# Patient Record
Sex: FEMALE | Race: WHITE | Employment: OTHER
[De-identification: names, ages, dates, MRNs, and addresses within clinical notes are randomized per-mention and may not be internally consistent; named-entity substitution may affect disease eponyms.]

---

## 2017-01-02 DIAGNOSIS — G25.81 RLS (RESTLESS LEGS SYNDROME): ICD-10-CM

## 2017-01-04 ENCOUNTER — OFFICE VISIT (OUTPATIENT)
Dept: INTERNAL MEDICINE | Facility: CLINIC | Age: 57
End: 2017-01-04

## 2017-01-04 VITALS
WEIGHT: 194 LBS | DIASTOLIC BLOOD PRESSURE: 72 MMHG | BODY MASS INDEX: 32.32 KG/M2 | SYSTOLIC BLOOD PRESSURE: 122 MMHG | HEART RATE: 93 BPM

## 2017-01-04 DIAGNOSIS — E10.43 TYPE 1 DIABETES MELLITUS WITH DIABETIC AUTONOMIC NEUROPATHY (HCC): ICD-10-CM

## 2017-01-04 DIAGNOSIS — J32.0 CHRONIC MAXILLARY SINUSITIS: ICD-10-CM

## 2017-01-04 DIAGNOSIS — J44.9 CHRONIC OBSTRUCTIVE PULMONARY DISEASE, UNSPECIFIED COPD TYPE (HCC): Primary | ICD-10-CM

## 2017-01-04 DIAGNOSIS — S09.90XA HEAD INJURIES, INITIAL ENCOUNTER: ICD-10-CM

## 2017-01-04 PROCEDURE — 99214 OFFICE O/P EST MOD 30 MIN: CPT | Performed by: INTERNAL MEDICINE

## 2017-01-04 RX ORDER — LORATADINE 10 MG/1
10 TABLET ORAL DAILY
Qty: 10 TABLET | Refills: 0 | Status: SHIPPED | OUTPATIENT
Start: 2017-01-04 | End: 2017-01-14

## 2017-01-04 RX ORDER — FLUTICASONE PROPIONATE 50 MCG
1 SPRAY, SUSPENSION (ML) NASAL DAILY
Qty: 1 BOTTLE | Refills: 3 | Status: SHIPPED | OUTPATIENT
Start: 2017-01-04 | End: 2017-05-18

## 2017-01-05 RX ORDER — TORSEMIDE 20 MG/1
TABLET ORAL
Qty: 30 TABLET | Refills: 11 | Status: SHIPPED | OUTPATIENT
Start: 2017-01-05 | End: 2018-02-06 | Stop reason: SDUPTHER

## 2017-01-06 ASSESSMENT — ENCOUNTER SYMPTOMS
RHINORRHEA: 1
ABDOMINAL DISTENTION: 0
ABDOMINAL PAIN: 0
BACK PAIN: 0
CHEST TIGHTNESS: 0
EYE DISCHARGE: 0
APNEA: 0
EYE ITCHING: 0

## 2017-01-12 ENCOUNTER — OFFICE VISIT (OUTPATIENT)
Dept: INTERNAL MEDICINE | Facility: CLINIC | Age: 57
End: 2017-01-12

## 2017-01-12 VITALS
HEIGHT: 65 IN | SYSTOLIC BLOOD PRESSURE: 124 MMHG | DIASTOLIC BLOOD PRESSURE: 68 MMHG | BODY MASS INDEX: 33.49 KG/M2 | WEIGHT: 201 LBS

## 2017-01-12 DIAGNOSIS — N60.02 CYST (SOLITARY) OF BREAST, LEFT: ICD-10-CM

## 2017-01-12 DIAGNOSIS — J01.00 ACUTE MAXILLARY SINUSITIS, RECURRENCE NOT SPECIFIED: ICD-10-CM

## 2017-01-12 DIAGNOSIS — Z96.41 INSULIN PUMP IN PLACE: ICD-10-CM

## 2017-01-12 DIAGNOSIS — M48.04 SPINAL STENOSIS, THORACIC: ICD-10-CM

## 2017-01-12 DIAGNOSIS — E13.319 RETINOPATHY DUE TO SECONDARY DM (HCC): ICD-10-CM

## 2017-01-12 DIAGNOSIS — H65.92 LEFT NON-SUPPURATIVE OTITIS MEDIA: ICD-10-CM

## 2017-01-12 DIAGNOSIS — G25.81 RESTLESS LEGS SYNDROME: ICD-10-CM

## 2017-01-12 DIAGNOSIS — E10.43 TYPE 1 DIABETES MELLITUS WITH DIABETIC AUTONOMIC NEUROPATHY, WITH LONG-TERM CURRENT USE OF INSULIN (HCC): ICD-10-CM

## 2017-01-12 DIAGNOSIS — F32.89 OTHER DEPRESSION: ICD-10-CM

## 2017-01-12 DIAGNOSIS — E78.2 MIXED HYPERLIPIDEMIA: ICD-10-CM

## 2017-01-12 DIAGNOSIS — J44.9 CHRONIC OBSTRUCTIVE PULMONARY DISEASE, UNSPECIFIED COPD TYPE (HCC): ICD-10-CM

## 2017-01-12 DIAGNOSIS — E10.43 TYPE 1 DIABETES MELLITUS WITH DIABETIC AUTONOMIC NEUROPATHY (HCC): Primary | ICD-10-CM

## 2017-01-12 DIAGNOSIS — E03.4 HYPOTHYROIDISM DUE TO ACQUIRED ATROPHY OF THYROID: ICD-10-CM

## 2017-01-12 DIAGNOSIS — I10 ESSENTIAL HYPERTENSION: ICD-10-CM

## 2017-01-12 DIAGNOSIS — I73.9 PERIPHERAL VASCULAR DISEASE (HCC): ICD-10-CM

## 2017-01-12 PROCEDURE — 3023F SPIROM DOC REV: CPT | Performed by: INTERNAL MEDICINE

## 2017-01-12 PROCEDURE — G8427 DOCREV CUR MEDS BY ELIG CLIN: HCPCS | Performed by: INTERNAL MEDICINE

## 2017-01-12 PROCEDURE — 3017F COLORECTAL CA SCREEN DOC REV: CPT | Performed by: INTERNAL MEDICINE

## 2017-01-12 PROCEDURE — G8419 CALC BMI OUT NRM PARAM NOF/U: HCPCS | Performed by: INTERNAL MEDICINE

## 2017-01-12 PROCEDURE — G8926 SPIRO NO PERF OR DOC: HCPCS | Performed by: INTERNAL MEDICINE

## 2017-01-12 PROCEDURE — 1036F TOBACCO NON-USER: CPT | Performed by: INTERNAL MEDICINE

## 2017-01-12 PROCEDURE — 3014F SCREEN MAMMO DOC REV: CPT | Performed by: INTERNAL MEDICINE

## 2017-01-12 PROCEDURE — 99214 OFFICE O/P EST MOD 30 MIN: CPT | Performed by: INTERNAL MEDICINE

## 2017-01-12 PROCEDURE — 3045F PR MOST RECENT HEMOGLOBIN A1C LEVEL 7.0-9.0%: CPT | Performed by: INTERNAL MEDICINE

## 2017-01-12 PROCEDURE — G8484 FLU IMMUNIZE NO ADMIN: HCPCS | Performed by: INTERNAL MEDICINE

## 2017-01-12 RX ORDER — AMOXICILLIN AND CLAVULANATE POTASSIUM 875; 125 MG/1; MG/1
1 TABLET, FILM COATED ORAL 2 TIMES DAILY
Qty: 20 TABLET | Refills: 1 | Status: SHIPPED | OUTPATIENT
Start: 2017-01-12 | End: 2017-01-22

## 2017-01-12 RX ORDER — METHYLPREDNISOLONE ACETATE 40 MG/ML
40 INJECTION, SUSPENSION INTRA-ARTICULAR; INTRALESIONAL; INTRAMUSCULAR; SOFT TISSUE ONCE
Qty: 1 ML | Refills: 0
Start: 2017-01-12 | End: 2017-01-12

## 2017-01-12 ASSESSMENT — ENCOUNTER SYMPTOMS
ABDOMINAL PAIN: 0
SHORTNESS OF BREATH: 0

## 2017-01-25 ENCOUNTER — OFFICE VISIT (OUTPATIENT)
Dept: INTERNAL MEDICINE | Facility: CLINIC | Age: 57
End: 2017-01-25

## 2017-01-25 VITALS
HEART RATE: 92 BPM | HEIGHT: 65 IN | WEIGHT: 201 LBS | SYSTOLIC BLOOD PRESSURE: 132 MMHG | BODY MASS INDEX: 33.49 KG/M2 | DIASTOLIC BLOOD PRESSURE: 70 MMHG

## 2017-01-25 DIAGNOSIS — E08.21 DIABETES MELLITUS DUE TO UNDERLYING CONDITION WITH DIABETIC NEPHROPATHY, WITHOUT LONG-TERM CURRENT USE OF INSULIN (HCC): ICD-10-CM

## 2017-01-25 DIAGNOSIS — E10.43 TYPE 1 DIABETES MELLITUS WITH DIABETIC AUTONOMIC NEUROPATHY (HCC): Primary | ICD-10-CM

## 2017-01-25 DIAGNOSIS — R10.84 GENERALIZED ABDOMINAL PAIN: ICD-10-CM

## 2017-01-25 PROCEDURE — G8419 CALC BMI OUT NRM PARAM NOF/U: HCPCS | Performed by: INTERNAL MEDICINE

## 2017-01-25 PROCEDURE — 99213 OFFICE O/P EST LOW 20 MIN: CPT | Performed by: INTERNAL MEDICINE

## 2017-01-25 PROCEDURE — G8484 FLU IMMUNIZE NO ADMIN: HCPCS | Performed by: INTERNAL MEDICINE

## 2017-01-25 PROCEDURE — 1036F TOBACCO NON-USER: CPT | Performed by: INTERNAL MEDICINE

## 2017-01-25 PROCEDURE — 3045F PR MOST RECENT HEMOGLOBIN A1C LEVEL 7.0-9.0%: CPT | Performed by: INTERNAL MEDICINE

## 2017-01-25 PROCEDURE — 3017F COLORECTAL CA SCREEN DOC REV: CPT | Performed by: INTERNAL MEDICINE

## 2017-01-25 PROCEDURE — 3014F SCREEN MAMMO DOC REV: CPT | Performed by: INTERNAL MEDICINE

## 2017-01-25 PROCEDURE — G8427 DOCREV CUR MEDS BY ELIG CLIN: HCPCS | Performed by: INTERNAL MEDICINE

## 2017-01-25 RX ORDER — DICYCLOMINE HYDROCHLORIDE 10 MG/1
10 CAPSULE ORAL 4 TIMES DAILY
Qty: 60 CAPSULE | Refills: 3 | Status: SHIPPED | OUTPATIENT
Start: 2017-01-25 | End: 2017-05-18

## 2017-01-26 ASSESSMENT — ENCOUNTER SYMPTOMS
CHEST TIGHTNESS: 0
ABDOMINAL DISTENTION: 0
EYE ITCHING: 0
ABDOMINAL PAIN: 1
EYE DISCHARGE: 0
BACK PAIN: 0
APNEA: 0
COLOR CHANGE: 0

## 2017-02-16 ENCOUNTER — OFFICE VISIT (OUTPATIENT)
Dept: INTERNAL MEDICINE | Facility: CLINIC | Age: 57
End: 2017-02-16

## 2017-02-16 VITALS
HEIGHT: 65 IN | DIASTOLIC BLOOD PRESSURE: 74 MMHG | WEIGHT: 204 LBS | SYSTOLIC BLOOD PRESSURE: 138 MMHG | BODY MASS INDEX: 33.99 KG/M2

## 2017-02-16 DIAGNOSIS — L02.92 BOIL: ICD-10-CM

## 2017-02-16 DIAGNOSIS — E78.2 MIXED HYPERLIPIDEMIA: ICD-10-CM

## 2017-02-16 DIAGNOSIS — E10.43 TYPE 1 DIABETES MELLITUS WITH DIABETIC AUTONOMIC NEUROPATHY (HCC): Primary | ICD-10-CM

## 2017-02-16 DIAGNOSIS — Z96.41 INSULIN PUMP IN PLACE: ICD-10-CM

## 2017-02-16 DIAGNOSIS — E10.43 TYPE 1 DIABETES MELLITUS WITH DIABETIC AUTONOMIC NEUROPATHY, WITH LONG-TERM CURRENT USE OF INSULIN (HCC): ICD-10-CM

## 2017-02-16 DIAGNOSIS — I10 ESSENTIAL HYPERTENSION: ICD-10-CM

## 2017-02-16 DIAGNOSIS — I73.9 PERIPHERAL VASCULAR DISEASE (HCC): ICD-10-CM

## 2017-02-16 DIAGNOSIS — M54.14 THORACIC RADICULOPATHY: Chronic | ICD-10-CM

## 2017-02-16 DIAGNOSIS — E03.4 HYPOTHYROIDISM DUE TO ACQUIRED ATROPHY OF THYROID: ICD-10-CM

## 2017-02-16 DIAGNOSIS — G89.4 CHRONIC PAIN SYNDROME: Chronic | ICD-10-CM

## 2017-02-16 PROCEDURE — G8484 FLU IMMUNIZE NO ADMIN: HCPCS | Performed by: INTERNAL MEDICINE

## 2017-02-16 PROCEDURE — G8427 DOCREV CUR MEDS BY ELIG CLIN: HCPCS | Performed by: INTERNAL MEDICINE

## 2017-02-16 PROCEDURE — 1036F TOBACCO NON-USER: CPT | Performed by: INTERNAL MEDICINE

## 2017-02-16 PROCEDURE — 99214 OFFICE O/P EST MOD 30 MIN: CPT | Performed by: INTERNAL MEDICINE

## 2017-02-16 PROCEDURE — G8417 CALC BMI ABV UP PARAM F/U: HCPCS | Performed by: INTERNAL MEDICINE

## 2017-02-16 PROCEDURE — 3017F COLORECTAL CA SCREEN DOC REV: CPT | Performed by: INTERNAL MEDICINE

## 2017-02-16 PROCEDURE — 3045F PR MOST RECENT HEMOGLOBIN A1C LEVEL 7.0-9.0%: CPT | Performed by: INTERNAL MEDICINE

## 2017-02-16 PROCEDURE — 3014F SCREEN MAMMO DOC REV: CPT | Performed by: INTERNAL MEDICINE

## 2017-02-16 RX ORDER — DOXYCYCLINE HYCLATE 100 MG/1
100 CAPSULE ORAL 2 TIMES DAILY
Qty: 20 CAPSULE | Refills: 0 | Status: SHIPPED | OUTPATIENT
Start: 2017-02-16 | End: 2017-02-26

## 2017-02-16 RX ORDER — AMOXICILLIN AND CLAVULANATE POTASSIUM 875; 125 MG/1; MG/1
1 TABLET, FILM COATED ORAL 2 TIMES DAILY
Qty: 20 TABLET | Refills: 0 | Status: SHIPPED | OUTPATIENT
Start: 2017-02-16 | End: 2017-02-26

## 2017-02-23 ASSESSMENT — ENCOUNTER SYMPTOMS
SHORTNESS OF BREATH: 0
ABDOMINAL PAIN: 0
ROS SKIN COMMENTS: BOIL

## 2017-03-15 ENCOUNTER — HOSPITAL ENCOUNTER (OUTPATIENT)
Dept: CT IMAGING | Age: 57
Discharge: HOME | End: 2017-03-15
Payer: MEDICARE

## 2017-03-15 DIAGNOSIS — R10.84 GENERALIZED ABDOMINAL PAIN: ICD-10-CM

## 2017-03-15 LAB
BUN BLDV-MCNC: 19 MG/DL (ref 6–20)
CREAT SERPL-MCNC: 0.85 MG/DL (ref 0.5–0.9)
GFR AFRICAN AMERICAN: >60 ML/MIN
GFR NON-AFRICAN AMERICAN: >60 ML/MIN
GFR SERPL CREATININE-BSD FRML MDRD: NORMAL ML/MIN/{1.73_M2}
GFR SERPL CREATININE-BSD FRML MDRD: NORMAL ML/MIN/{1.73_M2}

## 2017-03-15 PROCEDURE — 36415 COLL VENOUS BLD VENIPUNCTURE: CPT

## 2017-03-15 PROCEDURE — 84520 ASSAY OF UREA NITROGEN: CPT

## 2017-03-15 PROCEDURE — 6360000004 HC RX CONTRAST MEDICATION: Performed by: SURGERY

## 2017-03-15 PROCEDURE — 82565 ASSAY OF CREATININE: CPT

## 2017-03-15 PROCEDURE — 74177 CT ABD & PELVIS W/CONTRAST: CPT

## 2017-03-15 RX ORDER — 0.9 % SODIUM CHLORIDE 0.9 %
100 INTRAVENOUS SOLUTION INTRAVENOUS ONCE
Status: DISCONTINUED | OUTPATIENT
Start: 2017-03-15 | End: 2017-03-18 | Stop reason: HOSPADM

## 2017-03-15 RX ORDER — SODIUM CHLORIDE 0.9 % (FLUSH) 0.9 %
10 SYRINGE (ML) INJECTION PRN
Status: DISCONTINUED | OUTPATIENT
Start: 2017-03-15 | End: 2017-03-18 | Stop reason: HOSPADM

## 2017-03-15 RX ADMIN — IOVERSOL 130 ML: 741 INJECTION INTRA-ARTERIAL; INTRAVENOUS at 12:42

## 2017-03-23 ENCOUNTER — OFFICE VISIT (OUTPATIENT)
Dept: INTERNAL MEDICINE CLINIC | Age: 57
End: 2017-03-23
Payer: MEDICARE

## 2017-03-23 VITALS
SYSTOLIC BLOOD PRESSURE: 138 MMHG | DIASTOLIC BLOOD PRESSURE: 74 MMHG | WEIGHT: 202 LBS | HEIGHT: 65 IN | BODY MASS INDEX: 33.66 KG/M2

## 2017-03-23 DIAGNOSIS — F32.89 OTHER DEPRESSION: ICD-10-CM

## 2017-03-23 DIAGNOSIS — I10 ESSENTIAL HYPERTENSION: ICD-10-CM

## 2017-03-23 DIAGNOSIS — Z96.41 INSULIN PUMP IN PLACE: ICD-10-CM

## 2017-03-23 DIAGNOSIS — M25.561 KNEE PAIN, RIGHT ANTERIOR: Primary | ICD-10-CM

## 2017-03-23 DIAGNOSIS — E10.43 TYPE 1 DIABETES MELLITUS WITH DIABETIC AUTONOMIC NEUROPATHY, WITH LONG-TERM CURRENT USE OF INSULIN (HCC): ICD-10-CM

## 2017-03-23 PROCEDURE — G8484 FLU IMMUNIZE NO ADMIN: HCPCS | Performed by: INTERNAL MEDICINE

## 2017-03-23 PROCEDURE — 3017F COLORECTAL CA SCREEN DOC REV: CPT | Performed by: INTERNAL MEDICINE

## 2017-03-23 PROCEDURE — G8427 DOCREV CUR MEDS BY ELIG CLIN: HCPCS | Performed by: INTERNAL MEDICINE

## 2017-03-23 PROCEDURE — 3014F SCREEN MAMMO DOC REV: CPT | Performed by: INTERNAL MEDICINE

## 2017-03-23 PROCEDURE — 1036F TOBACCO NON-USER: CPT | Performed by: INTERNAL MEDICINE

## 2017-03-23 PROCEDURE — 99214 OFFICE O/P EST MOD 30 MIN: CPT | Performed by: INTERNAL MEDICINE

## 2017-03-23 PROCEDURE — 3045F PR MOST RECENT HEMOGLOBIN A1C LEVEL 7.0-9.0%: CPT | Performed by: INTERNAL MEDICINE

## 2017-03-23 PROCEDURE — G8417 CALC BMI ABV UP PARAM F/U: HCPCS | Performed by: INTERNAL MEDICINE

## 2017-03-27 ENCOUNTER — OFFICE VISIT (OUTPATIENT)
Dept: INTERNAL MEDICINE CLINIC | Age: 57
End: 2017-03-27
Payer: MEDICARE

## 2017-03-27 VITALS
HEART RATE: 87 BPM | BODY MASS INDEX: 33.99 KG/M2 | DIASTOLIC BLOOD PRESSURE: 70 MMHG | WEIGHT: 204 LBS | HEIGHT: 65 IN | SYSTOLIC BLOOD PRESSURE: 126 MMHG

## 2017-03-27 DIAGNOSIS — I10 ESSENTIAL HYPERTENSION: ICD-10-CM

## 2017-03-27 DIAGNOSIS — G89.29 CHRONIC PAIN OF RIGHT KNEE: ICD-10-CM

## 2017-03-27 DIAGNOSIS — M70.61 TROCHANTERIC BURSITIS OF RIGHT HIP: ICD-10-CM

## 2017-03-27 DIAGNOSIS — M25.561 CHRONIC PAIN OF RIGHT KNEE: ICD-10-CM

## 2017-03-27 DIAGNOSIS — Z13.9 SCREENING: Primary | ICD-10-CM

## 2017-03-27 LAB — HBA1C MFR BLD: 7.8 %

## 2017-03-27 PROCEDURE — 1036F TOBACCO NON-USER: CPT | Performed by: INTERNAL MEDICINE

## 2017-03-27 PROCEDURE — G8417 CALC BMI ABV UP PARAM F/U: HCPCS | Performed by: INTERNAL MEDICINE

## 2017-03-27 PROCEDURE — G8484 FLU IMMUNIZE NO ADMIN: HCPCS | Performed by: INTERNAL MEDICINE

## 2017-03-27 PROCEDURE — 3017F COLORECTAL CA SCREEN DOC REV: CPT | Performed by: INTERNAL MEDICINE

## 2017-03-27 PROCEDURE — 3014F SCREEN MAMMO DOC REV: CPT | Performed by: INTERNAL MEDICINE

## 2017-03-27 PROCEDURE — G8427 DOCREV CUR MEDS BY ELIG CLIN: HCPCS | Performed by: INTERNAL MEDICINE

## 2017-03-27 PROCEDURE — 99214 OFFICE O/P EST MOD 30 MIN: CPT | Performed by: INTERNAL MEDICINE

## 2017-03-27 ASSESSMENT — ENCOUNTER SYMPTOMS
ANAL BLEEDING: 0
CHEST TIGHTNESS: 0
CHOKING: 0
EYE DISCHARGE: 0
SHORTNESS OF BREATH: 0
APNEA: 0
EYE PAIN: 0
ABDOMINAL PAIN: 0
COUGH: 0
BACK PAIN: 0
EYE ITCHING: 0
ABDOMINAL DISTENTION: 0

## 2017-03-28 RX ORDER — METHYLPREDNISOLONE ACETATE 40 MG/ML
40 INJECTION, SUSPENSION INTRA-ARTICULAR; INTRALESIONAL; INTRAMUSCULAR; SOFT TISSUE ONCE
Qty: 1 ML | Refills: 0
Start: 2017-03-28 | End: 2017-03-28

## 2017-03-28 ASSESSMENT — ENCOUNTER SYMPTOMS: COLOR CHANGE: 0

## 2017-03-31 ENCOUNTER — HOSPITAL ENCOUNTER (OUTPATIENT)
Dept: PREADMISSION TESTING | Age: 57
Discharge: HOME | End: 2017-03-31
Payer: MEDICARE

## 2017-03-31 ENCOUNTER — HOSPITAL ENCOUNTER (OUTPATIENT)
Dept: GENERAL RADIOLOGY | Age: 57
Discharge: HOME | End: 2017-03-31
Payer: MEDICARE

## 2017-03-31 VITALS
HEIGHT: 64 IN | OXYGEN SATURATION: 96 % | SYSTOLIC BLOOD PRESSURE: 152 MMHG | DIASTOLIC BLOOD PRESSURE: 70 MMHG | BODY MASS INDEX: 34.85 KG/M2 | WEIGHT: 204.15 LBS | HEART RATE: 79 BPM | RESPIRATION RATE: 16 BRPM

## 2017-03-31 LAB
ABSOLUTE EOS #: 0.3 K/UL (ref 0–0.4)
ABSOLUTE LYMPH #: 2.7 K/UL (ref 1–4.8)
ABSOLUTE MONO #: 0.8 K/UL (ref 0.2–0.8)
ANION GAP SERPL CALCULATED.3IONS-SCNC: 14 MMOL/L (ref 9–17)
BASOPHILS # BLD: 1 % (ref 0–2)
BASOPHILS ABSOLUTE: 0 K/UL (ref 0–0.2)
BUN BLDV-MCNC: 11 MG/DL (ref 6–20)
BUN/CREAT BLD: 11 (ref 9–20)
CALCIUM SERPL-MCNC: 9.2 MG/DL (ref 8.6–10.4)
CHLORIDE BLD-SCNC: 96 MMOL/L (ref 98–107)
CO2: 28 MMOL/L (ref 20–31)
CREAT SERPL-MCNC: 1.04 MG/DL (ref 0.5–0.9)
DIFFERENTIAL TYPE: ABNORMAL
EKG ATRIAL RATE: 72 BPM
EKG P AXIS: 39 DEGREES
EKG P-R INTERVAL: 178 MS
EKG Q-T INTERVAL: 396 MS
EKG QRS DURATION: 80 MS
EKG QTC CALCULATION (BAZETT): 433 MS
EKG R AXIS: 70 DEGREES
EKG T AXIS: 55 DEGREES
EKG VENTRICULAR RATE: 72 BPM
EOSINOPHILS RELATIVE PERCENT: 3 % (ref 1–4)
GFR AFRICAN AMERICAN: >60 ML/MIN
GFR NON-AFRICAN AMERICAN: 55 ML/MIN
GFR SERPL CREATININE-BSD FRML MDRD: ABNORMAL ML/MIN/{1.73_M2}
GFR SERPL CREATININE-BSD FRML MDRD: ABNORMAL ML/MIN/{1.73_M2}
GLUCOSE BLD-MCNC: 215 MG/DL (ref 70–99)
HCT VFR BLD CALC: 39.5 % (ref 36–46)
HEMOGLOBIN: 13.1 G/DL (ref 12–16)
LYMPHOCYTES # BLD: 26 % (ref 24–44)
MCH RBC QN AUTO: 29.9 PG (ref 26–34)
MCHC RBC AUTO-ENTMCNC: 33.3 G/DL (ref 31–37)
MCV RBC AUTO: 89.8 FL (ref 80–100)
MONOCYTES # BLD: 7 % (ref 1–7)
PDW BLD-RTO: 14.6 % (ref 11.5–14.5)
PLATELET # BLD: 234 K/UL (ref 130–400)
PLATELET ESTIMATE: ABNORMAL
PMV BLD AUTO: 7.8 FL (ref 6–12)
POTASSIUM SERPL-SCNC: 4.5 MMOL/L (ref 3.7–5.3)
RBC # BLD: 4.4 M/UL (ref 4–5.2)
RBC # BLD: ABNORMAL 10*6/UL
SEG NEUTROPHILS: 63 % (ref 36–66)
SEGMENTED NEUTROPHILS ABSOLUTE COUNT: 6.4 K/UL (ref 1.8–7.7)
SODIUM BLD-SCNC: 138 MMOL/L (ref 135–144)
WBC # BLD: 10.2 K/UL (ref 3.5–11)
WBC # BLD: ABNORMAL 10*3/UL

## 2017-03-31 PROCEDURE — 85025 COMPLETE CBC W/AUTO DIFF WBC: CPT

## 2017-03-31 PROCEDURE — 93005 ELECTROCARDIOGRAM TRACING: CPT

## 2017-03-31 PROCEDURE — 36415 COLL VENOUS BLD VENIPUNCTURE: CPT

## 2017-03-31 PROCEDURE — 71020 XR CHEST STANDARD TWO VW: CPT

## 2017-03-31 PROCEDURE — 80048 BASIC METABOLIC PNL TOTAL CA: CPT

## 2017-04-05 ENCOUNTER — HOSPITAL ENCOUNTER (OUTPATIENT)
Dept: GENERAL RADIOLOGY | Age: 57
Discharge: HOME | End: 2017-04-05
Payer: MEDICARE

## 2017-04-05 ENCOUNTER — HOSPITAL ENCOUNTER (OUTPATIENT)
Dept: MRI IMAGING | Age: 57
Discharge: HOME | End: 2017-04-05
Payer: MEDICARE

## 2017-04-05 ENCOUNTER — HOSPITAL ENCOUNTER (EMERGENCY)
Age: 57
Discharge: HOME | End: 2017-04-05
Attending: EMERGENCY MEDICINE
Payer: MEDICARE

## 2017-04-05 VITALS
TEMPERATURE: 97.8 F | HEART RATE: 70 BPM | RESPIRATION RATE: 16 BRPM | OXYGEN SATURATION: 93 % | SYSTOLIC BLOOD PRESSURE: 150 MMHG | DIASTOLIC BLOOD PRESSURE: 71 MMHG | HEIGHT: 65 IN | WEIGHT: 200 LBS | BODY MASS INDEX: 33.32 KG/M2

## 2017-04-05 DIAGNOSIS — M25.561 CHRONIC PAIN OF BOTH KNEES: Primary | ICD-10-CM

## 2017-04-05 DIAGNOSIS — M25.562 CHRONIC PAIN OF BOTH KNEES: Primary | ICD-10-CM

## 2017-04-05 DIAGNOSIS — M25.561 KNEE PAIN, RIGHT ANTERIOR: ICD-10-CM

## 2017-04-05 DIAGNOSIS — G89.29 CHRONIC PAIN OF BOTH KNEES: Primary | ICD-10-CM

## 2017-04-05 LAB
CHP ED QC CHECK: NORMAL
GLUCOSE BLD-MCNC: 82 MG/DL
GLUCOSE BLD-MCNC: 82 MG/DL (ref 65–105)

## 2017-04-05 PROCEDURE — 96372 THER/PROPH/DIAG INJ SC/IM: CPT

## 2017-04-05 PROCEDURE — 73564 X-RAY EXAM KNEE 4 OR MORE: CPT

## 2017-04-05 PROCEDURE — 6360000002 HC RX W HCPCS: Performed by: PHYSICIAN ASSISTANT

## 2017-04-05 PROCEDURE — 99283 EMERGENCY DEPT VISIT LOW MDM: CPT

## 2017-04-05 PROCEDURE — 73721 MRI JNT OF LWR EXTRE W/O DYE: CPT

## 2017-04-05 PROCEDURE — 82947 ASSAY GLUCOSE BLOOD QUANT: CPT

## 2017-04-05 RX ORDER — METOCLOPRAMIDE HYDROCHLORIDE 5 MG/ML
10 INJECTION INTRAMUSCULAR; INTRAVENOUS ONCE
Status: COMPLETED | OUTPATIENT
Start: 2017-04-05 | End: 2017-04-05

## 2017-04-05 RX ADMIN — METOCLOPRAMIDE 10 MG: 5 INJECTION, SOLUTION INTRAMUSCULAR; INTRAVENOUS at 20:30

## 2017-04-05 RX ADMIN — HYDROMORPHONE HYDROCHLORIDE 1 MG: 1 INJECTION, SOLUTION INTRAMUSCULAR; INTRAVENOUS; SUBCUTANEOUS at 20:30

## 2017-04-05 ASSESSMENT — PAIN DESCRIPTION - ORIENTATION: ORIENTATION: RIGHT

## 2017-04-05 ASSESSMENT — PAIN SCALES - GENERAL
PAINLEVEL_OUTOF10: 8
PAINLEVEL_OUTOF10: 8
PAINLEVEL_OUTOF10: 5

## 2017-04-05 ASSESSMENT — PAIN DESCRIPTION - PAIN TYPE: TYPE: ACUTE PAIN

## 2017-04-05 ASSESSMENT — PAIN DESCRIPTION - DESCRIPTORS: DESCRIPTORS: ACHING;THROBBING

## 2017-04-05 ASSESSMENT — PAIN DESCRIPTION - LOCATION: LOCATION: KNEE

## 2017-04-06 ENCOUNTER — OFFICE VISIT (OUTPATIENT)
Dept: INTERNAL MEDICINE CLINIC | Age: 57
End: 2017-04-06
Payer: MEDICARE

## 2017-04-06 VITALS
BODY MASS INDEX: 33.49 KG/M2 | HEIGHT: 65 IN | DIASTOLIC BLOOD PRESSURE: 64 MMHG | WEIGHT: 201 LBS | SYSTOLIC BLOOD PRESSURE: 118 MMHG

## 2017-04-06 DIAGNOSIS — M76.891 TENDINITIS OF RIGHT KNEE: ICD-10-CM

## 2017-04-06 DIAGNOSIS — M17.11 ARTHRITIS OF KNEE, RIGHT: Primary | ICD-10-CM

## 2017-04-06 PROCEDURE — 3014F SCREEN MAMMO DOC REV: CPT | Performed by: INTERNAL MEDICINE

## 2017-04-06 PROCEDURE — 1036F TOBACCO NON-USER: CPT | Performed by: INTERNAL MEDICINE

## 2017-04-06 PROCEDURE — 3017F COLORECTAL CA SCREEN DOC REV: CPT | Performed by: INTERNAL MEDICINE

## 2017-04-06 PROCEDURE — G8427 DOCREV CUR MEDS BY ELIG CLIN: HCPCS | Performed by: INTERNAL MEDICINE

## 2017-04-06 PROCEDURE — G8417 CALC BMI ABV UP PARAM F/U: HCPCS | Performed by: INTERNAL MEDICINE

## 2017-04-06 PROCEDURE — 99214 OFFICE O/P EST MOD 30 MIN: CPT | Performed by: INTERNAL MEDICINE

## 2017-04-06 RX ORDER — HYDROCODONE BITARTRATE AND ACETAMINOPHEN 5; 325 MG/1; MG/1
1 TABLET ORAL 2 TIMES DAILY PRN
Qty: 60 TABLET | Refills: 0 | Status: ON HOLD | OUTPATIENT
Start: 2017-04-06 | End: 2017-06-19 | Stop reason: ALTCHOICE

## 2017-04-06 RX ORDER — PREDNISONE 20 MG/1
20 TABLET ORAL DAILY
Qty: 7 TABLET | Refills: 0 | Status: SHIPPED | OUTPATIENT
Start: 2017-04-06 | End: 2017-05-18

## 2017-04-09 ASSESSMENT — ENCOUNTER SYMPTOMS
SHORTNESS OF BREATH: 0
ABDOMINAL PAIN: 0

## 2017-04-17 ENCOUNTER — OFFICE VISIT (OUTPATIENT)
Dept: INTERNAL MEDICINE CLINIC | Age: 57
End: 2017-04-17
Payer: MEDICARE

## 2017-04-17 VITALS
BODY MASS INDEX: 33.66 KG/M2 | DIASTOLIC BLOOD PRESSURE: 78 MMHG | SYSTOLIC BLOOD PRESSURE: 130 MMHG | RESPIRATION RATE: 14 BRPM | WEIGHT: 202 LBS | HEIGHT: 65 IN

## 2017-04-17 DIAGNOSIS — K43.2 INCISIONAL HERNIA, WITHOUT OBSTRUCTION OR GANGRENE: ICD-10-CM

## 2017-04-17 DIAGNOSIS — M54.14 THORACIC RADICULOPATHY: Chronic | ICD-10-CM

## 2017-04-17 DIAGNOSIS — J20.8 ACUTE BRONCHITIS DUE TO OTHER SPECIFIED ORGANISMS: ICD-10-CM

## 2017-04-17 DIAGNOSIS — E10.43 TYPE 1 DIABETES MELLITUS WITH DIABETIC AUTONOMIC NEUROPATHY, WITH LONG-TERM CURRENT USE OF INSULIN (HCC): Primary | ICD-10-CM

## 2017-04-17 PROBLEM — J20.9 ACUTE BRONCHITIS: Status: ACTIVE | Noted: 2017-04-17

## 2017-04-17 PROCEDURE — 3014F SCREEN MAMMO DOC REV: CPT | Performed by: INTERNAL MEDICINE

## 2017-04-17 PROCEDURE — 1036F TOBACCO NON-USER: CPT | Performed by: INTERNAL MEDICINE

## 2017-04-17 PROCEDURE — G8417 CALC BMI ABV UP PARAM F/U: HCPCS | Performed by: INTERNAL MEDICINE

## 2017-04-17 PROCEDURE — G8427 DOCREV CUR MEDS BY ELIG CLIN: HCPCS | Performed by: INTERNAL MEDICINE

## 2017-04-17 PROCEDURE — 99214 OFFICE O/P EST MOD 30 MIN: CPT | Performed by: INTERNAL MEDICINE

## 2017-04-17 PROCEDURE — 3017F COLORECTAL CA SCREEN DOC REV: CPT | Performed by: INTERNAL MEDICINE

## 2017-04-17 PROCEDURE — 3045F PR MOST RECENT HEMOGLOBIN A1C LEVEL 7.0-9.0%: CPT | Performed by: INTERNAL MEDICINE

## 2017-04-17 RX ORDER — CEFUROXIME AXETIL 250 MG/1
250 TABLET ORAL 2 TIMES DAILY
Qty: 14 TABLET | Refills: 0 | Status: SHIPPED | OUTPATIENT
Start: 2017-04-17 | End: 2017-04-24

## 2017-04-17 RX ORDER — CEFTRIAXONE 500 MG/1
500 INJECTION, POWDER, FOR SOLUTION INTRAMUSCULAR; INTRAVENOUS ONCE
Qty: 0.5 G | Refills: 0
Start: 2017-04-17 | End: 2017-04-17

## 2017-04-17 ASSESSMENT — ENCOUNTER SYMPTOMS
RHINORRHEA: 0
ABDOMINAL PAIN: 1
SINUS PAIN: 0
DIARRHEA: 0
COLOR CHANGE: 0
NAUSEA: 0
SHORTNESS OF BREATH: 0
COUGH: 1
CONSTIPATION: 0
CHEST TIGHTNESS: 0
EYE PAIN: 0
TROUBLE SWALLOWING: 0
BACK PAIN: 0
EYE DISCHARGE: 0

## 2017-04-18 DIAGNOSIS — Z51.81 ENCOUNTER FOR THERAPEUTIC DRUG MONITORING: Primary | ICD-10-CM

## 2017-04-18 DIAGNOSIS — G25.81 RESTLESS LEGS SYNDROME: ICD-10-CM

## 2017-04-18 RX ORDER — CLONAZEPAM 0.5 MG/1
TABLET ORAL
Qty: 60 TABLET | Refills: 2 | Status: SHIPPED | OUTPATIENT
Start: 2017-04-18 | End: 2017-10-09 | Stop reason: SDUPTHER

## 2017-04-26 DIAGNOSIS — G89.4 CHRONIC PAIN SYNDROME: Chronic | ICD-10-CM

## 2017-04-27 RX ORDER — LOSARTAN POTASSIUM 100 MG/1
TABLET ORAL
Qty: 30 TABLET | Refills: 11 | Status: SHIPPED | OUTPATIENT
Start: 2017-04-27 | End: 2018-03-16 | Stop reason: SDUPTHER

## 2017-04-27 RX ORDER — ROPINIROLE 2 MG/1
TABLET, FILM COATED ORAL
Qty: 30 TABLET | Refills: 11 | Status: SHIPPED | OUTPATIENT
Start: 2017-04-27 | End: 2018-05-01 | Stop reason: SDUPTHER

## 2017-04-27 RX ORDER — GABAPENTIN 800 MG/1
TABLET ORAL
Qty: 60 TABLET | Refills: 5 | Status: SHIPPED | OUTPATIENT
Start: 2017-04-27 | End: 2017-11-28 | Stop reason: SDUPTHER

## 2017-05-18 ENCOUNTER — HOSPITAL ENCOUNTER (OUTPATIENT)
Age: 57
Setting detail: SPECIMEN
Discharge: HOME | End: 2017-05-18
Payer: MEDICARE

## 2017-05-18 ENCOUNTER — OFFICE VISIT (OUTPATIENT)
Dept: INTERNAL MEDICINE CLINIC | Age: 57
End: 2017-05-18
Payer: MEDICARE

## 2017-05-18 VITALS
SYSTOLIC BLOOD PRESSURE: 118 MMHG | BODY MASS INDEX: 33.15 KG/M2 | HEIGHT: 65 IN | DIASTOLIC BLOOD PRESSURE: 72 MMHG | WEIGHT: 199 LBS

## 2017-05-18 DIAGNOSIS — E10.43 TYPE 1 DIABETES MELLITUS WITH DIABETIC AUTONOMIC NEUROPATHY, WITH LONG-TERM CURRENT USE OF INSULIN (HCC): ICD-10-CM

## 2017-05-18 DIAGNOSIS — M25.561 CHRONIC PAIN OF RIGHT KNEE: ICD-10-CM

## 2017-05-18 DIAGNOSIS — F51.01 PRIMARY INSOMNIA: ICD-10-CM

## 2017-05-18 DIAGNOSIS — E13.319 RETINOPATHY DUE TO SECONDARY DM (HCC): ICD-10-CM

## 2017-05-18 DIAGNOSIS — E55.9 VITAMIN D DEFICIENCY: ICD-10-CM

## 2017-05-18 DIAGNOSIS — Z96.41 INSULIN PUMP IN PLACE: ICD-10-CM

## 2017-05-18 DIAGNOSIS — G25.81 RESTLESS LEGS SYNDROME: ICD-10-CM

## 2017-05-18 DIAGNOSIS — K43.2 INCISIONAL HERNIA, WITHOUT OBSTRUCTION OR GANGRENE: ICD-10-CM

## 2017-05-18 DIAGNOSIS — K21.9 GASTROESOPHAGEAL REFLUX DISEASE WITHOUT ESOPHAGITIS: ICD-10-CM

## 2017-05-18 DIAGNOSIS — I10 ESSENTIAL HYPERTENSION: ICD-10-CM

## 2017-05-18 DIAGNOSIS — M48.04 SPINAL STENOSIS, THORACIC: ICD-10-CM

## 2017-05-18 DIAGNOSIS — G89.29 CHRONIC PAIN OF RIGHT KNEE: ICD-10-CM

## 2017-05-18 DIAGNOSIS — E78.2 MIXED HYPERLIPIDEMIA: ICD-10-CM

## 2017-05-18 DIAGNOSIS — I73.9 PERIPHERAL VASCULAR DISEASE (HCC): Primary | ICD-10-CM

## 2017-05-18 DIAGNOSIS — E03.4 HYPOTHYROIDISM DUE TO ACQUIRED ATROPHY OF THYROID: ICD-10-CM

## 2017-05-18 PROBLEM — J20.9 ACUTE BRONCHITIS: Status: RESOLVED | Noted: 2017-04-17 | Resolved: 2017-05-18

## 2017-05-18 PROBLEM — M70.61 TROCHANTERIC BURSITIS OF RIGHT HIP: Status: RESOLVED | Noted: 2017-03-27 | Resolved: 2017-05-18

## 2017-05-18 LAB
CREATININE URINE: 10.9 MG/DL (ref 28–217)
MICROALBUMIN/CREAT 24H UR: <12 MG/L
MICROALBUMIN/CREAT UR-RTO: 110 MCG/MG CREAT

## 2017-05-18 PROCEDURE — 3014F SCREEN MAMMO DOC REV: CPT | Performed by: INTERNAL MEDICINE

## 2017-05-18 PROCEDURE — 1036F TOBACCO NON-USER: CPT | Performed by: INTERNAL MEDICINE

## 2017-05-18 PROCEDURE — 3045F PR MOST RECENT HEMOGLOBIN A1C LEVEL 7.0-9.0%: CPT | Performed by: INTERNAL MEDICINE

## 2017-05-18 PROCEDURE — 3017F COLORECTAL CA SCREEN DOC REV: CPT | Performed by: INTERNAL MEDICINE

## 2017-05-18 PROCEDURE — G8417 CALC BMI ABV UP PARAM F/U: HCPCS | Performed by: INTERNAL MEDICINE

## 2017-05-18 PROCEDURE — G8427 DOCREV CUR MEDS BY ELIG CLIN: HCPCS | Performed by: INTERNAL MEDICINE

## 2017-05-18 PROCEDURE — 99215 OFFICE O/P EST HI 40 MIN: CPT | Performed by: INTERNAL MEDICINE

## 2017-05-18 RX ORDER — AMITRIPTYLINE HYDROCHLORIDE 150 MG/1
75 TABLET, FILM COATED ORAL NIGHTLY
Qty: 30 TABLET | Refills: 10 | Status: ON HOLD | OUTPATIENT
Start: 2017-05-18 | End: 2017-06-01 | Stop reason: HOSPADM

## 2017-05-18 RX ORDER — PANTOPRAZOLE SODIUM 40 MG/1
40 TABLET, DELAYED RELEASE ORAL DAILY
Qty: 30 TABLET | Refills: 5 | Status: SHIPPED | COMMUNITY
Start: 2017-05-18 | End: 2017-07-13 | Stop reason: SDUPTHER

## 2017-05-18 ASSESSMENT — PATIENT HEALTH QUESTIONNAIRE - PHQ9
1. LITTLE INTEREST OR PLEASURE IN DOING THINGS: 1
2. FEELING DOWN, DEPRESSED OR HOPELESS: 1
SUM OF ALL RESPONSES TO PHQ QUESTIONS 1-9: 2
SUM OF ALL RESPONSES TO PHQ9 QUESTIONS 1 & 2: 2

## 2017-05-19 ASSESSMENT — ENCOUNTER SYMPTOMS
BACK PAIN: 1
SHORTNESS OF BREATH: 1
ABDOMINAL PAIN: 0
HEARTBURN: 1

## 2017-05-25 ENCOUNTER — OFFICE VISIT (OUTPATIENT)
Dept: INTERNAL MEDICINE CLINIC | Age: 57
End: 2017-05-25
Payer: MEDICARE

## 2017-05-25 VITALS
HEIGHT: 65 IN | BODY MASS INDEX: 32.99 KG/M2 | SYSTOLIC BLOOD PRESSURE: 134 MMHG | DIASTOLIC BLOOD PRESSURE: 64 MMHG | WEIGHT: 198 LBS

## 2017-05-25 DIAGNOSIS — T78.40XD ALLERGY, SUBSEQUENT ENCOUNTER: Primary | ICD-10-CM

## 2017-05-25 DIAGNOSIS — J06.9 ACUTE URI: ICD-10-CM

## 2017-05-25 DIAGNOSIS — I10 ESSENTIAL HYPERTENSION: ICD-10-CM

## 2017-05-25 DIAGNOSIS — H91.92 HEARING LOSS IN LEFT EAR: ICD-10-CM

## 2017-05-25 PROCEDURE — 3017F COLORECTAL CA SCREEN DOC REV: CPT | Performed by: INTERNAL MEDICINE

## 2017-05-25 PROCEDURE — 3014F SCREEN MAMMO DOC REV: CPT | Performed by: INTERNAL MEDICINE

## 2017-05-25 PROCEDURE — 99213 OFFICE O/P EST LOW 20 MIN: CPT | Performed by: INTERNAL MEDICINE

## 2017-05-25 PROCEDURE — G8417 CALC BMI ABV UP PARAM F/U: HCPCS | Performed by: INTERNAL MEDICINE

## 2017-05-25 PROCEDURE — G8427 DOCREV CUR MEDS BY ELIG CLIN: HCPCS | Performed by: INTERNAL MEDICINE

## 2017-05-25 PROCEDURE — 1036F TOBACCO NON-USER: CPT | Performed by: INTERNAL MEDICINE

## 2017-05-25 RX ORDER — CETIRIZINE HYDROCHLORIDE 10 MG/1
10 TABLET ORAL DAILY
Qty: 30 TABLET | Refills: 0 | Status: SHIPPED | OUTPATIENT
Start: 2017-05-25 | End: 2017-07-13 | Stop reason: SDUPTHER

## 2017-05-26 ENCOUNTER — APPOINTMENT (OUTPATIENT)
Dept: GENERAL RADIOLOGY | Age: 57
DRG: 189 | End: 2017-05-26
Payer: MEDICARE

## 2017-05-26 ENCOUNTER — APPOINTMENT (OUTPATIENT)
Dept: CT IMAGING | Age: 57
DRG: 189 | End: 2017-05-26
Payer: MEDICARE

## 2017-05-26 ENCOUNTER — HOSPITAL ENCOUNTER (INPATIENT)
Age: 57
LOS: 6 days | Discharge: NURSING, REHAB OR LONG-TERM CARE | DRG: 189 | End: 2017-06-01
Attending: EMERGENCY MEDICINE | Admitting: FAMILY MEDICINE
Payer: MEDICARE

## 2017-05-26 ENCOUNTER — TELEPHONE (OUTPATIENT)
Dept: INTERNAL MEDICINE CLINIC | Age: 57
End: 2017-05-26

## 2017-05-26 DIAGNOSIS — J44.1 COPD EXACERBATION (HCC): Primary | ICD-10-CM

## 2017-05-26 DIAGNOSIS — R09.02 HYPOXIA: ICD-10-CM

## 2017-05-26 LAB
-: ABNORMAL
ABSOLUTE EOS #: 0.3 K/UL (ref 0–0.4)
ABSOLUTE LYMPH #: 1.2 K/UL (ref 1–4.8)
ABSOLUTE MONO #: 0.5 K/UL (ref 0.1–1.2)
AMORPHOUS: ABNORMAL
ANION GAP SERPL CALCULATED.3IONS-SCNC: 13 MMOL/L (ref 9–17)
BACTERIA: ABNORMAL
BASOPHILS # BLD: 1 %
BASOPHILS ABSOLUTE: 0.1 K/UL (ref 0–0.2)
BILIRUBIN URINE: NEGATIVE
BNP INTERPRETATION: NORMAL
BUN BLDV-MCNC: 10 MG/DL (ref 6–20)
BUN/CREAT BLD: ABNORMAL (ref 9–20)
CALCIUM SERPL-MCNC: 9.3 MG/DL (ref 8.6–10.4)
CASTS UA: ABNORMAL /LPF (ref 0–2)
CHLORIDE BLD-SCNC: 102 MMOL/L (ref 98–107)
CO2: 25 MMOL/L (ref 20–31)
COLOR: YELLOW
CREAT SERPL-MCNC: 0.86 MG/DL (ref 0.5–0.9)
CRYSTALS, UA: ABNORMAL /HPF
D-DIMER QUANTITATIVE: 0.27 MG/L FEU
DIFFERENTIAL TYPE: ABNORMAL
EOSINOPHILS RELATIVE PERCENT: 3 %
EPITHELIAL CELLS UA: ABNORMAL /HPF (ref 0–5)
GFR AFRICAN AMERICAN: >60 ML/MIN
GFR NON-AFRICAN AMERICAN: >60 ML/MIN
GFR SERPL CREATININE-BSD FRML MDRD: ABNORMAL ML/MIN/{1.73_M2}
GFR SERPL CREATININE-BSD FRML MDRD: ABNORMAL ML/MIN/{1.73_M2}
GLUCOSE BLD-MCNC: 129 MG/DL (ref 70–99)
GLUCOSE BLD-MCNC: 285 MG/DL (ref 65–105)
GLUCOSE BLD-MCNC: 311 MG/DL (ref 65–105)
GLUCOSE BLD-MCNC: 341 MG/DL (ref 65–105)
GLUCOSE URINE: NEGATIVE
HCT VFR BLD CALC: 40.3 % (ref 36–46)
HEMOGLOBIN: 13.5 G/DL (ref 12–16)
KETONES, URINE: NEGATIVE
LEUKOCYTE ESTERASE, URINE: NEGATIVE
LYMPHOCYTES # BLD: 11 %
MCH RBC QN AUTO: 29.5 PG (ref 26–34)
MCHC RBC AUTO-ENTMCNC: 33.5 G/DL (ref 31–37)
MCV RBC AUTO: 88 FL (ref 80–100)
MONOCYTES # BLD: 5 %
MUCUS: ABNORMAL
NITRITE, URINE: NEGATIVE
OTHER OBSERVATIONS UA: ABNORMAL
PDW BLD-RTO: 14.5 % (ref 12.5–15.4)
PH UA: 6 (ref 5–8)
PLATELET # BLD: 220 K/UL (ref 140–450)
PLATELET ESTIMATE: ABNORMAL
PMV BLD AUTO: 8.5 FL (ref 6–12)
POC TROPONIN I: 0 NG/ML (ref 0–0.1)
POC TROPONIN I: 0.01 NG/ML (ref 0–0.1)
POC TROPONIN INTERP: NORMAL
POC TROPONIN INTERP: NORMAL
POTASSIUM SERPL-SCNC: 4.3 MMOL/L (ref 3.7–5.3)
PRO-BNP: 151 PG/ML
PROTEIN UA: NEGATIVE
RBC # BLD: 4.59 M/UL (ref 4–5.2)
RBC # BLD: ABNORMAL 10*6/UL
RBC UA: ABNORMAL /HPF (ref 0–2)
RENAL EPITHELIAL, UA: ABNORMAL /HPF
SEG NEUTROPHILS: 80 %
SEGMENTED NEUTROPHILS ABSOLUTE COUNT: 8.7 K/UL (ref 1.8–7.7)
SODIUM BLD-SCNC: 140 MMOL/L (ref 135–144)
SPECIFIC GRAVITY UA: 1.01 (ref 1–1.03)
TRICHOMONAS: ABNORMAL
TURBIDITY: CLEAR
URINE HGB: NEGATIVE
UROBILINOGEN, URINE: NORMAL
WBC # BLD: 10.8 K/UL (ref 3.5–11)
WBC # BLD: ABNORMAL 10*3/UL
WBC UA: ABNORMAL /HPF (ref 0–5)
YEAST: ABNORMAL

## 2017-05-26 PROCEDURE — 93005 ELECTROCARDIOGRAM TRACING: CPT

## 2017-05-26 PROCEDURE — 85379 FIBRIN DEGRADATION QUANT: CPT

## 2017-05-26 PROCEDURE — 71020 XR CHEST STANDARD TWO VW: CPT

## 2017-05-26 PROCEDURE — 85025 COMPLETE CBC W/AUTO DIFF WBC: CPT

## 2017-05-26 PROCEDURE — 6370000000 HC RX 637 (ALT 250 FOR IP): Performed by: FAMILY MEDICINE

## 2017-05-26 PROCEDURE — 2580000003 HC RX 258: Performed by: FAMILY MEDICINE

## 2017-05-26 PROCEDURE — 6360000002 HC RX W HCPCS: Performed by: FAMILY MEDICINE

## 2017-05-26 PROCEDURE — 82947 ASSAY GLUCOSE BLOOD QUANT: CPT

## 2017-05-26 PROCEDURE — 94664 DEMO&/EVAL PT USE INHALER: CPT

## 2017-05-26 PROCEDURE — 96374 THER/PROPH/DIAG INJ IV PUSH: CPT

## 2017-05-26 PROCEDURE — 87086 URINE CULTURE/COLONY COUNT: CPT

## 2017-05-26 PROCEDURE — 6360000002 HC RX W HCPCS: Performed by: EMERGENCY MEDICINE

## 2017-05-26 PROCEDURE — 84484 ASSAY OF TROPONIN QUANT: CPT

## 2017-05-26 PROCEDURE — 83880 ASSAY OF NATRIURETIC PEPTIDE: CPT

## 2017-05-26 PROCEDURE — 99285 EMERGENCY DEPT VISIT HI MDM: CPT

## 2017-05-26 PROCEDURE — 70480 CT ORBIT/EAR/FOSSA W/O DYE: CPT

## 2017-05-26 PROCEDURE — 80048 BASIC METABOLIC PNL TOTAL CA: CPT

## 2017-05-26 PROCEDURE — 6370000000 HC RX 637 (ALT 250 FOR IP): Performed by: EMERGENCY MEDICINE

## 2017-05-26 PROCEDURE — P9612 CATHETERIZE FOR URINE SPEC: HCPCS

## 2017-05-26 PROCEDURE — 1200000000 HC SEMI PRIVATE

## 2017-05-26 PROCEDURE — 81001 URINALYSIS AUTO W/SCOPE: CPT

## 2017-05-26 PROCEDURE — 96376 TX/PRO/DX INJ SAME DRUG ADON: CPT

## 2017-05-26 PROCEDURE — 94640 AIRWAY INHALATION TREATMENT: CPT

## 2017-05-26 PROCEDURE — 96375 TX/PRO/DX INJ NEW DRUG ADDON: CPT

## 2017-05-26 PROCEDURE — 99223 1ST HOSP IP/OBS HIGH 75: CPT | Performed by: FAMILY MEDICINE

## 2017-05-26 RX ORDER — CLONAZEPAM 0.5 MG/1
0.5 TABLET ORAL 2 TIMES DAILY PRN
Status: DISCONTINUED | OUTPATIENT
Start: 2017-05-26 | End: 2017-05-31

## 2017-05-26 RX ORDER — DOXYCYCLINE HYCLATE 100 MG
100 TABLET ORAL EVERY 12 HOURS
Status: DISCONTINUED | OUTPATIENT
Start: 2017-05-26 | End: 2017-05-27

## 2017-05-26 RX ORDER — METOCLOPRAMIDE 5 MG/1
5 TABLET ORAL 2 TIMES DAILY
Status: DISCONTINUED | OUTPATIENT
Start: 2017-05-26 | End: 2017-06-01 | Stop reason: HOSPADM

## 2017-05-26 RX ORDER — BENZONATATE 100 MG/1
100 CAPSULE ORAL 3 TIMES DAILY PRN
Status: DISCONTINUED | OUTPATIENT
Start: 2017-05-26 | End: 2017-06-01 | Stop reason: HOSPADM

## 2017-05-26 RX ORDER — ASPIRIN 81 MG/1
324 TABLET, CHEWABLE ORAL ONCE
Status: COMPLETED | OUTPATIENT
Start: 2017-05-26 | End: 2017-05-26

## 2017-05-26 RX ORDER — ALBUTEROL SULFATE 2.5 MG/3ML
5 SOLUTION RESPIRATORY (INHALATION)
Status: DISCONTINUED | OUTPATIENT
Start: 2017-05-26 | End: 2017-05-26

## 2017-05-26 RX ORDER — GABAPENTIN 800 MG/1
800 TABLET ORAL 2 TIMES DAILY
Status: DISCONTINUED | OUTPATIENT
Start: 2017-05-26 | End: 2017-06-01 | Stop reason: HOSPADM

## 2017-05-26 RX ORDER — IPRATROPIUM BROMIDE AND ALBUTEROL SULFATE 2.5; .5 MG/3ML; MG/3ML
1 SOLUTION RESPIRATORY (INHALATION)
Status: DISCONTINUED | OUTPATIENT
Start: 2017-05-26 | End: 2017-05-28

## 2017-05-26 RX ORDER — LOSARTAN POTASSIUM 50 MG/1
100 TABLET ORAL DAILY
Status: DISCONTINUED | OUTPATIENT
Start: 2017-05-26 | End: 2017-06-01 | Stop reason: HOSPADM

## 2017-05-26 RX ORDER — ACETAMINOPHEN 325 MG/1
650 TABLET ORAL EVERY 4 HOURS PRN
Status: DISCONTINUED | OUTPATIENT
Start: 2017-05-26 | End: 2017-06-01 | Stop reason: HOSPADM

## 2017-05-26 RX ORDER — SODIUM CHLORIDE 0.9 % (FLUSH) 0.9 %
10 SYRINGE (ML) INJECTION PRN
Status: DISCONTINUED | OUTPATIENT
Start: 2017-05-26 | End: 2017-06-01 | Stop reason: HOSPADM

## 2017-05-26 RX ORDER — LEVOFLOXACIN 500 MG/1
500 TABLET, FILM COATED ORAL DAILY
Status: DISCONTINUED | OUTPATIENT
Start: 2017-05-26 | End: 2017-05-27

## 2017-05-26 RX ORDER — LEVOTHYROXINE SODIUM 0.12 MG/1
125 TABLET ORAL DAILY
Status: DISCONTINUED | OUTPATIENT
Start: 2017-05-26 | End: 2017-06-01 | Stop reason: HOSPADM

## 2017-05-26 RX ORDER — SODIUM CHLORIDE 0.9 % (FLUSH) 0.9 %
10 SYRINGE (ML) INJECTION EVERY 12 HOURS SCHEDULED
Status: DISCONTINUED | OUTPATIENT
Start: 2017-05-26 | End: 2017-06-01 | Stop reason: HOSPADM

## 2017-05-26 RX ORDER — ASPIRIN 81 MG/1
81 TABLET ORAL DAILY
Status: DISCONTINUED | OUTPATIENT
Start: 2017-05-26 | End: 2017-06-01 | Stop reason: HOSPADM

## 2017-05-26 RX ORDER — METHYLPREDNISOLONE SODIUM SUCCINATE 125 MG/2ML
125 INJECTION, POWDER, LYOPHILIZED, FOR SOLUTION INTRAMUSCULAR; INTRAVENOUS ONCE
Status: COMPLETED | OUTPATIENT
Start: 2017-05-26 | End: 2017-05-26

## 2017-05-26 RX ORDER — NICOTINE POLACRILEX 4 MG
15 LOZENGE BUCCAL PRN
Status: DISCONTINUED | OUTPATIENT
Start: 2017-05-26 | End: 2017-06-01 | Stop reason: HOSPADM

## 2017-05-26 RX ORDER — CETIRIZINE HYDROCHLORIDE 10 MG/1
10 TABLET ORAL DAILY
Status: DISCONTINUED | OUTPATIENT
Start: 2017-05-26 | End: 2017-06-01 | Stop reason: HOSPADM

## 2017-05-26 RX ORDER — SIMVASTATIN 20 MG
20 TABLET ORAL NIGHTLY
Status: DISCONTINUED | OUTPATIENT
Start: 2017-05-26 | End: 2017-06-01 | Stop reason: HOSPADM

## 2017-05-26 RX ORDER — ALBUTEROL SULFATE 90 UG/1
2 AEROSOL, METERED RESPIRATORY (INHALATION) DAILY
Status: DISCONTINUED | OUTPATIENT
Start: 2017-05-26 | End: 2017-05-26

## 2017-05-26 RX ORDER — ALBUTEROL SULFATE 90 UG/1
2 AEROSOL, METERED RESPIRATORY (INHALATION) EVERY 6 HOURS PRN
Status: DISCONTINUED | OUTPATIENT
Start: 2017-05-26 | End: 2017-05-26

## 2017-05-26 RX ORDER — ROPINIROLE 1 MG/1
2 TABLET, FILM COATED ORAL 3 TIMES DAILY
Status: DISCONTINUED | OUTPATIENT
Start: 2017-05-26 | End: 2017-05-27

## 2017-05-26 RX ORDER — DEXTROSE MONOHYDRATE 25 G/50ML
12.5 INJECTION, SOLUTION INTRAVENOUS PRN
Status: DISCONTINUED | OUTPATIENT
Start: 2017-05-26 | End: 2017-06-01 | Stop reason: HOSPADM

## 2017-05-26 RX ORDER — ALBUTEROL SULFATE 90 UG/1
2 AEROSOL, METERED RESPIRATORY (INHALATION) EVERY 6 HOURS PRN
Qty: 1 INHALER | Refills: 0 | Status: SHIPPED | OUTPATIENT
Start: 2017-05-26 | End: 2019-09-04

## 2017-05-26 RX ORDER — PANTOPRAZOLE SODIUM 40 MG/1
40 TABLET, DELAYED RELEASE ORAL DAILY
Status: DISCONTINUED | OUTPATIENT
Start: 2017-05-26 | End: 2017-06-01 | Stop reason: HOSPADM

## 2017-05-26 RX ORDER — PREDNISONE 20 MG/1
TABLET ORAL
Qty: 10 TABLET | Refills: 0 | Status: ON HOLD | OUTPATIENT
Start: 2017-05-26 | End: 2017-06-19 | Stop reason: HOSPADM

## 2017-05-26 RX ORDER — DULOXETIN HYDROCHLORIDE 30 MG/1
60 CAPSULE, DELAYED RELEASE ORAL DAILY
Status: DISCONTINUED | OUTPATIENT
Start: 2017-05-26 | End: 2017-05-26

## 2017-05-26 RX ORDER — ONDANSETRON 2 MG/ML
4 INJECTION INTRAMUSCULAR; INTRAVENOUS EVERY 6 HOURS PRN
Status: DISCONTINUED | OUTPATIENT
Start: 2017-05-26 | End: 2017-06-01 | Stop reason: HOSPADM

## 2017-05-26 RX ORDER — HYDRALAZINE HYDROCHLORIDE 20 MG/ML
5 INJECTION INTRAMUSCULAR; INTRAVENOUS EVERY 6 HOURS PRN
Status: DISCONTINUED | OUTPATIENT
Start: 2017-05-26 | End: 2017-06-01 | Stop reason: HOSPADM

## 2017-05-26 RX ORDER — METHYLPREDNISOLONE SODIUM SUCCINATE 40 MG/ML
40 INJECTION, POWDER, LYOPHILIZED, FOR SOLUTION INTRAMUSCULAR; INTRAVENOUS EVERY 6 HOURS
Status: DISCONTINUED | OUTPATIENT
Start: 2017-05-26 | End: 2017-05-27

## 2017-05-26 RX ORDER — DOCUSATE SODIUM 100 MG/1
100 CAPSULE, LIQUID FILLED ORAL 2 TIMES DAILY
Status: DISCONTINUED | OUTPATIENT
Start: 2017-05-26 | End: 2017-06-01 | Stop reason: HOSPADM

## 2017-05-26 RX ORDER — MAGNESIUM SULFATE 1 G/100ML
1 INJECTION INTRAVENOUS
Status: COMPLETED | OUTPATIENT
Start: 2017-05-26 | End: 2017-05-26

## 2017-05-26 RX ORDER — METOPROLOL TARTRATE 50 MG/1
50 TABLET, FILM COATED ORAL 2 TIMES DAILY
Status: DISCONTINUED | OUTPATIENT
Start: 2017-05-26 | End: 2017-06-01 | Stop reason: HOSPADM

## 2017-05-26 RX ORDER — AMOXICILLIN AND CLAVULANATE POTASSIUM 875; 125 MG/1; MG/1
1 TABLET, FILM COATED ORAL 2 TIMES DAILY
Qty: 20 TABLET | Refills: 0 | Status: SHIPPED | OUTPATIENT
Start: 2017-05-26 | End: 2017-05-26 | Stop reason: CLARIF

## 2017-05-26 RX ORDER — DEXTROSE MONOHYDRATE 50 MG/ML
100 INJECTION, SOLUTION INTRAVENOUS PRN
Status: DISCONTINUED | OUTPATIENT
Start: 2017-05-26 | End: 2017-06-01 | Stop reason: HOSPADM

## 2017-05-26 RX ORDER — HYDROCODONE BITARTRATE AND ACETAMINOPHEN 5; 325 MG/1; MG/1
1 TABLET ORAL 2 TIMES DAILY PRN
Status: DISCONTINUED | OUTPATIENT
Start: 2017-05-26 | End: 2017-06-01 | Stop reason: HOSPADM

## 2017-05-26 RX ORDER — TORSEMIDE 20 MG/1
20 TABLET ORAL DAILY
Status: DISCONTINUED | OUTPATIENT
Start: 2017-05-26 | End: 2017-06-01 | Stop reason: HOSPADM

## 2017-05-26 RX ORDER — LEVOFLOXACIN 500 MG/1
500 TABLET, FILM COATED ORAL DAILY
Qty: 10 TABLET | Refills: 0 | Status: ON HOLD | OUTPATIENT
Start: 2017-05-26 | End: 2017-06-01 | Stop reason: HOSPADM

## 2017-05-26 RX ORDER — DULOXETIN HYDROCHLORIDE 30 MG/1
60 CAPSULE, DELAYED RELEASE ORAL DAILY
Status: DISCONTINUED | OUTPATIENT
Start: 2017-05-26 | End: 2017-05-29

## 2017-05-26 RX ADMIN — LEVOFLOXACIN 500 MG: 500 TABLET, FILM COATED ORAL at 16:09

## 2017-05-26 RX ADMIN — IPRATROPIUM BROMIDE 0.5 MG: 0.5 SOLUTION RESPIRATORY (INHALATION) at 11:40

## 2017-05-26 RX ADMIN — DOCUSATE SODIUM 100 MG: 100 CAPSULE ORAL at 21:09

## 2017-05-26 RX ADMIN — METHYLPREDNISOLONE SODIUM SUCCINATE 125 MG: 125 INJECTION, POWDER, FOR SOLUTION INTRAMUSCULAR; INTRAVENOUS at 11:31

## 2017-05-26 RX ADMIN — MOMETASONE FUROATE AND FORMOTEROL FUMARATE DIHYDRATE 2 PUFF: 200; 5 AEROSOL RESPIRATORY (INHALATION) at 21:33

## 2017-05-26 RX ADMIN — ROPINIROLE HYDROCHLORIDE 2 MG: 1 TABLET, FILM COATED ORAL at 21:09

## 2017-05-26 RX ADMIN — METHYLPREDNISOLONE SODIUM SUCCINATE 40 MG: 40 INJECTION, POWDER, FOR SOLUTION INTRAMUSCULAR; INTRAVENOUS at 16:10

## 2017-05-26 RX ADMIN — IPRATROPIUM BROMIDE AND ALBUTEROL SULFATE 1 AMPULE: .5; 3 SOLUTION RESPIRATORY (INHALATION) at 17:13

## 2017-05-26 RX ADMIN — ASPIRIN 81 MG 324 MG: 81 TABLET ORAL at 11:31

## 2017-05-26 RX ADMIN — HYDROCODONE BITARTRATE AND ACETAMINOPHEN 1 TABLET: 5; 325 TABLET ORAL at 17:59

## 2017-05-26 RX ADMIN — DOXYCYCLINE HYCLATE 100 MG: 100 TABLET, COATED ORAL at 16:09

## 2017-05-26 RX ADMIN — METOCLOPRAMIDE 5 MG: 5 TABLET ORAL at 21:09

## 2017-05-26 RX ADMIN — GABAPENTIN 800 MG: 800 TABLET ORAL at 21:09

## 2017-05-26 RX ADMIN — ENOXAPARIN SODIUM 40 MG: 40 INJECTION SUBCUTANEOUS at 16:09

## 2017-05-26 RX ADMIN — MAGNESIUM SULFATE IN DEXTROSE 1 G: 10 INJECTION, SOLUTION INTRAVENOUS at 13:00

## 2017-05-26 RX ADMIN — VITAMIN D, TAB 1000IU (100/BT) 4000 UNITS: 25 TAB at 21:08

## 2017-05-26 RX ADMIN — CLONAZEPAM 0.5 MG: 0.5 TABLET ORAL at 16:12

## 2017-05-26 RX ADMIN — ALBUTEROL SULFATE 5 MG: 5 SOLUTION RESPIRATORY (INHALATION) at 11:40

## 2017-05-26 RX ADMIN — METHYLPREDNISOLONE SODIUM SUCCINATE 40 MG: 40 INJECTION, POWDER, FOR SOLUTION INTRAMUSCULAR; INTRAVENOUS at 21:08

## 2017-05-26 RX ADMIN — MAGNESIUM SULFATE IN DEXTROSE 1 G: 10 INJECTION, SOLUTION INTRAVENOUS at 11:31

## 2017-05-26 RX ADMIN — METOPROLOL TARTRATE 50 MG: 50 TABLET, FILM COATED ORAL at 21:13

## 2017-05-26 RX ADMIN — IPRATROPIUM BROMIDE AND ALBUTEROL SULFATE 1 AMPULE: .5; 3 SOLUTION RESPIRATORY (INHALATION) at 21:33

## 2017-05-26 RX ADMIN — Medication 10 ML: at 21:09

## 2017-05-26 RX ADMIN — AMITRIPTYLINE HYDROCHLORIDE 75 MG: 50 TABLET, FILM COATED ORAL at 21:09

## 2017-05-26 RX ADMIN — ALBUTEROL SULFATE 5 MG: 5 SOLUTION RESPIRATORY (INHALATION) at 11:58

## 2017-05-26 RX ADMIN — DULOXETINE HYDROCHLORIDE 60 MG: 30 CAPSULE, DELAYED RELEASE ORAL at 21:09

## 2017-05-26 ASSESSMENT — PAIN DESCRIPTION - DESCRIPTORS: DESCRIPTORS: ACHING;DULL

## 2017-05-26 ASSESSMENT — ENCOUNTER SYMPTOMS
VOMITING: 0
TROUBLE SWALLOWING: 0
NAUSEA: 0
RHINORRHEA: 0
ABDOMINAL PAIN: 0
DIARRHEA: 0
VOICE CHANGE: 0
COUGH: 1
SORE THROAT: 0
SINUS PRESSURE: 0
CONSTIPATION: 0
SHORTNESS OF BREATH: 1
BACK PAIN: 0
BLOOD IN STOOL: 0
WHEEZING: 1

## 2017-05-26 ASSESSMENT — PAIN DESCRIPTION - PAIN TYPE: TYPE: ACUTE PAIN

## 2017-05-26 ASSESSMENT — PAIN SCALES - GENERAL
PAINLEVEL_OUTOF10: 5
PAINLEVEL_OUTOF10: 5

## 2017-05-26 ASSESSMENT — PAIN DESCRIPTION - LOCATION: LOCATION: HEAD;CHEST

## 2017-05-26 ASSESSMENT — PAIN DESCRIPTION - ONSET: ONSET: ON-GOING

## 2017-05-26 ASSESSMENT — PAIN DESCRIPTION - FREQUENCY: FREQUENCY: INTERMITTENT

## 2017-05-27 ENCOUNTER — APPOINTMENT (OUTPATIENT)
Dept: GENERAL RADIOLOGY | Age: 57
DRG: 189 | End: 2017-05-27
Payer: MEDICARE

## 2017-05-27 PROBLEM — J01.40 ACUTE NON-RECURRENT PANSINUSITIS: Status: ACTIVE | Noted: 2017-05-27

## 2017-05-27 PROBLEM — J01.00 ACUTE NON-RECURRENT MAXILLARY SINUSITIS: Status: ACTIVE | Noted: 2017-05-27

## 2017-05-27 LAB
CULTURE: NO GROWTH
CULTURE: NORMAL
FIO2: 100
GLUCOSE BLD-MCNC: 254 MG/DL (ref 65–105)
GLUCOSE BLD-MCNC: 265 MG/DL (ref 65–105)
GLUCOSE BLD-MCNC: 297 MG/DL (ref 65–105)
GLUCOSE BLD-MCNC: 329 MG/DL (ref 65–105)
Lab: NORMAL
NEGATIVE BASE EXCESS, ART: 1 (ref 0–2)
O2 DEVICE/FLOW/%: ABNORMAL
PATIENT TEMP: ABNORMAL
POC HCO3: 24.5 MMOL/L (ref 22–27)
POC O2 SATURATION: 93 %
POC PCO2 TEMP: ABNORMAL MM HG
POC PCO2: 46 MM HG (ref 32–45)
POC PH TEMP: ABNORMAL
POC PH: 7.34 (ref 7.35–7.45)
POC PO2 TEMP: ABNORMAL MM HG
POC PO2: 71 MM HG (ref 75–95)
POSITIVE BASE EXCESS, ART: ABNORMAL (ref 0–2)
SPECIMEN DESCRIPTION: NORMAL
STATUS: NORMAL
TCO2 (CALC), ART: 26 MM HG (ref 23–28)

## 2017-05-27 PROCEDURE — 6370000000 HC RX 637 (ALT 250 FOR IP): Performed by: FAMILY MEDICINE

## 2017-05-27 PROCEDURE — 82947 ASSAY GLUCOSE BLOOD QUANT: CPT

## 2017-05-27 PROCEDURE — 6370000000 HC RX 637 (ALT 250 FOR IP): Performed by: NURSE PRACTITIONER

## 2017-05-27 PROCEDURE — 2580000003 HC RX 258: Performed by: FAMILY MEDICINE

## 2017-05-27 PROCEDURE — 2060000000 HC ICU INTERMEDIATE R&B

## 2017-05-27 PROCEDURE — 71010 XR CHEST PORTABLE: CPT

## 2017-05-27 PROCEDURE — 99222 1ST HOSP IP/OBS MODERATE 55: CPT | Performed by: INTERNAL MEDICINE

## 2017-05-27 PROCEDURE — 36600 WITHDRAWAL OF ARTERIAL BLOOD: CPT

## 2017-05-27 PROCEDURE — 6360000002 HC RX W HCPCS: Performed by: HOSPITALIST

## 2017-05-27 PROCEDURE — 2580000003 HC RX 258: Performed by: HOSPITALIST

## 2017-05-27 PROCEDURE — 6370000000 HC RX 637 (ALT 250 FOR IP): Performed by: INTERNAL MEDICINE

## 2017-05-27 PROCEDURE — 6360000002 HC RX W HCPCS: Performed by: FAMILY MEDICINE

## 2017-05-27 PROCEDURE — 82803 BLOOD GASES ANY COMBINATION: CPT

## 2017-05-27 PROCEDURE — 99232 SBSQ HOSP IP/OBS MODERATE 35: CPT | Performed by: NURSE PRACTITIONER

## 2017-05-27 PROCEDURE — 94640 AIRWAY INHALATION TREATMENT: CPT

## 2017-05-27 RX ORDER — PREDNISONE 20 MG/1
40 TABLET ORAL DAILY
Status: DISCONTINUED | OUTPATIENT
Start: 2017-05-28 | End: 2017-06-01 | Stop reason: HOSPADM

## 2017-05-27 RX ORDER — LEVOFLOXACIN 5 MG/ML
750 INJECTION, SOLUTION INTRAVENOUS EVERY 24 HOURS
Status: DISCONTINUED | OUTPATIENT
Start: 2017-05-28 | End: 2017-06-01

## 2017-05-27 RX ORDER — FLUTICASONE PROPIONATE 50 MCG
1 SPRAY, SUSPENSION (ML) NASAL 2 TIMES DAILY
Status: DISCONTINUED | OUTPATIENT
Start: 2017-05-27 | End: 2017-06-01 | Stop reason: HOSPADM

## 2017-05-27 RX ORDER — GUAIFENESIN DEXTROMETHORPHAN HYDROBROMIDE ORAL SOLUTION 10; 100 MG/5ML; MG/5ML
10 SOLUTION ORAL 3 TIMES DAILY PRN
Status: DISCONTINUED | OUTPATIENT
Start: 2017-05-27 | End: 2017-06-01 | Stop reason: HOSPADM

## 2017-05-27 RX ADMIN — Medication 10 ML: at 21:03

## 2017-05-27 RX ADMIN — MOMETASONE FUROATE AND FORMOTEROL FUMARATE DIHYDRATE 2 PUFF: 200; 5 AEROSOL RESPIRATORY (INHALATION) at 20:08

## 2017-05-27 RX ADMIN — METOPROLOL TARTRATE 50 MG: 50 TABLET, FILM COATED ORAL at 08:59

## 2017-05-27 RX ADMIN — SIMVASTATIN 20 MG: 20 TABLET, FILM COATED ORAL at 21:03

## 2017-05-27 RX ADMIN — DOXYCYCLINE HYCLATE 100 MG: 100 TABLET, COATED ORAL at 15:22

## 2017-05-27 RX ADMIN — Medication 10 ML: at 03:41

## 2017-05-27 RX ADMIN — DOXYCYCLINE HYCLATE 100 MG: 100 TABLET, COATED ORAL at 04:55

## 2017-05-27 RX ADMIN — PANTOPRAZOLE SODIUM 40 MG: 40 TABLET, DELAYED RELEASE ORAL at 08:59

## 2017-05-27 RX ADMIN — GABAPENTIN 800 MG: 800 TABLET ORAL at 21:03

## 2017-05-27 RX ADMIN — ROPINIROLE HYDROCHLORIDE 2 MG: 1 TABLET, FILM COATED ORAL at 08:59

## 2017-05-27 RX ADMIN — BENZONATATE 100 MG: 100 CAPSULE ORAL at 17:49

## 2017-05-27 RX ADMIN — CLONAZEPAM 0.5 MG: 0.5 TABLET ORAL at 00:15

## 2017-05-27 RX ADMIN — HYDROCODONE BITARTRATE AND ACETAMINOPHEN 1 TABLET: 5; 325 TABLET ORAL at 22:39

## 2017-05-27 RX ADMIN — CLONAZEPAM 0.5 MG: 0.5 TABLET ORAL at 09:06

## 2017-05-27 RX ADMIN — METHYLPREDNISOLONE SODIUM SUCCINATE 40 MG: 40 INJECTION, POWDER, FOR SOLUTION INTRAMUSCULAR; INTRAVENOUS at 15:22

## 2017-05-27 RX ADMIN — ROPINIROLE HYDROCHLORIDE 2 MG: 1 TABLET, FILM COATED ORAL at 15:22

## 2017-05-27 RX ADMIN — DOCUSATE SODIUM 100 MG: 100 CAPSULE ORAL at 21:03

## 2017-05-27 RX ADMIN — IPRATROPIUM BROMIDE AND ALBUTEROL SULFATE 1 AMPULE: .5; 3 SOLUTION RESPIRATORY (INHALATION) at 12:23

## 2017-05-27 RX ADMIN — AMPICILLIN SODIUM AND SULBACTAM SODIUM 1.5 G: 1; .5 INJECTION, POWDER, FOR SOLUTION INTRAMUSCULAR; INTRAVENOUS at 21:00

## 2017-05-27 RX ADMIN — LEVOFLOXACIN 500 MG: 500 TABLET, FILM COATED ORAL at 08:59

## 2017-05-27 RX ADMIN — ENOXAPARIN SODIUM 40 MG: 40 INJECTION SUBCUTANEOUS at 08:59

## 2017-05-27 RX ADMIN — ONDANSETRON 4 MG: 2 INJECTION, SOLUTION INTRAMUSCULAR; INTRAVENOUS at 07:11

## 2017-05-27 RX ADMIN — METHYLPREDNISOLONE SODIUM SUCCINATE 40 MG: 40 INJECTION, POWDER, FOR SOLUTION INTRAMUSCULAR; INTRAVENOUS at 03:41

## 2017-05-27 RX ADMIN — CETIRIZINE HYDROCHLORIDE 10 MG: 10 TABLET ORAL at 08:59

## 2017-05-27 RX ADMIN — FLUTICASONE PROPIONATE 1 SPRAY: 50 SPRAY, METERED NASAL at 11:03

## 2017-05-27 RX ADMIN — GABAPENTIN 800 MG: 800 TABLET ORAL at 08:59

## 2017-05-27 RX ADMIN — MOMETASONE FUROATE AND FORMOTEROL FUMARATE DIHYDRATE 2 PUFF: 200; 5 AEROSOL RESPIRATORY (INHALATION) at 09:43

## 2017-05-27 RX ADMIN — METOPROLOL TARTRATE 50 MG: 50 TABLET, FILM COATED ORAL at 21:03

## 2017-05-27 RX ADMIN — LOSARTAN POTASSIUM 100 MG: 50 TABLET, FILM COATED ORAL at 08:59

## 2017-05-27 RX ADMIN — CLONAZEPAM 0.5 MG: 0.5 TABLET ORAL at 21:05

## 2017-05-27 RX ADMIN — AMITRIPTYLINE HYDROCHLORIDE 75 MG: 50 TABLET, FILM COATED ORAL at 21:03

## 2017-05-27 RX ADMIN — IPRATROPIUM BROMIDE AND ALBUTEROL SULFATE 1 AMPULE: .5; 3 SOLUTION RESPIRATORY (INHALATION) at 09:43

## 2017-05-27 RX ADMIN — VITAMIN D, TAB 1000IU (100/BT) 4000 UNITS: 25 TAB at 08:59

## 2017-05-27 RX ADMIN — DULOXETINE HYDROCHLORIDE 60 MG: 30 CAPSULE, DELAYED RELEASE ORAL at 09:01

## 2017-05-27 RX ADMIN — METOCLOPRAMIDE 5 MG: 5 TABLET ORAL at 08:59

## 2017-05-27 RX ADMIN — INSULIN LISPRO 4 UNITS: 100 INJECTION, SOLUTION INTRAVENOUS; SUBCUTANEOUS at 13:07

## 2017-05-27 RX ADMIN — FLUTICASONE PROPIONATE 1 SPRAY: 50 SPRAY, METERED NASAL at 21:03

## 2017-05-27 RX ADMIN — LEVOTHYROXINE SODIUM 125 MCG: 125 TABLET ORAL at 07:39

## 2017-05-27 RX ADMIN — IPRATROPIUM BROMIDE AND ALBUTEROL SULFATE 1 AMPULE: .5; 3 SOLUTION RESPIRATORY (INHALATION) at 05:22

## 2017-05-27 RX ADMIN — HYDRALAZINE HYDROCHLORIDE 5 MG: 20 INJECTION INTRAMUSCULAR; INTRAVENOUS at 00:15

## 2017-05-27 RX ADMIN — ASPIRIN 81 MG: 81 TABLET, COATED ORAL at 08:59

## 2017-05-27 RX ADMIN — ONDANSETRON 4 MG: 2 INJECTION, SOLUTION INTRAMUSCULAR; INTRAVENOUS at 17:49

## 2017-05-27 RX ADMIN — IPRATROPIUM BROMIDE AND ALBUTEROL SULFATE 1 AMPULE: .5; 3 SOLUTION RESPIRATORY (INHALATION) at 20:08

## 2017-05-27 RX ADMIN — DOCUSATE SODIUM 100 MG: 100 CAPSULE ORAL at 08:59

## 2017-05-27 RX ADMIN — IPRATROPIUM BROMIDE AND ALBUTEROL SULFATE 1 AMPULE: .5; 3 SOLUTION RESPIRATORY (INHALATION) at 17:10

## 2017-05-27 RX ADMIN — METHYLPREDNISOLONE SODIUM SUCCINATE 40 MG: 40 INJECTION, POWDER, FOR SOLUTION INTRAMUSCULAR; INTRAVENOUS at 08:58

## 2017-05-27 RX ADMIN — METOCLOPRAMIDE 5 MG: 5 TABLET ORAL at 21:03

## 2017-05-27 ASSESSMENT — PAIN SCALES - GENERAL: PAINLEVEL_OUTOF10: 5

## 2017-05-28 ENCOUNTER — APPOINTMENT (OUTPATIENT)
Dept: GENERAL RADIOLOGY | Age: 57
DRG: 189 | End: 2017-05-28
Payer: MEDICARE

## 2017-05-28 LAB
ABSOLUTE EOS #: 0 K/UL (ref 0–0.4)
ABSOLUTE LYMPH #: 1.8 K/UL (ref 1–4.8)
ABSOLUTE MONO #: 1 K/UL (ref 0.1–1.2)
ANION GAP SERPL CALCULATED.3IONS-SCNC: 12 MMOL/L (ref 9–17)
BASOPHILS # BLD: 0 %
BASOPHILS ABSOLUTE: 0 K/UL (ref 0–0.2)
BUN BLDV-MCNC: 19 MG/DL (ref 6–20)
BUN/CREAT BLD: ABNORMAL (ref 9–20)
CALCIUM SERPL-MCNC: 9.1 MG/DL (ref 8.6–10.4)
CHLORIDE BLD-SCNC: 102 MMOL/L (ref 98–107)
CO2: 28 MMOL/L (ref 20–31)
CREAT SERPL-MCNC: 0.95 MG/DL (ref 0.5–0.9)
DIFFERENTIAL TYPE: ABNORMAL
DIRECT EXAM: NORMAL
EOSINOPHILS RELATIVE PERCENT: 0 %
GFR AFRICAN AMERICAN: >60 ML/MIN
GFR NON-AFRICAN AMERICAN: >60 ML/MIN
GFR SERPL CREATININE-BSD FRML MDRD: ABNORMAL ML/MIN/{1.73_M2}
GFR SERPL CREATININE-BSD FRML MDRD: ABNORMAL ML/MIN/{1.73_M2}
GLUCOSE BLD-MCNC: 120 MG/DL (ref 65–105)
GLUCOSE BLD-MCNC: 135 MG/DL (ref 65–105)
GLUCOSE BLD-MCNC: 170 MG/DL (ref 65–105)
GLUCOSE BLD-MCNC: 243 MG/DL (ref 65–105)
GLUCOSE BLD-MCNC: 271 MG/DL (ref 65–105)
GLUCOSE BLD-MCNC: 70 MG/DL (ref 70–99)
GLUCOSE BLD-MCNC: 71 MG/DL (ref 65–105)
HCT VFR BLD CALC: 40.4 % (ref 36–46)
HEMOGLOBIN: 13.3 G/DL (ref 12–16)
LYMPHOCYTES # BLD: 10 %
Lab: NORMAL
MCH RBC QN AUTO: 29.4 PG (ref 26–34)
MCHC RBC AUTO-ENTMCNC: 33 G/DL (ref 31–37)
MCV RBC AUTO: 89 FL (ref 80–100)
MONOCYTES # BLD: 6 %
PDW BLD-RTO: 14.8 % (ref 12.5–15.4)
PLATELET # BLD: 241 K/UL (ref 140–450)
PLATELET ESTIMATE: ABNORMAL
PMV BLD AUTO: 7.9 FL (ref 6–12)
POTASSIUM SERPL-SCNC: 4.5 MMOL/L (ref 3.7–5.3)
RBC # BLD: 4.54 M/UL (ref 4–5.2)
RBC # BLD: ABNORMAL 10*6/UL
SEG NEUTROPHILS: 84 %
SEGMENTED NEUTROPHILS ABSOLUTE COUNT: 15.6 K/UL (ref 1.8–7.7)
SODIUM BLD-SCNC: 142 MMOL/L (ref 135–144)
SPECIMEN DESCRIPTION: NORMAL
STATUS: NORMAL
WBC # BLD: 18.5 K/UL (ref 3.5–11)
WBC # BLD: ABNORMAL 10*3/UL

## 2017-05-28 PROCEDURE — 6370000000 HC RX 637 (ALT 250 FOR IP): Performed by: FAMILY MEDICINE

## 2017-05-28 PROCEDURE — 36415 COLL VENOUS BLD VENIPUNCTURE: CPT

## 2017-05-28 PROCEDURE — 85025 COMPLETE CBC W/AUTO DIFF WBC: CPT

## 2017-05-28 PROCEDURE — 94640 AIRWAY INHALATION TREATMENT: CPT

## 2017-05-28 PROCEDURE — 6370000000 HC RX 637 (ALT 250 FOR IP): Performed by: HOSPITALIST

## 2017-05-28 PROCEDURE — 99232 SBSQ HOSP IP/OBS MODERATE 35: CPT | Performed by: NURSE PRACTITIONER

## 2017-05-28 PROCEDURE — 87449 NOS EACH ORGANISM AG IA: CPT

## 2017-05-28 PROCEDURE — 6370000000 HC RX 637 (ALT 250 FOR IP): Performed by: INTERNAL MEDICINE

## 2017-05-28 PROCEDURE — 82947 ASSAY GLUCOSE BLOOD QUANT: CPT

## 2017-05-28 PROCEDURE — 6360000002 HC RX W HCPCS: Performed by: FAMILY MEDICINE

## 2017-05-28 PROCEDURE — 80048 BASIC METABOLIC PNL TOTAL CA: CPT

## 2017-05-28 PROCEDURE — 2580000003 HC RX 258: Performed by: FAMILY MEDICINE

## 2017-05-28 PROCEDURE — 71020 XR CHEST STANDARD TWO VW: CPT

## 2017-05-28 PROCEDURE — 6360000002 HC RX W HCPCS: Performed by: HOSPITALIST

## 2017-05-28 PROCEDURE — 99232 SBSQ HOSP IP/OBS MODERATE 35: CPT | Performed by: INTERNAL MEDICINE

## 2017-05-28 PROCEDURE — 2580000003 HC RX 258: Performed by: HOSPITALIST

## 2017-05-28 PROCEDURE — 6370000000 HC RX 637 (ALT 250 FOR IP): Performed by: NURSE PRACTITIONER

## 2017-05-28 PROCEDURE — 2060000000 HC ICU INTERMEDIATE R&B

## 2017-05-28 RX ORDER — IPRATROPIUM BROMIDE AND ALBUTEROL SULFATE 2.5; .5 MG/3ML; MG/3ML
1 SOLUTION RESPIRATORY (INHALATION) 4 TIMES DAILY
Status: DISCONTINUED | OUTPATIENT
Start: 2017-05-28 | End: 2017-06-01 | Stop reason: HOSPADM

## 2017-05-28 RX ADMIN — PANTOPRAZOLE SODIUM 40 MG: 40 TABLET, DELAYED RELEASE ORAL at 09:40

## 2017-05-28 RX ADMIN — AMPICILLIN SODIUM AND SULBACTAM SODIUM 1.5 G: 1; .5 INJECTION, POWDER, FOR SOLUTION INTRAMUSCULAR; INTRAVENOUS at 09:51

## 2017-05-28 RX ADMIN — Medication 10 ML: at 09:48

## 2017-05-28 RX ADMIN — AMPICILLIN SODIUM AND SULBACTAM SODIUM 1.5 G: 1; .5 INJECTION, POWDER, FOR SOLUTION INTRAMUSCULAR; INTRAVENOUS at 14:27

## 2017-05-28 RX ADMIN — BENZONATATE 100 MG: 100 CAPSULE ORAL at 09:45

## 2017-05-28 RX ADMIN — CETIRIZINE HYDROCHLORIDE 10 MG: 10 TABLET ORAL at 09:43

## 2017-05-28 RX ADMIN — ENOXAPARIN SODIUM 40 MG: 40 INJECTION SUBCUTANEOUS at 09:45

## 2017-05-28 RX ADMIN — HYDROCODONE BITARTRATE AND ACETAMINOPHEN 1 TABLET: 5; 325 TABLET ORAL at 10:30

## 2017-05-28 RX ADMIN — VITAMIN D, TAB 1000IU (100/BT) 4000 UNITS: 25 TAB at 09:41

## 2017-05-28 RX ADMIN — Medication 10 ML: at 09:45

## 2017-05-28 RX ADMIN — ASPIRIN 81 MG: 81 TABLET, COATED ORAL at 09:40

## 2017-05-28 RX ADMIN — METOCLOPRAMIDE 5 MG: 5 TABLET ORAL at 09:47

## 2017-05-28 RX ADMIN — IPRATROPIUM BROMIDE AND ALBUTEROL SULFATE 1 AMPULE: .5; 3 SOLUTION RESPIRATORY (INHALATION) at 19:43

## 2017-05-28 RX ADMIN — GABAPENTIN 800 MG: 800 TABLET ORAL at 21:26

## 2017-05-28 RX ADMIN — CLONAZEPAM 0.5 MG: 0.5 TABLET ORAL at 10:30

## 2017-05-28 RX ADMIN — MOMETASONE FUROATE AND FORMOTEROL FUMARATE DIHYDRATE 2 PUFF: 200; 5 AEROSOL RESPIRATORY (INHALATION) at 19:43

## 2017-05-28 RX ADMIN — IPRATROPIUM BROMIDE AND ALBUTEROL SULFATE 1 AMPULE: .5; 3 SOLUTION RESPIRATORY (INHALATION) at 06:53

## 2017-05-28 RX ADMIN — METOPROLOL TARTRATE 50 MG: 50 TABLET, FILM COATED ORAL at 09:41

## 2017-05-28 RX ADMIN — LEVOTHYROXINE SODIUM 125 MCG: 125 TABLET ORAL at 09:49

## 2017-05-28 RX ADMIN — DULOXETINE HYDROCHLORIDE 60 MG: 30 CAPSULE, DELAYED RELEASE ORAL at 23:08

## 2017-05-28 RX ADMIN — HYDROCODONE BITARTRATE AND ACETAMINOPHEN 1 TABLET: 5; 325 TABLET ORAL at 21:27

## 2017-05-28 RX ADMIN — FLUTICASONE PROPIONATE 1 SPRAY: 50 SPRAY, METERED NASAL at 14:28

## 2017-05-28 RX ADMIN — FLUTICASONE PROPIONATE 1 SPRAY: 50 SPRAY, METERED NASAL at 21:26

## 2017-05-28 RX ADMIN — LOSARTAN POTASSIUM 100 MG: 50 TABLET, FILM COATED ORAL at 09:47

## 2017-05-28 RX ADMIN — TORSEMIDE 20 MG: 20 TABLET ORAL at 09:47

## 2017-05-28 RX ADMIN — IPRATROPIUM BROMIDE AND ALBUTEROL SULFATE 1 AMPULE: .5; 3 SOLUTION RESPIRATORY (INHALATION) at 15:19

## 2017-05-28 RX ADMIN — METOPROLOL TARTRATE 50 MG: 50 TABLET, FILM COATED ORAL at 21:26

## 2017-05-28 RX ADMIN — Medication 10 ML: at 21:26

## 2017-05-28 RX ADMIN — METOCLOPRAMIDE 5 MG: 5 TABLET ORAL at 21:26

## 2017-05-28 RX ADMIN — DOCUSATE SODIUM 100 MG: 100 CAPSULE ORAL at 09:43

## 2017-05-28 RX ADMIN — AMPICILLIN SODIUM AND SULBACTAM SODIUM 1.5 G: 1; .5 INJECTION, POWDER, FOR SOLUTION INTRAMUSCULAR; INTRAVENOUS at 02:25

## 2017-05-28 RX ADMIN — DOCUSATE SODIUM 100 MG: 100 CAPSULE ORAL at 21:28

## 2017-05-28 RX ADMIN — GABAPENTIN 800 MG: 800 TABLET ORAL at 09:44

## 2017-05-28 RX ADMIN — LEVOFLOXACIN 750 MG: 5 INJECTION, SOLUTION INTRAVENOUS at 10:33

## 2017-05-28 RX ADMIN — IPRATROPIUM BROMIDE AND ALBUTEROL SULFATE 1 AMPULE: .5; 3 SOLUTION RESPIRATORY (INHALATION) at 11:20

## 2017-05-28 RX ADMIN — AMITRIPTYLINE HYDROCHLORIDE 75 MG: 50 TABLET, FILM COATED ORAL at 21:27

## 2017-05-28 RX ADMIN — MOMETASONE FUROATE AND FORMOTEROL FUMARATE DIHYDRATE 2 PUFF: 200; 5 AEROSOL RESPIRATORY (INHALATION) at 06:54

## 2017-05-28 RX ADMIN — SIMVASTATIN 20 MG: 20 TABLET, FILM COATED ORAL at 21:26

## 2017-05-28 RX ADMIN — AMPICILLIN SODIUM AND SULBACTAM SODIUM 1.5 G: 1; .5 INJECTION, POWDER, FOR SOLUTION INTRAMUSCULAR; INTRAVENOUS at 21:25

## 2017-05-28 RX ADMIN — PREDNISONE 40 MG: 20 TABLET ORAL at 09:47

## 2017-05-28 ASSESSMENT — PAIN DESCRIPTION - ORIENTATION: ORIENTATION: LOWER

## 2017-05-28 ASSESSMENT — PAIN DESCRIPTION - PROGRESSION: CLINICAL_PROGRESSION: GRADUALLY IMPROVING

## 2017-05-28 ASSESSMENT — PAIN DESCRIPTION - LOCATION
LOCATION: GENERALIZED
LOCATION: BACK

## 2017-05-28 ASSESSMENT — PAIN SCALES - GENERAL
PAINLEVEL_OUTOF10: 0
PAINLEVEL_OUTOF10: 8
PAINLEVEL_OUTOF10: 0
PAINLEVEL_OUTOF10: 1
PAINLEVEL_OUTOF10: 7

## 2017-05-28 ASSESSMENT — PAIN DESCRIPTION - DESCRIPTORS: DESCRIPTORS: ACHING

## 2017-05-28 ASSESSMENT — PAIN DESCRIPTION - PAIN TYPE
TYPE: ACUTE PAIN
TYPE: ACUTE PAIN

## 2017-05-28 ASSESSMENT — PAIN DESCRIPTION - FREQUENCY: FREQUENCY: INTERMITTENT

## 2017-05-29 LAB
GLUCOSE BLD-MCNC: 227 MG/DL (ref 65–105)
GLUCOSE BLD-MCNC: 237 MG/DL (ref 65–105)
GLUCOSE BLD-MCNC: 62 MG/DL (ref 65–105)
GLUCOSE BLD-MCNC: 75 MG/DL (ref 65–105)
GLUCOSE BLD-MCNC: 92 MG/DL (ref 65–105)

## 2017-05-29 PROCEDURE — 94760 N-INVAS EAR/PLS OXIMETRY 1: CPT

## 2017-05-29 PROCEDURE — 99232 SBSQ HOSP IP/OBS MODERATE 35: CPT | Performed by: INTERNAL MEDICINE

## 2017-05-29 PROCEDURE — 6370000000 HC RX 637 (ALT 250 FOR IP): Performed by: INTERNAL MEDICINE

## 2017-05-29 PROCEDURE — 6360000002 HC RX W HCPCS: Performed by: FAMILY MEDICINE

## 2017-05-29 PROCEDURE — 6370000000 HC RX 637 (ALT 250 FOR IP): Performed by: FAMILY MEDICINE

## 2017-05-29 PROCEDURE — 6360000002 HC RX W HCPCS: Performed by: HOSPITALIST

## 2017-05-29 PROCEDURE — 82947 ASSAY GLUCOSE BLOOD QUANT: CPT

## 2017-05-29 PROCEDURE — 87205 SMEAR GRAM STAIN: CPT

## 2017-05-29 PROCEDURE — 2060000000 HC ICU INTERMEDIATE R&B

## 2017-05-29 PROCEDURE — 6370000000 HC RX 637 (ALT 250 FOR IP): Performed by: NURSE PRACTITIONER

## 2017-05-29 PROCEDURE — 6370000000 HC RX 637 (ALT 250 FOR IP): Performed by: HOSPITALIST

## 2017-05-29 PROCEDURE — 51701 INSERT BLADDER CATHETER: CPT

## 2017-05-29 PROCEDURE — 2580000003 HC RX 258: Performed by: HOSPITALIST

## 2017-05-29 PROCEDURE — 2580000003 HC RX 258: Performed by: FAMILY MEDICINE

## 2017-05-29 PROCEDURE — 94640 AIRWAY INHALATION TREATMENT: CPT

## 2017-05-29 PROCEDURE — 87070 CULTURE OTHR SPECIMN AEROBIC: CPT

## 2017-05-29 RX ORDER — DULOXETIN HYDROCHLORIDE 30 MG/1
60 CAPSULE, DELAYED RELEASE ORAL ONCE
Status: COMPLETED | OUTPATIENT
Start: 2017-05-30 | End: 2017-05-30

## 2017-05-29 RX ORDER — SENNA PLUS 8.6 MG/1
1 TABLET ORAL 2 TIMES DAILY
Status: DISCONTINUED | OUTPATIENT
Start: 2017-05-29 | End: 2017-06-01 | Stop reason: HOSPADM

## 2017-05-29 RX ORDER — DULOXETIN HYDROCHLORIDE 30 MG/1
60 CAPSULE, DELAYED RELEASE ORAL
Status: DISCONTINUED | OUTPATIENT
Start: 2017-05-30 | End: 2017-06-01 | Stop reason: HOSPADM

## 2017-05-29 RX ADMIN — CETIRIZINE HYDROCHLORIDE 10 MG: 10 TABLET ORAL at 09:30

## 2017-05-29 RX ADMIN — MOMETASONE FUROATE AND FORMOTEROL FUMARATE DIHYDRATE 2 PUFF: 200; 5 AEROSOL RESPIRATORY (INHALATION) at 08:02

## 2017-05-29 RX ADMIN — FLUTICASONE PROPIONATE 1 SPRAY: 50 SPRAY, METERED NASAL at 09:30

## 2017-05-29 RX ADMIN — MOMETASONE FUROATE AND FORMOTEROL FUMARATE DIHYDRATE 2 PUFF: 200; 5 AEROSOL RESPIRATORY (INHALATION) at 19:39

## 2017-05-29 RX ADMIN — GABAPENTIN 800 MG: 800 TABLET ORAL at 20:02

## 2017-05-29 RX ADMIN — LOSARTAN POTASSIUM 100 MG: 50 TABLET, FILM COATED ORAL at 09:29

## 2017-05-29 RX ADMIN — HYDROCODONE BITARTRATE AND ACETAMINOPHEN 1 TABLET: 5; 325 TABLET ORAL at 20:03

## 2017-05-29 RX ADMIN — PANTOPRAZOLE SODIUM 40 MG: 40 TABLET, DELAYED RELEASE ORAL at 09:28

## 2017-05-29 RX ADMIN — ENOXAPARIN SODIUM 40 MG: 40 INJECTION SUBCUTANEOUS at 09:29

## 2017-05-29 RX ADMIN — CLONAZEPAM 0.5 MG: 0.5 TABLET ORAL at 21:38

## 2017-05-29 RX ADMIN — AMPICILLIN SODIUM AND SULBACTAM SODIUM 1.5 G: 1; .5 INJECTION, POWDER, FOR SOLUTION INTRAMUSCULAR; INTRAVENOUS at 02:09

## 2017-05-29 RX ADMIN — AMPICILLIN SODIUM AND SULBACTAM SODIUM 1.5 G: 1; .5 INJECTION, POWDER, FOR SOLUTION INTRAMUSCULAR; INTRAVENOUS at 20:02

## 2017-05-29 RX ADMIN — AMITRIPTYLINE HYDROCHLORIDE 75 MG: 50 TABLET, FILM COATED ORAL at 21:38

## 2017-05-29 RX ADMIN — IPRATROPIUM BROMIDE AND ALBUTEROL SULFATE 1 AMPULE: .5; 3 SOLUTION RESPIRATORY (INHALATION) at 16:29

## 2017-05-29 RX ADMIN — GABAPENTIN 800 MG: 800 TABLET ORAL at 09:28

## 2017-05-29 RX ADMIN — AMPICILLIN SODIUM AND SULBACTAM SODIUM 1.5 G: 1; .5 INJECTION, POWDER, FOR SOLUTION INTRAMUSCULAR; INTRAVENOUS at 14:16

## 2017-05-29 RX ADMIN — IPRATROPIUM BROMIDE AND ALBUTEROL SULFATE 1 AMPULE: .5; 3 SOLUTION RESPIRATORY (INHALATION) at 19:39

## 2017-05-29 RX ADMIN — METOPROLOL TARTRATE 50 MG: 50 TABLET, FILM COATED ORAL at 20:02

## 2017-05-29 RX ADMIN — SENNA 8.6 MG: 8.6 TABLET, COATED ORAL at 09:28

## 2017-05-29 RX ADMIN — IPRATROPIUM BROMIDE AND ALBUTEROL SULFATE 1 AMPULE: .5; 3 SOLUTION RESPIRATORY (INHALATION) at 08:01

## 2017-05-29 RX ADMIN — LEVOFLOXACIN 750 MG: 5 INJECTION, SOLUTION INTRAVENOUS at 09:36

## 2017-05-29 RX ADMIN — FLUTICASONE PROPIONATE 1 SPRAY: 50 SPRAY, METERED NASAL at 20:07

## 2017-05-29 RX ADMIN — DOCUSATE SODIUM 100 MG: 100 CAPSULE ORAL at 20:02

## 2017-05-29 RX ADMIN — VITAMIN D, TAB 1000IU (100/BT) 4000 UNITS: 25 TAB at 09:28

## 2017-05-29 RX ADMIN — METOPROLOL TARTRATE 50 MG: 50 TABLET, FILM COATED ORAL at 09:30

## 2017-05-29 RX ADMIN — MAGNESIUM HYDROXIDE 30 ML: 400 SUSPENSION ORAL at 08:31

## 2017-05-29 RX ADMIN — METOCLOPRAMIDE 5 MG: 5 TABLET ORAL at 20:02

## 2017-05-29 RX ADMIN — Medication 10 ML: at 20:09

## 2017-05-29 RX ADMIN — DOCUSATE SODIUM 100 MG: 100 CAPSULE ORAL at 09:30

## 2017-05-29 RX ADMIN — ACETAMINOPHEN 650 MG: 325 TABLET ORAL at 16:28

## 2017-05-29 RX ADMIN — SENNA 8.6 MG: 8.6 TABLET, COATED ORAL at 20:02

## 2017-05-29 RX ADMIN — TORSEMIDE 20 MG: 20 TABLET ORAL at 09:30

## 2017-05-29 RX ADMIN — Medication 10 ML: at 03:45

## 2017-05-29 RX ADMIN — LEVOTHYROXINE SODIUM 125 MCG: 125 TABLET ORAL at 09:28

## 2017-05-29 RX ADMIN — IPRATROPIUM BROMIDE AND ALBUTEROL SULFATE 1 AMPULE: .5; 3 SOLUTION RESPIRATORY (INHALATION) at 12:30

## 2017-05-29 RX ADMIN — METOCLOPRAMIDE 5 MG: 5 TABLET ORAL at 09:28

## 2017-05-29 RX ADMIN — SIMVASTATIN 20 MG: 20 TABLET, FILM COATED ORAL at 09:28

## 2017-05-29 RX ADMIN — AMPICILLIN SODIUM AND SULBACTAM SODIUM 1.5 G: 1; .5 INJECTION, POWDER, FOR SOLUTION INTRAMUSCULAR; INTRAVENOUS at 09:36

## 2017-05-29 RX ADMIN — SIMVASTATIN 20 MG: 20 TABLET, FILM COATED ORAL at 23:07

## 2017-05-29 RX ADMIN — HYDROCODONE BITARTRATE AND ACETAMINOPHEN 1 TABLET: 5; 325 TABLET ORAL at 08:32

## 2017-05-29 RX ADMIN — PREDNISONE 40 MG: 20 TABLET ORAL at 09:29

## 2017-05-29 RX ADMIN — SENNA 8.6 MG: 8.6 TABLET, COATED ORAL at 02:09

## 2017-05-29 RX ADMIN — ASPIRIN 81 MG: 81 TABLET, COATED ORAL at 09:30

## 2017-05-29 ASSESSMENT — PAIN SCALES - GENERAL
PAINLEVEL_OUTOF10: 3
PAINLEVEL_OUTOF10: 2
PAINLEVEL_OUTOF10: 6
PAINLEVEL_OUTOF10: 7
PAINLEVEL_OUTOF10: 7
PAINLEVEL_OUTOF10: 3
PAINLEVEL_OUTOF10: 0

## 2017-05-29 ASSESSMENT — PAIN DESCRIPTION - LOCATION: LOCATION: BACK

## 2017-05-29 ASSESSMENT — PAIN DESCRIPTION - DESCRIPTORS: DESCRIPTORS: ACHING

## 2017-05-29 ASSESSMENT — PAIN DESCRIPTION - PAIN TYPE: TYPE: ACUTE PAIN

## 2017-05-29 ASSESSMENT — PAIN DESCRIPTION - ORIENTATION: ORIENTATION: LOWER

## 2017-05-30 LAB
GLUCOSE BLD-MCNC: 213 MG/DL (ref 65–105)
GLUCOSE BLD-MCNC: 268 MG/DL (ref 65–105)
GLUCOSE BLD-MCNC: 344 MG/DL (ref 65–105)
GLUCOSE BLD-MCNC: 77 MG/DL (ref 65–105)
GLUCOSE BLD-MCNC: 78 MG/DL (ref 65–105)
GLUCOSE BLD-MCNC: 80 MG/DL (ref 65–105)

## 2017-05-30 PROCEDURE — 97165 OT EVAL LOW COMPLEX 30 MIN: CPT

## 2017-05-30 PROCEDURE — 6370000000 HC RX 637 (ALT 250 FOR IP): Performed by: NURSE PRACTITIONER

## 2017-05-30 PROCEDURE — 99232 SBSQ HOSP IP/OBS MODERATE 35: CPT | Performed by: INTERNAL MEDICINE

## 2017-05-30 PROCEDURE — 6370000000 HC RX 637 (ALT 250 FOR IP): Performed by: FAMILY MEDICINE

## 2017-05-30 PROCEDURE — G8987 SELF CARE CURRENT STATUS: HCPCS

## 2017-05-30 PROCEDURE — 97161 PT EVAL LOW COMPLEX 20 MIN: CPT

## 2017-05-30 PROCEDURE — 94640 AIRWAY INHALATION TREATMENT: CPT

## 2017-05-30 PROCEDURE — 6370000000 HC RX 637 (ALT 250 FOR IP): Performed by: INTERNAL MEDICINE

## 2017-05-30 PROCEDURE — 2060000000 HC ICU INTERMEDIATE R&B

## 2017-05-30 PROCEDURE — G8988 SELF CARE GOAL STATUS: HCPCS

## 2017-05-30 PROCEDURE — 2580000003 HC RX 258: Performed by: FAMILY MEDICINE

## 2017-05-30 PROCEDURE — 6360000002 HC RX W HCPCS: Performed by: HOSPITALIST

## 2017-05-30 PROCEDURE — 6360000002 HC RX W HCPCS: Performed by: FAMILY MEDICINE

## 2017-05-30 PROCEDURE — S9441 ASTHMA EDUCATION: HCPCS

## 2017-05-30 PROCEDURE — 82947 ASSAY GLUCOSE BLOOD QUANT: CPT

## 2017-05-30 PROCEDURE — 6370000000 HC RX 637 (ALT 250 FOR IP): Performed by: HOSPITALIST

## 2017-05-30 PROCEDURE — 94660 CPAP INITIATION&MGMT: CPT

## 2017-05-30 PROCEDURE — 99233 SBSQ HOSP IP/OBS HIGH 50: CPT | Performed by: INTERNAL MEDICINE

## 2017-05-30 PROCEDURE — 2580000003 HC RX 258: Performed by: HOSPITALIST

## 2017-05-30 PROCEDURE — 97530 THERAPEUTIC ACTIVITIES: CPT

## 2017-05-30 PROCEDURE — 94664 DEMO&/EVAL PT USE INHALER: CPT

## 2017-05-30 RX ORDER — SIMVASTATIN 20 MG
TABLET ORAL
Qty: 30 TABLET | Refills: 11 | Status: SHIPPED | OUTPATIENT
Start: 2017-05-30 | End: 2017-06-26 | Stop reason: SDUPTHER

## 2017-05-30 RX ADMIN — AMPICILLIN SODIUM AND SULBACTAM SODIUM 1.5 G: 1; .5 INJECTION, POWDER, FOR SOLUTION INTRAMUSCULAR; INTRAVENOUS at 14:59

## 2017-05-30 RX ADMIN — TORSEMIDE 20 MG: 20 TABLET ORAL at 09:36

## 2017-05-30 RX ADMIN — METOCLOPRAMIDE 5 MG: 5 TABLET ORAL at 09:38

## 2017-05-30 RX ADMIN — GABAPENTIN 800 MG: 800 TABLET ORAL at 09:37

## 2017-05-30 RX ADMIN — PREDNISONE 40 MG: 20 TABLET ORAL at 09:38

## 2017-05-30 RX ADMIN — FLUTICASONE PROPIONATE 1 SPRAY: 50 SPRAY, METERED NASAL at 09:33

## 2017-05-30 RX ADMIN — CLONAZEPAM 0.5 MG: 0.5 TABLET ORAL at 23:05

## 2017-05-30 RX ADMIN — ONDANSETRON 4 MG: 2 INJECTION, SOLUTION INTRAMUSCULAR; INTRAVENOUS at 12:04

## 2017-05-30 RX ADMIN — DOCUSATE SODIUM 100 MG: 100 CAPSULE ORAL at 22:46

## 2017-05-30 RX ADMIN — SENNA 8.6 MG: 8.6 TABLET, COATED ORAL at 22:46

## 2017-05-30 RX ADMIN — DOCUSATE SODIUM 100 MG: 100 CAPSULE ORAL at 09:37

## 2017-05-30 RX ADMIN — HYDROCODONE BITARTRATE AND ACETAMINOPHEN 1 TABLET: 5; 325 TABLET ORAL at 23:05

## 2017-05-30 RX ADMIN — METOPROLOL TARTRATE 50 MG: 50 TABLET, FILM COATED ORAL at 09:38

## 2017-05-30 RX ADMIN — PANTOPRAZOLE SODIUM 40 MG: 40 TABLET, DELAYED RELEASE ORAL at 09:37

## 2017-05-30 RX ADMIN — METOPROLOL TARTRATE 50 MG: 50 TABLET, FILM COATED ORAL at 22:46

## 2017-05-30 RX ADMIN — Medication 10 ML: at 19:52

## 2017-05-30 RX ADMIN — IPRATROPIUM BROMIDE AND ALBUTEROL SULFATE 1 AMPULE: .5; 3 SOLUTION RESPIRATORY (INHALATION) at 19:46

## 2017-05-30 RX ADMIN — DULOXETINE HYDROCHLORIDE 60 MG: 30 CAPSULE, DELAYED RELEASE ORAL at 22:46

## 2017-05-30 RX ADMIN — VITAMIN D, TAB 1000IU (100/BT) 4000 UNITS: 25 TAB at 09:36

## 2017-05-30 RX ADMIN — INSULIN LISPRO 2 UNITS: 100 INJECTION, SOLUTION INTRAVENOUS; SUBCUTANEOUS at 23:17

## 2017-05-30 RX ADMIN — LEVOTHYROXINE SODIUM 125 MCG: 125 TABLET ORAL at 09:37

## 2017-05-30 RX ADMIN — LOSARTAN POTASSIUM 100 MG: 50 TABLET, FILM COATED ORAL at 09:37

## 2017-05-30 RX ADMIN — AMPICILLIN SODIUM AND SULBACTAM SODIUM 1.5 G: 1; .5 INJECTION, POWDER, FOR SOLUTION INTRAMUSCULAR; INTRAVENOUS at 19:48

## 2017-05-30 RX ADMIN — METOCLOPRAMIDE 5 MG: 5 TABLET ORAL at 22:46

## 2017-05-30 RX ADMIN — ENOXAPARIN SODIUM 40 MG: 40 INJECTION SUBCUTANEOUS at 09:38

## 2017-05-30 RX ADMIN — CETIRIZINE HYDROCHLORIDE 10 MG: 10 TABLET ORAL at 09:37

## 2017-05-30 RX ADMIN — MOMETASONE FUROATE AND FORMOTEROL FUMARATE DIHYDRATE 2 PUFF: 200; 5 AEROSOL RESPIRATORY (INHALATION) at 19:46

## 2017-05-30 RX ADMIN — MOMETASONE FUROATE AND FORMOTEROL FUMARATE DIHYDRATE 2 PUFF: 200; 5 AEROSOL RESPIRATORY (INHALATION) at 08:49

## 2017-05-30 RX ADMIN — AMPICILLIN SODIUM AND SULBACTAM SODIUM 1.5 G: 1; .5 INJECTION, POWDER, FOR SOLUTION INTRAMUSCULAR; INTRAVENOUS at 09:29

## 2017-05-30 RX ADMIN — SIMVASTATIN 20 MG: 20 TABLET, FILM COATED ORAL at 22:46

## 2017-05-30 RX ADMIN — AMITRIPTYLINE HYDROCHLORIDE 75 MG: 50 TABLET, FILM COATED ORAL at 22:46

## 2017-05-30 RX ADMIN — ASPIRIN 81 MG: 81 TABLET, COATED ORAL at 09:37

## 2017-05-30 RX ADMIN — IPRATROPIUM BROMIDE AND ALBUTEROL SULFATE 1 AMPULE: .5; 3 SOLUTION RESPIRATORY (INHALATION) at 16:16

## 2017-05-30 RX ADMIN — IPRATROPIUM BROMIDE AND ALBUTEROL SULFATE 1 AMPULE: .5; 3 SOLUTION RESPIRATORY (INHALATION) at 08:45

## 2017-05-30 RX ADMIN — AMPICILLIN SODIUM AND SULBACTAM SODIUM 1.5 G: 1; .5 INJECTION, POWDER, FOR SOLUTION INTRAMUSCULAR; INTRAVENOUS at 01:27

## 2017-05-30 RX ADMIN — ONDANSETRON 4 MG: 2 INJECTION, SOLUTION INTRAMUSCULAR; INTRAVENOUS at 19:33

## 2017-05-30 RX ADMIN — LEVOFLOXACIN 750 MG: 5 INJECTION, SOLUTION INTRAVENOUS at 10:38

## 2017-05-30 RX ADMIN — SENNA 8.6 MG: 8.6 TABLET, COATED ORAL at 09:37

## 2017-05-30 RX ADMIN — IPRATROPIUM BROMIDE AND ALBUTEROL SULFATE 1 AMPULE: .5; 3 SOLUTION RESPIRATORY (INHALATION) at 12:07

## 2017-05-30 RX ADMIN — Medication 10 ML: at 10:40

## 2017-05-30 RX ADMIN — DULOXETINE HYDROCHLORIDE 60 MG: 30 CAPSULE, DELAYED RELEASE ORAL at 01:25

## 2017-05-30 RX ADMIN — GABAPENTIN 800 MG: 800 TABLET ORAL at 22:46

## 2017-05-30 ASSESSMENT — PAIN SCALES - GENERAL
PAINLEVEL_OUTOF10: 6
PAINLEVEL_OUTOF10: 2
PAINLEVEL_OUTOF10: 5

## 2017-05-30 ASSESSMENT — PAIN DESCRIPTION - PAIN TYPE: TYPE: ACUTE PAIN

## 2017-05-30 ASSESSMENT — PAIN DESCRIPTION - LOCATION: LOCATION: ABDOMEN

## 2017-05-31 LAB
ANION GAP SERPL CALCULATED.3IONS-SCNC: 11 MMOL/L (ref 9–17)
BUN BLDV-MCNC: 23 MG/DL (ref 6–20)
BUN/CREAT BLD: ABNORMAL (ref 9–20)
CALCIUM SERPL-MCNC: 8.5 MG/DL (ref 8.6–10.4)
CHLORIDE BLD-SCNC: 93 MMOL/L (ref 98–107)
CO2: 30 MMOL/L (ref 20–31)
CREAT SERPL-MCNC: 1.03 MG/DL (ref 0.5–0.9)
CULTURE: NORMAL
CULTURE: NORMAL
DIRECT EXAM: NORMAL
GFR AFRICAN AMERICAN: >60 ML/MIN
GFR NON-AFRICAN AMERICAN: 56 ML/MIN
GFR SERPL CREATININE-BSD FRML MDRD: ABNORMAL ML/MIN/{1.73_M2}
GFR SERPL CREATININE-BSD FRML MDRD: ABNORMAL ML/MIN/{1.73_M2}
GLUCOSE BLD-MCNC: 121 MG/DL (ref 65–105)
GLUCOSE BLD-MCNC: 159 MG/DL (ref 65–105)
GLUCOSE BLD-MCNC: 163 MG/DL (ref 65–105)
GLUCOSE BLD-MCNC: 195 MG/DL (ref 70–99)
GLUCOSE BLD-MCNC: 209 MG/DL (ref 65–105)
GLUCOSE BLD-MCNC: 257 MG/DL (ref 65–105)
GLUCOSE BLD-MCNC: 320 MG/DL (ref 65–105)
GLUCOSE BLD-MCNC: 38 MG/DL (ref 65–105)
GLUCOSE BLD-MCNC: 46 MG/DL (ref 65–105)
GLUCOSE BLD-MCNC: 55 MG/DL (ref 65–105)
Lab: NORMAL
POTASSIUM SERPL-SCNC: 3.5 MMOL/L (ref 3.7–5.3)
SODIUM BLD-SCNC: 134 MMOL/L (ref 135–144)
SPECIMEN DESCRIPTION: NORMAL
STATUS: NORMAL

## 2017-05-31 PROCEDURE — 99233 SBSQ HOSP IP/OBS HIGH 50: CPT | Performed by: INTERNAL MEDICINE

## 2017-05-31 PROCEDURE — 6370000000 HC RX 637 (ALT 250 FOR IP): Performed by: INTERNAL MEDICINE

## 2017-05-31 PROCEDURE — 97535 SELF CARE MNGMENT TRAINING: CPT

## 2017-05-31 PROCEDURE — 82947 ASSAY GLUCOSE BLOOD QUANT: CPT

## 2017-05-31 PROCEDURE — 97110 THERAPEUTIC EXERCISES: CPT

## 2017-05-31 PROCEDURE — 2060000000 HC ICU INTERMEDIATE R&B

## 2017-05-31 PROCEDURE — 6370000000 HC RX 637 (ALT 250 FOR IP): Performed by: HOSPITALIST

## 2017-05-31 PROCEDURE — 6370000000 HC RX 637 (ALT 250 FOR IP): Performed by: FAMILY MEDICINE

## 2017-05-31 PROCEDURE — S9441 ASTHMA EDUCATION: HCPCS

## 2017-05-31 PROCEDURE — 80048 BASIC METABOLIC PNL TOTAL CA: CPT

## 2017-05-31 PROCEDURE — 2580000003 HC RX 258: Performed by: HOSPITALIST

## 2017-05-31 PROCEDURE — 94640 AIRWAY INHALATION TREATMENT: CPT

## 2017-05-31 PROCEDURE — 6370000000 HC RX 637 (ALT 250 FOR IP): Performed by: NURSE PRACTITIONER

## 2017-05-31 PROCEDURE — 99232 SBSQ HOSP IP/OBS MODERATE 35: CPT | Performed by: INTERNAL MEDICINE

## 2017-05-31 PROCEDURE — 6360000002 HC RX W HCPCS: Performed by: HOSPITALIST

## 2017-05-31 PROCEDURE — 36415 COLL VENOUS BLD VENIPUNCTURE: CPT

## 2017-05-31 PROCEDURE — 94660 CPAP INITIATION&MGMT: CPT

## 2017-05-31 PROCEDURE — 6360000002 HC RX W HCPCS: Performed by: FAMILY MEDICINE

## 2017-05-31 RX ORDER — LACTOBACILLUS RHAMNOSUS GG 10B CELL
1 CAPSULE ORAL
Status: DISCONTINUED | OUTPATIENT
Start: 2017-06-01 | End: 2017-06-01 | Stop reason: HOSPADM

## 2017-05-31 RX ORDER — CLONAZEPAM 0.5 MG/1
0.5 TABLET ORAL 3 TIMES DAILY PRN
Status: DISCONTINUED | OUTPATIENT
Start: 2017-05-31 | End: 2017-06-01 | Stop reason: HOSPADM

## 2017-05-31 RX ADMIN — ACETAMINOPHEN 650 MG: 325 TABLET ORAL at 12:25

## 2017-05-31 RX ADMIN — PREDNISONE 40 MG: 20 TABLET ORAL at 09:19

## 2017-05-31 RX ADMIN — GABAPENTIN 800 MG: 800 TABLET ORAL at 22:28

## 2017-05-31 RX ADMIN — MOMETASONE FUROATE AND FORMOTEROL FUMARATE DIHYDRATE 2 PUFF: 200; 5 AEROSOL RESPIRATORY (INHALATION) at 19:35

## 2017-05-31 RX ADMIN — FLUTICASONE PROPIONATE 1 SPRAY: 50 SPRAY, METERED NASAL at 22:30

## 2017-05-31 RX ADMIN — Medication 10 ML: at 22:34

## 2017-05-31 RX ADMIN — IPRATROPIUM BROMIDE AND ALBUTEROL SULFATE 1 AMPULE: .5; 3 SOLUTION RESPIRATORY (INHALATION) at 11:23

## 2017-05-31 RX ADMIN — AMPICILLIN SODIUM AND SULBACTAM SODIUM 1.5 G: 1; .5 INJECTION, POWDER, FOR SOLUTION INTRAMUSCULAR; INTRAVENOUS at 02:31

## 2017-05-31 RX ADMIN — GABAPENTIN 800 MG: 800 TABLET ORAL at 09:20

## 2017-05-31 RX ADMIN — METOCLOPRAMIDE 5 MG: 5 TABLET ORAL at 22:29

## 2017-05-31 RX ADMIN — IPRATROPIUM BROMIDE AND ALBUTEROL SULFATE 1 AMPULE: .5; 3 SOLUTION RESPIRATORY (INHALATION) at 15:33

## 2017-05-31 RX ADMIN — DOCUSATE SODIUM 100 MG: 100 CAPSULE ORAL at 22:28

## 2017-05-31 RX ADMIN — METOPROLOL TARTRATE 50 MG: 50 TABLET, FILM COATED ORAL at 09:20

## 2017-05-31 RX ADMIN — AMPICILLIN SODIUM AND SULBACTAM SODIUM 1.5 G: 1; .5 INJECTION, POWDER, FOR SOLUTION INTRAMUSCULAR; INTRAVENOUS at 20:18

## 2017-05-31 RX ADMIN — AMITRIPTYLINE HYDROCHLORIDE 75 MG: 50 TABLET, FILM COATED ORAL at 22:29

## 2017-05-31 RX ADMIN — SENNA 8.6 MG: 8.6 TABLET, COATED ORAL at 22:29

## 2017-05-31 RX ADMIN — IPRATROPIUM BROMIDE AND ALBUTEROL SULFATE 1 AMPULE: .5; 3 SOLUTION RESPIRATORY (INHALATION) at 19:35

## 2017-05-31 RX ADMIN — DULOXETINE HYDROCHLORIDE 60 MG: 30 CAPSULE, DELAYED RELEASE ORAL at 22:29

## 2017-05-31 RX ADMIN — SENNA 8.6 MG: 8.6 TABLET, COATED ORAL at 09:19

## 2017-05-31 RX ADMIN — CETIRIZINE HYDROCHLORIDE 10 MG: 10 TABLET ORAL at 09:20

## 2017-05-31 RX ADMIN — PANTOPRAZOLE SODIUM 40 MG: 40 TABLET, DELAYED RELEASE ORAL at 09:20

## 2017-05-31 RX ADMIN — AMPICILLIN SODIUM AND SULBACTAM SODIUM 1.5 G: 1; .5 INJECTION, POWDER, FOR SOLUTION INTRAMUSCULAR; INTRAVENOUS at 09:15

## 2017-05-31 RX ADMIN — LOSARTAN POTASSIUM 100 MG: 50 TABLET, FILM COATED ORAL at 09:20

## 2017-05-31 RX ADMIN — IPRATROPIUM BROMIDE AND ALBUTEROL SULFATE 1 AMPULE: .5; 3 SOLUTION RESPIRATORY (INHALATION) at 07:44

## 2017-05-31 RX ADMIN — ENOXAPARIN SODIUM 40 MG: 40 INJECTION SUBCUTANEOUS at 09:19

## 2017-05-31 RX ADMIN — MOMETASONE FUROATE AND FORMOTEROL FUMARATE DIHYDRATE 2 PUFF: 200; 5 AEROSOL RESPIRATORY (INHALATION) at 07:44

## 2017-05-31 RX ADMIN — FLUTICASONE PROPIONATE 1 SPRAY: 50 SPRAY, METERED NASAL at 09:24

## 2017-05-31 RX ADMIN — DOCUSATE SODIUM 100 MG: 100 CAPSULE ORAL at 09:20

## 2017-05-31 RX ADMIN — TORSEMIDE 20 MG: 20 TABLET ORAL at 09:19

## 2017-05-31 RX ADMIN — AMPICILLIN SODIUM AND SULBACTAM SODIUM 1.5 G: 1; .5 INJECTION, POWDER, FOR SOLUTION INTRAMUSCULAR; INTRAVENOUS at 16:23

## 2017-05-31 RX ADMIN — HYDROCODONE BITARTRATE AND ACETAMINOPHEN 1 TABLET: 5; 325 TABLET ORAL at 22:29

## 2017-05-31 RX ADMIN — LEVOTHYROXINE SODIUM 125 MCG: 125 TABLET ORAL at 09:20

## 2017-05-31 RX ADMIN — ASPIRIN 81 MG: 81 TABLET, COATED ORAL at 09:20

## 2017-05-31 RX ADMIN — ACETAMINOPHEN 650 MG: 325 TABLET ORAL at 05:34

## 2017-05-31 RX ADMIN — METOPROLOL TARTRATE 50 MG: 50 TABLET, FILM COATED ORAL at 22:29

## 2017-05-31 RX ADMIN — SIMVASTATIN 20 MG: 20 TABLET, FILM COATED ORAL at 22:29

## 2017-05-31 RX ADMIN — METOCLOPRAMIDE 5 MG: 5 TABLET ORAL at 09:20

## 2017-05-31 RX ADMIN — LEVOFLOXACIN 750 MG: 5 INJECTION, SOLUTION INTRAVENOUS at 11:07

## 2017-05-31 RX ADMIN — INSULIN LISPRO 2 UNITS: 100 INJECTION, SOLUTION INTRAVENOUS; SUBCUTANEOUS at 22:51

## 2017-05-31 RX ADMIN — VITAMIN D, TAB 1000IU (100/BT) 4000 UNITS: 25 TAB at 09:19

## 2017-05-31 RX ADMIN — CLONAZEPAM 0.5 MG: 0.5 TABLET ORAL at 16:24

## 2017-05-31 ASSESSMENT — PAIN DESCRIPTION - PROGRESSION: CLINICAL_PROGRESSION: GRADUALLY IMPROVING

## 2017-05-31 ASSESSMENT — PAIN SCALES - GENERAL
PAINLEVEL_OUTOF10: 6
PAINLEVEL_OUTOF10: 0
PAINLEVEL_OUTOF10: 4
PAINLEVEL_OUTOF10: 1
PAINLEVEL_OUTOF10: 0
PAINLEVEL_OUTOF10: 3
PAINLEVEL_OUTOF10: 0

## 2017-06-01 ENCOUNTER — APPOINTMENT (OUTPATIENT)
Dept: GENERAL RADIOLOGY | Age: 57
DRG: 189 | End: 2017-06-01
Payer: MEDICARE

## 2017-06-01 VITALS
HEART RATE: 100 BPM | WEIGHT: 198 LBS | BODY MASS INDEX: 32.99 KG/M2 | SYSTOLIC BLOOD PRESSURE: 152 MMHG | HEIGHT: 65 IN | DIASTOLIC BLOOD PRESSURE: 60 MMHG | TEMPERATURE: 97.7 F | OXYGEN SATURATION: 94 % | RESPIRATION RATE: 20 BRPM

## 2017-06-01 LAB
GLUCOSE BLD-MCNC: 205 MG/DL (ref 65–105)
GLUCOSE BLD-MCNC: 243 MG/DL (ref 65–105)
GLUCOSE BLD-MCNC: 250 MG/DL (ref 65–105)
GLUCOSE BLD-MCNC: 44 MG/DL (ref 65–105)
GLUCOSE BLD-MCNC: 80 MG/DL (ref 65–105)

## 2017-06-01 PROCEDURE — 6370000000 HC RX 637 (ALT 250 FOR IP): Performed by: FAMILY MEDICINE

## 2017-06-01 PROCEDURE — 6370000000 HC RX 637 (ALT 250 FOR IP): Performed by: NURSE PRACTITIONER

## 2017-06-01 PROCEDURE — 6360000002 HC RX W HCPCS: Performed by: FAMILY MEDICINE

## 2017-06-01 PROCEDURE — 6370000000 HC RX 637 (ALT 250 FOR IP): Performed by: INTERNAL MEDICINE

## 2017-06-01 PROCEDURE — 6360000002 HC RX W HCPCS: Performed by: HOSPITALIST

## 2017-06-01 PROCEDURE — 71010 XR CHEST PORTABLE: CPT

## 2017-06-01 PROCEDURE — 99239 HOSP IP/OBS DSCHRG MGMT >30: CPT | Performed by: INTERNAL MEDICINE

## 2017-06-01 PROCEDURE — 2580000003 HC RX 258: Performed by: HOSPITALIST

## 2017-06-01 PROCEDURE — 99233 SBSQ HOSP IP/OBS HIGH 50: CPT | Performed by: INTERNAL MEDICINE

## 2017-06-01 PROCEDURE — 94640 AIRWAY INHALATION TREATMENT: CPT

## 2017-06-01 PROCEDURE — 82947 ASSAY GLUCOSE BLOOD QUANT: CPT

## 2017-06-01 PROCEDURE — 6370000000 HC RX 637 (ALT 250 FOR IP): Performed by: HOSPITALIST

## 2017-06-01 RX ORDER — BENZONATATE 100 MG/1
100 CAPSULE ORAL 3 TIMES DAILY PRN
Qty: 20 CAPSULE | Refills: 0 | Status: SHIPPED | OUTPATIENT
Start: 2017-06-01 | End: 2017-06-08

## 2017-06-01 RX ORDER — FLUTICASONE PROPIONATE 50 MCG
1 SPRAY, SUSPENSION (ML) NASAL 2 TIMES DAILY
Qty: 1 BOTTLE | Refills: 0 | Status: SHIPPED | OUTPATIENT
Start: 2017-06-01 | End: 2019-05-08

## 2017-06-01 RX ORDER — ONDANSETRON 2 MG/ML
4 INJECTION INTRAMUSCULAR; INTRAVENOUS EVERY 6 HOURS PRN
Qty: 15 ML | Refills: 0 | Status: ON HOLD | OUTPATIENT
Start: 2017-06-01 | End: 2017-06-19 | Stop reason: ALTCHOICE

## 2017-06-01 RX ORDER — AMITRIPTYLINE HYDROCHLORIDE 75 MG/1
75 TABLET, FILM COATED ORAL NIGHTLY
Qty: 30 TABLET | Refills: 3 | Status: SHIPPED | OUTPATIENT
Start: 2017-06-01 | End: 2017-06-01 | Stop reason: HOSPADM

## 2017-06-01 RX ORDER — GUAIFENESIN DEXTROMETHORPHAN HYDROBROMIDE ORAL SOLUTION 10; 100 MG/5ML; MG/5ML
10 SOLUTION ORAL 3 TIMES DAILY PRN
Qty: 1 BOTTLE | Refills: 0 | Status: SHIPPED | OUTPATIENT
Start: 2017-06-01 | End: 2017-06-27 | Stop reason: ALTCHOICE

## 2017-06-01 RX ADMIN — IPRATROPIUM BROMIDE AND ALBUTEROL SULFATE 1 AMPULE: .5; 3 SOLUTION RESPIRATORY (INHALATION) at 09:51

## 2017-06-01 RX ADMIN — Medication 10 ML: at 06:02

## 2017-06-01 RX ADMIN — GABAPENTIN 800 MG: 800 TABLET ORAL at 08:42

## 2017-06-01 RX ADMIN — PANTOPRAZOLE SODIUM 40 MG: 40 TABLET, DELAYED RELEASE ORAL at 08:42

## 2017-06-01 RX ADMIN — METOPROLOL TARTRATE 50 MG: 50 TABLET, FILM COATED ORAL at 08:41

## 2017-06-01 RX ADMIN — AMPICILLIN SODIUM AND SULBACTAM SODIUM 1.5 G: 1; .5 INJECTION, POWDER, FOR SOLUTION INTRAMUSCULAR; INTRAVENOUS at 02:29

## 2017-06-01 RX ADMIN — CLONAZEPAM 0.5 MG: 0.5 TABLET ORAL at 12:04

## 2017-06-01 RX ADMIN — VITAMIN D, TAB 1000IU (100/BT) 4000 UNITS: 25 TAB at 08:41

## 2017-06-01 RX ADMIN — ASPIRIN 81 MG: 81 TABLET, COATED ORAL at 08:43

## 2017-06-01 RX ADMIN — Medication 1 CAPSULE: at 08:43

## 2017-06-01 RX ADMIN — METOCLOPRAMIDE 5 MG: 5 TABLET ORAL at 08:41

## 2017-06-01 RX ADMIN — LOSARTAN POTASSIUM 100 MG: 50 TABLET, FILM COATED ORAL at 08:42

## 2017-06-01 RX ADMIN — TORSEMIDE 20 MG: 20 TABLET ORAL at 08:45

## 2017-06-01 RX ADMIN — SENNA 8.6 MG: 8.6 TABLET, COATED ORAL at 08:43

## 2017-06-01 RX ADMIN — MOMETASONE FUROATE AND FORMOTEROL FUMARATE DIHYDRATE 2 PUFF: 200; 5 AEROSOL RESPIRATORY (INHALATION) at 09:51

## 2017-06-01 RX ADMIN — AMPICILLIN SODIUM AND SULBACTAM SODIUM 1.5 G: 1; .5 INJECTION, POWDER, FOR SOLUTION INTRAMUSCULAR; INTRAVENOUS at 10:33

## 2017-06-01 RX ADMIN — CETIRIZINE HYDROCHLORIDE 10 MG: 10 TABLET ORAL at 08:43

## 2017-06-01 RX ADMIN — FLUTICASONE PROPIONATE 1 SPRAY: 50 SPRAY, METERED NASAL at 08:44

## 2017-06-01 RX ADMIN — DOCUSATE SODIUM 100 MG: 100 CAPSULE ORAL at 08:42

## 2017-06-01 RX ADMIN — ENOXAPARIN SODIUM 40 MG: 40 INJECTION SUBCUTANEOUS at 08:42

## 2017-06-01 RX ADMIN — LEVOTHYROXINE SODIUM 125 MCG: 125 TABLET ORAL at 08:41

## 2017-06-01 RX ADMIN — PREDNISONE 40 MG: 20 TABLET ORAL at 08:41

## 2017-06-02 ENCOUNTER — HOSPITAL ENCOUNTER (OUTPATIENT)
Age: 57
Setting detail: SPECIMEN
Discharge: HOME | End: 2017-06-02
Payer: MEDICARE

## 2017-06-02 LAB
CHOLESTEROL/HDL RATIO: 4.4
CHOLESTEROL: 153 MG/DL
CULTURE: ABNORMAL
DIRECT EXAM: ABNORMAL
EKG ATRIAL RATE: 95 BPM
EKG P AXIS: 70 DEGREES
EKG P-R INTERVAL: 136 MS
EKG Q-T INTERVAL: 346 MS
EKG QRS DURATION: 74 MS
EKG QTC CALCULATION (BAZETT): 434 MS
EKG R AXIS: 37 DEGREES
EKG T AXIS: 69 DEGREES
EKG VENTRICULAR RATE: 95 BPM
ESTIMATED AVERAGE GLUCOSE: 180 MG/DL
HBA1C MFR BLD: 7.9 % (ref 4–6)
HDLC SERPL-MCNC: 35 MG/DL
LDL CHOLESTEROL: 82 MG/DL (ref 0–130)
Lab: ABNORMAL
Lab: ABNORMAL
SPECIMEN DESCRIPTION: ABNORMAL
SPECIMEN DESCRIPTION: ABNORMAL
STATUS: ABNORMAL
TRIGL SERPL-MCNC: 179 MG/DL
TSH SERPL DL<=0.05 MIU/L-ACNC: 0.23 MIU/L (ref 0.3–5)
VLDLC SERPL CALC-MCNC: ABNORMAL MG/DL (ref 1–30)

## 2017-06-02 PROCEDURE — 80061 LIPID PANEL: CPT

## 2017-06-02 PROCEDURE — 87070 CULTURE OTHR SPECIMN AEROBIC: CPT

## 2017-06-02 PROCEDURE — 83036 HEMOGLOBIN GLYCOSYLATED A1C: CPT

## 2017-06-02 PROCEDURE — 84443 ASSAY THYROID STIM HORMONE: CPT

## 2017-06-02 PROCEDURE — 87205 SMEAR GRAM STAIN: CPT

## 2017-06-03 ENCOUNTER — HOSPITAL ENCOUNTER (OUTPATIENT)
Age: 57
Setting detail: SPECIMEN
Discharge: HOME | End: 2017-06-03
Payer: MEDICARE

## 2017-06-03 LAB
DIRECT EXAM: NORMAL
Lab: NORMAL
SPECIMEN DESCRIPTION: NORMAL
SPECIMEN DESCRIPTION: NORMAL
STATUS: NORMAL

## 2017-06-03 PROCEDURE — 87449 NOS EACH ORGANISM AG IA: CPT

## 2017-06-05 ENCOUNTER — CARE COORDINATION (OUTPATIENT)
Dept: CASE MANAGEMENT | Age: 57
End: 2017-06-05

## 2017-06-13 ENCOUNTER — CARE COORDINATION (OUTPATIENT)
Dept: CASE MANAGEMENT | Age: 57
End: 2017-06-13

## 2017-06-14 ENCOUNTER — CARE COORDINATION (OUTPATIENT)
Dept: CASE MANAGEMENT | Age: 57
End: 2017-06-14

## 2017-06-16 ENCOUNTER — OFFICE VISIT (OUTPATIENT)
Dept: INTERNAL MEDICINE CLINIC | Age: 57
End: 2017-06-16
Payer: MEDICARE

## 2017-06-16 VITALS
DIASTOLIC BLOOD PRESSURE: 60 MMHG | HEART RATE: 84 BPM | HEIGHT: 65 IN | WEIGHT: 195 LBS | BODY MASS INDEX: 32.49 KG/M2 | SYSTOLIC BLOOD PRESSURE: 128 MMHG

## 2017-06-16 DIAGNOSIS — I10 ESSENTIAL HYPERTENSION: ICD-10-CM

## 2017-06-16 DIAGNOSIS — J44.1 COPD EXACERBATION (HCC): ICD-10-CM

## 2017-06-16 DIAGNOSIS — K21.00 GASTROESOPHAGEAL REFLUX DISEASE WITH ESOPHAGITIS: Primary | ICD-10-CM

## 2017-06-16 DIAGNOSIS — J06.9 UPPER RESPIRATORY TRACT INFECTION, UNSPECIFIED TYPE: ICD-10-CM

## 2017-06-16 PROCEDURE — G8417 CALC BMI ABV UP PARAM F/U: HCPCS | Performed by: INTERNAL MEDICINE

## 2017-06-16 PROCEDURE — 3014F SCREEN MAMMO DOC REV: CPT | Performed by: INTERNAL MEDICINE

## 2017-06-16 PROCEDURE — 99214 OFFICE O/P EST MOD 30 MIN: CPT | Performed by: INTERNAL MEDICINE

## 2017-06-16 PROCEDURE — G8427 DOCREV CUR MEDS BY ELIG CLIN: HCPCS | Performed by: INTERNAL MEDICINE

## 2017-06-16 PROCEDURE — 1111F DSCHRG MED/CURRENT MED MERGE: CPT | Performed by: INTERNAL MEDICINE

## 2017-06-16 PROCEDURE — G8926 SPIRO NO PERF OR DOC: HCPCS | Performed by: INTERNAL MEDICINE

## 2017-06-16 PROCEDURE — 3023F SPIROM DOC REV: CPT | Performed by: INTERNAL MEDICINE

## 2017-06-16 PROCEDURE — 1036F TOBACCO NON-USER: CPT | Performed by: INTERNAL MEDICINE

## 2017-06-16 PROCEDURE — 3017F COLORECTAL CA SCREEN DOC REV: CPT | Performed by: INTERNAL MEDICINE

## 2017-06-16 RX ORDER — AZITHROMYCIN 250 MG/1
TABLET, FILM COATED ORAL
Qty: 1 PACKET | Refills: 0 | Status: ON HOLD | OUTPATIENT
Start: 2017-06-16 | End: 2017-06-19 | Stop reason: HOSPADM

## 2017-06-16 RX ORDER — PANTOPRAZOLE SODIUM 20 MG/1
20 TABLET, DELAYED RELEASE ORAL DAILY
Qty: 30 TABLET | Refills: 3 | Status: ON HOLD | OUTPATIENT
Start: 2017-06-16 | End: 2017-06-19 | Stop reason: ALTCHOICE

## 2017-06-16 RX ORDER — SYRING-NEEDL,DISP,INSUL,0.3 ML 29 G X1/2"
SYRINGE, EMPTY DISPOSABLE MISCELLANEOUS
Status: ON HOLD | COMMUNITY
Start: 2017-05-09 | End: 2020-10-17 | Stop reason: HOSPADM

## 2017-06-18 ENCOUNTER — HOSPITAL ENCOUNTER (OUTPATIENT)
Age: 57
Setting detail: OBSERVATION
LOS: 1 days | Discharge: HOME | End: 2017-06-19
Attending: EMERGENCY MEDICINE | Admitting: INTERNAL MEDICINE
Payer: MEDICARE

## 2017-06-18 ENCOUNTER — APPOINTMENT (OUTPATIENT)
Dept: GENERAL RADIOLOGY | Age: 57
End: 2017-06-18
Payer: MEDICARE

## 2017-06-18 DIAGNOSIS — J18.9 HCAP (HEALTHCARE-ASSOCIATED PNEUMONIA): Primary | ICD-10-CM

## 2017-06-18 PROBLEM — J06.9 URI (UPPER RESPIRATORY INFECTION): Status: ACTIVE | Noted: 2017-06-18

## 2017-06-18 LAB
ABSOLUTE EOS #: 0 K/UL (ref 0–0.4)
ABSOLUTE LYMPH #: 0.7 K/UL (ref 1–4.8)
ABSOLUTE MONO #: 0.2 K/UL (ref 0.1–1.2)
ANION GAP SERPL CALCULATED.3IONS-SCNC: 17 MMOL/L (ref 9–17)
BASOPHILS # BLD: 0 %
BASOPHILS ABSOLUTE: 0 K/UL (ref 0–0.2)
BUN BLDV-MCNC: 14 MG/DL (ref 6–20)
BUN/CREAT BLD: ABNORMAL (ref 9–20)
CALCIUM SERPL-MCNC: 9 MG/DL (ref 8.6–10.4)
CHLORIDE BLD-SCNC: 98 MMOL/L (ref 98–107)
CO2: 22 MMOL/L (ref 20–31)
CREAT SERPL-MCNC: 1.16 MG/DL (ref 0.5–0.9)
DIFFERENTIAL TYPE: ABNORMAL
EOSINOPHILS RELATIVE PERCENT: 0 %
GFR AFRICAN AMERICAN: 59 ML/MIN
GFR NON-AFRICAN AMERICAN: 49 ML/MIN
GFR SERPL CREATININE-BSD FRML MDRD: ABNORMAL ML/MIN/{1.73_M2}
GFR SERPL CREATININE-BSD FRML MDRD: ABNORMAL ML/MIN/{1.73_M2}
GLUCOSE BLD-MCNC: 228 MG/DL (ref 70–99)
HCT VFR BLD CALC: 32.7 % (ref 36–46)
HEMOGLOBIN: 10.9 G/DL (ref 12–16)
LYMPHOCYTES # BLD: 8 %
MCH RBC QN AUTO: 29.1 PG (ref 26–34)
MCHC RBC AUTO-ENTMCNC: 33.2 G/DL (ref 31–37)
MCV RBC AUTO: 87.6 FL (ref 80–100)
MONOCYTES # BLD: 2 %
PDW BLD-RTO: 15.2 % (ref 12.5–15.4)
PLATELET # BLD: 237 K/UL (ref 140–450)
PLATELET ESTIMATE: ABNORMAL
PMV BLD AUTO: 7.8 FL (ref 6–12)
POC TROPONIN I: 0 NG/ML (ref 0–0.1)
POC TROPONIN I: 0 NG/ML (ref 0–0.1)
POC TROPONIN INTERP: NORMAL
POC TROPONIN INTERP: NORMAL
POTASSIUM SERPL-SCNC: 3.9 MMOL/L (ref 3.7–5.3)
RBC # BLD: 3.73 M/UL (ref 4–5.2)
RBC # BLD: ABNORMAL 10*6/UL
SEG NEUTROPHILS: 90 %
SEGMENTED NEUTROPHILS ABSOLUTE COUNT: 7.5 K/UL (ref 1.8–7.7)
SODIUM BLD-SCNC: 137 MMOL/L (ref 135–144)
WBC # BLD: 8.4 K/UL (ref 3.5–11)
WBC # BLD: ABNORMAL 10*3/UL

## 2017-06-18 PROCEDURE — 6360000002 HC RX W HCPCS: Performed by: EMERGENCY MEDICINE

## 2017-06-18 PROCEDURE — 1200000000 HC SEMI PRIVATE

## 2017-06-18 PROCEDURE — 80048 BASIC METABOLIC PNL TOTAL CA: CPT

## 2017-06-18 PROCEDURE — 96375 TX/PRO/DX INJ NEW DRUG ADDON: CPT

## 2017-06-18 PROCEDURE — 71020 XR CHEST STANDARD TWO VW: CPT

## 2017-06-18 PROCEDURE — 99285 EMERGENCY DEPT VISIT HI MDM: CPT

## 2017-06-18 PROCEDURE — 2580000003 HC RX 258: Performed by: EMERGENCY MEDICINE

## 2017-06-18 PROCEDURE — 94640 AIRWAY INHALATION TREATMENT: CPT

## 2017-06-18 PROCEDURE — 85025 COMPLETE CBC W/AUTO DIFF WBC: CPT

## 2017-06-18 PROCEDURE — 94664 DEMO&/EVAL PT USE INHALER: CPT

## 2017-06-18 PROCEDURE — 93005 ELECTROCARDIOGRAM TRACING: CPT

## 2017-06-18 PROCEDURE — 84484 ASSAY OF TROPONIN QUANT: CPT

## 2017-06-18 PROCEDURE — 96365 THER/PROPH/DIAG IV INF INIT: CPT

## 2017-06-18 RX ADMIN — ALBUTEROL SULFATE 5 MG: 5 SOLUTION RESPIRATORY (INHALATION) at 20:58

## 2017-06-18 RX ADMIN — PIPERACILLIN AND TAZOBACTAM 3.38 G: 3; .375 INJECTION, POWDER, LYOPHILIZED, FOR SOLUTION INTRAVENOUS; PARENTERAL at 22:46

## 2017-06-18 RX ADMIN — IPRATROPIUM BROMIDE 0.5 MG: 0.5 SOLUTION RESPIRATORY (INHALATION) at 20:58

## 2017-06-18 RX ADMIN — VANCOMYCIN HYDROCHLORIDE 1250 MG: 10 INJECTION, POWDER, LYOPHILIZED, FOR SOLUTION INTRAVENOUS at 23:42

## 2017-06-18 ASSESSMENT — ENCOUNTER SYMPTOMS
CONSTIPATION: 0
BLOOD IN STOOL: 0
RHINORRHEA: 0
TROUBLE SWALLOWING: 0
COUGH: 1
SHORTNESS OF BREATH: 1
PHOTOPHOBIA: 0
NAUSEA: 0
DIARRHEA: 0
ABDOMINAL PAIN: 0
SINUS PRESSURE: 0
SORE THROAT: 0
VOMITING: 0
BACK PAIN: 0

## 2017-06-19 VITALS
DIASTOLIC BLOOD PRESSURE: 60 MMHG | OXYGEN SATURATION: 96 % | RESPIRATION RATE: 16 BRPM | SYSTOLIC BLOOD PRESSURE: 133 MMHG | HEIGHT: 64 IN | WEIGHT: 195 LBS | HEART RATE: 101 BPM | BODY MASS INDEX: 33.29 KG/M2 | TEMPERATURE: 99.6 F

## 2017-06-19 LAB
ABSOLUTE EOS #: 0 K/UL (ref 0–0.4)
ABSOLUTE LYMPH #: 0.8 K/UL (ref 1–4.8)
ABSOLUTE MONO #: 0.5 K/UL (ref 0.1–1.2)
ANION GAP SERPL CALCULATED.3IONS-SCNC: 13 MMOL/L (ref 9–17)
BASOPHILS # BLD: 0 %
BASOPHILS ABSOLUTE: 0 K/UL (ref 0–0.2)
BUN BLDV-MCNC: 16 MG/DL (ref 6–20)
BUN/CREAT BLD: ABNORMAL (ref 9–20)
CALCIUM SERPL-MCNC: 8.5 MG/DL (ref 8.6–10.4)
CHLORIDE BLD-SCNC: 99 MMOL/L (ref 98–107)
CO2: 23 MMOL/L (ref 20–31)
CREAT SERPL-MCNC: 0.93 MG/DL (ref 0.5–0.9)
DIFFERENTIAL TYPE: ABNORMAL
EKG ATRIAL RATE: 91 BPM
EKG P AXIS: 21 DEGREES
EKG P-R INTERVAL: 150 MS
EKG Q-T INTERVAL: 356 MS
EKG QRS DURATION: 74 MS
EKG QTC CALCULATION (BAZETT): 437 MS
EKG R AXIS: 23 DEGREES
EKG T AXIS: 47 DEGREES
EKG VENTRICULAR RATE: 91 BPM
EOSINOPHILS RELATIVE PERCENT: 0 %
GFR AFRICAN AMERICAN: >60 ML/MIN
GFR NON-AFRICAN AMERICAN: >60 ML/MIN
GFR SERPL CREATININE-BSD FRML MDRD: ABNORMAL ML/MIN/{1.73_M2}
GFR SERPL CREATININE-BSD FRML MDRD: ABNORMAL ML/MIN/{1.73_M2}
GLUCOSE BLD-MCNC: 309 MG/DL (ref 70–99)
GLUCOSE BLD-MCNC: 334 MG/DL (ref 65–105)
GLUCOSE BLD-MCNC: 423 MG/DL (ref 65–105)
HCT VFR BLD CALC: 30.3 % (ref 36–46)
HEMOGLOBIN: 10.3 G/DL (ref 12–16)
LYMPHOCYTES # BLD: 12 %
MCH RBC QN AUTO: 29.3 PG (ref 26–34)
MCHC RBC AUTO-ENTMCNC: 33.9 G/DL (ref 31–37)
MCV RBC AUTO: 86.5 FL (ref 80–100)
MONOCYTES # BLD: 7 %
PDW BLD-RTO: 14.8 % (ref 12.5–15.4)
PLATELET # BLD: 240 K/UL (ref 140–450)
PLATELET ESTIMATE: ABNORMAL
PMV BLD AUTO: 8.2 FL (ref 6–12)
POTASSIUM SERPL-SCNC: 4.5 MMOL/L (ref 3.7–5.3)
RBC # BLD: 3.5 M/UL (ref 4–5.2)
RBC # BLD: ABNORMAL 10*6/UL
SEG NEUTROPHILS: 81 %
SEGMENTED NEUTROPHILS ABSOLUTE COUNT: 5.5 K/UL (ref 1.8–7.7)
SODIUM BLD-SCNC: 135 MMOL/L (ref 135–144)
WBC # BLD: 6.7 K/UL (ref 3.5–11)
WBC # BLD: ABNORMAL 10*3/UL

## 2017-06-19 PROCEDURE — 36415 COLL VENOUS BLD VENIPUNCTURE: CPT

## 2017-06-19 PROCEDURE — 82947 ASSAY GLUCOSE BLOOD QUANT: CPT

## 2017-06-19 PROCEDURE — 6370000000 HC RX 637 (ALT 250 FOR IP): Performed by: INTERNAL MEDICINE

## 2017-06-19 PROCEDURE — 80048 BASIC METABOLIC PNL TOTAL CA: CPT

## 2017-06-19 PROCEDURE — 2580000003 HC RX 258: Performed by: INTERNAL MEDICINE

## 2017-06-19 PROCEDURE — 6360000002 HC RX W HCPCS: Performed by: EMERGENCY MEDICINE

## 2017-06-19 PROCEDURE — 6360000002 HC RX W HCPCS: Performed by: INTERNAL MEDICINE

## 2017-06-19 PROCEDURE — G8979 MOBILITY GOAL STATUS: HCPCS

## 2017-06-19 PROCEDURE — 97161 PT EVAL LOW COMPLEX 20 MIN: CPT

## 2017-06-19 PROCEDURE — G8978 MOBILITY CURRENT STATUS: HCPCS

## 2017-06-19 PROCEDURE — 96372 THER/PROPH/DIAG INJ SC/IM: CPT

## 2017-06-19 PROCEDURE — 94640 AIRWAY INHALATION TREATMENT: CPT

## 2017-06-19 PROCEDURE — 85025 COMPLETE CBC W/AUTO DIFF WBC: CPT

## 2017-06-19 PROCEDURE — G8980 MOBILITY D/C STATUS: HCPCS

## 2017-06-19 PROCEDURE — G0378 HOSPITAL OBSERVATION PER HR: HCPCS

## 2017-06-19 PROCEDURE — 99220 PR INITIAL OBSERVATION CARE/DAY 70 MINUTES: CPT | Performed by: INTERNAL MEDICINE

## 2017-06-19 PROCEDURE — 97530 THERAPEUTIC ACTIVITIES: CPT

## 2017-06-19 RX ORDER — METOCLOPRAMIDE 5 MG/1
5 TABLET ORAL 2 TIMES DAILY
Status: DISCONTINUED | OUTPATIENT
Start: 2017-06-19 | End: 2017-06-19 | Stop reason: HOSPADM

## 2017-06-19 RX ORDER — SODIUM CHLORIDE 0.9 % (FLUSH) 0.9 %
10 SYRINGE (ML) INJECTION EVERY 12 HOURS SCHEDULED
Status: DISCONTINUED | OUTPATIENT
Start: 2017-06-19 | End: 2017-06-19 | Stop reason: HOSPADM

## 2017-06-19 RX ORDER — ASPIRIN 81 MG/1
81 TABLET, CHEWABLE ORAL DAILY
Status: DISCONTINUED | OUTPATIENT
Start: 2017-06-19 | End: 2017-06-19 | Stop reason: HOSPADM

## 2017-06-19 RX ORDER — DEXTROSE MONOHYDRATE 25 G/50ML
12.5 INJECTION, SOLUTION INTRAVENOUS PRN
Status: DISCONTINUED | OUTPATIENT
Start: 2017-06-19 | End: 2017-06-19 | Stop reason: HOSPADM

## 2017-06-19 RX ORDER — SIMVASTATIN 20 MG
20 TABLET ORAL NIGHTLY
Status: DISCONTINUED | OUTPATIENT
Start: 2017-06-19 | End: 2017-06-19 | Stop reason: HOSPADM

## 2017-06-19 RX ORDER — AMITRIPTYLINE HYDROCHLORIDE 50 MG/1
150 TABLET, FILM COATED ORAL NIGHTLY
Status: DISCONTINUED | OUTPATIENT
Start: 2017-06-19 | End: 2017-06-19 | Stop reason: HOSPADM

## 2017-06-19 RX ORDER — ALBUTEROL SULFATE 90 UG/1
2 AEROSOL, METERED RESPIRATORY (INHALATION) EVERY 6 HOURS PRN
Status: DISCONTINUED | OUTPATIENT
Start: 2017-06-19 | End: 2017-06-19 | Stop reason: HOSPADM

## 2017-06-19 RX ORDER — SODIUM CHLORIDE 0.9 % (FLUSH) 0.9 %
10 SYRINGE (ML) INJECTION PRN
Status: DISCONTINUED | OUTPATIENT
Start: 2017-06-19 | End: 2017-06-19 | Stop reason: HOSPADM

## 2017-06-19 RX ORDER — CLONAZEPAM 0.5 MG/1
0.5 TABLET ORAL 2 TIMES DAILY PRN
Status: DISCONTINUED | OUTPATIENT
Start: 2017-06-19 | End: 2017-06-19 | Stop reason: HOSPADM

## 2017-06-19 RX ORDER — ROPINIROLE 1 MG/1
2 TABLET, FILM COATED ORAL NIGHTLY
Status: DISCONTINUED | OUTPATIENT
Start: 2017-06-19 | End: 2017-06-19 | Stop reason: HOSPADM

## 2017-06-19 RX ORDER — ALBUTEROL SULFATE 2.5 MG/3ML
2.5 SOLUTION RESPIRATORY (INHALATION) 3 TIMES DAILY
Status: DISCONTINUED | OUTPATIENT
Start: 2017-06-19 | End: 2017-06-19 | Stop reason: HOSPADM

## 2017-06-19 RX ORDER — CETIRIZINE HYDROCHLORIDE 10 MG/1
10 TABLET ORAL DAILY
Status: DISCONTINUED | OUTPATIENT
Start: 2017-06-19 | End: 2017-06-19 | Stop reason: HOSPADM

## 2017-06-19 RX ORDER — NICOTINE POLACRILEX 4 MG
15 LOZENGE BUCCAL PRN
Status: DISCONTINUED | OUTPATIENT
Start: 2017-06-19 | End: 2017-06-19 | Stop reason: HOSPADM

## 2017-06-19 RX ORDER — SODIUM CHLORIDE 9 MG/ML
INJECTION, SOLUTION INTRAVENOUS CONTINUOUS
Status: DISCONTINUED | OUTPATIENT
Start: 2017-06-19 | End: 2017-06-19

## 2017-06-19 RX ORDER — BENZONATATE 100 MG/1
100 CAPSULE ORAL 3 TIMES DAILY PRN
Qty: 14 CAPSULE | Refills: 0 | Status: SHIPPED | OUTPATIENT
Start: 2017-06-19 | End: 2017-06-26

## 2017-06-19 RX ORDER — PANTOPRAZOLE SODIUM 40 MG/1
40 TABLET, DELAYED RELEASE ORAL DAILY
Status: DISCONTINUED | OUTPATIENT
Start: 2017-06-19 | End: 2017-06-19 | Stop reason: HOSPADM

## 2017-06-19 RX ORDER — GUAIFENESIN DEXTROMETHORPHAN HYDROBROMIDE ORAL SOLUTION 10; 100 MG/5ML; MG/5ML
10 SOLUTION ORAL 3 TIMES DAILY PRN
Status: DISCONTINUED | OUTPATIENT
Start: 2017-06-19 | End: 2017-06-19 | Stop reason: HOSPADM

## 2017-06-19 RX ORDER — DEXTROSE MONOHYDRATE 50 MG/ML
100 INJECTION, SOLUTION INTRAVENOUS PRN
Status: DISCONTINUED | OUTPATIENT
Start: 2017-06-19 | End: 2017-06-19 | Stop reason: HOSPADM

## 2017-06-19 RX ORDER — DULOXETIN HYDROCHLORIDE 30 MG/1
60 CAPSULE, DELAYED RELEASE ORAL DAILY
Status: DISCONTINUED | OUTPATIENT
Start: 2017-06-19 | End: 2017-06-19 | Stop reason: HOSPADM

## 2017-06-19 RX ORDER — TORSEMIDE 20 MG/1
20 TABLET ORAL DAILY
Status: DISCONTINUED | OUTPATIENT
Start: 2017-06-19 | End: 2017-06-19 | Stop reason: HOSPADM

## 2017-06-19 RX ORDER — LOSARTAN POTASSIUM 50 MG/1
100 TABLET ORAL DAILY
Status: DISCONTINUED | OUTPATIENT
Start: 2017-06-19 | End: 2017-06-19 | Stop reason: HOSPADM

## 2017-06-19 RX ORDER — GABAPENTIN 800 MG/1
800 TABLET ORAL 2 TIMES DAILY
Status: DISCONTINUED | OUTPATIENT
Start: 2017-06-19 | End: 2017-06-19 | Stop reason: HOSPADM

## 2017-06-19 RX ORDER — ALBUTEROL SULFATE 2.5 MG/3ML
2.5 SOLUTION RESPIRATORY (INHALATION) EVERY 6 HOURS PRN
Status: DISCONTINUED | OUTPATIENT
Start: 2017-06-19 | End: 2017-06-19 | Stop reason: HOSPADM

## 2017-06-19 RX ORDER — AMITRIPTYLINE HYDROCHLORIDE 150 MG/1
150 TABLET, FILM COATED ORAL NIGHTLY
COMMUNITY
End: 2017-08-10

## 2017-06-19 RX ORDER — LEVOFLOXACIN 750 MG/1
750 TABLET ORAL DAILY
Status: DISCONTINUED | OUTPATIENT
Start: 2017-06-19 | End: 2017-06-19 | Stop reason: HOSPADM

## 2017-06-19 RX ORDER — LOSARTAN POTASSIUM 50 MG/1
100 TABLET ORAL DAILY
Status: DISCONTINUED | OUTPATIENT
Start: 2017-06-19 | End: 2017-06-19

## 2017-06-19 RX ORDER — ONDANSETRON 2 MG/ML
4 INJECTION INTRAMUSCULAR; INTRAVENOUS EVERY 6 HOURS PRN
Status: DISCONTINUED | OUTPATIENT
Start: 2017-06-19 | End: 2017-06-19 | Stop reason: HOSPADM

## 2017-06-19 RX ORDER — LEVOFLOXACIN 750 MG/1
750 TABLET ORAL DAILY
Qty: 3 TABLET | Refills: 0 | Status: SHIPPED | OUTPATIENT
Start: 2017-06-19 | End: 2017-06-22

## 2017-06-19 RX ORDER — METOPROLOL TARTRATE 50 MG/1
50 TABLET, FILM COATED ORAL 2 TIMES DAILY
Status: DISCONTINUED | OUTPATIENT
Start: 2017-06-19 | End: 2017-06-19 | Stop reason: HOSPADM

## 2017-06-19 RX ORDER — LEVOTHYROXINE SODIUM 0.12 MG/1
125 TABLET ORAL DAILY
Status: DISCONTINUED | OUTPATIENT
Start: 2017-06-19 | End: 2017-06-19 | Stop reason: HOSPADM

## 2017-06-19 RX ADMIN — SIMVASTATIN 20 MG: 20 TABLET, FILM COATED ORAL at 01:49

## 2017-06-19 RX ADMIN — VITAMIN D, TAB 1000IU (100/BT) 4000 UNITS: 25 TAB at 09:03

## 2017-06-19 RX ADMIN — GABAPENTIN 800 MG: 800 TABLET ORAL at 01:49

## 2017-06-19 RX ADMIN — ALBUTEROL SULFATE 2.5 MG: 2.5 SOLUTION RESPIRATORY (INHALATION) at 09:34

## 2017-06-19 RX ADMIN — PANTOPRAZOLE SODIUM 40 MG: 40 TABLET, DELAYED RELEASE ORAL at 09:03

## 2017-06-19 RX ADMIN — GABAPENTIN 800 MG: 800 TABLET ORAL at 09:03

## 2017-06-19 RX ADMIN — METOCLOPRAMIDE 5 MG: 5 TABLET ORAL at 09:03

## 2017-06-19 RX ADMIN — ENOXAPARIN SODIUM 40 MG: 40 INJECTION SUBCUTANEOUS at 09:03

## 2017-06-19 RX ADMIN — AMITRIPTYLINE HYDROCHLORIDE 150 MG: 50 TABLET, FILM COATED ORAL at 03:17

## 2017-06-19 RX ADMIN — METOPROLOL TARTRATE 50 MG: 50 TABLET, FILM COATED ORAL at 09:03

## 2017-06-19 RX ADMIN — TIOTROPIUM BROMIDE 18 MCG: 18 CAPSULE ORAL; RESPIRATORY (INHALATION) at 07:48

## 2017-06-19 RX ADMIN — CLONAZEPAM 0.5 MG: 0.5 TABLET ORAL at 01:49

## 2017-06-19 RX ADMIN — METOPROLOL TARTRATE 50 MG: 50 TABLET, FILM COATED ORAL at 01:49

## 2017-06-19 RX ADMIN — SODIUM CHLORIDE: 9 INJECTION, SOLUTION INTRAVENOUS at 01:26

## 2017-06-19 RX ADMIN — ASPIRIN 81 MG: 81 TABLET, CHEWABLE ORAL at 09:03

## 2017-06-19 RX ADMIN — CETIRIZINE HYDROCHLORIDE 10 MG: 10 TABLET ORAL at 09:03

## 2017-06-19 RX ADMIN — LOSARTAN POTASSIUM 100 MG: 50 TABLET, FILM COATED ORAL at 09:03

## 2017-06-19 RX ADMIN — TORSEMIDE 20 MG: 20 TABLET ORAL at 09:05

## 2017-06-19 RX ADMIN — Medication 10 ML: at 04:14

## 2017-06-19 RX ADMIN — ROPINIROLE HYDROCHLORIDE 2 MG: 1 TABLET, FILM COATED ORAL at 01:49

## 2017-06-19 RX ADMIN — DULOXETINE HYDROCHLORIDE 60 MG: 30 CAPSULE, DELAYED RELEASE ORAL at 03:17

## 2017-06-19 RX ADMIN — LEVOFLOXACIN 750 MG: 750 TABLET, FILM COATED ORAL at 09:03

## 2017-06-19 RX ADMIN — METOCLOPRAMIDE 5 MG: 5 TABLET ORAL at 03:17

## 2017-06-19 RX ADMIN — Medication 10 ML: at 08:12

## 2017-06-19 RX ADMIN — LEVOTHYROXINE SODIUM 125 MCG: 125 TABLET ORAL at 06:43

## 2017-06-19 ASSESSMENT — ENCOUNTER SYMPTOMS
WHEEZING: 0
VOMITING: 0
SPUTUM PRODUCTION: 0
SHORTNESS OF BREATH: 1
COUGH: 1
PHOTOPHOBIA: 0

## 2017-06-19 ASSESSMENT — PAIN SCALES - GENERAL
PAINLEVEL_OUTOF10: 0
PAINLEVEL_OUTOF10: 0

## 2017-06-20 ENCOUNTER — CARE COORDINATION (OUTPATIENT)
Dept: INTERNAL MEDICINE CLINIC | Age: 57
End: 2017-06-20

## 2017-06-20 ENCOUNTER — CARE COORDINATION (OUTPATIENT)
Dept: CARE COORDINATION | Age: 57
End: 2017-06-20

## 2017-06-20 ENCOUNTER — CARE COORDINATION (OUTPATIENT)
Dept: CASE MANAGEMENT | Age: 57
End: 2017-06-20

## 2017-06-20 RX ORDER — PREDNISONE 20 MG/1
20 TABLET ORAL DAILY
COMMUNITY
End: 2017-08-15

## 2017-06-23 ENCOUNTER — CARE COORDINATION (OUTPATIENT)
Dept: CASE MANAGEMENT | Age: 57
End: 2017-06-23

## 2017-06-24 ASSESSMENT — ENCOUNTER SYMPTOMS
WHEEZING: 1
BLOOD IN STOOL: 0
BACK PAIN: 0
RHINORRHEA: 1
APNEA: 0
COUGH: 1
ABDOMINAL PAIN: 0
EYE ITCHING: 0
CONSTIPATION: 0
CHOKING: 0
EYE DISCHARGE: 0
SORE THROAT: 1
SHORTNESS OF BREATH: 0
DIARRHEA: 0
EYE PAIN: 0
CHEST TIGHTNESS: 0
COLOR CHANGE: 0
ABDOMINAL DISTENTION: 0
EYE REDNESS: 0

## 2017-06-27 ENCOUNTER — OFFICE VISIT (OUTPATIENT)
Dept: INTERNAL MEDICINE CLINIC | Age: 57
End: 2017-06-27
Payer: MEDICARE

## 2017-06-27 VITALS
WEIGHT: 192 LBS | DIASTOLIC BLOOD PRESSURE: 74 MMHG | HEIGHT: 64 IN | SYSTOLIC BLOOD PRESSURE: 132 MMHG | BODY MASS INDEX: 32.78 KG/M2 | OXYGEN SATURATION: 96 %

## 2017-06-27 DIAGNOSIS — I10 ESSENTIAL HYPERTENSION: Primary | ICD-10-CM

## 2017-06-27 DIAGNOSIS — E10.43 TYPE 1 DIABETES MELLITUS WITH DIABETIC AUTONOMIC NEUROPATHY, WITH LONG-TERM CURRENT USE OF INSULIN (HCC): ICD-10-CM

## 2017-06-27 DIAGNOSIS — J44.1 COPD EXACERBATION (HCC): ICD-10-CM

## 2017-06-27 PROCEDURE — G8427 DOCREV CUR MEDS BY ELIG CLIN: HCPCS | Performed by: INTERNAL MEDICINE

## 2017-06-27 PROCEDURE — G8926 SPIRO NO PERF OR DOC: HCPCS | Performed by: INTERNAL MEDICINE

## 2017-06-27 PROCEDURE — 99214 OFFICE O/P EST MOD 30 MIN: CPT | Performed by: INTERNAL MEDICINE

## 2017-06-27 PROCEDURE — 3017F COLORECTAL CA SCREEN DOC REV: CPT | Performed by: INTERNAL MEDICINE

## 2017-06-27 PROCEDURE — 3023F SPIROM DOC REV: CPT | Performed by: INTERNAL MEDICINE

## 2017-06-27 PROCEDURE — 3046F HEMOGLOBIN A1C LEVEL >9.0%: CPT | Performed by: INTERNAL MEDICINE

## 2017-06-27 PROCEDURE — G8417 CALC BMI ABV UP PARAM F/U: HCPCS | Performed by: INTERNAL MEDICINE

## 2017-06-27 PROCEDURE — 3014F SCREEN MAMMO DOC REV: CPT | Performed by: INTERNAL MEDICINE

## 2017-06-27 PROCEDURE — 1111F DSCHRG MED/CURRENT MED MERGE: CPT | Performed by: INTERNAL MEDICINE

## 2017-06-27 PROCEDURE — 1036F TOBACCO NON-USER: CPT | Performed by: INTERNAL MEDICINE

## 2017-06-27 RX ORDER — SIMVASTATIN 20 MG
20 TABLET ORAL NIGHTLY
Qty: 30 TABLET | Refills: 11 | Status: SHIPPED | OUTPATIENT
Start: 2017-06-27 | End: 2018-03-16 | Stop reason: SDUPTHER

## 2017-06-27 RX ORDER — PREDNISONE 20 MG/1
20 TABLET ORAL DAILY
Qty: 7 TABLET | Refills: 0 | Status: SHIPPED | OUTPATIENT
Start: 2017-06-27 | End: 2017-07-04

## 2017-06-27 RX ORDER — AZITHROMYCIN 250 MG/1
TABLET, FILM COATED ORAL
Qty: 1 PACKET | Refills: 0 | Status: SHIPPED | OUTPATIENT
Start: 2017-06-27 | End: 2017-08-15 | Stop reason: ALTCHOICE

## 2017-07-02 ENCOUNTER — HOSPITAL ENCOUNTER (EMERGENCY)
Age: 57
Discharge: HOME | End: 2017-07-02
Attending: EMERGENCY MEDICINE
Payer: MEDICARE

## 2017-07-02 ENCOUNTER — APPOINTMENT (OUTPATIENT)
Dept: GENERAL RADIOLOGY | Age: 57
End: 2017-07-02
Payer: MEDICARE

## 2017-07-02 VITALS
RESPIRATION RATE: 18 BRPM | HEART RATE: 78 BPM | HEIGHT: 64 IN | WEIGHT: 194 LBS | TEMPERATURE: 99.4 F | DIASTOLIC BLOOD PRESSURE: 84 MMHG | BODY MASS INDEX: 33.12 KG/M2 | SYSTOLIC BLOOD PRESSURE: 151 MMHG | OXYGEN SATURATION: 98 %

## 2017-07-02 DIAGNOSIS — S92.901A FOOT FRACTURE, RIGHT, CLOSED, INITIAL ENCOUNTER: Primary | ICD-10-CM

## 2017-07-02 PROCEDURE — 73590 X-RAY EXAM OF LOWER LEG: CPT

## 2017-07-02 PROCEDURE — 73630 X-RAY EXAM OF FOOT: CPT

## 2017-07-02 PROCEDURE — 99283 EMERGENCY DEPT VISIT LOW MDM: CPT

## 2017-07-02 ASSESSMENT — PAIN SCALES - GENERAL: PAINLEVEL_OUTOF10: 8

## 2017-07-02 ASSESSMENT — PAIN DESCRIPTION - PAIN TYPE: TYPE: ACUTE PAIN

## 2017-07-02 ASSESSMENT — ENCOUNTER SYMPTOMS
WHEEZING: 0
COUGH: 1
BACK PAIN: 0
STRIDOR: 0

## 2017-07-02 ASSESSMENT — PAIN DESCRIPTION - ORIENTATION: ORIENTATION: RIGHT

## 2017-07-02 ASSESSMENT — PAIN DESCRIPTION - LOCATION: LOCATION: FOOT;LEG

## 2017-07-03 ENCOUNTER — CARE COORDINATION (OUTPATIENT)
Dept: CARE COORDINATION | Age: 57
End: 2017-07-03

## 2017-07-05 RX ORDER — DULOXETIN HYDROCHLORIDE 30 MG/1
1 CAPSULE, DELAYED RELEASE ORAL DAILY
Refills: 2 | COMMUNITY
Start: 2017-06-18 | End: 2017-09-21

## 2017-07-05 RX ORDER — ALBUTEROL SULFATE 2.5 MG/3ML
1 SOLUTION RESPIRATORY (INHALATION) 3 TIMES DAILY PRN
Refills: 0 | COMMUNITY
Start: 2017-06-15 | End: 2019-09-04

## 2017-07-05 RX ORDER — FLUTICASONE FUROATE AND VILANTEROL TRIFENATATE 100; 25 UG/1; UG/1
1 POWDER RESPIRATORY (INHALATION) DAILY
Refills: 1 | COMMUNITY
Start: 2017-06-17 | End: 2017-11-30 | Stop reason: SDUPTHER

## 2017-07-11 ENCOUNTER — OFFICE VISIT (OUTPATIENT)
Dept: INTERNAL MEDICINE CLINIC | Age: 57
End: 2017-07-11
Payer: MEDICARE

## 2017-07-11 VITALS
WEIGHT: 201 LBS | BODY MASS INDEX: 34.31 KG/M2 | DIASTOLIC BLOOD PRESSURE: 72 MMHG | HEIGHT: 64 IN | SYSTOLIC BLOOD PRESSURE: 138 MMHG

## 2017-07-11 DIAGNOSIS — I10 ESSENTIAL HYPERTENSION: ICD-10-CM

## 2017-07-11 DIAGNOSIS — I73.9 PERIPHERAL VASCULAR DISEASE (HCC): ICD-10-CM

## 2017-07-11 DIAGNOSIS — J69.0 ASPIRATION PNEUMONIA OF BOTH LOWER LOBES, UNSPECIFIED ASPIRATION PNEUMONIA TYPE (HCC): ICD-10-CM

## 2017-07-11 DIAGNOSIS — J96.11 CHRONIC RESPIRATORY FAILURE WITH HYPOXIA (HCC): ICD-10-CM

## 2017-07-11 DIAGNOSIS — E13.319 RETINOPATHY DUE TO SECONDARY DM (HCC): ICD-10-CM

## 2017-07-11 DIAGNOSIS — S89.91XD RIGHT LEG INJURY, SUBSEQUENT ENCOUNTER: Primary | ICD-10-CM

## 2017-07-11 DIAGNOSIS — J18.9 HCAP (HEALTHCARE-ASSOCIATED PNEUMONIA): ICD-10-CM

## 2017-07-11 DIAGNOSIS — E03.4 HYPOTHYROIDISM DUE TO ACQUIRED ATROPHY OF THYROID: ICD-10-CM

## 2017-07-11 PROCEDURE — 3014F SCREEN MAMMO DOC REV: CPT | Performed by: INTERNAL MEDICINE

## 2017-07-11 PROCEDURE — G8427 DOCREV CUR MEDS BY ELIG CLIN: HCPCS | Performed by: INTERNAL MEDICINE

## 2017-07-11 PROCEDURE — 3046F HEMOGLOBIN A1C LEVEL >9.0%: CPT | Performed by: INTERNAL MEDICINE

## 2017-07-11 PROCEDURE — 1036F TOBACCO NON-USER: CPT | Performed by: INTERNAL MEDICINE

## 2017-07-11 PROCEDURE — 99215 OFFICE O/P EST HI 40 MIN: CPT | Performed by: INTERNAL MEDICINE

## 2017-07-11 PROCEDURE — G8417 CALC BMI ABV UP PARAM F/U: HCPCS | Performed by: INTERNAL MEDICINE

## 2017-07-11 PROCEDURE — 1111F DSCHRG MED/CURRENT MED MERGE: CPT | Performed by: INTERNAL MEDICINE

## 2017-07-11 PROCEDURE — 3017F COLORECTAL CA SCREEN DOC REV: CPT | Performed by: INTERNAL MEDICINE

## 2017-07-11 ASSESSMENT — ENCOUNTER SYMPTOMS
SHORTNESS OF BREATH: 1
HEARTBURN: 1
BACK PAIN: 1
ABDOMINAL PAIN: 0

## 2017-07-13 ENCOUNTER — TELEPHONE (OUTPATIENT)
Dept: INTERNAL MEDICINE CLINIC | Age: 57
End: 2017-07-13

## 2017-07-13 DIAGNOSIS — K21.9 GASTROESOPHAGEAL REFLUX DISEASE WITHOUT ESOPHAGITIS: ICD-10-CM

## 2017-07-13 PROBLEM — J30.1 SEASONAL ALLERGIC RHINITIS DUE TO POLLEN: Status: ACTIVE | Noted: 2017-07-13

## 2017-07-13 RX ORDER — CETIRIZINE HYDROCHLORIDE 10 MG/1
10 TABLET ORAL DAILY
Qty: 30 TABLET | Refills: 0 | Status: SHIPPED | OUTPATIENT
Start: 2017-07-13 | End: 2017-08-24 | Stop reason: SDUPTHER

## 2017-07-13 RX ORDER — PANTOPRAZOLE SODIUM 40 MG/1
40 TABLET, DELAYED RELEASE ORAL DAILY
Qty: 30 TABLET | Refills: 5 | Status: ON HOLD | OUTPATIENT
Start: 2017-07-13 | End: 2018-04-09

## 2017-07-13 RX ORDER — METOPROLOL TARTRATE 50 MG/1
50 TABLET, FILM COATED ORAL 2 TIMES DAILY
Qty: 60 TABLET | Refills: 9 | Status: SHIPPED | OUTPATIENT
Start: 2017-07-13 | End: 2018-05-24 | Stop reason: SDUPTHER

## 2017-07-14 RX ORDER — AMOXICILLIN 500 MG/1
500 CAPSULE ORAL 3 TIMES DAILY
Qty: 21 CAPSULE | Refills: 0 | OUTPATIENT
Start: 2017-07-14 | End: 2017-07-21

## 2017-08-08 ENCOUNTER — HOSPITAL ENCOUNTER (OUTPATIENT)
Dept: SLEEP CENTER | Age: 57
Discharge: HOME | End: 2017-08-08
Payer: MEDICARE

## 2017-08-08 PROCEDURE — 95810 POLYSOM 6/> YRS 4/> PARAM: CPT

## 2017-08-08 ASSESSMENT — SLEEP AND FATIGUE QUESTIONNAIRES: NECK CIRCUMFERENCE (INCHES): 43

## 2017-08-09 VITALS — BODY MASS INDEX: 34.31 KG/M2 | HEART RATE: 92 BPM | RESPIRATION RATE: 14 BRPM | HEIGHT: 64 IN | WEIGHT: 201 LBS

## 2017-08-09 DIAGNOSIS — F51.01 PRIMARY INSOMNIA: ICD-10-CM

## 2017-08-10 RX ORDER — AMITRIPTYLINE HYDROCHLORIDE 150 MG/1
150 TABLET, FILM COATED ORAL NIGHTLY
Qty: 30 TABLET | Refills: 10 | Status: SHIPPED | OUTPATIENT
Start: 2017-08-10 | End: 2018-08-14 | Stop reason: SDUPTHER

## 2017-08-15 ENCOUNTER — OFFICE VISIT (OUTPATIENT)
Dept: INTERNAL MEDICINE CLINIC | Age: 57
End: 2017-08-15
Payer: MEDICARE

## 2017-08-15 VITALS
HEIGHT: 64 IN | SYSTOLIC BLOOD PRESSURE: 110 MMHG | WEIGHT: 199 LBS | BODY MASS INDEX: 33.97 KG/M2 | DIASTOLIC BLOOD PRESSURE: 64 MMHG

## 2017-08-15 DIAGNOSIS — F32.A DEPRESSION, UNSPECIFIED DEPRESSION TYPE: ICD-10-CM

## 2017-08-15 DIAGNOSIS — J44.9 CHRONIC OBSTRUCTIVE PULMONARY DISEASE, UNSPECIFIED COPD TYPE (HCC): ICD-10-CM

## 2017-08-15 DIAGNOSIS — Z23 NEED FOR VACCINATION: ICD-10-CM

## 2017-08-15 DIAGNOSIS — J69.0 ASPIRATION PNEUMONIA OF BOTH LOWER LOBES, UNSPECIFIED ASPIRATION PNEUMONIA TYPE (HCC): ICD-10-CM

## 2017-08-15 DIAGNOSIS — G89.4 CHRONIC PAIN SYNDROME: Chronic | ICD-10-CM

## 2017-08-15 DIAGNOSIS — E78.2 MIXED HYPERLIPIDEMIA: ICD-10-CM

## 2017-08-15 DIAGNOSIS — E10.43 TYPE 1 DIABETES MELLITUS WITH DIABETIC AUTONOMIC NEUROPATHY, WITH LONG-TERM CURRENT USE OF INSULIN (HCC): ICD-10-CM

## 2017-08-15 DIAGNOSIS — M54.14 THORACIC RADICULOPATHY: Chronic | ICD-10-CM

## 2017-08-15 DIAGNOSIS — I73.9 PERIPHERAL VASCULAR DISEASE (HCC): ICD-10-CM

## 2017-08-15 DIAGNOSIS — E13.319 RETINOPATHY DUE TO SECONDARY DM (HCC): ICD-10-CM

## 2017-08-15 DIAGNOSIS — M77.8 TENDINITIS OF BOTH WRISTS: ICD-10-CM

## 2017-08-15 DIAGNOSIS — J18.9 PNEUMONIA OF BOTH LOWER LOBES DUE TO INFECTIOUS ORGANISM: Primary | ICD-10-CM

## 2017-08-15 DIAGNOSIS — Z96.41 INSULIN PUMP IN PLACE: ICD-10-CM

## 2017-08-15 DIAGNOSIS — E11.8 DIABETIC FOOT (HCC): ICD-10-CM

## 2017-08-15 DIAGNOSIS — M48.04 SPINAL STENOSIS, THORACIC: ICD-10-CM

## 2017-08-15 DIAGNOSIS — G25.81 RESTLESS LEGS SYNDROME: ICD-10-CM

## 2017-08-15 DIAGNOSIS — E55.9 VITAMIN D DEFICIENCY: ICD-10-CM

## 2017-08-15 DIAGNOSIS — I10 ESSENTIAL HYPERTENSION: ICD-10-CM

## 2017-08-15 DIAGNOSIS — J45.42 MODERATE PERSISTENT ASTHMA WITH STATUS ASTHMATICUS: ICD-10-CM

## 2017-08-15 DIAGNOSIS — E03.4 HYPOTHYROIDISM DUE TO ACQUIRED ATROPHY OF THYROID: ICD-10-CM

## 2017-08-15 PROBLEM — J96.11 CHRONIC RESPIRATORY FAILURE WITH HYPOXIA (HCC): Status: RESOLVED | Noted: 2017-07-11 | Resolved: 2017-08-15

## 2017-08-15 PROBLEM — J44.1 COPD EXACERBATION (HCC): Status: RESOLVED | Noted: 2017-05-26 | Resolved: 2017-08-15

## 2017-08-15 PROBLEM — G89.29 CHRONIC PAIN OF RIGHT KNEE: Status: RESOLVED | Noted: 2017-03-27 | Resolved: 2017-08-15

## 2017-08-15 PROBLEM — M25.561 CHRONIC PAIN OF RIGHT KNEE: Status: RESOLVED | Noted: 2017-03-27 | Resolved: 2017-08-15

## 2017-08-15 PROBLEM — J01.40 ACUTE NON-RECURRENT PANSINUSITIS: Status: RESOLVED | Noted: 2017-05-27 | Resolved: 2017-08-15

## 2017-08-15 PROBLEM — J06.9 URI (UPPER RESPIRATORY INFECTION): Status: RESOLVED | Noted: 2017-06-18 | Resolved: 2017-08-15

## 2017-08-15 PROCEDURE — 3023F SPIROM DOC REV: CPT | Performed by: INTERNAL MEDICINE

## 2017-08-15 PROCEDURE — 99214 OFFICE O/P EST MOD 30 MIN: CPT | Performed by: INTERNAL MEDICINE

## 2017-08-15 PROCEDURE — 3014F SCREEN MAMMO DOC REV: CPT | Performed by: INTERNAL MEDICINE

## 2017-08-15 PROCEDURE — G8427 DOCREV CUR MEDS BY ELIG CLIN: HCPCS | Performed by: INTERNAL MEDICINE

## 2017-08-15 PROCEDURE — 3046F HEMOGLOBIN A1C LEVEL >9.0%: CPT | Performed by: INTERNAL MEDICINE

## 2017-08-15 PROCEDURE — 3017F COLORECTAL CA SCREEN DOC REV: CPT | Performed by: INTERNAL MEDICINE

## 2017-08-15 PROCEDURE — G0008 ADMIN INFLUENZA VIRUS VAC: HCPCS | Performed by: INTERNAL MEDICINE

## 2017-08-15 PROCEDURE — G8926 SPIRO NO PERF OR DOC: HCPCS | Performed by: INTERNAL MEDICINE

## 2017-08-15 PROCEDURE — 90686 IIV4 VACC NO PRSV 0.5 ML IM: CPT | Performed by: INTERNAL MEDICINE

## 2017-08-15 PROCEDURE — 1036F TOBACCO NON-USER: CPT | Performed by: INTERNAL MEDICINE

## 2017-08-15 PROCEDURE — G8417 CALC BMI ABV UP PARAM F/U: HCPCS | Performed by: INTERNAL MEDICINE

## 2017-08-15 RX ORDER — SALSALATE 750 MG
750 TABLET ORAL 2 TIMES DAILY
Qty: 60 TABLET | Refills: 0 | Status: SHIPPED | OUTPATIENT
Start: 2017-08-15 | End: 2017-10-03 | Stop reason: ALTCHOICE

## 2017-08-15 ASSESSMENT — ENCOUNTER SYMPTOMS
BACK PAIN: 1
SHORTNESS OF BREATH: 1
HEARTBURN: 0
ABDOMINAL PAIN: 0

## 2017-08-24 RX ORDER — CETIRIZINE HYDROCHLORIDE 10 MG/1
10 TABLET ORAL DAILY
Qty: 30 TABLET | Refills: 5 | Status: ON HOLD | OUTPATIENT
Start: 2017-08-24 | End: 2018-04-08 | Stop reason: CLARIF

## 2017-09-05 ENCOUNTER — CARE COORDINATION (OUTPATIENT)
Dept: CARE COORDINATION | Age: 57
End: 2017-09-05

## 2017-09-16 DIAGNOSIS — J06.9 URTI (ACUTE UPPER RESPIRATORY INFECTION): Primary | ICD-10-CM

## 2017-09-16 RX ORDER — AMOXICILLIN AND CLAVULANATE POTASSIUM 875; 125 MG/1; MG/1
1 TABLET, FILM COATED ORAL 2 TIMES DAILY
Qty: 20 TABLET | Refills: 0 | Status: SHIPPED | OUTPATIENT
Start: 2017-09-16 | End: 2017-09-26

## 2017-09-21 RX ORDER — DULOXETIN HYDROCHLORIDE 60 MG/1
CAPSULE, DELAYED RELEASE ORAL
Qty: 30 CAPSULE | Refills: 11 | Status: SHIPPED | OUTPATIENT
Start: 2017-09-21 | End: 2018-06-03 | Stop reason: SDUPTHER

## 2017-09-22 RX ORDER — DULOXETIN HYDROCHLORIDE 30 MG/1
CAPSULE, DELAYED RELEASE ORAL
Qty: 30 CAPSULE | Refills: 5 | Status: SHIPPED | OUTPATIENT
Start: 2017-09-22 | End: 2017-10-25 | Stop reason: SDUPTHER

## 2017-10-03 ENCOUNTER — HOSPITAL ENCOUNTER (OUTPATIENT)
Dept: PREADMISSION TESTING | Age: 57
Discharge: HOME | End: 2017-10-03
Payer: MEDICARE

## 2017-10-03 VITALS
DIASTOLIC BLOOD PRESSURE: 62 MMHG | WEIGHT: 209 LBS | HEART RATE: 85 BPM | SYSTOLIC BLOOD PRESSURE: 147 MMHG | RESPIRATION RATE: 18 BRPM | BODY MASS INDEX: 34.82 KG/M2 | OXYGEN SATURATION: 94 % | HEIGHT: 65 IN

## 2017-10-03 LAB
ABSOLUTE EOS #: 0.3 K/UL (ref 0–0.4)
ABSOLUTE LYMPH #: 2.2 K/UL (ref 1–4.8)
ABSOLUTE MONO #: 0.5 K/UL (ref 0.2–0.8)
ANION GAP SERPL CALCULATED.3IONS-SCNC: 11 MMOL/L (ref 9–17)
BASOPHILS # BLD: 1 %
BASOPHILS ABSOLUTE: 0.1 K/UL (ref 0–0.2)
BUN BLDV-MCNC: 26 MG/DL (ref 6–20)
BUN/CREAT BLD: 24 (ref 9–20)
CALCIUM SERPL-MCNC: 9.2 MG/DL (ref 8.6–10.4)
CHLORIDE BLD-SCNC: 98 MMOL/L (ref 98–107)
CO2: 26 MMOL/L (ref 20–31)
CREAT SERPL-MCNC: 1.07 MG/DL (ref 0.5–0.9)
DIFFERENTIAL TYPE: ABNORMAL
EOSINOPHILS RELATIVE PERCENT: 3 %
GFR AFRICAN AMERICAN: >60 ML/MIN
GFR NON-AFRICAN AMERICAN: 53 ML/MIN
GFR SERPL CREATININE-BSD FRML MDRD: ABNORMAL ML/MIN/{1.73_M2}
GFR SERPL CREATININE-BSD FRML MDRD: ABNORMAL ML/MIN/{1.73_M2}
GLUCOSE BLD-MCNC: 281 MG/DL (ref 70–99)
HCT VFR BLD CALC: 35.9 % (ref 36–46)
HEMOGLOBIN: 12.3 G/DL (ref 12–16)
LYMPHOCYTES # BLD: 24 %
MCH RBC QN AUTO: 29.4 PG (ref 26–34)
MCHC RBC AUTO-ENTMCNC: 34.2 G/DL (ref 31–37)
MCV RBC AUTO: 86.2 FL (ref 80–100)
MONOCYTES # BLD: 5 %
PDW BLD-RTO: 14.2 % (ref 11.5–14.5)
PLATELET # BLD: 275 K/UL (ref 130–400)
PLATELET ESTIMATE: ABNORMAL
PMV BLD AUTO: 7.3 FL (ref 6–12)
POTASSIUM SERPL-SCNC: 5 MMOL/L (ref 3.7–5.3)
RBC # BLD: 4.17 M/UL (ref 4–5.2)
RBC # BLD: ABNORMAL 10*6/UL
SEG NEUTROPHILS: 67 %
SEGMENTED NEUTROPHILS ABSOLUTE COUNT: 6.4 K/UL (ref 1.8–7.7)
SODIUM BLD-SCNC: 135 MMOL/L (ref 135–144)
WBC # BLD: 9.5 K/UL (ref 3.5–11)
WBC # BLD: ABNORMAL 10*3/UL

## 2017-10-03 PROCEDURE — 80048 BASIC METABOLIC PNL TOTAL CA: CPT

## 2017-10-03 PROCEDURE — 85025 COMPLETE CBC W/AUTO DIFF WBC: CPT

## 2017-10-03 PROCEDURE — 36415 COLL VENOUS BLD VENIPUNCTURE: CPT

## 2017-10-03 NOTE — PRE-PROCEDURE INSTRUCTIONS
[facility] Tuesday, October 17th at 0705 AM    Once you enter the hospital lobby, take the elevators to the second floor. Check-In is at the surgery registration desk. If you have been given a blood band, you must bring it with you the day of surgery. Continue to take your home medications as you normally do up to and including the night before surgery with the exception of any blood thinning medications. Please stop any blood thinning medications as directed by your surgeon or prescribing physician. Failure to stop certain medications may interfere with your scheduled surgery. These may include:  Aspirin, Warfarin (Coumadin), Clopidogrel (Plavix), Ibuprofen (Motrin, Advil), Naproxen (Aleve), Meloxicam (Mobic), Celecoxib (Celebrex), Eliquis, Pradaxa, Xarelto, Effient, Fish Oil, Herbal supplements. Stop aspirin    If you are diabetic, do not take any of your diabetic medications by mouth the morning of surgery. If you are taking insulin contact the doctor that manages your diabetes for instructions about any changes to your insulin dosages the day before surgery. Do not inject insulin or other injectable diabetic medications the morning of surgery unless otherwise instructed by the doctor who manages your diabetes. Please take the following medication(s) the day of surgery with a small sip of water:  Losartan,metoprolol,metoclopramide, levothyroxin,pantoprazole  Please use your inhalers at home the day of surgery. PREPARING FOR YOUR SURGERY:     Before surgery, you can play an important role in your own health. Because skin is not sterile, we need to be sure that your skin is as free of germs as possible before surgery by carefully washing before surgery. Preparing or prepping skin before surgery can reduce the risk of a surgical site infection.   Do not shave the area of your body where your surgery will be performed unless you received specific permission from your must be 25years of age or older and able to sign off on your discharge instructions. A taxi cab or any other form of public transportation is not acceptable. Your friend or family member must stay at the hospital throughout your procedure. Someone must remain with you for the first 24 hours after your surgery if you receive anesthesia or medication. If you do not have someone to stay with you, your procedure may be cancelled.       If you have any other questions regarding your procedure or the day of surgery, please call [phone]      _________________________  ____________________________  Signature (Patient)              Signature (Provider) & date

## 2017-10-03 NOTE — H&P
PAT History and Physical    Pt Name: Timo Brown  MRN: [id number]  YOB: 1960  Date of evaluation: 10/3/2017  Primary Care Physician: Byron Kelley MD    SUBJECTIVE:   History of Chief Complaint: This is Timo Brown a 49 y.o. female with multiple comorbidities some of which DM on insulin pump with gastroparesis, neuropathy and neurogenic bladder, HTN, Hx COPD was O2 dependent until 7/2017. The patient presents to [facility] for upcoming incisional hernia repair with mesh by Dr Olena Barbour for incisional hernia scheduled on 10/17/17 @0905. S/p bowel resection 2012 and previous abdominal surgeries  The patient c/o incisional hernia that has progressively worsened causing increased right abdominal discomfort and \"some constipation\" Scheduled earlier this year but developed SOB. Hospitalized for exacerbation of COPD and aspiration pneumonia and elective surgery was canceled. Now presents for surgical intervention. The patient denies fever, chills, cough, increased SOB, chest pain, night sweats, open sores, lesions or unexplained weight loss. Past Medical History    has a past medical history of Anesthesia complication; Anxiety; Arthritis; Back pain; CAD (coronary artery disease); Caffeine use; Cataract; COPD (chronic obstructive pulmonary disease) ([address] Deafness; Depression; Diabetes mellitus ([address] Diarrhea; Diverticulosis; Fibromyalgia; Gastroparalysis; GERD (gastroesophageal reflux disease); Hearing loss; Hyperlipidemia; Hyperlipidemia; Hypertension; Incontinence; MDRO (multiple drug resistant organisms) resistance; Neurogenic bladder; Neuropathy ([address] Obesity; Osteoarthritis; Peripheral vascular disease, unspecified ([address] Restless leg syndrome; Rhabdomyolysis; Spinal stenosis, thoracic; Stroke ([address] Thyroid disease; Trigeminal neuralgia; and Type II or unspecified type diabetes mellitus without mention of complication, not stated as uncontrolled.   Past Surgical History   has a past surgical history that includes Cholecystectomy; Colon surgery; Tonsillectomy; Tubal ligation; Carpal tunnel release (Right); Middle ear surgery (Left); cyst removal; cyst removal; Cholecystectomy, open; Finger trigger release (Right); Colonoscopy; Abdomen surgery; polypectomy; Cystocopy; Cataract removal; Incisional hernia repair (07/07/15); Upper gastrointestinal endoscopy (07/13/2016); Colonoscopy (07/13/2016); and eye surgery (Right). Medications  Prior to Admission medications    Medication Sig Start Date End Date Taking?  Authorizing Provider   NONFORMULARY Patient has insulin pump with Novolog   Yes Historical Provider, MD   DULoxetine (CYMBALTA) 30 MG extended release capsule TAKE 1 CAPSULE BY MOUTH EVERY DAY 9/22/17   Yosi Chanel MD   DULoxetine (CYMBALTA) 60 MG extended release capsule TAKE 1 CAPSULE BY MOUTH EVERY DAY 9/21/17   Yosi Chanel MD   cetirizine (ZYRTEC ALLERGY) 10 MG tablet Take 1 tablet by mouth daily 8/24/17   Yosi Chanel MD   amitriptyline (ELAVIL) 150 MG tablet Take 1 tablet by mouth nightly 8/10/17   Yosi Chanel MD   pantoprazole (PROTONIX) 40 MG tablet Take 1 tablet by mouth daily 7/13/17   Yosi Chanel MD   metoprolol tartrate (LOPRESSOR) 50 MG tablet Take 1 tablet by mouth 2 times daily 7/13/17   Yosi Chanel MD   BREO ELLIPTA 100-25 MCG/INH AEPB inhaler Take 1 puff by mouth daily 6/17/17   Historical Provider, MD   albuterol (PROVENTIL) (2.5 MG/3ML) 0.083% nebulizer solution Take 1 mL by nebulization 3 times daily as needed 6/15/17   Historical Provider, MD   Walking Boot [address] [facility] by Does not apply route Dx: Right foot fracture 7/2/17   Love Jhaveri PA-C   simvastatin (ZOCOR) 20 MG tablet Take 1 tablet by mouth nightly 6/27/17   Yosi Chanel MD   tiotropium (Jeananne Potash) 18 MCG inhalation capsule Inhale 1 capsule into the lungs Daily 6/19/17   Ananda Woodall MD   ONE TOUCH ULTRA TEST strip  6/7/17   Historical Provider, MD TEJADA INSULIN SYRINGE 29G X 1/2\" 0.5 ML MISC  5/9/17   Historical Provider, MD   fluticasone (FLONASE) 50 MCG/ACT nasal spray 1 spray by Nasal route 2 times daily 6/1/17   James Whitlock MD   albuterol sulfate HFA (PROAIR HFA) 108 (90 BASE) MCG/ACT inhaler Inhale 2 puffs into the lungs every 6 hours as needed for Wheezing 5/26/17   Sharon Mckinney MD   losartan (COZAAR) 100 MG tablet take 1 tablet by mouth once daily 4/27/17   Sharon Mckinney MD   gabapentin (NEURONTIN) 800 MG tablet take 1 tablet by mouth twice a day 4/27/17   Sharon Mckinney MD   rOPINIRole (REQUIP) 2 MG tablet take 1 tablet by mouth every evening 4/27/17   Sharon Mckinney MD   clonazePAM (KLONOPIN) 0.5 MG tablet take 1 tablet by mouth twice a day if needed for anxiety 4/18/17   Zechariah Licona MD   torsemide (DEMADEX) 20 MG tablet take 1 tablet by mouth once daily 1/5/17   Zechariah Licona MD   levothyroxine (SYNTHROID) 125 MCG tablet take 1 tablet by mouth once daily 11/21/16   Zechariah Licona MD   metoclopramide (REGLAN) 5 MG tablet Take 5 mg by mouth 2 times daily  3/25/16   Historical Provider, MD   vitamin D (CHOLECALCIFEROL) 1000 UNIT TABS tablet Take 4,000 Units by mouth daily Takes 4 tabs daily    Historical Provider, MD   acetaminophen (TYLENOL) 325 MG tablet Take 650 mg by mouth every 6 hours as needed for Pain    Historical Provider, MD   docusate sodium (COLACE) 100 MG capsule Take 1 capsule by mouth 2 times daily Take while on narcotics 7/7/15   Stalin García MD   Probiotic Product (PROBIOTIC DAILY PO)   Take 1 tablet by mouth     Historical Provider, MD   aspirin 81 MG tablet Take 81 mg by mouth daily. Historical Provider, MD     Allergies  is allergic to fioricet [butalbital-apap-caffeine]; erythromycin; codeine; droperidol; and tape [adhesive tape].   Family History  family history includes Cancer in her father, maternal grandmother, and paternal grandmother; Diabetes in her maternal grandmother; Heart Disease in her father and maternal grandmother; High myalgia or numbness or tingling. \"Sad at this time of the year since she loss her brother in [facility]". Their birthday were in October together  No LMP recorded. Patient is postmenopausal.       OBJECTIVE:   VITALS:  height is 5' 5\" (1.651 m) and weight is 208 lb 15.9 oz (94.8 kg). Her blood pressure is 147/62 (abnormal) and her pulse is 85. Her respiration is 18 and oxygen saturation is 94%. CONSTITUTIONAL:This is a 49 y.o. female who is cooperative, pleasant, alert & orientated x 3, and in no acute distress   SKIN:  Warm and dry, no rashes No visible open wounds or sores   HEAD:  Normocephalic, atraumatic Face symmetrical   EYES: PERRL. EOMs intact. Glasses w/vision correction  EARS:  Hearing loss AD  No hearing aid  NOSE:  Nares patent. No rhinorrhea   THROAT:  Airway is patent, membranes are moist Dentition good  NECK:supple, no lymphadenopathy  No JVD or carotid bruits  LUNGS:  Normal effort,unlabored w/o use of accessory muscles with scattered rhonchi that cleared past cough. No wheezes noted  CARDIOVASCULAR: Heart sounds are normal.  HR 85 regular rate and rhythm without murmur, gallop or rub. ABDOMEN: Round, obese, distended  Insulin pump present left abdomen. scars consistent with previous surgery  +Incisional hernia noted. Bowel sounds decreased but noted. EXTREMITIES: no peripheral edema or calf tenderness bilaterally Pulses 2+/2+  NEURO: Cranial nerves II-XII grossly intact Strength 5+/5+     Testing:   EKG: Hard copy of 6/18/17 in chart NSR     Lab Review:   Recent Labs      10/03/17   1425   HGB  12.3   HCT  35.9*   WBC  9.5   MCV  86.2   NA  135   K  5.0   CL  98   CO2  26   BUN  26*   CREATININE  1.07*   GLUCOSE  281*       IMPRESSIONS:   1. Incisional hernia  2.  has a past medical history of Anesthesia complication; Anxiety; Arthritis; Back pain; CAD (coronary artery disease); Caffeine use; Cataract; COPD (chronic obstructive pulmonary disease) ([address]  Deafness; Depression; Diabetes mellitus

## 2017-10-09 DIAGNOSIS — Z51.81 ENCOUNTER FOR THERAPEUTIC DRUG MONITORING: Primary | ICD-10-CM

## 2017-10-09 DIAGNOSIS — G25.81 RESTLESS LEGS SYNDROME: ICD-10-CM

## 2017-10-09 NOTE — TELEPHONE ENCOUNTER
Medication Requested: Clonzaepam  Last visit: 8/15/17  Next visit: 11/8/2017  Last refill:4/18/17    Med contract on file:  [x] yes   [] no    Last urine drug screen: do not see drug screening on file  Consistent with medication(s):    [] yes   [] no    Last OARRS ran:  7/13/17    Quantity of medication remaining: 3    Who will be picking rx up: self    Pharmacy if escribed: [facility]

## 2017-10-10 RX ORDER — CLONAZEPAM 0.5 MG/1
TABLET ORAL
Qty: 60 TABLET | Refills: 2 | Status: SHIPPED | OUTPATIENT
Start: 2017-10-10 | End: 2018-04-12 | Stop reason: SDUPTHER

## 2017-10-13 ENCOUNTER — HOSPITAL ENCOUNTER (OUTPATIENT)
Age: 57
Setting detail: SPECIMEN
Discharge: HOME | End: 2017-10-13
Payer: MEDICARE

## 2017-10-13 DIAGNOSIS — Z51.81 ENCOUNTER FOR THERAPEUTIC DRUG MONITORING: ICD-10-CM

## 2017-10-16 ENCOUNTER — ANESTHESIA EVENT (OUTPATIENT)
Dept: OPERATING ROOM | Age: 57
End: 2017-10-16
Payer: MEDICARE

## 2017-10-17 ENCOUNTER — HOSPITAL ENCOUNTER (OUTPATIENT)
Age: 57
Setting detail: OBSERVATION
Discharge: HOME | End: 2017-10-18
Attending: SURGERY | Admitting: SURGERY
Payer: MEDICARE

## 2017-10-17 ENCOUNTER — ANESTHESIA (OUTPATIENT)
Dept: OPERATING ROOM | Age: 57
End: 2017-10-17
Payer: MEDICARE

## 2017-10-17 VITALS — DIASTOLIC BLOOD PRESSURE: 56 MMHG | SYSTOLIC BLOOD PRESSURE: 107 MMHG | OXYGEN SATURATION: 94 % | TEMPERATURE: 96.6 F

## 2017-10-17 LAB
ESTIMATED AVERAGE GLUCOSE: 200 MG/DL
GLUCOSE BLD-MCNC: 129 MG/DL (ref 65–105)
GLUCOSE BLD-MCNC: 134 MG/DL (ref 65–105)
GLUCOSE BLD-MCNC: 161 MG/DL (ref 65–105)
GLUCOSE BLD-MCNC: 179 MG/DL (ref 65–105)
GLUCOSE BLD-MCNC: 199 MG/DL (ref 65–105)
HBA1C MFR BLD: 8.6 % (ref 4–6)

## 2017-10-17 PROCEDURE — 6360000002 HC RX W HCPCS: Performed by: SURGERY

## 2017-10-17 PROCEDURE — 82947 ASSAY GLUCOSE BLOOD QUANT: CPT

## 2017-10-17 PROCEDURE — 7100000001 HC PACU RECOVERY - ADDTL 15 MIN: Performed by: SURGERY

## 2017-10-17 PROCEDURE — 3700000000 HC ANESTHESIA ATTENDED CARE: Performed by: SURGERY

## 2017-10-17 PROCEDURE — 2500000003 HC RX 250 WO HCPCS: Performed by: SPECIALIST

## 2017-10-17 PROCEDURE — A4364 ADHESIVE, LIQUID OR EQUAL: HCPCS | Performed by: SURGERY

## 2017-10-17 PROCEDURE — L0450 TLSO FLEX TRUNK/THOR PRE OTS: HCPCS | Performed by: SURGERY

## 2017-10-17 PROCEDURE — 2580000003 HC RX 258: Performed by: SURGERY

## 2017-10-17 PROCEDURE — 2580000003 HC RX 258: Performed by: ANESTHESIOLOGY

## 2017-10-17 PROCEDURE — 2500000003 HC RX 250 WO HCPCS: Performed by: SURGERY

## 2017-10-17 PROCEDURE — 2580000003 HC RX 258: Performed by: SPECIALIST

## 2017-10-17 PROCEDURE — 3700000001 HC ADD 15 MINUTES (ANESTHESIA): Performed by: SURGERY

## 2017-10-17 PROCEDURE — 6370000000 HC RX 637 (ALT 250 FOR IP): Performed by: SURGERY

## 2017-10-17 PROCEDURE — 3600000002 HC SURGERY LEVEL 2 BASE: Performed by: SURGERY

## 2017-10-17 PROCEDURE — 6360000002 HC RX W HCPCS: Performed by: SPECIALIST

## 2017-10-17 PROCEDURE — 36415 COLL VENOUS BLD VENIPUNCTURE: CPT

## 2017-10-17 PROCEDURE — A6402 STERILE GAUZE <= 16 SQ IN: HCPCS | Performed by: SURGERY

## 2017-10-17 PROCEDURE — 94640 AIRWAY INHALATION TREATMENT: CPT

## 2017-10-17 PROCEDURE — G0378 HOSPITAL OBSERVATION PER HR: HCPCS

## 2017-10-17 PROCEDURE — C1781 MESH (IMPLANTABLE): HCPCS | Performed by: SURGERY

## 2017-10-17 PROCEDURE — 83036 HEMOGLOBIN GLYCOSYLATED A1C: CPT

## 2017-10-17 PROCEDURE — 7100000000 HC PACU RECOVERY - FIRST 15 MIN: Performed by: SURGERY

## 2017-10-17 PROCEDURE — 2500000003 HC RX 250 WO HCPCS

## 2017-10-17 PROCEDURE — 3600000012 HC SURGERY LEVEL 2 ADDTL 15MIN: Performed by: SURGERY

## 2017-10-17 DEVICE — PATCH HERN M DIA2.5IN CIR W/ STRP SEPRA TECHNOLOGY ABSRB: Type: IMPLANTABLE DEVICE | Site: ABDOMEN | Status: FUNCTIONAL

## 2017-10-17 RX ORDER — ASPIRIN 81 MG/1
81 TABLET ORAL DAILY
Status: DISCONTINUED | OUTPATIENT
Start: 2017-10-17 | End: 2017-10-18 | Stop reason: HOSPADM

## 2017-10-17 RX ORDER — PROPOFOL 10 MG/ML
INJECTION, EMULSION INTRAVENOUS PRN
Status: DISCONTINUED | OUTPATIENT
Start: 2017-10-17 | End: 2017-10-17 | Stop reason: SDUPTHER

## 2017-10-17 RX ORDER — ONDANSETRON 2 MG/ML
4 INJECTION INTRAMUSCULAR; INTRAVENOUS
Status: DISCONTINUED | OUTPATIENT
Start: 2017-10-17 | End: 2017-10-17 | Stop reason: HOSPADM

## 2017-10-17 RX ORDER — CETIRIZINE HYDROCHLORIDE 10 MG/1
10 TABLET ORAL NIGHTLY
Status: DISCONTINUED | OUTPATIENT
Start: 2017-10-17 | End: 2017-10-18 | Stop reason: HOSPADM

## 2017-10-17 RX ORDER — SODIUM CHLORIDE 0.9 % (FLUSH) 0.9 %
10 SYRINGE (ML) INJECTION PRN
Status: DISCONTINUED | OUTPATIENT
Start: 2017-10-17 | End: 2017-10-17 | Stop reason: HOSPADM

## 2017-10-17 RX ORDER — FLUTICASONE FUROATE AND VILANTEROL 100; 25 UG/1; UG/1
1 POWDER RESPIRATORY (INHALATION) DAILY
Status: DISCONTINUED | OUTPATIENT
Start: 2017-10-17 | End: 2017-10-17 | Stop reason: CLARIF

## 2017-10-17 RX ORDER — FLUTICASONE PROPIONATE 50 MCG
1 SPRAY, SUSPENSION (ML) NASAL 2 TIMES DAILY
Status: DISCONTINUED | OUTPATIENT
Start: 2017-10-17 | End: 2017-10-18 | Stop reason: HOSPADM

## 2017-10-17 RX ORDER — ONDANSETRON 2 MG/ML
INJECTION INTRAMUSCULAR; INTRAVENOUS PRN
Status: DISCONTINUED | OUTPATIENT
Start: 2017-10-17 | End: 2017-10-17 | Stop reason: SDUPTHER

## 2017-10-17 RX ORDER — SODIUM CHLORIDE, SODIUM LACTATE, POTASSIUM CHLORIDE, CALCIUM CHLORIDE 600; 310; 30; 20 MG/100ML; MG/100ML; MG/100ML; MG/100ML
INJECTION, SOLUTION INTRAVENOUS CONTINUOUS PRN
Status: DISCONTINUED | OUTPATIENT
Start: 2017-10-17 | End: 2017-10-17 | Stop reason: SDUPTHER

## 2017-10-17 RX ORDER — AMITRIPTYLINE HYDROCHLORIDE 150 MG/1
150 TABLET, FILM COATED ORAL NIGHTLY
Status: DISCONTINUED | OUTPATIENT
Start: 2017-10-17 | End: 2017-10-18 | Stop reason: HOSPADM

## 2017-10-17 RX ORDER — FENTANYL CITRATE 50 UG/ML
25 INJECTION, SOLUTION INTRAMUSCULAR; INTRAVENOUS EVERY 5 MIN PRN
Status: DISCONTINUED | OUTPATIENT
Start: 2017-10-17 | End: 2017-10-17 | Stop reason: HOSPADM

## 2017-10-17 RX ORDER — DEXTROSE MONOHYDRATE 50 MG/ML
100 INJECTION, SOLUTION INTRAVENOUS PRN
Status: DISCONTINUED | OUTPATIENT
Start: 2017-10-17 | End: 2017-10-17 | Stop reason: SDUPTHER

## 2017-10-17 RX ORDER — ALBUTEROL SULFATE 2.5 MG/3ML
2.5 SOLUTION RESPIRATORY (INHALATION) 3 TIMES DAILY
Status: DISCONTINUED | OUTPATIENT
Start: 2017-10-17 | End: 2017-10-18 | Stop reason: HOSPADM

## 2017-10-17 RX ORDER — HYDROMORPHONE HCL 110MG/55ML
0.25 PATIENT CONTROLLED ANALGESIA SYRINGE INTRAVENOUS EVERY 5 MIN PRN
Status: DISCONTINUED | OUTPATIENT
Start: 2017-10-17 | End: 2017-10-17 | Stop reason: HOSPADM

## 2017-10-17 RX ORDER — MORPHINE SULFATE 2 MG/ML
4 INJECTION, SOLUTION INTRAMUSCULAR; INTRAVENOUS
Status: DISCONTINUED | OUTPATIENT
Start: 2017-10-17 | End: 2017-10-18 | Stop reason: HOSPADM

## 2017-10-17 RX ORDER — GABAPENTIN 800 MG/1
800 TABLET ORAL 2 TIMES DAILY
Status: DISCONTINUED | OUTPATIENT
Start: 2017-10-17 | End: 2017-10-18 | Stop reason: HOSPADM

## 2017-10-17 RX ORDER — SODIUM CHLORIDE 0.9 % (FLUSH) 0.9 %
10 SYRINGE (ML) INJECTION EVERY 12 HOURS SCHEDULED
Status: DISCONTINUED | OUTPATIENT
Start: 2017-10-17 | End: 2017-10-18 | Stop reason: HOSPADM

## 2017-10-17 RX ORDER — LABETALOL HYDROCHLORIDE 5 MG/ML
5 INJECTION, SOLUTION INTRAVENOUS EVERY 10 MIN PRN
Status: DISCONTINUED | OUTPATIENT
Start: 2017-10-17 | End: 2017-10-17 | Stop reason: HOSPADM

## 2017-10-17 RX ORDER — LIDOCAINE HYDROCHLORIDE 10 MG/ML
1 INJECTION, SOLUTION EPIDURAL; INFILTRATION; INTRACAUDAL; PERINEURAL
Status: DISCONTINUED | OUTPATIENT
Start: 2017-10-17 | End: 2017-10-17 | Stop reason: HOSPADM

## 2017-10-17 RX ORDER — CETIRIZINE HYDROCHLORIDE 10 MG/1
10 TABLET ORAL DAILY
Status: DISCONTINUED | OUTPATIENT
Start: 2017-10-17 | End: 2017-10-17

## 2017-10-17 RX ORDER — HYDRALAZINE HYDROCHLORIDE 20 MG/ML
5 INJECTION INTRAMUSCULAR; INTRAVENOUS EVERY 10 MIN PRN
Status: DISCONTINUED | OUTPATIENT
Start: 2017-10-17 | End: 2017-10-17 | Stop reason: HOSPADM

## 2017-10-17 RX ORDER — SODIUM CHLORIDE 0.9 % (FLUSH) 0.9 %
10 SYRINGE (ML) INJECTION EVERY 12 HOURS SCHEDULED
Status: DISCONTINUED | OUTPATIENT
Start: 2017-10-17 | End: 2017-10-17 | Stop reason: HOSPADM

## 2017-10-17 RX ORDER — SODIUM CHLORIDE, SODIUM LACTATE, POTASSIUM CHLORIDE, CALCIUM CHLORIDE 600; 310; 30; 20 MG/100ML; MG/100ML; MG/100ML; MG/100ML
INJECTION, SOLUTION INTRAVENOUS CONTINUOUS
Status: DISCONTINUED | OUTPATIENT
Start: 2017-10-17 | End: 2017-10-18

## 2017-10-17 RX ORDER — DULOXETIN HYDROCHLORIDE 60 MG/1
60 CAPSULE, DELAYED RELEASE ORAL DAILY
Status: DISCONTINUED | OUTPATIENT
Start: 2017-10-17 | End: 2017-10-17

## 2017-10-17 RX ORDER — OXYCODONE HYDROCHLORIDE AND ACETAMINOPHEN 5; 325 MG/1; MG/1
2 TABLET ORAL EVERY 4 HOURS PRN
Status: DISCONTINUED | OUTPATIENT
Start: 2017-10-17 | End: 2017-10-18 | Stop reason: HOSPADM

## 2017-10-17 RX ORDER — DULOXETIN HYDROCHLORIDE 60 MG/1
60 CAPSULE, DELAYED RELEASE ORAL NIGHTLY
Status: DISCONTINUED | OUTPATIENT
Start: 2017-10-17 | End: 2017-10-18 | Stop reason: HOSPADM

## 2017-10-17 RX ORDER — NICOTINE POLACRILEX 4 MG
15 LOZENGE BUCCAL PRN
Status: DISCONTINUED | OUTPATIENT
Start: 2017-10-17 | End: 2017-10-18 | Stop reason: HOSPADM

## 2017-10-17 RX ORDER — FENTANYL CITRATE 50 UG/ML
50 INJECTION, SOLUTION INTRAMUSCULAR; INTRAVENOUS EVERY 5 MIN PRN
Status: DISCONTINUED | OUTPATIENT
Start: 2017-10-17 | End: 2017-10-17 | Stop reason: HOSPADM

## 2017-10-17 RX ORDER — PANTOPRAZOLE SODIUM 40 MG/1
40 TABLET, DELAYED RELEASE ORAL DAILY
Status: DISCONTINUED | OUTPATIENT
Start: 2017-10-17 | End: 2017-10-18 | Stop reason: HOSPADM

## 2017-10-17 RX ORDER — OXYCODONE HYDROCHLORIDE AND ACETAMINOPHEN 5; 325 MG/1; MG/1
1 TABLET ORAL EVERY 6 HOURS PRN
Qty: 28 TABLET | Refills: 0 | Status: SHIPPED | OUTPATIENT
Start: 2017-10-17 | End: 2017-10-24

## 2017-10-17 RX ORDER — ROPINIROLE 2 MG/1
2 TABLET, FILM COATED ORAL EVERY MORNING
Status: DISCONTINUED | OUTPATIENT
Start: 2017-10-17 | End: 2017-10-17

## 2017-10-17 RX ORDER — METOCLOPRAMIDE 5 MG/1
5 TABLET ORAL 2 TIMES DAILY
Status: DISCONTINUED | OUTPATIENT
Start: 2017-10-17 | End: 2017-10-18 | Stop reason: HOSPADM

## 2017-10-17 RX ORDER — SODIUM CHLORIDE 0.9 % (FLUSH) 0.9 %
10 SYRINGE (ML) INJECTION PRN
Status: DISCONTINUED | OUTPATIENT
Start: 2017-10-17 | End: 2017-10-18 | Stop reason: HOSPADM

## 2017-10-17 RX ORDER — HYDROMORPHONE HCL 110MG/55ML
0.5 PATIENT CONTROLLED ANALGESIA SYRINGE INTRAVENOUS EVERY 5 MIN PRN
Status: DISCONTINUED | OUTPATIENT
Start: 2017-10-17 | End: 2017-10-17 | Stop reason: HOSPADM

## 2017-10-17 RX ORDER — ALBUTEROL SULFATE 2.5 MG/3ML
2.5 SOLUTION RESPIRATORY (INHALATION) EVERY 6 HOURS PRN
Status: DISCONTINUED | OUTPATIENT
Start: 2017-10-17 | End: 2017-10-18 | Stop reason: HOSPADM

## 2017-10-17 RX ORDER — NICOTINE POLACRILEX 4 MG
15 LOZENGE BUCCAL PRN
Status: DISCONTINUED | OUTPATIENT
Start: 2017-10-17 | End: 2017-10-17 | Stop reason: SDUPTHER

## 2017-10-17 RX ORDER — DEXTROSE MONOHYDRATE 50 MG/ML
100 INJECTION, SOLUTION INTRAVENOUS PRN
Status: DISCONTINUED | OUTPATIENT
Start: 2017-10-17 | End: 2017-10-18 | Stop reason: HOSPADM

## 2017-10-17 RX ORDER — OXYCODONE HYDROCHLORIDE AND ACETAMINOPHEN 5; 325 MG/1; MG/1
1 TABLET ORAL EVERY 4 HOURS PRN
Status: DISCONTINUED | OUTPATIENT
Start: 2017-10-17 | End: 2017-10-18 | Stop reason: HOSPADM

## 2017-10-17 RX ORDER — ROCURONIUM BROMIDE 10 MG/ML
INJECTION, SOLUTION INTRAVENOUS PRN
Status: DISCONTINUED | OUTPATIENT
Start: 2017-10-17 | End: 2017-10-17 | Stop reason: SDUPTHER

## 2017-10-17 RX ORDER — METOPROLOL TARTRATE 50 MG/1
50 TABLET, FILM COATED ORAL 2 TIMES DAILY
Status: DISCONTINUED | OUTPATIENT
Start: 2017-10-17 | End: 2017-10-18 | Stop reason: HOSPADM

## 2017-10-17 RX ORDER — DEXTROSE MONOHYDRATE 25 G/50ML
12.5 INJECTION, SOLUTION INTRAVENOUS PRN
Status: DISCONTINUED | OUTPATIENT
Start: 2017-10-17 | End: 2017-10-18 | Stop reason: HOSPADM

## 2017-10-17 RX ORDER — SODIUM CHLORIDE 9 MG/ML
INJECTION, SOLUTION INTRAVENOUS CONTINUOUS
Status: DISCONTINUED | OUTPATIENT
Start: 2017-10-17 | End: 2017-10-17

## 2017-10-17 RX ORDER — SIMVASTATIN 20 MG
20 TABLET ORAL NIGHTLY
Status: DISCONTINUED | OUTPATIENT
Start: 2017-10-17 | End: 2017-10-18 | Stop reason: HOSPADM

## 2017-10-17 RX ORDER — ROPINIROLE 2 MG/1
2 TABLET, FILM COATED ORAL NIGHTLY
Status: DISCONTINUED | OUTPATIENT
Start: 2017-10-17 | End: 2017-10-18 | Stop reason: HOSPADM

## 2017-10-17 RX ORDER — LIDOCAINE HYDROCHLORIDE 20 MG/ML
INJECTION, SOLUTION EPIDURAL; INFILTRATION; INTRACAUDAL; PERINEURAL PRN
Status: DISCONTINUED | OUTPATIENT
Start: 2017-10-17 | End: 2017-10-17 | Stop reason: SDUPTHER

## 2017-10-17 RX ORDER — ALBUTEROL SULFATE 90 UG/1
2 AEROSOL, METERED RESPIRATORY (INHALATION) EVERY 6 HOURS PRN
Status: DISCONTINUED | OUTPATIENT
Start: 2017-10-17 | End: 2017-10-18 | Stop reason: HOSPADM

## 2017-10-17 RX ORDER — DOCUSATE SODIUM 100 MG/1
100 CAPSULE, LIQUID FILLED ORAL 2 TIMES DAILY
Status: DISCONTINUED | OUTPATIENT
Start: 2017-10-17 | End: 2017-10-18 | Stop reason: HOSPADM

## 2017-10-17 RX ORDER — ONDANSETRON 2 MG/ML
4 INJECTION INTRAMUSCULAR; INTRAVENOUS EVERY 6 HOURS PRN
Status: DISCONTINUED | OUTPATIENT
Start: 2017-10-17 | End: 2017-10-18 | Stop reason: HOSPADM

## 2017-10-17 RX ORDER — LOSARTAN POTASSIUM 100 MG/1
100 TABLET ORAL DAILY
Status: DISCONTINUED | OUTPATIENT
Start: 2017-10-17 | End: 2017-10-18 | Stop reason: HOSPADM

## 2017-10-17 RX ORDER — DEXTROSE MONOHYDRATE 25 G/50ML
12.5 INJECTION, SOLUTION INTRAVENOUS PRN
Status: DISCONTINUED | OUTPATIENT
Start: 2017-10-17 | End: 2017-10-17 | Stop reason: SDUPTHER

## 2017-10-17 RX ORDER — CLONAZEPAM 0.5 MG/1
0.5 TABLET ORAL 2 TIMES DAILY PRN
Status: DISCONTINUED | OUTPATIENT
Start: 2017-10-17 | End: 2017-10-18 | Stop reason: HOSPADM

## 2017-10-17 RX ORDER — TORSEMIDE 20 MG/1
20 TABLET ORAL DAILY
Status: DISCONTINUED | OUTPATIENT
Start: 2017-10-17 | End: 2017-10-18 | Stop reason: HOSPADM

## 2017-10-17 RX ORDER — SODIUM CHLORIDE, SODIUM LACTATE, POTASSIUM CHLORIDE, CALCIUM CHLORIDE 600; 310; 30; 20 MG/100ML; MG/100ML; MG/100ML; MG/100ML
INJECTION, SOLUTION INTRAVENOUS CONTINUOUS
Status: DISCONTINUED | OUTPATIENT
Start: 2017-10-17 | End: 2017-10-17

## 2017-10-17 RX ORDER — MORPHINE SULFATE 2 MG/ML
2 INJECTION, SOLUTION INTRAMUSCULAR; INTRAVENOUS
Status: DISCONTINUED | OUTPATIENT
Start: 2017-10-17 | End: 2017-10-18 | Stop reason: HOSPADM

## 2017-10-17 RX ORDER — LEVOTHYROXINE SODIUM 0.12 MG/1
125 TABLET ORAL DAILY
Status: DISCONTINUED | OUTPATIENT
Start: 2017-10-17 | End: 2017-10-18 | Stop reason: HOSPADM

## 2017-10-17 RX ORDER — FENTANYL CITRATE 50 UG/ML
INJECTION, SOLUTION INTRAMUSCULAR; INTRAVENOUS PRN
Status: DISCONTINUED | OUTPATIENT
Start: 2017-10-17 | End: 2017-10-17 | Stop reason: SDUPTHER

## 2017-10-17 RX ORDER — BUPIVACAINE HYDROCHLORIDE AND EPINEPHRINE 5; 5 MG/ML; UG/ML
INJECTION, SOLUTION EPIDURAL; INTRACAUDAL; PERINEURAL PRN
Status: DISCONTINUED | OUTPATIENT
Start: 2017-10-17 | End: 2017-10-17 | Stop reason: HOSPADM

## 2017-10-17 RX ORDER — ALBUTEROL SULFATE 2.5 MG/3ML
0.83 SOLUTION RESPIRATORY (INHALATION) EVERY 6 HOURS PRN
Status: DISCONTINUED | OUTPATIENT
Start: 2017-10-17 | End: 2017-10-17

## 2017-10-17 RX ORDER — GLYCOPYRROLATE 0.2 MG/ML
INJECTION INTRAMUSCULAR; INTRAVENOUS PRN
Status: DISCONTINUED | OUTPATIENT
Start: 2017-10-17 | End: 2017-10-17 | Stop reason: SDUPTHER

## 2017-10-17 RX ADMIN — CEFAZOLIN SODIUM 2 G: 2 SOLUTION INTRAVENOUS at 09:34

## 2017-10-17 RX ADMIN — ONDANSETRON 4 MG: 2 INJECTION, SOLUTION INTRAMUSCULAR; INTRAVENOUS at 09:45

## 2017-10-17 RX ADMIN — LIDOCAINE HYDROCHLORIDE 100 MG: 20 INJECTION, SOLUTION EPIDURAL; INFILTRATION; INTRACAUDAL; PERINEURAL at 09:23

## 2017-10-17 RX ADMIN — TORSEMIDE 20 MG: 20 TABLET ORAL at 16:34

## 2017-10-17 RX ADMIN — OXYCODONE HYDROCHLORIDE AND ACETAMINOPHEN 1 TABLET: 5; 325 TABLET ORAL at 14:30

## 2017-10-17 RX ADMIN — METOCLOPRAMIDE 5 MG: 5 TABLET ORAL at 21:00

## 2017-10-17 RX ADMIN — OXYCODONE HYDROCHLORIDE AND ACETAMINOPHEN 2 TABLET: 5; 325 TABLET ORAL at 20:49

## 2017-10-17 RX ADMIN — ALBUTEROL SULFATE 2.5 MG: 2.5 SOLUTION RESPIRATORY (INHALATION) at 18:00

## 2017-10-17 RX ADMIN — FENTANYL CITRATE 100 MCG: 50 INJECTION, SOLUTION INTRAMUSCULAR; INTRAVENOUS at 09:23

## 2017-10-17 RX ADMIN — ASPIRIN 81 MG: 81 TABLET, COATED ORAL at 17:18

## 2017-10-17 RX ADMIN — VITAMIN D, TAB 1000IU (100/BT) 4000 UNITS: 25 TAB at 21:02

## 2017-10-17 RX ADMIN — Medication 20 MG: at 20:59

## 2017-10-17 RX ADMIN — ROCURONIUM BROMIDE 40 MG: 10 INJECTION, SOLUTION INTRAVENOUS at 09:23

## 2017-10-17 RX ADMIN — MOMETASONE FUROATE AND FORMOTEROL FUMARATE DIHYDRATE 2 PUFF: 200; 5 AEROSOL RESPIRATORY (INHALATION) at 17:54

## 2017-10-17 RX ADMIN — DULOXETIN HYDROCHLORIDE 60 MG: 60 CAPSULE, DELAYED RELEASE ORAL at 20:59

## 2017-10-17 RX ADMIN — GABAPENTIN 800 MG: 800 TABLET ORAL at 20:57

## 2017-10-17 RX ADMIN — NEOSTIGMINE METHYLSULFATE 3 MG: 1 INJECTION INTRAMUSCULAR; INTRAVENOUS; SUBCUTANEOUS at 09:51

## 2017-10-17 RX ADMIN — Medication 0.4 MG: at 09:51

## 2017-10-17 RX ADMIN — SODIUM CHLORIDE, POTASSIUM CHLORIDE, SODIUM LACTATE AND CALCIUM CHLORIDE: 600; 310; 30; 20 INJECTION, SOLUTION INTRAVENOUS at 09:21

## 2017-10-17 RX ADMIN — SODIUM CHLORIDE, POTASSIUM CHLORIDE, SODIUM LACTATE AND CALCIUM CHLORIDE: 600; 310; 30; 20 INJECTION, SOLUTION INTRAVENOUS at 08:13

## 2017-10-17 RX ADMIN — AMITRIPTYLINE HYDROCHLORIDE 150 MG: 150 TABLET, FILM COATED ORAL at 21:01

## 2017-10-17 RX ADMIN — VANCOMYCIN HYDROCHLORIDE 1250 MG: 500 INJECTION, POWDER, LYOPHILIZED, FOR SOLUTION INTRAVENOUS at 09:37

## 2017-10-17 RX ADMIN — SODIUM CHLORIDE, POTASSIUM CHLORIDE, SODIUM LACTATE AND CALCIUM CHLORIDE: 600; 310; 30; 20 INJECTION, SOLUTION INTRAVENOUS at 16:38

## 2017-10-17 RX ADMIN — ALBUTEROL SULFATE 2.5 MG: 2.5 SOLUTION RESPIRATORY (INHALATION) at 20:36

## 2017-10-17 RX ADMIN — METOPROLOL TARTRATE 50 MG: 50 TABLET, FILM COATED ORAL at 21:00

## 2017-10-17 RX ADMIN — GABAPENTIN 800 MG: 800 TABLET ORAL at 16:34

## 2017-10-17 RX ADMIN — CLONAZEPAM 0.5 MG: 0.5 TABLET ORAL at 21:07

## 2017-10-17 RX ADMIN — FLUTICASONE PROPIONATE 1 SPRAY: 50 SPRAY, METERED NASAL at 21:02

## 2017-10-17 RX ADMIN — ROPINIROLE 2 MG: 2 TABLET, FILM COATED ORAL at 20:58

## 2017-10-17 RX ADMIN — SODIUM CHLORIDE, POTASSIUM CHLORIDE, SODIUM LACTATE AND CALCIUM CHLORIDE: 600; 310; 30; 20 INJECTION, SOLUTION INTRAVENOUS at 11:30

## 2017-10-17 RX ADMIN — PROPOFOL 150 MG: 10 INJECTION, EMULSION INTRAVENOUS at 09:23

## 2017-10-17 ASSESSMENT — PAIN SCALES - GENERAL
PAINLEVEL_OUTOF10: 0
PAINLEVEL_OUTOF10: 6
PAINLEVEL_OUTOF10: 3
PAINLEVEL_OUTOF10: 0
PAINLEVEL_OUTOF10: 7

## 2017-10-17 ASSESSMENT — PAIN - FUNCTIONAL ASSESSMENT: PAIN_FUNCTIONAL_ASSESSMENT: 0-10

## 2017-10-17 NOTE — LETTER
[place] [address]  Phone: [phone]    October 18, 2017     Patient: Richa Huang   YOB: 1960   Date of Visit: 8/30/2017     Patient Discharge Instructions  Discharge Date:  10/18/2017    Discharged To:   Home    Home with Home Health Care: No    RESUME ACTIVITY:      BATHING:  May shower in 24 hours, leave Dermabond on, no creams/lotions/ointments over Dermabond    DRIVING: No driving on narcotics    WALKING:  Yes    STAIRS:  Yes    LIFTING: Less than 15 pounds for 4-6 weeks    DIET: common adult    SPECIAL INSTRUCTIONS:     May use ice pack for pain/swelling    May use Motrin or Aleve for breakthrough pain    May take [address] as needed for constipation      Call [phone] for follow up appointment with Dr. Elaine Woods in:  7-10 days

## 2017-10-17 NOTE — INTERVAL H&P NOTE
History and Physical Update    Pt Name: Nicolette Patel  MRN: [id number]  YOB: 1960  Date of evaluation: 10/17/2017      [x] I have [facility]ane Johns CNP dated 10/3/17 which meets the criteria for an Interval History and Physical note and is attached below. [x] I have examined  Nicoletet Patel, a 57 yo female with multiple comorbidities which include DM w/insulin pump, gastroparesis, neuropathy and neurogenic bladder for which she self caths. Berenice Pretty POC . Patient turned Insulin pump to 75% Additionally she has COPD and used O2 until 3 months ago. Used albuterol last night  Has not used Breo Ellipta since 10/15/17 Denies fever, chills, productive cough or increased SOB today. There are no changes to the patient or plans for an Incisional hernia repair with mesh by Dr Sabine Hua for incisional hernia. Stopped ASA 10/9/17  +HX MRSA 2014. Vital signs: BP (!) 153/77   Pulse 72   Temp 98.1 °F (36.7 °C)   Resp 20   SpO2 95%     Allergies:  Fioricet [butalbital-apap-caffeine]; Erythromycin; Codeine; Droperidol; and Tape [adhesive tape]    Medications:   No current facility-administered medications on file prior to encounter.       Current Outpatient Prescriptions on File Prior to Encounter   Medication Sig Dispense Refill    cetirizine (ZYRTEC ALLERGY) 10 MG tablet Take 1 tablet by mouth daily 30 tablet 5    amitriptyline (ELAVIL) 150 MG tablet Take 1 tablet by mouth nightly 30 tablet 10    pantoprazole (PROTONIX) 40 MG tablet Take 1 tablet by mouth daily 30 tablet 5    metoprolol tartrate (LOPRESSOR) 50 MG tablet Take 1 tablet by mouth 2 times daily 60 tablet 9    BREO ELLIPTA 100-25 MCG/INH AEPB inhaler Take 1 puff by mouth daily  1    albuterol (PROVENTIL) (2.5 MG/3ML) 0.083% nebulizer solution Take 1 mL by nebulization 3 times daily as needed  0    Walking Boot MISC by Does not apply route Dx: Right foot fracture 1 each 0    simvastatin (ZOCOR) 20 MG tablet Take 1 MOUTH EVERY DAY 9/21/17   Be Santiago MD   cetirizine (ZYRTEC ALLERGY) 10 MG tablet Take 1 tablet by mouth daily 8/24/17   Be Santiago MD   amitriptyline (ELAVIL) 150 MG tablet Take 1 tablet by mouth nightly 8/10/17   Be Santiago MD   pantoprazole (PROTONIX) 40 MG tablet Take 1 tablet by mouth daily 7/13/17   Be Santiago MD   metoprolol tartrate (LOPRESSOR) 50 MG tablet Take 1 tablet by mouth 2 times daily 7/13/17   Be Santiago MD   BREO ELLIPTA 100-25 MCG/INH AEPB inhaler Take 1 puff by mouth daily 6/17/17   Historical Provider, MD   albuterol (PROVENTIL) (2.5 MG/3ML) 0.083% nebulizer solution Take 1 mL by nebulization 3 times daily as needed 6/15/17   Historical Provider, MD   Walking Boot [facility] by Does not apply route Dx: Right foot fracture 7/2/17   Amos Jhaveri PA-C   simvastatin (ZOCOR) 20 MG tablet Take 1 tablet by mouth nightly 6/27/17   Be Santiago MD   tiotropium (Geoffry Bowser) 18 MCG inhalation capsule Inhale 1 capsule into the lungs Daily 6/19/17   Brooklyn Gallagher MD   ONE TOUCH ULTRA TEST strip  6/7/17   Historical Provider, MD TEJADA INSULIN SYRINGE 29G X 1/2\" 0.5 ML MISC  5/9/17   Historical Provider, MD   fluticasone (FLONASE) 50 MCG/ACT nasal spray 1 spray by Nasal route 2 times daily 6/1/17   Yasmine Miller MD   albuterol sulfate HFA (PROAIR HFA) 108 (90 BASE) MCG/ACT inhaler Inhale 2 puffs into the lungs every 6 hours as needed for Wheezing 5/26/17   Evangelina Vásquez MD   losartan (COZAAR) 100 MG tablet take 1 tablet by mouth once daily 4/27/17   Evangelina Vásquez MD   gabapentin (NEURONTIN) 800 MG tablet take 1 tablet by mouth twice a day 4/27/17   Evangelina Vásquez MD   rOPINIRole (REQUIP) 2 MG tablet take 1 tablet by mouth every evening 4/27/17   Evangelina Vásquez MD   torsemide (DEMADEX) 20 MG tablet take 1 tablet by mouth once daily 1/5/17   Be Santiago MD   levothyroxine (SYNTHROID) 125 MCG tablet take 1 tablet by mouth once daily 11/21/16   Be Santiago MD metoclopramide (REGLAN) 5 MG tablet Take 5 mg by mouth 2 times daily  3/25/16   Historical Provider, MD   vitamin D (CHOLECALCIFEROL) 1000 UNIT TABS tablet Take 4,000 Units by mouth daily Takes 4 tabs daily    Historical Provider, MD   acetaminophen (TYLENOL) 325 MG tablet Take 650 mg by mouth every 6 hours as needed for Pain    Historical Provider, MD   docusate sodium (COLACE) 100 MG capsule Take 1 capsule by mouth 2 times daily Take while on narcotics 7/7/15   Sisi Johnson MD   Probiotic Product (PROBIOTIC DAILY PO)   Take 1 tablet by mouth     Historical Provider, MD   aspirin 81 MG tablet Take 81 mg by mouth daily. Historical Provider, MD       This is a 56 y.o. female who is pleasant, cooperative, alert and oriented x3, in no acute distress. Heart: Heart sounds are normal.  HR 72 regular rate and rhythm without murmur, gallop or rub. Lungs:Equal expansion, normal effort, unlabored and generally clear to auscultation without wheezes or rales    Abdomen: Obese, round, nontender  Insulin pump left abdomen Scars consistent with previous surgeries  +Incisional hernia. Bowel sounds present.    Skin: No visible rash or open wounds     Labs:  Recent Labs      10/03/17   1425   HGB  12.3   HCT  35.9*   WBC  9.5   MCV  86.2   PLT  275   NA  135   K  5.0   CL  98   CO2  26   BUN  26*   CREATININE  1.07*   GLUCOSE  281*       Ye Saucedo, ANP-BC  Electronically signed 10/17/2017 at 7:40 AM

## 2017-10-17 NOTE — ANESTHESIA PRE PROCEDURE
Department of Anesthesiology  Preprocedure Note       Name:  Curtis Kwan   Age:  56 y.o.  :  1960                                          MRN:  [id number]         Date:  10/17/2017      Surgeon: Marilee Lake):  Rboert Mcfarlane MD    Procedure: Procedure(s): HERNIA INCISIONAL REPAIR WITH MESH    Medications prior to admission:   Prior to Admission medications    Medication Sig Start Date End Date Taking?  Authorizing Provider   clonazePAM (KLONOPIN) 0.5 MG tablet take 1 tablet by mouth twice a day if needed for anxiety 10/10/17  Yes Shannon Reynolds MD   NONFORMULARY Patient has insulin pump with Novolog    Yes Historical Provider, MD   DULoxetine (CYMBALTA) 30 MG extended release capsule TAKE 1 CAPSULE BY MOUTH EVERY DAY 17  Yes Shannon Reynolds MD   DULoxetine (CYMBALTA) 60 MG extended release capsule TAKE 1 CAPSULE BY MOUTH EVERY DAY 17  Yes Shannon Reynolds MD   amitriptyline (ELAVIL) 150 MG tablet Take 1 tablet by mouth nightly 8/10/17  Yes Shannon Reynolds MD   pantoprazole (PROTONIX) 40 MG tablet Take 1 tablet by mouth daily 17  Yes Shannon Reynolds MD   metoprolol tartrate (LOPRESSOR) 50 MG tablet Take 1 tablet by mouth 2 times daily 17  Yes Shannon Reynolds MD   BREO ELLIPTA 100-25 MCG/INH AEPB inhaler Take 1 puff by mouth daily 17  Yes Historical Provider, MD   albuterol (PROVENTIL) (2.5 MG/3ML) 0.083% nebulizer solution Take 1 mL by nebulization 3 times daily as needed 6/15/17  Yes Historical Provider, MD   simvastatin (ZOCOR) 20 MG tablet Take 1 tablet by mouth nightly 17  Yes Shannon Reynolds MD   tiotropium (Stefani Smiles) 18 MCG inhalation capsule Inhale 1 capsule into the lungs Daily 17  Yes Johanna Rivas MD   fluticasone (FLONASE) 50 MCG/ACT nasal spray 1 spray by Nasal route 2 times daily 17  Yes Ruby Treadwell MD   albuterol sulfate HFA (PROAIR HFA) 108 (90 BASE) MCG/ACT inhaler Inhale 2 puffs into the lungs every 6 hours as needed for Neck ROM: full  Mouth opening: > = 3 FB Dental: normal exam         Pulmonary: breath sounds clear to auscultation  (+) COPD:      (-) recent URI and sleep apnea                           Cardiovascular:  Exercise tolerance: poor (<4 METS),   (+) hypertension:, CAD:, [place]:, hyperlipidemia    (-) pacemaker, CABG/stent, dysrhythmias and  angina    ECG reviewed  Rhythm: regular  Rate: normal  Echocardiogram reviewed         Beta Blocker:  Dose within 24 Hrs         Neuro/Psych:   (+) CVA (L sided facial numbness): residual symptoms, psychiatric history: stable with treatment   (-) seizures              Comments: Peripheral neuropathy of bilateral LE GI/Hepatic/Renal:   (+) GERD:,      (-) liver disease       Endo/Other:    (+) Type I DM, , hypothyroidism: arthritis:. Abdominal:   (+) obese,         Vascular:     - PVD, DVT and PE. Anesthesia Plan      general     ASA 4       Induction: intravenous. MIPS: Postoperative opioids intended and Prophylactic antiemetics administered. Anesthetic plan and risks discussed with patient. Plan discussed with CRNA.     Attending anesthesiologist reviewed and agrees with Hallie Schwartz MD   10/17/2017

## 2017-10-17 NOTE — ANESTHESIA POSTPROCEDURE EVALUATION
Department of Anesthesiology  Postprocedure Note    Patient: Farrah Bell  MRN: [id number]  YOB: 1960  Date of evaluation: 10/17/2017  Time:  5:07 PM     Procedure Summary     Date:  10/17/17 [facility]  [address] [facility]    Anesthesia Start:  9712 Anesthesia Stop:  0725    Procedure: HERNIA INCISIONAL REPAIR WITH MESH (N/A Abdomen) Diagnosis:  (DX INCISIONAL HERNIA )    Surgeon:  Leila Flores MD Responsible Provider:  Michoacano Sharp MD    Anesthesia Type:  general ASA Status:  4          Anesthesia Type: general    Melina Phase I: Melina Score: 9    Melina Phase II:      Last vitals: Reviewed and per EMR flowsheets.        Anesthesia Post Evaluation    Patient location during evaluation: PACU  Patient participation: complete - patient participated  Level of consciousness: awake and alert  Airway patency: patent  Nausea & Vomiting: no nausea  Complications: no  Cardiovascular status: blood pressure returned to baseline  Respiratory status: acceptable  Hydration status: euvolemic

## 2017-10-17 NOTE — OP NOTE
abdominal wall was re-approximated over the hernia repair with a Prolene suture. Local anesthesia in the form of Marcaine was then infiltrated into the skin and subcutaneous tissue, as well as abdominal wall fascia for patient's comfort postoperatively. The deep subcutaneous tissue, as well as deep dermal tissue were reapproximated with interrupted Vicryl sutures. The skin was closed with a running Monocryl suture. Dermabond was then applied to the wound. The sponge, lap, needle, and instrument count were correct at the end of the case. The patient was awakened from anesthesia and transported to the recovery room in stable condition. There were no immediate complications.     Electronically signed by Jessica Camp MD on 10/17/2017 at 10:01 AM

## 2017-10-17 NOTE — PROGRESS NOTES
Patient admitted from OR to room 1019-2. Patient A&O x4 and denies pain at this time. Ice pack applied to surgical site. Admission assessment completed, VS taken and telemetry placed. Orders reviewed and released. Patient given call light and oriented to room. See chart for details.

## 2017-10-17 NOTE — H&P (VIEW-ONLY)
PAT History and Physical    Pt Name: Richa Huang  MRN: [id number]  YOB: 1960  Date of evaluation: 10/3/2017  Primary Care Physician: Rosemary Jefferson MD    SUBJECTIVE:   History of Chief Complaint: This is Richa Huang a 49 y.o. female with multiple comorbidities some of which DM on insulin pump with gastroparesis, neuropathy and neurogenic bladder, HTN, Hx COPD was O2 dependent until 7/2017. The patient presents to [facility] for upcoming incisional hernia repair with mesh by Dr Elaine Woods for incisional hernia scheduled on 10/17/17 @0905. S/p bowel resection 2012 and previous abdominal surgeries  The patient c/o incisional hernia that has progressively worsened causing increased right abdominal discomfort and \"some constipation\" Scheduled earlier this year but developed SOB. Hospitalized for exacerbation of COPD and aspiration pneumonia and elective surgery was canceled. Now presents for surgical intervention. The patient denies fever, chills, cough, increased SOB, chest pain, night sweats, open sores, lesions or unexplained weight loss. Past Medical History    has a past medical history of Anesthesia complication; Anxiety; Arthritis; Back pain; CAD (coronary artery disease); Caffeine use; Cataract; COPD (chronic obstructive pulmonary disease) ([address] Deafness; Depression; Diabetes mellitus ([address] Diarrhea; Diverticulosis; Fibromyalgia; Gastroparalysis; GERD (gastroesophageal reflux disease); Hearing loss; Hyperlipidemia; Hyperlipidemia; Hypertension; Incontinence; MDRO (multiple drug resistant organisms) resistance; Neurogenic bladder; Neuropathy ([address] Obesity; Osteoarthritis; Peripheral vascular disease, unspecified ([address] Restless leg syndrome; Rhabdomyolysis; Spinal stenosis, thoracic; Stroke ([address] Thyroid disease; Trigeminal neuralgia; and Type II or unspecified type diabetes mellitus without mention of complication, not stated as uncontrolled.   Past Surgical History   has a past 29G X 1/2\" 0.5 ML MISC  5/9/17   Historical Provider, MD   fluticasone (FLONASE) 50 MCG/ACT nasal spray 1 spray by Nasal route 2 times daily 6/1/17   Harvey Giles MD   albuterol sulfate HFA (PROAIR HFA) 108 (90 BASE) MCG/ACT inhaler Inhale 2 puffs into the lungs every 6 hours as needed for Wheezing 5/26/17   Lexa Loya MD   losartan (COZAAR) 100 MG tablet take 1 tablet by mouth once daily 4/27/17   Lexa Loya MD   gabapentin (NEURONTIN) 800 MG tablet take 1 tablet by mouth twice a day 4/27/17   Lexa Loya MD   rOPINIRole (REQUIP) 2 MG tablet take 1 tablet by mouth every evening 4/27/17   Lexa Loya MD   clonazePAM (KLONOPIN) 0.5 MG tablet take 1 tablet by mouth twice a day if needed for anxiety 4/18/17   Yosi Chanel MD   torsemide (DEMADEX) 20 MG tablet take 1 tablet by mouth once daily 1/5/17   Yosi Chanel MD   levothyroxine (SYNTHROID) 125 MCG tablet take 1 tablet by mouth once daily 11/21/16   Yosi Chanel MD   metoclopramide (REGLAN) 5 MG tablet Take 5 mg by mouth 2 times daily  3/25/16   Historical Provider, MD   vitamin D (CHOLECALCIFEROL) 1000 UNIT TABS tablet Take 4,000 Units by mouth daily Takes 4 tabs daily    Historical Provider, MD   acetaminophen (TYLENOL) 325 MG tablet Take 650 mg by mouth every 6 hours as needed for Pain    Historical Provider, MD   docusate sodium (COLACE) 100 MG capsule Take 1 capsule by mouth 2 times daily Take while on narcotics 7/7/15   Edwina Davidson MD   Probiotic Product (PROBIOTIC DAILY PO)   Take 1 tablet by mouth     Historical Provider, MD   aspirin 81 MG tablet Take 81 mg by mouth daily. Historical Provider, MD     Allergies  is allergic to fioricet [butalbital-apap-caffeine]; erythromycin; codeine; droperidol; and tape [adhesive tape].   Family History  family history includes Cancer in her father, maternal grandmother, and paternal grandmother; Diabetes in her maternal grandmother; Heart Disease in her father and maternal grandmother; High Blood Pressure in her father and mother; Migraines in her mother; Other in her brother; Parkinsonism in her maternal grandmother. Social History   reports that she has never smoked. She has never used smokeless tobacco.   reports that she does not drink alcohol. reports that she uses illicit drugs, including Marijuana, about 4 times per week. ROS:  General Health:  Denies any problems with weight, appetite, or sleep. Skin:  No itching or rashes. No lesions or yeast skin infection. No easy bruising or bleeding. HEENT:Glasses  Deaf right ear No hearing aid  \"+benign tumor left ear\" with mild discomfort Needs another hearing test that is scheduled soon. Denies cephalgia or head injury. No eye pain. No sinusitis, rhinorhea or epistaxis. No frequent sorethroat, dysphagia or hoarseness. +sinus drainage uses Flonase nightly No stiffness or injury to the neck. Endocrinology: +DM on insulin pump with gastroparesis and neuropathy Uses glucometer and checks often   with rare 450 every two-three weeks If over 300 takes insulin    Cardio-Respiratory:+COPD Does not use O2 any longer Uses Spiriva and Breo  Sleep study 8/2017 \"normal\"   No hemoptysis, shortness of breath, chest pain, dizziness, orthopnea or palpitations. Quit smoking 5/2017 with pneumonia. Gastrointestinal: +GERD and gastroparesis  +constipation uses colace, probiotics and foods but still has difficulty Colonoscopy 2016 then can have diarrhea No cristiana stools, red or black in the stool. No nausea or vomiting. Genitourinary: +neurogenic bladder Straight cath 4-5x/day depending on day  No dysuria, hematuria, discharge, incontinence but has urgency and need to go quickly Wears pad sometimes but not at bad   No recent urinary tract infection. Locomotor: +PVD   Denies bone, joint or muscle  problems. No need for assistance with ambulation.   Neuropsychiatric:+Neuropathy   Denies neuropathy, syncopal episodes, weakness, vertigo, arthralgia, myalgia or numbness or tingling. \"Sad at this time of the year since she loss her brother in [facility]". Their birthday were in October together  No LMP recorded. Patient is postmenopausal.       OBJECTIVE:   VITALS:  height is 5' 5\" (1.651 m) and weight is 208 lb 15.9 oz (94.8 kg). Her blood pressure is 147/62 (abnormal) and her pulse is 85. Her respiration is 18 and oxygen saturation is 94%. CONSTITUTIONAL:This is a 49 y.o. female who is cooperative, pleasant, alert & orientated x 3, and in no acute distress   SKIN:  Warm and dry, no rashes No visible open wounds or sores   HEAD:  Normocephalic, atraumatic Face symmetrical   EYES: PERRL. EOMs intact. Glasses w/vision correction  EARS:  Hearing loss AD  No hearing aid  NOSE:  Nares patent. No rhinorrhea   THROAT:  Airway is patent, membranes are moist Dentition good  NECK:supple, no lymphadenopathy  No JVD or carotid bruits  LUNGS:  Normal effort,unlabored w/o use of accessory muscles with scattered rhonchi that cleared past cough. No wheezes noted  CARDIOVASCULAR: Heart sounds are normal.  HR 85 regular rate and rhythm without murmur, gallop or rub. ABDOMEN: Round, obese, distended  Insulin pump present left abdomen. scars consistent with previous surgery  +Incisional hernia noted. Bowel sounds decreased but noted. EXTREMITIES: no peripheral edema or calf tenderness bilaterally Pulses 2+/2+  NEURO: Cranial nerves II-XII grossly intact Strength 5+/5+     Testing:   EKG: Hard copy of 6/18/17 in chart NSR     Lab Review:   Recent Labs      10/03/17   1425   HGB  12.3   HCT  35.9*   WBC  9.5   MCV  86.2   NA  135   K  5.0   CL  98   CO2  26   BUN  26*   CREATININE  1.07*   GLUCOSE  281*       IMPRESSIONS:   1. Incisional hernia  2.  has a past medical history of Anesthesia complication; Anxiety; Arthritis; Back pain; CAD (coronary artery disease); Caffeine use; Cataract; COPD (chronic obstructive pulmonary disease) ([address]  Deafness; Depression; Diabetes mellitus ([facility] Diarrhea; Diverticulosis; Fibromyalgia; Gastroparalysis; GERD (gastroesophageal reflux disease); Hearing loss; Hyperlipidemia; Hyperlipidemia; Hypertension; Incontinence; MDRO (multiple drug resistant organisms) resistance (2012); Neurogenic bladder; Neuropathy ([facility] Obesity; Osteoarthritis; Peripheral vascular disease, unspecified ([facility] Restless leg syndrome; Rhabdomyolysis; Spinal stenosis, thoracic (5/27/2014); Stroke [facility]) (2012); Thyroid disease; Trigeminal neuralgia; and Type II or unspecified type diabetes mellitus without mention of complication, not stated as uncontrolled. PLANS:   1.  Repair incisional hernia with mesh    Toya Saucedo ANP-BC  Electronically signed 10/3/2017 at 2:59 PM

## 2017-10-18 VITALS
RESPIRATION RATE: 18 BRPM | OXYGEN SATURATION: 98 % | HEART RATE: 74 BPM | TEMPERATURE: 98.4 F | SYSTOLIC BLOOD PRESSURE: 143 MMHG | BODY MASS INDEX: 34.82 KG/M2 | HEIGHT: 65 IN | WEIGHT: 209 LBS | DIASTOLIC BLOOD PRESSURE: 69 MMHG

## 2017-10-18 LAB
6-ACETYLMORPHINE, UR: NOT DETECTED
7-AMINOCLONAZEPAM, URINE: NOT DETECTED
ALPHA-OH-ALPRAZ, URINE: NOT DETECTED
ALPRAZOLAM, URINE: NOT DETECTED
AMPHETAMINES, URINE: NOT DETECTED
BARBITURATES, URINE: NOT DETECTED
BENZOYLECGONINE, UR: NOT DETECTED
BUPRENORPHINE URINE: NOT DETECTED
CARISOPRODOL, UR: NOT DETECTED
CLONAZEPAM, URINE: NOT DETECTED
CODEINE, URINE: NOT DETECTED
CREATININE URINE: 31.9 MG/DL (ref 20–400)
DIAZEPAM, URINE: NOT DETECTED
DRUGS EXPECTED, UR: NORMAL
EER HI RES INTERP UR: NORMAL
ETHYL GLUCURONIDE UR: NOT DETECTED
FENTANYL URINE: NOT DETECTED
GLUCOSE BLD-MCNC: 269 MG/DL (ref 65–105)
HYDROCODONE, URINE: NOT DETECTED
HYDROMORPHONE, URINE: NOT DETECTED
LORAZEPAM, URINE: NOT DETECTED
MARIJUANA METAB, UR: PRESENT
MDA, UR: NOT DETECTED
MDEA, EVE, UR: NOT DETECTED
MDMA URINE: NOT DETECTED
MEPERIDINE METAB, UR: NOT DETECTED
METHADONE, URINE: NOT DETECTED
METHAMPHETAMINE, URINE: NOT DETECTED
METHYLPHENIDATE: NOT DETECTED
MIDAZOLAM, URINE: NOT DETECTED
MORPHINE URINE: NOT DETECTED
MRSA, DNA, NASAL: NORMAL
NORBUPRENORPHINE, URINE: NOT DETECTED
NORDIAZEPAM, URINE: NOT DETECTED
NORFENTANYL, URINE: NOT DETECTED
NORHYDROCODONE, URINE: NOT DETECTED
NOROXYCODONE, URINE: NOT DETECTED
NOROXYMORPHONE, URINE: NOT DETECTED
OXAZEPAM, URINE: NOT DETECTED
OXYCODONE URINE: NOT DETECTED
OXYMORPHONE, URINE: NOT DETECTED
PAIN MANAGEMENT DRUG PANEL INTERP, URINE: NORMAL
PAIN MGT DRUG PANEL, HI RES, UR: NORMAL
PCP,URINE: NOT DETECTED
PHENTERMINE, UR: NOT DETECTED
PROPOXYPHENE, URINE: NOT DETECTED
SPECIMEN DESCRIPTION: NORMAL
TAPENTADOL, URINE: NOT DETECTED
TAPENTADOL-O-SULFATE, URINE: NOT DETECTED
TEMAZEPAM, URINE: NOT DETECTED
TRAMADOL, URINE: NOT DETECTED
ZOLPIDEM, URINE: NOT DETECTED

## 2017-10-18 PROCEDURE — G0378 HOSPITAL OBSERVATION PER HR: HCPCS

## 2017-10-18 PROCEDURE — 6370000000 HC RX 637 (ALT 250 FOR IP): Performed by: SURGERY

## 2017-10-18 PROCEDURE — 2580000003 HC RX 258: Performed by: SURGERY

## 2017-10-18 PROCEDURE — 82947 ASSAY GLUCOSE BLOOD QUANT: CPT

## 2017-10-18 PROCEDURE — 87641 MR-STAPH DNA AMP PROBE: CPT

## 2017-10-18 PROCEDURE — 94640 AIRWAY INHALATION TREATMENT: CPT

## 2017-10-18 PROCEDURE — 6360000002 HC RX W HCPCS: Performed by: SURGERY

## 2017-10-18 RX ADMIN — OXYCODONE HYDROCHLORIDE AND ACETAMINOPHEN 2 TABLET: 5; 325 TABLET ORAL at 06:03

## 2017-10-18 RX ADMIN — ALBUTEROL SULFATE 2.5 MG: 2.5 SOLUTION RESPIRATORY (INHALATION) at 09:22

## 2017-10-18 RX ADMIN — SODIUM CHLORIDE, POTASSIUM CHLORIDE, SODIUM LACTATE AND CALCIUM CHLORIDE: 600; 310; 30; 20 INJECTION, SOLUTION INTRAVENOUS at 03:31

## 2017-10-18 RX ADMIN — LEVOTHYROXINE SODIUM 125 MCG: 0.12 TABLET ORAL at 06:03

## 2017-10-18 RX ADMIN — MOMETASONE FUROATE AND FORMOTEROL FUMARATE DIHYDRATE 2 PUFF: 200; 5 AEROSOL RESPIRATORY (INHALATION) at 09:25

## 2017-10-18 ASSESSMENT — PAIN SCALES - GENERAL
PAINLEVEL_OUTOF10: 7
PAINLEVEL_OUTOF10: 0

## 2017-10-18 NOTE — PROGRESS NOTES
Surgery Progress Note            PATIENT NAME: Daily Black     TODAY'S DATE: 10/18/2017, 4:21 AM    SUBJECTIVE:    Pt seen and examined. No acute events overnight. Pain controlled but present. Some throat soreness this am. Denies N/V/Fever/Chills. Passing flatus. Tolerating diet. OBJECTIVE:   VITALS:  BP (!) 118/42   Pulse 72   Temp 97.7 °F (36.5 °C) (Oral)   Resp 18   Ht 5' 4.96\" (1.65 m)   Wt 208 lb 15.9 oz (94.8 kg)   SpO2 97%   BMI 34.82 kg/m²      INTAKE/OUTPUT:      Intake/Output Summary (Last 24 hours) at 10/18/17 0421  Last data filed at 10/17/17 1808   Gross per 24 hour   Intake             1591 ml   Output                0 ml   Net             1591 ml       PHYSICAL EXAM  GEN: Alert, awake, oriented, NAD  HEENT: moist mucous membranes  CV: S1/S2  LUNG: no respiratory distress, no audible wheezing  ABDOMEN: appropriately tender along midline incision where hernia repaired in epigastric region, incision C/D/I with dermabond    ASSESSMENT   1. POD#1 incisional hernia repair with mesh (epigastric)    Plan  1. Okay for regular diet  2. Pain control PRN  3. Supportive care   4. Glucose control/checks   5. As patient's glucose seems to be reasonably controlled overnight, will plan for D/C home later today if continuing to tolerate meals this am. Follow up with Dr. Tayler Deal in 1 week. Electronically signed by Cong Yo DO  on 10/18/2017 at 4:21 AM     Attending Physician Statement  I have discussed the case, including pertinent history and exam findings with the resident. I agree with the assessment, plan and orders as documented by the resident. Patient seen and examined. Agree with above.   Discharge home today with office follow up in 7-10 days

## 2017-10-18 NOTE — FLOWSHEET NOTE
Patient receives Sacrament of the Sick (anointing) from [facility]. [facility] will follow as needed. (writer charting for [place] [facility].)     05/16/25 1133   Encounter Summary   Services provided to: Patient   Referral/Consult From: Rounding   Place of [address] Visiting (10/18/17 anointed)   Complexity of Encounter Low   Length of Encounter 15 minutes   Routine   Type Follow up   Sacraments   Sacrament of Sick-Anointing Anointed  (10/18/17 Fr. Zander Gomez)

## 2017-10-18 NOTE — PROGRESS NOTES
Patient to be discharged after breakfast if tolerating regular diet. All patient belongings collected. All discharge paperwork prepared. Patient requests to take her daily medications at home as she is using her own Rx at this time as an observation patient. No meds given. IV & telemetry to be removed closer to discharge time. Paperwork to be given to and explained to patient upon discharge including Rx for pain medication. See discharge event for further details.

## 2017-10-18 NOTE — CARE COORDINATION
[address] Transitions Interview     10/18/2017    Patient: Patrice Morris Patient : 1960   MRN: [id number]  Reason for Admission: There are no discharge diagnoses documented for the most recent discharge. RARS: [facility] Risk Score: 22.5 CMRS: 11     Attempted to meet with patient for discharge planning and care transitions. Patient discharged prior to completion of interview. Care transitions will continue to follow. Msg to [facility] requesting assist with scheduling TCM appointment with PCP. Review of MR completed. Readmission Risk  Patient Active Problem List   Diagnosis    Peripheral vascular disease ([facility]    Restless legs syndrome    Spinal stenosis, thoracic    Thoracic radiculopathy    Chronic pain syndrome    Depression    Essential hypertension    Retinopathy due to secondary DM ([facility]    Hypothyroidism    Vitamin D deficiency    Mixed hyperlipidemia    Type 1 diabetes mellitus with diabetic autonomic neuropathy, with long-term current use of insulin (HCC)    Insulin pump in place    Incisional hernia without obstruction or gangrene    Pneumonia of both lower lobes due to infectious organism    Moderate persistent asthma with status asthmaticus    Aspiration pneumonia of both lower lobes (HCC)    Seasonal allergic rhinitis due to pollen    Chronic obstructive pulmonary disease (HCC)    Insulin pump in place    Diabetic foot [facility])       Inpatient Assessment  Care Transitions Summary    Care Transitions Inpatient Review  Medication Review  Do you have all of your prescriptions and are they filled?:  Yes   Barriers to Medication Adherence:  None  Are you able to afford your medications?:  Yes  How often do you have difficulty taking your medications?:  I always take them as prescribed.   Housing Review  Who do you live with?:  Alone  Are you an active caregiver in your home?:  No  Social Support  Do you have a ?:  No  Do you have a [address] [place] [address] Irineo Bishop

## 2017-10-19 ENCOUNTER — CARE COORDINATION (OUTPATIENT)
Dept: CASE MANAGEMENT | Age: 57
End: 2017-10-19

## 2017-10-25 ENCOUNTER — OFFICE VISIT (OUTPATIENT)
Dept: INTERNAL MEDICINE CLINIC | Age: 57
End: 2017-10-25
Payer: MEDICARE

## 2017-10-25 ENCOUNTER — CARE COORDINATION (OUTPATIENT)
Dept: CASE MANAGEMENT | Age: 57
End: 2017-10-25

## 2017-10-25 VITALS
WEIGHT: 213 LBS | DIASTOLIC BLOOD PRESSURE: 70 MMHG | HEART RATE: 72 BPM | SYSTOLIC BLOOD PRESSURE: 134 MMHG | RESPIRATION RATE: 14 BRPM | HEIGHT: 65 IN | TEMPERATURE: 97.8 F | BODY MASS INDEX: 35.49 KG/M2 | OXYGEN SATURATION: 93 %

## 2017-10-25 DIAGNOSIS — I10 ESSENTIAL HYPERTENSION: ICD-10-CM

## 2017-10-25 DIAGNOSIS — Z98.890 HISTORY OF INCISIONAL HERNIA REPAIR: Primary | ICD-10-CM

## 2017-10-25 DIAGNOSIS — F32.A DEPRESSION, UNSPECIFIED DEPRESSION TYPE: ICD-10-CM

## 2017-10-25 DIAGNOSIS — F33.42 RECURRENT MAJOR DEPRESSIVE EPISODES, IN FULL REMISSION (HCC): ICD-10-CM

## 2017-10-25 DIAGNOSIS — Z87.19 HISTORY OF INCISIONAL HERNIA REPAIR: Primary | ICD-10-CM

## 2017-10-25 DIAGNOSIS — E10.43 TYPE 1 DIABETES MELLITUS WITH DIABETIC AUTONOMIC NEUROPATHY, WITH LONG-TERM CURRENT USE OF INSULIN (HCC): ICD-10-CM

## 2017-10-25 DIAGNOSIS — Z96.41 INSULIN PUMP IN PLACE: ICD-10-CM

## 2017-10-25 DIAGNOSIS — E03.4 HYPOTHYROIDISM DUE TO ACQUIRED ATROPHY OF THYROID: ICD-10-CM

## 2017-10-25 DIAGNOSIS — Z12.39 SCREENING FOR BREAST CANCER: ICD-10-CM

## 2017-10-25 PROCEDURE — 99496 TRANSJ CARE MGMT HIGH F2F 7D: CPT | Performed by: NURSE PRACTITIONER

## 2017-10-25 RX ORDER — LOSARTAN POTASSIUM 100 MG/1
TABLET ORAL
COMMUNITY
Start: 2016-08-09 | End: 2017-10-25 | Stop reason: SDUPTHER

## 2017-10-25 RX ORDER — GENTAMICIN SULFATE 1 MG/G
CREAM TOPICAL
Refills: 1 | Status: ON HOLD | COMMUNITY
Start: 2017-08-22 | End: 2018-04-08 | Stop reason: CLARIF

## 2017-10-25 RX ORDER — BUDESONIDE AND FORMOTEROL FUMARATE DIHYDRATE 160; 4.5 UG/1; UG/1
AEROSOL RESPIRATORY (INHALATION)
COMMUNITY
Start: 2014-10-17 | End: 2017-10-25 | Stop reason: SDUPTHER

## 2017-10-25 RX ORDER — METOCLOPRAMIDE 5 MG/1
TABLET ORAL
COMMUNITY
Start: 2016-03-25 | End: 2017-10-25 | Stop reason: SDUPTHER

## 2017-10-25 RX ORDER — ROPINIROLE 2 MG/1
TABLET, FILM COATED ORAL
COMMUNITY
Start: 2016-11-03 | End: 2017-10-25 | Stop reason: SDUPTHER

## 2017-10-25 RX ORDER — AMITRIPTYLINE HYDROCHLORIDE 150 MG/1
TABLET, FILM COATED ORAL
COMMUNITY
Start: 2016-10-03 | End: 2017-10-25 | Stop reason: SDUPTHER

## 2017-10-25 RX ORDER — FLUTICASONE PROPIONATE 50 MCG
SPRAY, SUSPENSION (ML) NASAL
COMMUNITY
Start: 2017-01-04 | End: 2017-10-25 | Stop reason: SDUPTHER

## 2017-10-25 RX ORDER — CLONAZEPAM 0.5 MG/1
TABLET ORAL
COMMUNITY
Start: 2016-10-28 | End: 2017-10-25 | Stop reason: SDUPTHER

## 2017-10-25 RX ORDER — DICYCLOMINE HYDROCHLORIDE 10 MG/1
CAPSULE ORAL
COMMUNITY
Start: 2017-01-25 | End: 2018-04-03

## 2017-10-25 RX ORDER — LEVOTHYROXINE SODIUM 0.12 MG/1
TABLET ORAL
COMMUNITY
Start: 2016-11-21 | End: 2017-10-25 | Stop reason: SDUPTHER

## 2017-10-25 RX ORDER — HYDROCODONE BITARTRATE AND ACETAMINOPHEN 5; 325 MG/1; MG/1
TABLET ORAL
COMMUNITY
Start: 2017-04-06 | End: 2017-10-25 | Stop reason: SDUPTHER

## 2017-10-25 RX ORDER — ALBUTEROL SULFATE 90 UG/1
AEROSOL, METERED RESPIRATORY (INHALATION)
COMMUNITY
Start: 2014-10-13 | End: 2017-10-25 | Stop reason: SDUPTHER

## 2017-10-25 RX ORDER — METOPROLOL TARTRATE 50 MG/1
TABLET, FILM COATED ORAL
COMMUNITY
Start: 2016-08-31 | End: 2017-10-25 | Stop reason: SDUPTHER

## 2017-10-25 RX ORDER — GABAPENTIN 800 MG/1
TABLET ORAL
COMMUNITY
Start: 2016-11-03 | End: 2017-10-25 | Stop reason: SDUPTHER

## 2017-10-25 RX ORDER — PANTOPRAZOLE SODIUM 40 MG/1
TABLET, DELAYED RELEASE ORAL
COMMUNITY
End: 2017-10-25 | Stop reason: SDUPTHER

## 2017-10-25 RX ORDER — TORSEMIDE 20 MG/1
TABLET ORAL
COMMUNITY
Start: 2017-01-05 | End: 2017-10-25 | Stop reason: SDUPTHER

## 2017-10-25 RX ORDER — ASPIRIN 81 MG/1
TABLET, CHEWABLE ORAL
COMMUNITY
End: 2017-10-25 | Stop reason: SDUPTHER

## 2017-10-25 RX ORDER — DULOXETIN HYDROCHLORIDE 30 MG/1
CAPSULE, DELAYED RELEASE ORAL
COMMUNITY
End: 2017-10-25 | Stop reason: SDUPTHER

## 2017-10-25 RX ORDER — DOCUSATE SODIUM 100 MG/1
CAPSULE, LIQUID FILLED ORAL
COMMUNITY
Start: 2015-07-07 | End: 2017-10-25 | Stop reason: SDUPTHER

## 2017-10-25 RX ORDER — SIMVASTATIN 20 MG
TABLET ORAL
COMMUNITY
Start: 2016-10-25 | End: 2017-10-25 | Stop reason: SDUPTHER

## 2017-10-25 NOTE — CARE COORDINATION
Attempt to reach patient for care transitions. [facility] requesting return call. Contact information provided. Attempt to reach patient for final care transitions call. [facility] requesting return call. Contact information provided. Final attempt to reach patient for care transitions call. [facility] requesting return call. Contact information provided. Episode resolved. RN-ACC notified.

## 2017-10-25 NOTE — PROGRESS NOTES
Transition Care Office Visit    Date of Face-to-Face: 10/25/2017    Persons at visit: pt only     Here for follow after hospitalization for: hernia repair. Activity: activity as tolerated    Any medication changes since post-hospitalization phone call? No    Any treatment changes since post-hospitalization phone call? No    Any further education needed on medications/treatment plan?  No      Current Medication List  Outpatient Encounter Prescriptions as of 10/25/2017   Medication Sig Dispense Refill    dicyclomine (BENTYL) 10 MG capsule Take by mouth      insulin aspart (NOVOLOG) 100 UNIT/ML injection vial Inject into the skin      BREO ELLIPTA 200-25 MCG/INH AEPB INL 1 PUFF PO QD  6    gentamicin (GARAMYCIN) 0.1 % cream APPLY SUFFICIENT AMOUNT AA QD  1    clonazePAM (KLONOPIN) 0.5 MG tablet take 1 tablet by mouth twice a day if needed for anxiety 60 tablet 2    NONFORMULARY Patient has insulin pump with Novolog       DULoxetine (CYMBALTA) 60 MG extended release capsule TAKE 1 CAPSULE BY MOUTH EVERY DAY 30 capsule 11    cetirizine (ZYRTEC ALLERGY) 10 MG tablet Take 1 tablet by mouth daily 30 tablet 5    amitriptyline (ELAVIL) 150 MG tablet Take 1 tablet by mouth nightly 30 tablet 10    pantoprazole (PROTONIX) 40 MG tablet Take 1 tablet by mouth daily 30 tablet 5    metoprolol tartrate (LOPRESSOR) 50 MG tablet Take 1 tablet by mouth 2 times daily 60 tablet 9    BREO ELLIPTA 100-25 MCG/INH AEPB inhaler Take 1 puff by mouth daily  1    albuterol (PROVENTIL) (2.5 MG/3ML) 0.083% nebulizer solution Take 1 mL by nebulization 3 times daily as needed  0    Walking Boot MISC by Does not apply route Dx: Right foot fracture 1 each 0    simvastatin (ZOCOR) 20 MG tablet Take 1 tablet by mouth nightly 30 tablet 11    tiotropium (SPIRIVA HANDIHALER) 18 MCG inhalation capsule Inhale 1 capsule into the lungs Daily 30 capsule 5    ONE TOUCH ULTRA TEST strip   1    B-D INSULIN SYRINGE 29G X 1/2\" 0.5 ML MISC  fluticasone (FLONASE) 50 MCG/ACT nasal spray 1 spray by Nasal route 2 times daily 1 Bottle 0    albuterol sulfate HFA (PROAIR HFA) 108 (90 BASE) MCG/ACT inhaler Inhale 2 puffs into the lungs every 6 hours as needed for Wheezing 1 Inhaler 0    losartan (COZAAR) 100 MG tablet take 1 tablet by mouth once daily 30 tablet 11    gabapentin (NEURONTIN) 800 MG tablet take 1 tablet by mouth twice a day 60 tablet 5    rOPINIRole (REQUIP) 2 MG tablet take 1 tablet by mouth every evening 30 tablet 11    torsemide (DEMADEX) 20 MG tablet take 1 tablet by mouth once daily 30 tablet 11    levothyroxine (SYNTHROID) 125 MCG tablet take 1 tablet by mouth once daily 30 tablet 11    metoclopramide (REGLAN) 5 MG tablet Take 5 mg by mouth 2 times daily   0    vitamin D (CHOLECALCIFEROL) 1000 UNIT TABS tablet Take 4,000 Units by mouth daily Takes 4 tabs daily      acetaminophen (TYLENOL) 325 MG tablet Take 650 mg by mouth every 6 hours as needed for Pain      docusate sodium (COLACE) 100 MG capsule Take 1 capsule by mouth 2 times daily Take while on narcotics 60 capsule 0    Probiotic Product (PROBIOTIC DAILY PO)   Take 1 tablet by mouth       aspirin 81 MG tablet Take 81 mg by mouth daily.  [DISCONTINUED] budesonide-formoterol (SYMBICORT) 160-4.5 MCG/ACT AERO Take by mouth      [DISCONTINUED] HYDROcodone-acetaminophen (NORCO) 5-325 MG per tablet Take by mouth .       [DISCONTINUED] albuterol sulfate  (90 Base) MCG/ACT inhaler Inhale into the lungs      [DISCONTINUED] amitriptyline (ELAVIL) 150 MG tablet       [DISCONTINUED] aspirin 81 MG chewable tablet Take by mouth      [DISCONTINUED] clonazePAM (KLONOPIN) 0.5 MG tablet       [DISCONTINUED] docusate sodium (COLACE) 100 MG capsule Take by mouth      [DISCONTINUED] DULoxetine (CYMBALTA) 30 MG extended release capsule Take by mouth      [DISCONTINUED] fluticasone (FLONASE) 50 MCG/ACT nasal spray by Nasal route      [DISCONTINUED] gabapentin (NEURONTIN) 800 MG tablet       [DISCONTINUED] levothyroxine (SYNTHROID) 125 MCG tablet       [DISCONTINUED] losartan (COZAAR) 100 MG tablet       [DISCONTINUED] metoclopramide (REGLAN) 5 MG tablet Take by mouth      [DISCONTINUED] metoprolol tartrate (LOPRESSOR) 50 MG tablet       [DISCONTINUED] pantoprazole (PROTONIX) 40 MG tablet Take by mouth      [DISCONTINUED] rOPINIRole (REQUIP) 2 MG tablet       [DISCONTINUED] simvastatin (ZOCOR) 20 MG tablet       [DISCONTINUED] tiotropium (SPIRIVA) 18 MCG inhalation capsule Inhale into the lungs      [DISCONTINUED] torsemide (DEMADEX) 20 MG tablet       [DISCONTINUED] DULoxetine (CYMBALTA) 30 MG extended release capsule TAKE 1 CAPSULE BY MOUTH EVERY DAY 30 capsule 5     No facility-administered encounter medications on file as of 10/25/2017. Medication Reconciliation  Provider has reviewed medication record and it has been modified as necessary to accurately depict current medications since hospitalization. Vic Fleming has been instructed on these changes. See transitional care telephone note. HPI  H/o colon resection\  2nd hernia repair at [facility]  No strenuous activity. No abd pain, incision healing well. Sees dr elias tomorrow   Using stool softener, stooling w/o issue. Has not used neb treatments x 2 d d/t no tubing. DM sees dr Genevieve Arzate wearing pump       Review of Systems     History obtained from the patient.  report own ADLs with caution d/t no lifting order   General ROS: negative for - chills, fatigue or fever  Respiratory ROS: no cough, shortness of breath, or wheezing, needs tube for neb - has order from dr Garry Jefferson   Cardiovascular ROS: no chest pain or dyspnea on exertion  Gastrointestinal ROS: no abdominal pain, change in bowel habits, or black or bloody stools  Genito-Urinary ROS: no dysuria, trouble voiding, or hematuria  Musculoskeletal ROS: negative for - gait disturbance, joint pain or joint swelling  Neurological ROS: no TIA or stroke controlled      6. Hypothyroidism due to acquired atrophy of thyroid  Labs ordered   Is compliant with meds. 7. Type 1 diabetes mellitus with diabetic autonomic neuropathy, with long-term current use of insulin (HCC)  Sees endocrin , is to place glucose monitor on abd. Was waiting d/t surgery. 8. Insulin pump in place has appt in near future. q 3 mos. Orders Placed This Encounter   Procedures    HARMEET DIGITAL SCREEN W OR WO CAD BILATERAL     Standing Status:   Future     Standing Expiration Date:   12/25/2018     Order Specific Question:   Reason for exam:     Answer:   annual exam    TSH with Reflex     Standing Status:   Future     Standing Expiration Date:   10/26/2018     No orders of the defined types were placed in this encounter. Return in about 2 weeks (around 11/8/2017) for with pcp . Diagnostic Tests performed in hospital: surgery , no diagnositics noted in chart.    Requested/reviewed: Yes    Disease Education:  HTN, COPD, DM       Home Health Care :  None      Discussed with other providers: internal medicine          Note:  CPT Coding - 97893 (Moderate level - seen within 14 days)      28642 (High level - seen within 7 days)  Needs to have associated phone contact within 2 business days of inpatient discharge

## 2017-10-25 NOTE — PROGRESS NOTES
Transition Care Office Visit    Date of Face-to-Face: 10/25/2017    Persons at visit: Alone    Here for follow after hospitalization for: Hernia Repair    Activity: activity as tolerated    Any medication changes since post-hospitalization phone call? No    Any treatment changes since post-hospitalization phone call? No    Any further education needed on medications/treatment plan?  No      Current Medication List  Outpatient Encounter Prescriptions as of 10/25/2017   Medication Sig Dispense Refill    dicyclomine (BENTYL) 10 MG capsule Take by mouth      insulin aspart (NOVOLOG) 100 UNIT/ML injection vial Inject into the skin      BREO ELLIPTA 200-25 MCG/INH AEPB INL 1 PUFF PO QD  6    gentamicin (GARAMYCIN) 0.1 % cream APPLY SUFFICIENT AMOUNT AA QD  1    clonazePAM (KLONOPIN) 0.5 MG tablet take 1 tablet by mouth twice a day if needed for anxiety 60 tablet 2    NONFORMULARY Patient has insulin pump with Novolog       DULoxetine (CYMBALTA) 60 MG extended release capsule TAKE 1 CAPSULE BY MOUTH EVERY DAY 30 capsule 11    cetirizine (ZYRTEC ALLERGY) 10 MG tablet Take 1 tablet by mouth daily 30 tablet 5    amitriptyline (ELAVIL) 150 MG tablet Take 1 tablet by mouth nightly 30 tablet 10    pantoprazole (PROTONIX) 40 MG tablet Take 1 tablet by mouth daily 30 tablet 5    metoprolol tartrate (LOPRESSOR) 50 MG tablet Take 1 tablet by mouth 2 times daily 60 tablet 9    BREO ELLIPTA 100-25 MCG/INH AEPB inhaler Take 1 puff by mouth daily  1    albuterol (PROVENTIL) (2.5 MG/3ML) 0.083% nebulizer solution Take 1 mL by nebulization 3 times daily as needed  0    Walking Boot MISC by Does not apply route Dx: Right foot fracture 1 each 0    simvastatin (ZOCOR) 20 MG tablet Take 1 tablet by mouth nightly 30 tablet 11    tiotropium (SPIRIVA HANDIHALER) 18 MCG inhalation capsule Inhale 1 capsule into the lungs Daily 30 capsule 5    ONE TOUCH ULTRA TEST strip   1    B-D INSULIN SYRINGE 29G X 1/2\" 0.5 ML MISC       MG tablet       [DISCONTINUED] levothyroxine (SYNTHROID) 125 MCG tablet       [DISCONTINUED] losartan (COZAAR) 100 MG tablet       [DISCONTINUED] metoclopramide (REGLAN) 5 MG tablet Take by mouth      [DISCONTINUED] metoprolol tartrate (LOPRESSOR) 50 MG tablet       [DISCONTINUED] pantoprazole (PROTONIX) 40 MG tablet Take by mouth      [DISCONTINUED] rOPINIRole (REQUIP) 2 MG tablet       [DISCONTINUED] simvastatin (ZOCOR) 20 MG tablet       [DISCONTINUED] tiotropium (SPIRIVA) 18 MCG inhalation capsule Inhale into the lungs      [DISCONTINUED] torsemide (DEMADEX) 20 MG tablet       [DISCONTINUED] DULoxetine (CYMBALTA) 30 MG extended release capsule TAKE 1 CAPSULE BY MOUTH EVERY DAY 30 capsule 5     No facility-administered encounter medications on file as of 10/25/2017. Medication Reconciliation  Provider has reviewed medication record and it has been modified as necessary to accurately depict current medications since hospitalization. Alfredo Hdz has been instructed on these changes. See transitional care telephone note.        Diagnostic Tests performed in hospital: labs  Requested/reviewed: Yes    Disease Education:  [address] :  None      Discussed with other providers: internal medicine          Note:  CPT Coding - 29403 (Moderate level - seen within 14 days)      49251 (High level - seen within 7 days)  Needs to have associated phone contact within 2 business days of inpatient discharge

## 2017-10-31 ENCOUNTER — TELEPHONE (OUTPATIENT)
Dept: PHARMACY | Facility: CLINIC | Age: 57
End: 2017-10-31

## 2017-11-02 ENCOUNTER — CARE COORDINATION (OUTPATIENT)
Dept: CARE COORDINATION | Age: 57
End: 2017-11-02

## 2017-11-02 NOTE — CARE COORDINATION
[facility] attempted to contact patient for Ambulatory Care Coordination, LVM with contact info [phone].       Cassia Gabriel RN

## 2017-11-03 ENCOUNTER — CARE COORDINATION (OUTPATIENT)
Dept: CARE COORDINATION | Age: 57
End: 2017-11-03

## 2017-11-03 NOTE — CARE COORDINATION
[facility] attempted to contact patient again for Ambulatory Care Coordination, SIS with contact info [phone]. Called her mother's number and spoke to her father who stated the cell number is the only number for Dennis.    Will try to contact again next week if a return call isnt received first.    Future Appointments  Date Time Provider Reji Jay   11/10/2017 2:30  [facility] DIGITAL RM [address] [facility] Radiolog   11/30/2017 2:45 PM Byron Kelley MD [address] RN

## 2017-11-07 ENCOUNTER — CARE COORDINATION (OUTPATIENT)
Dept: CARE COORDINATION | Age: 57
End: 2017-11-07

## 2017-11-07 NOTE — CARE COORDINATION
Ambulatory Care Coordination Note  11/7/2017  CM Risk Score: 11  Jackie Mortality Risk Score:      ACC: Cassia Gabriel, RN    Summary Note: [facility] called and spoke to patient who states she is doing somewhat better. She had to reschedule her appt with pcp d/t having appt with the [facility] in [place] tomorrow. 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 She said her BS had been running high and over the weekend she had to adjust her insulin pump and they are better now. Discussed diet with her and she has been trying to eat better and avoid sweets. Her last A1C was 8.6 up from 7.9. Recently had hernia repair with mesh and inpt. Encouraged her to call [facility] with any questions or concerns and reminder of same day appts. Care Coordination Interventions    Program Enrollment:  Complex Care  Referral from Primary Care Provider:  Yes  Suggested Interventions and Community Resources  Diabetes Education:  Completed  Disease Association:  Completed (Comment: Dr Sakina Walker - Endocrinologist, [facility])  [address]:  Completed (Comment: [address])  Other Services:  Completed (Comment: [address] D[facility])  Zone Management Tools: In Process (Comment: DM and COPD zone sheet.)         Goals Addressed             Most Recent     Conditions and Symptoms   On track (11/7/2017)             I will schedule office visits, as directed by my provider. I will keep my appointment or reschedule if I have to cancel. I will notify my provider of any barriers to my plan of care. I will follow my Zone Management tool to seek urgent or emergent care. I will notify my provider of any symptoms that indicate a worsening of my condition. Barriers: lack of education  Plan for overcoming my barriers: will follow DM and COPD zones. Confidence: 7/10  Anticipated Goal Completion Date: 10/1/17              Prior to Admission medications    Medication Sig Start Date End Date Taking? Authorizing Provider   dicyclomine (BENTYL) 10 MG capsule Take by mouth 1/25/17   Historical Provider, MD   insulin aspart (NOVOLOG) 100 UNIT/ML injection vial Inject into the skin 4/5/16   Historical Provider, MD PATO MGTA 200-25 MCG/INH AEPB INL 1 PUFF PO QD 10/7/17   Historical Provider, MD   gentamicin (GARAMYCIN) 0.1 % cream APPLY SUFFICIENT AMOUNT AA QD 8/22/17   Historical Provider, MD   clonazePAM (KLONOPIN) 0.5 MG tablet take 1 tablet by mouth twice a day if needed for anxiety 10/10/17   Frannie Baez MD   NONFORMULARY Patient has insulin pump with Novolog     Historical Provider, MD   DULoxetine (CYMBALTA) 60 MG extended release capsule TAKE 1 CAPSULE BY MOUTH EVERY DAY 9/21/17   Frannie Baez MD   cetirizine (ZYRTEC ALLERGY) 10 MG tablet Take 1 tablet by mouth daily 8/24/17   Frannie Baez MD   amitriptyline (ELAVIL) 150 MG tablet Take 1 tablet by mouth nightly 8/10/17   Frannie Baez MD   pantoprazole (PROTONIX) 40 MG tablet Take 1 tablet by mouth daily 7/13/17   Frannie Baez MD   metoprolol tartrate (LOPRESSOR) 50 MG tablet Take 1 tablet by mouth 2 times daily 7/13/17   MD PATO Mai ELLIPTA 100-25 MCG/INH AEPB inhaler Take 1 puff by mouth daily 6/17/17   Historical Provider, MD   albuterol (PROVENTIL) (2.5 MG/3ML) 0.083% nebulizer solution Take 1 mL by nebulization 3 times daily as needed 6/15/17   Historical Provider, MD   Walking Boot [address] [facility] by Does not apply route Dx: Right foot fracture 7/2/17   Jennifer Jhaveri PA-C   simvastatin (ZOCOR) 20 MG tablet Take 1 tablet by mouth nightly 6/27/17   Frannie Baez MD   tiotropium ([place] ) 18 MCG inhalation capsule Inhale 1 capsule into the lungs Daily 6/19/17   Jovon Sebastian MD   ONE TOUCH ULTRA TEST strip  6/7/17   Historical Provider, MD TEJADA INSULIN SYRINGE 29G X 1/2\" 0.5 ML MISC  5/9/17   Historical Provider, MD   fluticasone (FLONASE) 50 MCG/ACT nasal spray 1 spray by Nasal route 2 times daily 6/1/17 Jonatan Smith MD   albuterol sulfate HFA (PROAIR HFA) 108 (90 BASE) MCG/ACT inhaler Inhale 2 puffs into the lungs every 6 hours as needed for Wheezing 5/26/17   Emily Gómez MD   losartan (COZAAR) 100 MG tablet take 1 tablet by mouth once daily 4/27/17   Emily Gómez MD   gabapentin (NEURONTIN) 800 MG tablet take 1 tablet by mouth twice a day 4/27/17   Emily Gómez MD   rOPINIRole (REQUIP) 2 MG tablet take 1 tablet by mouth every evening 4/27/17   Emily Gómez MD   torsemide (DEMADEX) 20 MG tablet take 1 tablet by mouth once daily 1/5/17   Tamia Guerra MD   levothyroxine (SYNTHROID) 125 MCG tablet take 1 tablet by mouth once daily 11/21/16   Tamia Guerra MD   metoclopramide (REGLAN) 5 MG tablet Take 5 mg by mouth 2 times daily  3/25/16   Historical Provider, MD   vitamin D (CHOLECALCIFEROL) 1000 UNIT TABS tablet Take 4,000 Units by mouth daily Takes 4 tabs daily    Historical Provider, MD   acetaminophen (TYLENOL) 325 MG tablet Take 650 mg by mouth every 6 hours as needed for Pain    Historical Provider, MD   docusate sodium (COLACE) 100 MG capsule Take 1 capsule by mouth 2 times daily Take while on narcotics 7/7/15   Vitor Florian MD   Probiotic Product (PROBIOTIC DAILY PO)   Take 1 tablet by mouth     Historical Provider, MD   aspirin 81 MG tablet Take 81 mg by mouth daily.     Historical Provider, MD       Future Appointments  Date Time Provider Reji Jay   11/10/2017 2:30  [facility] DIGITAL RM [address] [facility] Radiolog   11/30/2017 2:45 PM Tamia Guerra MD [address], RN  Ambulatory Care Coordinator

## 2017-11-14 ENCOUNTER — HOSPITAL ENCOUNTER (OUTPATIENT)
Dept: MRI IMAGING | Age: 57
Discharge: HOME | End: 2017-11-14
Payer: MEDICARE

## 2017-11-14 DIAGNOSIS — D33.3 ACOUSTIC NEUROMA (HCC): ICD-10-CM

## 2017-11-14 LAB
BUN BLDV-MCNC: 16 MG/DL (ref 6–20)
CREAT SERPL-MCNC: 0.94 MG/DL (ref 0.5–0.9)
GFR AFRICAN AMERICAN: >60 ML/MIN
GFR NON-AFRICAN AMERICAN: >60 ML/MIN
GFR SERPL CREATININE-BSD FRML MDRD: ABNORMAL ML/MIN/{1.73_M2}
GFR SERPL CREATININE-BSD FRML MDRD: ABNORMAL ML/MIN/{1.73_M2}

## 2017-11-14 PROCEDURE — 84520 ASSAY OF UREA NITROGEN: CPT

## 2017-11-14 PROCEDURE — 82565 ASSAY OF CREATININE: CPT

## 2017-11-14 PROCEDURE — 36415 COLL VENOUS BLD VENIPUNCTURE: CPT

## 2017-11-14 PROCEDURE — 6360000004 HC RX CONTRAST MEDICATION: Performed by: INTERNAL MEDICINE

## 2017-11-14 PROCEDURE — 70553 MRI BRAIN STEM W/O & W/DYE: CPT

## 2017-11-14 PROCEDURE — A9579 GAD-BASE MR CONTRAST NOS,1ML: HCPCS | Performed by: INTERNAL MEDICINE

## 2017-11-14 PROCEDURE — 2580000003 HC RX 258: Performed by: INTERNAL MEDICINE

## 2017-11-14 RX ORDER — SODIUM CHLORIDE 0.9 % (FLUSH) 0.9 %
20 SYRINGE (ML) INJECTION PRN
Status: DISCONTINUED | OUTPATIENT
Start: 2017-11-14 | End: 2017-11-17 | Stop reason: HOSPADM

## 2017-11-14 RX ADMIN — GADOTERIDOL 10 ML: 279.3 INJECTION, SOLUTION INTRAVENOUS at 16:07

## 2017-11-14 RX ADMIN — Medication 20 ML: at 16:07

## 2017-11-28 DIAGNOSIS — G89.4 CHRONIC PAIN SYNDROME: Chronic | ICD-10-CM

## 2017-11-28 DIAGNOSIS — E10.43 TYPE 1 DIABETES MELLITUS WITH DIABETIC AUTONOMIC NEUROPATHY (HCC): ICD-10-CM

## 2017-11-28 RX ORDER — METOCLOPRAMIDE 5 MG/1
5 TABLET ORAL 2 TIMES DAILY
Qty: 120 TABLET | Refills: 0 | Status: SHIPPED | OUTPATIENT
Start: 2017-11-28 | End: 2018-03-22 | Stop reason: SDUPTHER

## 2017-11-28 RX ORDER — GABAPENTIN 800 MG/1
800 TABLET ORAL 2 TIMES DAILY
Qty: 60 TABLET | Refills: 5 | Status: SHIPPED | OUTPATIENT
Start: 2017-11-28 | End: 2018-06-03 | Stop reason: SDUPTHER

## 2017-11-30 ENCOUNTER — OFFICE VISIT (OUTPATIENT)
Dept: INTERNAL MEDICINE CLINIC | Age: 57
End: 2017-11-30
Payer: MEDICARE

## 2017-11-30 VITALS
HEIGHT: 65 IN | BODY MASS INDEX: 35.16 KG/M2 | WEIGHT: 211 LBS | SYSTOLIC BLOOD PRESSURE: 138 MMHG | DIASTOLIC BLOOD PRESSURE: 84 MMHG

## 2017-11-30 DIAGNOSIS — I73.9 PERIPHERAL VASCULAR DISEASE (HCC): ICD-10-CM

## 2017-11-30 DIAGNOSIS — Z98.890 S/P HERNIA REPAIR: ICD-10-CM

## 2017-11-30 DIAGNOSIS — Z87.19 S/P HERNIA REPAIR: ICD-10-CM

## 2017-11-30 DIAGNOSIS — M19.012 BILATERAL SHOULDER REGION ARTHRITIS: ICD-10-CM

## 2017-11-30 DIAGNOSIS — I10 ESSENTIAL HYPERTENSION: Primary | ICD-10-CM

## 2017-11-30 DIAGNOSIS — K43.2 INCISIONAL HERNIA WITHOUT OBSTRUCTION OR GANGRENE: ICD-10-CM

## 2017-11-30 DIAGNOSIS — M19.011 BILATERAL SHOULDER REGION ARTHRITIS: ICD-10-CM

## 2017-11-30 DIAGNOSIS — E10.43 TYPE 1 DIABETES MELLITUS WITH DIABETIC AUTONOMIC NEUROPATHY, WITH LONG-TERM CURRENT USE OF INSULIN (HCC): ICD-10-CM

## 2017-11-30 DIAGNOSIS — G89.4 CHRONIC PAIN SYNDROME: Chronic | ICD-10-CM

## 2017-11-30 PROCEDURE — 3045F PR MOST RECENT HEMOGLOBIN A1C LEVEL 7.0-9.0%: CPT | Performed by: INTERNAL MEDICINE

## 2017-11-30 PROCEDURE — 3017F COLORECTAL CA SCREEN DOC REV: CPT | Performed by: INTERNAL MEDICINE

## 2017-11-30 PROCEDURE — G8427 DOCREV CUR MEDS BY ELIG CLIN: HCPCS | Performed by: INTERNAL MEDICINE

## 2017-11-30 PROCEDURE — 3014F SCREEN MAMMO DOC REV: CPT | Performed by: INTERNAL MEDICINE

## 2017-11-30 PROCEDURE — 99214 OFFICE O/P EST MOD 30 MIN: CPT | Performed by: INTERNAL MEDICINE

## 2017-11-30 PROCEDURE — 1036F TOBACCO NON-USER: CPT | Performed by: INTERNAL MEDICINE

## 2017-11-30 PROCEDURE — G8417 CALC BMI ABV UP PARAM F/U: HCPCS | Performed by: INTERNAL MEDICINE

## 2017-11-30 PROCEDURE — G8484 FLU IMMUNIZE NO ADMIN: HCPCS | Performed by: INTERNAL MEDICINE

## 2017-11-30 NOTE — PROGRESS NOTES
Visit Information    Have you changed or started any medications since your last visit including any over-the-counter medicines, vitamins, or herbal medicines? no   Are you having any side effects from any of your medications? -  no  Have you stopped taking any of your medications? Is so, why? -  no    Have you seen any other physician or provider since your last visit? Yes - Records Obtained  Have you had any other diagnostic tests since your last visit? Yes - Records Obtained  Have you been seen in the emergency room and/or had an admission to a hospital since we last saw you? No  Have you had your routine dental cleaning in the past 6 months? no    Have you activated your [employer] account? If not, what are your barriers?  No:      Patient Care Team:  Abrahan Ochoa MD as PCP - General  Cristo Feng MD as PCP - Pulmonology (Pulmonology)  Abrahan Ochoa MD as PCP - [facility] Attributed Provider  Raul Kimble MD as Consulting Physician (Urology)  Omero Lloyd RN as Care Coordinator    Medical History Review  Past Medical, Family, and Social History reviewed and does contribute to the patient presenting condition    Health Maintenance   Topic Date Due    Hepatitis C screen  1960    HIV screen  10/05/1977    Diabetic foot exam  08/10/2016    Diabetic retinal exam  03/01/2017    Breast cancer screen  02/02/2018    Cervical cancer screen  04/30/2018    Diabetic microalbuminuria test  05/18/2018    Lipid screen  06/02/2018    Diabetic hemoglobin A1C test  10/17/2018    Colon cancer screen colonoscopy  01/20/2025    DTaP/Tdap/Td vaccine (2 - Td) 04/20/2025    Flu vaccine  Completed    Pneumococcal med risk  Completed     Chief Complaint   Patient presents with    Hoarse     hoarse voice not feeling    Diarrhea     had diarrhea for about 4 days has stopped now Hoarse     hoarse voice not feeling    Diarrhea     had diarrhea for about 4 days has stopped now           Encounter Diagnoses   Name Primary?  Essential hypertension Yes    Peripheral vascular disease ([facility] [address]     Chronic pain syndrome     S/P hernia repair     Type 1 diabetes mellitus with diabetic autonomic neuropathy, with long-term current use of insulin (HCC)     Incisional hernia without obstruction or gangrene     Bilateral shoulder region arthritis           AND --Patient had abdominal ventral hernia repair recently by [facility]  He has been successful and he has been good    He is complaining of some hoarseness of voice and some vague GI symptoms which seem to have been resolving on its own over the last few days--      ROS AS NOTED IN HPI ,    Other  positive -- Review of Systems   HENT: Positive for congestion. ALL OTHER  SYSTEM REVIEW NEGATIVE. Social History   Substance Use Topics    Smoking status: Never Smoker    Smokeless tobacco: Never Used    Alcohol use No      Comment: once a month     Fioricet [butalbital-apap-caffeine]; Erythromycin; Codeine; Droperidol; and Tape [adhesive tape]     FAMILY  And SOCIAL HISTORY:   Reviewed and No change from previous visit        Medication List          Accurate as of 11/30/17  5:30 PM. If you have any questions, ask your nurse or doctor.                CONTINUE taking these medications    acetaminophen 325 MG tablet  Commonly known as:  TYLENOL     * albuterol sulfate  (90 Base) MCG/ACT inhaler  Commonly known as:  PROAIR HFA  Inhale 2 puffs into the lungs every 6 hours as needed for Wheezing     * albuterol (2.5 MG/3ML) 0.083% nebulizer solution  Commonly known as:  PROVENTIL     amitriptyline 150 MG tablet  Commonly known as:  ELAVIL  Take 1 tablet by mouth nightly     aspirin 81 MG tablet     B-D INSULIN SYRINGE 29G X 1/2\" 0.5 ML Misc  Generic drug:  INSULIN SYRINGE .5CC/29G     BREO ELLIPTA 200-25 MCG/INH Aepb  Generic drug:  Fluticasone Furoate-Vilanterol     cetirizine 10 MG tablet  Commonly known as:  ZYRTEC ALLERGY  Take 1 tablet by mouth daily     clonazePAM 0.5 MG tablet  Commonly known as:  KLONOPIN  take 1 tablet by mouth twice a day if needed for anxiety     dicyclomine 10 MG capsule  Commonly known as:  BENTYL     docusate sodium 100 MG capsule  Commonly known as:  COLACE  Take 1 capsule by mouth 2 times daily Take while on narcotics     DULoxetine 60 MG extended release capsule  Commonly known as:  CYMBALTA  TAKE 1 CAPSULE BY MOUTH EVERY DAY     fluticasone 50 MCG/ACT nasal spray  Commonly known as:  FLONASE  1 spray by Nasal route 2 times daily     gabapentin 800 MG tablet  Commonly known as:  NEURONTIN  Take 1 tablet by mouth 2 times daily     gentamicin 0.1 % cream  Commonly known as:  GARAMYCIN     insulin aspart 100 UNIT/ML injection vial  Commonly known as:  NOVOLOG     levothyroxine 125 MCG tablet  Commonly known as:  SYNTHROID  take 1 tablet by mouth once daily     losartan 100 MG tablet  Commonly known as:  COZAAR  take 1 tablet by mouth once daily     metoclopramide 5 MG tablet  Commonly known as:  REGLAN  Take 1 tablet by mouth 2 times daily     metoprolol tartrate 50 MG tablet  Commonly known as:  LOPRESSOR  Take 1 tablet by mouth 2 times daily     NONFORMULARY     ONE TOUCH ULTRA TEST strip  Generic drug:  glucose blood VI test strips     pantoprazole 40 MG tablet  Commonly known as:  PROTONIX  Take 1 tablet by mouth daily     PROBIOTIC DAILY PO     rOPINIRole 2 MG tablet  Commonly known as:  REQUIP  take 1 tablet by mouth every evening     simvastatin 20 MG tablet  Commonly known as:  ZOCOR  Take 1 tablet by mouth nightly     tiotropium 18 MCG inhalation capsule  Commonly known as:  SPIRIVA HANDIHALER  Inhale 1 capsule into the lungs Daily     torsemide 20 MG tablet  Commonly known as:  DEMADEX  take 1 tablet by mouth once daily     vitamin D 1000 UNIT Tabs tablet  Commonly known as: CHOLECALCIFEROL     Walking Boot Misc  by Does not apply route Dx: Right foot fracture        * This list has 2 medication(s) that are the same as other medications prescribed for you. Read the directions carefully, and ask your doctor or other care provider to review them with you. Allergies; medicatons; past medical, surgical, family, and social history; and problem list reviewed by me, as indicated in this encounter  . OBJECTIVE      Physical exam      Vitals:    11/30/17 1503   BP: 138/84   Weight: 211 lb (95.7 kg)   Height: 5' 4.96\" (1.65 m)      Estimated body mass index is 35.15 kg/m² as calculated from the following:    Height as of this encounter: 5' 4.96\" (1.65 m). Weight as of this encounter: 211 lb (95.7 kg). Physical Exam   Constitutional: She is cooperative. Neck: Carotid bruit is not present. No thyroid mass and no thyromegaly present. Cardiovascular: Normal rate, regular rhythm and normal heart sounds. Exam reveals no S3. No murmur heard. Pulmonary/Chest: Effort normal and breath sounds normal.   Neurological: She is alert. She has normal strength. Skin: Skin is warm and dry. No rash noted. Psychiatric: She has a normal mood and affect.         DIAGNOSTICS / INSERTS / IMAGES    [x] Reviewed         Labs           Chemistry        Component Value Date/Time     10/03/2017 1425    K 5.0 10/03/2017 1425    CL 98 10/03/2017 1425    CO2 26 10/03/2017 1425    BUN 16 11/14/2017 1435    CREATININE 0.94 (H) 11/14/2017 1435        Component Value Date/Time    CALCIUM 9.2 10/03/2017 1425    ALKPHOS 115 (H) 10/14/2016 1602    AST 16 10/14/2016 1602    ALT 11 10/14/2016 1602    BILITOT 0.24 (L) 10/14/2016 1602          Lab Results   Component Value Date    WBC 9.5 10/03/2017    HGB 12.3 10/03/2017    HCT 35.9 (L) 10/03/2017    MCV 86.2 10/03/2017     10/03/2017     Lab Results   Component Value Date    LABMICR 110 (H) 05/18/2017     No components found acute abnormality. SINUSES: The visualized paranasal sinuses and mastoid air cells are well aerated. BONES/SOFT TISSUES: The bone marrow signal intensity appears normal. The craniocervical junction is normal in appearance. 1. Unremarkable appearance of the bilateral 7th and 8th nerve complexes. No evidence of abnormal contrast enhancement. 2. Fluid-filled structures of the bilateral inner ears are unremarkable. No restricted diffusion overlying the temporal bones. No abnormal contrast enhancement. 3. A few scattered nonspecific T2 hyperintense white matter lesions, likely representing chronic small vessel ischemic white matter disease. ASSESSMENT / Jessica Baugh was seen today for hoarse and diarrhea. Diagnoses and all orders for this visit:    Essential hypertension    Peripheral vascular disease ([facility] [address]    Chronic pain syndrome    S/P hernia repair  Comments:  abd , dr elias , 10/17    Type 1 diabetes mellitus with diabetic autonomic neuropathy, with long-term current use of insulin ([facility])    Incisional hernia without obstruction or gangrene    Bilateral shoulder region arthritis  -     diclofenac sodium (VOLTAREN) 1 % GEL; Apply 2 g topically 2 times daily         Patient's diabetic control has been excellent since she had an insulin pump   She is complaining of bilateral shoulder pain for which I prescribed diclofenac sodium   Does have some impingement of the shoulders the problems are chronic      SALIENT  ACTIONS TODAYS VISIT     Today's office visit SALIENT ACTIONS   Medications Discontinued During This Encounter   Medication Reason    BREO ELLIPTA 100-25 MCG/INH AEPB inhaler Duplicate Order         No orders of the defined types were placed in this encounter. There are no Patient Instructions on file for this visit.      Current Outpatient Prescriptions   Medication Sig Dispense Refill    gabapentin (NEURONTIN) 800 MG tablet Take 1 tablet by mouth 2 times daily 60 tablet 5  levothyroxine (SYNTHROID) 125 MCG tablet take 1 tablet by mouth once daily 30 tablet 11    vitamin D (CHOLECALCIFEROL) 1000 UNIT TABS tablet Take 4,000 Units by mouth daily Takes 4 tabs daily      acetaminophen (TYLENOL) 325 MG tablet Take 650 mg by mouth every 6 hours as needed for Pain      docusate sodium (COLACE) 100 MG capsule Take 1 capsule by mouth 2 times daily Take while on narcotics 60 capsule 0    Probiotic Product (PROBIOTIC DAILY PO)   Take 1 tablet by mouth       aspirin 81 MG tablet Take 81 mg by mouth daily. No current facility-administered medications for this visit. FOLLOW UP  PLANS     Return in about 4 months (around 3/30/2018). MD JODI Hilliard[address], [address] [place] [address].    Phone [phone]   Fax: [phone]  Answering Service: [phone]

## 2017-12-19 RX ORDER — LEVOTHYROXINE SODIUM 0.12 MG/1
125 TABLET ORAL DAILY
Qty: 90 TABLET | Refills: 3 | Status: SHIPPED | OUTPATIENT
Start: 2017-12-19 | End: 2018-05-03 | Stop reason: SDUPTHER

## 2017-12-19 NOTE — TELEPHONE ENCOUNTER
Medication: synthroid  Last visit: 11/30/17  Next visit: 4/4/2018  Last refill: 11/21/16  Ph[facility]: [facility]

## 2017-12-26 ENCOUNTER — CARE COORDINATION (OUTPATIENT)
Dept: CARE COORDINATION | Age: 57
End: 2017-12-26

## 2017-12-26 NOTE — CARE COORDINATION
MD   gabapentin (NEURONTIN) 800 MG tablet Take 1 tablet by mouth 2 times daily 11/28/17 1/27/18  Whitney Brown MD   metoclopramide (REGLAN) 5 MG tablet Take 1 tablet by mouth 2 times daily 11/28/17   Whitney Brown MD   dicyclomine (BENTYL) 10 MG capsule Take by mouth 1/25/17   Historical Provider, MD   insulin aspart (NOVOLOG) 100 UNIT/ML injection vial Inject into the skin 4/5/16   Historical Provider, MD   BREKELLEY ELLIPTA 200-25 MCG/INH AEPB INL 1 PUFF PO QD 10/7/17   Historical Provider, MD   gentamicin (GARAMYCIN) 0.1 % cream APPLY SUFFICIENT AMOUNT AA QD 8/22/17   Historical Provider, MD   clonazePAM (KLONOPIN) 0.5 MG tablet take 1 tablet by mouth twice a day if needed for anxiety 10/10/17   Whitney Brown MD   NONFORMULARY Patient has insulin pump with Novolog     Historical Provider, MD   DULoxetine (CYMBALTA) 60 MG extended release capsule TAKE 1 CAPSULE BY MOUTH EVERY DAY 9/21/17   Whitney Brown MD   cetirizine (ZYRTEC ALLERGY) 10 MG tablet Take 1 tablet by mouth daily 8/24/17   Whitney Brown MD   amitriptyline (ELAVIL) 150 MG tablet Take 1 tablet by mouth nightly 8/10/17   Whitney Brown MD   pantoprazole (PROTONIX) 40 MG tablet Take 1 tablet by mouth daily 7/13/17   Whitney Brown MD   metoprolol tartrate (LOPRESSOR) 50 MG tablet Take 1 tablet by mouth 2 times daily 7/13/17   Whitney Brown MD   albuterol (PROVENTIL) (2.5 MG/3ML) 0.083% nebulizer solution Take 1 mL by nebulization 3 times daily as needed 6/15/17   Historical Provider, MD   Walking Boot [address] [facility] by Does not apply route Dx: Right foot fracture 7/2/17   Fabiana Jahveri PA-C   simvastatin (ZOCOR) 20 MG tablet Take 1 tablet by mouth nightly 6/27/17   Whitney Brown MD   tiotropium ([facility] Nightingale) 18 MCG inhalation capsule Inhale 1 capsule into the lungs Daily 6/19/17   Елена Morgan MD   ONE TOUCH ULTRA TEST strip  6/7/17   Historical Provider, MD   B-KELVIN INSULIN SYRINGE 29G X 1/2\" 0.5 ML MISC  5/9/17   Historical

## 2018-01-23 ENCOUNTER — CARE COORDINATION (OUTPATIENT)
Dept: CARE COORDINATION | Age: 58
End: 2018-01-23

## 2018-01-30 ENCOUNTER — OFFICE VISIT (OUTPATIENT)
Dept: INTERNAL MEDICINE CLINIC | Age: 58
End: 2018-01-30
Payer: MEDICARE

## 2018-01-30 VITALS
SYSTOLIC BLOOD PRESSURE: 138 MMHG | DIASTOLIC BLOOD PRESSURE: 74 MMHG | BODY MASS INDEX: 34.66 KG/M2 | HEIGHT: 65 IN | WEIGHT: 208 LBS

## 2018-01-30 DIAGNOSIS — Z79.4 TYPE 2 DIABETES MELLITUS WITH CHRONIC KIDNEY DISEASE, WITH LONG-TERM CURRENT USE OF INSULIN, UNSPECIFIED CKD STAGE (HCC): ICD-10-CM

## 2018-01-30 DIAGNOSIS — S90.512A ABRASION OF LEFT ANKLE, INITIAL ENCOUNTER: Primary | ICD-10-CM

## 2018-01-30 DIAGNOSIS — E11.22 TYPE 2 DIABETES MELLITUS WITH CHRONIC KIDNEY DISEASE, WITH LONG-TERM CURRENT USE OF INSULIN, UNSPECIFIED CKD STAGE (HCC): ICD-10-CM

## 2018-01-30 DIAGNOSIS — J42 CHRONIC BRONCHITIS, UNSPECIFIED CHRONIC BRONCHITIS TYPE (HCC): ICD-10-CM

## 2018-01-30 DIAGNOSIS — F33.42 RECURRENT MAJOR DEPRESSIVE EPISODES, IN FULL REMISSION (HCC): ICD-10-CM

## 2018-01-30 DIAGNOSIS — B34.9 ACUTE VIRAL SYNDROME: ICD-10-CM

## 2018-01-30 PROCEDURE — 3023F SPIROM DOC REV: CPT | Performed by: INTERNAL MEDICINE

## 2018-01-30 PROCEDURE — G8484 FLU IMMUNIZE NO ADMIN: HCPCS | Performed by: INTERNAL MEDICINE

## 2018-01-30 PROCEDURE — 3046F HEMOGLOBIN A1C LEVEL >9.0%: CPT | Performed by: INTERNAL MEDICINE

## 2018-01-30 PROCEDURE — 3014F SCREEN MAMMO DOC REV: CPT | Performed by: INTERNAL MEDICINE

## 2018-01-30 PROCEDURE — G8427 DOCREV CUR MEDS BY ELIG CLIN: HCPCS | Performed by: INTERNAL MEDICINE

## 2018-01-30 PROCEDURE — 99213 OFFICE O/P EST LOW 20 MIN: CPT | Performed by: INTERNAL MEDICINE

## 2018-01-30 PROCEDURE — 1036F TOBACCO NON-USER: CPT | Performed by: INTERNAL MEDICINE

## 2018-01-30 PROCEDURE — G8417 CALC BMI ABV UP PARAM F/U: HCPCS | Performed by: INTERNAL MEDICINE

## 2018-01-30 PROCEDURE — 3017F COLORECTAL CA SCREEN DOC REV: CPT | Performed by: INTERNAL MEDICINE

## 2018-01-30 PROCEDURE — G8926 SPIRO NO PERF OR DOC: HCPCS | Performed by: INTERNAL MEDICINE

## 2018-01-30 NOTE — PROGRESS NOTES
injured lt ankle                                                                                                                                                                                                           OFFICE VISIT  PROGRESS NOTE                                                      Date of patient's visit:  2/9/2018  Patient's Name:             Ginny Bowers  YOB: 1960       Patient Care Team:  Fernando Troy MD as PCP - Antionette Grey MD as PCP - Pulmonology (Pulmonology)  Fernando Troy MD as PCP - [facility] Attributed Provider  Bonifacio Morales MD as Consulting Physician (Urology)  Sukhdev Burns RN as Care Coordinator        SUBJECTIVE     HISTORY             Pertinent details  added to ,       Chief Complaint   Patient presents with    Nausea     every other day     Back Pain     twisted in shower and and has pain in back radiating to abdomin     Ankle Injury     slipped on ice today and injured lt ankle           Encounter Diagnosis   Name Primary?  Abrasion of left ankle, initial encounter Yes          AND --  FEELING    [] sick ,                Has   []  fever                 []    chills       [x] malaise                          Complains of                         [x]  Has  Cough,                         [] wheezing,                         [x]  shortness of breath                         [x].sore throat                          [x]  rhinorrhea                         [].nasal congestion   --      ROS AS NOTED IN HPI ,    Other  positive -- ROS    ALL OTHER  SYSTEM REVIEW NEGATIVE. Social History   Substance Use Topics    Smoking status: Never Smoker    Smokeless tobacco: Never Used    Alcohol use No      Comment: once a month     Fioricet [butalbital-apap-caffeine]; Erythromycin;  Codeine; Droperidol; and Tape [adhesive tape]     Allergies; medicatons; past medical, surgical, family, and social history; and problem list reviewed by me, as indicated in this encounter  . OBJECTIVE      Physical exam      Vitals:    01/30/18 1610   BP: 138/74   Weight: 208 lb (94.3 kg)   Height: 5' 4.96\" (1.65 m)      Estimated body mass index is 34.65 kg/m² as calculated from the following:    Height as of this encounter: 5' 4.96\" (1.65 m). Weight as of this encounter: 208 lb (94.3 kg). Physical Exam   Constitutional: She is cooperative. Neck: Carotid bruit is not present. No thyroid mass and no thyromegaly present. Cardiovascular: Normal rate, regular rhythm and normal heart sounds. Exam reveals no S3. No murmur heard. Pulmonary/Chest: Effort normal and breath sounds normal.   Neurological: She is alert. She has normal strength. Skin: Skin is warm and dry. No rash noted. Psychiatric: She has a normal mood and affect. DIAGNOSTICS / INSERTS / IMAGES    [x] Reviewed        Labs           Chemistry        Component Value Date/Time     10/03/2017 1425    K 5.0 10/03/2017 1425    CL 98 10/03/2017 1425    CO2 26 10/03/2017 1425    BUN 16 11/14/2017 1435    CREATININE 0.94 (H) 11/14/2017 1435        Component Value Date/Time    CALCIUM 9.2 10/03/2017 1425    ALKPHOS 115 (H) 10/14/2016 1602    AST 16 10/14/2016 1602    ALT 11 10/14/2016 1602    BILITOT 0.24 (L) 10/14/2016 1602          Lab Results   Component Value Date    WBC 9.5 10/03/2017    HGB 12.3 10/03/2017    HCT 35.9 (L) 10/03/2017    MCV 86.2 10/03/2017     10/03/2017             Results for orders placed or performed during the hospital encounter of 11/14/17   BUN & Creatinine   Result Value Ref Range    BUN 16 6 - 20 mg/dL    CREATININE 0.94 (H) 0.50 - 0.90 mg/dL    GFR Non-African American >60 >60 mL/min    GFR African American >60 >60 mL/min    GFR Comment          GFR Staging NOT REPORTED         Xr Femur Left (min 2 Views)    Result Date: 1/31/2018  EXAMINATION: 4 VIEWS OF THE LEFT FEMUR 1/31/2018 2:49 pm COMPARISON: None.  HISTORY: ORDERING SYSTEM Bones are intact. Joint spaces are maintained and in anatomic alignment. No focal soft tissue swelling. Nondisplaced distal tibial/lateral malleolar fracture with mild overlying soft tissue swelling. Proximal tibia and fibula are intact. No fracture or malalignment involving the foot. Xr Ankle Left (min 3 Views)    Result Date: 1/31/2018  EXAMINATION: 3 VIEWS OF THE LEFT FOOT; 3 VIEWS OF THE LEFT ANKLE; VIEWS OF THE LEFT TIBIA AND FIBULA 1/31/2018 2:49 pm COMPARISON: Left foot and ankle radiographs 02/23/2014 HISTORY: ORDERING SYSTEM PROVIDED HISTORY: fall TECHNOLOGIST PROVIDED HISTORY: Reason for exam:->fall Ordering Physician Provided Reason for Exam: fall yesterday, left leg pain from femur to foot Acuity: Unknown Type of Exam: Unknown FINDINGS: Tibia/fibula:  Subtle irregularity along the distal fibula. Tibia and fibula are otherwise intact without evidence of fracture or malalignment. No focal soft tissue abnormality. Ankle:  Tibial plafond and talar dome are smooth. Symmetric ankle mortise. No joint effusion. Cortical step-off along the lateral malleolus. Lateral ankle soft tissue swelling. Foot:  Bones are intact. Joint spaces are maintained and in anatomic alignment. No focal soft tissue swelling. Nondisplaced distal tibial/lateral malleolar fracture with mild overlying soft tissue swelling. Proximal tibia and fibula are intact. No fracture or malalignment involving the foot.      Xr Foot Left (min 3 Views)    Result Date: 1/31/2018  EXAMINATION: 3 VIEWS OF THE LEFT FOOT; 3 VIEWS OF THE LEFT ANKLE; VIEWS OF THE LEFT TIBIA AND FIBULA 1/31/2018 2:49 pm COMPARISON: Left foot and ankle radiographs 02/23/2014 HISTORY: ORDERING SYSTEM PROVIDED HISTORY: fall TECHNOLOGIST PROVIDED HISTORY: Reason for exam:->fall Ordering Physician Provided Reason for Exam: fall yesterday, left leg pain from femur to foot Acuity: Unknown Type of Exam: Unknown FINDINGS: Tibia/fibula:  Subtle irregularity along vitamin D 1000 UNIT Tabs tablet  Commonly known as:  CHOLECALCIFEROL     Walking Boot Misc  by Does not apply route Dx: Right foot fracture        * This list has 2 medication(s) that are the same as other medications prescribed for you. Read the directions carefully, and ask your doctor or other care provider to review them with you. FOLLOW UP  PLANS     No Follow-up on file. Evalee Bloch, MD [address], [address] [place] [address].    Phone [phone]   Fax: [phone]  Answering Service: [phone]

## 2018-01-31 ENCOUNTER — APPOINTMENT (OUTPATIENT)
Dept: GENERAL RADIOLOGY | Age: 58
End: 2018-01-31
Payer: MEDICARE

## 2018-01-31 ENCOUNTER — HOSPITAL ENCOUNTER (EMERGENCY)
Age: 58
Discharge: HOME | End: 2018-01-31
Attending: EMERGENCY MEDICINE
Payer: MEDICARE

## 2018-01-31 VITALS
DIASTOLIC BLOOD PRESSURE: 59 MMHG | SYSTOLIC BLOOD PRESSURE: 120 MMHG | HEIGHT: 65 IN | HEART RATE: 78 BPM | TEMPERATURE: 99.1 F | OXYGEN SATURATION: 96 % | RESPIRATION RATE: 16 BRPM | WEIGHT: 208 LBS | BODY MASS INDEX: 34.66 KG/M2

## 2018-01-31 DIAGNOSIS — S82.832A CLOSED FRACTURE OF DISTAL END OF LEFT FIBULA, UNSPECIFIED FRACTURE MORPHOLOGY, INITIAL ENCOUNTER: Primary | ICD-10-CM

## 2018-01-31 PROCEDURE — 73590 X-RAY EXAM OF LOWER LEG: CPT

## 2018-01-31 PROCEDURE — 29515 APPLICATION SHORT LEG SPLINT: CPT

## 2018-01-31 PROCEDURE — 73552 X-RAY EXAM OF FEMUR 2/>: CPT

## 2018-01-31 PROCEDURE — 99284 EMERGENCY DEPT VISIT MOD MDM: CPT

## 2018-01-31 PROCEDURE — 73610 X-RAY EXAM OF ANKLE: CPT

## 2018-01-31 PROCEDURE — 73630 X-RAY EXAM OF FOOT: CPT

## 2018-01-31 PROCEDURE — 6370000000 HC RX 637 (ALT 250 FOR IP): Performed by: PHYSICIAN ASSISTANT

## 2018-01-31 PROCEDURE — 73562 X-RAY EXAM OF KNEE 3: CPT

## 2018-01-31 RX ORDER — OXYCODONE HYDROCHLORIDE AND ACETAMINOPHEN 5; 325 MG/1; MG/1
1 TABLET ORAL EVERY 6 HOURS PRN
Qty: 10 TABLET | Refills: 0 | Status: SHIPPED | OUTPATIENT
Start: 2018-01-31 | End: 2018-02-03

## 2018-01-31 RX ORDER — OXYCODONE HYDROCHLORIDE AND ACETAMINOPHEN 5; 325 MG/1; MG/1
1 TABLET ORAL ONCE
Status: COMPLETED | OUTPATIENT
Start: 2018-01-31 | End: 2018-01-31

## 2018-01-31 RX ADMIN — OXYCODONE HYDROCHLORIDE AND ACETAMINOPHEN 1 TABLET: 5; 325 TABLET ORAL at 15:42

## 2018-01-31 ASSESSMENT — PAIN DESCRIPTION - PAIN TYPE
TYPE: ACUTE PAIN
TYPE: ACUTE PAIN

## 2018-01-31 ASSESSMENT — PAIN SCALES - GENERAL
PAINLEVEL_OUTOF10: 6
PAINLEVEL_OUTOF10: 8
PAINLEVEL_OUTOF10: 10

## 2018-01-31 ASSESSMENT — PAIN DESCRIPTION - LOCATION: LOCATION: ANKLE;LEG

## 2018-01-31 ASSESSMENT — PAIN DESCRIPTION - ORIENTATION: ORIENTATION: LEFT

## 2018-01-31 NOTE — ED PROVIDER NOTES
effusion identified. Joint spaces appear maintained. Bone mineralization appears within normal limits. Soft tissues have a normal radiographic appearance. No radiographic evidence for acute osseous abnormality. Xr Knee Left (3 Views)    Result Date: 1/31/2018  EXAMINATION: 3 VIEWS OF THE LEFT KNEE 1/31/2018 2:49 pm COMPARISON: April 5, 2017 HISTORY: ORDERING SYSTEM PROVIDED HISTORY: fall TECHNOLOGIST PROVIDED HISTORY: Reason for exam:->fall Ordering Physician Provided Reason for Exam: fall yesterday, left leg pain from femur to foot Acuity: Unknown Type of Exam: Unknown FINDINGS: Osteopenia is noted. No evidence of acute fracture or dislocation. No focal osseous lesion. No evidence of joint effusion. No focal soft tissue abnormality. No acute abnormality of the knee. Xr Tibia Fibula Left (2 Views)    Result Date: 1/31/2018  EXAMINATION: 3 VIEWS OF THE LEFT FOOT; 3 VIEWS OF THE LEFT ANKLE; VIEWS OF THE LEFT TIBIA AND FIBULA 1/31/2018 2:49 pm COMPARISON: Left foot and ankle radiographs 02/23/2014 HISTORY: ORDERING SYSTEM PROVIDED HISTORY: fall TECHNOLOGIST PROVIDED HISTORY: Reason for exam:->fall Ordering Physician Provided Reason for Exam: fall yesterday, left leg pain from femur to foot Acuity: Unknown Type of Exam: Unknown FINDINGS: Tibia/fibula:  Subtle irregularity along the distal fibula. Tibia and fibula are otherwise intact without evidence of fracture or malalignment. No focal soft tissue abnormality. Ankle:  Tibial plafond and talar dome are smooth. Symmetric ankle mortise. No joint effusion. Cortical step-off along the lateral malleolus. Lateral ankle soft tissue swelling. Foot:  Bones are intact. Joint spaces are maintained and in anatomic alignment. No focal soft tissue swelling. Nondisplaced distal tibial/lateral malleolar fracture with mild overlying soft tissue swelling. Proximal tibia and fibula are intact. No fracture or malalignment involving the foot.      Xr Ankle Left (min 3 Views)    Result Date: 1/31/2018  EXAMINATION: 3 VIEWS OF THE LEFT FOOT; 3 VIEWS OF THE LEFT ANKLE; VIEWS OF THE LEFT TIBIA AND FIBULA 1/31/2018 2:49 pm COMPARISON: Left foot and ankle radiographs 02/23/2014 HISTORY: ORDERING SYSTEM PROVIDED HISTORY: fall TECHNOLOGIST PROVIDED HISTORY: Reason for exam:->fall Ordering Physician Provided Reason for Exam: fall yesterday, left leg pain from femur to foot Acuity: Unknown Type of Exam: Unknown FINDINGS: Tibia/fibula:  Subtle irregularity along the distal fibula. Tibia and fibula are otherwise intact without evidence of fracture or malalignment. No focal soft tissue abnormality. Ankle:  Tibial plafond and talar dome are smooth. Symmetric ankle mortise. No joint effusion. Cortical step-off along the lateral malleolus. Lateral ankle soft tissue swelling. Foot:  Bones are intact. Joint spaces are maintained and in anatomic alignment. No focal soft tissue swelling. Nondisplaced distal tibial/lateral malleolar fracture with mild overlying soft tissue swelling. Proximal tibia and fibula are intact. No fracture or malalignment involving the foot. Xr Foot Left (min 3 Views)    Result Date: 1/31/2018  EXAMINATION: 3 VIEWS OF THE LEFT FOOT; 3 VIEWS OF THE LEFT ANKLE; VIEWS OF THE LEFT TIBIA AND FIBULA 1/31/2018 2:49 pm COMPARISON: Left foot and ankle radiographs 02/23/2014 HISTORY: ORDERING SYSTEM PROVIDED HISTORY: fall TECHNOLOGIST PROVIDED HISTORY: Reason for exam:->fall Ordering Physician Provided Reason for Exam: fall yesterday, left leg pain from femur to foot Acuity: Unknown Type of Exam: Unknown FINDINGS: Tibia/fibula:  Subtle irregularity along the distal fibula. Tibia and fibula are otherwise intact without evidence of fracture or malalignment. No focal soft tissue abnormality. Ankle:  Tibial plafond and talar dome are smooth. Symmetric ankle mortise. No joint effusion. Cortical step-off along the lateral malleolus.   Lateral ankle soft tissue swelling. Foot:  Bones are intact. Joint spaces are maintained and in anatomic alignment. No focal soft tissue swelling. Nondisplaced distal tibial/lateral malleolar fracture with mild overlying soft tissue swelling. Proximal tibia and fibula are intact. No fracture or malalignment involving the foot. LABS:  Labs Reviewed - No data to display    All other labs were within normal range or not returned as of this dictation. EMERGENCY DEPARTMENT COURSE and DIFFERENTIAL DIAGNOSIS/MDM:   Vitals:    Vitals:    01/31/18 1416   BP: (!) 120/59   Pulse: 78   Resp: 16   Temp: 99.1 °F (37.3 °C)   TempSrc: Oral   SpO2: 96%   Weight: 208 lb (94.3 kg)   Height: 5' 5\" (1.651 m)       After application of posterior splint to left leg by RN, Post application exam shows:  cap refill less than 2 seconds  there is no swelling or discoloration  Sensation intact and able to move distally  Splint is appropriate        Patient instructed to return to the emergency room if symptoms worsen, return, or any other concern right away which is agreed by the patient    ED MEDS:  Orders Placed This Encounter   Medications    oxyCODONE-acetaminophen (PERCOCET) 5-325 MG per tablet 1 tablet    oxyCODONE-acetaminophen (PERCOCET) 5-325 MG per tablet     Sig: Take 1 tablet by mouth every 6 hours as needed for Pain for up to 3 days . Take lowest dose possible to manage pain. Dispense:  10 tablet     Refill:  0         CONSULTS:  None    PROCEDURES:  None      FINAL IMPRESSION      1.  Closed fracture of distal end of left fibula, unspecified fracture morphology, initial encounter          DISPOSITION/PLAN   DISPOSITION Decision To Discharge    PATIENT REFERRED TO:  Viridiana Emmanuel, [address] [facility] [id number] [phone]    In 1 day      [facility] ED  [address]  [address] [facility]  [phone]    If symptoms worsen    Peterson Roberts MD  [address] [facility] [place].  [address] [place] [address]

## 2018-02-01 ENCOUNTER — CARE COORDINATION (OUTPATIENT)
Dept: CARE COORDINATION | Age: 58
End: 2018-02-01

## 2018-02-06 ENCOUNTER — CARE COORDINATION (OUTPATIENT)
Dept: CARE COORDINATION | Age: 58
End: 2018-02-06

## 2018-02-06 DIAGNOSIS — G25.81 RLS (RESTLESS LEGS SYNDROME): ICD-10-CM

## 2018-02-06 RX ORDER — TORSEMIDE 20 MG/1
TABLET ORAL
Qty: 30 TABLET | Refills: 5 | Status: SHIPPED | OUTPATIENT
Start: 2018-02-06 | End: 2018-11-03 | Stop reason: SDUPTHER

## 2018-02-06 NOTE — CARE COORDINATION
understand envrionmental exposure?:  Yes  Is the patient able to verbalize Rescue vs. Long Acting medications?:  Yes  Does the patient have a nebulizer?:  Yes            Symptoms:         ,   Diabetes Assessment    Meal Planning:  Avoidance of concentrated sweets   How often do you test your blood sugar?:  Meals, Bedtime   Do you have barriers with adherence to non-pharmacologic self-management interventions?  (Nutrition/Exercise/Self-Monitoring):  No   Have you ever had to go to the ED for symptoms of low blood sugar?:  No       No patient-reported symptoms      ,

## 2018-02-14 ENCOUNTER — CARE COORDINATION (OUTPATIENT)
Dept: CARE COORDINATION | Age: 58
End: 2018-02-14

## 2018-02-14 NOTE — CARE COORDINATION
RNCC reviewed chart and previous encounters, placed call to [facility] no answer and LVM to return call to [phone] for care coordination updates, questions or concerns.

## 2018-02-21 ENCOUNTER — CARE COORDINATION (OUTPATIENT)
Dept: CARE COORDINATION | Age: 58
End: 2018-02-21

## 2018-02-21 NOTE — CARE COORDINATION
RNCC reviewed chart and previous encounters, placed call to [facility] for CC needs, no answer and LVM to return call to [phone] for updates, questions or concerns.

## 2018-03-07 ENCOUNTER — CARE COORDINATION (OUTPATIENT)
Dept: CARE COORDINATION | Age: 58
End: 2018-03-07

## 2018-03-07 NOTE — CARE COORDINATION
Ambulatory Care Coordination Note  3/7/2018  CM Risk Score: 11  Jackie Mortality Risk Score:      ACC: Cullen Duke, RN    Summary Note: RNCC reviewed chart and previous encounters, spoke with Jonaschasity Godwin for CC needs, and informs [address] that \" I am on my fifth cast, due to damage of cast, but hopefully this is the last one. I am also having issues with both my shoulders, probably because of my broken fibula and always using my arms to help me get up and around. \" [address] inquired of the need for an earlier appointment than 4/4/18 and Jonaschasity Godwin declined. Informed RNCC that she was recently seen at ENT and diagnosed with LPR laryngopharyngeal Reflux and started on Ratidine. No other questions or concerns at this time, encouraged to call office if symptoms worsen or need to be seen by PCP sooner. Informed Jonas Godwin of new RNCC with practice Darek Jung and future calls for outreach. Care Coordination Interventions    Program Enrollment:  Complex Care  Referral from Primary Care Provider:  Yes  Suggested Interventions and Community Resources  Diabetes Education:  Completed  Disease Association:  Completed (Comment: Dr Cynthia Hernandez - Endocrinologist, [facility])  [address]:  Completed (Comment: [address])  Physical Therapy:  Not Started  Other Services:  Completed (Comment: [address])  Zone Management Tools: In Process (Comment: DM and COPD zone sheet.)         Goals Addressed             Most Recent     Conditions and Symptoms   No change (3/7/2018)             I will schedule office visits, as directed by my provider. I will keep my appointment or reschedule if I have to cancel. I will notify my provider of any barriers to my plan of care. I will follow my Zone Management tool to seek urgent or emergent care. I will notify my provider of any symptoms that indicate a worsening of my condition.     Barriers: lack of education  Plan for overcoming my barriers: will follow DM and COPD Historical Provider, MD   Walking Boot [address] [facility] by Does not apply route Dx: Right foot fracture 7/2/17   Amilcar Jhaveri PA-C   simvastatin (ZOCOR) 20 MG tablet Take 1 tablet by mouth nightly 6/27/17   Ryan Shirley MD   tiotropium (Lisa Ivans) 18 MCG inhalation capsule Inhale 1 capsule into the lungs Daily 6/19/17   Nadira Floyd MD   ONE TOUCH ULTRA TEST strip  6/7/17   Historical Provider, MD TEJADA INSULIN SYRINGE 29G X 1/2\" 0.5 ML MISC  5/9/17   Historical Provider, MD   fluticasone (FLONASE) 50 MCG/ACT nasal spray 1 spray by Nasal route 2 times daily 6/1/17   Trip Briggs MD   albuterol sulfate HFA (PROAIR HFA) 108 (90 BASE) MCG/ACT inhaler Inhale 2 puffs into the lungs every 6 hours as needed for Wheezing 5/26/17   Ko Giron MD   losartan (COZAAR) 100 MG tablet take 1 tablet by mouth once daily 4/27/17   Ko Giron MD   rOPINIRole (REQUIP) 2 MG tablet take 1 tablet by mouth every evening 4/27/17   Ko Giron MD   vitamin D (CHOLECALCIFEROL) 1000 UNIT TABS tablet Take 4,000 Units by mouth daily Takes 4 tabs daily    Historical Provider, MD   acetaminophen (TYLENOL) 325 MG tablet Take 650 mg by mouth every 6 hours as needed for Pain    Historical Provider, MD   docusate sodium (COLACE) 100 MG capsule Take 1 capsule by mouth 2 times daily Take while on narcotics 7/7/15   Carlos Obando MD   Probiotic Product (PROBIOTIC DAILY PO)   Take 1 tablet by mouth     Historical Provider, MD   aspirin 81 MG tablet Take 81 mg by mouth daily. Historical Provider, MD       Future Appointments  Date Time Provider Reji Jay   4/4/2018 1:15 PM Ryan Shirley MD [address]     ,   Diabetes Assessment    Meal Planning:  Avoidance of concentrated sweets   How often do you test your blood sugar?:  Meals, Bedtime   Do you have barriers with adherence to non-pharmacologic self-management interventions?  (Nutrition/Exercise/Self-Monitoring):  No   Have you ever had to go to the ED for

## 2018-03-19 RX ORDER — SIMVASTATIN 20 MG
20 TABLET ORAL NIGHTLY
Qty: 90 TABLET | Refills: 3 | Status: SHIPPED | OUTPATIENT
Start: 2018-03-19 | End: 2019-03-10 | Stop reason: SDUPTHER

## 2018-03-19 RX ORDER — LOSARTAN POTASSIUM 100 MG/1
100 TABLET ORAL DAILY
Qty: 90 TABLET | Refills: 3 | Status: SHIPPED | OUTPATIENT
Start: 2018-03-19 | End: 2019-04-16 | Stop reason: SDUPTHER

## 2018-03-22 DIAGNOSIS — E10.43 TYPE 1 DIABETES MELLITUS WITH DIABETIC AUTONOMIC NEUROPATHY (HCC): ICD-10-CM

## 2018-03-23 RX ORDER — METOCLOPRAMIDE 5 MG/1
TABLET ORAL
Qty: 120 TABLET | Refills: 0 | Status: SHIPPED | OUTPATIENT
Start: 2018-03-23 | End: 2018-06-03 | Stop reason: SDUPTHER

## 2018-03-29 RX ORDER — DULOXETIN HYDROCHLORIDE 30 MG/1
CAPSULE, DELAYED RELEASE ORAL
Qty: 90 CAPSULE | Refills: 2 | Status: SHIPPED | OUTPATIENT
Start: 2018-03-29 | End: 2018-06-04

## 2018-03-31 ENCOUNTER — HOSPITAL ENCOUNTER (EMERGENCY)
Age: 58
Discharge: HOME | End: 2018-03-31
Attending: EMERGENCY MEDICINE
Payer: MEDICARE

## 2018-03-31 ENCOUNTER — APPOINTMENT (OUTPATIENT)
Dept: GENERAL RADIOLOGY | Age: 58
End: 2018-03-31
Payer: MEDICARE

## 2018-03-31 VITALS
OXYGEN SATURATION: 96 % | SYSTOLIC BLOOD PRESSURE: 163 MMHG | BODY MASS INDEX: 35.17 KG/M2 | HEART RATE: 64 BPM | DIASTOLIC BLOOD PRESSURE: 64 MMHG | RESPIRATION RATE: 16 BRPM | TEMPERATURE: 97.6 F | WEIGHT: 206 LBS | HEIGHT: 64 IN

## 2018-03-31 DIAGNOSIS — R07.81 RIB PAIN ON LEFT SIDE: Primary | ICD-10-CM

## 2018-03-31 LAB
ABSOLUTE EOS #: 0.2 K/UL (ref 0–0.4)
ABSOLUTE IMMATURE GRANULOCYTE: ABNORMAL K/UL (ref 0–0.3)
ABSOLUTE LYMPH #: 3.1 K/UL (ref 1–4.8)
ABSOLUTE MONO #: 0.7 K/UL (ref 0.1–1.3)
ANION GAP SERPL CALCULATED.3IONS-SCNC: 12 MMOL/L (ref 9–17)
BASOPHILS # BLD: 1 % (ref 0–2)
BASOPHILS ABSOLUTE: 0.1 K/UL (ref 0–0.2)
BUN BLDV-MCNC: 20 MG/DL (ref 6–20)
BUN/CREAT BLD: ABNORMAL (ref 9–20)
CALCIUM SERPL-MCNC: 9.2 MG/DL (ref 8.6–10.4)
CHLORIDE BLD-SCNC: 101 MMOL/L (ref 98–107)
CO2: 29 MMOL/L (ref 20–31)
CREAT SERPL-MCNC: 0.87 MG/DL (ref 0.5–0.9)
DIFFERENTIAL TYPE: ABNORMAL
EKG ATRIAL RATE: 68 BPM
EKG P AXIS: 45 DEGREES
EKG P-R INTERVAL: 164 MS
EKG Q-T INTERVAL: 396 MS
EKG QRS DURATION: 82 MS
EKG QTC CALCULATION (BAZETT): 421 MS
EKG R AXIS: 42 DEGREES
EKG T AXIS: 54 DEGREES
EKG VENTRICULAR RATE: 68 BPM
EOSINOPHILS RELATIVE PERCENT: 2 % (ref 0–4)
GFR AFRICAN AMERICAN: >60 ML/MIN
GFR NON-AFRICAN AMERICAN: >60 ML/MIN
GFR SERPL CREATININE-BSD FRML MDRD: ABNORMAL ML/MIN/{1.73_M2}
GFR SERPL CREATININE-BSD FRML MDRD: ABNORMAL ML/MIN/{1.73_M2}
GLUCOSE BLD-MCNC: 87 MG/DL (ref 70–99)
HCT VFR BLD CALC: 42.3 % (ref 36–46)
HEMOGLOBIN: 13.9 G/DL (ref 12–16)
IMMATURE GRANULOCYTES: ABNORMAL %
LYMPHOCYTES # BLD: 27 % (ref 24–44)
MCH RBC QN AUTO: 29.2 PG (ref 26–34)
MCHC RBC AUTO-ENTMCNC: 32.8 G/DL (ref 31–37)
MCV RBC AUTO: 89.2 FL (ref 80–100)
MONOCYTES # BLD: 6 % (ref 1–7)
NRBC AUTOMATED: ABNORMAL PER 100 WBC
PDW BLD-RTO: 14.8 % (ref 11.5–14.9)
PLATELET # BLD: 276 K/UL (ref 150–450)
PLATELET ESTIMATE: ABNORMAL
PMV BLD AUTO: 7.4 FL (ref 6–12)
POTASSIUM SERPL-SCNC: 3.5 MMOL/L (ref 3.7–5.3)
RBC # BLD: 4.74 M/UL (ref 4–5.2)
RBC # BLD: ABNORMAL 10*6/UL
SEG NEUTROPHILS: 64 % (ref 36–66)
SEGMENTED NEUTROPHILS ABSOLUTE COUNT: 7.5 K/UL (ref 1.3–9.1)
SODIUM BLD-SCNC: 142 MMOL/L (ref 135–144)
TROPONIN INTERP: NORMAL
TROPONIN T: <0.03 NG/ML
WBC # BLD: 11.6 K/UL (ref 3.5–11)
WBC # BLD: ABNORMAL 10*3/UL

## 2018-03-31 PROCEDURE — 80048 BASIC METABOLIC PNL TOTAL CA: CPT

## 2018-03-31 PROCEDURE — 71046 X-RAY EXAM CHEST 2 VIEWS: CPT

## 2018-03-31 PROCEDURE — 93005 ELECTROCARDIOGRAM TRACING: CPT

## 2018-03-31 PROCEDURE — 85025 COMPLETE CBC W/AUTO DIFF WBC: CPT

## 2018-03-31 PROCEDURE — 84484 ASSAY OF TROPONIN QUANT: CPT

## 2018-03-31 PROCEDURE — 6370000000 HC RX 637 (ALT 250 FOR IP): Performed by: EMERGENCY MEDICINE

## 2018-03-31 PROCEDURE — 36415 COLL VENOUS BLD VENIPUNCTURE: CPT

## 2018-03-31 PROCEDURE — 99284 EMERGENCY DEPT VISIT MOD MDM: CPT

## 2018-03-31 RX ORDER — HYDROCODONE BITARTRATE AND ACETAMINOPHEN 5; 325 MG/1; MG/1
2 TABLET ORAL ONCE
Status: COMPLETED | OUTPATIENT
Start: 2018-03-31 | End: 2018-03-31

## 2018-03-31 RX ORDER — TRAMADOL HYDROCHLORIDE 50 MG/1
50 TABLET ORAL EVERY 6 HOURS PRN
Qty: 12 TABLET | Refills: 0 | Status: SHIPPED | OUTPATIENT
Start: 2018-03-31 | End: 2018-04-03

## 2018-03-31 RX ADMIN — HYDROCODONE BITARTRATE AND ACETAMINOPHEN 2 TABLET: 5; 325 TABLET ORAL at 12:33

## 2018-03-31 ASSESSMENT — ENCOUNTER SYMPTOMS
COUGH: 0
NAUSEA: 0
SORE THROAT: 0
WHEEZING: 0
EYE DISCHARGE: 0
CONSTIPATION: 0
ABDOMINAL PAIN: 0
VOMITING: 0
SHORTNESS OF BREATH: 0
DIARRHEA: 0
BLURRED VISION: 0

## 2018-03-31 ASSESSMENT — PAIN DESCRIPTION - FREQUENCY: FREQUENCY: CONTINUOUS

## 2018-03-31 ASSESSMENT — PAIN DESCRIPTION - DESCRIPTORS: DESCRIPTORS: ACHING

## 2018-03-31 ASSESSMENT — PAIN SCALES - GENERAL
PAINLEVEL_OUTOF10: 8
PAINLEVEL_OUTOF10: 2

## 2018-04-02 ENCOUNTER — CARE COORDINATION (OUTPATIENT)
Dept: CARE COORDINATION | Age: 58
End: 2018-04-02

## 2018-04-03 ENCOUNTER — OFFICE VISIT (OUTPATIENT)
Dept: INTERNAL MEDICINE CLINIC | Age: 58
End: 2018-04-03
Payer: MEDICARE

## 2018-04-03 ENCOUNTER — CARE COORDINATION (OUTPATIENT)
Dept: CARE COORDINATION | Age: 58
End: 2018-04-03

## 2018-04-03 VITALS
DIASTOLIC BLOOD PRESSURE: 87 MMHG | HEIGHT: 64 IN | HEART RATE: 69 BPM | SYSTOLIC BLOOD PRESSURE: 163 MMHG | WEIGHT: 212.2 LBS | BODY MASS INDEX: 36.23 KG/M2

## 2018-04-03 DIAGNOSIS — E10.43 TYPE 1 DIABETES MELLITUS WITH DIABETIC AUTONOMIC NEUROPATHY, WITH LONG-TERM CURRENT USE OF INSULIN (HCC): ICD-10-CM

## 2018-04-03 DIAGNOSIS — S20.212S CONTUSION OF LEFT CHEST WALL, SEQUELA: ICD-10-CM

## 2018-04-03 DIAGNOSIS — I73.9 PAD (PERIPHERAL ARTERY DISEASE) (HCC): ICD-10-CM

## 2018-04-03 DIAGNOSIS — E03.4 HYPOTHYROIDISM DUE TO ACQUIRED ATROPHY OF THYROID: ICD-10-CM

## 2018-04-03 DIAGNOSIS — I10 ESSENTIAL HYPERTENSION: ICD-10-CM
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
## 2018-04-03 PROCEDURE — 3014F SCREEN MAMMO DOC REV: CPT | Performed by: INTERNAL MEDICINE

## 2018-04-03 PROCEDURE — 1036F TOBACCO NON-USER: CPT | Performed by: INTERNAL MEDICINE

## 2018-04-03 PROCEDURE — G8427 DOCREV CUR MEDS BY ELIG CLIN: HCPCS | Performed by: INTERNAL MEDICINE

## 2018-04-03 PROCEDURE — 99214 OFFICE O/P EST MOD 30 MIN: CPT | Performed by: INTERNAL MEDICINE

## 2018-04-03 PROCEDURE — 3046F HEMOGLOBIN A1C LEVEL >9.0%: CPT | Performed by: INTERNAL MEDICINE

## 2018-04-03 PROCEDURE — G8417 CALC BMI ABV UP PARAM F/U: HCPCS | Performed by: INTERNAL MEDICINE

## 2018-04-03 PROCEDURE — 3017F COLORECTAL CA SCREEN DOC REV: CPT | Performed by: INTERNAL MEDICINE

## 2018-04-03 RX ORDER — HYDROCODONE BITARTRATE AND ACETAMINOPHEN 5; 325 MG/1; MG/1
2 TABLET ORAL EVERY 8 HOURS PRN
Qty: 42 TABLET | Refills: 0 | Status: ON HOLD | OUTPATIENT
Start: 2018-04-03 | End: 2018-04-11 | Stop reason: HOSPADM

## 2018-04-03 ASSESSMENT — ENCOUNTER SYMPTOMS: SHORTNESS OF BREATH: 1

## 2018-04-08 ENCOUNTER — HOSPITAL ENCOUNTER (INPATIENT)
Age: 58
LOS: 3 days | Discharge: HOME | DRG: 191 | End: 2018-04-11
Attending: EMERGENCY MEDICINE | Admitting: INTERNAL MEDICINE
Payer: MEDICARE

## 2018-04-08 ENCOUNTER — APPOINTMENT (OUTPATIENT)
Dept: GENERAL RADIOLOGY | Age: 58
DRG: 191 | End: 2018-04-08
Payer: MEDICARE

## 2018-04-08 DIAGNOSIS — J18.9 PNEUMONIA DUE TO ORGANISM: Primary | ICD-10-CM

## 2018-04-08 PROBLEM — R33.9 CHRONIC RETENTION OF URINE: Status: ACTIVE | Noted: 2018-04-08

## 2018-04-08 PROBLEM — S20.212A CONTUSION OF LEFT CHEST WALL: Status: ACTIVE | Noted: 2018-04-08

## 2018-04-08 PROBLEM — J44.1 COPD EXACERBATION (HCC): Status: ACTIVE | Noted: 2018-04-08

## 2018-04-08 LAB
-: ABNORMAL
ABSOLUTE EOS #: 0.1 K/UL (ref 0–0.4)
ABSOLUTE IMMATURE GRANULOCYTE: ABNORMAL K/UL (ref 0–0.3)
ABSOLUTE LYMPH #: 1 K/UL (ref 1–4.8)
ABSOLUTE MONO #: 0.6 K/UL (ref 0.2–0.8)
AMORPHOUS: ABNORMAL
ANION GAP SERPL CALCULATED.3IONS-SCNC: 16 MMOL/L (ref 9–17)
BACTERIA: ABNORMAL
BASOPHILS # BLD: 1 % (ref 0–2)
BASOPHILS ABSOLUTE: 0 K/UL (ref 0–0.2)
BILIRUBIN URINE: NEGATIVE
BUN BLDV-MCNC: 18 MG/DL (ref 6–20)
BUN/CREAT BLD: 16 (ref 9–20)
CALCIUM SERPL-MCNC: 8.5 MG/DL (ref 8.6–10.4)
CASTS UA: ABNORMAL /LPF
CHLORIDE BLD-SCNC: 93 MMOL/L (ref 98–107)
CO2: 24 MMOL/L (ref 20–31)
COLOR: YELLOW
COMMENT UA: ABNORMAL
CREAT SERPL-MCNC: 1.1 MG/DL (ref 0.5–0.9)
CRYSTALS, UA: ABNORMAL /HPF
D-DIMER QUANTITATIVE: 0.44 MG/L FEU
DIFFERENTIAL TYPE: ABNORMAL
EOSINOPHILS RELATIVE PERCENT: 2 % (ref 1–4)
EPITHELIAL CELLS UA: ABNORMAL /HPF (ref 0–5)
GFR AFRICAN AMERICAN: >60 ML/MIN
GFR NON-AFRICAN AMERICAN: 52 ML/MIN
GFR SERPL CREATININE-BSD FRML MDRD: ABNORMAL ML/MIN/{1.73_M2}
GFR SERPL CREATININE-BSD FRML MDRD: ABNORMAL ML/MIN/{1.73_M2}
GLUCOSE BLD-MCNC: 159 MG/DL (ref 65–105)
GLUCOSE BLD-MCNC: 191 MG/DL (ref 65–105)
GLUCOSE BLD-MCNC: 232 MG/DL (ref 70–99)
GLUCOSE URINE: ABNORMAL
HCT VFR BLD CALC: 39.3 % (ref 36–46)
HEMOGLOBIN: 13.2 G/DL (ref 12–16)
IMMATURE GRANULOCYTES: ABNORMAL %
KETONES, URINE: NEGATIVE
LEUKOCYTE ESTERASE, URINE: NEGATIVE
LYMPHOCYTES # BLD: 15 % (ref 24–44)
MCH RBC QN AUTO: 30 PG (ref 26–34)
MCHC RBC AUTO-ENTMCNC: 33.5 G/DL (ref 31–37)
MCV RBC AUTO: 89.5 FL (ref 80–100)
MONOCYTES # BLD: 9 % (ref 1–7)
MUCUS: ABNORMAL
NITRITE, URINE: NEGATIVE
NRBC AUTOMATED: ABNORMAL PER 100 WBC
OTHER OBSERVATIONS UA: ABNORMAL
PDW BLD-RTO: 15.2 % (ref 11.5–14.5)
PH UA: 5.5 (ref 5–8)
PLATELET # BLD: 195 K/UL (ref 130–400)
PLATELET ESTIMATE: ABNORMAL
PMV BLD AUTO: 7.4 FL (ref 6–12)
POTASSIUM SERPL-SCNC: 3.7 MMOL/L (ref 3.7–5.3)
PROTEIN UA: ABNORMAL
RBC # BLD: 4.39 M/UL (ref 4–5.2)
RBC # BLD: ABNORMAL 10*6/UL
RBC UA: ABNORMAL /HPF (ref 0–2)
RENAL EPITHELIAL, UA: ABNORMAL /HPF
SEG NEUTROPHILS: 73 % (ref 36–66)
SEGMENTED NEUTROPHILS ABSOLUTE COUNT: 4.9 K/UL (ref 1.8–7.7)
SODIUM BLD-SCNC: 133 MMOL/L (ref 135–144)
SPECIFIC GRAVITY UA: 1.02 (ref 1–1.03)
TRICHOMONAS: ABNORMAL
TROPONIN INTERP: NORMAL
TROPONIN T: <0.03 NG/ML
TURBIDITY: CLEAR
URINE HGB: NEGATIVE
UROBILINOGEN, URINE: NORMAL
WBC # BLD: 6.6 K/UL (ref 3.5–11)
WBC # BLD: ABNORMAL 10*3/UL
WBC UA: ABNORMAL /HPF (ref 0–5)
YEAST: ABNORMAL

## 2018-04-08 PROCEDURE — 99285 EMERGENCY DEPT VISIT HI MDM: CPT

## 2018-04-08 PROCEDURE — 6360000002 HC RX W HCPCS: Performed by: EMERGENCY MEDICINE

## 2018-04-08 PROCEDURE — 96365 THER/PROPH/DIAG IV INF INIT: CPT

## 2018-04-08 PROCEDURE — 85379 FIBRIN DEGRADATION QUANT: CPT

## 2018-04-08 PROCEDURE — 6360000002 HC RX W HCPCS: Performed by: INTERNAL MEDICINE

## 2018-04-08 PROCEDURE — 36415 COLL VENOUS BLD VENIPUNCTURE: CPT

## 2018-04-08 PROCEDURE — 84484 ASSAY OF TROPONIN QUANT: CPT

## 2018-04-08 PROCEDURE — 94664 DEMO&/EVAL PT USE INHALER: CPT

## 2018-04-08 PROCEDURE — 1200000000 HC SEMI PRIVATE

## 2018-04-08 PROCEDURE — 94640 AIRWAY INHALATION TREATMENT: CPT

## 2018-04-08 PROCEDURE — 87040 BLOOD CULTURE FOR BACTERIA: CPT

## 2018-04-08 PROCEDURE — 99223 1ST HOSP IP/OBS HIGH 75: CPT | Performed by: INTERNAL MEDICINE

## 2018-04-08 PROCEDURE — 85025 COMPLETE CBC W/AUTO DIFF WBC: CPT

## 2018-04-08 PROCEDURE — 2580000003 HC RX 258: Performed by: INTERNAL MEDICINE

## 2018-04-08 PROCEDURE — 71045 X-RAY EXAM CHEST 1 VIEW: CPT

## 2018-04-08 PROCEDURE — 82947 ASSAY GLUCOSE BLOOD QUANT: CPT

## 2018-04-08 PROCEDURE — 2580000003 HC RX 258: Performed by: EMERGENCY MEDICINE

## 2018-04-08 PROCEDURE — 6370000000 HC RX 637 (ALT 250 FOR IP): Performed by: INTERNAL MEDICINE

## 2018-04-08 PROCEDURE — 81001 URINALYSIS AUTO W/SCOPE: CPT

## 2018-04-08 PROCEDURE — 6370000000 HC RX 637 (ALT 250 FOR IP): Performed by: NURSE PRACTITIONER

## 2018-04-08 PROCEDURE — 93005 ELECTROCARDIOGRAM TRACING: CPT

## 2018-04-08 PROCEDURE — 80048 BASIC METABOLIC PNL TOTAL CA: CPT

## 2018-04-08 RX ORDER — ASPIRIN 81 MG/1
81 TABLET ORAL DAILY
Status: DISCONTINUED | OUTPATIENT
Start: 2018-04-08 | End: 2018-04-11 | Stop reason: HOSPADM

## 2018-04-08 RX ORDER — SODIUM CHLORIDE 9 MG/ML
INJECTION, SOLUTION INTRAVENOUS CONTINUOUS
Status: DISCONTINUED | OUTPATIENT
Start: 2018-04-08 | End: 2018-04-08

## 2018-04-08 RX ORDER — AMITRIPTYLINE HYDROCHLORIDE 50 MG/1
150 TABLET, FILM COATED ORAL NIGHTLY
Status: DISCONTINUED | OUTPATIENT
Start: 2018-04-08 | End: 2018-04-11 | Stop reason: HOSPADM

## 2018-04-08 RX ORDER — RANITIDINE 150 MG/1
150 CAPSULE ORAL 2 TIMES DAILY
COMMUNITY
End: 2019-05-08

## 2018-04-08 RX ORDER — ONDANSETRON 2 MG/ML
4 INJECTION INTRAMUSCULAR; INTRAVENOUS EVERY 6 HOURS PRN
Status: DISCONTINUED | OUTPATIENT
Start: 2018-04-08 | End: 2018-04-08 | Stop reason: RX

## 2018-04-08 RX ORDER — NICOTINE POLACRILEX 4 MG
15 LOZENGE BUCCAL PRN
Status: DISCONTINUED | OUTPATIENT
Start: 2018-04-08 | End: 2018-04-11 | Stop reason: HOSPADM

## 2018-04-08 RX ORDER — FLUTICASONE PROPIONATE 50 MCG
1 SPRAY, SUSPENSION (ML) NASAL 2 TIMES DAILY
Status: DISCONTINUED | OUTPATIENT
Start: 2018-04-08 | End: 2018-04-11 | Stop reason: HOSPADM

## 2018-04-08 RX ORDER — DULOXETIN HYDROCHLORIDE 30 MG/1
30 CAPSULE, DELAYED RELEASE ORAL 2 TIMES DAILY
Status: DISCONTINUED | OUTPATIENT
Start: 2018-04-08 | End: 2018-04-10

## 2018-04-08 RX ORDER — METOPROLOL TARTRATE 50 MG/1
50 TABLET, FILM COATED ORAL 2 TIMES DAILY
Status: DISCONTINUED | OUTPATIENT
Start: 2018-04-08 | End: 2018-04-11 | Stop reason: HOSPADM

## 2018-04-08 RX ORDER — ACETAMINOPHEN 325 MG/1
650 TABLET ORAL EVERY 4 HOURS PRN
Status: DISCONTINUED | OUTPATIENT
Start: 2018-04-08 | End: 2018-04-11 | Stop reason: HOSPADM

## 2018-04-08 RX ORDER — DEXTROSE MONOHYDRATE 50 MG/ML
100 INJECTION, SOLUTION INTRAVENOUS PRN
Status: DISCONTINUED | OUTPATIENT
Start: 2018-04-08 | End: 2018-04-11 | Stop reason: HOSPADM

## 2018-04-08 RX ORDER — METHYLPREDNISOLONE SODIUM SUCCINATE 40 MG/ML
40 INJECTION, POWDER, LYOPHILIZED, FOR SOLUTION INTRAMUSCULAR; INTRAVENOUS EVERY 12 HOURS
Status: DISCONTINUED | OUTPATIENT
Start: 2018-04-08 | End: 2018-04-11

## 2018-04-08 RX ORDER — CLONAZEPAM 0.5 MG/1
0.5 TABLET ORAL 2 TIMES DAILY PRN
Status: DISCONTINUED | OUTPATIENT
Start: 2018-04-08 | End: 2018-04-11 | Stop reason: HOSPADM

## 2018-04-08 RX ORDER — GUAIFENESIN/DEXTROMETHORPHAN 100-10MG/5
10 SYRUP ORAL EVERY 4 HOURS PRN
Status: DISCONTINUED | OUTPATIENT
Start: 2018-04-08 | End: 2018-04-11 | Stop reason: HOSPADM

## 2018-04-08 RX ORDER — LEVOTHYROXINE SODIUM 0.12 MG/1
125 TABLET ORAL DAILY
Status: DISCONTINUED | OUTPATIENT
Start: 2018-04-09 | End: 2018-04-11 | Stop reason: HOSPADM

## 2018-04-08 RX ORDER — LOSARTAN POTASSIUM 100 MG/1
100 TABLET ORAL DAILY
Status: DISCONTINUED | OUTPATIENT
Start: 2018-04-08 | End: 2018-04-11 | Stop reason: HOSPADM

## 2018-04-08 RX ORDER — DULOXETIN HYDROCHLORIDE 60 MG/1
60 CAPSULE, DELAYED RELEASE ORAL DAILY
Status: DISCONTINUED | OUTPATIENT
Start: 2018-04-08 | End: 2018-04-10 | Stop reason: SDUPTHER

## 2018-04-08 RX ORDER — PANTOPRAZOLE SODIUM 40 MG/1
40 TABLET, DELAYED RELEASE ORAL DAILY
Status: DISCONTINUED | OUTPATIENT
Start: 2018-04-08 | End: 2018-04-09

## 2018-04-08 RX ORDER — ALBUTEROL SULFATE 90 UG/1
2 AEROSOL, METERED RESPIRATORY (INHALATION) EVERY 6 HOURS PRN
Status: DISCONTINUED | OUTPATIENT
Start: 2018-04-08 | End: 2018-04-11 | Stop reason: HOSPADM

## 2018-04-08 RX ORDER — SODIUM CHLORIDE 0.9 % (FLUSH) 0.9 %
10 SYRINGE (ML) INJECTION PRN
Status: DISCONTINUED | OUTPATIENT
Start: 2018-04-08 | End: 2018-04-11 | Stop reason: HOSPADM

## 2018-04-08 RX ORDER — METOCLOPRAMIDE 5 MG/1
5 TABLET ORAL 2 TIMES DAILY
Status: DISCONTINUED | OUTPATIENT
Start: 2018-04-08 | End: 2018-04-11 | Stop reason: HOSPADM

## 2018-04-08 RX ORDER — ONDANSETRON 4 MG/1
4 TABLET, ORALLY DISINTEGRATING ORAL EVERY 6 HOURS PRN
Status: DISCONTINUED | OUTPATIENT
Start: 2018-04-08 | End: 2018-04-11 | Stop reason: HOSPADM

## 2018-04-08 RX ORDER — GABAPENTIN 400 MG/1
800 CAPSULE ORAL 2 TIMES DAILY
Status: DISCONTINUED | OUTPATIENT
Start: 2018-04-08 | End: 2018-04-11 | Stop reason: HOSPADM

## 2018-04-08 RX ORDER — SODIUM CHLORIDE 0.9 % (FLUSH) 0.9 %
10 SYRINGE (ML) INJECTION EVERY 12 HOURS SCHEDULED
Status: DISCONTINUED | OUTPATIENT
Start: 2018-04-08 | End: 2018-04-11 | Stop reason: HOSPADM

## 2018-04-08 RX ORDER — LEVOFLOXACIN 5 MG/ML
500 INJECTION, SOLUTION INTRAVENOUS ONCE
Status: COMPLETED | OUTPATIENT
Start: 2018-04-08 | End: 2018-04-08

## 2018-04-08 RX ORDER — ALBUTEROL SULFATE 2.5 MG/3ML
2.5 SOLUTION RESPIRATORY (INHALATION) EVERY 4 HOURS PRN
Status: DISCONTINUED | OUTPATIENT
Start: 2018-04-08 | End: 2018-04-11 | Stop reason: HOSPADM

## 2018-04-08 RX ORDER — ONDANSETRON 2 MG/ML
4 INJECTION INTRAMUSCULAR; INTRAVENOUS EVERY 6 HOURS PRN
Status: DISCONTINUED | OUTPATIENT
Start: 2018-04-08 | End: 2018-04-11 | Stop reason: HOSPADM

## 2018-04-08 RX ORDER — DEXTROSE MONOHYDRATE 25 G/50ML
12.5 INJECTION, SOLUTION INTRAVENOUS PRN
Status: DISCONTINUED | OUTPATIENT
Start: 2018-04-08 | End: 2018-04-11 | Stop reason: HOSPADM

## 2018-04-08 RX ORDER — HYDROCODONE BITARTRATE AND ACETAMINOPHEN 5; 325 MG/1; MG/1
2 TABLET ORAL EVERY 6 HOURS PRN
Status: DISCONTINUED | OUTPATIENT
Start: 2018-04-08 | End: 2018-04-11 | Stop reason: HOSPADM

## 2018-04-08 RX ORDER — DOCUSATE SODIUM 100 MG/1
100 CAPSULE, LIQUID FILLED ORAL 2 TIMES DAILY
Status: DISCONTINUED | OUTPATIENT
Start: 2018-04-08 | End: 2018-04-11 | Stop reason: HOSPADM

## 2018-04-08 RX ORDER — RANITIDINE 150 MG/1
150 CAPSULE ORAL NIGHTLY
Status: DISCONTINUED | OUTPATIENT
Start: 2018-04-08 | End: 2018-04-11 | Stop reason: HOSPADM

## 2018-04-08 RX ORDER — LEVOFLOXACIN 500 MG/1
500 TABLET, FILM COATED ORAL DAILY
Status: DISCONTINUED | OUTPATIENT
Start: 2018-04-09 | End: 2018-04-11 | Stop reason: HOSPADM

## 2018-04-08 RX ORDER — ROPINIROLE 2 MG/1
2 TABLET, FILM COATED ORAL NIGHTLY
Status: DISCONTINUED | OUTPATIENT
Start: 2018-04-08 | End: 2018-04-11 | Stop reason: HOSPADM

## 2018-04-08 RX ADMIN — FLUTICASONE PROPIONATE 1 SPRAY: 50 SPRAY, METERED NASAL at 20:39

## 2018-04-08 RX ADMIN — Medication 10 ML: at 22:02

## 2018-04-08 RX ADMIN — ALBUTEROL SULFATE 2.5 MG: 5 SOLUTION RESPIRATORY (INHALATION) at 14:07

## 2018-04-08 RX ADMIN — GABAPENTIN 800 MG: 400 CAPSULE ORAL at 22:00

## 2018-04-08 RX ADMIN — ROPINIROLE HYDROCHLORIDE 2 MG: 2 TABLET, FILM COATED ORAL at 20:38

## 2018-04-08 RX ADMIN — METHYLPREDNISOLONE SODIUM SUCCINATE 40 MG: 40 INJECTION, POWDER, FOR SOLUTION INTRAMUSCULAR; INTRAVENOUS at 19:34

## 2018-04-08 RX ADMIN — HYDROCODONE BITARTRATE AND ACETAMINOPHEN 2 TABLET: 5; 325 TABLET ORAL at 16:28

## 2018-04-08 RX ADMIN — ENOXAPARIN SODIUM 40 MG: 40 INJECTION SUBCUTANEOUS at 19:28

## 2018-04-08 RX ADMIN — CLONAZEPAM 0.5 MG: 0.5 TABLET ORAL at 22:00

## 2018-04-08 RX ADMIN — METOPROLOL TARTRATE 50 MG: 50 TABLET, FILM COATED ORAL at 20:38

## 2018-04-08 RX ADMIN — DOCUSATE SODIUM 100 MG: 100 CAPSULE, LIQUID FILLED ORAL at 20:38

## 2018-04-08 RX ADMIN — AMITRIPTYLINE HYDROCHLORIDE 150 MG: 50 TABLET, FILM COATED ORAL at 20:41

## 2018-04-08 RX ADMIN — DULOXETINE HYDROCHLORIDE 30 MG: 30 CAPSULE, DELAYED RELEASE ORAL at 22:00

## 2018-04-08 RX ADMIN — LEVOFLOXACIN 500 MG: 5 INJECTION, SOLUTION INTRAVENOUS at 15:17

## 2018-04-08 RX ADMIN — SODIUM CHLORIDE: 9 INJECTION, SOLUTION INTRAVENOUS at 14:08

## 2018-04-08 RX ADMIN — DULOXETINE HYDROCHLORIDE 60 MG: 60 CAPSULE, DELAYED RELEASE ORAL at 20:38

## 2018-04-08 RX ADMIN — RANITIDINE 150 MG: 150 CAPSULE ORAL at 22:33

## 2018-04-08 RX ADMIN — METOCLOPRAMIDE 5 MG: 5 TABLET ORAL at 20:39

## 2018-04-08 ASSESSMENT — PAIN DESCRIPTION - ORIENTATION
ORIENTATION: LEFT
ORIENTATION: LEFT

## 2018-04-08 ASSESSMENT — PAIN DESCRIPTION - DESCRIPTORS
DESCRIPTORS: ACHING
DESCRIPTORS: ACHING

## 2018-04-08 ASSESSMENT — ENCOUNTER SYMPTOMS
GASTROINTESTINAL NEGATIVE: 1
COUGH: 1
ALLERGIC/IMMUNOLOGIC NEGATIVE: 1
SHORTNESS OF BREATH: 1
EYES NEGATIVE: 1

## 2018-04-08 ASSESSMENT — PAIN SCALES - GENERAL
PAINLEVEL_OUTOF10: 8
PAINLEVEL_OUTOF10: 3
PAINLEVEL_OUTOF10: 8
PAINLEVEL_OUTOF10: 6

## 2018-04-08 ASSESSMENT — PAIN DESCRIPTION - PROGRESSION: CLINICAL_PROGRESSION: NOT CHANGED

## 2018-04-08 ASSESSMENT — PAIN DESCRIPTION - PAIN TYPE
TYPE: ACUTE PAIN
TYPE: ACUTE PAIN

## 2018-04-08 ASSESSMENT — PAIN DESCRIPTION - LOCATION
LOCATION: RIB CAGE
LOCATION: RIB CAGE

## 2018-04-08 ASSESSMENT — PAIN DESCRIPTION - ONSET: ONSET: ON-GOING

## 2018-04-08 ASSESSMENT — PAIN DESCRIPTION - FREQUENCY: FREQUENCY: INTERMITTENT

## 2018-04-09 LAB
ANION GAP SERPL CALCULATED.3IONS-SCNC: 12 MMOL/L (ref 9–17)
BUN BLDV-MCNC: 16 MG/DL (ref 6–20)
BUN/CREAT BLD: 16 (ref 9–20)
CALCIUM SERPL-MCNC: 8.4 MG/DL (ref 8.6–10.4)
CHLORIDE BLD-SCNC: 96 MMOL/L (ref 98–107)
CO2: 27 MMOL/L (ref 20–31)
CREAT SERPL-MCNC: 0.97 MG/DL (ref 0.5–0.9)
EKG ATRIAL RATE: 88 BPM
EKG P AXIS: 36 DEGREES
EKG P-R INTERVAL: 152 MS
EKG Q-T INTERVAL: 356 MS
EKG QRS DURATION: 82 MS
EKG QTC CALCULATION (BAZETT): 430 MS
EKG R AXIS: 45 DEGREES
EKG T AXIS: 61 DEGREES
EKG VENTRICULAR RATE: 88 BPM
GFR AFRICAN AMERICAN: >60 ML/MIN
GFR NON-AFRICAN AMERICAN: 60 ML/MIN
GFR SERPL CREATININE-BSD FRML MDRD: ABNORMAL ML/MIN/{1.73_M2}
GFR SERPL CREATININE-BSD FRML MDRD: ABNORMAL ML/MIN/{1.73_M2}
GLUCOSE BLD-MCNC: 102 MG/DL (ref 65–105)
GLUCOSE BLD-MCNC: 148 MG/DL (ref 65–105)
GLUCOSE BLD-MCNC: 158 MG/DL (ref 65–105)
GLUCOSE BLD-MCNC: 172 MG/DL (ref 70–99)
GLUCOSE BLD-MCNC: 176 MG/DL (ref 65–105)
GLUCOSE BLD-MCNC: 222 MG/DL (ref 65–105)
GLUCOSE BLD-MCNC: 226 MG/DL (ref 65–105)
GLUCOSE BLD-MCNC: 265 MG/DL (ref 65–105)
GLUCOSE BLD-MCNC: 47 MG/DL (ref 65–105)
HCT VFR BLD CALC: 37.7 % (ref 36–46)
HEMOGLOBIN: 12.5 G/DL (ref 12–16)
MCH RBC QN AUTO: 29.7 PG (ref 26–34)
MCHC RBC AUTO-ENTMCNC: 33.1 G/DL (ref 31–37)
MCV RBC AUTO: 89.8 FL (ref 80–100)
NRBC AUTOMATED: ABNORMAL PER 100 WBC
PDW BLD-RTO: 15.1 % (ref 11.5–14.5)
PLATELET # BLD: 220 K/UL (ref 130–400)
PMV BLD AUTO: 7.4 FL (ref 6–12)
POTASSIUM SERPL-SCNC: 4.4 MMOL/L (ref 3.7–5.3)
RBC # BLD: 4.2 M/UL (ref 4–5.2)
SODIUM BLD-SCNC: 135 MMOL/L (ref 135–144)
WBC # BLD: 6.4 K/UL (ref 3.5–11)

## 2018-04-09 PROCEDURE — 6360000002 HC RX W HCPCS: Performed by: INTERNAL MEDICINE

## 2018-04-09 PROCEDURE — 6370000000 HC RX 637 (ALT 250 FOR IP): Performed by: NURSE PRACTITIONER

## 2018-04-09 PROCEDURE — 94760 N-INVAS EAR/PLS OXIMETRY 1: CPT

## 2018-04-09 PROCEDURE — 80048 BASIC METABOLIC PNL TOTAL CA: CPT

## 2018-04-09 PROCEDURE — 99232 SBSQ HOSP IP/OBS MODERATE 35: CPT | Performed by: INTERNAL MEDICINE

## 2018-04-09 PROCEDURE — 94640 AIRWAY INHALATION TREATMENT: CPT

## 2018-04-09 PROCEDURE — 36415 COLL VENOUS BLD VENIPUNCTURE: CPT

## 2018-04-09 PROCEDURE — 1200000000 HC SEMI PRIVATE

## 2018-04-09 PROCEDURE — 6370000000 HC RX 637 (ALT 250 FOR IP): Performed by: INTERNAL MEDICINE

## 2018-04-09 PROCEDURE — 82947 ASSAY GLUCOSE BLOOD QUANT: CPT

## 2018-04-09 PROCEDURE — 2700000000 HC OXYGEN THERAPY PER DAY

## 2018-04-09 PROCEDURE — 2580000003 HC RX 258: Performed by: INTERNAL MEDICINE

## 2018-04-09 PROCEDURE — 85027 COMPLETE CBC AUTOMATED: CPT

## 2018-04-09 RX ORDER — PANTOPRAZOLE SODIUM 40 MG/1
40 TABLET, DELAYED RELEASE ORAL 2 TIMES DAILY
Status: DISCONTINUED | OUTPATIENT
Start: 2018-04-09 | End: 2018-04-11 | Stop reason: HOSPADM

## 2018-04-09 RX ORDER — PANTOPRAZOLE SODIUM 40 MG/1
40 TABLET, DELAYED RELEASE ORAL 2 TIMES DAILY
COMMUNITY
End: 2019-04-16 | Stop reason: SDUPTHER

## 2018-04-09 RX ADMIN — Medication 2 UNITS: at 17:03

## 2018-04-09 RX ADMIN — DULOXETINE HYDROCHLORIDE 60 MG: 60 CAPSULE, DELAYED RELEASE ORAL at 20:44

## 2018-04-09 RX ADMIN — LEVOFLOXACIN 500 MG: 500 TABLET, FILM COATED ORAL at 09:29

## 2018-04-09 RX ADMIN — ASPIRIN 81 MG: 81 TABLET, COATED ORAL at 09:29

## 2018-04-09 RX ADMIN — ROPINIROLE HYDROCHLORIDE 2 MG: 2 TABLET, FILM COATED ORAL at 20:44

## 2018-04-09 RX ADMIN — Medication 10 ML: at 06:24

## 2018-04-09 RX ADMIN — Medication 10 ML: at 09:30

## 2018-04-09 RX ADMIN — DULOXETINE HYDROCHLORIDE 30 MG: 30 CAPSULE, DELAYED RELEASE ORAL at 20:47

## 2018-04-09 RX ADMIN — GABAPENTIN 800 MG: 400 CAPSULE ORAL at 20:44

## 2018-04-09 RX ADMIN — METOPROLOL TARTRATE 50 MG: 50 TABLET, FILM COATED ORAL at 09:29

## 2018-04-09 RX ADMIN — LOSARTAN POTASSIUM 100 MG: 100 TABLET, FILM COATED ORAL at 09:29

## 2018-04-09 RX ADMIN — RANITIDINE 150 MG: 150 CAPSULE ORAL at 20:43

## 2018-04-09 RX ADMIN — FLUTICASONE PROPIONATE 1 SPRAY: 50 SPRAY, METERED NASAL at 20:47

## 2018-04-09 RX ADMIN — METHYLPREDNISOLONE SODIUM SUCCINATE 40 MG: 40 INJECTION, POWDER, FOR SOLUTION INTRAMUSCULAR; INTRAVENOUS at 19:09

## 2018-04-09 RX ADMIN — ALBUTEROL SULFATE 2.5 MG: 2.5 SOLUTION RESPIRATORY (INHALATION) at 21:05

## 2018-04-09 RX ADMIN — HYDROCODONE BITARTRATE AND ACETAMINOPHEN 2 TABLET: 5; 325 TABLET ORAL at 18:40

## 2018-04-09 RX ADMIN — Medication 2 UNITS: at 09:22

## 2018-04-09 RX ADMIN — ALBUTEROL SULFATE 2.5 MG: 2.5 SOLUTION RESPIRATORY (INHALATION) at 09:37

## 2018-04-09 RX ADMIN — METOPROLOL TARTRATE 50 MG: 50 TABLET, FILM COATED ORAL at 20:44

## 2018-04-09 RX ADMIN — Medication 10 ML: at 19:11

## 2018-04-09 RX ADMIN — FLUTICASONE PROPIONATE 1 SPRAY: 50 SPRAY, METERED NASAL at 09:29

## 2018-04-09 RX ADMIN — METOCLOPRAMIDE 5 MG: 5 TABLET ORAL at 20:44

## 2018-04-09 RX ADMIN — ENOXAPARIN SODIUM 40 MG: 40 INJECTION SUBCUTANEOUS at 09:28

## 2018-04-09 RX ADMIN — LEVOTHYROXINE SODIUM 125 MCG: 125 TABLET ORAL at 06:24

## 2018-04-09 RX ADMIN — Medication 6 UNITS: at 12:39

## 2018-04-09 RX ADMIN — GABAPENTIN 800 MG: 400 CAPSULE ORAL at 09:28

## 2018-04-09 RX ADMIN — METHYLPREDNISOLONE SODIUM SUCCINATE 40 MG: 40 INJECTION, POWDER, FOR SOLUTION INTRAMUSCULAR; INTRAVENOUS at 06:24

## 2018-04-09 RX ADMIN — PANTOPRAZOLE SODIUM 40 MG: 40 TABLET, DELAYED RELEASE ORAL at 18:27

## 2018-04-09 RX ADMIN — PANTOPRAZOLE SODIUM 40 MG: 40 TABLET, DELAYED RELEASE ORAL at 09:29

## 2018-04-09 RX ADMIN — HYDROCODONE BITARTRATE AND ACETAMINOPHEN 2 TABLET: 5; 325 TABLET ORAL at 03:26

## 2018-04-09 RX ADMIN — DOCUSATE SODIUM 100 MG: 100 CAPSULE, LIQUID FILLED ORAL at 20:43

## 2018-04-09 RX ADMIN — HYDROCODONE BITARTRATE AND ACETAMINOPHEN 2 TABLET: 5; 325 TABLET ORAL at 09:58

## 2018-04-09 RX ADMIN — AMITRIPTYLINE HYDROCHLORIDE 150 MG: 50 TABLET, FILM COATED ORAL at 20:43

## 2018-04-09 RX ADMIN — METOCLOPRAMIDE 5 MG: 5 TABLET ORAL at 09:29

## 2018-04-09 RX ADMIN — TIOTROPIUM BROMIDE 18 MCG: 18 CAPSULE ORAL; RESPIRATORY (INHALATION) at 09:33

## 2018-04-09 RX ADMIN — DOCUSATE SODIUM 100 MG: 100 CAPSULE, LIQUID FILLED ORAL at 09:28

## 2018-04-09 RX ADMIN — CLONAZEPAM 0.5 MG: 0.5 TABLET ORAL at 23:16

## 2018-04-09 ASSESSMENT — PAIN SCALES - GENERAL
PAINLEVEL_OUTOF10: 3
PAINLEVEL_OUTOF10: 7
PAINLEVEL_OUTOF10: 0
PAINLEVEL_OUTOF10: 8
PAINLEVEL_OUTOF10: 8
PAINLEVEL_OUTOF10: 6

## 2018-04-09 ASSESSMENT — PAIN DESCRIPTION - ORIENTATION
ORIENTATION: LEFT
ORIENTATION: LEFT

## 2018-04-09 ASSESSMENT — PAIN DESCRIPTION - LOCATION
LOCATION: RIB CAGE
LOCATION: RIB CAGE

## 2018-04-09 ASSESSMENT — PAIN DESCRIPTION - FREQUENCY: FREQUENCY: INTERMITTENT

## 2018-04-09 ASSESSMENT — PAIN DESCRIPTION - DESCRIPTORS
DESCRIPTORS: ACHING
DESCRIPTORS: ACHING

## 2018-04-09 ASSESSMENT — PAIN DESCRIPTION - PAIN TYPE
TYPE: ACUTE PAIN
TYPE: ACUTE PAIN

## 2018-04-09 ASSESSMENT — PAIN DESCRIPTION - ONSET: ONSET: ON-GOING

## 2018-04-09 ASSESSMENT — PAIN DESCRIPTION - PROGRESSION: CLINICAL_PROGRESSION: NOT CHANGED

## 2018-04-10 LAB
ADENOVIRUS PCR: NOT DETECTED
BORDETELLA PERTUSSIS PCR: NOT DETECTED
CHLAMYDIA PNEUMONIAE BY PCR: NOT DETECTED
CORONAVIRUS 229E PCR: NOT DETECTED
CORONAVIRUS HKU1 PCR: NOT DETECTED
CORONAVIRUS NL63 PCR: NOT DETECTED
CORONAVIRUS OC43 PCR: NOT DETECTED
DIRECT EXAM: ABNORMAL
GLUCOSE BLD-MCNC: 152 MG/DL (ref 65–105)
GLUCOSE BLD-MCNC: 163 MG/DL (ref 65–105)
GLUCOSE BLD-MCNC: 164 MG/DL (ref 65–105)
GLUCOSE BLD-MCNC: 60 MG/DL (ref 65–105)
GLUCOSE BLD-MCNC: 80 MG/DL (ref 65–105)
GLUCOSE BLD-MCNC: 94 MG/DL (ref 65–105)
HUMAN METAPNEUMOVIRUS PCR: NOT DETECTED
INFLUENZA A BY PCR: DETECTED
INFLUENZA A H1 (2009) PCR: NOT DETECTED
INFLUENZA A H1 PCR: NOT DETECTED
INFLUENZA A H3 PCR: DETECTED
INFLUENZA B BY PCR: NOT DETECTED
Lab: ABNORMAL
MYCOPLASMA PNEUMONIAE PCR: NOT DETECTED
PARAINFLUENZA 1 PCR: NOT DETECTED
PARAINFLUENZA 2 PCR: NOT DETECTED
PARAINFLUENZA 3 PCR: NOT DETECTED
PARAINFLUENZA 4 PCR: NOT DETECTED
PROCALCITONIN: 0.12 NG/ML
RESP SYNCYTIAL VIRUS PCR: NOT DETECTED
RHINO/ENTEROVIRUS PCR: NOT DETECTED
SOURCE: ABNORMAL
SPECIMEN DESCRIPTION: ABNORMAL
STATUS: ABNORMAL

## 2018-04-10 PROCEDURE — 36415 COLL VENOUS BLD VENIPUNCTURE: CPT

## 2018-04-10 PROCEDURE — 6370000000 HC RX 637 (ALT 250 FOR IP): Performed by: INTERNAL MEDICINE

## 2018-04-10 PROCEDURE — 87798 DETECT AGENT NOS DNA AMP: CPT

## 2018-04-10 PROCEDURE — 87486 CHLMYD PNEUM DNA AMP PROBE: CPT

## 2018-04-10 PROCEDURE — 87633 RESP VIRUS 12-25 TARGETS: CPT

## 2018-04-10 PROCEDURE — 84145 PROCALCITONIN (PCT): CPT

## 2018-04-10 PROCEDURE — 6360000002 HC RX W HCPCS: Performed by: NURSE PRACTITIONER

## 2018-04-10 PROCEDURE — 1200000000 HC SEMI PRIVATE

## 2018-04-10 PROCEDURE — 82947 ASSAY GLUCOSE BLOOD QUANT: CPT

## 2018-04-10 PROCEDURE — 94640 AIRWAY INHALATION TREATMENT: CPT

## 2018-04-10 PROCEDURE — 6360000002 HC RX W HCPCS: Performed by: INTERNAL MEDICINE

## 2018-04-10 PROCEDURE — 99232 SBSQ HOSP IP/OBS MODERATE 35: CPT | Performed by: INTERNAL MEDICINE

## 2018-04-10 PROCEDURE — 2580000003 HC RX 258: Performed by: INTERNAL MEDICINE

## 2018-04-10 PROCEDURE — 6370000000 HC RX 637 (ALT 250 FOR IP): Performed by: NURSE PRACTITIONER

## 2018-04-10 PROCEDURE — 87804 INFLUENZA ASSAY W/OPTIC: CPT

## 2018-04-10 PROCEDURE — 2700000000 HC OXYGEN THERAPY PER DAY

## 2018-04-10 PROCEDURE — 87581 M.PNEUMON DNA AMP PROBE: CPT

## 2018-04-10 RX ORDER — DULOXETIN HYDROCHLORIDE 30 MG/1
30 CAPSULE, DELAYED RELEASE ORAL NIGHTLY
Status: DISCONTINUED | OUTPATIENT
Start: 2018-04-10 | End: 2018-04-10 | Stop reason: SDUPTHER

## 2018-04-10 RX ORDER — ALBUTEROL SULFATE 2.5 MG/3ML
2.5 SOLUTION RESPIRATORY (INHALATION) 3 TIMES DAILY
Status: DISCONTINUED | OUTPATIENT
Start: 2018-04-10 | End: 2018-04-11 | Stop reason: HOSPADM

## 2018-04-10 RX ADMIN — Medication 10 ML: at 11:15

## 2018-04-10 RX ADMIN — ASPIRIN 81 MG: 81 TABLET, COATED ORAL at 11:15

## 2018-04-10 RX ADMIN — Medication 10 ML: at 06:37

## 2018-04-10 RX ADMIN — PANTOPRAZOLE SODIUM 40 MG: 40 TABLET, DELAYED RELEASE ORAL at 11:15

## 2018-04-10 RX ADMIN — LEVOFLOXACIN 500 MG: 500 TABLET, FILM COATED ORAL at 11:16

## 2018-04-10 RX ADMIN — LOSARTAN POTASSIUM 100 MG: 100 TABLET, FILM COATED ORAL at 13:28

## 2018-04-10 RX ADMIN — Medication 10 ML: at 18:29

## 2018-04-10 RX ADMIN — METHYLPREDNISOLONE SODIUM SUCCINATE 40 MG: 40 INJECTION, POWDER, FOR SOLUTION INTRAMUSCULAR; INTRAVENOUS at 06:36

## 2018-04-10 RX ADMIN — HYDROCODONE BITARTRATE AND ACETAMINOPHEN 2 TABLET: 5; 325 TABLET ORAL at 21:16

## 2018-04-10 RX ADMIN — FLUTICASONE PROPIONATE 1 SPRAY: 50 SPRAY, METERED NASAL at 20:45

## 2018-04-10 RX ADMIN — GABAPENTIN 800 MG: 400 CAPSULE ORAL at 13:28

## 2018-04-10 RX ADMIN — Medication 10 ML: at 20:50

## 2018-04-10 RX ADMIN — METOPROLOL TARTRATE 50 MG: 50 TABLET, FILM COATED ORAL at 20:50

## 2018-04-10 RX ADMIN — AMITRIPTYLINE HYDROCHLORIDE 150 MG: 50 TABLET, FILM COATED ORAL at 20:46

## 2018-04-10 RX ADMIN — ENOXAPARIN SODIUM 40 MG: 40 INJECTION SUBCUTANEOUS at 11:14

## 2018-04-10 RX ADMIN — HYDROCODONE BITARTRATE AND ACETAMINOPHEN 2 TABLET: 5; 325 TABLET ORAL at 13:56

## 2018-04-10 RX ADMIN — CLONAZEPAM 0.5 MG: 0.5 TABLET ORAL at 22:38

## 2018-04-10 RX ADMIN — LEVOTHYROXINE SODIUM 125 MCG: 125 TABLET ORAL at 06:39

## 2018-04-10 RX ADMIN — ALBUTEROL SULFATE 2.5 MG: 2.5 SOLUTION RESPIRATORY (INHALATION) at 20:46

## 2018-04-10 RX ADMIN — MOMETASONE FUROATE AND FORMOTEROL FUMARATE DIHYDRATE 2 PUFF: 100; 5 AEROSOL RESPIRATORY (INHALATION) at 20:47

## 2018-04-10 RX ADMIN — HYDROCODONE BITARTRATE AND ACETAMINOPHEN 1 TABLET: 5; 325 TABLET ORAL at 06:45

## 2018-04-10 RX ADMIN — DULOXETINE HYDROCHLORIDE 90 MG: 60 CAPSULE, DELAYED RELEASE ORAL at 21:03

## 2018-04-10 RX ADMIN — METOPROLOL TARTRATE 50 MG: 50 TABLET, FILM COATED ORAL at 13:28

## 2018-04-10 RX ADMIN — ALBUTEROL SULFATE 2.5 MG: 2.5 SOLUTION RESPIRATORY (INHALATION) at 12:16

## 2018-04-10 RX ADMIN — RANITIDINE 150 MG: 150 CAPSULE ORAL at 20:45

## 2018-04-10 RX ADMIN — ALBUTEROL SULFATE 2.5 MG: 2.5 SOLUTION RESPIRATORY (INHALATION) at 15:56

## 2018-04-10 RX ADMIN — PANTOPRAZOLE SODIUM 40 MG: 40 TABLET, DELAYED RELEASE ORAL at 18:35

## 2018-04-10 RX ADMIN — METOCLOPRAMIDE 5 MG: 5 TABLET ORAL at 13:28

## 2018-04-10 RX ADMIN — FLUTICASONE PROPIONATE 1 SPRAY: 50 SPRAY, METERED NASAL at 11:14

## 2018-04-10 RX ADMIN — ROPINIROLE HYDROCHLORIDE 2 MG: 2 TABLET, FILM COATED ORAL at 20:49

## 2018-04-10 RX ADMIN — TIOTROPIUM BROMIDE 18 MCG: 18 CAPSULE ORAL; RESPIRATORY (INHALATION) at 12:16

## 2018-04-10 RX ADMIN — GUAIFENESIN AND DEXTROMETHORPHAN 10 ML: 100; 10 SYRUP ORAL at 16:25

## 2018-04-10 RX ADMIN — METHYLPREDNISOLONE SODIUM SUCCINATE 40 MG: 40 INJECTION, POWDER, FOR SOLUTION INTRAMUSCULAR; INTRAVENOUS at 18:28

## 2018-04-10 RX ADMIN — CLONAZEPAM 0.5 MG: 0.5 TABLET ORAL at 16:25

## 2018-04-10 RX ADMIN — DOCUSATE SODIUM 100 MG: 100 CAPSULE, LIQUID FILLED ORAL at 20:45

## 2018-04-10 RX ADMIN — GABAPENTIN 800 MG: 400 CAPSULE ORAL at 20:45

## 2018-04-10 RX ADMIN — METOCLOPRAMIDE 5 MG: 5 TABLET ORAL at 20:48

## 2018-04-10 ASSESSMENT — PAIN DESCRIPTION - ORIENTATION
ORIENTATION: LEFT

## 2018-04-10 ASSESSMENT — PAIN DESCRIPTION - DESCRIPTORS
DESCRIPTORS: ACHING;DISCOMFORT;SORE
DESCRIPTORS: ACHING
DESCRIPTORS: ACHING

## 2018-04-10 ASSESSMENT — PAIN SCALES - GENERAL
PAINLEVEL_OUTOF10: 7
PAINLEVEL_OUTOF10: 7
PAINLEVEL_OUTOF10: 4
PAINLEVEL_OUTOF10: 8
PAINLEVEL_OUTOF10: 5
PAINLEVEL_OUTOF10: 5

## 2018-04-10 ASSESSMENT — PAIN DESCRIPTION - LOCATION
LOCATION: RIB CAGE

## 2018-04-10 ASSESSMENT — PAIN DESCRIPTION - PAIN TYPE
TYPE: ACUTE PAIN

## 2018-04-10 ASSESSMENT — PAIN DESCRIPTION - ONSET: ONSET: ON-GOING

## 2018-04-10 ASSESSMENT — PAIN DESCRIPTION - FREQUENCY: FREQUENCY: CONTINUOUS

## 2018-04-10 ASSESSMENT — PAIN DESCRIPTION - PROGRESSION: CLINICAL_PROGRESSION: GRADUALLY WORSENING

## 2018-04-11 ENCOUNTER — CARE COORDINATION (OUTPATIENT)
Dept: CARE COORDINATION | Age: 58
End: 2018-04-11

## 2018-04-11 ENCOUNTER — APPOINTMENT (OUTPATIENT)
Dept: GENERAL RADIOLOGY | Age: 58
DRG: 191 | End: 2018-04-11
Payer: MEDICARE

## 2018-04-11 VITALS
RESPIRATION RATE: 18 BRPM | DIASTOLIC BLOOD PRESSURE: 70 MMHG | WEIGHT: 207.4 LBS | SYSTOLIC BLOOD PRESSURE: 148 MMHG | BODY MASS INDEX: 36.75 KG/M2 | TEMPERATURE: 97.9 F | OXYGEN SATURATION: 95 % | HEART RATE: 68 BPM | HEIGHT: 63 IN

## 2018-04-11 LAB
BNP INTERPRETATION: NORMAL
GLUCOSE BLD-MCNC: 185 MG/DL (ref 65–105)
GLUCOSE BLD-MCNC: 44 MG/DL (ref 65–105)
GLUCOSE BLD-MCNC: 67 MG/DL (ref 65–105)
GLUCOSE BLD-MCNC: 74 MG/DL (ref 65–105)
GLUCOSE BLD-MCNC: 95 MG/DL (ref 65–105)
GLUCOSE BLD-MCNC: 97 MG/DL (ref 65–105)
PRO-BNP: 198 PG/ML

## 2018-04-11 PROCEDURE — 71046 X-RAY EXAM CHEST 2 VIEWS: CPT

## 2018-04-11 PROCEDURE — 6370000000 HC RX 637 (ALT 250 FOR IP): Performed by: NURSE PRACTITIONER

## 2018-04-11 PROCEDURE — 99232 SBSQ HOSP IP/OBS MODERATE 35: CPT | Performed by: INTERNAL MEDICINE

## 2018-04-11 PROCEDURE — 6370000000 HC RX 637 (ALT 250 FOR IP): Performed by: INTERNAL MEDICINE

## 2018-04-11 PROCEDURE — 6360000002 HC RX W HCPCS: Performed by: NURSE PRACTITIONER

## 2018-04-11 PROCEDURE — 2580000003 HC RX 258: Performed by: INTERNAL MEDICINE

## 2018-04-11 PROCEDURE — 36415 COLL VENOUS BLD VENIPUNCTURE: CPT

## 2018-04-11 PROCEDURE — 2700000000 HC OXYGEN THERAPY PER DAY

## 2018-04-11 PROCEDURE — 6360000002 HC RX W HCPCS: Performed by: INTERNAL MEDICINE

## 2018-04-11 PROCEDURE — 83880 ASSAY OF NATRIURETIC PEPTIDE: CPT

## 2018-04-11 PROCEDURE — 94640 AIRWAY INHALATION TREATMENT: CPT

## 2018-04-11 PROCEDURE — 82947 ASSAY GLUCOSE BLOOD QUANT: CPT

## 2018-04-11 RX ORDER — PREDNISONE 20 MG/1
20 TABLET ORAL 2 TIMES DAILY
Status: DISCONTINUED | OUTPATIENT
Start: 2018-04-11 | End: 2018-04-11 | Stop reason: HOSPADM

## 2018-04-11 RX ORDER — PREDNISONE 20 MG/1
TABLET ORAL
Qty: 11 TABLET | Refills: 0 | Status: SHIPPED | OUTPATIENT
Start: 2018-04-11 | End: 2018-05-03 | Stop reason: ALTCHOICE

## 2018-04-11 RX ORDER — LEVOFLOXACIN 500 MG/1
500 TABLET, FILM COATED ORAL DAILY
Qty: 10 TABLET | Refills: 0 | Status: SHIPPED | OUTPATIENT
Start: 2018-04-12 | End: 2018-04-22

## 2018-04-11 RX ADMIN — HYDROCODONE BITARTRATE AND ACETAMINOPHEN 2 TABLET: 5; 325 TABLET ORAL at 04:53

## 2018-04-11 RX ADMIN — Medication 10 ML: at 08:01

## 2018-04-11 RX ADMIN — ENOXAPARIN SODIUM 40 MG: 40 INJECTION SUBCUTANEOUS at 08:04

## 2018-04-11 RX ADMIN — PANTOPRAZOLE SODIUM 40 MG: 40 TABLET, DELAYED RELEASE ORAL at 17:26

## 2018-04-11 RX ADMIN — ASPIRIN 81 MG: 81 TABLET, COATED ORAL at 08:01

## 2018-04-11 RX ADMIN — PANTOPRAZOLE SODIUM 40 MG: 40 TABLET, DELAYED RELEASE ORAL at 08:01

## 2018-04-11 RX ADMIN — ALBUTEROL SULFATE 2.5 MG: 2.5 SOLUTION RESPIRATORY (INHALATION) at 14:44

## 2018-04-11 RX ADMIN — DOCUSATE SODIUM 100 MG: 100 CAPSULE, LIQUID FILLED ORAL at 08:02

## 2018-04-11 RX ADMIN — Medication 10 ML: at 04:54

## 2018-04-11 RX ADMIN — MOMETASONE FUROATE AND FORMOTEROL FUMARATE DIHYDRATE 2 PUFF: 100; 5 AEROSOL RESPIRATORY (INHALATION) at 09:31

## 2018-04-11 RX ADMIN — TIOTROPIUM BROMIDE 18 MCG: 18 CAPSULE ORAL; RESPIRATORY (INHALATION) at 09:31

## 2018-04-11 RX ADMIN — GABAPENTIN 800 MG: 400 CAPSULE ORAL at 08:04

## 2018-04-11 RX ADMIN — LEVOTHYROXINE SODIUM 125 MCG: 125 TABLET ORAL at 04:53

## 2018-04-11 RX ADMIN — METOCLOPRAMIDE 5 MG: 5 TABLET ORAL at 08:01

## 2018-04-11 RX ADMIN — HYDROCODONE BITARTRATE AND ACETAMINOPHEN 2 TABLET: 5; 325 TABLET ORAL at 11:21

## 2018-04-11 RX ADMIN — FLUTICASONE PROPIONATE 1 SPRAY: 50 SPRAY, METERED NASAL at 08:29

## 2018-04-11 RX ADMIN — LEVOFLOXACIN 500 MG: 500 TABLET, FILM COATED ORAL at 08:04

## 2018-04-11 RX ADMIN — METOPROLOL TARTRATE 50 MG: 50 TABLET, FILM COATED ORAL at 08:01

## 2018-04-11 RX ADMIN — METHYLPREDNISOLONE SODIUM SUCCINATE 40 MG: 40 INJECTION, POWDER, FOR SOLUTION INTRAMUSCULAR; INTRAVENOUS at 04:53

## 2018-04-11 RX ADMIN — LOSARTAN POTASSIUM 100 MG: 100 TABLET, FILM COATED ORAL at 08:02

## 2018-04-11 RX ADMIN — ALBUTEROL SULFATE 2.5 MG: 2.5 SOLUTION RESPIRATORY (INHALATION) at 09:31

## 2018-04-11 ASSESSMENT — PAIN SCALES - GENERAL
PAINLEVEL_OUTOF10: 7
PAINLEVEL_OUTOF10: 7
PAINLEVEL_OUTOF10: 4

## 2018-04-11 ASSESSMENT — PAIN DESCRIPTION - LOCATION
LOCATION: RIB CAGE
LOCATION: RIB CAGE

## 2018-04-11 ASSESSMENT — PAIN DESCRIPTION - DESCRIPTORS
DESCRIPTORS: ACHING
DESCRIPTORS: ACHING

## 2018-04-11 ASSESSMENT — PAIN DESCRIPTION - ORIENTATION
ORIENTATION: LEFT
ORIENTATION: LEFT

## 2018-04-11 ASSESSMENT — PAIN DESCRIPTION - ONSET: ONSET: ON-GOING

## 2018-04-11 ASSESSMENT — PAIN DESCRIPTION - PAIN TYPE
TYPE: ACUTE PAIN
TYPE: ACUTE PAIN

## 2018-04-11 ASSESSMENT — PAIN DESCRIPTION - PROGRESSION: CLINICAL_PROGRESSION: GRADUALLY IMPROVING

## 2018-04-11 ASSESSMENT — PAIN DESCRIPTION - FREQUENCY: FREQUENCY: CONTINUOUS

## 2018-04-12 ENCOUNTER — CARE COORDINATION (OUTPATIENT)
Dept: CASE MANAGEMENT | Age: 58
End: 2018-04-12

## 2018-04-12 DIAGNOSIS — S20.212D CONTUSION OF LEFT CHEST WALL, SUBSEQUENT ENCOUNTER: ICD-10-CM

## 2018-04-12 DIAGNOSIS — G25.81 RESTLESS LEGS SYNDROME: ICD-10-CM

## 2018-04-12 DIAGNOSIS — J44.1 COPD EXACERBATION (HCC): Primary | ICD-10-CM

## 2018-04-12 DIAGNOSIS — R33.9 CHRONIC RETENTION OF URINE: ICD-10-CM

## 2018-04-12 PROCEDURE — 1111F DSCHRG MED/CURRENT MED MERGE: CPT | Performed by: INTERNAL MEDICINE

## 2018-04-13 ENCOUNTER — TELEPHONE (OUTPATIENT)
Dept: INTERNAL MEDICINE CLINIC | Age: 58
End: 2018-04-13

## 2018-04-13 RX ORDER — CLONAZEPAM 0.5 MG/1
TABLET ORAL
Qty: 60 TABLET | Refills: 2 | OUTPATIENT
Start: 2018-04-13 | End: 2018-10-01 | Stop reason: SDUPTHER

## 2018-04-14 LAB
CULTURE: NORMAL
Lab: NORMAL
Lab: NORMAL
SPECIMEN DESCRIPTION: NORMAL
STATUS: NORMAL
STATUS: NORMAL

## 2018-04-17 ENCOUNTER — CARE COORDINATION (OUTPATIENT)
Dept: CASE MANAGEMENT | Age: 58
End: 2018-04-17

## 2018-04-18 ENCOUNTER — OFFICE VISIT (OUTPATIENT)
Dept: INTERNAL MEDICINE CLINIC | Age: 58
End: 2018-04-18
Payer: MEDICARE

## 2018-04-18 VITALS
WEIGHT: 212 LBS | HEIGHT: 63 IN | SYSTOLIC BLOOD PRESSURE: 122 MMHG | DIASTOLIC BLOOD PRESSURE: 74 MMHG | BODY MASS INDEX: 37.56 KG/M2

## 2018-04-18 DIAGNOSIS — Z09 HOSPITAL DISCHARGE FOLLOW-UP: ICD-10-CM

## 2018-04-18 DIAGNOSIS — Z12.39 SCREENING FOR BREAST CANCER: Primary | ICD-10-CM

## 2018-04-18 PROCEDURE — 99495 TRANSJ CARE MGMT MOD F2F 14D: CPT | Performed by: INTERNAL MEDICINE

## 2018-04-18 PROCEDURE — 1111F DSCHRG MED/CURRENT MED MERGE: CPT | Performed by: INTERNAL MEDICINE

## 2018-04-23 ENCOUNTER — TELEPHONE (OUTPATIENT)
Dept: INTERNAL MEDICINE CLINIC | Age: 58
End: 2018-04-23

## 2018-04-23 DIAGNOSIS — R05.9 COUGH: Primary | ICD-10-CM

## 2018-04-25 ENCOUNTER — HOSPITAL ENCOUNTER (EMERGENCY)
Age: 58
Discharge: HOME | End: 2018-04-25
Attending: EMERGENCY MEDICINE
Payer: MEDICARE

## 2018-04-25 VITALS
WEIGHT: 210 LBS | BODY MASS INDEX: 35.85 KG/M2 | HEART RATE: 89 BPM | HEIGHT: 64 IN | SYSTOLIC BLOOD PRESSURE: 177 MMHG | TEMPERATURE: 98.6 F | DIASTOLIC BLOOD PRESSURE: 85 MMHG | OXYGEN SATURATION: 96 % | RESPIRATION RATE: 16 BRPM

## 2018-04-25 DIAGNOSIS — H60.392 INFECTIVE OTITIS EXTERNA OF LEFT EAR: Primary | ICD-10-CM

## 2018-04-25 DIAGNOSIS — H66.92 LEFT OTITIS MEDIA, UNSPECIFIED OTITIS MEDIA TYPE: ICD-10-CM

## 2018-04-25 PROCEDURE — 99282 EMERGENCY DEPT VISIT SF MDM: CPT

## 2018-04-25 PROCEDURE — 6370000000 HC RX 637 (ALT 250 FOR IP): Performed by: NURSE PRACTITIONER

## 2018-04-25 PROCEDURE — 6360000002 HC RX W HCPCS: Performed by: NURSE PRACTITIONER

## 2018-04-25 RX ORDER — HYDROCODONE BITARTRATE AND ACETAMINOPHEN 5; 325 MG/1; MG/1
2 TABLET ORAL ONCE
Status: COMPLETED | OUTPATIENT
Start: 2018-04-25 | End: 2018-04-25

## 2018-04-25 RX ORDER — AMOXICILLIN 875 MG/1
875 TABLET, COATED ORAL 2 TIMES DAILY
Qty: 20 TABLET | Refills: 0 | Status: SHIPPED | OUTPATIENT
Start: 2018-04-25 | End: 2018-05-05

## 2018-04-25 RX ORDER — ONDANSETRON 4 MG/1
4 TABLET, ORALLY DISINTEGRATING ORAL ONCE
Status: COMPLETED | OUTPATIENT
Start: 2018-04-25 | End: 2018-04-25

## 2018-04-25 RX ORDER — OFLOXACIN 3 MG/ML
10 SOLUTION AURICULAR (OTIC) 2 TIMES DAILY
Qty: 10 ML | Refills: 0 | Status: SHIPPED | OUTPATIENT
Start: 2018-04-25 | End: 2018-05-05

## 2018-04-25 RX ADMIN — ONDANSETRON 4 MG: 4 TABLET, ORALLY DISINTEGRATING ORAL at 15:31

## 2018-04-25 RX ADMIN — HYDROCODONE BITARTRATE AND ACETAMINOPHEN 2 TABLET: 5; 325 TABLET ORAL at 15:30

## 2018-04-25 ASSESSMENT — ENCOUNTER SYMPTOMS
SHORTNESS OF BREATH: 0
VOMITING: 0
COUGH: 0
NAUSEA: 0
TROUBLE SWALLOWING: 0

## 2018-04-25 ASSESSMENT — PAIN DESCRIPTION - PAIN TYPE: TYPE: ACUTE PAIN

## 2018-04-25 ASSESSMENT — PAIN SCALES - GENERAL
PAINLEVEL_OUTOF10: 7
PAINLEVEL_OUTOF10: 9

## 2018-04-25 ASSESSMENT — PAIN DESCRIPTION - ORIENTATION: ORIENTATION: LEFT

## 2018-04-25 ASSESSMENT — PAIN DESCRIPTION - LOCATION: LOCATION: EAR

## 2018-04-26 ENCOUNTER — HOSPITAL ENCOUNTER (OUTPATIENT)
Age: 58
Discharge: HOME | End: 2018-04-26
Payer: MEDICARE

## 2018-04-26 ENCOUNTER — HOSPITAL ENCOUNTER (OUTPATIENT)
Dept: GENERAL RADIOLOGY | Age: 58
Discharge: HOME | End: 2018-04-28
Payer: MEDICARE

## 2018-04-26 ENCOUNTER — CARE COORDINATION (OUTPATIENT)
Dept: CARE COORDINATION | Age: 58
End: 2018-04-26

## 2018-04-26 ENCOUNTER — HOSPITAL ENCOUNTER (OUTPATIENT)
Age: 58
Discharge: HOME | End: 2018-04-28
Payer: MEDICARE

## 2018-04-26 DIAGNOSIS — R05.9 COUGH: ICD-10-CM

## 2018-04-26 LAB
ALBUMIN SERPL-MCNC: 4 G/DL (ref 3.5–5.2)
ALBUMIN/GLOBULIN RATIO: ABNORMAL (ref 1–2.5)
ALP BLD-CCNC: 110 U/L (ref 35–104)
ALT SERPL-CCNC: 13 U/L (ref 5–33)
ANION GAP SERPL CALCULATED.3IONS-SCNC: 10 MMOL/L (ref 9–17)
AST SERPL-CCNC: 23 U/L
BILIRUB SERPL-MCNC: 0.25 MG/DL (ref 0.3–1.2)
BILIRUBIN DIRECT: 0.1 MG/DL
BILIRUBIN, INDIRECT: 0.15 MG/DL (ref 0–1)
BUN BLDV-MCNC: 12 MG/DL (ref 6–20)
BUN/CREAT BLD: ABNORMAL (ref 9–20)
CALCIUM SERPL-MCNC: 8.9 MG/DL (ref 8.6–10.4)
CHLORIDE BLD-SCNC: 99 MMOL/L (ref 98–107)
CO2: 27 MMOL/L (ref 20–31)
CREAT SERPL-MCNC: 0.86 MG/DL (ref 0.5–0.9)
CREATININE URINE: 20.6 MG/DL (ref 28–217)
ESTIMATED AVERAGE GLUCOSE: 220 MG/DL
GFR AFRICAN AMERICAN: >60 ML/MIN
GFR NON-AFRICAN AMERICAN: >60 ML/MIN
GFR SERPL CREATININE-BSD FRML MDRD: ABNORMAL ML/MIN/{1.73_M2}
GFR SERPL CREATININE-BSD FRML MDRD: ABNORMAL ML/MIN/{1.73_M2}
GLOBULIN: ABNORMAL G/DL (ref 1.5–3.8)
GLUCOSE BLD-MCNC: 123 MG/DL (ref 70–99)
HBA1C MFR BLD: 9.3 % (ref 4–6)
MICROALBUMIN/CREAT 24H UR: 20 MG/L
MICROALBUMIN/CREAT UR-RTO: 97 MCG/MG CREAT
POTASSIUM SERPL-SCNC: 4.3 MMOL/L (ref 3.7–5.3)
SODIUM BLD-SCNC: 136 MMOL/L (ref 135–144)
THYROXINE, FREE: 0.97 NG/DL (ref 0.93–1.7)
TOTAL PROTEIN: 6.7 G/DL (ref 6.4–8.3)
TSH SERPL DL<=0.05 MIU/L-ACNC: 9.31 MIU/L (ref 0.3–5)

## 2018-04-26 PROCEDURE — 71046 X-RAY EXAM CHEST 2 VIEWS: CPT

## 2018-04-26 PROCEDURE — 80048 BASIC METABOLIC PNL TOTAL CA: CPT

## 2018-04-26 PROCEDURE — 36415 COLL VENOUS BLD VENIPUNCTURE: CPT

## 2018-04-26 PROCEDURE — 84443 ASSAY THYROID STIM HORMONE: CPT

## 2018-04-26 PROCEDURE — 82043 UR ALBUMIN QUANTITATIVE: CPT

## 2018-04-26 PROCEDURE — 84439 ASSAY OF FREE THYROXINE: CPT

## 2018-04-26 PROCEDURE — 80076 HEPATIC FUNCTION PANEL: CPT

## 2018-04-26 PROCEDURE — 82570 ASSAY OF URINE CREATININE: CPT

## 2018-04-26 PROCEDURE — 83036 HEMOGLOBIN GLYCOSYLATED A1C: CPT

## 2018-05-01 ENCOUNTER — TELEPHONE (OUTPATIENT)
Dept: INTERNAL MEDICINE CLINIC | Age: 58
End: 2018-05-01

## 2018-05-02 RX ORDER — ROPINIROLE 2 MG/1
2 TABLET, FILM COATED ORAL DAILY
Qty: 30 TABLET | Refills: 11 | Status: SHIPPED | OUTPATIENT
Start: 2018-05-02 | End: 2019-02-05 | Stop reason: SDUPTHER

## 2018-05-03 ENCOUNTER — OFFICE VISIT (OUTPATIENT)
Dept: INTERNAL MEDICINE CLINIC | Age: 58
End: 2018-05-03
Payer: MEDICARE

## 2018-05-03 ENCOUNTER — CARE COORDINATION (OUTPATIENT)
Dept: CARE COORDINATION | Age: 58
End: 2018-05-03

## 2018-05-03 VITALS
SYSTOLIC BLOOD PRESSURE: 133 MMHG | BODY MASS INDEX: 35.85 KG/M2 | HEART RATE: 71 BPM | HEIGHT: 64 IN | DIASTOLIC BLOOD PRESSURE: 83 MMHG | WEIGHT: 210 LBS

## 2018-05-03 DIAGNOSIS — Z12.39 BREAST CANCER SCREENING: Primary | ICD-10-CM

## 2018-05-03 DIAGNOSIS — H66.002 ACUTE SUPPURATIVE OTITIS MEDIA OF LEFT EAR WITHOUT SPONTANEOUS RUPTURE OF TYMPANIC MEMBRANE, RECURRENCE NOT SPECIFIED: ICD-10-CM

## 2018-05-03 DIAGNOSIS — I10 ESSENTIAL HYPERTENSION: ICD-10-CM

## 2018-05-03 DIAGNOSIS — J44.9 CHRONIC OBSTRUCTIVE PULMONARY DISEASE, UNSPECIFIED COPD TYPE (HCC): ICD-10-CM

## 2018-05-03 DIAGNOSIS — E10.43 TYPE 1 DIABETES MELLITUS WITH DIABETIC AUTONOMIC NEUROPATHY, WITH LONG-TERM CURRENT USE OF INSULIN (HCC): ICD-10-CM

## 2018-05-03 DIAGNOSIS — Z96.41 INSULIN PUMP IN PLACE: ICD-10-CM

## 2018-05-03 DIAGNOSIS — E03.2 HYPOTHYROIDISM DUE TO NON-MEDICATION EXOGENOUS SUBSTANCES: ICD-10-CM

## 2018-05-03 DIAGNOSIS — E13.319 RETINOPATHY DUE TO SECONDARY DM (HCC): ICD-10-CM

## 2018-05-03 DIAGNOSIS — G89.4 CHRONIC PAIN SYNDROME: Chronic | ICD-10-CM

## 2018-05-03 DIAGNOSIS — I73.9 PERIPHERAL VASCULAR DISEASE (HCC): ICD-10-CM

## 2018-05-03 PROBLEM — J44.1 COPD EXACERBATION (HCC): Status: RESOLVED | Noted: 2018-04-08 | Resolved: 2018-05-03

## 2018-05-03 PROBLEM — J69.0 ASPIRATION PNEUMONIA OF BOTH LOWER LOBES (HCC): Status: RESOLVED | Noted: 2017-07-11 | Resolved: 2018-05-03

## 2018-05-03 PROBLEM — E11.8 DIABETIC FOOT (HCC): Status: RESOLVED | Noted: 2017-08-15 | Resolved: 2018-05-03

## 2018-05-03 PROCEDURE — G8417 CALC BMI ABV UP PARAM F/U: HCPCS | Performed by: INTERNAL MEDICINE

## 2018-05-03 PROCEDURE — 3023F SPIROM DOC REV: CPT | Performed by: INTERNAL MEDICINE

## 2018-05-03 PROCEDURE — 99215 OFFICE O/P EST HI 40 MIN: CPT | Performed by: INTERNAL MEDICINE

## 2018-05-03 PROCEDURE — 3017F COLORECTAL CA SCREEN DOC REV: CPT | Performed by: INTERNAL MEDICINE

## 2018-05-03 PROCEDURE — G8427 DOCREV CUR MEDS BY ELIG CLIN: HCPCS | Performed by: INTERNAL MEDICINE

## 2018-05-03 PROCEDURE — 1036F TOBACCO NON-USER: CPT | Performed by: INTERNAL MEDICINE

## 2018-05-03 PROCEDURE — 3046F HEMOGLOBIN A1C LEVEL >9.0%: CPT | Performed by: INTERNAL MEDICINE

## 2018-05-03 PROCEDURE — G8926 SPIRO NO PERF OR DOC: HCPCS | Performed by: INTERNAL MEDICINE

## 2018-05-03 PROCEDURE — 2022F DILAT RTA XM EVC RTNOPTHY: CPT | Performed by: INTERNAL MEDICINE

## 2018-05-03 PROCEDURE — 1111F DSCHRG MED/CURRENT MED MERGE: CPT | Performed by: INTERNAL MEDICINE

## 2018-05-03 RX ORDER — METAXALONE 800 MG/1
800 TABLET ORAL 3 TIMES DAILY
COMMUNITY
End: 2019-04-15

## 2018-05-03 RX ORDER — ONDANSETRON 4 MG/1
4 TABLET, ORALLY DISINTEGRATING ORAL EVERY 8 HOURS PRN
COMMUNITY
End: 2018-12-30

## 2018-05-03 RX ORDER — IBUPROFEN 200 MG
200 TABLET ORAL EVERY 6 HOURS PRN
COMMUNITY
End: 2019-04-15 | Stop reason: ALTCHOICE

## 2018-05-03 RX ORDER — HYDROCODONE BITARTRATE AND ACETAMINOPHEN 5; 325 MG/1; MG/1
1 TABLET ORAL EVERY 6 HOURS PRN
COMMUNITY
End: 2018-05-03

## 2018-05-03 RX ORDER — LEVOTHYROXINE SODIUM 137 UG/1
125 TABLET ORAL DAILY
Qty: 90 TABLET | Refills: 3 | Status: SHIPPED | OUTPATIENT
Start: 2018-05-03 | End: 2019-04-16 | Stop reason: SDUPTHER

## 2018-05-03 RX ORDER — TRAMADOL HYDROCHLORIDE 50 MG/1
50 TABLET ORAL EVERY 6 HOURS PRN
COMMUNITY
End: 2018-05-03

## 2018-05-03 ASSESSMENT — ENCOUNTER SYMPTOMS
ABDOMINAL PAIN: 0
SHORTNESS OF BREATH: 1
COUGH: 0

## 2018-05-08 ENCOUNTER — HOSPITAL ENCOUNTER (OUTPATIENT)
Dept: WOMENS IMAGING | Age: 58
Discharge: HOME | End: 2018-05-10
Payer: MEDICARE

## 2018-05-08 DIAGNOSIS — Z12.39 SCREENING FOR BREAST CANCER: ICD-10-CM

## 2018-05-08 PROCEDURE — 77067 SCR MAMMO BI INCL CAD: CPT

## 2018-05-11 ENCOUNTER — OFFICE VISIT (OUTPATIENT)
Dept: INTERNAL MEDICINE CLINIC | Age: 58
End: 2018-05-11
Payer: MEDICARE

## 2018-05-11 VITALS
DIASTOLIC BLOOD PRESSURE: 62 MMHG | WEIGHT: 210 LBS | SYSTOLIC BLOOD PRESSURE: 128 MMHG | BODY MASS INDEX: 35.85 KG/M2 | HEIGHT: 64 IN

## 2018-05-11 DIAGNOSIS — E03.9 ACQUIRED HYPOTHYROIDISM: ICD-10-CM

## 2018-05-11 DIAGNOSIS — I10 ESSENTIAL HYPERTENSION: ICD-10-CM

## 2018-05-11 DIAGNOSIS — E78.2 MIXED HYPERLIPIDEMIA: ICD-10-CM

## 2018-05-11 DIAGNOSIS — R60.9 EDEMA, UNSPECIFIED TYPE: Primary | ICD-10-CM

## 2018-05-11 DIAGNOSIS — I50.43 ACUTE ON CHRONIC COMBINED SYSTOLIC AND DIASTOLIC CONGESTIVE HEART FAILURE (HCC): ICD-10-CM

## 2018-05-11 DIAGNOSIS — T80.1XXA VASCULAR COMPLICATIONS FOLLOWING INFUSION, TRANSFUSION AND THERAPEUTIC INJECTION, INITIAL ENCOUNTER: ICD-10-CM

## 2018-05-11 DIAGNOSIS — G25.81 RESTLESS LEGS SYNDROME: ICD-10-CM

## 2018-05-11 PROCEDURE — 1111F DSCHRG MED/CURRENT MED MERGE: CPT | Performed by: INTERNAL MEDICINE

## 2018-05-11 PROCEDURE — 1036F TOBACCO NON-USER: CPT | Performed by: INTERNAL MEDICINE

## 2018-05-11 PROCEDURE — 99214 OFFICE O/P EST MOD 30 MIN: CPT | Performed by: INTERNAL MEDICINE

## 2018-05-11 PROCEDURE — 3017F COLORECTAL CA SCREEN DOC REV: CPT | Performed by: INTERNAL MEDICINE

## 2018-05-11 PROCEDURE — G8427 DOCREV CUR MEDS BY ELIG CLIN: HCPCS | Performed by: INTERNAL MEDICINE

## 2018-05-11 PROCEDURE — G8417 CALC BMI ABV UP PARAM F/U: HCPCS | Performed by: INTERNAL MEDICINE

## 2018-05-11 RX ORDER — DOXYCYCLINE HYCLATE 100 MG/1
CAPSULE ORAL
COMMUNITY
Start: 2018-05-09 | End: 2018-08-21

## 2018-05-11 ASSESSMENT — PATIENT HEALTH QUESTIONNAIRE - PHQ9
SUM OF ALL RESPONSES TO PHQ QUESTIONS 1-9: 2
1. LITTLE INTEREST OR PLEASURE IN DOING THINGS: 1
2. FEELING DOWN, DEPRESSED OR HOPELESS: 1
SUM OF ALL RESPONSES TO PHQ9 QUESTIONS 1 & 2: 2

## 2018-05-11 ASSESSMENT — ENCOUNTER SYMPTOMS
BLOOD IN STOOL: 0
SHORTNESS OF BREATH: 0
DIARRHEA: 0
WHEEZING: 0
COLOR CHANGE: 0
ABDOMINAL DISTENTION: 0
EYE DISCHARGE: 0
COUGH: 0
TROUBLE SWALLOWING: 0
EYE PAIN: 0

## 2018-05-17 ENCOUNTER — CARE COORDINATION (OUTPATIENT)
Dept: CARE COORDINATION | Age: 58
End: 2018-05-17

## 2018-05-23 ENCOUNTER — CARE COORDINATION (OUTPATIENT)
Dept: CARE COORDINATION | Age: 58
End: 2018-05-23

## 2018-05-23 ENCOUNTER — TELEPHONE (OUTPATIENT)
Dept: INTERNAL MEDICINE CLINIC | Age: 58
End: 2018-05-23

## 2018-05-24 RX ORDER — METOPROLOL TARTRATE 50 MG/1
TABLET, FILM COATED ORAL
Qty: 60 TABLET | Refills: 4 | Status: SHIPPED | OUTPATIENT
Start: 2018-05-24 | End: 2018-09-22 | Stop reason: SDUPTHER

## 2018-05-30 ENCOUNTER — CARE COORDINATION (OUTPATIENT)
Dept: CARE COORDINATION | Age: 58
End: 2018-05-30

## 2018-06-03 DIAGNOSIS — E10.43 TYPE 1 DIABETES MELLITUS WITH DIABETIC AUTONOMIC NEUROPATHY (HCC): ICD-10-CM

## 2018-06-03 DIAGNOSIS — G89.4 CHRONIC PAIN SYNDROME: Chronic | ICD-10-CM

## 2018-06-04 ENCOUNTER — CARE COORDINATION (OUTPATIENT)
Dept: CARE COORDINATION | Age: 58
End: 2018-06-04

## 2018-06-04 RX ORDER — GABAPENTIN 800 MG/1
TABLET ORAL
Qty: 60 TABLET | Refills: 5 | Status: SHIPPED | OUTPATIENT
Start: 2018-06-04 | End: 2018-12-15 | Stop reason: SDUPTHER

## 2018-06-04 RX ORDER — METOCLOPRAMIDE 5 MG/1
TABLET ORAL
Qty: 120 TABLET | Refills: 0 | Status: SHIPPED | OUTPATIENT
Start: 2018-06-04 | End: 2018-07-14 | Stop reason: SDUPTHER

## 2018-06-04 RX ORDER — DULOXETIN HYDROCHLORIDE 60 MG/1
CAPSULE, DELAYED RELEASE ORAL
Qty: 30 CAPSULE | Refills: 8 | Status: SHIPPED | OUTPATIENT
Start: 2018-06-04 | End: 2019-01-04

## 2018-06-11 ENCOUNTER — CARE COORDINATION (OUTPATIENT)
Dept: CARE COORDINATION | Age: 58
End: 2018-06-11

## 2018-06-21 LAB
AVERAGE GLUCOSE: 194
HBA1C MFR BLD: 8.4 %
TSH SERPL DL<=0.05 MIU/L-ACNC: 0.09 UIU/ML

## 2018-06-22 ENCOUNTER — HOSPITAL ENCOUNTER (OUTPATIENT)
Dept: NON INVASIVE DIAGNOSTICS | Age: 58
Discharge: HOME | End: 2018-06-22
Payer: MEDICARE

## 2018-06-22 ENCOUNTER — HOSPITAL ENCOUNTER (OUTPATIENT)
Dept: VASCULAR LAB | Age: 58
Discharge: HOME | End: 2018-06-22
Payer: MEDICARE

## 2018-06-22 DIAGNOSIS — I50.43 ACUTE ON CHRONIC COMBINED SYSTOLIC AND DIASTOLIC CONGESTIVE HEART FAILURE (HCC): ICD-10-CM

## 2018-06-22 LAB
LV EF: 55 %
LVEF MODALITY: NORMAL

## 2018-06-22 PROCEDURE — 93306 TTE W/DOPPLER COMPLETE: CPT

## 2018-06-22 PROCEDURE — 93970 EXTREMITY STUDY: CPT

## 2018-06-25 ENCOUNTER — HOSPITAL ENCOUNTER (OUTPATIENT)
Age: 58
Setting detail: SPECIMEN
Discharge: HOME | End: 2018-06-25
Payer: MEDICARE

## 2018-06-25 DIAGNOSIS — N39.0 URINARY TRACT INFECTION WITHOUT HEMATURIA, SITE UNSPECIFIED: ICD-10-CM

## 2018-06-25 DIAGNOSIS — N39.0 URINARY TRACT INFECTION WITHOUT HEMATURIA, SITE UNSPECIFIED: Primary | ICD-10-CM

## 2018-06-25 LAB
BILIRUBIN URINE: NEGATIVE
COLOR: YELLOW
COMMENT UA: NORMAL
GLUCOSE URINE: NEGATIVE
KETONES, URINE: NEGATIVE
LEUKOCYTE ESTERASE, URINE: NEGATIVE
NITRITE, URINE: NEGATIVE
PH UA: 7 (ref 5–8)
PROTEIN UA: NEGATIVE
SPECIFIC GRAVITY UA: 1 (ref 1–1.03)
TURBIDITY: CLEAR
URINE HGB: NEGATIVE
UROBILINOGEN, URINE: NORMAL

## 2018-06-28 DIAGNOSIS — E10.43 TYPE 1 DIABETES MELLITUS WITH DIABETIC AUTONOMIC NEUROPATHY, WITH LONG-TERM CURRENT USE OF INSULIN (HCC): ICD-10-CM

## 2018-06-28 DIAGNOSIS — E03.2 HYPOTHYROIDISM DUE TO NON-MEDICATION EXOGENOUS SUBSTANCES: ICD-10-CM

## 2018-07-11 ENCOUNTER — CARE COORDINATION (OUTPATIENT)
Dept: CARE COORDINATION | Age: 58
End: 2018-07-11

## 2018-07-14 DIAGNOSIS — E10.43 TYPE 1 DIABETES MELLITUS WITH DIABETIC AUTONOMIC NEUROPATHY (HCC): ICD-10-CM

## 2018-07-16 RX ORDER — METOCLOPRAMIDE 5 MG/1
TABLET ORAL
Qty: 120 TABLET | Refills: 0 | Status: SHIPPED | OUTPATIENT
Start: 2018-07-16 | End: 2019-04-16 | Stop reason: SDUPTHER

## 2018-07-20 ENCOUNTER — ANESTHESIA EVENT (OUTPATIENT)
Dept: OPERATING ROOM | Age: 58
End: 2018-07-20
Payer: MEDICARE

## 2018-07-23 ENCOUNTER — ANESTHESIA (OUTPATIENT)
Dept: OPERATING ROOM | Age: 58
End: 2018-07-23
Payer: MEDICARE

## 2018-07-23 ENCOUNTER — HOSPITAL ENCOUNTER (OUTPATIENT)
Age: 58
Setting detail: OUTPATIENT SURGERY
Discharge: HOME | End: 2018-07-23
Attending: SURGERY | Admitting: SURGERY
Payer: MEDICARE

## 2018-07-23 VITALS
WEIGHT: 208 LBS | HEIGHT: 64 IN | RESPIRATION RATE: 19 BRPM | TEMPERATURE: 97.2 F | BODY MASS INDEX: 35.51 KG/M2 | OXYGEN SATURATION: 94 % | HEART RATE: 72 BPM | DIASTOLIC BLOOD PRESSURE: 76 MMHG | SYSTOLIC BLOOD PRESSURE: 152 MMHG

## 2018-07-23 VITALS
DIASTOLIC BLOOD PRESSURE: 70 MMHG | RESPIRATION RATE: 10 BRPM | OXYGEN SATURATION: 98 % | SYSTOLIC BLOOD PRESSURE: 174 MMHG

## 2018-07-23 LAB — GLUCOSE BLD-MCNC: 323 MG/DL (ref 65–105)

## 2018-07-23 PROCEDURE — 82947 ASSAY GLUCOSE BLOOD QUANT: CPT

## 2018-07-23 PROCEDURE — 2709999900 HC NON-CHARGEABLE SUPPLY: Performed by: SURGERY

## 2018-07-23 PROCEDURE — 3609012400 HC EGD TRANSORAL BIOPSY SINGLE/MULTIPLE: Performed by: SURGERY

## 2018-07-23 PROCEDURE — 3700000001 HC ADD 15 MINUTES (ANESTHESIA): Performed by: SURGERY

## 2018-07-23 PROCEDURE — 2580000003 HC RX 258: Performed by: ANESTHESIOLOGY

## 2018-07-23 PROCEDURE — 7100000010 HC PHASE II RECOVERY - FIRST 15 MIN: Performed by: SURGERY

## 2018-07-23 PROCEDURE — 7100000011 HC PHASE II RECOVERY - ADDTL 15 MIN: Performed by: SURGERY

## 2018-07-23 PROCEDURE — 88305 TISSUE EXAM BY PATHOLOGIST: CPT

## 2018-07-23 PROCEDURE — 3700000000 HC ANESTHESIA ATTENDED CARE: Performed by: SURGERY

## 2018-07-23 PROCEDURE — 6370000000 HC RX 637 (ALT 250 FOR IP): Performed by: SURGERY

## 2018-07-23 PROCEDURE — 6360000002 HC RX W HCPCS: Performed by: NURSE ANESTHETIST, CERTIFIED REGISTERED

## 2018-07-23 PROCEDURE — 2500000003 HC RX 250 WO HCPCS: Performed by: NURSE ANESTHETIST, CERTIFIED REGISTERED

## 2018-07-23 RX ORDER — SODIUM CHLORIDE 9 MG/ML
INJECTION, SOLUTION INTRAVENOUS CONTINUOUS
Status: DISCONTINUED | OUTPATIENT
Start: 2018-07-24 | End: 2018-07-23

## 2018-07-23 RX ORDER — PROPOFOL 10 MG/ML
INJECTION, EMULSION INTRAVENOUS PRN
Status: DISCONTINUED | OUTPATIENT
Start: 2018-07-23 | End: 2018-07-23 | Stop reason: SDUPTHER

## 2018-07-23 RX ORDER — SODIUM CHLORIDE 0.9 % (FLUSH) 0.9 %
10 SYRINGE (ML) INJECTION EVERY 12 HOURS SCHEDULED
Status: DISCONTINUED | OUTPATIENT
Start: 2018-07-23 | End: 2018-07-23 | Stop reason: HOSPADM

## 2018-07-23 RX ORDER — LIDOCAINE HYDROCHLORIDE 10 MG/ML
INJECTION, SOLUTION EPIDURAL; INFILTRATION; INTRACAUDAL; PERINEURAL PRN
Status: DISCONTINUED | OUTPATIENT
Start: 2018-07-23 | End: 2018-07-23 | Stop reason: SDUPTHER

## 2018-07-23 RX ORDER — SODIUM CHLORIDE 0.9 % (FLUSH) 0.9 %
10 SYRINGE (ML) INJECTION PRN
Status: DISCONTINUED | OUTPATIENT
Start: 2018-07-23 | End: 2018-07-23 | Stop reason: HOSPADM

## 2018-07-23 RX ORDER — ONDANSETRON 2 MG/ML
4 INJECTION INTRAMUSCULAR; INTRAVENOUS
Status: DISCONTINUED | OUTPATIENT
Start: 2018-07-23 | End: 2018-07-23 | Stop reason: HOSPADM

## 2018-07-23 RX ORDER — DEXTROSE MONOHYDRATE 50 MG/ML
INJECTION, SOLUTION INTRAVENOUS ONCE
Status: DISCONTINUED | OUTPATIENT
Start: 2018-07-23 | End: 2018-07-23 | Stop reason: HOSPADM

## 2018-07-23 RX ORDER — LIDOCAINE HYDROCHLORIDE 10 MG/ML
1 INJECTION, SOLUTION EPIDURAL; INFILTRATION; INTRACAUDAL; PERINEURAL
Status: DISCONTINUED | OUTPATIENT
Start: 2018-07-24 | End: 2018-07-23 | Stop reason: HOSPADM

## 2018-07-23 RX ORDER — SODIUM CHLORIDE, SODIUM LACTATE, POTASSIUM CHLORIDE, CALCIUM CHLORIDE 600; 310; 30; 20 MG/100ML; MG/100ML; MG/100ML; MG/100ML
INJECTION, SOLUTION INTRAVENOUS CONTINUOUS
Status: DISCONTINUED | OUTPATIENT
Start: 2018-07-24 | End: 2018-07-23 | Stop reason: HOSPADM

## 2018-07-23 RX ORDER — MIDAZOLAM HYDROCHLORIDE 1 MG/ML
INJECTION INTRAMUSCULAR; INTRAVENOUS PRN
Status: DISCONTINUED | OUTPATIENT
Start: 2018-07-23 | End: 2018-07-23 | Stop reason: SDUPTHER

## 2018-07-23 RX ADMIN — PROPOFOL 20 MG: 10 INJECTION, EMULSION INTRAVENOUS at 07:37

## 2018-07-23 RX ADMIN — SODIUM CHLORIDE, POTASSIUM CHLORIDE, SODIUM LACTATE AND CALCIUM CHLORIDE: 600; 310; 30; 20 INJECTION, SOLUTION INTRAVENOUS at 06:58

## 2018-07-23 RX ADMIN — SODIUM CHLORIDE, POTASSIUM CHLORIDE, SODIUM LACTATE AND CALCIUM CHLORIDE: 600; 310; 30; 20 INJECTION, SOLUTION INTRAVENOUS at 07:28

## 2018-07-23 RX ADMIN — LIDOCAINE HYDROCHLORIDE 50 MG: 10 INJECTION, SOLUTION EPIDURAL; INFILTRATION; INTRACAUDAL; PERINEURAL at 07:33

## 2018-07-23 RX ADMIN — PROPOFOL 30 MG: 10 INJECTION, EMULSION INTRAVENOUS at 07:33

## 2018-07-23 RX ADMIN — PROPOFOL 20 MG: 10 INJECTION, EMULSION INTRAVENOUS at 07:36

## 2018-07-23 RX ADMIN — MIDAZOLAM 2 MG: 1 INJECTION INTRAMUSCULAR; INTRAVENOUS at 07:28

## 2018-07-23 RX ADMIN — PROPOFOL 20 MG: 10 INJECTION, EMULSION INTRAVENOUS at 07:40

## 2018-07-23 RX ADMIN — PROPOFOL 30 MG: 10 INJECTION, EMULSION INTRAVENOUS at 07:38

## 2018-07-23 ASSESSMENT — PULMONARY FUNCTION TESTS
PIF_VALUE: 1

## 2018-07-23 ASSESSMENT — PAIN DESCRIPTION - DESCRIPTORS: DESCRIPTORS: ACHING

## 2018-07-23 ASSESSMENT — PAIN SCALES - GENERAL
PAINLEVEL_OUTOF10: 0

## 2018-07-23 ASSESSMENT — ENCOUNTER SYMPTOMS: SHORTNESS OF BREATH: 0

## 2018-07-23 ASSESSMENT — PAIN - FUNCTIONAL ASSESSMENT: PAIN_FUNCTIONAL_ASSESSMENT: 0-10

## 2018-07-23 NOTE — ANESTHESIA PRE PROCEDURE
Department of Anesthesiology  Preprocedure Note       Name:  Roxane Spicer   Age:  56 y.o.  :  1960                                          MRN:  [id number]         Date:  2018      Surgeon: Jackelin Garza):  José Gustafson MD    Procedure: Procedure(s):  EGD ESOPHAGOGASTRODUODENOSCOPY    Medications prior to admission:   Prior to Admission medications    Medication Sig Start Date End Date Taking?  Authorizing Provider   metoclopramide (REGLAN) 5 MG tablet TAKE 1 TABLET BY MOUTH TWICE DAILY 18  Yes Garry Rosales MD   gabapentin (NEURONTIN) 800 MG tablet TAKE ONE TABLET BY MOUTH TWICE DAILY 18 Yes Leonie Friedman MD   DULoxetine (CYMBALTA) 60 MG extended release capsule TAKE ONE CAPSULE BY MOUTH ONCE DAILY 18  Yes Leonie Friedman MD   metoprolol tartrate (LOPRESSOR) 50 MG tablet TAKE ONE TABLET BY MOUTH TWICE DAILY 18  Yes Mayuri Harding MD   levothyroxine (SYNTHROID) 137 MCG tablet Take 1 tablet by mouth daily 5/3/18  Yes Leonie Friedman MD   rOPINIRole (REQUIP) 2 MG tablet Take 1 tablet by mouth daily 18  Yes Leonie Friedman MD   clonazePAM (KLONOPIN) 0.5 MG tablet take 1 tablet by mouth twice a day if needed for anxiety but takes one for sure at hs. 18 Yes Leonie Friedman MD   pantoprazole (PROTONIX) 40 MG tablet Take 40 mg by mouth 2 times daily   Yes Historical Provider, MD   ranitidine (ZANTAC) 150 MG capsule Take 150 mg by mouth nightly   Yes Historical Provider, MD   simvastatin (ZOCOR) 20 MG tablet Take 1 tablet by mouth nightly 3/19/18  Yes Leonie Friedman MD   losartan (COZAAR) 100 MG tablet Take 1 tablet by mouth daily 3/19/18  Yes Leonie Friedman MD   torsemide (DEMADEX) 20 MG tablet TAKE ONE TABLET BY MOUTH ONCE DAILY 18  Yes Leonie Friedman MD   insulin aspart (NOVOLOG) 100 UNIT/ML injection vial Inject into the skin Via insulin pump 16  Yes Historical Provider, MD   BREO ELLIPTA 200-25 MCG/INH AEPB INL 1 PUFF PO DAILY 10/7/17 Yes Historical Provider, MD   amitriptyline (ELAVIL) 150 MG tablet Take 1 tablet by mouth nightly 8/10/17  Yes Elissa Latham MD   albuterol (PROVENTIL) (2.5 MG/3ML) 0.083% nebulizer solution Take 1 mL by nebulization 3 times daily as needed 6/15/17  Yes Historical Provider, MD   tiotropium (Naomy Borne) 18 MCG inhalation capsule Inhale 1 capsule into the lungs Daily 6/19/17  Yes Johanna Rivas MD   albuterol sulfate HFA (PROAIR HFA) 108 (90 BASE) MCG/ACT inhaler Inhale 2 puffs into the lungs every 6 hours as needed for Wheezing 5/26/17  Yes Martir Schmidt MD   vitamin D (CHOLECALCIFEROL) 1000 UNIT TABS tablet Take 4,000 Units by mouth daily Takes 4 tabs daily   Yes Historical Provider, MD   Probiotic Product (PROBIOTIC DAILY PO)   Take 1 tablet by mouth    Yes Historical Provider, MD   aspirin 81 MG tablet Take 81 mg by mouth daily.    Yes Historical Provider, MD   doxycycline hyclate (VIBRAMYCIN) 100 MG capsule  5/9/18   Historical Provider, MD   ibuprofen (ADVIL;MOTRIN) 200 MG tablet Take 200 mg by mouth every 6 hours as needed for Pain    Historical Provider, MD   metaxalone (SKELAXIN) 800 MG tablet Take 800 mg by mouth 3 times daily    Historical Provider, MD   ondansetron (ZOFRAN-ODT) 4 MG disintegrating tablet Take 4 mg by mouth every 8 hours as needed for Nausea or Vomiting    Historical Provider, MD   ONE TOUCH ULTRA TEST strip  6/7/17   Historical Provider, MD   B-KELVIN INSULIN SYRINGE 29G X 1/2\" 0.5 ML MISC  5/9/17   Historical Provider, MD   fluticasone (FLONASE) 50 MCG/ACT nasal spray 1 spray by Nasal route 2 times daily 6/1/17   Jordana Hua MD   docusate sodium (COLACE) 100 MG capsule Take 1 capsule by mouth 2 times daily Take while on narcotics 7/7/15   Janie Fung MD       Current medications:    Current Facility-Administered Medications   Medication Dose Route Frequency Provider Last Rate Last Dose    dextrose 5 % solution   Intravenous Once MD Zane Tang ON Anesthesia complication     \"lung collapsed after colon surgery    Anxiety     Arthritis     Back pain     CAD (coronary artery disease)     no stents    Caffeine use     3 coffee / day    Cataract     left    COPD (chronic obstructive pulmonary disease) (HCC)     EMPHYSEMA    Deafness     right ear    Depression     Diabetes mellitus (HCC)     Diarrhea     Diverticulosis     Fibromyalgia     Gastroparalysis     GERD (gastroesophageal reflux disease)     Hearing loss     DEAF IN RIGHT EAR    Hyperlipidemia     Hyperlipidemia     Hypertension     Incontinence     MDRO (multiple drug resistant organisms) resistance 2012    mrsa (nasal), eye, ear    Neurogenic bladder     CATHES HERSELF 7 TIMES A DAY, IS ABLE TO URINATE ON OWN    Neuropathy ([facility])     feet    Obesity     Osteoarthritis     Peripheral vascular disease, unspecified     Restless leg syndrome     Rhabdomyolysis     Deposit 1 week, May 2014, then [facility] for rehab.     Spinal stenosis, thoracic 5/27/2014    Stroke [facility]) 2012    numbness on the left side of face    Thyroid disease     enlarged    Trigeminal neuralgia     Type II or unspecified type diabetes mellitus without mention of complication, not stated as uncontrolled        Past Surgical History:        Procedure Laterality Date    ABDOMEN SURGERY      LAPAROSCOPY    CARPAL TUNNEL RELEASE Right     CATARACT REMOVAL      CHOLECYSTECTOMY      CHOLECYSTECTOMY, OPEN      11/2012    COLON SURGERY      benign mass removed    COLONOSCOPY      COLONOSCOPY  07/13/2016    CYST REMOVAL      sebaceous cyst, under arm left side x5    CYST REMOVAL      right ankle    CYSTOSCOPY      EYE SURGERY Right     HOLE IN RETINA REPAIRED    FINGER TRIGGER RELEASE Right     INCISIONAL HERNIA REPAIR  07/07/15    open   [address]  10/17/2017    MIDDLE EAR SURGERY Left     ear tube placement    POLYPECTOMY      colon polyp    IN REPAIR INCISIONAL ALT 13 04/26/2018       POC Tests: No results for input(s): POCGLU, POCNA, POCK, POCCL, POCBUN, POCHEMO, POCHCT in the last 72 hours. Coags: No results found for: PROTIME, INR, APTT    HCG (If Applicable): No results found for: PREGTESTUR, PREGSERUM, HCG, HCGQUANT     ABGs: No results found for: PHART, PO2ART, PPF8RGI, OJS3YXO, BEART, Y4WPSMGB     Type & Screen (If Applicable):  No results found for: LABABO, LABRH    Anesthesia Evaluation    Airway: Mallampati: I  TM distance: >3 FB   Neck ROM: full  Mouth opening: > = 3 FB Dental:          Pulmonary:normal exam    (+) COPD:      (-) shortness of breath                           Cardiovascular:    (+) hypertension:, CAD:,     (-) CABG/stent and  angina        Rate: normal                    Neuro/Psych:   (+) CVA: no interval change,             GI/Hepatic/Renal:             Endo/Other:    (+) Diabetes, . Abdominal:           Vascular:                                        Anesthesia Plan      general and MAC     ASA 3             Anesthetic plan and risks discussed with patient.                       Nkechi Carson MD   7/23/2018

## 2018-07-23 NOTE — H&P
History and Physical Service   [place] [id number]    HISTORY AND PHYSICAL EXAMINATION            Date of Evaluation: 7/23/2018  Patient name:  Daly Magallanes  MRN:   [id number]  YOB: 1960  PCP:    Lesly Almazan MD    History Obtained From:     Patient    History of Present Illness: This is Daly Magallanes a 56 y.o. female who presents today for an EGD. No hematemesis, vomiting or constipation. She does report chronic diarrhea that is not dark in color and bloody. She has a history of poorly controlled diabetes and has been diagnosed with gastroparesis. She has had symptoms of GERD. She also has a neurogenic bladder due to problems with diabetic neuropathy. She denies any problems with skin lesions, cough, dysuria or fever or chills. She currently has an elevated blood pressure and denies any cardiac or neurological symptoms. She also suffers form COPD that she reports is well controlled. She denies any history of sleep apnea. Her OT BS this AM that is 323. History of MRSA with the last infection 6 weeks ago of the ear that she resolved but has problems with her hearing now.       Past Medical History:     Past Medical History:   Diagnosis Date    Anesthesia complication     \"lung collapsed after colon surgery    Anxiety     Arthritis     Back pain     CAD (coronary artery disease)     no stents    Caffeine use     3 coffee / day    Cataract     left    COPD (chronic obstructive pulmonary disease) (HCC)     EMPHYSEMA    Deafness     right ear    Depression     Diabetes mellitus (HCC)     Diarrhea     Diverticulosis     Fibromyalgia     Gastroparalysis     GERD (gastroesophageal reflux disease)     Hearing loss     DEAF IN RIGHT EAR    Hyperlipidemia     Hyperlipidemia     Hypertension     Incontinence     MDRO (multiple drug resistant organisms) resistance 2012    mrsa (nasal), eye, ear    Neurogenic bladder     CATHES HERSELF 7 TIMES A DAY, IS Provider, MD   aspirin 81 MG tablet Take 81 mg by mouth daily. Yes Historical Provider, MD   doxycycline hyclate (VIBRAMYCIN) 100 MG capsule  5/9/18   Historical Provider, MD   ibuprofen (ADVIL;MOTRIN) 200 MG tablet Take 200 mg by mouth every 6 hours as needed for Pain    Historical Provider, MD   metaxalone (SKELAXIN) 800 MG tablet Take 800 mg by mouth 3 times daily    Historical Provider, MD   ondansetron (ZOFRAN-ODT) 4 MG disintegrating tablet Take 4 mg by mouth every 8 hours as needed for Nausea or Vomiting    Historical Provider, MD   ONE TOUCH ULTRA TEST strip  6/7/17   Historical Provider, MD COELHO-KELVIN INSULIN SYRINGE 29G X 1/2\" 0.5 ML MISC  5/9/17   Historical Provider, MD   fluticasone (FLONASE) 50 MCG/ACT nasal spray 1 spray by Nasal route 2 times daily 6/1/17   Efrain Wall MD   docusate sodium (COLACE) 100 MG capsule Take 1 capsule by mouth 2 times daily Take while on narcotics 7/7/15   Amanda Ansari MD        Allergies:     Fioricet [butalbital-apap-caffeine]; Erythromycin; Codeine; Droperidol; and Tape [adhesive tape]    Social History:     Tobacco:    reports that she has never smoked. She has never used smokeless tobacco.  Alcohol:      reports that she does not drink alcohol. Drug Use:  reports that she uses drugs, including Marijuana, about 4 times per week. Family History:     Family History   Problem Relation Age of Onset    High Blood Pressure Mother     Migraines Mother     High Blood Pressure Father     Heart Disease Father     Cancer Father         skin    Diabetes Maternal Grandmother     Heart Disease Maternal Grandmother     Cancer Maternal Grandmother     Parkinsonism Maternal Grandmother     Cancer Paternal Grandmother     Other Brother         epilepsy       Review of Systems:     Positive and Negative as described in HPI.     CONSTITUTIONAL:  negative for fevers, chills, sweats, fatigue, weight loss  HEENT:  negative for vision, hearing changes, runny nose, throat pain  RESPIRATORY:  negative for shortness of breath, cough, congestion, wheezing. CARDIOVASCULAR:  negative for chest pain, palpitations. GASTROINTESTINAL:  See HPI. Negative for nausea, vomiting, diarrhea, constipation, change in bowel habits, abdominal pain   GENITOURINARY:  negative for difficulty of urination, burning with urination, frequency   INTEGUMENT:  negative for rash, skin lesions, easy bruising   HEMATOLOGIC/LYMPHATIC:  negative for swelling/edema   ALLERGIC/IMMUNOLOGIC:  negative for urticaria , itching  ENDOCRINE:  negative increase in drinking, increase in urination, hot or cold intolerance  MUSCULOSKELETAL:  negative joint pains, muscle aches, swelling of joints  NEUROLOGICAL:  negative for headaches, dizziness, lightheadedness, numbness, pain, tingling extremities  BEHAVIOR/PSYCH:  negative for depression, anxiety    Physical Exam:   BP (!) 161/68   Pulse 80   Temp 97.9 °F (36.6 °C) (Oral)   Resp 20   Ht 5' 4\" (1.626 m)   Wt 208 lb (94.3 kg)   SpO2 95%   BMI 35.70 kg/m²       General Appearance:  alert, well appearing, and in no acute distress  Mental status: oriented to person, place, and time with normal affect  Head:  normocephalic, atraumatic. Eye: no icterus, redness, pupils equal and reactive, extraocular eye movements intact, conjunctiva clear  Ear: normal external ear, no discharge, hearing intact  Nose:  no drainage noted  Mouth: mucous membranes moist  Neck: supple, no carotid bruits, thyroid not palpable  Lungs: Bilateral equal air entry, clear to ausculation, no wheezing, rales or rhonchi, normal effort  Cardiovascular: normal rate, regular rhythm, no murmur, gallop, rub.   Abdomen: Soft, nontender, nondistended, normal bowel sounds, no hepatomegaly or splenomegaly  Neurologic: There are no new focal motor or sensory deficits, normal muscle tone and bulk, no abnormal sensation, normal speech, cranial nerves II through XII grossly intact  Skin: No gross lesions, rashes, bruising or bleeding on exposed skin area  Extremities:  peripheral pulses palpable, no pedal edema or calf pain with palpation  Psych: normal affect           Diagnosis:      Gastroparesis        KIMBERLEE Leong CNP  7/23/2018  7:17 AM

## 2018-07-23 NOTE — OP NOTE
Operative Note    Vargas Camera  [id number]  1960    Preoperative Diagnosis:  Epigastric abdominal pain    Postoperative Diagnosis:   Antral gastritis and duodenitis, retained food consistent with her gastroparesis    Procedure:  EGD with duodenal biopsy    Surgeon:  Quinton Connell M.D. Assistant:  Timmy Jaimes D.O. Anesthesia: Monitored anesthesia care    EBL: Minimal    Findings:  Antral gastritis and duodenitis, retained food in the stomach    Specimens:  Duodenal biopsy    Disposition:  To Recovery Room in stable condition    Indications for Procedure: This is a very pleasant 56 y.o. female who presents with the abdominal pain. The patient has a history of gastroparesis and prior gastritis. The risks and benefits of EGD were discussed with the patient and verbal and written consent was obtained. Procedure in Detail:  After verbal and written consent, the patient was brought to the operating room. A timeout was performed with the staff in the room confirming both the patient and the procedure to be performed. The procedure was begun by anesthetizing her posterior pharynx with topical anesthetic. A bite block was placed. Monitored anesthesia care was administered. An upper endoscope was inserted into the patient's posterior pharynx and under direct visualization was advanced down the esophagus and into the stomach. The pylorus was intubated and the duodenum appeared grossly normal.  There was retained food throughout the stomach. There was evidence of antral gastritis. The patient had evidence of superficial duodenal erosions as well. A duodenal cold biopsy was performed. The scope was advanced to the third portion of the duodenum. The scope was then withdrawn back into the stomach and retroflexed. This demonstrated no evidence of proximal gastric lesions. The distal esophagus appeared fairly unremarkable. The remaining esophagus was without evidence of stricture.   The

## 2018-07-23 NOTE — ANESTHESIA POSTPROCEDURE EVALUATION
Department of Anesthesiology  Postprocedure Note    Patient: Bryanna Bundy  MRN: [id number]  YOB: 1960  Date of evaluation: 7/23/2018  Time:  10:26 AM     Procedure Summary     Date:  07/23/18 [facility]    Anesthesia Start:  0726 Anesthesia Stop:  Lito Garzon    Procedure:  EGD BIOPSY (N/A ) Diagnosis:  (DX ABD PAIN GERD)    Surgeon:  Alon Engel MD Responsible Provider:  Kirk Madrid MD    Anesthesia Type:  general, MAC ASA Status:  3          Anesthesia Type: general, MAC    Melina Phase I:      Melina Phase II: Melina Score: 10    Last vitals: Reviewed and per EMR flowsheets.        Anesthesia Post Evaluation    Patient location during evaluation: PACU  Complications: no  Cardiovascular status: hemodynamically stable  Respiratory status: nonlabored ventilation

## 2018-07-24 ENCOUNTER — CARE COORDINATION (OUTPATIENT)
Dept: CARE COORDINATION | Age: 58
End: 2018-07-24

## 2018-07-24 LAB — SURGICAL PATHOLOGY REPORT: NORMAL

## 2018-07-26 LAB — GLUCOSE BLD-MCNC: 294 MG/DL (ref 65–105)

## 2018-08-14 ENCOUNTER — CARE COORDINATION (OUTPATIENT)
Dept: CARE COORDINATION | Age: 58
End: 2018-08-14

## 2018-08-14 DIAGNOSIS — F51.01 PRIMARY INSOMNIA: ICD-10-CM

## 2018-08-14 RX ORDER — AMITRIPTYLINE HYDROCHLORIDE 150 MG/1
TABLET, FILM COATED ORAL
Qty: 30 TABLET | Refills: 6 | Status: SHIPPED | OUTPATIENT
Start: 2018-08-14 | End: 2019-03-17 | Stop reason: SDUPTHER

## 2018-08-14 NOTE — CARE COORDINATION
Ambulatory Care Coordination Note  8/14/2018  CM Risk Score: 12  Jackie Mortality Risk Score:      ACC: Juan Oates, RN    Summary Note: Spoke with patient, reports she's doing ok. She's having dental pain and scheduled to have 2 teeth pulled tomorrow. States her teeth are not infected. States no new issues with her ears. She's unsure of her next appointment at the [facility]. Her mother takes her to appointments and very supportive. She has her meds on hand and taking as directed. She's aware of her PCP appointment on 8/21. She states no needs from PCP or CC at this time. Encouraged to call with any changes or concerns. Plan;  She has PCP appt 8/21  Discuss recent dental appointment          Care Coordination Interventions    Program Enrollment:  Complex Care  Referral from Primary Care Provider:  Yes  Suggested Interventions and Community Resources  Diabetes Education:  Completed  Disease Association:  Completed (Comment: Dr Maurer Master - Endocrinologist, [facility])  [address]:  Completed (Comment: [address])  Physical Therapy:  Not Started  Other Services:  Completed (Comment: [address] DME)  Zone Management Tools: In Process (Comment: DM and COPD zone sheet.)         Goals Addressed             Most Recent     Conditions and Symptoms   On track (8/14/2018)             I will schedule office visits, as directed by my provider. I will keep my appointment or reschedule if I have to cancel. I will notify my provider of any barriers to my plan of care. I will follow my Zone Management tool to seek urgent or emergent care. I will notify my provider of any symptoms that indicate a worsening of my condition. Barriers: lack of education  Plan for overcoming my barriers: will follow DM and COPD zones. Confidence: 7/10  Anticipated Goal Completion Date: 10/1/17              Prior to Admission medications    Medication Sig Start Date End Date Taking?  Authorizing

## 2018-08-21 ENCOUNTER — CARE COORDINATION (OUTPATIENT)
Dept: CARE COORDINATION | Age: 58
End: 2018-08-21

## 2018-08-21 ENCOUNTER — OFFICE VISIT (OUTPATIENT)
Dept: INTERNAL MEDICINE CLINIC | Age: 58
End: 2018-08-21
Payer: MEDICARE

## 2018-08-21 VITALS
DIASTOLIC BLOOD PRESSURE: 78 MMHG | HEIGHT: 64 IN | SYSTOLIC BLOOD PRESSURE: 151 MMHG | WEIGHT: 207 LBS | BODY MASS INDEX: 35.34 KG/M2

## 2018-08-21 DIAGNOSIS — I73.9 PERIPHERAL VASCULAR DISEASE (HCC): ICD-10-CM

## 2018-08-21 DIAGNOSIS — Z13.220 SCREENING FOR HYPERLIPIDEMIA: ICD-10-CM

## 2018-08-21 DIAGNOSIS — Z72.89 OTHER PROBLEMS RELATED TO LIFESTYLE: ICD-10-CM

## 2018-08-21 DIAGNOSIS — E10.43 TYPE 1 DIABETES MELLITUS WITH DIABETIC AUTONOMIC NEUROPATHY, WITH LONG-TERM CURRENT USE OF INSULIN (HCC): ICD-10-CM

## 2018-08-21 DIAGNOSIS — I10 ESSENTIAL HYPERTENSION: ICD-10-CM

## 2018-08-21 DIAGNOSIS — E13.319 RETINOPATHY DUE TO SECONDARY DM (HCC): ICD-10-CM

## 2018-08-21 DIAGNOSIS — J44.9 CHRONIC OBSTRUCTIVE PULMONARY DISEASE, UNSPECIFIED COPD TYPE (HCC): Primary | ICD-10-CM

## 2018-08-21 PROCEDURE — 99214 OFFICE O/P EST MOD 30 MIN: CPT | Performed by: INTERNAL MEDICINE

## 2018-08-21 PROCEDURE — G8417 CALC BMI ABV UP PARAM F/U: HCPCS | Performed by: INTERNAL MEDICINE

## 2018-08-21 PROCEDURE — 3017F COLORECTAL CA SCREEN DOC REV: CPT | Performed by: INTERNAL MEDICINE

## 2018-08-21 PROCEDURE — G8926 SPIRO NO PERF OR DOC: HCPCS | Performed by: INTERNAL MEDICINE

## 2018-08-21 PROCEDURE — 1036F TOBACCO NON-USER: CPT | Performed by: INTERNAL MEDICINE

## 2018-08-21 PROCEDURE — 2022F DILAT RTA XM EVC RTNOPTHY: CPT | Performed by: INTERNAL MEDICINE

## 2018-08-21 PROCEDURE — 3045F PR MOST RECENT HEMOGLOBIN A1C LEVEL 7.0-9.0%: CPT | Performed by: INTERNAL MEDICINE

## 2018-08-21 PROCEDURE — G8427 DOCREV CUR MEDS BY ELIG CLIN: HCPCS | Performed by: INTERNAL MEDICINE

## 2018-08-21 PROCEDURE — 3023F SPIROM DOC REV: CPT | Performed by: INTERNAL MEDICINE

## 2018-08-21 ASSESSMENT — PATIENT HEALTH QUESTIONNAIRE - PHQ9
SUM OF ALL RESPONSES TO PHQ QUESTIONS 1-9: 0
SUM OF ALL RESPONSES TO PHQ9 QUESTIONS 1 & 2: 0
2. FEELING DOWN, DEPRESSED OR HOPELESS: 0
SUM OF ALL RESPONSES TO PHQ QUESTIONS 1-9: 0
1. LITTLE INTEREST OR PLEASURE IN DOING THINGS: 0

## 2018-08-21 NOTE — PROGRESS NOTES
management     Chronic Disease Visit Information    BP Readings from Last 3 Encounters:   08/21/18 (!) 151/78   07/23/18 (!) 174/70   07/23/18 (!) 152/76          Hemoglobin A1C (%)   Date Value   06/21/2018 8.4   04/26/2018 9.3 (H)   10/17/2017 8.6 (H)     Microalb/Crt. Ratio (mcg/mg creat)   Date Value   04/26/2018 97 (H)     LDL Cholesterol (mg/dL)   Date Value   06/02/2017 82     HDL (mg/dL)   Date Value   06/02/2017 35 (L)     BUN (mg/dL)   Date Value   04/26/2018 12     CREATININE (mg/dL)   Date Value   04/26/2018 0.86     Glucose (mg/dL)   Date Value   04/26/2018 123 (H)   05/14/2012 342 (H)            Have you changed or started any medications since your last visit including any over-the-counter medicines, vitamins, or herbal medicines? no   Are you having any side effects from any of your medications? -  no  Have you stopped taking any of your medications? Is so, why? -  no    Have you seen any other physician or provider since your last visit? Yes - Records Obtained  Have you had any other diagnostic tests since your last visit? Yes - Records Obtained  Have you been seen in the emergency room and/or had an admission to a hospital since we last saw you? Yes - Records Obtained  Have you had your annual diabetic retinal (eye) exam? Yes - Records Requested  Have you had your routine dental cleaning in the past 6 months? no    Have you activated your [employer] account? If not, what are your barriers?  No: declined     Patient Care Team:  Louis Hanson MD as PCP - General  Dimitri Rondon MD as PCP - Pulmonology (Pulmonology)  Louis Hanson MD as PCP - S Attributed Provider  Roshan Fonseca MD as Consulting Physician (Urology)  Olaf Gallardo RN as [facility] AMIRAH Powers MD as Consulting Physician (Infectious Diseases)         Medical History Review  Past Medical, Family, and Social History reviewed and does contribute to the patient presenting condition    Health Maintenance   Topic Date Due    Hepatitis C screen  1960    HIV screen  10/05/1977    Diabetic foot exam  08/10/2016    Cervical cancer screen  04/30/2018    Lipid screen  06/02/2018    Shingles Vaccine (1 of 2 - 2 Dose Series) 04/18/2019 (Originally 10/5/2012)    Flu vaccine (1) 09/01/2018    Diabetic microalbuminuria test  04/26/2019    Potassium monitoring  04/26/2019    Creatinine monitoring  04/26/2019    A1C test (Diabetic or Prediabetic)  06/21/2019    TSH testing  06/21/2019    Diabetic retinal exam  07/09/2019    Breast cancer screen  05/08/2020    Colon cancer screen colonoscopy  01/20/2025    DTaP/Tdap/Td vaccine (2 - Td) 04/20/2025    Pneumococcal med risk  Completed       Discussed rogelio patient has scheduled already     Chief Complaint   Patient presents with    Hypertension     management                                                                                                                                                                                                          OFFICE VISIT  PROGRESS NOTE                                                      Date of patient's visit:  8/21/2018  Patient's Name:             Vargas Monson  YOB: 1960       Patient Care Team:  Louis Hanson MD as PCP - General  Dimitri Rondon MD as PCP - Pulmonology (Pulmonology)  Louis Hanson MD as PCP - [facility] Attributed Provider  Roshan Fonseca MD as Consulting Physician (Urology)  Olaf Gallardo RN as Care Coordinator  Jose Veloz MD as Consulting Physician (Infectious Diseases)        SUBJECTIVE     HISTORY             Pertinent details  added to ,       Chief Complaint   Patient presents with   Shirlene Perez Hypertension     management          Encounter Diagnoses   Name Primary?     Chronic obstructive pulmonary disease, unspecified COPD type ([facility] Yes    Type 1 diabetes mellitus with diabetic autonomic neuropathy, with long-term current use of insulin (HCC)     Retinopathy due to secondary DM ([facility]     Peripheral vascular disease ([facility] [facility]     Essential hypertension           AND --blood sugar control poor     Congested lungs --      ROS AS NOTED IN HPI ,    Other  positive -- Review of Systems   Constitutional: Positive for malaise/fatigue. HENT: Positive for congestion. Respiratory: Positive for shortness of breath. Negative for cough. Cardiovascular: Negative for chest pain, palpitations and leg swelling. Gastrointestinal: Negative for abdominal pain. Genitourinary: Negative for frequency. Musculoskeletal: Negative for myalgias. ALL OTHER  SYSTEM REVIEW NEGATIVE. Social History   Substance Use Topics    Smoking status: Never Smoker    Smokeless tobacco: Never Used    Alcohol use No      Comment: once a month     Fioricet [butalbital-apap-caffeine]; Erythromycin; Codeine; Droperidol; and Tape [adhesive tape]     Allergies; medicatons; past medical, surgical, family, and social history; and problem list reviewed by me, as indicated in this encounter  . OBJECTIVE      Physical exam      Vitals:    08/21/18 1422 08/21/18 1429   BP: (!) 151/72 (!) 151/78   Weight: 207 lb (93.9 kg)    Height: 5' 4.02\" (1.626 m)       Estimated body mass index is 35.51 kg/m² as calculated from the following:    Height as of this encounter: 5' 4.02\" (1.626 m). Weight as of this encounter: 207 lb (93.9 kg). Physical Exam   Constitutional: She is cooperative. Neck: Carotid bruit is not present. No thyroid mass and no thyromegaly present. Cardiovascular: Normal rate, regular rhythm and normal heart sounds. Exam reveals no S3. No murmur heard. Pulmonary/Chest: Effort normal and breath sounds normal.   Neurological: She is alert. She has normal strength. Skin: Skin is warm and dry. No rash noted. Psychiatric: She has a normal mood and affect.         DIAGNOSTICS / INSERTS / IMAGES    [x] Reviewed        Labs           Chemistry        Component Value Date/Time  04/26/2018 1215    K 4.3 04/26/2018 1215    CL 99 04/26/2018 1215    CO2 27 04/26/2018 1215    BUN 12 04/26/2018 1215    CREATININE 0.86 04/26/2018 1215        Component Value Date/Time    CALCIUM 8.9 04/26/2018 1215    ALKPHOS 110 (H) 04/26/2018 1215    AST 23 04/26/2018 1215    ALT 13 04/26/2018 1215    BILITOT 0.25 (L) 04/26/2018 1215          Lab Results   Component Value Date    WBC 6.4 04/09/2018    HGB 12.5 04/09/2018    HCT 37.7 04/09/2018    MCV 89.8 04/09/2018     04/09/2018             Results for orders placed or performed during the hospital encounter of 07/23/18   Surgical Pathology   Result Value Ref Range    Surgical Pathology Report       (NOTE)  [id number]  [address] [place] [address], [place] [address]. [place], [address]  [phone]  Fax: [phone]    [facility]    Patient Name: Holley Robin [facility] Rec: [id number]  Path Number: [id number]  Collected: 7/23/2018  Received: 7/23/2018  Reported: 7/24/2018 14:46    -- Diagnosis --  DUODENUM, BIOPSY:  - UNREMARKABLE DUODENAL MUCOSA. Susie Yousif. Latanya Pardo,  **Electronically Signed Out**         sls/7/24/2018      Clinical Information  Pre-op Diagnosis:   ABD PAIN, GERD  Operative Findings:   DUODENAL BIOPSY  Operation Performed:    EGD    Source of Specimen  1: DUODENUM BX (A)    Gross Description  \"KARYN JONES, DUODENUM BIOPSY\" A 0.3 x 0.2 x 0.2 cm tan tissue  fragment. Entirely in 1cs. po/as      Microscopic Description  The villous architecture appears normal.  There is no evidence of  active inflammation, granulomatous inflammation, ulcer, dysplasia or  malignancy.      POC Glucose Fingerstick   Result Value Ref Range    POC Glucose 323 (H) 65 - 105 mg/dL   POC Glucose Fingerstick   Result Value Ref Range    POC Glucose 294 (H) 65 - 105 mg/dL        Xr Chest Standard (2 Vw)    Result Date: 4/26/2018  EXAMINATION: TWO VIEWS OF THE

## 2018-08-21 NOTE — CARE COORDINATION
Ambulatory Care Coordination Note  8/21/2018  CM Risk Score: 12  Jackie Mortality Risk Score:      ACC: Britany Bailon, RN    Summary Note: Spoke with patient. She recently had 2 teeth pulled. She needs additional dental work for an abscessed tooth and awaiting a return call from the oral surgeon to schedule appt. She has meds on hand. Denies needs from CC at this time. She has PCP office appt today. Plan; follow up in 2 weeks           Care Coordination Interventions    Program Enrollment:  Complex Care  Referral from Primary Care Provider:  Yes  Suggested Interventions and Community Resources  Diabetes Education:  Completed  Disease Association:  Completed (Comment: Dr Bettye Estrada - Endocrinologist, [facility])  [address]:  Completed (Comment: [address])  Physical Therapy:  Not Started  Other Services:  Completed (Comment: [phone])  Zone Management Tools: In Process (Comment: DM and COPD zone sheet.)         Goals Addressed             Most Recent     Conditions and Symptoms   On track (8/21/2018)             I will schedule office visits, as directed by my provider. I will keep my appointment or reschedule if I have to cancel. I will notify my provider of any barriers to my plan of care. I will follow my Zone Management tool to seek urgent or emergent care. I will notify my provider of any symptoms that indicate a worsening of my condition. Barriers: lack of education  Plan for overcoming my barriers: will follow DM and COPD zones. Confidence: 7/10  Anticipated Goal Completion Date: 10/1/17              Prior to Admission medications    Medication Sig Start Date End Date Taking?  Authorizing Provider   amitriptyline (ELAVIL) 150 MG tablet TAKE ONE TABLET BY MOUTH ONCE NIGHTLY 8/14/18   Baldomero Mcclain MD   metoclopramide (REGLAN) 5 MG tablet TAKE 1 TABLET BY MOUTH TWICE DAILY 7/16/18   Lissett Walters MD   gabapentin (NEURONTIN) 800 MG tablet TAKE ONE TABLET tiotropium (SPIRIVA HANDIHALER) 18 MCG inhalation capsule Inhale 1 capsule into the lungs Daily 6/19/17   Alma Sifuentes MD   ONE TOUCH ULTRA TEST strip  6/7/17   Historical Provider, MD COELHO-KELVIN INSULIN SYRINGE 29G X 1/2\" 0.5 ML MISC  5/9/17   Historical Provider, MD   fluticasone (FLONASE) 50 MCG/ACT nasal spray 1 spray by Nasal route 2 times daily 6/1/17   Tobi Andrade MD   albuterol sulfate HFA (PROAIR HFA) 108 (90 BASE) MCG/ACT inhaler Inhale 2 puffs into the lungs every 6 hours as needed for Wheezing 5/26/17   Iron Landis MD   vitamin D (CHOLECALCIFEROL) 1000 UNIT TABS tablet Take 4,000 Units by mouth daily Takes 4 tabs daily    Historical Provider, MD   docusate sodium (COLACE) 100 MG capsule Take 1 capsule by mouth 2 times daily Take while on narcotics 7/7/15   Sol Johnson MD   Probiotic Product (PROBIOTIC DAILY PO)   Take 1 tablet by mouth     Historical Provider, MD   aspirin 81 MG tablet Take 81 mg by mouth daily.     Historical Provider, MD       Future Appointments  Date Time Provider Reji Jay   8/21/2018 2:00 PM Lesly Payne MD [facility]

## 2018-09-10 ENCOUNTER — TELEPHONE (OUTPATIENT)
Dept: INTERNAL MEDICINE CLINIC | Age: 58
End: 2018-09-10

## 2018-09-13 ENCOUNTER — HOSPITAL ENCOUNTER (INPATIENT)
Age: 58
LOS: 2 days | Discharge: HOME | DRG: 179 | End: 2018-09-15
Attending: EMERGENCY MEDICINE | Admitting: INTERNAL MEDICINE
Payer: MEDICARE

## 2018-09-13 ENCOUNTER — APPOINTMENT (OUTPATIENT)
Dept: GENERAL RADIOLOGY | Age: 58
DRG: 179 | End: 2018-09-13
Payer: MEDICARE

## 2018-09-13 ENCOUNTER — CARE COORDINATION (OUTPATIENT)
Dept: CARE COORDINATION | Age: 58
End: 2018-09-13

## 2018-09-13 DIAGNOSIS — R06.89 DYSPNEA AND RESPIRATORY ABNORMALITIES: ICD-10-CM

## 2018-09-13 DIAGNOSIS — R06.00 DYSPNEA AND RESPIRATORY ABNORMALITIES: ICD-10-CM

## 2018-09-13 DIAGNOSIS — J44.1 COPD EXACERBATION (HCC): Primary | ICD-10-CM

## 2018-09-13 LAB
ABSOLUTE EOS #: 0.45 K/UL (ref 0–0.44)
ABSOLUTE IMMATURE GRANULOCYTE: 0.08 K/UL (ref 0–0.3)
ABSOLUTE LYMPH #: 2.11 K/UL (ref 1.1–3.7)
ABSOLUTE MONO #: 0.57 K/UL (ref 0.1–1.2)
ANION GAP SERPL CALCULATED.3IONS-SCNC: 11 MMOL/L (ref 9–17)
BASOPHILS # BLD: 1 % (ref 0–2)
BASOPHILS ABSOLUTE: 0.08 K/UL (ref 0–0.2)
BNP INTERPRETATION: NORMAL
BUN BLDV-MCNC: 10 MG/DL (ref 6–20)
BUN/CREAT BLD: ABNORMAL (ref 9–20)
CALCIUM SERPL-MCNC: 8.7 MG/DL (ref 8.6–10.4)
CHLORIDE BLD-SCNC: 98 MMOL/L (ref 98–107)
CO2: 25 MMOL/L (ref 20–31)
CREAT SERPL-MCNC: 0.86 MG/DL (ref 0.5–0.9)
DIFFERENTIAL TYPE: ABNORMAL
EOSINOPHILS RELATIVE PERCENT: 5 % (ref 1–4)
GFR AFRICAN AMERICAN: >60 ML/MIN
GFR NON-AFRICAN AMERICAN: >60 ML/MIN
GFR SERPL CREATININE-BSD FRML MDRD: ABNORMAL ML/MIN/{1.73_M2}
GFR SERPL CREATININE-BSD FRML MDRD: ABNORMAL ML/MIN/{1.73_M2}
GLUCOSE BLD-MCNC: 330 MG/DL (ref 70–99)
GLUCOSE BLD-MCNC: 349 MG/DL (ref 65–105)
GLUCOSE BLD-MCNC: 374 MG/DL (ref 65–105)
HCT VFR BLD CALC: 39.9 % (ref 36.3–47.1)
HEMOGLOBIN: 12.7 G/DL (ref 11.9–15.1)
IMMATURE GRANULOCYTES: 1 %
LYMPHOCYTES # BLD: 22 % (ref 24–43)
MCH RBC QN AUTO: 29.1 PG (ref 25.2–33.5)
MCHC RBC AUTO-ENTMCNC: 31.8 G/DL (ref 28.4–34.8)
MCV RBC AUTO: 91.5 FL (ref 82.6–102.9)
MONOCYTES # BLD: 6 % (ref 3–12)
NRBC AUTOMATED: 0 PER 100 WBC
PDW BLD-RTO: 13.4 % (ref 11.8–14.4)
PLATELET # BLD: 232 K/UL (ref 138–453)
PLATELET ESTIMATE: ABNORMAL
PMV BLD AUTO: 9.9 FL (ref 8.1–13.5)
POTASSIUM SERPL-SCNC: 4.3 MMOL/L (ref 3.7–5.3)
PRO-BNP: 105 PG/ML
RBC # BLD: 4.36 M/UL (ref 3.95–5.11)
RBC # BLD: ABNORMAL 10*6/UL
SEG NEUTROPHILS: 65 % (ref 36–65)
SEGMENTED NEUTROPHILS ABSOLUTE COUNT: 6.27 K/UL (ref 1.5–8.1)
SODIUM BLD-SCNC: 134 MMOL/L (ref 135–144)
WBC # BLD: 9.6 K/UL (ref 3.5–11.3)
WBC # BLD: ABNORMAL 10*3/UL

## 2018-09-13 PROCEDURE — 83880 ASSAY OF NATRIURETIC PEPTIDE: CPT

## 2018-09-13 PROCEDURE — 6360000002 HC RX W HCPCS: Performed by: EMERGENCY MEDICINE

## 2018-09-13 PROCEDURE — 85025 COMPLETE CBC W/AUTO DIFF WBC: CPT

## 2018-09-13 PROCEDURE — 82947 ASSAY GLUCOSE BLOOD QUANT: CPT

## 2018-09-13 PROCEDURE — 2500000003 HC RX 250 WO HCPCS: Performed by: EMERGENCY MEDICINE

## 2018-09-13 PROCEDURE — 99285 EMERGENCY DEPT VISIT HI MDM: CPT

## 2018-09-13 PROCEDURE — 80048 BASIC METABOLIC PNL TOTAL CA: CPT

## 2018-09-13 PROCEDURE — 94640 AIRWAY INHALATION TREATMENT: CPT

## 2018-09-13 PROCEDURE — 6370000000 HC RX 637 (ALT 250 FOR IP): Performed by: STUDENT IN AN ORGANIZED HEALTH CARE EDUCATION/TRAINING PROGRAM

## 2018-09-13 PROCEDURE — 1200000000 HC SEMI PRIVATE

## 2018-09-13 PROCEDURE — 71046 X-RAY EXAM CHEST 2 VIEWS: CPT

## 2018-09-13 PROCEDURE — 2580000003 HC RX 258: Performed by: EMERGENCY MEDICINE

## 2018-09-13 RX ORDER — METHYLPREDNISOLONE SODIUM SUCCINATE 125 MG/2ML
125 INJECTION, POWDER, LYOPHILIZED, FOR SOLUTION INTRAMUSCULAR; INTRAVENOUS ONCE
Status: COMPLETED | OUTPATIENT
Start: 2018-09-13 | End: 2018-09-13

## 2018-09-13 RX ORDER — ALBUTEROL SULFATE 2.5 MG/3ML
5 SOLUTION RESPIRATORY (INHALATION)
Status: DISCONTINUED | OUTPATIENT
Start: 2018-09-13 | End: 2018-09-14

## 2018-09-13 RX ORDER — ONDANSETRON 4 MG/1
4 TABLET, ORALLY DISINTEGRATING ORAL ONCE
Status: COMPLETED | OUTPATIENT
Start: 2018-09-13 | End: 2018-09-13

## 2018-09-13 RX ORDER — CLINDAMYCIN PHOSPHATE 600 MG/50ML
600 INJECTION INTRAVENOUS EVERY 8 HOURS
Status: DISCONTINUED | OUTPATIENT
Start: 2018-09-13 | End: 2018-09-15 | Stop reason: HOSPADM

## 2018-09-13 RX ORDER — LABETALOL HYDROCHLORIDE 5 MG/ML
10 INJECTION, SOLUTION INTRAVENOUS ONCE
Status: DISCONTINUED | OUTPATIENT
Start: 2018-09-13 | End: 2018-09-13

## 2018-09-13 RX ORDER — TRAMADOL HYDROCHLORIDE 50 MG/1
50 TABLET ORAL ONCE
Status: COMPLETED | OUTPATIENT
Start: 2018-09-13 | End: 2018-09-13

## 2018-09-13 RX ADMIN — ALBUTEROL SULFATE 5 MG: 5 SOLUTION RESPIRATORY (INHALATION) at 18:50

## 2018-09-13 RX ADMIN — CEFTRIAXONE SODIUM 1 G: 1 INJECTION, POWDER, FOR SOLUTION INTRAMUSCULAR; INTRAVENOUS at 20:12

## 2018-09-13 RX ADMIN — ONDANSETRON 4 MG: 4 TABLET, ORALLY DISINTEGRATING ORAL at 18:47

## 2018-09-13 RX ADMIN — METHYLPREDNISOLONE SODIUM SUCCINATE 125 MG: 125 INJECTION, POWDER, FOR SOLUTION INTRAMUSCULAR; INTRAVENOUS at 18:47

## 2018-09-13 RX ADMIN — TRAMADOL HYDROCHLORIDE 50 MG: 50 TABLET, FILM COATED ORAL at 23:22

## 2018-09-13 RX ADMIN — CLINDAMYCIN PHOSPHATE 600 MG: 600 INJECTION, SOLUTION INTRAVENOUS at 21:31

## 2018-09-13 RX ADMIN — ALBUTEROL SULFATE 5 MG: 5 SOLUTION RESPIRATORY (INHALATION) at 19:01

## 2018-09-13 RX ADMIN — IPRATROPIUM BROMIDE 0.5 MG: 0.5 SOLUTION RESPIRATORY (INHALATION) at 18:50

## 2018-09-13 ASSESSMENT — ENCOUNTER SYMPTOMS
CONSTIPATION: 0
NAUSEA: 1
DIARRHEA: 0
PHOTOPHOBIA: 0
SHORTNESS OF BREATH: 1
COUGH: 0
ABDOMINAL PAIN: 0
COLOR CHANGE: 0
VOMITING: 0
TROUBLE SWALLOWING: 0

## 2018-09-13 ASSESSMENT — PAIN SCALES - GENERAL: PAINLEVEL_OUTOF10: 6

## 2018-09-14 ENCOUNTER — APPOINTMENT (OUTPATIENT)
Dept: GENERAL RADIOLOGY | Age: 58
DRG: 179 | End: 2018-09-14
Payer: MEDICARE

## 2018-09-14 LAB
ANION GAP SERPL CALCULATED.3IONS-SCNC: 14 MMOL/L (ref 9–17)
BUN BLDV-MCNC: 9 MG/DL (ref 6–20)
BUN/CREAT BLD: ABNORMAL (ref 9–20)
CALCIUM SERPL-MCNC: 8.7 MG/DL (ref 8.6–10.4)
CHLORIDE BLD-SCNC: 102 MMOL/L (ref 98–107)
CO2: 22 MMOL/L (ref 20–31)
CREAT SERPL-MCNC: 0.8 MG/DL (ref 0.5–0.9)
ESTIMATED AVERAGE GLUCOSE: 192 MG/DL
GFR AFRICAN AMERICAN: >60 ML/MIN
GFR NON-AFRICAN AMERICAN: >60 ML/MIN
GFR SERPL CREATININE-BSD FRML MDRD: ABNORMAL ML/MIN/{1.73_M2}
GFR SERPL CREATININE-BSD FRML MDRD: ABNORMAL ML/MIN/{1.73_M2}
GLUCOSE BLD-MCNC: 265 MG/DL (ref 65–105)
GLUCOSE BLD-MCNC: 296 MG/DL (ref 70–99)
GLUCOSE BLD-MCNC: 301 MG/DL (ref 65–105)
GLUCOSE BLD-MCNC: 303 MG/DL (ref 65–105)
GLUCOSE BLD-MCNC: 337 MG/DL (ref 65–105)
GLUCOSE BLD-MCNC: 360 MG/DL (ref 65–105)
HBA1C MFR BLD: 8.3 % (ref 4–6)
HCT VFR BLD CALC: 40.4 % (ref 36.3–47.1)
HEMOGLOBIN: 12.7 G/DL (ref 11.9–15.1)
MCH RBC QN AUTO: 28.5 PG (ref 25.2–33.5)
MCHC RBC AUTO-ENTMCNC: 31.4 G/DL (ref 28.4–34.8)
MCV RBC AUTO: 90.8 FL (ref 82.6–102.9)
NRBC AUTOMATED: 0 PER 100 WBC
PDW BLD-RTO: 13.5 % (ref 11.8–14.4)
PLATELET # BLD: 249 K/UL (ref 138–453)
PMV BLD AUTO: 10.1 FL (ref 8.1–13.5)
POTASSIUM SERPL-SCNC: 5.1 MMOL/L (ref 3.7–5.3)
RBC # BLD: 4.45 M/UL (ref 3.95–5.11)
SODIUM BLD-SCNC: 138 MMOL/L (ref 135–144)
WBC # BLD: 12.9 K/UL (ref 3.5–11.3)

## 2018-09-14 PROCEDURE — 6360000002 HC RX W HCPCS: Performed by: NURSE PRACTITIONER

## 2018-09-14 PROCEDURE — 99222 1ST HOSP IP/OBS MODERATE 55: CPT | Performed by: INTERNAL MEDICINE

## 2018-09-14 PROCEDURE — 83036 HEMOGLOBIN GLYCOSYLATED A1C: CPT

## 2018-09-14 PROCEDURE — 97165 OT EVAL LOW COMPLEX 30 MIN: CPT

## 2018-09-14 PROCEDURE — G8987 SELF CARE CURRENT STATUS: HCPCS

## 2018-09-14 PROCEDURE — 94762 N-INVAS EAR/PLS OXIMTRY CONT: CPT

## 2018-09-14 PROCEDURE — G8979 MOBILITY GOAL STATUS: HCPCS

## 2018-09-14 PROCEDURE — G8978 MOBILITY CURRENT STATUS: HCPCS

## 2018-09-14 PROCEDURE — 1200000000 HC SEMI PRIVATE

## 2018-09-14 PROCEDURE — 97530 THERAPEUTIC ACTIVITIES: CPT

## 2018-09-14 PROCEDURE — 94640 AIRWAY INHALATION TREATMENT: CPT

## 2018-09-14 PROCEDURE — 6370000000 HC RX 637 (ALT 250 FOR IP): Performed by: STUDENT IN AN ORGANIZED HEALTH CARE EDUCATION/TRAINING PROGRAM

## 2018-09-14 PROCEDURE — 97162 PT EVAL MOD COMPLEX 30 MIN: CPT

## 2018-09-14 PROCEDURE — G8988 SELF CARE GOAL STATUS: HCPCS

## 2018-09-14 PROCEDURE — 6370000000 HC RX 637 (ALT 250 FOR IP): Performed by: NURSE PRACTITIONER

## 2018-09-14 PROCEDURE — 2500000003 HC RX 250 WO HCPCS: Performed by: EMERGENCY MEDICINE

## 2018-09-14 PROCEDURE — 82947 ASSAY GLUCOSE BLOOD QUANT: CPT

## 2018-09-14 PROCEDURE — 80048 BASIC METABOLIC PNL TOTAL CA: CPT

## 2018-09-14 PROCEDURE — 71045 X-RAY EXAM CHEST 1 VIEW: CPT

## 2018-09-14 PROCEDURE — 85027 COMPLETE CBC AUTOMATED: CPT

## 2018-09-14 PROCEDURE — 2580000003 HC RX 258: Performed by: NURSE PRACTITIONER

## 2018-09-14 PROCEDURE — 97535 SELF CARE MNGMENT TRAINING: CPT

## 2018-09-14 PROCEDURE — 51701 INSERT BLADDER CATHETER: CPT

## 2018-09-14 PROCEDURE — 36415 COLL VENOUS BLD VENIPUNCTURE: CPT

## 2018-09-14 RX ORDER — LOSARTAN POTASSIUM 50 MG/1
100 TABLET ORAL DAILY
Status: DISCONTINUED | OUTPATIENT
Start: 2018-09-14 | End: 2018-09-15 | Stop reason: HOSPADM

## 2018-09-14 RX ORDER — FLUTICASONE FUROATE AND VILANTEROL 200; 25 UG/1; UG/1
1 POWDER RESPIRATORY (INHALATION) DAILY
Status: DISCONTINUED | OUTPATIENT
Start: 2018-09-14 | End: 2018-09-15 | Stop reason: HOSPADM

## 2018-09-14 RX ORDER — IPRATROPIUM BROMIDE AND ALBUTEROL SULFATE 2.5; .5 MG/3ML; MG/3ML
1 SOLUTION RESPIRATORY (INHALATION)
Status: DISCONTINUED | OUTPATIENT
Start: 2018-09-14 | End: 2018-09-14

## 2018-09-14 RX ORDER — METOCLOPRAMIDE 5 MG/1
5 TABLET ORAL 2 TIMES DAILY
Status: DISCONTINUED | OUTPATIENT
Start: 2018-09-14 | End: 2018-09-15 | Stop reason: HOSPADM

## 2018-09-14 RX ORDER — CLONAZEPAM 1 MG/1
0.5 TABLET ORAL ONCE
Status: COMPLETED | OUTPATIENT
Start: 2018-09-14 | End: 2018-09-14

## 2018-09-14 RX ORDER — AMITRIPTYLINE HYDROCHLORIDE 50 MG/1
150 TABLET, FILM COATED ORAL NIGHTLY
Status: DISCONTINUED | OUTPATIENT
Start: 2018-09-14 | End: 2018-09-15 | Stop reason: HOSPADM

## 2018-09-14 RX ORDER — FAMOTIDINE 20 MG/1
20 TABLET, FILM COATED ORAL DAILY
Status: DISCONTINUED | OUTPATIENT
Start: 2018-09-14 | End: 2018-09-15 | Stop reason: HOSPADM

## 2018-09-14 RX ORDER — ASPIRIN 81 MG/1
81 TABLET ORAL DAILY
Status: DISCONTINUED | OUTPATIENT
Start: 2018-09-14 | End: 2018-09-15 | Stop reason: HOSPADM

## 2018-09-14 RX ORDER — SIMVASTATIN 20 MG
20 TABLET ORAL NIGHTLY
Status: DISCONTINUED | OUTPATIENT
Start: 2018-09-14 | End: 2018-09-15 | Stop reason: HOSPADM

## 2018-09-14 RX ORDER — ONDANSETRON 2 MG/ML
4 INJECTION INTRAMUSCULAR; INTRAVENOUS EVERY 6 HOURS PRN
Status: DISCONTINUED | OUTPATIENT
Start: 2018-09-14 | End: 2018-09-15 | Stop reason: HOSPADM

## 2018-09-14 RX ORDER — DULOXETIN HYDROCHLORIDE 30 MG/1
60 CAPSULE, DELAYED RELEASE ORAL DAILY
Status: DISCONTINUED | OUTPATIENT
Start: 2018-09-14 | End: 2018-09-15 | Stop reason: HOSPADM

## 2018-09-14 RX ORDER — GABAPENTIN 800 MG/1
800 TABLET ORAL 2 TIMES DAILY
Status: DISCONTINUED | OUTPATIENT
Start: 2018-09-14 | End: 2018-09-15 | Stop reason: HOSPADM

## 2018-09-14 RX ORDER — METHYLPREDNISOLONE SODIUM SUCCINATE 125 MG/2ML
80 INJECTION, POWDER, LYOPHILIZED, FOR SOLUTION INTRAMUSCULAR; INTRAVENOUS EVERY 8 HOURS
Status: DISCONTINUED | OUTPATIENT
Start: 2018-09-14 | End: 2018-09-14

## 2018-09-14 RX ORDER — ALBUTEROL SULFATE 2.5 MG/3ML
2.5 SOLUTION RESPIRATORY (INHALATION) 4 TIMES DAILY
Status: DISCONTINUED | OUTPATIENT
Start: 2018-09-14 | End: 2018-09-15 | Stop reason: HOSPADM

## 2018-09-14 RX ORDER — ROPINIROLE 1 MG/1
2 TABLET, FILM COATED ORAL DAILY
Status: DISCONTINUED | OUTPATIENT
Start: 2018-09-14 | End: 2018-09-14 | Stop reason: SDUPTHER

## 2018-09-14 RX ORDER — ROPINIROLE 1 MG/1
2 TABLET, FILM COATED ORAL NIGHTLY
Status: DISCONTINUED | OUTPATIENT
Start: 2018-09-14 | End: 2018-09-15 | Stop reason: HOSPADM

## 2018-09-14 RX ORDER — SODIUM CHLORIDE 9 MG/ML
INJECTION, SOLUTION INTRAVENOUS CONTINUOUS
Status: DISCONTINUED | OUTPATIENT
Start: 2018-09-14 | End: 2018-09-15 | Stop reason: HOSPADM

## 2018-09-14 RX ORDER — LEVOTHYROXINE SODIUM 0.12 MG/1
125 TABLET ORAL DAILY
Status: DISCONTINUED | OUTPATIENT
Start: 2018-09-14 | End: 2018-09-15 | Stop reason: HOSPADM

## 2018-09-14 RX ORDER — ACETAMINOPHEN 325 MG/1
650 TABLET ORAL EVERY 4 HOURS PRN
Status: DISCONTINUED | OUTPATIENT
Start: 2018-09-14 | End: 2018-09-15 | Stop reason: HOSPADM

## 2018-09-14 RX ORDER — DEXTROSE MONOHYDRATE 25 G/50ML
12.5 INJECTION, SOLUTION INTRAVENOUS PRN
Status: DISCONTINUED | OUTPATIENT
Start: 2018-09-14 | End: 2018-09-15 | Stop reason: HOSPADM

## 2018-09-14 RX ORDER — METOCLOPRAMIDE 10 MG/1
10 TABLET ORAL ONCE
Status: COMPLETED | OUTPATIENT
Start: 2018-09-14 | End: 2018-09-14

## 2018-09-14 RX ORDER — PANTOPRAZOLE SODIUM 40 MG/1
40 TABLET, DELAYED RELEASE ORAL 2 TIMES DAILY
Status: DISCONTINUED | OUTPATIENT
Start: 2018-09-14 | End: 2018-09-15 | Stop reason: HOSPADM

## 2018-09-14 RX ORDER — BISACODYL 10 MG
10 SUPPOSITORY, RECTAL RECTAL DAILY PRN
Status: DISCONTINUED | OUTPATIENT
Start: 2018-09-14 | End: 2018-09-15 | Stop reason: HOSPADM

## 2018-09-14 RX ORDER — TORSEMIDE 20 MG/1
20 TABLET ORAL DAILY
Status: DISCONTINUED | OUTPATIENT
Start: 2018-09-14 | End: 2018-09-15 | Stop reason: HOSPADM

## 2018-09-14 RX ORDER — FLUTICASONE PROPIONATE 50 MCG
1 SPRAY, SUSPENSION (ML) NASAL 2 TIMES DAILY
Status: DISCONTINUED | OUTPATIENT
Start: 2018-09-14 | End: 2018-09-15 | Stop reason: HOSPADM

## 2018-09-14 RX ORDER — GABAPENTIN 400 MG/1
800 CAPSULE ORAL ONCE
Status: COMPLETED | OUTPATIENT
Start: 2018-09-14 | End: 2018-09-14

## 2018-09-14 RX ORDER — DULOXETIN HYDROCHLORIDE 30 MG/1
30 CAPSULE, DELAYED RELEASE ORAL ONCE
Status: COMPLETED | OUTPATIENT
Start: 2018-09-14 | End: 2018-09-14

## 2018-09-14 RX ORDER — METOPROLOL TARTRATE 50 MG/1
50 TABLET, FILM COATED ORAL 2 TIMES DAILY
Status: DISCONTINUED | OUTPATIENT
Start: 2018-09-14 | End: 2018-09-15 | Stop reason: HOSPADM

## 2018-09-14 RX ORDER — DEXTROSE MONOHYDRATE 50 MG/ML
100 INJECTION, SOLUTION INTRAVENOUS PRN
Status: DISCONTINUED | OUTPATIENT
Start: 2018-09-14 | End: 2018-09-15 | Stop reason: HOSPADM

## 2018-09-14 RX ORDER — NICOTINE POLACRILEX 4 MG
15 LOZENGE BUCCAL PRN
Status: DISCONTINUED | OUTPATIENT
Start: 2018-09-14 | End: 2018-09-15 | Stop reason: HOSPADM

## 2018-09-14 RX ORDER — FAMOTIDINE 20 MG/1
20 TABLET, FILM COATED ORAL 2 TIMES DAILY
Status: DISCONTINUED | OUTPATIENT
Start: 2018-09-14 | End: 2018-09-14

## 2018-09-14 RX ORDER — PANTOPRAZOLE SODIUM 40 MG/1
40 TABLET, DELAYED RELEASE ORAL ONCE
Status: COMPLETED | OUTPATIENT
Start: 2018-09-14 | End: 2018-09-14

## 2018-09-14 RX ORDER — AMITRIPTYLINE HYDROCHLORIDE 50 MG/1
150 TABLET, FILM COATED ORAL NIGHTLY
Status: DISCONTINUED | OUTPATIENT
Start: 2018-09-14 | End: 2018-09-14 | Stop reason: SDUPTHER

## 2018-09-14 RX ORDER — SODIUM CHLORIDE 0.9 % (FLUSH) 0.9 %
10 SYRINGE (ML) INJECTION PRN
Status: DISCONTINUED | OUTPATIENT
Start: 2018-09-14 | End: 2018-09-15 | Stop reason: HOSPADM

## 2018-09-14 RX ORDER — SODIUM CHLORIDE 0.9 % (FLUSH) 0.9 %
10 SYRINGE (ML) INJECTION EVERY 12 HOURS SCHEDULED
Status: DISCONTINUED | OUTPATIENT
Start: 2018-09-14 | End: 2018-09-15 | Stop reason: HOSPADM

## 2018-09-14 RX ORDER — CLONAZEPAM 0.5 MG/1
0.5 TABLET ORAL 2 TIMES DAILY
Status: DISCONTINUED | OUTPATIENT
Start: 2018-09-14 | End: 2018-09-15 | Stop reason: HOSPADM

## 2018-09-14 RX ORDER — NICOTINE 21 MG/24HR
1 PATCH, TRANSDERMAL 24 HOURS TRANSDERMAL DAILY PRN
Status: DISCONTINUED | OUTPATIENT
Start: 2018-09-14 | End: 2018-09-15 | Stop reason: HOSPADM

## 2018-09-14 RX ORDER — ALBUTEROL SULFATE 2.5 MG/3ML
2.5 SOLUTION RESPIRATORY (INHALATION)
Status: DISCONTINUED | OUTPATIENT
Start: 2018-09-14 | End: 2018-09-15 | Stop reason: HOSPADM

## 2018-09-14 RX ADMIN — TIOTROPIUM BROMIDE 18 MCG: 18 CAPSULE ORAL; RESPIRATORY (INHALATION) at 07:49

## 2018-09-14 RX ADMIN — LEVOTHYROXINE SODIUM 125 MCG: 125 TABLET ORAL at 09:50

## 2018-09-14 RX ADMIN — METOPROLOL TARTRATE 50 MG: 50 TABLET, FILM COATED ORAL at 02:58

## 2018-09-14 RX ADMIN — METOCLOPRAMIDE 5 MG: 5 TABLET ORAL at 21:03

## 2018-09-14 RX ADMIN — CLONAZEPAM 0.5 MG: 0.5 TABLET ORAL at 21:04

## 2018-09-14 RX ADMIN — SIMVASTATIN 20 MG: 20 TABLET, FILM COATED ORAL at 21:03

## 2018-09-14 RX ADMIN — AMITRIPTYLINE HYDROCHLORIDE 150 MG: 50 TABLET, FILM COATED ORAL at 00:42

## 2018-09-14 RX ADMIN — CLINDAMYCIN PHOSPHATE 600 MG: 600 INJECTION, SOLUTION INTRAVENOUS at 16:08

## 2018-09-14 RX ADMIN — INSULIN LISPRO 5 UNITS: 100 INJECTION, SOLUTION INTRAVENOUS; SUBCUTANEOUS at 22:04

## 2018-09-14 RX ADMIN — DULOXETINE HYDROCHLORIDE 30 MG: 30 CAPSULE, DELAYED RELEASE ORAL at 00:43

## 2018-09-14 RX ADMIN — METOCLOPRAMIDE 10 MG: 10 TABLET ORAL at 00:42

## 2018-09-14 RX ADMIN — CEFTRIAXONE SODIUM 1 G: 1 INJECTION, POWDER, FOR SOLUTION INTRAMUSCULAR; INTRAVENOUS at 21:03

## 2018-09-14 RX ADMIN — METHYLPREDNISOLONE SODIUM SUCCINATE 80 MG: 125 INJECTION, POWDER, FOR SOLUTION INTRAMUSCULAR; INTRAVENOUS at 02:58

## 2018-09-14 RX ADMIN — AMITRIPTYLINE HYDROCHLORIDE 150 MG: 50 TABLET, FILM COATED ORAL at 21:04

## 2018-09-14 RX ADMIN — CLONAZEPAM 0.5 MG: 1 TABLET ORAL at 00:42

## 2018-09-14 RX ADMIN — ENOXAPARIN SODIUM 40 MG: 40 INJECTION SUBCUTANEOUS at 09:50

## 2018-09-14 RX ADMIN — ALBUTEROL SULFATE 2.5 MG: 2.5 SOLUTION RESPIRATORY (INHALATION) at 15:57

## 2018-09-14 RX ADMIN — PANTOPRAZOLE SODIUM 40 MG: 40 TABLET, DELAYED RELEASE ORAL at 09:51

## 2018-09-14 RX ADMIN — PANTOPRAZOLE SODIUM 40 MG: 40 TABLET, DELAYED RELEASE ORAL at 00:42

## 2018-09-14 RX ADMIN — SODIUM CHLORIDE: 9 INJECTION, SOLUTION INTRAVENOUS at 02:55

## 2018-09-14 RX ADMIN — Medication 10 ML: at 09:51

## 2018-09-14 RX ADMIN — FAMOTIDINE 20 MG: 20 TABLET, FILM COATED ORAL at 09:51

## 2018-09-14 RX ADMIN — METOPROLOL TARTRATE 50 MG: 50 TABLET, FILM COATED ORAL at 21:03

## 2018-09-14 RX ADMIN — CLINDAMYCIN PHOSPHATE 600 MG: 600 INJECTION, SOLUTION INTRAVENOUS at 22:08

## 2018-09-14 RX ADMIN — ROPINIROLE HYDROCHLORIDE 2 MG: 1 TABLET, FILM COATED ORAL at 00:43

## 2018-09-14 RX ADMIN — TORSEMIDE 20 MG: 20 TABLET ORAL at 09:51

## 2018-09-14 RX ADMIN — GABAPENTIN 800 MG: 800 TABLET ORAL at 09:51

## 2018-09-14 RX ADMIN — ROPINIROLE HYDROCHLORIDE 2 MG: 1 TABLET, FILM COATED ORAL at 21:03

## 2018-09-14 RX ADMIN — METOCLOPRAMIDE 5 MG: 5 TABLET ORAL at 09:51

## 2018-09-14 RX ADMIN — ALBUTEROL SULFATE 2.5 MG: 2.5 SOLUTION RESPIRATORY (INHALATION) at 11:48

## 2018-09-14 RX ADMIN — LOSARTAN POTASSIUM 100 MG: 50 TABLET, FILM COATED ORAL at 09:51

## 2018-09-14 RX ADMIN — ACETAMINOPHEN 650 MG: 325 TABLET ORAL at 22:47

## 2018-09-14 RX ADMIN — ALBUTEROL SULFATE 2.5 MG: 2.5 SOLUTION RESPIRATORY (INHALATION) at 07:49

## 2018-09-14 RX ADMIN — DULOXETINE HYDROCHLORIDE 60 MG: 30 CAPSULE, DELAYED RELEASE ORAL at 21:03

## 2018-09-14 RX ADMIN — GABAPENTIN 800 MG: 400 CAPSULE ORAL at 00:42

## 2018-09-14 RX ADMIN — PANTOPRAZOLE SODIUM 40 MG: 40 TABLET, DELAYED RELEASE ORAL at 21:03

## 2018-09-14 RX ADMIN — METOPROLOL TARTRATE 50 MG: 50 TABLET, FILM COATED ORAL at 09:51

## 2018-09-14 RX ADMIN — CLINDAMYCIN PHOSPHATE 600 MG: 600 INJECTION, SOLUTION INTRAVENOUS at 05:26

## 2018-09-14 RX ADMIN — GABAPENTIN 800 MG: 800 TABLET ORAL at 21:03

## 2018-09-14 RX ADMIN — ASPIRIN 81 MG: 81 TABLET ORAL at 09:51

## 2018-09-14 RX ADMIN — CLONAZEPAM 0.5 MG: 0.5 TABLET ORAL at 09:50

## 2018-09-14 ASSESSMENT — PAIN SCALES - GENERAL
PAINLEVEL_OUTOF10: 6
PAINLEVEL_OUTOF10: 5
PAINLEVEL_OUTOF10: 6

## 2018-09-14 ASSESSMENT — PAIN DESCRIPTION - PAIN TYPE: TYPE: CHRONIC PAIN

## 2018-09-14 ASSESSMENT — PAIN DESCRIPTION - ORIENTATION: ORIENTATION: MID;UPPER

## 2018-09-14 ASSESSMENT — PAIN DESCRIPTION - LOCATION
LOCATION: BACK
LOCATION: BACK

## 2018-09-15 VITALS
DIASTOLIC BLOOD PRESSURE: 89 MMHG | SYSTOLIC BLOOD PRESSURE: 156 MMHG | WEIGHT: 207.9 LBS | BODY MASS INDEX: 34.64 KG/M2 | HEIGHT: 65 IN | TEMPERATURE: 98.7 F | HEART RATE: 77 BPM | OXYGEN SATURATION: 92 % | RESPIRATION RATE: 20 BRPM

## 2018-09-15 LAB
GLUCOSE BLD-MCNC: 131 MG/DL (ref 65–105)
GLUCOSE BLD-MCNC: 43 MG/DL (ref 65–105)
GLUCOSE BLD-MCNC: 83 MG/DL (ref 65–105)
GLUCOSE BLD-MCNC: 95 MG/DL (ref 65–105)

## 2018-09-15 PROCEDURE — 6360000002 HC RX W HCPCS: Performed by: NURSE PRACTITIONER

## 2018-09-15 PROCEDURE — 94762 N-INVAS EAR/PLS OXIMTRY CONT: CPT

## 2018-09-15 PROCEDURE — 6370000000 HC RX 637 (ALT 250 FOR IP): Performed by: NURSE PRACTITIONER

## 2018-09-15 PROCEDURE — 2580000003 HC RX 258: Performed by: NURSE PRACTITIONER

## 2018-09-15 PROCEDURE — 82947 ASSAY GLUCOSE BLOOD QUANT: CPT

## 2018-09-15 PROCEDURE — 6370000000 HC RX 637 (ALT 250 FOR IP): Performed by: STUDENT IN AN ORGANIZED HEALTH CARE EDUCATION/TRAINING PROGRAM

## 2018-09-15 PROCEDURE — 99239 HOSP IP/OBS DSCHRG MGMT >30: CPT | Performed by: INTERNAL MEDICINE

## 2018-09-15 PROCEDURE — 2500000003 HC RX 250 WO HCPCS: Performed by: EMERGENCY MEDICINE

## 2018-09-15 PROCEDURE — 94640 AIRWAY INHALATION TREATMENT: CPT

## 2018-09-15 RX ORDER — AMOXICILLIN AND CLAVULANATE POTASSIUM 875; 125 MG/1; MG/1
1 TABLET, FILM COATED ORAL EVERY 12 HOURS
Qty: 20 TABLET | Refills: 0 | Status: SHIPPED | OUTPATIENT
Start: 2018-09-15 | End: 2018-09-25

## 2018-09-15 RX ADMIN — CLONAZEPAM 0.5 MG: 0.5 TABLET ORAL at 09:47

## 2018-09-15 RX ADMIN — LEVOTHYROXINE SODIUM 125 MCG: 125 TABLET ORAL at 09:47

## 2018-09-15 RX ADMIN — METOCLOPRAMIDE 5 MG: 5 TABLET ORAL at 09:47

## 2018-09-15 RX ADMIN — SODIUM CHLORIDE: 9 INJECTION, SOLUTION INTRAVENOUS at 05:39

## 2018-09-15 RX ADMIN — ALBUTEROL SULFATE 2.5 MG: 2.5 SOLUTION RESPIRATORY (INHALATION) at 09:28

## 2018-09-15 RX ADMIN — FAMOTIDINE 20 MG: 20 TABLET, FILM COATED ORAL at 09:47

## 2018-09-15 RX ADMIN — GABAPENTIN 800 MG: 800 TABLET ORAL at 09:47

## 2018-09-15 RX ADMIN — ASPIRIN 81 MG: 81 TABLET ORAL at 09:47

## 2018-09-15 RX ADMIN — ENOXAPARIN SODIUM 40 MG: 40 INJECTION SUBCUTANEOUS at 09:48

## 2018-09-15 RX ADMIN — ACETAMINOPHEN 650 MG: 325 TABLET ORAL at 16:40

## 2018-09-15 RX ADMIN — ALBUTEROL SULFATE 2.5 MG: 2.5 SOLUTION RESPIRATORY (INHALATION) at 12:14

## 2018-09-15 RX ADMIN — LOSARTAN POTASSIUM 100 MG: 50 TABLET, FILM COATED ORAL at 09:47

## 2018-09-15 RX ADMIN — PANTOPRAZOLE SODIUM 40 MG: 40 TABLET, DELAYED RELEASE ORAL at 09:48

## 2018-09-15 RX ADMIN — TORSEMIDE 20 MG: 20 TABLET ORAL at 09:47

## 2018-09-15 RX ADMIN — CLINDAMYCIN PHOSPHATE 600 MG: 600 INJECTION, SOLUTION INTRAVENOUS at 05:36

## 2018-09-15 RX ADMIN — CLINDAMYCIN PHOSPHATE 600 MG: 600 INJECTION, SOLUTION INTRAVENOUS at 16:04

## 2018-09-15 RX ADMIN — METOPROLOL TARTRATE 50 MG: 50 TABLET, FILM COATED ORAL at 09:47

## 2018-09-15 RX ADMIN — TIOTROPIUM BROMIDE 18 MCG: 18 CAPSULE ORAL; RESPIRATORY (INHALATION) at 09:28

## 2018-09-15 ASSESSMENT — PAIN DESCRIPTION - DESCRIPTORS: DESCRIPTORS: ACHING

## 2018-09-15 ASSESSMENT — PAIN DESCRIPTION - ONSET: ONSET: ON-GOING

## 2018-09-15 ASSESSMENT — PAIN SCALES - GENERAL: PAINLEVEL_OUTOF10: 3

## 2018-09-15 ASSESSMENT — PAIN DESCRIPTION - LOCATION: LOCATION: HEAD

## 2018-09-15 ASSESSMENT — PAIN DESCRIPTION - PAIN TYPE: TYPE: ACUTE PAIN

## 2018-09-15 ASSESSMENT — PAIN DESCRIPTION - FREQUENCY: FREQUENCY: CONTINUOUS

## 2018-09-16 ENCOUNTER — CARE COORDINATION (OUTPATIENT)
Dept: CASE MANAGEMENT | Age: 58
End: 2018-09-16

## 2018-09-17 ENCOUNTER — CARE COORDINATION (OUTPATIENT)
Dept: CARE COORDINATION | Age: 58
End: 2018-09-17

## 2018-09-17 ENCOUNTER — TELEPHONE (OUTPATIENT)
Dept: PHARMACY | Facility: CLINIC | Age: 58
End: 2018-09-17

## 2018-09-17 NOTE — CARE COORDINATION
[facility] Transitions Initial Follow Up Call    Call within 2 business days of discharge: Yes    Patient: Rita Blackmon Patient : 1960   MRN: [id number]  Reason for Admission: There are no discharge diagnoses documented for the most recent discharge. Discharge Date: 9/15/18 RARS: Readmission Risk Score: 15     Spoke Patient    Facility: [facility]    Non-face-to-face services provided:      Care Transitions 24 Hour Call    Do you have any ongoing symptoms?:  Yes  Patient-reported symptoms:  Nausea  Interventions for patient-reported symptoms:  Other  Do you have a copy of your discharge instructions?:  Yes  Do you have all of your prescriptions and are they filled?:  Yes  Have you been contacted by a [facility] Pharmacist?:  No  Have you scheduled your follow up appointment?:  Yes  How are you going to get to your appointment?:  Car - family or friend to transport  Were you discharged with any Home Care or Post Acute Services:  Yes  Post Acute Services: Other, Home Health (Comment: [address] to Stay)  Patient DME:  Other, Straight cane  Other Patient DME:  Glucometer  Patient Home Equipment:  Oxygen, Nebulizer (Comment: [id number])  Do you have support at home?:  Partner/Spouse/SO  Do you feel like you have everything you need to keep you well at home?:  Yes  Are you an active caregiver in your home?:  No  Care Transitions Interventions         Follow Up  Spoke with patient . Patients main concern is that she is aspirating in the night from reflux, she states that she has had this a while and is cinsidering going to the [facility] for a \"Stomach pacemaker\" she is \" Doses of \"reglan,Zantac and Protonix  Rx have been increased. patient is taking antibiotics as prescribed . Patient reports that she has a appointment with her PCP and will follow up with her surgeon 18  and lung MD 18. Partial Med review completed. Patient did \" not have time\" to review all Rx.

## 2018-09-17 NOTE — TELEPHONE ENCOUNTER
She is calling to see if these forms were completed. Her license is coming up as due. Can you please call her back?

## 2018-09-18 ENCOUNTER — CARE COORDINATION (OUTPATIENT)
Dept: CASE MANAGEMENT | Age: 58
End: 2018-09-18

## 2018-09-18 DIAGNOSIS — M54.14 THORACIC RADICULOPATHY: Primary | Chronic | ICD-10-CM

## 2018-09-18 NOTE — CARE COORDINATION
[facility] Transitions Follow Up Call    2018    Patient: Jermaine Philip  Patient : 1960   MRN: [id number]  Reason for Admission: There are no discharge diagnoses documented for the most recent discharge. Discharge Date: 9/15/18 RARS: Readmission Risk Score: 15       Spoke with: Jermaine Philip     Per patient she is home and feeling much better. She is awaiting her nurse from [address] [place] [address]. Patient lives alone and is independent. Patient has and understands her d/c instructions and plan of care. She denies chest pain, severe sob or cough, fever, chills, N/V/D. She is eating \"a little\", she is concerned about having reflux in the evening. She has started eating her dinner meal earlier and this is helping. She is on supplemental oxygen @ 2lpm NC, she denies wheezing and has all of her supplies. She is drinking fluids and having normal elimination patterns. Patient stated that she has everything she needs. She picked up her Augmentin and is taking it. She has f/u appts scheduled and has transportation. She is aware of when to call her doctor or report to ED for worsening or severe sx. Care Transitions Subsequent and Final Call    Subsequent and Final Calls  Do you have any ongoing symptoms?:  No  Have your medications changed?:  No  Do you have any questions related to your medications?:  No  Do you currently have any active services?:  Yes  Are you currently active with any services?:  Home Health  Do you have any needs or concerns that I can assist you with?:  No  Identified Barriers:  None  Care Transitions Interventions  No Identified Needs  Other Interventions:             Follow Up  Future Appointments  Date Time Provider Reji Jay   2018 4:00 PM Radha Kendrick [facility]   2018 12:45 PM Vicky Crouch MD 42 Broderick Chao RN

## 2018-09-20 ENCOUNTER — CARE COORDINATION (OUTPATIENT)
Dept: CASE MANAGEMENT | Age: 58
End: 2018-09-20

## 2018-09-21 ENCOUNTER — OFFICE VISIT (OUTPATIENT)
Dept: INTERNAL MEDICINE CLINIC | Age: 58
End: 2018-09-21
Payer: MEDICARE

## 2018-09-21 ENCOUNTER — CARE COORDINATION (OUTPATIENT)
Dept: CASE MANAGEMENT | Age: 58
End: 2018-09-21

## 2018-09-21 VITALS
DIASTOLIC BLOOD PRESSURE: 72 MMHG | SYSTOLIC BLOOD PRESSURE: 138 MMHG | BODY MASS INDEX: 32.99 KG/M2 | WEIGHT: 198 LBS | HEIGHT: 65 IN

## 2018-09-21 DIAGNOSIS — J43.1 PANLOBULAR EMPHYSEMA (HCC): ICD-10-CM

## 2018-09-21 DIAGNOSIS — K31.84 GASTROPARESIS: ICD-10-CM

## 2018-09-21 DIAGNOSIS — E10.43 TYPE 1 DIABETES MELLITUS WITH DIABETIC AUTONOMIC NEUROPATHY, WITH LONG-TERM CURRENT USE OF INSULIN (HCC): ICD-10-CM

## 2018-09-21 DIAGNOSIS — I10 ESSENTIAL HYPERTENSION: ICD-10-CM

## 2018-09-21 DIAGNOSIS — J69.0 ASPIRATION PNEUMONIA OF BOTH LOWER LOBES DUE TO VOMIT (HCC): Primary | ICD-10-CM

## 2018-09-21 PROCEDURE — 99496 TRANSJ CARE MGMT HIGH F2F 7D: CPT | Performed by: PHYSICIAN ASSISTANT

## 2018-09-21 PROCEDURE — 1111F DSCHRG MED/CURRENT MED MERGE: CPT | Performed by: PHYSICIAN ASSISTANT

## 2018-09-21 NOTE — TELEPHONE ENCOUNTER
CLINICAL PHARMACY NOTE - Post-Discharge Transitions of Care (KESHA)    Second attempt made to reach patient by telephone for medication review. Left voice message for patient to return clinician's phone call to [phone]. Will prepare letter and mail to patient.      Anthony Kidd, PharmD, Rodney Aguilar, [facility]  Clinical Pharmacy Specialist  O: [phone]  C: [address] [place], [place] [phone], [address]    =======================================================    For Pharmacy Admin Tracking Only  TCM Call Made?: Yes  [facility] Select Patient?: Yes  Total # of Interventions Recommended: 0  Total # Interventions Accepted: 0  Intervention Severity:   - Level 1 Intervention Present?: No   - Level 2 #: 0   - Level 3 #: 0  Outreach Status: Patient Unreachable  Care Coordinator Outreach to Patient?: No  Provider Contacted?: No  Time Spent (min): 15

## 2018-09-21 NOTE — PROGRESS NOTES
 levothyroxine (SYNTHROID) 137 MCG tablet Take 1 tablet by mouth daily 90 tablet 3    rOPINIRole (REQUIP) 2 MG tablet Take 1 tablet by mouth daily 30 tablet 11    clonazePAM (KLONOPIN) 0.5 MG tablet take 1 tablet by mouth twice a day if needed for anxiety but takes one for sure at hs. 60 tablet 2    pantoprazole (PROTONIX) 40 MG tablet Take 40 mg by mouth 2 times daily      ranitidine (ZANTAC) 150 MG capsule Take 150 mg by mouth 2 times daily       simvastatin (ZOCOR) 20 MG tablet Take 1 tablet by mouth nightly 90 tablet 3    losartan (COZAAR) 100 MG tablet Take 1 tablet by mouth daily 90 tablet 3    torsemide (DEMADEX) 20 MG tablet TAKE ONE TABLET BY MOUTH ONCE DAILY 30 tablet 5    insulin aspart (NOVOLOG) 100 UNIT/ML injection vial Inject into the skin Via insulin pump      BREO ELLIPTA 200-25 MCG/INH AEPB INL 1 PUFF PO DAILY  6    albuterol (PROVENTIL) (2.5 MG/3ML) 0.083% nebulizer solution Take 1 mL by nebulization 3 times daily as needed  0    tiotropium (SPIRIVA HANDIHALER) 18 MCG inhalation capsule Inhale 1 capsule into the lungs Daily 30 capsule 5    ONE TOUCH ULTRA TEST strip   1    B-D INSULIN SYRINGE 29G X 1/2\" 0.5 ML MISC       fluticasone (FLONASE) 50 MCG/ACT nasal spray 1 spray by Nasal route 2 times daily 1 Bottle 0    albuterol sulfate HFA (PROAIR HFA) 108 (90 BASE) MCG/ACT inhaler Inhale 2 puffs into the lungs every 6 hours as needed for Wheezing 1 Inhaler 0    vitamin D (CHOLECALCIFEROL) 1000 UNIT TABS tablet Take 4,000 Units by mouth daily Takes 4 tabs daily      docusate sodium (COLACE) 100 MG capsule Take 1 capsule by mouth 2 times daily Take while on narcotics 60 capsule 0    Probiotic Product (PROBIOTIC DAILY PO)   Take 1 tablet by mouth       aspirin 81 MG tablet Take 81 mg by mouth daily.         Medications patient taking as of now reconciled against medications ordered at time of hospital discharge: Yes    Chief Complaint   Patient presents with    Follow-Up from bilaterally, otherwise clear to auscultation bilaterally- no wheezes, rales or rhonchi, no hypoxia, no respiratory distress  Cardiovascular: normal rate, regular rhythm,distal pulses intact, no carotid bruits  Abdomen: soft, non-tender, non-distended, normal bowel sounds  Extremities: no cyanosis, clubbing or edema  Neurologic:  no cranial nerve deficit, gait, coordination and speech normal    Assessment/Plan:  1. Aspiration pneumonia of both lower lobes due to vomit ([address]  - NV DISCHARGE MEDS RECONCILED W/ CURRENT OUTPATIENT MED LIST  - Continue Augmentin until gone  - Follow up with Dr. Braeden Ny as directed  - Continue home oxygen  - Breathing exercises     2. Panlobular emphysema ([address]  - Stable  - Follow up with Dr. Braeden Ny as directed  - Continue current medications  - Continue home oxygen    3. Essential hypertension  - Controlled  - Continue current medications    4. Gastroparesis  - Continue current medications  - Follow with Dr. Dave King  - Discussed prevention of future aspiration     5.  Type 1 diabetes mellitus with diabetic autonomic neuropathy, with long-term current use of insulin (HCC)  - Insulin pump  - Continue present management    Medical Decision Making: moderate complexity    THOMAS Cat [facility]  9/21/2018, 4:59 PM

## 2018-09-24 ENCOUNTER — CARE COORDINATION (OUTPATIENT)
Dept: CARE COORDINATION | Age: 58
End: 2018-09-24

## 2018-09-25 RX ORDER — METOPROLOL TARTRATE 50 MG/1
TABLET, FILM COATED ORAL
Qty: 180 TABLET | Refills: 1 | Status: SHIPPED | OUTPATIENT
Start: 2018-09-25 | End: 2019-04-15 | Stop reason: DRUGHIGH

## 2018-09-27 ENCOUNTER — HOSPITAL ENCOUNTER (OUTPATIENT)
Dept: MRI IMAGING | Facility: CLINIC | Age: 58
Discharge: HOME | End: 2018-09-29
Payer: MEDICARE

## 2018-09-27 DIAGNOSIS — G62.9 NEUROPATHY: ICD-10-CM

## 2018-09-27 DIAGNOSIS — R42 DISEQUILIBRIUM: ICD-10-CM

## 2018-09-27 DIAGNOSIS — R20.2 PARESTHESIA OF RIGHT ARM: ICD-10-CM

## 2018-09-27 DIAGNOSIS — M54.2 NECK PAIN: ICD-10-CM

## 2018-09-27 PROCEDURE — 72141 MRI NECK SPINE W/O DYE: CPT

## 2018-10-01 DIAGNOSIS — F41.9 ANXIETY DISORDER, UNSPECIFIED TYPE: Primary | ICD-10-CM

## 2018-10-01 DIAGNOSIS — G25.81 RESTLESS LEGS SYNDROME: ICD-10-CM

## 2018-10-02 RX ORDER — CLONAZEPAM 0.5 MG/1
TABLET ORAL
Qty: 60 TABLET | Refills: 2 | Status: SHIPPED | OUTPATIENT
Start: 2018-10-02 | End: 2019-02-05 | Stop reason: SDUPTHER

## 2018-10-03 ENCOUNTER — CARE COORDINATION (OUTPATIENT)
Dept: CARE COORDINATION | Age: 58
End: 2018-10-03

## 2018-10-03 NOTE — CARE COORDINATION
Ambulatory Care Coordination Note  10/3/2018  CM Risk Score: 16  Jackie Mortality Risk Score:      ACC: Lyssa Johnson RN    Summary Note: Spoke with patient who denied any needs from PCP or CC at this time. Patient was admitted to [facility] on 9.13.18 for aspiration pneumonia. Patient stated she is having a swallowing study tomorrow and she also has a follow up with Dr. Tri Denton tomorrow. She has completed her antibiotics. Per patient she may be going to [address] [place] [address] in the future to discuss a gastric pacemaker. Patient voiced being upset over losing a friend to pancreatic cancer this week, CC allowed patient to express her grief and offered support. Marcus Price will be on her birthday. CC Plan:   Follow up with patient in 7-10 days for needs. Care Coordination Interventions    Program Enrollment:  Complex Care  Referral from Primary Care Provider:  Yes  Suggested Interventions and Community Resources  Diabetes Education:  Completed  Disease Association:  Completed (Comment: Dr Kelly Healy - Endocrinologist, [facility])  [address]:  Completed (Comment: [address])  Physical Therapy:  Not Started  Other Services:  Completed (Comment: [address] DME)  Zone Management Tools: In Process (Comment: DM and COPD zone sheet.)         Goals Addressed             Most Recent     Conditions and Symptoms   On track (10/3/2018)             I will schedule office visits, as directed by my provider. I will keep my appointment or reschedule if I have to cancel. I will notify my provider of any barriers to my plan of care. I will follow my Zone Management tool to seek urgent or emergent care. I will notify my provider of any symptoms that indicate a worsening of my condition. Barriers: lack of education  Plan for overcoming my barriers: will follow DM and COPD zones.   Confidence: 7/10  Anticipated Goal Completion Date: 10/1/17              Prior to Admission medications    Medication

## 2018-10-04 ENCOUNTER — HOSPITAL ENCOUNTER (OUTPATIENT)
Dept: GENERAL RADIOLOGY | Age: 58
Discharge: HOME | End: 2018-10-06
Payer: MEDICARE

## 2018-10-04 DIAGNOSIS — K21.9 CHRONIC GASTROESOPHAGEAL REFLUX DISEASE: ICD-10-CM

## 2018-10-04 PROCEDURE — 2500000003 HC RX 250 WO HCPCS: Performed by: SURGERY

## 2018-10-04 PROCEDURE — 74247 FL UGI W AIR CONTRAST: CPT

## 2018-10-04 RX ADMIN — BARIUM SULFATE 90 ML: 960 POWDER, FOR SUSPENSION ORAL at 09:45

## 2018-10-04 RX ADMIN — BARIUM SULFATE 135 ML: 980 POWDER, FOR SUSPENSION ORAL at 09:45

## 2018-10-12 ENCOUNTER — CARE COORDINATION (OUTPATIENT)
Dept: CARE COORDINATION | Age: 58
End: 2018-10-12

## 2018-10-12 NOTE — CARE COORDINATION
BY MOUTH TWICE DAILY 9/25/18   Itzel Desai MD   amitriptyline (ELAVIL) 150 MG tablet TAKE ONE TABLET BY MOUTH ONCE NIGHTLY 8/14/18   Itzel Desai MD   metoclopramide (REGLAN) 5 MG tablet TAKE 1 TABLET BY MOUTH TWICE DAILY 7/16/18   Zee Dunbar MD   gabapentin (NEURONTIN) 800 MG tablet TAKE ONE TABLET BY MOUTH TWICE DAILY 6/4/18 11/4/18  Itzel Desai MD   DULoxetine (CYMBALTA) 60 MG extended release capsule TAKE ONE CAPSULE BY MOUTH ONCE DAILY 6/4/18   Itzle Desai MD   ibuprofen (ADVIL;MOTRIN) 200 MG tablet Take 200 mg by mouth every 6 hours as needed for Pain    Historical Provider, MD   metaxalone (SKELAXIN) 800 MG tablet Take 800 mg by mouth 3 times daily    Historical Provider, MD   ondansetron (ZOFRAN-ODT) 4 MG disintegrating tablet Take 4 mg by mouth every 8 hours as needed for Nausea or Vomiting    Historical Provider, MD   levothyroxine (SYNTHROID) 137 MCG tablet Take 1 tablet by mouth daily 5/3/18   Itzel Desai MD   rOPINIRole (REQUIP) 2 MG tablet Take 1 tablet by mouth daily 5/2/18   Itzel Desai MD   pantoprazole (PROTONIX) 40 MG tablet Take 40 mg by mouth 2 times daily    Historical Provider, MD   ranitidine (ZANTAC) 150 MG capsule Take 150 mg by mouth 2 times daily     Historical Provider, MD   simvastatin (ZOCOR) 20 MG tablet Take 1 tablet by mouth nightly 3/19/18   Itzel Desai MD   losartan (COZAAR) 100 MG tablet Take 1 tablet by mouth daily 3/19/18   Itzel Desai MD   torsemide (DEMADEX) 20 MG tablet TAKE ONE TABLET BY MOUTH ONCE DAILY 2/6/18   Itzel Desai MD   insulin aspart (NOVOLOG) 100 UNIT/ML injection vial Inject into the skin Via insulin pump 4/5/16   Historical Provider, MD PATO REAL 200-25 MCG/INH AEPB INL 1 PUFF PO DAILY 10/7/17   Historical Provider, MD   albuterol (PROVENTIL) (2.5 MG/3ML) 0.083% nebulizer solution Take 1 mL by nebulization 3 times daily as needed 6/15/17   Historical Provider, MD   tiotropium (Teresia Setting) 18 MCG

## 2018-10-22 ENCOUNTER — TELEPHONE (OUTPATIENT)
Dept: INTERNAL MEDICINE CLINIC | Age: 58
End: 2018-10-22

## 2018-10-22 NOTE — TELEPHONE ENCOUNTER
Jeffkale Som dropped off Haroon Schein of public safety  [employer] forms. she needs needs completed for driving ability/restrictions. she needs this completed by ASAP. The last one we did, did not get returned in time, so they could not accept it. Please fax/call/mail to [facility] in enclosed envelope. Paperwork was given to [place].

## 2018-10-25 ENCOUNTER — CARE COORDINATION (OUTPATIENT)
Dept: CARE COORDINATION | Age: 58
End: 2018-10-25

## 2018-10-25 NOTE — CARE COORDINATION
Attempting to reach patient for a follow up care coordination call regarding any needs, questions or concerns.   Jovanni Lira, [facility]

## 2018-11-03 DIAGNOSIS — G25.81 RLS (RESTLESS LEGS SYNDROME): ICD-10-CM

## 2018-11-05 RX ORDER — TORSEMIDE 20 MG/1
TABLET ORAL
Qty: 30 TABLET | Refills: 11 | Status: SHIPPED | OUTPATIENT
Start: 2018-11-05 | End: 2018-12-20 | Stop reason: SDUPTHER

## 2018-11-09 ENCOUNTER — CARE COORDINATION (OUTPATIENT)
Dept: CARE COORDINATION | Age: 58
End: 2018-11-09

## 2018-11-19 ENCOUNTER — TELEPHONE (OUTPATIENT)
Dept: INTERNAL MEDICINE CLINIC | Age: 58
End: 2018-11-19

## 2018-11-19 NOTE — TELEPHONE ENCOUNTER
This will be the 3rd time that we are completing this form for the patient. The first time it was not returned within 30 days. Second time, we did not answer all of the required questions. Patient dropped off another set of forms to be completed and mailed back ASAP to the 20[facility] so that she can retain her driving privileges. Form placed in [place]'s in box.

## 2018-12-10 ENCOUNTER — HOSPITAL ENCOUNTER (EMERGENCY)
Age: 58
Discharge: HOME | End: 2018-12-10
Attending: EMERGENCY MEDICINE
Payer: MEDICARE

## 2018-12-10 ENCOUNTER — CARE COORDINATION (OUTPATIENT)
Dept: CARE COORDINATION | Age: 58
End: 2018-12-10

## 2018-12-10 VITALS
TEMPERATURE: 97.6 F | SYSTOLIC BLOOD PRESSURE: 167 MMHG | DIASTOLIC BLOOD PRESSURE: 79 MMHG | OXYGEN SATURATION: 97 % | WEIGHT: 198 LBS | BODY MASS INDEX: 32.95 KG/M2 | RESPIRATION RATE: 16 BRPM | HEART RATE: 83 BPM

## 2018-12-10 DIAGNOSIS — T14.8XXA SKIN AVULSION: Primary | ICD-10-CM

## 2018-12-10 DIAGNOSIS — S61.212A LACERATION OF RIGHT MIDDLE FINGER, FOREIGN BODY PRESENCE UNSPECIFIED, NAIL DAMAGE STATUS UNSPECIFIED, INITIAL ENCOUNTER: ICD-10-CM

## 2018-12-10 PROCEDURE — 99282 EMERGENCY DEPT VISIT SF MDM: CPT

## 2018-12-10 NOTE — CARE COORDINATION
Attempting to reach patient for a follow up care coordination call. Left detailed message for the patient to return my call with any questions or concerns. Would like to check on her blood sugar and any needs, see how her endocrine consult went. Last A! C was 8.3

## 2018-12-11 ENCOUNTER — CARE COORDINATION (OUTPATIENT)
Dept: CARE COORDINATION | Age: 58
End: 2018-12-11

## 2018-12-11 NOTE — CARE COORDINATION
1 TABLET BY MOUTH TWICE DAILY 9/25/18   Jasbir Hampton MD   amitriptyline (ELAVIL) 150 MG tablet TAKE ONE TABLET BY MOUTH ONCE NIGHTLY 8/14/18   Jasbir Hampton MD   metoclopramide (REGLAN) 5 MG tablet TAKE 1 TABLET BY MOUTH TWICE DAILY 7/16/18   Savanna Hodge MD   gabapentin (NEURONTIN) 800 MG tablet TAKE ONE TABLET BY MOUTH TWICE DAILY 6/4/18 11/4/18  Jasbir Hampton MD   DULoxetine (CYMBALTA) 60 MG extended release capsule TAKE ONE CAPSULE BY MOUTH ONCE DAILY 6/4/18   Jasbir Hampton MD   ibuprofen (ADVIL;MOTRIN) 200 MG tablet Take 200 mg by mouth every 6 hours as needed for Pain    Historical Provider, MD   metaxalone (SKELAXIN) 800 MG tablet Take 800 mg by mouth 3 times daily    Historical Provider, MD   ondansetron (ZOFRAN-ODT) 4 MG disintegrating tablet Take 4 mg by mouth every 8 hours as needed for Nausea or Vomiting    Historical Provider, MD   levothyroxine (SYNTHROID) 137 MCG tablet Take 1 tablet by mouth daily 5/3/18   Jasbir Hampton MD   rOPINIRole (REQUIP) 2 MG tablet Take 1 tablet by mouth daily 5/2/18   Jasbir Hampton MD   pantoprazole (PROTONIX) 40 MG tablet Take 40 mg by mouth 2 times daily    Historical Provider, MD   ranitidine (ZANTAC) 150 MG capsule Take 150 mg by mouth 2 times daily     Historical Provider, MD   simvastatin (ZOCOR) 20 MG tablet Take 1 tablet by mouth nightly 3/19/18   Jasbir Hampton MD   losartan (COZAAR) 100 MG tablet Take 1 tablet by mouth daily 3/19/18   Jasbir Hampton MD   insulin aspart (NOVOLOG) 100 UNIT/ML injection vial Inject into the skin Via insulin pump 4/5/16   Historical Provider, MD   BREO ELLIPTA 200-25 MCG/INH AEPB INL 1 PUFF PO DAILY 10/7/17   Historical Provider, MD   albuterol (PROVENTIL) (2.5 MG/3ML) 0.083% nebulizer solution Take 1 mL by nebulization 3 times daily as needed 6/15/17   Historical Provider, MD   tiotropium (SPIRIVA HANDIHALER) 18 MCG inhalation capsule Inhale 1 capsule into the lungs Daily 6/19/17   Jared Royal MD

## 2018-12-15 DIAGNOSIS — G89.4 CHRONIC PAIN SYNDROME: Chronic | ICD-10-CM

## 2018-12-18 RX ORDER — GABAPENTIN 800 MG/1
TABLET ORAL
Qty: 60 TABLET | Refills: 5 | Status: SHIPPED | OUTPATIENT
Start: 2018-12-18 | End: 2019-04-15 | Stop reason: DRUGHIGH

## 2018-12-18 NOTE — TELEPHONE ENCOUNTER
Medication Requested: Gabapentin      Last visit: 09/21/18  Next visit: 12/18/2018  Last refill: 06/04/18 with 5 refills     Med contract on file:  [x] yes   [] no    Last urine drug screen: 10/13/17  Consistent with medication(s):    [] yes   [] no    Last OARRS ran:12/18/18    Quantity of medication remaining: unknown     Who will be picking rx up: unknown     Pharmacy if escribed: unknown     Dr Genevieve Coreas is out of office can you please sign

## 2018-12-20 ENCOUNTER — CARE COORDINATION (OUTPATIENT)
Dept: CARE COORDINATION | Age: 58
End: 2018-12-20

## 2018-12-20 ENCOUNTER — OFFICE VISIT (OUTPATIENT)
Dept: INTERNAL MEDICINE CLINIC | Age: 58
End: 2018-12-20
Payer: MEDICARE

## 2018-12-20 VITALS
HEIGHT: 65 IN | BODY MASS INDEX: 32.99 KG/M2 | DIASTOLIC BLOOD PRESSURE: 64 MMHG | HEART RATE: 68 BPM | OXYGEN SATURATION: 93 % | WEIGHT: 198 LBS | SYSTOLIC BLOOD PRESSURE: 118 MMHG

## 2018-12-20 DIAGNOSIS — G25.81 RLS (RESTLESS LEGS SYNDROME): ICD-10-CM

## 2018-12-20 DIAGNOSIS — M75.40 IMPINGEMENT SYNDROME OF SHOULDER REGION, UNSPECIFIED LATERALITY: ICD-10-CM

## 2018-12-20 DIAGNOSIS — E10.43 TYPE 1 DIABETES MELLITUS WITH DIABETIC AUTONOMIC NEUROPATHY, WITH LONG-TERM CURRENT USE OF INSULIN (HCC): ICD-10-CM

## 2018-12-20 DIAGNOSIS — I50.43 ACUTE ON CHRONIC COMBINED SYSTOLIC AND DIASTOLIC CONGESTIVE HEART FAILURE (HCC): ICD-10-CM

## 2018-12-20 DIAGNOSIS — J06.9 UPPER RESPIRATORY TRACT INFECTION, UNSPECIFIED TYPE: ICD-10-CM

## 2018-12-20 DIAGNOSIS — R51.9 NONINTRACTABLE HEADACHE, UNSPECIFIED CHRONICITY PATTERN, UNSPECIFIED HEADACHE TYPE: ICD-10-CM

## 2018-12-20 DIAGNOSIS — R19.7 DIARRHEA, UNSPECIFIED TYPE: Primary | ICD-10-CM

## 2018-12-20 PROCEDURE — G8417 CALC BMI ABV UP PARAM F/U: HCPCS | Performed by: INTERNAL MEDICINE

## 2018-12-20 PROCEDURE — G8482 FLU IMMUNIZE ORDER/ADMIN: HCPCS | Performed by: INTERNAL MEDICINE

## 2018-12-20 PROCEDURE — 3017F COLORECTAL CA SCREEN DOC REV: CPT | Performed by: INTERNAL MEDICINE

## 2018-12-20 PROCEDURE — 2022F DILAT RTA XM EVC RTNOPTHY: CPT | Performed by: INTERNAL MEDICINE

## 2018-12-20 PROCEDURE — 99214 OFFICE O/P EST MOD 30 MIN: CPT | Performed by: INTERNAL MEDICINE

## 2018-12-20 PROCEDURE — 1036F TOBACCO NON-USER: CPT | Performed by: INTERNAL MEDICINE

## 2018-12-20 PROCEDURE — 3045F PR MOST RECENT HEMOGLOBIN A1C LEVEL 7.0-9.0%: CPT | Performed by: INTERNAL MEDICINE

## 2018-12-20 PROCEDURE — G8427 DOCREV CUR MEDS BY ELIG CLIN: HCPCS | Performed by: INTERNAL MEDICINE

## 2018-12-20 RX ORDER — TORSEMIDE 10 MG/1
10 TABLET ORAL DAILY PRN
Qty: 30 TABLET | Refills: 5 | Status: SHIPPED | OUTPATIENT
Start: 2018-12-20 | End: 2019-04-16 | Stop reason: SDUPTHER

## 2018-12-20 RX ORDER — DOXYCYCLINE HYCLATE 100 MG/1
100 CAPSULE ORAL 2 TIMES DAILY
Qty: 20 CAPSULE | Refills: 0 | Status: SHIPPED | OUTPATIENT
Start: 2018-12-20 | End: 2018-12-30

## 2018-12-20 NOTE — PROGRESS NOTES
Visit Information    Have you changed or started any medications since your last visit including any over-the-counter medicines, vitamins, or herbal medicines? no   Are you having any side effects from any of your medications? -  no  Have you stopped taking any of your medications? Is so, why? -  no    Have you seen any other physician or provider since your last visit? No  Have you had any other diagnostic tests since your last visit? No  Have you been seen in the emergency room and/or had an admission to a hospital since we last saw you? No  Have you had your routine dental cleaning in the past 6 months? no    Have you activated your [employer] account? If not, what are your barriers?  No: sahara     Patient Care Team:  Cecilia Hampton MD as PCP - General  Rita Huertas MD as PCP - Pulmonology (Pulmonology)  Cecilia Hampton MD as PCP - S Attributed Provider  Natacha Brunson MD as Consulting Physician (Urology)  Oneyda Guerrero RN as Care Coordinator  Meghann Diaz MD as Consulting Physician (Infectious Diseases)    Medical History Review  Past Medical, Family, and Social History reviewed and does not contribute to the patient presenting condition    Health Maintenance   Topic Date Due    Hepatitis C screen  1960    HIV screen  10/05/1977    Cervical cancer screen  04/30/2018    Lipid screen  06/02/2018    Shingles Vaccine (1 of 2 - 2 Dose Series) 04/18/2019 (Originally 10/5/2012)    Diabetic microalbuminuria test  04/26/2019    TSH testing  06/21/2019    Diabetic retinal exam  07/09/2019    Diabetic foot exam  08/21/2019    A1C test (Diabetic or Prediabetic)  09/14/2019    Potassium monitoring  09/14/2019    Creatinine monitoring  09/14/2019    Breast cancer screen  05/08/2020    Colon cancer screen colonoscopy  01/20/2025    DTaP/Tdap/Td vaccine (2 - Td) 04/20/2025    Flu vaccine  Completed    Pneumococcal med risk  Completed     Chief Complaint   Patient presents with    Sinusitis unspecified chronicity pattern, unspecified headache type     Type 1 diabetes mellitus with diabetic autonomic neuropathy, with long-term current use of insulin (HCC)     Upper respiratory tract infection, unspecified type     RLS (restless legs syndrome)     Acute on chronic combined systolic and diastolic congestive heart failure (HCC)     Impingement syndrome of shoulder region, unspecified laterality           AND --blood sugar control poor     Congested lungs --      ROS AS NOTED IN HPI ,    Other  positive -- Review of Systems   Constitutional: Positive for malaise/fatigue. HENT: Positive for congestion. Respiratory: Positive for shortness of breath. Negative for cough. Cardiovascular: Negative for chest pain, palpitations and leg swelling. Gastrointestinal: Negative for abdominal pain. Genitourinary: Negative for frequency. Musculoskeletal: Negative for myalgias. ALL OTHER  SYSTEM REVIEW NEGATIVE. Social History   Substance Use Topics    Smoking status: Never Smoker    Smokeless tobacco: Never Used    Alcohol use No      Comment: once a month     Fioricet [butalbital-apap-caffeine]; Erythromycin; Codeine; Droperidol; and Tape [adhesive tape]     Allergies; medicatons; past medical, surgical, family, and social history; and problem list reviewed by me, as indicated in this encounter  . OBJECTIVE      Physical exam      Vitals:    12/20/18 1529   BP: 118/64   Pulse: 68   SpO2: 93%   Weight: 198 lb (89.8 kg)   Height: 5' 5\" (1.651 m)      Estimated body mass index is 32.95 kg/m² as calculated from the following:    Height as of this encounter: 5' 5\" (1.651 m). Weight as of this encounter: 198 lb (89.8 kg). Physical Exam   Constitutional: She is cooperative. Neck: Carotid bruit is not present. No thyroid mass and no thyromegaly present. Cardiovascular: Normal rate, regular rhythm and normal heart sounds. Exam reveals no S3. No murmur heard.   Pulmonary/Chest: BUN 10 6 - 20 mg/dL    CREATININE 0.86 0.50 - 0.90 mg/dL    Bun/Cre Ratio NOT REPORTED 9 - 20    Calcium 8.7 8.6 - 10.4 mg/dL    Sodium 134 (L) 135 - 144 mmol/L    Potassium 4.3 3.7 - 5.3 mmol/L    Chloride 98 98 - 107 mmol/L    CO2 25 20 - 31 mmol/L    Anion Gap 11 9 - 17 mmol/L    GFR Non-African American >60 >60 mL/min    GFR African American >60 >60 mL/min    GFR Comment          GFR Staging NOT REPORTED    Brain Natriuretic Peptide   Result Value Ref Range    Pro- <300 pg/mL    BNP Interpretation         Basic Metabolic Panel w/ Reflex to MG   Result Value Ref Range    Glucose 296 (H) 70 - 99 mg/dL    BUN 9 6 - 20 mg/dL    CREATININE 0.80 0.50 - 0.90 mg/dL    Bun/Cre Ratio NOT REPORTED 9 - 20    Calcium 8.7 8.6 - 10.4 mg/dL    Sodium 138 135 - 144 mmol/L    Potassium 5.1 3.7 - 5.3 mmol/L    Chloride 102 98 - 107 mmol/L    CO2 22 20 - 31 mmol/L    Anion Gap 14 9 - 17 mmol/L    GFR Non-African American >60 >60 mL/min    GFR African American >60 >60 mL/min    GFR Comment          GFR Staging NOT REPORTED    CBC   Result Value Ref Range    WBC 12.9 (H) 3.5 - 11.3 k/uL    RBC 4.45 3.95 - 5.11 m/uL    Hemoglobin 12.7 11.9 - 15.1 g/dL    Hematocrit 40.4 36.3 - 47.1 %    MCV 90.8 82.6 - 102.9 fL    MCH 28.5 25.2 - 33.5 pg    MCHC 31.4 28.4 - 34.8 g/dL    RDW 13.5 11.8 - 14.4 %    Platelets 861 835 - 976 k/uL    MPV 10.1 8.1 - 13.5 fL    NRBC Automated 0.0 0.0 per 100 WBC   Hemoglobin A1C   Result Value Ref Range    Hemoglobin A1C 8.3 (H) 4.0 - 6.0 %    Estimated Avg Glucose 192 mg/dL   POC Glucose Fingerstick   Result Value Ref Range    POC Glucose 349 (H) 65 - 105 mg/dL   POC Glucose Fingerstick   Result Value Ref Range    POC Glucose 374 (H) 65 - 105 mg/dL   POC Glucose Fingerstick   Result Value Ref Range    POC Glucose 337 (H) 65 - 105 mg/dL   POC Glucose Fingerstick   Result Value Ref Range    POC Glucose 265 (H) 65 - 105 mg/dL   POC Glucose Fingerstick   Result Value Ref Range    POC Glucose 301 (H) 65 - CHEST 4/8/2018 2:03 pm COMPARISON: Chest x-ray from 03/31/2018 HISTORY: ORDERING SYSTEM PROVIDED HISTORY: cough TECHNOLOGIST PROVIDED HISTORY: Reason for exam:->cough Acuity: Unknown Type of Exam: Unknown FINDINGS: Increasing reticular densities are seen in the mid to lower lung zones. No focal airspace consolidation, sizeable pleural effusion or pneumothorax identified. Heart size appears within normal limits. Stable appearance of remote right-sided rib fracture. There are similar moderate hypertrophic degenerative changes of the visualized spine. Visualized osseous structures appear mildly demineralized, but grossly intact, given the non dedicated imaging. Increasing linear densities in the mid to lower lung zones, bilaterally may reflect atelectasis and/or atypical pneumonitis. No focal airspace consolidation. Continued imaging follow-up to resolution is recommended. Xr Chest 1 Vw    Result Date: 4/8/2018  EXAMINATION: SINGLE VIEW OF THE CHEST 4/8/2018 2:34 pm COMPARISON: None. HISTORY: ORDERING SYSTEM PROVIDED HISTORY: Chest Pain TECHNOLOGIST PROVIDED HISTORY: Lat view only Reason for exam:->Chest Pain Acuity: Unknown Type of Exam: Unknown FINDINGS: Minimal streaky densities identified primarily related to marco mild bronchial wall thickening appreciated. No focal airspace consolidation or sizable pleural effusion. Heart size appears at the upper limits of normal. Visualized osseous structures appear intact and grossly unremarkable, given the non dedicated imaging. Streaky linear densities and mild peribronchial cuffing may reflect bronchitis/atypical pneumonitis. Atelectasis and/or mild vascular congestion could have a similar appearance. No focal airspace consolidation. Continued imaging follow-up is suggested. ASSESSMENT / PLAN      Diagnosis Orders   1. Diarrhea, unspecified type  doxycycline hyclate (VIBRAMYCIN) 100 MG capsule   2.  Nonintractable headache, unspecified chronicity pattern, unspecified headache type     3. Type 1 diabetes mellitus with diabetic autonomic neuropathy, with long-term current use of insulin ([facility])     4. Upper respiratory tract infection, unspecified type  doxycycline hyclate (VIBRAMYCIN) 100 MG capsule   5. RLS (restless legs syndrome)  torsemide (DEMADEX) 10 MG tablet   6. Acute on chronic combined systolic and diastolic congestive heart failure ([address]     7. Impingement syndrome of shoulder region, unspecified laterality  Brice Comer MD, Orthoped[facility] was seen today for discuss medications and congestion. Diagnoses and all orders for this visit:    Breast cancer screening    Hypothyroidism due to non-medication exogenous substances  On replacement therapy . Continue present dose .    -     TSH with Reflex; Future    Essential hypertension  Good Control . Continue present plan. Retinopathy due to secondary DM ([address]  1. Stable . 2. Rx reviewed . 3. Continue present regimen . Type 1 diabetes mellitus with diabetic autonomic neuropathy, with long-term current use of insulin ([facility])  -     Hemoglobin A1C; Future    Insulin pump in place    Chronic obstructive pulmonary disease, unspecified COPD type ([address]  Pneumonic changes 4/8/18 resolved . Still bothers but improved . Compliance with treatment good . Peripheral vascular disease (HCC)  Asymptomatic     Chronic pain syndrome  Fair control . Therapy reviewed . Continue to monitor . Acute suppurative otitis media of left ear without spontaneous rupture of tympanic membrane, recurrence not specified  On treatment , saw ear specialist from [facility] clinic     Other orders  -     levothyroxine (SYNTHROID) 137 MCG tablet; Take 1 tablet by mouth daily              SALIENT  ACTIONS TODAYS VISIT     Today's office visit SALIENT ACTIONS    -comp med review. ---    Medications Discontinued During This Encounter   Medication Reason    torsemide (DEMADEX)

## 2018-12-21 NOTE — CARE COORDINATION
understand envrionmental exposure?:  Yes  Is the patient able to verbalize Rescue vs. Long Acting medications?:  Yes  Does the patient have a nebulizer?:  Yes     No patient-reported symptoms         Symptoms:   None:  Yes

## 2018-12-28 ENCOUNTER — TELEPHONE (OUTPATIENT)
Dept: INTERNAL MEDICINE CLINIC | Age: 58
End: 2018-12-28

## 2018-12-30 ENCOUNTER — HOSPITAL ENCOUNTER (EMERGENCY)
Age: 58
Discharge: HOME | End: 2018-12-30
Attending: EMERGENCY MEDICINE
Payer: MEDICARE

## 2018-12-30 ENCOUNTER — APPOINTMENT (OUTPATIENT)
Dept: GENERAL RADIOLOGY | Age: 58
End: 2018-12-30
Payer: MEDICARE

## 2018-12-30 ENCOUNTER — APPOINTMENT (OUTPATIENT)
Dept: CT IMAGING | Age: 58
End: 2018-12-30
Payer: MEDICARE

## 2018-12-30 VITALS
WEIGHT: 200 LBS | HEART RATE: 68 BPM | RESPIRATION RATE: 18 BRPM | HEIGHT: 65 IN | OXYGEN SATURATION: 96 % | DIASTOLIC BLOOD PRESSURE: 67 MMHG | SYSTOLIC BLOOD PRESSURE: 155 MMHG | TEMPERATURE: 98.3 F | BODY MASS INDEX: 33.32 KG/M2

## 2018-12-30 DIAGNOSIS — R51.9 NONINTRACTABLE HEADACHE, UNSPECIFIED CHRONICITY PATTERN, UNSPECIFIED HEADACHE TYPE: ICD-10-CM

## 2018-12-30 DIAGNOSIS — R11.0 NAUSEA: ICD-10-CM

## 2018-12-30 DIAGNOSIS — R10.9 FLANK PAIN: Primary | ICD-10-CM

## 2018-12-30 LAB
ABSOLUTE EOS #: 0.4 K/UL (ref 0–0.4)
ABSOLUTE IMMATURE GRANULOCYTE: ABNORMAL K/UL (ref 0–0.3)
ABSOLUTE LYMPH #: 2.7 K/UL (ref 1–4.8)
ABSOLUTE MONO #: 0.5 K/UL (ref 0.2–0.8)
ALBUMIN SERPL-MCNC: 4 G/DL (ref 3.5–5.2)
ALBUMIN/GLOBULIN RATIO: ABNORMAL (ref 1–2.5)
ALP BLD-CCNC: 117 U/L (ref 35–104)
ALT SERPL-CCNC: 6 U/L (ref 5–33)
ANION GAP SERPL CALCULATED.3IONS-SCNC: 13 MMOL/L (ref 9–17)
AST SERPL-CCNC: 11 U/L
BASOPHILS # BLD: 2 % (ref 0–2)
BASOPHILS ABSOLUTE: 0.1 K/UL (ref 0–0.2)
BILIRUB SERPL-MCNC: 0.31 MG/DL (ref 0.3–1.2)
BILIRUBIN DIRECT: 0.1 MG/DL
BILIRUBIN URINE: NEGATIVE
BILIRUBIN, INDIRECT: 0.21 MG/DL (ref 0–1)
BNP INTERPRETATION: NORMAL
BUN BLDV-MCNC: 16 MG/DL (ref 6–20)
BUN/CREAT BLD: 21 (ref 9–20)
CALCIUM SERPL-MCNC: 9.4 MG/DL (ref 8.6–10.4)
CHLORIDE BLD-SCNC: 102 MMOL/L (ref 98–107)
CO2: 24 MMOL/L (ref 20–31)
COLOR: YELLOW
COMMENT UA: NORMAL
CREAT SERPL-MCNC: 0.75 MG/DL (ref 0.5–0.9)
DIFFERENTIAL TYPE: ABNORMAL
EOSINOPHILS RELATIVE PERCENT: 5 % (ref 1–4)
GFR AFRICAN AMERICAN: >60 ML/MIN
GFR NON-AFRICAN AMERICAN: >60 ML/MIN
GFR SERPL CREATININE-BSD FRML MDRD: ABNORMAL ML/MIN/{1.73_M2}
GFR SERPL CREATININE-BSD FRML MDRD: ABNORMAL ML/MIN/{1.73_M2}
GLOBULIN: ABNORMAL G/DL (ref 1.5–3.8)
GLUCOSE BLD-MCNC: 103 MG/DL (ref 65–105)
GLUCOSE BLD-MCNC: 69 MG/DL (ref 70–99)
GLUCOSE URINE: NEGATIVE
HCT VFR BLD CALC: 40.7 % (ref 36–46)
HEMOGLOBIN: 13.3 G/DL (ref 12–16)
IMMATURE GRANULOCYTES: ABNORMAL %
KETONES, URINE: NEGATIVE
LEUKOCYTE ESTERASE, URINE: NEGATIVE
LIPASE: 17 U/L (ref 13–60)
LYMPHOCYTES # BLD: 34 % (ref 24–44)
MAGNESIUM: 2 MG/DL (ref 1.6–2.6)
MCH RBC QN AUTO: 29.3 PG (ref 26–34)
MCHC RBC AUTO-ENTMCNC: 32.8 G/DL (ref 31–37)
MCV RBC AUTO: 89.4 FL (ref 80–100)
MONOCYTES # BLD: 7 % (ref 1–7)
NITRITE, URINE: NEGATIVE
NRBC AUTOMATED: ABNORMAL PER 100 WBC
PDW BLD-RTO: 14 % (ref 11.5–14.5)
PH UA: 6 (ref 5–8)
PLATELET # BLD: 278 K/UL (ref 130–400)
PLATELET ESTIMATE: ABNORMAL
PMV BLD AUTO: ABNORMAL FL (ref 6–12)
POTASSIUM SERPL-SCNC: 4.3 MMOL/L (ref 3.7–5.3)
PRO-BNP: 142 PG/ML
PROTEIN UA: NEGATIVE
RBC # BLD: 4.55 M/UL (ref 4–5.2)
RBC # BLD: ABNORMAL 10*6/UL
SEG NEUTROPHILS: 52 % (ref 36–66)
SEGMENTED NEUTROPHILS ABSOLUTE COUNT: 4.2 K/UL (ref 1.8–7.7)
SODIUM BLD-SCNC: 139 MMOL/L (ref 135–144)
SPECIFIC GRAVITY UA: 1.01 (ref 1–1.03)
TOTAL PROTEIN: 6.8 G/DL (ref 6.4–8.3)
TURBIDITY: CLEAR
URINE HGB: NEGATIVE
UROBILINOGEN, URINE: NORMAL
WBC # BLD: 7.9 K/UL (ref 3.5–11)
WBC # BLD: ABNORMAL 10*3/UL

## 2018-12-30 PROCEDURE — 83735 ASSAY OF MAGNESIUM: CPT

## 2018-12-30 PROCEDURE — 96361 HYDRATE IV INFUSION ADD-ON: CPT

## 2018-12-30 PROCEDURE — 80076 HEPATIC FUNCTION PANEL: CPT

## 2018-12-30 PROCEDURE — 74176 CT ABD & PELVIS W/O CONTRAST: CPT

## 2018-12-30 PROCEDURE — 83880 ASSAY OF NATRIURETIC PEPTIDE: CPT

## 2018-12-30 PROCEDURE — 6360000002 HC RX W HCPCS: Performed by: NURSE PRACTITIONER

## 2018-12-30 PROCEDURE — 2580000003 HC RX 258: Performed by: NURSE PRACTITIONER

## 2018-12-30 PROCEDURE — 99284 EMERGENCY DEPT VISIT MOD MDM: CPT

## 2018-12-30 PROCEDURE — 96374 THER/PROPH/DIAG INJ IV PUSH: CPT

## 2018-12-30 PROCEDURE — 85025 COMPLETE CBC W/AUTO DIFF WBC: CPT

## 2018-12-30 PROCEDURE — 83690 ASSAY OF LIPASE: CPT

## 2018-12-30 PROCEDURE — 81003 URINALYSIS AUTO W/O SCOPE: CPT

## 2018-12-30 PROCEDURE — 82947 ASSAY GLUCOSE BLOOD QUANT: CPT

## 2018-12-30 PROCEDURE — 80048 BASIC METABOLIC PNL TOTAL CA: CPT

## 2018-12-30 PROCEDURE — 71045 X-RAY EXAM CHEST 1 VIEW: CPT

## 2018-12-30 RX ORDER — ACETAMINOPHEN AND CODEINE PHOSPHATE 300; 30 MG/1; MG/1
1 TABLET ORAL EVERY 6 HOURS PRN
Qty: 12 TABLET | Refills: 0 | Status: SHIPPED | OUTPATIENT
Start: 2018-12-30 | End: 2019-01-06

## 2018-12-30 RX ORDER — ONDANSETRON 2 MG/ML
4 INJECTION INTRAMUSCULAR; INTRAVENOUS ONCE
Status: COMPLETED | OUTPATIENT
Start: 2018-12-30 | End: 2018-12-30

## 2018-12-30 RX ORDER — ONDANSETRON 4 MG/1
4 TABLET, ORALLY DISINTEGRATING ORAL EVERY 8 HOURS PRN
Qty: 20 TABLET | Refills: 0 | Status: SHIPPED | OUTPATIENT
Start: 2018-12-30 | End: 2019-09-04

## 2018-12-30 RX ORDER — 0.9 % SODIUM CHLORIDE 0.9 %
1000 INTRAVENOUS SOLUTION INTRAVENOUS ONCE
Status: COMPLETED | OUTPATIENT
Start: 2018-12-30 | End: 2018-12-30

## 2018-12-30 RX ORDER — DEXTROSE MONOHYDRATE 25 G/50ML
12.5 INJECTION, SOLUTION INTRAVENOUS ONCE
Status: COMPLETED | OUTPATIENT
Start: 2018-12-30 | End: 2018-12-30

## 2018-12-30 RX ADMIN — SODIUM CHLORIDE 1000 ML: 9 INJECTION, SOLUTION INTRAVENOUS at 14:10

## 2018-12-30 RX ADMIN — DEXTROSE MONOHYDRATE 12.5 G: 25 INJECTION, SOLUTION INTRAVENOUS at 14:17

## 2018-12-30 RX ADMIN — ONDANSETRON 4 MG: 2 INJECTION INTRAMUSCULAR; INTRAVENOUS at 14:17

## 2018-12-30 ASSESSMENT — PAIN DESCRIPTION - ORIENTATION: ORIENTATION: LEFT

## 2018-12-30 ASSESSMENT — ENCOUNTER SYMPTOMS
COLOR CHANGE: 0
RHINORRHEA: 0
DIARRHEA: 0
PHOTOPHOBIA: 0
VOMITING: 0
SORE THROAT: 0
NAUSEA: 1
COUGH: 0
SHORTNESS OF BREATH: 0
EYE PAIN: 0
ABDOMINAL PAIN: 0
SINUS PRESSURE: 0
BACK PAIN: 1

## 2018-12-30 ASSESSMENT — PAIN DESCRIPTION - FREQUENCY: FREQUENCY: CONTINUOUS

## 2018-12-30 ASSESSMENT — PAIN DESCRIPTION - DESCRIPTORS: DESCRIPTORS: ACHING;CONSTANT

## 2018-12-30 ASSESSMENT — PAIN SCALES - GENERAL: PAINLEVEL_OUTOF10: 6

## 2018-12-30 ASSESSMENT — PAIN SCALES - WONG BAKER: WONGBAKER_NUMERICALRESPONSE: 6

## 2018-12-31 ENCOUNTER — CARE COORDINATION (OUTPATIENT)
Dept: CARE COORDINATION | Age: 58
End: 2018-12-31

## 2019-01-01 ENCOUNTER — APPOINTMENT (OUTPATIENT)
Dept: CT IMAGING | Age: 59
End: 2019-01-01
Payer: MEDICARE

## 2019-01-01 ENCOUNTER — HOSPITAL ENCOUNTER (EMERGENCY)
Age: 59
Discharge: HOME | End: 2019-01-01
Attending: EMERGENCY MEDICINE
Payer: MEDICARE

## 2019-01-01 VITALS
TEMPERATURE: 98.3 F | OXYGEN SATURATION: 95 % | WEIGHT: 200 LBS | BODY MASS INDEX: 33.32 KG/M2 | SYSTOLIC BLOOD PRESSURE: 160 MMHG | DIASTOLIC BLOOD PRESSURE: 82 MMHG | RESPIRATION RATE: 16 BRPM | HEIGHT: 65 IN | HEART RATE: 66 BPM

## 2019-01-01 DIAGNOSIS — K08.89 PAIN, DENTAL: Primary | ICD-10-CM

## 2019-01-01 DIAGNOSIS — G50.0 TRIGEMINAL NEURALGIA OF LEFT SIDE OF FACE: ICD-10-CM

## 2019-01-01 DIAGNOSIS — J01.00 ACUTE NON-RECURRENT MAXILLARY SINUSITIS: ICD-10-CM

## 2019-01-01 PROCEDURE — 70450 CT HEAD/BRAIN W/O DYE: CPT

## 2019-01-01 PROCEDURE — 6370000000 HC RX 637 (ALT 250 FOR IP): Performed by: NURSE PRACTITIONER

## 2019-01-01 PROCEDURE — 6360000002 HC RX W HCPCS: Performed by: NURSE PRACTITIONER

## 2019-01-01 PROCEDURE — 99284 EMERGENCY DEPT VISIT MOD MDM: CPT

## 2019-01-01 PROCEDURE — 96372 THER/PROPH/DIAG INJ SC/IM: CPT

## 2019-01-01 RX ORDER — KETOROLAC TROMETHAMINE 30 MG/ML
30 INJECTION, SOLUTION INTRAMUSCULAR; INTRAVENOUS ONCE
Status: COMPLETED | OUTPATIENT
Start: 2019-01-01 | End: 2019-01-01

## 2019-01-01 RX ORDER — AMOXICILLIN AND CLAVULANATE POTASSIUM 875; 125 MG/1; MG/1
1 TABLET, FILM COATED ORAL 2 TIMES DAILY
Qty: 20 TABLET | Refills: 0 | Status: SHIPPED | OUTPATIENT
Start: 2019-01-01 | End: 2019-01-11

## 2019-01-01 RX ORDER — PENICILLIN V POTASSIUM 250 MG/1
500 TABLET ORAL ONCE
Status: COMPLETED | OUTPATIENT
Start: 2019-01-01 | End: 2019-01-01

## 2019-01-01 RX ORDER — HYDROCODONE BITARTRATE AND ACETAMINOPHEN 5; 325 MG/1; MG/1
2 TABLET ORAL ONCE
Status: COMPLETED | OUTPATIENT
Start: 2019-01-01 | End: 2019-01-01

## 2019-01-01 RX ORDER — HYDROCODONE BITARTRATE AND ACETAMINOPHEN 5; 325 MG/1; MG/1
1 TABLET ORAL EVERY 8 HOURS PRN
Qty: 10 TABLET | Refills: 0 | Status: SHIPPED | OUTPATIENT
Start: 2019-01-01 | End: 2019-01-05

## 2019-01-01 RX ADMIN — PENICILLIN V POTASIUM 500 MG: 250 TABLET ORAL at 21:15

## 2019-01-01 RX ADMIN — KETOROLAC TROMETHAMINE 30 MG: 30 INJECTION, SOLUTION INTRAMUSCULAR at 21:15

## 2019-01-01 RX ADMIN — HYDROCODONE BITARTRATE AND ACETAMINOPHEN 2 TABLET: 5; 325 TABLET ORAL at 21:15

## 2019-01-01 ASSESSMENT — ENCOUNTER SYMPTOMS
VOMITING: 0
NAUSEA: 0
SHORTNESS OF BREATH: 0
COUGH: 0
TROUBLE SWALLOWING: 0

## 2019-01-01 ASSESSMENT — PAIN SCALES - GENERAL
PAINLEVEL_OUTOF10: 2
PAINLEVEL_OUTOF10: 10
PAINLEVEL_OUTOF10: 10

## 2019-01-02 ENCOUNTER — CARE COORDINATION (OUTPATIENT)
Dept: CARE COORDINATION | Age: 59
End: 2019-01-02

## 2019-01-02 LAB — GLUCOSE BLD-MCNC: 79 MG/DL (ref 65–105)

## 2019-01-03 ENCOUNTER — CARE COORDINATION (OUTPATIENT)
Dept: CARE COORDINATION | Age: 59
End: 2019-01-03

## 2019-01-04 ENCOUNTER — TELEPHONE (OUTPATIENT)
Dept: INTERNAL MEDICINE CLINIC | Age: 59
End: 2019-01-04

## 2019-01-04 RX ORDER — DULOXETIN HYDROCHLORIDE 30 MG/1
CAPSULE, DELAYED RELEASE ORAL
Qty: 90 CAPSULE | Refills: 2 | Status: SHIPPED | OUTPATIENT
Start: 2019-01-04 | End: 2019-02-05 | Stop reason: SDUPTHER

## 2019-01-10 ENCOUNTER — OFFICE VISIT (OUTPATIENT)
Dept: ORTHOPEDIC SURGERY | Age: 59
End: 2019-01-10
Payer: MEDICARE

## 2019-01-10 VITALS — WEIGHT: 200 LBS | BODY MASS INDEX: 33.32 KG/M2 | HEIGHT: 65 IN

## 2019-01-10 DIAGNOSIS — M25.512 ACUTE PAIN OF BOTH SHOULDERS: Primary | ICD-10-CM

## 2019-01-10 DIAGNOSIS — M25.511 ACUTE PAIN OF BOTH SHOULDERS: Primary | ICD-10-CM

## 2019-01-10 PROCEDURE — 1036F TOBACCO NON-USER: CPT | Performed by: ORTHOPAEDIC SURGERY

## 2019-01-10 PROCEDURE — 3017F COLORECTAL CA SCREEN DOC REV: CPT | Performed by: ORTHOPAEDIC SURGERY

## 2019-01-10 PROCEDURE — G8482 FLU IMMUNIZE ORDER/ADMIN: HCPCS | Performed by: ORTHOPAEDIC SURGERY

## 2019-01-10 PROCEDURE — G8427 DOCREV CUR MEDS BY ELIG CLIN: HCPCS | Performed by: ORTHOPAEDIC SURGERY

## 2019-01-10 PROCEDURE — 99203 OFFICE O/P NEW LOW 30 MIN: CPT | Performed by: ORTHOPAEDIC SURGERY

## 2019-01-10 PROCEDURE — G8417 CALC BMI ABV UP PARAM F/U: HCPCS | Performed by: ORTHOPAEDIC SURGERY

## 2019-01-16 ENCOUNTER — CARE COORDINATION (OUTPATIENT)
Dept: CARE COORDINATION | Age: 59
End: 2019-01-16

## 2019-01-25 ENCOUNTER — HOSPITAL ENCOUNTER (OUTPATIENT)
Age: 59
Setting detail: SPECIMEN
Discharge: HOME | End: 2019-01-25
Payer: MEDICARE

## 2019-01-25 ENCOUNTER — OFFICE VISIT (OUTPATIENT)
Dept: INTERNAL MEDICINE CLINIC | Age: 59
End: 2019-01-25
Payer: MEDICARE

## 2019-01-25 ENCOUNTER — CARE COORDINATION (OUTPATIENT)
Dept: CARE COORDINATION | Age: 59
End: 2019-01-25

## 2019-01-25 VITALS
SYSTOLIC BLOOD PRESSURE: 122 MMHG | HEART RATE: 78 BPM | DIASTOLIC BLOOD PRESSURE: 78 MMHG | HEIGHT: 65 IN | BODY MASS INDEX: 33.66 KG/M2 | OXYGEN SATURATION: 95 % | WEIGHT: 202 LBS

## 2019-01-25 DIAGNOSIS — R10.9 ACUTE LEFT FLANK PAIN: Primary | ICD-10-CM

## 2019-01-25 DIAGNOSIS — R10.30 LOWER ABDOMINAL PAIN: ICD-10-CM

## 2019-01-25 DIAGNOSIS — R10.9 ACUTE LEFT FLANK PAIN: ICD-10-CM

## 2019-01-25 LAB
ABSOLUTE EOS #: 0.33 K/UL (ref 0–0.44)
ABSOLUTE IMMATURE GRANULOCYTE: 0.05 K/UL (ref 0–0.3)
ABSOLUTE LYMPH #: 2.44 K/UL (ref 1.1–3.7)
ABSOLUTE MONO #: 0.5 K/UL (ref 0.1–1.2)
ANION GAP SERPL CALCULATED.3IONS-SCNC: 16 MMOL/L (ref 9–17)
BASOPHILS # BLD: 1 % (ref 0–2)
BASOPHILS ABSOLUTE: 0.07 K/UL (ref 0–0.2)
BILIRUBIN URINE: NEGATIVE
BUN BLDV-MCNC: 8 MG/DL (ref 6–20)
BUN/CREAT BLD: NORMAL (ref 9–20)
CALCIUM SERPL-MCNC: 9.7 MG/DL (ref 8.6–10.4)
CHLORIDE BLD-SCNC: 102 MMOL/L (ref 98–107)
CO2: 25 MMOL/L (ref 20–31)
COLOR: YELLOW
COMMENT UA: NORMAL
CREAT SERPL-MCNC: 0.86 MG/DL (ref 0.5–0.9)
DIFFERENTIAL TYPE: ABNORMAL
EOSINOPHILS RELATIVE PERCENT: 3 % (ref 1–4)
GFR AFRICAN AMERICAN: >60 ML/MIN
GFR NON-AFRICAN AMERICAN: >60 ML/MIN
GFR SERPL CREATININE-BSD FRML MDRD: NORMAL ML/MIN/{1.73_M2}
GFR SERPL CREATININE-BSD FRML MDRD: NORMAL ML/MIN/{1.73_M2}
GLUCOSE BLD-MCNC: 89 MG/DL (ref 70–99)
GLUCOSE URINE: NEGATIVE
HCT VFR BLD CALC: 44.2 % (ref 36.3–47.1)
HEMOGLOBIN: 13.5 G/DL (ref 11.9–15.1)
IMMATURE GRANULOCYTES: 1 %
KETONES, URINE: NEGATIVE
LEUKOCYTE ESTERASE, URINE: NEGATIVE
LYMPHOCYTES # BLD: 24 % (ref 24–43)
MCH RBC QN AUTO: 29.4 PG (ref 25.2–33.5)
MCHC RBC AUTO-ENTMCNC: 30.5 G/DL (ref 28.4–34.8)
MCV RBC AUTO: 96.3 FL (ref 82.6–102.9)
MONOCYTES # BLD: 5 % (ref 3–12)
NITRITE, URINE: NEGATIVE
NRBC AUTOMATED: 0 PER 100 WBC
PDW BLD-RTO: 13.5 % (ref 11.8–14.4)
PH UA: 5 (ref 5–8)
PLATELET # BLD: 246 K/UL (ref 138–453)
PLATELET ESTIMATE: ABNORMAL
PMV BLD AUTO: 10 FL (ref 8.1–13.5)
POTASSIUM SERPL-SCNC: 4.6 MMOL/L (ref 3.7–5.3)
PROTEIN UA: NEGATIVE
RBC # BLD: 4.59 M/UL (ref 3.95–5.11)
RBC # BLD: ABNORMAL 10*6/UL
SEG NEUTROPHILS: 66 % (ref 36–65)
SEGMENTED NEUTROPHILS ABSOLUTE COUNT: 6.69 K/UL (ref 1.5–8.1)
SODIUM BLD-SCNC: 143 MMOL/L (ref 135–144)
SPECIFIC GRAVITY UA: 1 (ref 1–1.03)
TURBIDITY: CLEAR
URINE HGB: NEGATIVE
UROBILINOGEN, URINE: NORMAL
WBC # BLD: 10.1 K/UL (ref 3.5–11.3)
WBC # BLD: ABNORMAL 10*3/UL

## 2019-01-25 PROCEDURE — G8427 DOCREV CUR MEDS BY ELIG CLIN: HCPCS | Performed by: PHYSICIAN ASSISTANT

## 2019-01-25 PROCEDURE — 3017F COLORECTAL CA SCREEN DOC REV: CPT | Performed by: PHYSICIAN ASSISTANT

## 2019-01-25 PROCEDURE — 99214 OFFICE O/P EST MOD 30 MIN: CPT | Performed by: PHYSICIAN ASSISTANT

## 2019-01-25 PROCEDURE — 1036F TOBACCO NON-USER: CPT | Performed by: PHYSICIAN ASSISTANT

## 2019-01-25 PROCEDURE — G8417 CALC BMI ABV UP PARAM F/U: HCPCS | Performed by: PHYSICIAN ASSISTANT

## 2019-01-25 PROCEDURE — G8482 FLU IMMUNIZE ORDER/ADMIN: HCPCS | Performed by: PHYSICIAN ASSISTANT

## 2019-01-25 RX ORDER — TAMSULOSIN HYDROCHLORIDE 0.4 MG/1
0.4 CAPSULE ORAL DAILY
Qty: 30 CAPSULE | Refills: 0 | Status: SHIPPED | OUTPATIENT
Start: 2019-01-25 | End: 2019-04-15 | Stop reason: ALTCHOICE

## 2019-01-25 ASSESSMENT — ENCOUNTER SYMPTOMS
VOMITING: 0
RHINORRHEA: 0
DIARRHEA: 0
NAUSEA: 0
EYE DISCHARGE: 0
BACK PAIN: 0
SINUS PAIN: 0
SHORTNESS OF BREATH: 0
COUGH: 0
ABDOMINAL PAIN: 1
COLOR CHANGE: 0
CONSTIPATION: 0
CHEST TIGHTNESS: 0
SORE THROAT: 0
EYE PAIN: 0
BLOOD IN STOOL: 0
EYE ITCHING: 0

## 2019-02-01 ENCOUNTER — HOSPITAL ENCOUNTER (OUTPATIENT)
Dept: CT IMAGING | Age: 59
Discharge: HOME | End: 2019-02-03
Payer: MEDICARE

## 2019-02-01 DIAGNOSIS — N20.0 KIDNEY STONE: ICD-10-CM

## 2019-02-01 PROCEDURE — 74176 CT ABD & PELVIS W/O CONTRAST: CPT

## 2019-02-05 ENCOUNTER — OFFICE VISIT (OUTPATIENT)
Dept: INTERNAL MEDICINE CLINIC | Age: 59
End: 2019-02-05
Payer: MEDICARE

## 2019-02-05 ENCOUNTER — CARE COORDINATION (OUTPATIENT)
Dept: CARE COORDINATION | Age: 59
End: 2019-02-05

## 2019-02-05 VITALS
SYSTOLIC BLOOD PRESSURE: 138 MMHG | DIASTOLIC BLOOD PRESSURE: 64 MMHG | WEIGHT: 206 LBS | OXYGEN SATURATION: 94 % | HEIGHT: 65 IN | HEART RATE: 72 BPM | BODY MASS INDEX: 34.32 KG/M2

## 2019-02-05 DIAGNOSIS — G89.4 CHRONIC PAIN SYNDROME: Chronic | ICD-10-CM

## 2019-02-05 DIAGNOSIS — E10.43 TYPE 1 DIABETES MELLITUS WITH DIABETIC AUTONOMIC NEUROPATHY, WITH LONG-TERM CURRENT USE OF INSULIN (HCC): ICD-10-CM

## 2019-02-05 DIAGNOSIS — M48.04 SPINAL STENOSIS, THORACIC: ICD-10-CM

## 2019-02-05 DIAGNOSIS — I10 ESSENTIAL HYPERTENSION: ICD-10-CM

## 2019-02-05 DIAGNOSIS — E03.9 ACQUIRED HYPOTHYROIDISM: ICD-10-CM
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
## 2019-02-05 DIAGNOSIS — I73.9 PERIPHERAL VASCULAR DISEASE (HCC): ICD-10-CM

## 2019-02-05 DIAGNOSIS — K43.2 INCISIONAL HERNIA WITHOUT OBSTRUCTION OR GANGRENE: ICD-10-CM

## 2019-02-05 DIAGNOSIS — E13.319 RETINOPATHY DUE TO SECONDARY DM (HCC): ICD-10-CM

## 2019-02-05 DIAGNOSIS — G47.00 INSOMNIA, UNSPECIFIED TYPE: ICD-10-CM

## 2019-02-05 DIAGNOSIS — G25.81 RESTLESS LEGS SYNDROME: ICD-10-CM

## 2019-02-05 DIAGNOSIS — E10.319 TYPE 1 DIABETES MELLITUS WITH RETINOPATHY, MACULAR EDEMA PRESENCE UNSPECIFIED, UNSPECIFIED LATERALITY, UNSPECIFIED RETINOPATHY SEVERITY (HCC): ICD-10-CM

## 2019-02-05 DIAGNOSIS — F33.42 MAJOR DEPRESSIVE DISORDER, RECURRENT, IN FULL REMISSION (HCC): ICD-10-CM

## 2019-02-05 DIAGNOSIS — F41.9 ANXIETY DISORDER, UNSPECIFIED TYPE: ICD-10-CM

## 2019-02-05 DIAGNOSIS — I50.42 CHRONIC COMBINED SYSTOLIC AND DIASTOLIC CONGESTIVE HEART FAILURE (HCC): ICD-10-CM

## 2019-02-05 DIAGNOSIS — E10.51 TYPE 1 DIABETES MELLITUS WITH DIABETIC PERIPHERAL ANGIOPATHY WITHOUT GANGRENE (HCC): ICD-10-CM

## 2019-02-05 DIAGNOSIS — N20.0 RECURRENT KIDNEY STONES: ICD-10-CM

## 2019-02-05 DIAGNOSIS — N31.9 NEUROGENIC BLADDER: ICD-10-CM

## 2019-02-05 DIAGNOSIS — R33.9 CHRONIC RETENTION OF URINE: ICD-10-CM

## 2019-02-05 DIAGNOSIS — S00.93XA CONTUSION OF HEAD, UNSPECIFIED PART OF HEAD, INITIAL ENCOUNTER: ICD-10-CM

## 2019-02-05 DIAGNOSIS — J45.42 MODERATE PERSISTENT ASTHMA WITH STATUS ASTHMATICUS: ICD-10-CM

## 2019-02-05 DIAGNOSIS — F32.A DEPRESSION, UNSPECIFIED DEPRESSION TYPE: ICD-10-CM

## 2019-02-05 DIAGNOSIS — J43.1 PANLOBULAR EMPHYSEMA (HCC): ICD-10-CM

## 2019-02-05 DIAGNOSIS — Z96.41 INSULIN PUMP IN PLACE: ICD-10-CM

## 2019-02-05 PROBLEM — J69.0 ASPIRATION PNEUMONIA (HCC): Status: RESOLVED | Noted: 2017-07-11 | Resolved: 2019-02-05

## 2019-02-05 PROBLEM — S90.512A ABRASION OF LEFT ANKLE: Status: RESOLVED | Noted: 2018-01-30 | Resolved: 2019-02-05

## 2019-02-05 PROBLEM — J44.1 COPD EXACERBATION (HCC): Status: RESOLVED | Noted: 2018-09-13 | Resolved: 2019-02-05

## 2019-02-05 PROBLEM — I50.43 ACUTE ON CHRONIC COMBINED SYSTOLIC AND DIASTOLIC CONGESTIVE HEART FAILURE (HCC): Status: RESOLVED | Noted: 2018-05-11 | Resolved: 2019-02-05

## 2019-02-05 PROCEDURE — 3023F SPIROM DOC REV: CPT | Performed by: INTERNAL MEDICINE

## 2019-02-05 PROCEDURE — 2022F DILAT RTA XM EVC RTNOPTHY: CPT | Performed by: INTERNAL MEDICINE

## 2019-02-05 PROCEDURE — 1036F TOBACCO NON-USER: CPT | Performed by: INTERNAL MEDICINE

## 2019-02-05 PROCEDURE — 3046F HEMOGLOBIN A1C LEVEL >9.0%: CPT | Performed by: INTERNAL MEDICINE

## 2019-02-05 PROCEDURE — G8926 SPIRO NO PERF OR DOC: HCPCS | Performed by: INTERNAL MEDICINE

## 2019-02-05 PROCEDURE — G8417 CALC BMI ABV UP PARAM F/U: HCPCS | Performed by: INTERNAL MEDICINE

## 2019-02-05 PROCEDURE — G8427 DOCREV CUR MEDS BY ELIG CLIN: HCPCS | Performed by: INTERNAL MEDICINE

## 2019-02-05 PROCEDURE — G8482 FLU IMMUNIZE ORDER/ADMIN: HCPCS | Performed by: INTERNAL MEDICINE

## 2019-02-05 PROCEDURE — 3017F COLORECTAL CA SCREEN DOC REV: CPT | Performed by: INTERNAL MEDICINE

## 2019-02-05 PROCEDURE — 99214 OFFICE O/P EST MOD 30 MIN: CPT | Performed by: INTERNAL MEDICINE

## 2019-02-05 RX ORDER — ROPINIROLE 2 MG/1
2 TABLET, FILM COATED ORAL DAILY
Qty: 30 TABLET | Refills: 11 | Status: SHIPPED | OUTPATIENT
Start: 2019-02-05 | End: 2019-04-16 | Stop reason: SDUPTHER

## 2019-02-05 RX ORDER — DULOXETIN HYDROCHLORIDE 30 MG/1
30 CAPSULE, DELAYED RELEASE ORAL DAILY
Qty: 90 CAPSULE | Refills: 2 | Status: SHIPPED | OUTPATIENT
Start: 2019-02-05 | End: 2019-04-16 | Stop reason: SDUPTHER

## 2019-02-05 RX ORDER — DULOXETIN HYDROCHLORIDE 60 MG/1
60 CAPSULE, DELAYED RELEASE ORAL DAILY
Qty: 30 CAPSULE | Refills: 5 | Status: SHIPPED | OUTPATIENT
Start: 2019-02-05 | End: 2019-08-12 | Stop reason: SDUPTHER

## 2019-02-05 RX ORDER — CLONAZEPAM 0.5 MG/1
0.5 TABLET ORAL 2 TIMES DAILY PRN
Qty: 60 TABLET | Refills: 2 | Status: SHIPPED | OUTPATIENT
Start: 2019-02-05 | End: 2019-09-04 | Stop reason: SDUPTHER

## 2019-03-06 ENCOUNTER — CARE COORDINATION (OUTPATIENT)
Dept: CARE COORDINATION | Age: 59
End: 2019-03-06

## 2019-03-11 RX ORDER — SIMVASTATIN 20 MG
20 TABLET ORAL NIGHTLY
Qty: 90 TABLET | Refills: 3 | Status: SHIPPED | OUTPATIENT
Start: 2019-03-11 | End: 2019-04-16 | Stop reason: SDUPTHER

## 2019-03-17 DIAGNOSIS — F51.01 PRIMARY INSOMNIA: ICD-10-CM

## 2019-03-19 ENCOUNTER — CARE COORDINATION (OUTPATIENT)
Dept: CARE COORDINATION | Age: 59
End: 2019-03-19

## 2019-03-20 ENCOUNTER — TELEPHONE (OUTPATIENT)
Dept: INTERNAL MEDICINE CLINIC | Age: 59
End: 2019-03-20

## 2019-03-20 RX ORDER — AMITRIPTYLINE HYDROCHLORIDE 150 MG/1
TABLET, FILM COATED ORAL
Qty: 30 TABLET | Refills: 6 | Status: SHIPPED | OUTPATIENT
Start: 2019-03-20 | End: 2019-04-16 | Stop reason: SDUPTHER

## 2019-04-02 ENCOUNTER — CARE COORDINATION (OUTPATIENT)
Dept: CARE COORDINATION | Age: 59
End: 2019-04-02

## 2019-04-02 NOTE — CARE COORDINATION
Attempting to reach patient for a follow up care coordination call regarding any needs, questions or concerns. Xin Trammell, [facility]  Phone was picked up and hung up without speaking. CC mailed letter, unable to reach.

## 2019-04-02 NOTE — LETTER
4/2/2019     [address] [place] [address]  [id number]    Dear [address]    I recently attempted to contact you to discuss your healthcare needs. My name is Jannette Swanson and I am a registered nurse who partners with Faustino Mayorga MD to improve patients health. We have met many times in the past.  As a member of your health care team, I would work with other providers involved in your care, offer education for your specific health conditions, and connect you with additional resources as needed. I will collaborate with Darrel Solomon MD to support you in following your treatment plan. The additional support I provide is no additional cost to you. My primary focus is to help you achieve specific goals and improve your health. I look forward to working with you. Please contact me at your earliest convenience at [phone].     In good health,    Jannette Swanson RN

## 2019-04-08 ENCOUNTER — CARE COORDINATION (OUTPATIENT)
Dept: CARE COORDINATION | Age: 59
End: 2019-04-08

## 2019-04-08 NOTE — CARE COORDINATION
Ambulatory Care Coordination Note  4/8/2019  CM Risk Score: 16  Jackie Mortality Risk Score:      ACC: Rhina Pereira RN    Summary Note: Patient returned CC call stating she is doing \"ok\". She is having dental and ear pain. Per patient she is currently on steroids and antibiotics. CC reviewed increase of BS with steroid use, patient is aware and is monitoring BS. Patient needs several teeth extractions but is unsure that she can afford them. CC will ask for assistance from [employer] [place] and FREDRICK Milligan. Patient has appointments with :   ENT on 4.9.19  Oral surgeon 4.10.19  Pulmonology 4.11.19    Per patient she feels she would benefit from going back to psychiatrist to talk. CC offered a referral, patient stated she will go back to [facility] where she has been in the past, she does not need a referral.     Patient stated she has not had any additional falls since her visit on 2-5-19  CC Plan:   Referral to  Jose Malcolm and FREDRICK Milligan. Follow up call in 1-2 weeks for needs. Care Coordination Interventions    Program Enrollment:  Complex Care  Referral from Primary Care Provider:  Yes  Suggested Interventions and Community Resources  Diabetes Education:  Completed  Disease Association:  Completed (Comment: Dr Gallo Hui - Endocrinologist, [facility])  [address]:  Completed (Comment: [address])  Physical Therapy:  Not Started  Other Services:  Completed (Comment: [address] [employer])  Zone Management Tools:   In Process (Comment: DM and COPD zone sheet.)         Goals Addressed                 This Visit's Progress     Reduce Falls    Improving     I will reduce my risk of falls by the following: slow down, avoid ice    Barriers: need to do her own snow removal and take garbage out  Plan for overcoming my barriers: work with CC educate self on fall risks  Confidence: 4/10  Anticipated Goal Completion Date: 6.5.19            Prior to Admission medications Medication Sig Start Date End Date Taking? Authorizing Provider   amitriptyline (ELAVIL) 150 MG tablet TAKE 1 TABLET BY MOUTH NIGHTLY 3/20/19   Maylene Ormond, MD   simvastatin (ZOCOR) 20 MG tablet TAKE 1 TABLET BY MOUTH NIGHTLY 3/11/19   Bertram Yo PA-C   DULoxetine (CYMBALTA) 30 MG extended release capsule Take 1 capsule by mouth daily Take in addition to 30 mg , total dose 90 2/5/19   Maylene Ormond, MD   rOPINIRole (REQUIP) 2 MG tablet Take 1 tablet by mouth daily 2/5/19   Maylene Ormond, MD   DULoxetine (CYMBALTA) 60 MG extended release capsule Take 1 capsule by mouth daily 2/5/19   Maylene Ormond, MD   clonazePAM (KLONOPIN) 0.5 MG tablet Take 1 tablet by mouth 2 times daily as needed for Anxiety (sleep) for up to 90 days. . 2/5/19 5/6/19  Maylene Ormond, MD   tamsulosin (FLOMAX) 0.4 MG capsule Take 1 capsule by mouth daily 1/25/19   Bertram Yo PA-C   ondansetron (ZOFRAN ODT) 4 MG disintegrating tablet Take 1 tablet by mouth every 8 hours as needed for Nausea or Vomiting 12/30/18   KIMBERLEE Platt - CNP   torsemide (DEMADEX) 10 MG tablet Take 1 tablet by mouth daily as needed (leg edema) 12/20/18   Maylene Ormond, MD   gabapentin (NEURONTIN) 800 MG tablet TAKE 1 TABLET BY MOUTH TWICE DAILY 12/18/18 1/17/19  Pennelope Dakin, MD   metoprolol tartrate (LOPRESSOR) 50 MG tablet TAKE 1 TABLET BY MOUTH TWICE DAILY 9/25/18   Maylene Ormond, MD   metoclopramide (REGLAN) 5 MG tablet TAKE 1 TABLET BY MOUTH TWICE DAILY 7/16/18   Marisol Garnica MD   ibuprofen (ADVIL;MOTRIN) 200 MG tablet Take 200 mg by mouth every 6 hours as needed for Pain    Historical Provider, MD   metaxalone (SKELAXIN) 800 MG tablet Take 800 mg by mouth 3 times daily    Historical Provider, MD   levothyroxine (SYNTHROID) 137 MCG tablet Take 1 tablet by mouth daily 5/3/18   Maylene Ormond, MD   pantoprazole (PROTONIX) 40 MG tablet Take 40 mg by mouth 2 times daily    Historical Provider, MD   ranitidine (ZANTAC) 150 MG capsule Take 150 mg by mouth 2 times daily     Historical Provider, MD   losartan (COZAAR) 100 MG tablet Take 1 tablet by mouth daily 3/19/18   Marissa Brennan MD   insulin aspart (NOVOLOG) 100 UNIT/ML injection vial Inject into the skin Via insulin pump 4/5/16   Historical Provider, MD PATO REAL 200-25 MCG/INH AEPB INL 1 PUFF PO DAILY 10/7/17   Historical Provider, MD   albuterol (PROVENTIL) (2.5 MG/3ML) 0.083% nebulizer solution Take 1 mL by nebulization 3 times daily as needed 6/15/17   Historical Provider, MD   tiotropium (Chichi Ivans) 18 MCG inhalation capsule Inhale 1 capsule into the lungs Daily 6/19/17   Susy Farias MD   ONE TOUCH ULTRA TEST strip  6/7/17   Historical Provider, MD TEJADA INSULIN SYRINGE 29G X 1/2\" 0.5 ML MISC  5/9/17   Historical Provider, MD   fluticasone (FLONASE) 50 MCG/ACT nasal spray 1 spray by Nasal route 2 times daily 6/1/17   [address] Spear MD   albuterol sulfate HFA (PROAIR HFA) 108 (90 BASE) MCG/ACT inhaler Inhale 2 puffs into the lungs every 6 hours as needed for Wheezing 5/26/17   Vargas Saunders MD   vitamin D (CHOLECALCIFEROL) 1000 UNIT TABS tablet Take 4,000 Units by mouth daily Takes 4 tabs daily    Historical Provider, MD   docusate sodium (COLACE) 100 MG capsule Take 1 capsule by mouth 2 times daily Take while on narcotics 7/7/15   Donzetta Heimlich, MD   Probiotic Product (PROBIOTIC DAILY PO)   Take 1 tablet by mouth     Historical Provider, MD   aspirin 81 MG tablet Take 81 mg by mouth daily. Historical Provider, MD       No future appointments. ,   Diabetes Assessment    Meal Planning:  Avoidance of concentrated sweets   How often do you test your blood sugar?:  Meals, Bedtime   Do you have barriers with adherence to non-pharmacologic self-management interventions?  (Nutrition/Exercise/Self-Monitoring):  No   Have you ever had to go to the ED for symptoms of low blood sugar?:  No       Increase or Decrease trend in Blood Sugars      ,   Congestive Heart Failure Assessment    Do you understand a low sodium diet?:  Yes  Do you understand how to read food labels?:  Yes  Do you salt your food before tasting it?:  No     No patient-reported symptoms      Symptoms:   CHF associated leg swelling: Neg, CHF associated shortness of breath: Neg      Symptom course:  stable  Weight trend:  stable  Salt intake watch compared to last visit:  stable     ,   COPD Assessment    Does the patient understand envrionmental exposure?:  Yes  Is the patient able to verbalize Rescue vs. Long Acting medications?:  Yes  Does the patient have a nebulizer?:  Yes     No patient-reported symptoms         Symptoms:          and   General Assessment    Do you have any symptoms that are causing concern?:  Yes  Progression since Onset:  Unchanged  Reported Symptoms:  Other (Comment: dental concerns)

## 2019-04-09 ENCOUNTER — CARE COORDINATION (OUTPATIENT)
Dept: CARE COORDINATION | Age: 59
End: 2019-04-09

## 2019-04-09 NOTE — CARE COORDINATION
Albina Kawasaki is a 66year old female who is a patient of Kim Richey. [facility] to [facility] with finding extractions due to cost.     called and spoke with Dennis. Dennis reports that the cheapest she could find to have 2 teeth pulled was $400 dollars.  informed Dennis that she could try [facility]     Address: [address] [place] [address] Cornelio Pelaez, Judy Gates     Phone: [phone]        explained payment is based on a sliding scale depending on income.

## 2019-04-11 ENCOUNTER — APPOINTMENT (OUTPATIENT)
Dept: CT IMAGING | Age: 59
End: 2019-04-11
Payer: MEDICARE

## 2019-04-11 ENCOUNTER — HOSPITAL ENCOUNTER (EMERGENCY)
Age: 59
Discharge: HOME | End: 2019-04-12
Attending: EMERGENCY MEDICINE
Payer: MEDICARE

## 2019-04-11 ENCOUNTER — TELEPHONE (OUTPATIENT)
Dept: PHARMACY | Facility: CLINIC | Age: 59
End: 2019-04-11

## 2019-04-11 DIAGNOSIS — Z76.5 DRUG-SEEKING BEHAVIOR: ICD-10-CM

## 2019-04-11 DIAGNOSIS — J01.90 ACUTE SINUSITIS, RECURRENCE NOT SPECIFIED, UNSPECIFIED LOCATION: Primary | ICD-10-CM

## 2019-04-11 LAB
ABSOLUTE EOS #: 0.2 K/UL (ref 0–0.4)
ABSOLUTE IMMATURE GRANULOCYTE: ABNORMAL K/UL (ref 0–0.3)
ABSOLUTE LYMPH #: 2.9 K/UL (ref 1–4.8)
ABSOLUTE MONO #: 0.7 K/UL (ref 0.1–1.3)
ANION GAP SERPL CALCULATED.3IONS-SCNC: 14 MMOL/L (ref 9–17)
BASOPHILS # BLD: 1 % (ref 0–2)
BASOPHILS ABSOLUTE: 0.2 K/UL (ref 0–0.2)
BUN BLDV-MCNC: 10 MG/DL (ref 6–20)
BUN/CREAT BLD: ABNORMAL (ref 9–20)
CALCIUM SERPL-MCNC: 9.4 MG/DL (ref 8.6–10.4)
CHLORIDE BLD-SCNC: 98 MMOL/L (ref 98–107)
CO2: 25 MMOL/L (ref 20–31)
CREAT SERPL-MCNC: 0.8 MG/DL (ref 0.5–0.9)
DIFFERENTIAL TYPE: ABNORMAL
EOSINOPHILS RELATIVE PERCENT: 2 % (ref 0–4)
GFR AFRICAN AMERICAN: >60 ML/MIN
GFR NON-AFRICAN AMERICAN: >60 ML/MIN
GFR SERPL CREATININE-BSD FRML MDRD: ABNORMAL ML/MIN/{1.73_M2}
GFR SERPL CREATININE-BSD FRML MDRD: ABNORMAL ML/MIN/{1.73_M2}
GLUCOSE BLD-MCNC: 176 MG/DL (ref 70–99)
HCT VFR BLD CALC: 41.6 % (ref 36–46)
HEMOGLOBIN: 14.3 G/DL (ref 12–16)
IMMATURE GRANULOCYTES: ABNORMAL %
LYMPHOCYTES # BLD: 24 % (ref 24–44)
MCH RBC QN AUTO: 30.6 PG (ref 26–34)
MCHC RBC AUTO-ENTMCNC: 34.4 G/DL (ref 31–37)
MCV RBC AUTO: 88.9 FL (ref 80–100)
MONOCYTES # BLD: 6 % (ref 1–7)
NRBC AUTOMATED: ABNORMAL PER 100 WBC
PDW BLD-RTO: 14.5 % (ref 11.5–14.9)
PLATELET # BLD: 261 K/UL (ref 150–450)
PLATELET ESTIMATE: ABNORMAL
PMV BLD AUTO: 7.3 FL (ref 6–12)
POTASSIUM SERPL-SCNC: 3.2 MMOL/L (ref 3.7–5.3)
RBC # BLD: 4.68 M/UL (ref 4–5.2)
RBC # BLD: ABNORMAL 10*6/UL
SEG NEUTROPHILS: 67 % (ref 36–66)
SEGMENTED NEUTROPHILS ABSOLUTE COUNT: 7.9 K/UL (ref 1.3–9.1)
SODIUM BLD-SCNC: 137 MMOL/L (ref 135–144)
WBC # BLD: 11.8 K/UL (ref 3.5–11)
WBC # BLD: ABNORMAL 10*3/UL

## 2019-04-11 PROCEDURE — 36415 COLL VENOUS BLD VENIPUNCTURE: CPT

## 2019-04-11 PROCEDURE — 2580000003 HC RX 258: Performed by: EMERGENCY MEDICINE

## 2019-04-11 PROCEDURE — 70487 CT MAXILLOFACIAL W/DYE: CPT

## 2019-04-11 PROCEDURE — 85025 COMPLETE CBC W/AUTO DIFF WBC: CPT

## 2019-04-11 PROCEDURE — 6360000004 HC RX CONTRAST MEDICATION: Performed by: EMERGENCY MEDICINE

## 2019-04-11 PROCEDURE — 96374 THER/PROPH/DIAG INJ IV PUSH: CPT

## 2019-04-11 PROCEDURE — 96375 TX/PRO/DX INJ NEW DRUG ADDON: CPT

## 2019-04-11 PROCEDURE — 6370000000 HC RX 637 (ALT 250 FOR IP): Performed by: EMERGENCY MEDICINE

## 2019-04-11 PROCEDURE — 6360000002 HC RX W HCPCS: Performed by: EMERGENCY MEDICINE

## 2019-04-11 PROCEDURE — 80048 BASIC METABOLIC PNL TOTAL CA: CPT

## 2019-04-11 PROCEDURE — 99284 EMERGENCY DEPT VISIT MOD MDM: CPT

## 2019-04-11 RX ORDER — AMOXICILLIN AND CLAVULANATE POTASSIUM 875; 125 MG/1; MG/1
1 TABLET, FILM COATED ORAL 2 TIMES DAILY
Qty: 20 TABLET | Refills: 0 | Status: SHIPPED | OUTPATIENT
Start: 2019-04-11 | End: 2019-04-21

## 2019-04-11 RX ORDER — ONDANSETRON 2 MG/ML
4 INJECTION INTRAMUSCULAR; INTRAVENOUS ONCE
Status: COMPLETED | OUTPATIENT
Start: 2019-04-11 | End: 2019-04-11

## 2019-04-11 RX ORDER — MORPHINE SULFATE 4 MG/ML
4 INJECTION, SOLUTION INTRAMUSCULAR; INTRAVENOUS ONCE
Status: COMPLETED | OUTPATIENT
Start: 2019-04-11 | End: 2019-04-11

## 2019-04-11 RX ORDER — 0.9 % SODIUM CHLORIDE 0.9 %
80 INTRAVENOUS SOLUTION INTRAVENOUS ONCE
Status: COMPLETED | OUTPATIENT
Start: 2019-04-11 | End: 2019-04-11

## 2019-04-11 RX ORDER — AMOXICILLIN AND CLAVULANATE POTASSIUM 875; 125 MG/1; MG/1
1 TABLET, FILM COATED ORAL ONCE
Status: COMPLETED | OUTPATIENT
Start: 2019-04-11 | End: 2019-04-11

## 2019-04-11 RX ORDER — 0.9 % SODIUM CHLORIDE 0.9 %
1000 INTRAVENOUS SOLUTION INTRAVENOUS ONCE
Status: COMPLETED | OUTPATIENT
Start: 2019-04-11 | End: 2019-04-11

## 2019-04-11 RX ORDER — SODIUM CHLORIDE 0.9 % (FLUSH) 0.9 %
10 SYRINGE (ML) INJECTION PRN
Status: DISCONTINUED | OUTPATIENT
Start: 2019-04-11 | End: 2019-04-12 | Stop reason: HOSPADM

## 2019-04-11 RX ADMIN — SODIUM CHLORIDE 1000 ML: 9 INJECTION, SOLUTION INTRAVENOUS at 22:16

## 2019-04-11 RX ADMIN — ONDANSETRON 4 MG: 2 INJECTION INTRAMUSCULAR; INTRAVENOUS at 22:16

## 2019-04-11 RX ADMIN — IOVERSOL 75 ML: 741 INJECTION INTRA-ARTERIAL; INTRAVENOUS at 23:03

## 2019-04-11 RX ADMIN — SODIUM CHLORIDE 80 ML: 9 INJECTION, SOLUTION INTRAVENOUS at 23:04

## 2019-04-11 RX ADMIN — MORPHINE SULFATE 4 MG: 4 INJECTION INTRAVENOUS at 22:17

## 2019-04-11 RX ADMIN — AMOXICILLIN AND CLAVULANATE POTASSIUM 1 TABLET: 875; 125 TABLET, FILM COATED ORAL at 23:59

## 2019-04-11 RX ADMIN — Medication 10 ML: at 23:04

## 2019-04-11 ASSESSMENT — ENCOUNTER SYMPTOMS
BACK PAIN: 0
ABDOMINAL PAIN: 0
SHORTNESS OF BREATH: 0
SORE THROAT: 0
VOMITING: 0
TROUBLE SWALLOWING: 0
NAUSEA: 0
COLOR CHANGE: 0
CONSTIPATION: 0
SINUS PAIN: 1
COUGH: 0
BLOOD IN STOOL: 0
DIARRHEA: 0

## 2019-04-11 ASSESSMENT — PAIN SCALES - GENERAL
PAINLEVEL_OUTOF10: 9

## 2019-04-11 NOTE — TELEPHONE ENCOUNTER
CLINICAL PHARMACY NOTE - Medication Review    Louann Ormond is a 66 y.o. female referred to a clinical pharmacy specialist given their history of COPD, CHF and DM.  Appointment scheduled for 04/12/2019 at 12:30pm.    [facility]: MED RECONCILIATION/REVIEW ADDENDUM    For Pharmacy Admin Tracking Only    PHSO: Yes  Time Spent (min): Annabelle Dominguez

## 2019-04-12 ENCOUNTER — OFFICE VISIT (OUTPATIENT)
Dept: INTERNAL MEDICINE CLINIC | Age: 59
End: 2019-04-12
Payer: MEDICARE

## 2019-04-12 ENCOUNTER — SCHEDULED TELEPHONE ENCOUNTER (OUTPATIENT)
Dept: PHARMACY | Facility: CLINIC | Age: 59
End: 2019-04-12

## 2019-04-12 ENCOUNTER — TELEPHONE (OUTPATIENT)
Dept: INTERNAL MEDICINE CLINIC | Age: 59
End: 2019-04-12

## 2019-04-12 VITALS
WEIGHT: 201 LBS | OXYGEN SATURATION: 96 % | DIASTOLIC BLOOD PRESSURE: 82 MMHG | HEIGHT: 65 IN | BODY MASS INDEX: 33.49 KG/M2 | SYSTOLIC BLOOD PRESSURE: 188 MMHG

## 2019-04-12 VITALS
HEART RATE: 112 BPM | DIASTOLIC BLOOD PRESSURE: 104 MMHG | TEMPERATURE: 97.4 F | RESPIRATION RATE: 18 BRPM | SYSTOLIC BLOOD PRESSURE: 229 MMHG | WEIGHT: 199 LBS | OXYGEN SATURATION: 89 % | HEIGHT: 65 IN | BODY MASS INDEX: 33.15 KG/M2

## 2019-04-12 DIAGNOSIS — G50.0 TRIGEMINAL NEURALGIA OF LEFT SIDE OF FACE: ICD-10-CM

## 2019-04-12 DIAGNOSIS — I10 ESSENTIAL HYPERTENSION: Primary | ICD-10-CM

## 2019-04-12 DIAGNOSIS — E03.9 HYPOTHYROIDISM, UNSPECIFIED TYPE: ICD-10-CM

## 2019-04-12 DIAGNOSIS — E10.319 TYPE 1 DIABETES MELLITUS WITH RETINOPATHY, MACULAR EDEMA PRESENCE UNSPECIFIED, UNSPECIFIED LATERALITY, UNSPECIFIED RETINOPATHY SEVERITY (HCC): ICD-10-CM

## 2019-04-12 PROCEDURE — G8427 DOCREV CUR MEDS BY ELIG CLIN: HCPCS | Performed by: INTERNAL MEDICINE

## 2019-04-12 PROCEDURE — 3017F COLORECTAL CA SCREEN DOC REV: CPT | Performed by: INTERNAL MEDICINE

## 2019-04-12 PROCEDURE — 2022F DILAT RTA XM EVC RTNOPTHY: CPT | Performed by: INTERNAL MEDICINE

## 2019-04-12 PROCEDURE — 1036F TOBACCO NON-USER: CPT | Performed by: INTERNAL MEDICINE

## 2019-04-12 PROCEDURE — 99214 OFFICE O/P EST MOD 30 MIN: CPT | Performed by: INTERNAL MEDICINE

## 2019-04-12 PROCEDURE — G8417 CALC BMI ABV UP PARAM F/U: HCPCS | Performed by: INTERNAL MEDICINE

## 2019-04-12 PROCEDURE — 3046F HEMOGLOBIN A1C LEVEL >9.0%: CPT | Performed by: INTERNAL MEDICINE

## 2019-04-12 RX ORDER — HYDROCODONE BITARTRATE AND ACETAMINOPHEN 5; 325 MG/1; MG/1
1 TABLET ORAL EVERY 8 HOURS PRN
Qty: 15 TABLET | Refills: 0 | Status: SHIPPED | OUTPATIENT
Start: 2019-04-12 | End: 2019-04-17

## 2019-04-12 RX ORDER — AMLODIPINE BESYLATE 5 MG/1
5 TABLET ORAL DAILY
Qty: 30 TABLET | Refills: 3 | Status: SHIPPED | OUTPATIENT
Start: 2019-04-12 | End: 2019-04-16 | Stop reason: SDUPTHER

## 2019-04-12 ASSESSMENT — ENCOUNTER SYMPTOMS
COUGH: 0
EYE PAIN: 0
APNEA: 0
ABDOMINAL DISTENTION: 0
CONSTIPATION: 0
CHEST TIGHTNESS: 0
ABDOMINAL PAIN: 0
EYE ITCHING: 0
BLOOD IN STOOL: 0
EYE DISCHARGE: 0
DIARRHEA: 0
COLOR CHANGE: 0
BACK PAIN: 0
EYE REDNESS: 0
CHOKING: 0
SHORTNESS OF BREATH: 0

## 2019-04-12 NOTE — ED NOTES
This RN discharged pt- Dr. Quezada Able okay with pt d/c vitals; pt educated on Rx and f/u with PCP for recheck and continuing sx. Pt IV was removed without complications.  Pt had steady gait out of department     Media CLEVE Cid  04/12/19 0011

## 2019-04-12 NOTE — ED NOTES
Pt is an alert and oriented x4 female who presents to the ER c/o left sided facial/ear pain. Pt states she was treated yesterday at [facility] for a fungal infoection in her left ear. Pt also has a hx of trigeminal myalgia causing the pain to her face. Pt states she has a headache and since being here, her pain in her face has increased. While examing pts ears, pt complains of tenderness to the left ear during the exam. Dr. James Joseph in to bedside for evaluation.       Liz Chao RN  04/12/19 0002

## 2019-04-12 NOTE — ED NOTES
RN in to room to inform her that now would be an appropriate time to call for her ride. RN states to pt that I would be in with her paperwork and go over her discharge once her ride was here. Pt states \"shes not coming in\" RN informed to pt that because of the pain medication she received through her IV, she would need a ride to come in to get her, like she was informed prior to receiving the morphine. As RN was walking away pt yells \"youre a fucking pain in my ass\" RN back to room. Pt states that she was not told she would need someone to come in and get her. Pt states RN was not caring and did nothing for her. RN informed pt of the medical work up and evaluation Dr. Janie Romero had ordered. Pt states \"yea well you didn't do shit for my pain\". RN asked pt if there was something I could do for her to help better satisfy her needs. Pt states \"I dont care to argue with you, it hurts to talk\". Pt then heard yelling on her cell phone from the nurses station.       Kalani Cannon RN  04/11/19 4738

## 2019-04-12 NOTE — ED PROVIDER NOTES
[address] ED  eMERGENCY dEPARTMENT eNCOUnter    Pt Name: Wandalee Sandhoff  MRN: [id number]  YOB: 1960  Date of evaluation:4/11/19  PCP: Alyssa Ren MD    [address] [place] [address]       Chief Complaint   Patient presents with    Facial Pain       HISTORY OF PRESENT ILLNESS    Wandalee Sandhoff is a 66 y.o. female who presents with a chief facial pain. Patient states that 2 days ago at [facility] she was diagnosed with a fungal infection of her left ear. She was given some sort of \"ear powder\" to treat the fungal infection. She states her pain has increased and she is now having pain mostly along her left jaw, under her chin and around the back of her ear. No fevers. States pain is 9 out of 10 in severity. Nothing makes symptoms better or worse. Pain is sharp and nonradiating. No neck pain. No neck stiffness. No difficulty swallowing or breathing. States she is taking Tylenol 3 for pain at home which has not been helping. She does acknowledge multiple other comorbidities. Symptoms are acute. Patient has no other complaints at this time. REVIEW OF SYSTEMS       Review of Systems   Constitutional: Negative for chills, fatigue and fever. HENT: Positive for ear pain and sinus pain. Negative for congestion, sore throat and trouble swallowing. Eyes: Negative for visual disturbance. Respiratory: Negative for cough and shortness of breath. Cardiovascular: Negative for chest pain, palpitations and leg swelling. Gastrointestinal: Negative for abdominal pain, blood in stool, constipation, diarrhea, nausea and vomiting. Genitourinary: Negative for dysuria and flank pain. Musculoskeletal: Negative for arthralgias, back pain, myalgias and neck pain. Skin: Negative for color change, rash and wound. Neurological: Negative for dizziness, weakness, light-headedness, numbness and headaches. Psychiatric/Behavioral: Negative for confusion.    All other systems reviewed and are negative. Negativein 10 essential Systems except as mentioned above and in the HPI. PAST MEDICAL HISTORY     Past Medical History:   Diagnosis Date    Anesthesia complication     \"lung collapsed after colon surgery    Anxiety     Arthritis     Back pain     CAD (coronary artery disease)     no stents    Caffeine use     3 coffee / day    Cataract     left    COPD (chronic obstructive pulmonary disease) (HCC)     EMPHYSEMA    Deafness     right ear    Depression     Diabetes mellitus (HCC)     Diarrhea     Diverticulosis     Fibromyalgia     Gastroparalysis     GERD (gastroesophageal reflux disease)     Hearing loss     DEAF IN RIGHT EAR    Hyperlipidemia     Hyperlipidemia     Hypertension     Incontinence     MDRO (multiple drug resistant organisms) resistance 2012    mrsa (nasal), eye, ear    Neurogenic bladder     CATHES HERSELF 7 TIMES A DAY, IS ABLE TO URINATE ON OWN    Neuropathy     feet    Obesity     Osteoarthritis     Peripheral vascular disease, unspecified ([facility])     Restless leg syndrome     Rhabdomyolysis     [place] 1 week, May 2014, then [facility] for rehab.  Spinal stenosis, thoracic 5/27/2014    Stroke [facility]) 2012    numbness on the left side of face    Thyroid disease     enlarged    Trigeminal neuralgia     Type II or unspecified type diabetes mellitus without mention of complication, not stated as uncontrolled          SURGICAL HISTORY      has a past surgical history that includes Cholecystectomy; Colon surgery; Tonsillectomy; Tubal ligation; Carpal tunnel release (Right); Middle ear surgery (Left); cyst removal; cyst removal; Cholecystectomy, open; Finger trigger release (Right); Colonoscopy; Abdomen surgery; polypectomy; Cystocopy; Cataract removal; Incisional hernia repair (07/07/15); Upper gastrointestinal endoscopy (07/13/2016); Colonoscopy (07/13/2016); eye surgery (Right);  Incisional hernia repair (10/17/2017); pr repair incisional hernia,reducible (N/A, 10/17/2017); Upper gastrointestinal endoscopy (07/23/2018); and Upper gastrointestinal endoscopy (N/A, 7/23/2018). CURRENT MEDICATIONS       Previous Medications    ALBUTEROL (PROVENTIL) (2.5 MG/3ML) 0.083% NEBULIZER SOLUTION    Take 1 mL by nebulization 3 times daily as needed    ALBUTEROL SULFATE HFA (PROAIR HFA) 108 (90 BASE) MCG/ACT INHALER    Inhale 2 puffs into the lungs every 6 hours as needed for Wheezing    AMITRIPTYLINE (ELAVIL) 150 MG TABLET    TAKE 1 TABLET BY MOUTH NIGHTLY    ASPIRIN 81 MG TABLET    Take 81 mg by mouth daily. B-D INSULIN SYRINGE 29G X 1/2\" 0.5 ML MISC        BREO ELLIPTA 200-25 MCG/INH AEPB    INL 1 PUFF PO DAILY    CLONAZEPAM (KLONOPIN) 0.5 MG TABLET    Take 1 tablet by mouth 2 times daily as needed for Anxiety (sleep) for up to 90 days. .    DOCUSATE SODIUM (COLACE) 100 MG CAPSULE    Take 1 capsule by mouth 2 times daily Take while on narcotics    DULOXETINE (CYMBALTA) 30 MG EXTENDED RELEASE CAPSULE    Take 1 capsule by mouth daily Take in addition to 30 mg , total dose 90    DULOXETINE (CYMBALTA) 60 MG EXTENDED RELEASE CAPSULE    Take 1 capsule by mouth daily    FLUTICASONE (FLONASE) 50 MCG/ACT NASAL SPRAY    1 spray by Nasal route 2 times daily    GABAPENTIN (NEURONTIN) 800 MG TABLET    TAKE 1 TABLET BY MOUTH TWICE DAILY    IBUPROFEN (ADVIL;MOTRIN) 200 MG TABLET    Take 200 mg by mouth every 6 hours as needed for Pain    INSULIN ASPART (NOVOLOG) 100 UNIT/ML INJECTION VIAL    Inject into the skin Via insulin pump    LEVOTHYROXINE (SYNTHROID) 137 MCG TABLET    Take 1 tablet by mouth daily    LOSARTAN (COZAAR) 100 MG TABLET    Take 1 tablet by mouth daily    METAXALONE (SKELAXIN) 800 MG TABLET    Take 800 mg by mouth 3 times daily    METOCLOPRAMIDE (REGLAN) 5 MG TABLET    TAKE 1 TABLET BY MOUTH TWICE DAILY    METOPROLOL TARTRATE (LOPRESSOR) 50 MG TABLET    TAKE 1 TABLET BY MOUTH TWICE DAILY    ONDANSETRON (ZOFRAN ODT) 4 MG DISINTEGRATING TABLET Take 1 tablet by mouth every 8 hours as needed for Nausea or Vomiting    ONE TOUCH ULTRA TEST STRIP        PANTOPRAZOLE (PROTONIX) 40 MG TABLET    Take 40 mg by mouth 2 times daily    PROBIOTIC PRODUCT (PROBIOTIC DAILY PO)      Take 1 tablet by mouth     RANITIDINE (ZANTAC) 150 MG CAPSULE    Take 150 mg by mouth 2 times daily     ROPINIROLE (REQUIP) 2 MG TABLET    Take 1 tablet by mouth daily    SIMVASTATIN (ZOCOR) 20 MG TABLET    TAKE 1 TABLET BY MOUTH NIGHTLY    TAMSULOSIN (FLOMAX) 0.4 MG CAPSULE    Take 1 capsule by mouth daily    TIOTROPIUM (SPIRIVA HANDIHALER) 18 MCG INHALATION CAPSULE    Inhale 1 capsule into the lungs Daily    TORSEMIDE (DEMADEX) 10 MG TABLET    Take 1 tablet by mouth daily as needed (leg edema)    VITAMIN D (CHOLECALCIFEROL) 1000 UNIT TABS TABLET    Take 4,000 Units by mouth daily Takes 4 tabs daily       ALLERGIES     is allergic to fioricet [butalbital-apap-caffeine]; erythromycin; codeine; droperidol; and tape [adhesive tape]. FAMILY HISTORY     indicated that her mother is alive. She indicated that her father is alive. She indicated that the status of her brother is unknown. She indicated that the status of her maternal grandmother is unknown. She indicated that the status of her paternal grandmother is unknown.     family history includes Cancer in her father, maternal grandmother, and paternal grandmother; Diabetes in her maternal grandmother; Heart Disease in her father and maternal grandmother; High Blood Pressure in her father and mother; Migraines in her mother; Other in her brother; Parkinsonism in her maternal grandmother. SOCIAL HISTORY      reports that she has never smoked. She has never used smokeless tobacco. She reports that she has current or past drug history. Drug: Marijuana. Frequency: 4.00 times per week. She reports that she does not drink alcohol. PHYSICAL EXAM     INITIAL VITALS:  height is 5' 5\" (1.651 m) and weight is 199 lb (90.3 kg).  Her oral temperature is 97.4 °F (36.3 °C). Her blood pressure is 206/83 (abnormal) and her pulse is 102. Her respiration is 18 and oxygen saturation is 95%. Physical Exam   Constitutional: She is oriented to person, place, and time. No distress. HENT:   Head: Normocephalic and atraumatic. Right Ear: Tympanic membrane and ear canal normal.   Purple staining of the left ear canal noted. Eyes: Pupils are equal, round, and reactive to light. Conjunctivae are normal.   Neck: Neck supple. Cardiovascular: Normal rate, regular rhythm, normal heart sounds and intact distal pulses. No murmur heard. Pulmonary/Chest: Effort normal and breath sounds normal. No respiratory distress. Abdominal: Soft. Bowel sounds are normal. She exhibits no distension. There is no tenderness. Musculoskeletal: She exhibits no edema or tenderness. Lymphadenopathy:     She has no cervical adenopathy. Neurological: She is alert and oriented to person, place, and time. Skin: Skin is warm and dry. No rash noted. Psychiatric: Judgment normal.   Nursing note and vitals reviewed. DIFFERENTIAL DIAGNOSIS/MDM:   61-year-old female presents with facial pain. She is afebrile and nontoxic. Vital signs are normal other than high blood pressure. She doesn't history of hypertension. She's not in acute distress. She does have significant tenderness along her maxillary area most significantly on the left. Also with left mastoid tenderness. No erythema or swelling noted. No sublingual swelling, tenderness or erythema. Abdomen very low suspicion for Herrera angina. I'm concern for mastoiditis or malignant otitis externa as patient is diabetic. She is already being treated for a fungal infection of the ear. We'll give IV fluids, pain medication, check labwork get CT scan of the facial bones.     DIAGNOSTIC RESULTS     EKG: All EKG's are interpreted by the Emergency Department Physician who either signs or Co-signs this chart in the worsen. She is agreeable to plan will be discharge home today. CRITICALCARE:      CONSULTS:  None      PROCEDURES:      FINAL IMPRESSION      1. Acute sinusitis, recurrence not specified, unspecified location    2.  Drug-seeking behavior            DISPOSITION/PLAN   DISPOSITION Decision To Discharge 04/11/2019 11:37:11 PM          PATIENT REFERRED TO:  Kori Ferrera, [address] [facility] [phone]    Schedule an appointment as soon as possible for a visit       [facility] ED  [facility] 469 [phone]    As needed, If symptoms worsen      DISCHARGE MEDICATIONS:  New Prescriptions    AMOXICILLIN-CLAVULANATE (AUGMENTIN) 875-125 MG PER TABLET    Take 1 tablet by mouth 2 times daily for 10 days       (Please note that portions ofthis note were completed with a voice recognition program.  Efforts were made to edit the dictations but occasionally words are mis-transcribed.)    Valencia Seip, DO  Attending Emergency Physician          Valencia Seip, DO  04/11/19 Joanna KNIGHT [address]

## 2019-04-12 NOTE — TELEPHONE ENCOUNTER
CLINICAL PHARMACY CONSULT: MEDICATION RECONCILIATION/REVIEW    Jared Valdes is a 66 y.o. female referred to clinical pharmacy specialist for Medication Reconciliation/Review. Patient states she is not feeling well and was in the ER yesterday. Patient states she is going to doctor soon and can not complete this call today. Patient requests a call back Monday afternoon.      Ragini Serrano, PharmD, [address] Pharmacist  Direct: [phone], Ext [phone]  ======================================  CLINICAL PHARMACY CONSULT: MED RECONCILIATION/REVIEW ADDENDUM    For Pharmacy Admin Tracking Only    PHSO: Yes    Time Spent (min): [id number] [facility]

## 2019-04-12 NOTE — ED NOTES
RN informed by CT that pt is requesting more pain medication prior to CT scan. Pt informed there were no orders at this time. Pt verbalized understanding and went to CT.       Ivanna Guardado RN  04/11/19 1521

## 2019-04-12 NOTE — ED NOTES
This RN heard rising commotion from room 8 and went in to introduce self. Rhiannon RN leaving pts room, this RN attempts to introduce self to pt when pt cuts RN off and states\" shes a fucking bitch! \" this RN then responds with \" I'm sorry you feel that way ma'am. My name is David Whitman and I'm the\" when pt cuts RN off again and states \" I just want to leave, I'm looking for my phone so I can call my ride. I don't care to talk to you. It hurts for me to talk. \" RN then returns to nurses station.       Tracy Becker RN  04/11/19 7819

## 2019-04-12 NOTE — ED NOTES
Pt yelling from her room \"SOMEONE GET THAT NURSE IN HERE\". RN in to bedside. Pt has her arm above her head and blood back up into the IV tubing. Pt states \"see what I mean, you guys dont do shit\" RN informed pt that sometimes this can happened based on the positioning of the IV and the bag of fluids. Pt was given her call light. Pt states I don't need that. RN informed pt that d/t her c/o pain with talking, it might save her some pain when yelling. Pt states \"why, you won't do shit for my pain anyways\". Pt IV is disconnected. Registration informed RN that family for the pt is in the lobby waiting for her. Pt informed that we would be getting her paperwork together for her. Pt is using obscenities as RN leaves the room.       Jessica Saavedra, CLEVE  04/12/19 6512

## 2019-04-12 NOTE — ED NOTES
Pt called out stating that the \"pain shot\" did nothing for her. Dr. Carlotta Hackett informed. No new orders. Pt was informed that she would not be receiving any more narcotics at this time.       Liz Chao RN  04/11/19 1506

## 2019-04-12 NOTE — PROGRESS NOTES
Subjective:      Chief Complaint   Patient presents with    Follow-Up from [facility]     Left ear pain, pt states she seen ENT and was told she had a fungal infection in her ear     Migraine       Patient ID: Eduin Chilel is a 66 y.o. female. Visit Information    Have you changed or started any medications since your last visit including any over-the-counter medicines, vitamins, or herbal medicines? no   Are you having any side effects from any of your medications? -  no  Have you stopped taking any of your medications? Is so, why? -  no    Have you seen any other physician or provider since your last visit? Yes - Records Obtained  Have you had any other diagnostic tests since your last visit? Yes - Records Obtained  Have you been seen in the emergency room and/or had an admission to a hospital since we last saw you? Yes - Records Obtained  Have you had your routine dental cleaning in the past 6 months? Yes, 2 weeks ago    Have you activated your [employer] account? If not, what are your barriers?  No:      Patient Care Team:  Robert Solorzano MD as PCP - General  Franky Chakraborty MD as PCP - Pulmonology (Pulmonology)  Robert Solorzano MD as PCP - [facility] Attributed Provider  Bre Ba MD as Consulting Physician (Urology)  Maira Mccullough RN as [address] [place] [address] MD Gio as Consulting Physician (Infectious Diseases)  UBALDO Singh as     Medical History Review  Past Medical, Family, and Social History reviewed and does not contribute to the patient presenting condition    Health Maintenance   Topic Date Due    Hepatitis C screen  1960    HIV screen  10/05/1977    Hepatitis B Vaccine (1 of 3 - Risk 3-dose series) 10/05/1981    Cervical cancer screen  04/30/2018    Lipid screen  06/02/2018    Diabetic microalbuminuria test  04/26/2019    Shingles Vaccine (1 of 2) 04/18/2019 (Originally 10/5/2012)    TSH testing  06/21/2019    Diabetic retinal exam 07/09/2019    Diabetic foot exam  08/21/2019    A1C test (Diabetic or Prediabetic)  09/14/2019    Potassium monitoring  04/11/2020    Creatinine monitoring  04/11/2020    Breast cancer screen  05/08/2020    Colon cancer screen colonoscopy  01/20/2025    DTaP/Tdap/Td vaccine (2 - Td) 04/20/2025    Flu vaccine  Completed    Pneumococcal 0-64 years Vaccine  Completed     HPI       HPI   1) Location/Symptom - Headache , facial Pain   2) Timing/Onset: 3 days   3) Context/Setting: No fall /Injury /Trauma   4) Quality: Pulsatile Throbbing in nature   5) Duration: continuous   6) Modifying Factors: Hearing problem in Left Ear   7) Severity: moderate   Mentioned that she has Seen ENT doctor , was told that she has Ear Infection , was seen by ENT couples of week ago , Not using Drops prescribed by ENT   Was started on Antibiotics per ED visit , yesterday   Had CT facial Bones , Normal   Had CT head in Jan, 19 , was positive for Left sided Sinusitis   Was Prescribed Medrol pack by Neurologist   hesitant to try Carbamazepine      Review of Systems   Constitutional: Negative for activity change, appetite change, chills, diaphoresis, fatigue and fever. HENT: Positive for hearing loss (on Right side and Reduced hearing on Right side ). Negative for congestion, dental problem, drooling and ear discharge. Eyes: Negative for pain, discharge, redness and itching. Respiratory: Negative for apnea, cough, choking, chest tightness and shortness of breath. Cardiovascular: Negative for chest pain and leg swelling. Gastrointestinal: Negative for abdominal distention, abdominal pain, blood in stool, constipation and diarrhea. Endocrine: Negative for cold intolerance and heat intolerance. Genitourinary: Negative for difficulty urinating, dysuria, enuresis, flank pain and frequency. Musculoskeletal: Positive for arthralgias (Left side of face ). Negative for back pain, gait problem and joint swelling.    Skin: Negative edema presence unspecified, unspecified laterality, unspecified retinopathy severity ([address]  - Hemoglobin A1C; Future  Last HBAIC was 8.3   4. Hypothyroidism, unspecified type  Follows with endo   - TSH; Future      Return in about 6 weeks (around 5/24/2019). · Reviewed prior labs and health maintenance. · Discussed use, benefit, and side effects of prescribed medications. Barriers to medication compliance addressed. All patient questions answered. Pt voiced understanding. MD JODI DrewMissouri Southern Healthcare  4/12/2019, 1:41 PM    Please note that this chart was generated using voice recognition Dragon dictation software. Although every effort was made to ensure the accuracy of this automated transcription, some errors in transcription may have occurred.

## 2019-04-15 ENCOUNTER — CARE COORDINATION (OUTPATIENT)
Dept: CARE COORDINATION | Age: 59
End: 2019-04-15

## 2019-04-15 ENCOUNTER — SCHEDULED TELEPHONE ENCOUNTER (OUTPATIENT)
Dept: PHARMACY | Facility: CLINIC | Age: 59
End: 2019-04-15

## 2019-04-15 RX ORDER — METHYLPREDNISOLONE 4 MG/1
4 TABLET ORAL SEE ADMIN INSTRUCTIONS
COMMUNITY
End: 2019-05-08

## 2019-04-15 RX ORDER — GABAPENTIN 800 MG/1
800 TABLET ORAL 3 TIMES DAILY
COMMUNITY
End: 2019-04-16

## 2019-04-15 RX ORDER — METOPROLOL TARTRATE 50 MG/1
50 TABLET, FILM COATED ORAL 2 TIMES DAILY
COMMUNITY
End: 2019-04-15

## 2019-04-15 RX ORDER — METOPROLOL TARTRATE 50 MG/1
TABLET, FILM COATED ORAL
Qty: 180 TABLET | Refills: 1 | Status: SHIPPED | OUTPATIENT
Start: 2019-04-15 | End: 2019-04-16 | Stop reason: SDUPTHER

## 2019-04-15 NOTE — TELEPHONE ENCOUNTER
CLINICAL PHARMACY NOTE - Medication Review  Patient outreach to review medications - Spoke with patient. SUBJECTIVE/OBJECTIVE:   Duane Swartz is a 66 y.o. female referred to a clinical pharmacy specialist given their history of COPD, CHF, and DM2. Medications:  Medication Sig Comments    methylPREDNISolone (MEDROL DOSEPACK) 4 MG tablet Take 4 mg by mouth See Admin Instructions Take by mouth.  gabapentin (NEURONTIN) 800 MG tablet Take 800 mg by mouth 3 times daily.  metoprolol tartrate (LOPRESSOR) 50 MG tablet Take 50 mg by mouth 2 times daily Sometimes takes three times daily     HYDROcodone-acetaminophen (NORCO) 5-325 MG per tablet Take 1 tablet by mouth every 8 hours as needed for Pain for up to 5 days. Intended supply: 3 days. Take lowest dose possible to manage pain     amLODIPine (NORVASC) 5 MG tablet Take 1 tablet by mouth daily New    amoxicillin-clavulanate (AUGMENTIN) 875-125 MG per tablet Take 1 tablet by mouth 2 times daily for 10 days Patient states she has both Augmentin (from recent ED visit) and amoxicillin (from dentist). Dispense report suggests patient is taking amoxicillin not Augmentin    amitriptyline (ELAVIL) 150 MG tablet TAKE 1 TABLET BY MOUTH NIGHTLY     simvastatin (ZOCOR) 20 MG tablet TAKE 1 TABLET BY MOUTH NIGHTLY     DULoxetine (CYMBALTA) 30 MG extended release capsule Take 1 capsule by mouth daily Take in addition to 30 mg , total dose 90     rOPINIRole (REQUIP) 2 MG tablet Take 1 tablet by mouth daily     DULoxetine (CYMBALTA) 60 MG extended release capsule Take 1 capsule by mouth daily     clonazePAM (KLONOPIN) 0.5 MG tablet Take 1 tablet by mouth 2 times daily as needed for Anxiety (sleep) for up to 90 days. .     ondansetron (ZOFRAN ODT) 4 MG disintegrating tablet Take 1 tablet by mouth every 8 hours as needed for Nausea or Vomiting     torsemide (DEMADEX) 10 MG tablet Take 1 tablet by mouth daily as needed (leg edema) Uses 2-3x/week   [facility] metoclopramide (REGLAN) 5 MG tablet TAKE 1 TABLET BY MOUTH TWICE DAILY     levothyroxine (SYNTHROID) 137 MCG tablet Take 1 tablet by mouth daily     pantoprazole (PROTONIX) 40 MG tablet Take 40 mg by mouth 2 times daily     ranitidine (ZANTAC) 150 MG capsule Take 150 mg by mouth 2 times daily      losartan (COZAAR) 100 MG tablet Take 1 tablet by mouth daily     insulin aspart (NOVOLOG) 100 UNIT/ML injection vial Inject into the skin Via insulin pump     BREO ELLIPTA 200-25 MCG/INH AEPB INL 1 PUFF PO DAILY     albuterol (PROVENTIL) (2.5 MG/3ML) 0.083% nebulizer solution Take 1 mL by nebulization 3 times daily as needed     tiotropium (SPIRIVA HANDIHALER) 18 MCG inhalation capsule Inhale 1 capsule into the lungs Daily     vitamin D (CHOLECALCIFEROL) 1000 UNIT TABS tablet Take 4,000 Units by mouth daily Takes 4 tabs daily     docusate sodium (COLACE) 100 MG capsule Take 1 capsule by mouth 2 times daily Take while on narcotics     Probiotic Product (PROBIOTIC DAILY PO) Take 1 tablet by mouth      aspirin 81 MG tablet Take 81 mg by mouth daily.  ONE TOUCH ULTRA TEST strip      B-D INSULIN SYRINGE 29G X 1/2\" 0.5 ML MISC      fluticasone (FLONASE) 50 MCG/ACT nasal spray 1 spray by Nasal route 2 times daily No longer taking    albuterol sulfate HFA (PROAIR HFA) 108 (90 BASE) MCG/ACT inhaler Inhale 2 puffs into the lungs every 6 hours as needed for Wheezing Doesn't use     Additional Medications (including OTC/Herbal Supplements):  · None per patient    Allergies:    Allergies   Allergen Reactions    Fioricet [Butalbital-Apap-Caffeine] Shortness Of Breath    Erythromycin      Other reaction(s): Unknown  Eye ointment caused swelling    Codeine Nausea And Vomiting and Nausea Only    Droperidol Anxiety    Tape Mauricio Shannan Tape] Rash     Pertinent Labs/Vitals:  BP Readings from Last 3 Encounters:   04/12/19 (!) 188/82   04/12/19 (!) 229/104   02/05/19 138/64     Lab Results   Component Value Date    LABMICR 97 (H) 04/26/2018     Lab Results   Component Value Date    LABA1C 8.3 (H) 09/14/2018    LABA1C 8.4 06/21/2018    LABA1C 9.3 (H) 04/26/2018     Lab Results   Component Value Date    CHOL 153 06/02/2017    TRIG 179 (H) 06/02/2017    HDL 35 (L) 06/02/2017    LDLCHOLESTEROL 82 06/02/2017    LDLDIRECT 127 (H) 05/14/2012     ALT   Date Value Ref Range Status   12/30/2018 6 5 - 33 U/L Final     AST   Date Value Ref Range Status   12/30/2018 11 <32 U/L Final     The 10-year ASCVD risk score (Tiana Vega, et al., 2013) is: 13.5%    Values used to calculate the score:      Age: 66 years      Sex: Female      Is Non- : No      Diabetic: Yes      Tobacco smoker: No      Systolic Blood Pressure: 515 mmHg      Is BP treated: Yes      HDL Cholesterol: 35 mg/dL      Total Cholesterol: 153 mg/dL     Lab Results   Component Value Date    CREATININE 0.80 04/11/2019     CrCL ~71 mL/min (calculated using sCr 0.8 mg/dL, Ht 65\", IBW 57 kg, using C-G equation)   eGFR: >60 mL/min/1.73 m^2    Social History:   Tobacco Use    Smoking status: Never Smoker    Smokeless tobacco: Never Used   Substance Use Topics    Alcohol use: No     Alcohol/week: 0.0 oz     Comment: once a month     Immunizations:   Most Recent Immunizations   Administered Date(s) Administered    Influenza Vaccine, unspecified formulation 11/30/2016    Influenza Virus Vaccine 10/15/2015    Influenza, Cielo Hamm, 3 yrs and older, IM, PF (Fluzone 3 yrs and older or Afluria 5 yrs and older) 11/15/2018    Pneumococcal Polysaccharide ([id number]) 11/15/2018    Tdap (Boostrix, Adacel) 04/20/2015     Spirometry/PFT results:  Not available    Last Echo:  06/22/2018 EF >55%    ASSESSMENT/PLAN:   General Assessment:   · Adherence: inconsistent  · Cost: patient mentioned not being able to afford healthcare, primarily dental work  · Current pharmacy/pharmacies: [facility] DR ANTHONY BRIAN in [place], [place] ([phone])  · Drug interactions: No clinically significant interactions identified via [employer] as category D or higher. · Renal dosing: No renal adjustments necessary. · Immunizations: appropriate per ADA guidelines (pneumonia, influenza)  · Smoking status: Never used tobacco (marijuana use 4x/wk)  · Blood pressure: Is not at BP target of <130/80 mmHg. Recently began amlodipine for uncontrolled hypertension. No questions or concerns about new med. · Lipids: Age >40 with DM2, patient is appropriately prescribed moderate-intensity statin therapy based on updated guidelines. Heart Failure:  · Patient confirmed PRN torsemide for water weight, but then stated she was never informed she had HF    Diabetes:    Glycemic goal: <7.0%. Is not at blood glucose goal but is on insulin therapy.    Current problems: waiting on new insulin pump with sensor that will adjust insulin dose based on BG   Home blood sugar records: fasting range: 100-190   Any episodes of hypoglycemia: yes - reports BG readings <70 ~once per day   Current testing supplies/frequency: 6x/day   Appropriateness of insulin therapy: inappropriate based on most recent A1c and frequent occurrences of hypoglycemia   Diet/exercise: reports recent weight loss   Barriers to success: socioeconomic    Primary Care 6 Gaps:  · A1c not <9: need new A1c   · BP not <130/80  · Pneumonia vaccination (>/= 64yo with any PPSV23 or PCV13 documented in Health Maintenance tab): received 11/15/2018  · Breast cancer screening (50-74yo with mammogram within 27 months of end of current year): had mammogram 05/08/2018  · Colorectal cancer screening (50-74yo with FOBT in past 12 months, flex sig in past 5 years or colonoscopy in last 10 years): had colonoscopy 07/13/2016  · Nephropathy screening (18-74yo with diabetes; either on ACE-I/ARB or annual microalbumin): on losartan    Upcoming appointments:   Future Appointments   Date Time Provider Reji Jay   4/15/2019  1:00 PM SCHEDULE, S CLINICAL

## 2019-04-16 DIAGNOSIS — G89.4 CHRONIC PAIN SYNDROME: Chronic | ICD-10-CM

## 2019-04-16 DIAGNOSIS — E10.43 TYPE 1 DIABETES MELLITUS WITH DIABETIC AUTONOMIC NEUROPATHY (HCC): ICD-10-CM

## 2019-04-16 DIAGNOSIS — G25.81 RESTLESS LEGS SYNDROME: ICD-10-CM

## 2019-04-16 DIAGNOSIS — I10 ESSENTIAL HYPERTENSION: ICD-10-CM

## 2019-04-16 DIAGNOSIS — Z79.899 HIGH RISK MEDICATION USE: Primary | ICD-10-CM

## 2019-04-16 DIAGNOSIS — F32.A DEPRESSION, UNSPECIFIED DEPRESSION TYPE: ICD-10-CM

## 2019-04-16 DIAGNOSIS — G25.81 RLS (RESTLESS LEGS SYNDROME): ICD-10-CM

## 2019-04-16 DIAGNOSIS — F51.01 PRIMARY INSOMNIA: ICD-10-CM

## 2019-04-16 RX ORDER — PANTOPRAZOLE SODIUM 40 MG/1
40 TABLET, DELAYED RELEASE ORAL 2 TIMES DAILY
Qty: 180 TABLET | Refills: 3 | Status: SHIPPED | OUTPATIENT
Start: 2019-04-16 | End: 2019-05-08

## 2019-04-16 RX ORDER — ROPINIROLE 2 MG/1
2 TABLET, FILM COATED ORAL DAILY
Qty: 90 TABLET | Refills: 3 | Status: SHIPPED | OUTPATIENT
Start: 2019-04-16

## 2019-04-16 RX ORDER — AMLODIPINE BESYLATE 5 MG/1
5 TABLET ORAL DAILY
Qty: 90 TABLET | Refills: 3 | Status: SHIPPED | OUTPATIENT
Start: 2019-04-16 | End: 2019-12-12 | Stop reason: SDUPTHER

## 2019-04-16 RX ORDER — METOPROLOL TARTRATE 50 MG/1
TABLET, FILM COATED ORAL
Qty: 180 TABLET | Refills: 3 | Status: SHIPPED | OUTPATIENT
Start: 2019-04-16 | End: 2020-02-10

## 2019-04-16 RX ORDER — GABAPENTIN 800 MG/1
TABLET ORAL
Qty: 270 TABLET | Refills: 0 | Status: SHIPPED | OUTPATIENT
Start: 2019-04-16 | End: 2022-05-05

## 2019-04-16 RX ORDER — AMITRIPTYLINE HYDROCHLORIDE 150 MG/1
TABLET, FILM COATED ORAL
Qty: 90 TABLET | Refills: 3 | Status: SHIPPED | OUTPATIENT
Start: 2019-04-16 | End: 2020-01-22

## 2019-04-16 RX ORDER — TORSEMIDE 10 MG/1
10 TABLET ORAL DAILY PRN
Qty: 90 TABLET | Refills: 3 | Status: SHIPPED | OUTPATIENT
Start: 2019-04-16 | End: 2020-10-16

## 2019-04-16 RX ORDER — LOSARTAN POTASSIUM 100 MG/1
100 TABLET ORAL DAILY
Qty: 90 TABLET | Refills: 3 | Status: SHIPPED | OUTPATIENT
Start: 2019-04-16 | End: 2019-05-14

## 2019-04-16 RX ORDER — LEVOTHYROXINE SODIUM 137 UG/1
125 TABLET ORAL DAILY
Qty: 90 TABLET | Refills: 3 | Status: SHIPPED | OUTPATIENT
Start: 2019-04-16 | End: 2022-05-05 | Stop reason: ALTCHOICE

## 2019-04-16 RX ORDER — SIMVASTATIN 20 MG
20 TABLET ORAL NIGHTLY
Qty: 90 TABLET | Refills: 3 | Status: ON HOLD | OUTPATIENT
Start: 2019-04-16 | End: 2020-10-17 | Stop reason: HOSPADM

## 2019-04-16 RX ORDER — DULOXETIN HYDROCHLORIDE 30 MG/1
30 CAPSULE, DELAYED RELEASE ORAL DAILY
Qty: 90 CAPSULE | Refills: 3 | Status: SHIPPED | OUTPATIENT
Start: 2019-04-16 | End: 2019-04-24 | Stop reason: SDUPTHER

## 2019-04-16 NOTE — TELEPHONE ENCOUNTER
Pharmacy is requesting metoclopramide 10mg refill. However we have her on 5mg. Can you look into this and then have Dr. Mauro Jimenez approve accordingly? Pending the 5mg as we have it.

## 2019-04-16 NOTE — TELEPHONE ENCOUNTER
Medication Requested: gabapentin    Last visit: 4/12/19  Next visit: 5/8/2019  Last refill: 12/18/18    Med contract on file:  [x] yes   [] no    Last urine drug screen: 10/13/17--ordered new  Consistent with medication(s):    [] yes   [x] no    Last OARRS ran: unkn    Quantity of medication remaining: unkn    Who will be picking rx up: patient    Pharmacy if escribed: [facility]

## 2019-04-16 NOTE — TELEPHONE ENCOUNTER
Medication: torsemide, losartan, metoprolol, amitriptyline, norvasc, duloxetine, levothyrox, requip, simvastatin, prilosec  Last visit: 4/12/19  Next visit: 5/8/2019  Last refill: multiple  Pharmacy: [facility]

## 2019-04-18 ENCOUNTER — CARE COORDINATION (OUTPATIENT)
Dept: CARE COORDINATION | Age: 59
End: 2019-04-18

## 2019-04-18 RX ORDER — METOCLOPRAMIDE 5 MG/1
TABLET ORAL
Qty: 180 TABLET | Refills: 3 | Status: SHIPPED | OUTPATIENT
Start: 2019-04-18 | End: 2020-10-16 | Stop reason: DRUGHIGH

## 2019-04-19 ENCOUNTER — TELEPHONE (OUTPATIENT)
Dept: INTERNAL MEDICINE CLINIC | Age: 59
End: 2019-04-19

## 2019-04-19 DIAGNOSIS — F32.A DEPRESSION, UNSPECIFIED DEPRESSION TYPE: ICD-10-CM

## 2019-04-19 NOTE — TELEPHONE ENCOUNTER
Cymbalta was sent in this week. However the directions are not clear and do not match the qty to be dispensed. Can you please review and correct by sending a new script to [facility].

## 2019-04-23 ENCOUNTER — CARE COORDINATION (OUTPATIENT)
Dept: CARE COORDINATION | Age: 59
End: 2019-04-23

## 2019-04-23 NOTE — CARE COORDINATION
Attempting to reach patient for a follow up care coordination call regarding any needs, questions or concerns.   Lashon Lawrence, [address] [facility] Plan:   Follow up with patient at upcoming PCP appointment

## 2019-04-24 RX ORDER — DULOXETIN HYDROCHLORIDE 30 MG/1
30 CAPSULE, DELAYED RELEASE ORAL DAILY
Qty: 90 CAPSULE | Refills: 3 | Status: SHIPPED | OUTPATIENT
Start: 2019-04-24 | End: 2019-11-13

## 2019-04-26 ENCOUNTER — CARE COORDINATION (OUTPATIENT)
Dept: CARE COORDINATION | Age: 59
End: 2019-04-26

## 2019-05-08 ENCOUNTER — CARE COORDINATION (OUTPATIENT)
Dept: CARE COORDINATION | Age: 59
End: 2019-05-08

## 2019-05-08 ENCOUNTER — OFFICE VISIT (OUTPATIENT)
Dept: INTERNAL MEDICINE CLINIC | Age: 59
End: 2019-05-08
Payer: MEDICARE

## 2019-05-08 VITALS
HEIGHT: 65 IN | WEIGHT: 209 LBS | BODY MASS INDEX: 34.82 KG/M2 | DIASTOLIC BLOOD PRESSURE: 70 MMHG | SYSTOLIC BLOOD PRESSURE: 132 MMHG

## 2019-05-08 DIAGNOSIS — K02.9 PAIN DUE TO DENTAL CARIES: ICD-10-CM

## 2019-05-08 DIAGNOSIS — G89.4 CHRONIC PAIN SYNDROME: Primary | ICD-10-CM

## 2019-05-08 PROCEDURE — G8427 DOCREV CUR MEDS BY ELIG CLIN: HCPCS | Performed by: INTERNAL MEDICINE

## 2019-05-08 PROCEDURE — 99214 OFFICE O/P EST MOD 30 MIN: CPT | Performed by: INTERNAL MEDICINE

## 2019-05-08 PROCEDURE — 1036F TOBACCO NON-USER: CPT | Performed by: INTERNAL MEDICINE

## 2019-05-08 PROCEDURE — G8417 CALC BMI ABV UP PARAM F/U: HCPCS | Performed by: INTERNAL MEDICINE

## 2019-05-08 PROCEDURE — 3017F COLORECTAL CA SCREEN DOC REV: CPT | Performed by: INTERNAL MEDICINE

## 2019-05-08 RX ORDER — OFLOXACIN 3 MG/ML
SOLUTION AURICULAR (OTIC)
Refills: 0 | COMMUNITY
Start: 2019-03-27 | End: 2019-05-08

## 2019-05-08 RX ORDER — AMOXICILLIN 250 MG/1
CAPSULE ORAL
Refills: 0 | COMMUNITY
Start: 2019-05-01 | End: 2019-05-08

## 2019-05-08 RX ORDER — HYDROCODONE BITARTRATE AND ACETAMINOPHEN 5; 325 MG/1; MG/1
1 TABLET ORAL EVERY 6 HOURS PRN
Qty: 28 TABLET | Refills: 0 | Status: SHIPPED | OUTPATIENT
Start: 2019-05-08 | End: 2019-05-15

## 2019-05-08 RX ORDER — RANITIDINE 150 MG/1
150 TABLET ORAL 2 TIMES DAILY
Qty: 180 TABLET | Refills: 1 | Status: SHIPPED | OUTPATIENT
Start: 2019-05-08 | End: 2019-12-11

## 2019-05-08 NOTE — CARE COORDINATION
Ambulatory Care Coordination Note  5/8/2019  CM Risk Score: 12  Jackie Mortality Risk Score:      ACC: Jannette Swanson RN    Summary Note: Met with patient at PCP appointment. PCP recommended graduation from [address] [place] [address]. Per PCP she is more stable than a year ago and is seeing endocrine. Patient comprehends education provided but chooses not to apply it at times. CC will sign off care team for now but be available in the future if needed. Patient has open communication with PCP. CC reminded patient of the walk in clinic for non emergency needs. Care Coordination Interventions    Program Enrollment:  Complex Care  Referral from Primary Care Provider:  Yes  Suggested Interventions and Community Resources  Diabetes Education:  Completed  Disease Association:  Completed (Comment: Dr Edie Faith - Endocrinologist, [facility])  [address]:  Completed (Comment: [address])  Physical Therapy:  Not Started  Social Work:  Completed  Other Services:  Completed (Comment: [address] DME)  Zone Management Tools: In Process (Comment: DM and COPD zone sheet.)         Goals Addressed     None          Prior to Admission medications    Medication Sig Start Date End Date Taking? Authorizing Provider   ranitidine (ZANTAC) 150 MG tablet Take 1 tablet by mouth 2 times daily 5/8/19   Darrel Solomon MD   HYDROcodone-acetaminophen (NORCO) 5-325 MG per tablet Take 1 tablet by mouth every 6 hours as needed for Pain for up to 7 days.  Intended supply: 30 days 5/8/19 5/15/19  Darrel Solomon MD   DULoxetine (CYMBALTA) 30 MG extended release capsule Take 1 capsule by mouth daily Take 30 mg in addition to 60 mg to equil total dose 90 4/24/19   Darrel Solomon MD   metoclopramide (REGLAN) 5 MG tablet TAKE 1 TABLET BY MOUTH TWICE DAILY 4/18/19   Darrel Solomon MD   torsemide (DEMADEX) 10 MG tablet Take 1 tablet by mouth daily as needed (leg edema) 4/16/19   Darrel Solomon MD   losartan (COZAAR) 100 MG tablet Take as needed for Wheezing 5/26/17   Piper Mcpherson MD   vitamin D (CHOLECALCIFEROL) 1000 UNIT TABS tablet Take 4,000 Units by mouth daily Takes 4 tabs daily    Historical Provider, MD   docusate sodium (COLACE) 100 MG capsule Take 1 capsule by mouth 2 times daily Take while on narcotics 7/7/15   Yadiel Kwan MD   Probiotic Product (PROBIOTIC DAILY PO)   Take 1 tablet by mouth     Historical Provider, MD   aspirin 81 MG tablet Take 81 mg by mouth daily.     Historical Provider, MD       Future Appointments   Date Time Provider Reji Misty   9/4/2019  1:45 PM Maylene Ormond, MD [facility]

## 2019-05-08 NOTE — PROGRESS NOTES
Vaccine  Completed     Chief Complaint   Patient presents with    Facial Pain     is having multiple teeth extracted, would like pain medication for a week                                                                                                                                                                                                           OFFICE VISIT  PROGRESS NOTE         Date of patient's visit: 5/8/2019  Patient's Name:  Xin Unger  YOB: 1960       Patient Care Team:  Carlos Enrique Sosa MD as PCP - Tera Sy MD as PCP - Pulmonology (Pulmonology)  Carlos Enrique Sosa MD as PCP - [facility] Attributed Provider  Oksana Wylie MD as Consulting Physician (Urology)  Lashon Lawrence RN as Care Coordinator  Tyrese Huber MD as Consulting Physician (Infectious Diseases)  UBALDO Stephens as         SUBJECTIVE     HISTORY           History of present illness     Pertinent details  added to ,       Chief Complaint   Patient presents with    Facial Pain     is having multiple teeth extracted, would like pain medication for a week           Encounter Diagnoses   Name Primary?  Chronic pain syndrome Yes    Pain due to dental caries            Symptom / problem          ---      having teeth pulled upper gums . Dental caries and trigeminal neuralgia     dentist dr Flaquita Hua HPI ,    Other  positive-- Review of Systems    ALL OTHER  SYSTEM REVIEW NEGATIVE. Allergies; medicatons; past medical, surgical, family, and social history; and problem list reviewed by me, as indicated in this encounter  . OBJECTIVE      Physical exam      Vitals:    05/08/19 1344   BP: 132/70   Weight: 209 lb (94.8 kg)   Height: 5' 5\" (1.651 m)        Estimated body mass index is 34.78 kg/m² as calculated from the following:    Height as of this encounter: 5' 5\" (1.651 m). Weight as of this encounter: 209 lb (94.8 kg).   Physical Exam   Vitals:  /70   Ht 5' 5\" (1.651 m)   Wt 209 lb (94.8 kg)   BMI 34.78 kg/m²                 Body mass index is 34.78 kg/m². General Appearance:   Alert , CO-OPERATIVE ,     H E:E N T      No icterus, no pallor . No ptosis. No gross asymmetry  Or abnormality face            Skin:                             No rash or erythema  Neck:                            No mass , no thyroid enlargement                                           Pulmonary/Chest:        Clear to auscultation bilaterally . No wheezes, rales or rhonchi . Cardiovascular:            Normal rate, regular rhythm,                                          No murmur or  Gallop . Abdomen:                       Soft, non-tender                                           Normal bowels sounds,                                             Extremities:                    No  Edema . Lalit Patrick / LILLIAN / Gilberto Macias    [x] Reviewed       Labs      URINE ANALYSIS: No results found for: LABURIN     CBC:  Lab Results   Component Value Date    WBC 11.8 04/11/2019    HGB 14.3 04/11/2019     04/11/2019     05/14/2012        BMP:    Lab Results   Component Value Date     04/11/2019    K 3.2 04/11/2019    CL 98 04/11/2019    CO2 25 04/11/2019    BUN 10 04/11/2019    CREATININE 0.80 04/11/2019    GLUCOSE 176 04/11/2019    GLUCOSE 342 05/14/2012        No components found for: LDLC  Lab Results   Component Value Date    LABA1C 8.3 09/14/2018     Lab Results   Component Value Date    POCGLU 103 12/30/2018    POCGLU 79 12/30/2018    POCGLU 83 09/15/2018    POCGLU 131 09/15/2018    POCGLU 95 09/15/2018      .      LIVER PROFILE:  Lab Results   Component Value Date    ALT 6 12/30/2018    AST 11 12/30/2018    PROT 6.8 12/30/2018    PROT 6.8 02/27/2013    BILITOT 0.31 12/30/2018    BILIDIR 0.10 12/30/2018    LABALBU 4.0 12/30/2018    LABALBU 4.0 05/14/2012                           ASSESSMENT / PLAN     Skip Espana was seen today for facial pain. Diagnoses and all orders for this visit:    Chronic pain syndrome    Pain due to dental caries  -     HYDROcodone-acetaminophen (NORCO) 5-325 MG per tablet; Take 1 tablet by mouth every 6 hours as needed for Pain for up to 7 days. Intended supply: 30 days    Other orders  -     ranitidine (ZANTAC) 150 MG tablet; Take 1 tablet by mouth 2 times daily          Diagnosis Orders   1. Chronic pain syndrome     2. Pain due to dental caries  HYDROcodone-acetaminophen (NORCO) 5-325 MG per tablet        SALIENT  ACTIONS TODAYS VISIT     Today's office visit SALIENT ACTIONS    ----          Medications Discontinued During This Encounter   Medication Reason    amoxicillin (AMOXIL) 250 MG capsule     ofloxacin (FLOXIN) 0.3 % otic solution     methylPREDNISolone (MEDROL DOSEPACK) 4 MG tablet     fluticasone (FLONASE) 50 MCG/ACT nasal spray     ranitidine (ZANTAC) 150 MG capsule     pantoprazole (PROTONIX) 40 MG tablet         No orders of the defined types were placed in this encounter. There are no Patient Instructions on file for this visit. Medication List           Accurate as of 5/8/19  2:12 PM. If you have any questions, ask your nurse or doctor. START taking these medications    HYDROcodone-acetaminophen 5-325 MG per tablet  Commonly known as:  NORCO  Take 1 tablet by mouth every 6 hours as needed for Pain for up to 7 days.  Intended supply: 30 days  Started by:  Abbey Woodward MD     ranitidine 150 MG tablet  Commonly known as:  ZANTAC  Take 1 tablet by mouth 2 times daily  Replaces:  ranitidine 150 MG capsule  Started by:  Abbey Woodward MD        CONTINUE taking these medications    * albuterol sulfate  (90 Base) MCG/ACT inhaler  Commonly known as: PROAIR HFA  Inhale 2 puffs into the lungs every 6 hours as needed for Wheezing     * albuterol (2.5 MG/3ML) 0.083% nebulizer solution  Commonly known as:  PROVENTIL     amitriptyline 150 MG tablet  Commonly known as:  ELAVIL  TAKE 1 TABLET BY MOUTH NIGHTLY     amLODIPine 5 MG tablet  Commonly known as:  NORVASC  Take 1 tablet by mouth daily     aspirin 81 MG tablet     B-D INSULIN SYRINGE 29G X 1/2\" 0.5 ML Misc  Generic drug:  INSULIN SYRINGE .5CC/29G     BREO ELLIPTA 200-25 MCG/INH Aepb  Generic drug:  Fluticasone Furoate-Vilanterol     clonazePAM 0.5 MG tablet  Commonly known as:  KLONOPIN  Take 1 tablet by mouth 2 times daily as needed for Anxiety (sleep) for up to 90 days. [place]      docusate sodium 100 MG capsule  Commonly known as:  COLACE  Take 1 capsule by mouth 2 times daily Take while on narcotics     * DULoxetine 60 MG extended release capsule  Commonly known as:  CYMBALTA  Take 1 capsule by mouth daily     * DULoxetine 30 MG extended release capsule  Commonly known as:  CYMBALTA  Take 1 capsule by mouth daily Take 30 mg in addition to 60 mg to equil total dose 90     gabapentin 800 MG tablet  Commonly known as:  NEURONTIN  TAKE 1 TABLET BY MOUTH THREE TIMES DAILY     insulin aspart 100 UNIT/ML injection vial  Commonly known as:  NOVOLOG     levothyroxine 137 MCG tablet  Commonly known as:  SYNTHROID  Take 1 tablet by mouth daily     losartan 100 MG tablet  Commonly known as:  COZAAR  Take 1 tablet by mouth daily     metoclopramide 5 MG tablet  Commonly known as:  REGLAN  TAKE 1 TABLET BY MOUTH TWICE DAILY     metoprolol tartrate 50 MG tablet  Commonly known as:  LOPRESSOR  TAKE 1 TABLET BY MOUTH TWICE DAILY     ondansetron 4 MG disintegrating tablet  Commonly known as:  ZOFRAN ODT  Take 1 tablet by mouth every 8 hours as needed for Nausea or Vomiting     ONE TOUCH ULTRA TEST strip  Generic drug:  blood glucose test strips     PROBIOTIC DAILY PO     rOPINIRole 2 MG tablet  Commonly known as:  REQUIP  Take 1 tablet by mouth daily     simvastatin 20 MG tablet  Commonly known as:  ZOCOR  Take 1 tablet by mouth nightly     tiotropium 18 MCG inhalation capsule  Commonly known as:  SPIRIVA HANDIHALER  Inhale 1 capsule into the lungs Daily     torsemide 10 MG tablet  Commonly known as:  DEMADEX  Take 1 tablet by mouth daily as needed (leg edema)     vitamin D 1000 UNIT Tabs tablet  Commonly known as:  CHOLECALCIFEROL         * This list has 4 medication(s) that are the same as other medications prescribed for you. Read the directions carefully, and ask your doctor or other care provider to review them with you. STOP taking these medications    amoxicillin 250 MG capsule  Commonly known as:  AMOXIL  Stopped by:  Chiara Hawk MD     fluticasone 50 MCG/ACT nasal spray  Commonly known as:  FLONASE  Stopped by:  Chiara Hawk MD     methylPREDNISolone 4 MG tablet  Commonly known as:  MEDROL DOSEPACK  Stopped by:  Chiara Hawk MD     ofloxacin 0.3 % otic solution  Commonly known as:  FLOXIN  Stopped by:  Chiara Hawk MD     pantoprazole 40 MG tablet  Commonly known as:  PROTONIX  Stopped by:  Chiara Hawk MD     ranitidine 150 MG capsule  Commonly known as:  ZANTAC  Replaced by:  ranitidine 150 MG tablet  Stopped by:  Chiara Hawk MD           Where to Get Your Medications      These medications were sent to [address]    Phone:  [phone] ·   ranitidine 150 MG tablet     You can get these medications from any pharmacy    Bring a paper prescription for each of these medications  · HYDROcodone-acetaminophen 5-325 MG per tablet             FOLLOW UP  PLANS     No follow-ups on file. MD JODI Cohen [address], [address] [place] [address].    Phone [phone]   Fax: [phone]  Answering Service: [phone]

## 2019-05-13 ENCOUNTER — CARE COORDINATION (OUTPATIENT)
Dept: CARE COORDINATION | Age: 59
End: 2019-05-13

## 2019-05-14 RX ORDER — LOSARTAN POTASSIUM 100 MG/1
TABLET ORAL
Qty: 90 TABLET | Refills: 3 | Status: SHIPPED | OUTPATIENT
Start: 2019-05-14 | End: 2022-10-14 | Stop reason: SDUPTHER

## 2019-06-06 ENCOUNTER — TELEPHONE (OUTPATIENT)
Dept: INTERNAL MEDICINE CLINIC | Age: 59
End: 2019-06-06

## 2019-06-06 ENCOUNTER — HOSPITAL ENCOUNTER (OUTPATIENT)
Age: 59
Setting detail: SPECIMEN
Discharge: HOME | End: 2019-06-06
Payer: MEDICARE

## 2019-06-06 DIAGNOSIS — N39.0 URINARY TRACT INFECTION WITHOUT HEMATURIA, SITE UNSPECIFIED: Primary | ICD-10-CM

## 2019-06-06 DIAGNOSIS — N39.0 URINARY TRACT INFECTION WITHOUT HEMATURIA, SITE UNSPECIFIED: ICD-10-CM

## 2019-06-06 DIAGNOSIS — Z79.899 HIGH RISK MEDICATION USE: ICD-10-CM

## 2019-06-06 LAB
-: ABNORMAL
AMORPHOUS: ABNORMAL
BACTERIA: ABNORMAL
BILIRUBIN URINE: NEGATIVE
CASTS UA: ABNORMAL /LPF (ref 0–8)
COLOR: YELLOW
COMMENT UA: ABNORMAL
CRYSTALS, UA: ABNORMAL /HPF
EPITHELIAL CELLS UA: ABNORMAL /HPF (ref 0–5)
GLUCOSE URINE: NEGATIVE
KETONES, URINE: NEGATIVE
LEUKOCYTE ESTERASE, URINE: ABNORMAL
MUCUS: ABNORMAL
NITRITE, URINE: NEGATIVE
OTHER OBSERVATIONS UA: ABNORMAL
PH UA: 8 (ref 5–8)
PROTEIN UA: ABNORMAL
RBC UA: ABNORMAL /HPF (ref 0–4)
RENAL EPITHELIAL, UA: ABNORMAL /HPF
SPECIFIC GRAVITY UA: 1.01 (ref 1–1.03)
TRICHOMONAS: ABNORMAL
TURBIDITY: ABNORMAL
URINE HGB: ABNORMAL
UROBILINOGEN, URINE: NORMAL
WBC UA: ABNORMAL /HPF (ref 0–5)
YEAST: ABNORMAL

## 2019-06-06 NOTE — TELEPHONE ENCOUNTER
Patient co having burning with urination is requesting to have a UA done to make sure that she does not have an infection         Per verbal order from Dr Ke garcia to order UA

## 2019-06-07 ENCOUNTER — TELEPHONE (OUTPATIENT)
Dept: INTERNAL MEDICINE CLINIC | Age: 59
End: 2019-06-07

## 2019-06-07 RX ORDER — NITROFURANTOIN 25; 75 MG/1; MG/1
100 CAPSULE ORAL 2 TIMES DAILY
Qty: 14 CAPSULE | Refills: 0 | Status: SHIPPED | OUTPATIENT
Start: 2019-06-07 | End: 2019-06-14

## 2019-06-08 LAB
CULTURE: ABNORMAL
Lab: ABNORMAL
SPECIMEN DESCRIPTION: ABNORMAL

## 2019-06-10 LAB
6-ACETYLMORPHINE, UR: NOT DETECTED
7-AMINOCLONAZEPAM, URINE: PRESENT
ALPHA-OH-ALPRAZ, URINE: NOT DETECTED
ALPRAZOLAM, URINE: NOT DETECTED
AMPHETAMINES, URINE: NOT DETECTED
BARBITURATES, URINE: NOT DETECTED
BENZOYLECGONINE, UR: NOT DETECTED
BUPRENORPHINE URINE: NOT DETECTED
CARISOPRODOL, UR: NOT DETECTED
CLONAZEPAM, URINE: NOT DETECTED
CODEINE, URINE: NOT DETECTED
CREATININE URINE: 55.7 MG/DL (ref 20–400)
DIAZEPAM, URINE: NOT DETECTED
DRUGS EXPECTED, UR: NORMAL
EER HI RES INTERP UR: NORMAL
ETHYL GLUCURONIDE UR: NOT DETECTED
FENTANYL URINE: NOT DETECTED
HYDROCODONE, URINE: NOT DETECTED
HYDROMORPHONE, URINE: NOT DETECTED
LORAZEPAM, URINE: NOT DETECTED
MARIJUANA METAB, UR: PRESENT
MDA, UR: NOT DETECTED
MDEA, EVE, UR: NOT DETECTED
MDMA URINE: NOT DETECTED
MEPERIDINE METAB, UR: NOT DETECTED
METHADONE, URINE: NOT DETECTED
METHAMPHETAMINE, URINE: NOT DETECTED
METHYLPHENIDATE: NOT DETECTED
MIDAZOLAM, URINE: NOT DETECTED
MORPHINE URINE: NOT DETECTED
NORBUPRENORPHINE, URINE: NOT DETECTED
NORDIAZEPAM, URINE: NOT DETECTED
NORFENTANYL, URINE: NOT DETECTED
NORHYDROCODONE, URINE: NOT DETECTED
NOROXYCODONE, URINE: NOT DETECTED
NOROXYMORPHONE, URINE: NOT DETECTED
OXAZEPAM, URINE: NOT DETECTED
OXYCODONE URINE: NOT DETECTED
OXYMORPHONE, URINE: NOT DETECTED
PAIN MANAGEMENT DRUG PANEL INTERP, URINE: NORMAL
PAIN MGT DRUG PANEL, HI RES, UR: NORMAL
PCP,URINE: NOT DETECTED
PHENTERMINE, UR: NOT DETECTED
PROPOXYPHENE, URINE: NOT DETECTED
TAPENTADOL, URINE: NOT DETECTED
TAPENTADOL-O-SULFATE, URINE: NOT DETECTED
TEMAZEPAM, URINE: NOT DETECTED
TRAMADOL, URINE: NOT DETECTED
ZOLPIDEM, URINE: NOT DETECTED

## 2019-06-11 ENCOUNTER — TELEPHONE (OUTPATIENT)
Dept: INTERNAL MEDICINE CLINIC | Age: 59
End: 2019-06-11

## 2019-06-18 ENCOUNTER — OFFICE VISIT (OUTPATIENT)
Dept: INTERNAL MEDICINE CLINIC | Age: 59
End: 2019-06-18
Payer: MEDICARE

## 2019-06-18 VITALS
TEMPERATURE: 98.3 F | WEIGHT: 201 LBS | HEIGHT: 65 IN | OXYGEN SATURATION: 98 % | BODY MASS INDEX: 33.49 KG/M2 | HEART RATE: 78 BPM | DIASTOLIC BLOOD PRESSURE: 97 MMHG | SYSTOLIC BLOOD PRESSURE: 174 MMHG

## 2019-06-18 DIAGNOSIS — J01.01 ACUTE RECURRENT MAXILLARY SINUSITIS: Primary | ICD-10-CM

## 2019-06-18 PROCEDURE — 99213 OFFICE O/P EST LOW 20 MIN: CPT | Performed by: PHYSICIAN ASSISTANT

## 2019-06-18 PROCEDURE — 3017F COLORECTAL CA SCREEN DOC REV: CPT | Performed by: PHYSICIAN ASSISTANT

## 2019-06-18 PROCEDURE — G8427 DOCREV CUR MEDS BY ELIG CLIN: HCPCS | Performed by: PHYSICIAN ASSISTANT

## 2019-06-18 PROCEDURE — G8417 CALC BMI ABV UP PARAM F/U: HCPCS | Performed by: PHYSICIAN ASSISTANT

## 2019-06-18 PROCEDURE — 1036F TOBACCO NON-USER: CPT | Performed by: PHYSICIAN ASSISTANT

## 2019-06-18 RX ORDER — AMOXICILLIN AND CLAVULANATE POTASSIUM 875; 125 MG/1; MG/1
1 TABLET, FILM COATED ORAL 2 TIMES DAILY
Qty: 20 TABLET | Refills: 0 | Status: SHIPPED | OUTPATIENT
Start: 2019-06-18 | End: 2019-06-28

## 2019-06-18 RX ORDER — TOPIRAMATE 25 MG/1
TABLET ORAL
Refills: 11 | COMMUNITY
Start: 2019-06-14 | End: 2020-10-16

## 2019-06-18 RX ORDER — CLONAZEPAM 0.5 MG/1
TABLET ORAL
Refills: 2 | COMMUNITY
Start: 2019-05-30 | End: 2019-10-01 | Stop reason: SDUPTHER

## 2019-06-18 ASSESSMENT — ENCOUNTER SYMPTOMS
SINUS PAIN: 1
WHEEZING: 0
TROUBLE SWALLOWING: 0
ABDOMINAL PAIN: 0
EYE REDNESS: 0
VOMITING: 0
COLOR CHANGE: 0
SORE THROAT: 0
EYE PAIN: 0
CHEST TIGHTNESS: 0
NAUSEA: 0
COUGH: 0
SINUS PRESSURE: 1
RHINORRHEA: 0
SHORTNESS OF BREATH: 0

## 2019-06-18 NOTE — PROGRESS NOTES
[facility] PHYSICIANS  [facility] - [facility]  [address] [facility]  [address] [address]  Dept: [phone]  Dept Fax: [phone]    OfficeProgress/Follow Up Note  Date of patient's visit: 6/18/2019  Patient's Name:  Ya Castanon YOB: 1960            Patient Care Team:  Abbey Woodward MD as PCP - General  Lisbeth Ramírez MD as PCP - Pulmonology (Pulmonology)  Abbey Woodward MD as PCP - [facility] Empaneled Provider  Vicente Lockett MD as Consulting Physician (Urology)  Annabelle Villagomez MD as Consulting Physician (Infectious Diseases)    REASON FOR VISIT:  Same day visit    HISTORY OF PRESENT ILLNESS:      Chief Complaint   Patient presents with    Sinusitis     facial pain, head ache,      History was obtained from the patient. Ya Castanon is a 66 y.o. female here today for above. Facial pain. Symptoms present for the past month. Pressure in the maxillary sinuses. Associated congestion, headache. Patient does endorse dental caries, recent upper teeth extraction. No fever or chills. Patient recently had CT facial bones, no abscess, mastoiditis, mild sinus thickening.      MEDICATIONS:      Current Outpatient Medications   Medication Sig Dispense Refill    topiramate (TOPAMAX) 25 MG tablet TAKE 1 TABLET BY MOUTH ONCE DAILY FOR 7 DAYS THEN 1 TWICE DAILY FOR 7 DAYS THEN 1 IN THE MORNING AND 2 IN THE EVENING FOR 7 DAYS THEN 2 TWIC  11    clonazePAM (KLONOPIN) 0.5 MG tablet TAKE 1 TABLET BY MOUTH TWICE DAILY AS NEEDED FOR ANXIETY OR SLEEP  2    amoxicillin-clavulanate (AUGMENTIN) 875-125 MG per tablet Take 1 tablet by mouth 2 times daily for 10 days 20 tablet 0    losartan (COZAAR) 100 MG tablet TAKE 1 TABLET BY MOUTH ONCE DAILY 90 tablet 3    ranitidine (ZANTAC) 150 MG tablet Take 1 tablet by mouth 2 times daily 180 tablet 1    DULoxetine (CYMBALTA) 30 MG extended release capsule Take 1 capsule by mouth daily Take 30 mg in addition to 60 mg to equil total dose 90 90 capsule 3    metoclopramide (REGLAN) 5 MG tablet TAKE 1 TABLET BY MOUTH TWICE DAILY 180 tablet 3    torsemide (DEMADEX) 10 MG tablet Take 1 tablet by mouth daily as needed (leg edema) 90 tablet 3    rOPINIRole (REQUIP) 2 MG tablet Take 1 tablet by mouth daily 90 tablet 3    simvastatin (ZOCOR) 20 MG tablet Take 1 tablet by mouth nightly 90 tablet 3    amitriptyline (ELAVIL) 150 MG tablet TAKE 1 TABLET BY MOUTH NIGHTLY 90 tablet 3    amLODIPine (NORVASC) 5 MG tablet Take 1 tablet by mouth daily 90 tablet 3    levothyroxine (SYNTHROID) 137 MCG tablet Take 1 tablet by mouth daily 90 tablet 3    metoprolol tartrate (LOPRESSOR) 50 MG tablet TAKE 1 TABLET BY MOUTH TWICE DAILY 180 tablet 3    gabapentin (NEURONTIN) 800 MG tablet TAKE 1 TABLET BY MOUTH THREE TIMES DAILY 270 tablet 0    DULoxetine (CYMBALTA) 60 MG extended release capsule Take 1 capsule by mouth daily 30 capsule 5    ondansetron (ZOFRAN ODT) 4 MG disintegrating tablet Take 1 tablet by mouth every 8 hours as needed for Nausea or Vomiting 20 tablet 0    insulin aspart (NOVOLOG) 100 UNIT/ML injection vial Inject into the skin Via insulin pump      BREO ELLIPTA 200-25 MCG/INH AEPB INL 1 PUFF PO DAILY  6    albuterol (PROVENTIL) (2.5 MG/3ML) 0.083% nebulizer solution Take 1 mL by nebulization 3 times daily as needed  0    tiotropium (SPIRIVA HANDIHALER) 18 MCG inhalation capsule Inhale 1 capsule into the lungs Daily 30 capsule 5    ONE TOUCH ULTRA TEST strip   1    B-D INSULIN SYRINGE 29G X 1/2\" 0.5 ML MISC       albuterol sulfate HFA (PROAIR HFA) 108 (90 BASE) MCG/ACT inhaler Inhale 2 puffs into the lungs every 6 hours as needed for Wheezing 1 Inhaler 0    vitamin D (CHOLECALCIFEROL) 1000 UNIT TABS tablet Take 4,000 Units by mouth daily Takes 4 tabs daily      docusate sodium (COLACE) 100 MG capsule Take 1 capsule by mouth 2 times daily Take while on narcotics 60 capsule 0    Probiotic Product (PROBIOTIC DAILY PO)   Take 1 tablet by mouth       aspirin 81 MG tablet Take 81 mg by mouth daily.  clonazePAM (KLONOPIN) 0.5 MG tablet Take 1 tablet by mouth 2 times daily as needed for Anxiety (sleep) for up to 90 days. . 60 tablet 2     No current facility-administered medications for this visit. ALLERGIES:      Allergies   Allergen Reactions    Fioricet [Butalbital-Apap-Caffeine] Shortness Of Breath    Erythromycin      Other reaction(s): Unknown  Eye ointment caused swelling    Codeine Nausea And Vomiting and Nausea Only    Droperidol Anxiety    Tape [Adhesive Tape] Rash     SOCIAL HISTORY   Reviewed and no change from previous record. Mariah Valle  reports that she has never smoked. She has never used smokeless tobacco.    FAMILY HISTORY:    Reviewed and No change from previous visit    REVIEW OF SYSTEMS:    Review of Systems   Constitutional: Negative for chills, fatigue and fever. HENT: Positive for congestion, postnasal drip, sinus pressure and sinus pain. Negative for drooling, ear discharge, ear pain, hearing loss, rhinorrhea, sore throat and trouble swallowing. Eyes: Negative for pain and redness. Respiratory: Negative for cough, chest tightness, shortness of breath and wheezing. Cardiovascular: Negative for chest pain and leg swelling. Gastrointestinal: Negative for abdominal pain, nausea and vomiting. Musculoskeletal: Negative for neck pain and neck stiffness. Skin: Negative for color change and rash. Neurological: Positive for headaches. Negative for dizziness, weakness, light-headedness and numbness. Hematological: Negative for adenopathy.       PHYSICAL EXAM:      Vitals:    06/18/19 1050   BP: (!) 174/97   Pulse: 78   Temp: 98.3 °F (36.8 °C)   TempSrc: Oral   SpO2: 98%   Weight: 201 lb (91.2 kg)   Height: 5' 5\" (1.651 m)     General - alert, mild ill appearing, in no acute distress  Skin - normal coloration and turgor, no rashes  Head - tenderness to palpation of maxillary/frontal sinuses  Eyes - pupils equal and reactive, extraocular eye movements intact  Ears - bilateral TM's and external ear canals normal  Nose - normal and patent, no erythema, discharge or polyps  Mouth - mucous membranes are moist, no masses upper mouth or soft palate, pharynx normal without lesions  Neck - supple, no significant adenopathy  Lymphatics - no palpable lymphadenopathy, no hepatosplenomegaly  Chest - clear to auscultation, no wheezes, rales or rhonchi, symmetric air entry  Heart - normal rate, regular rhythm, no murmur  Abdomen - soft, nontender, nondistended  Neurological - alert, oriented, normal speech, no focal sensory or motor deficit  Extremities - no pedal edema    ASSESSMENT AND PLAN:      1. Acute recurrent maxillary sinusitis  - supportive care, rest, hydration, analgesics as needed   - amoxicillin-clavulanate (AUGMENTIN) 875-125 MG per tablet; Take 1 tablet by mouth 2 times daily for 10 days  Dispense: 20 tablet; Refill: 0  - advised to follow up with dentist as instructed, return for new or worsening symptoms   - consider CT sinus if symptoms persist or fail to resolve, consider ENT referral    FOLLOW UP AND INSTRUCTIONS:   Return if symptoms worsen or fail to improve. Discussed use, benefit, and side effects of prescribed medications. All patient questions answered. Patient voiced understanding. Patient given educational materials - see patient instructions    Renee ARNOLD [facility]  6/18/2019, 11:11 AM    Please note that this chart wasgenerated using voice recognition Dragon dictation software. Although every effort was made to ensure the accuracy of this automated transcription, some errors in transcription may have occurred.

## 2019-06-18 NOTE — PROGRESS NOTES
Visit Information    Have you changed or started any medications since your last visit including any over-the-counter medicines, vitamins, or herbal medicines? no   Are you having any side effects from any of your medications? -  no  Have you stopped taking any of your medications? Is so, why? -  no    Have you seen any other physician or provider since your last visit? No  Have you had any other diagnostic tests since your last visit? No  Have you been seen in the emergency room and/or had an admission to a hospital since we last saw you? No  Have you had your routine dental cleaning in the past 6 months? no    Have you activated your [employer] account? If not, what are your barriers?  No: declined     Patient Care Team:  Mathieu Mckeon MD as PCP - General  Hermelinda Bonds MD as PCP - Pulmonology (Pulmonology)  Mathieu Mckeon MD as PCP - [facility] Provider  Tyshawn Lugo MD as Consulting Physician (Urology)  Aylin Roman MD as Consulting Physician (Infectious Diseases)    Medical History Review  Past Medical, Family, and Social History reviewed and does contribute to the patient presenting condition    Health Maintenance   Topic Date Due    Hepatitis C screen  1960    HIV screen  10/05/1977    Hepatitis B Vaccine (1 of 3 - Risk 3-dose series) 10/05/1981    Shingles Vaccine (1 of 2) 10/05/2012    Cervical cancer screen  04/30/2018    Lipid screen  06/02/2018    Diabetic microalbuminuria test  04/26/2019    TSH testing  06/21/2019    Diabetic retinal exam  07/09/2019    Diabetic foot exam  08/21/2019    A1C test (Diabetic or Prediabetic)  09/14/2019    Potassium monitoring  04/11/2020    Creatinine monitoring  04/11/2020    Breast cancer screen  05/08/2020    Colon cancer screen colonoscopy  01/20/2025    DTaP/Tdap/Td vaccine (2 - Td) 04/20/2025    Flu vaccine  Completed    Pneumococcal 0-64 years Vaccine  Completed     Chief Complaint   Patient presents with    Sinusitis facial pain, head ache,

## 2019-06-20 ENCOUNTER — TELEPHONE (OUTPATIENT)
Dept: INTERNAL MEDICINE CLINIC | Age: 59
End: 2019-06-20

## 2019-06-20 RX ORDER — DOXYCYCLINE HYCLATE 100 MG/1
100 CAPSULE ORAL 2 TIMES DAILY
Qty: 20 CAPSULE | Refills: 0 | Status: SHIPPED | OUTPATIENT
Start: 2019-06-20 | End: 2019-06-30

## 2019-06-25 ENCOUNTER — HOSPITAL ENCOUNTER (OUTPATIENT)
Age: 59
Setting detail: SPECIMEN
Discharge: HOME | End: 2019-06-25
Payer: MEDICARE

## 2019-06-25 DIAGNOSIS — N39.0 URINARY TRACT INFECTION WITHOUT HEMATURIA, SITE UNSPECIFIED: ICD-10-CM

## 2019-06-25 DIAGNOSIS — N39.0 URINARY TRACT INFECTION WITHOUT HEMATURIA, SITE UNSPECIFIED: Primary | ICD-10-CM

## 2019-06-25 LAB
-: NORMAL
AMORPHOUS: NORMAL
BACTERIA: NORMAL
BILIRUBIN URINE: NEGATIVE
CASTS UA: NORMAL /LPF (ref 0–8)
COLOR: ABNORMAL
COMMENT UA: ABNORMAL
CRYSTALS, UA: NORMAL /HPF
EPITHELIAL CELLS UA: NORMAL /HPF (ref 0–5)
GLUCOSE URINE: NEGATIVE
KETONES, URINE: NEGATIVE
LEUKOCYTE ESTERASE, URINE: NEGATIVE
MUCUS: NORMAL
NITRITE, URINE: POSITIVE
OTHER OBSERVATIONS UA: NORMAL
PH UA: 6.5 (ref 5–8)
PROTEIN UA: NEGATIVE
RBC UA: NORMAL /HPF (ref 0–4)
RENAL EPITHELIAL, UA: NORMAL /HPF
SPECIFIC GRAVITY UA: 1.01 (ref 1–1.03)
TRICHOMONAS: NORMAL
TURBIDITY: CLEAR
URINE HGB: NEGATIVE
UROBILINOGEN, URINE: NORMAL
WBC UA: NORMAL /HPF (ref 0–5)
YEAST: NORMAL

## 2019-06-26 LAB
CULTURE: NO GROWTH
Lab: NORMAL
SPECIMEN DESCRIPTION: NORMAL

## 2019-06-27 ENCOUNTER — TELEPHONE (OUTPATIENT)
Dept: INTERNAL MEDICINE CLINIC | Age: 59
End: 2019-06-27

## 2019-08-12 DIAGNOSIS — F32.A DEPRESSION, UNSPECIFIED DEPRESSION TYPE: ICD-10-CM

## 2019-08-13 RX ORDER — DULOXETIN HYDROCHLORIDE 60 MG/1
CAPSULE, DELAYED RELEASE ORAL
Qty: 90 CAPSULE | Refills: 1 | Status: SHIPPED | OUTPATIENT
Start: 2019-08-13 | End: 2019-11-13 | Stop reason: SDUPTHER

## 2019-09-04 ENCOUNTER — HOSPITAL ENCOUNTER (OUTPATIENT)
Age: 59
Setting detail: SPECIMEN
Discharge: HOME | End: 2019-09-04
Payer: MEDICARE

## 2019-09-04 ENCOUNTER — OFFICE VISIT (OUTPATIENT)
Dept: INTERNAL MEDICINE CLINIC | Age: 59
End: 2019-09-04
Payer: MEDICARE

## 2019-09-04 VITALS
HEIGHT: 65 IN | BODY MASS INDEX: 32.49 KG/M2 | DIASTOLIC BLOOD PRESSURE: 74 MMHG | WEIGHT: 195 LBS | SYSTOLIC BLOOD PRESSURE: 132 MMHG

## 2019-09-04 DIAGNOSIS — N30.90 CYSTITIS: ICD-10-CM

## 2019-09-04 DIAGNOSIS — J43.1 PANLOBULAR EMPHYSEMA (HCC): ICD-10-CM

## 2019-09-04 DIAGNOSIS — E03.9 ACQUIRED HYPOTHYROIDISM: ICD-10-CM

## 2019-09-04 DIAGNOSIS — I10 ESSENTIAL HYPERTENSION: ICD-10-CM

## 2019-09-04 DIAGNOSIS — I73.9 PERIPHERAL VASCULAR DISEASE (HCC): ICD-10-CM

## 2019-09-04 DIAGNOSIS — H59.022: ICD-10-CM

## 2019-09-04 DIAGNOSIS — E13.319 RETINOPATHY DUE TO SECONDARY DM (HCC): ICD-10-CM

## 2019-09-04 DIAGNOSIS — F33.42 MAJOR DEPRESSIVE DISORDER, RECURRENT, IN FULL REMISSION (HCC): ICD-10-CM

## 2019-09-04 DIAGNOSIS — G89.4 CHRONIC PAIN SYNDROME: Primary | Chronic | ICD-10-CM

## 2019-09-04 DIAGNOSIS — E10.319 TYPE 1 DIABETES MELLITUS WITH RETINOPATHY, MACULAR EDEMA PRESENCE UNSPECIFIED, UNSPECIFIED LATERALITY, UNSPECIFIED RETINOPATHY SEVERITY (HCC): ICD-10-CM

## 2019-09-04 DIAGNOSIS — I50.42 CHRONIC COMBINED SYSTOLIC AND DIASTOLIC CONGESTIVE HEART FAILURE (HCC): ICD-10-CM

## 2019-09-04 DIAGNOSIS — N39.42 URINARY INCONTINENCE WITHOUT SENSORY AWARENESS: ICD-10-CM

## 2019-09-04 DIAGNOSIS — M17.11 ARTHRITIS OF KNEE, RIGHT: ICD-10-CM

## 2019-09-04 DIAGNOSIS — Z96.41 INSULIN PUMP IN PLACE: ICD-10-CM

## 2019-09-04 DIAGNOSIS — R33.9 CHRONIC RETENTION OF URINE: ICD-10-CM

## 2019-09-04 LAB
BILIRUBIN URINE: NEGATIVE
COLOR: YELLOW
COMMENT UA: NORMAL
GLUCOSE URINE: NEGATIVE
KETONES, URINE: NEGATIVE
LEUKOCYTE ESTERASE, URINE: NEGATIVE
NITRITE, URINE: NEGATIVE
PH UA: 6.5 (ref 5–8)
PROTEIN UA: NEGATIVE
SPECIFIC GRAVITY UA: 1 (ref 1–1.03)
TURBIDITY: CLEAR
URINE HGB: NEGATIVE
UROBILINOGEN, URINE: NORMAL

## 2019-09-04 PROCEDURE — 99214 OFFICE O/P EST MOD 30 MIN: CPT | Performed by: INTERNAL MEDICINE

## 2019-09-04 RX ORDER — CEFUROXIME AXETIL 250 MG/1
250 TABLET ORAL 2 TIMES DAILY
Qty: 20 TABLET | Refills: 0 | Status: SHIPPED | OUTPATIENT
Start: 2019-09-04 | End: 2019-09-12

## 2019-09-04 RX ORDER — PANTOPRAZOLE SODIUM 40 MG/1
40 TABLET, DELAYED RELEASE ORAL 2 TIMES DAILY
COMMUNITY

## 2019-09-04 RX ORDER — OFLOXACIN 3 MG/ML
3 SOLUTION/ DROPS OPHTHALMIC 4 TIMES DAILY
COMMUNITY
End: 2020-01-16

## 2019-09-04 NOTE — PROGRESS NOTES
Orders Placed This Encounter   Procedures   Lizette Mayfield MD, Orthopedic Surgery, [place]     Referral Priority:   Routine     Referral Type:   Eval and Treat     Referral Reason:   Specialty Services Required     Referred to Provider:   Virgie Francis MD     Requested Specialty:   Orthopedic Surgery     Number of Visits Requested:   1        There are no Patient Instructions on file for this visit. Medication List           Accurate as of 9/4/19  4:31 PM. If you have any questions, ask your nurse or doctor.                START taking these medications    cefUROXime 250 MG tablet  Commonly known as:  CEFTIN  Take 1 tablet by mouth 2 times daily for 10 days  Started by:  Beth German MD        CONTINUE taking these medications    amitriptyline 150 MG tablet  Commonly known as:  ELAVIL  TAKE 1 TABLET BY MOUTH NIGHTLY     amLODIPine 5 MG tablet  Commonly known as:  NORVASC  Take 1 tablet by mouth daily     aspirin 81 MG tablet     B-D INSULIN SYRINGE 29G X 1/2\" 0.5 ML Misc  Generic drug:  INSULIN SYRINGE .5CC/29G     BREO ELLIPTA 200-25 MCG/INH Aepb  Generic drug:  Fluticasone Furoate-Vilanterol     clonazePAM 0.5 MG tablet  Commonly known as:  KLONOPIN     docusate sodium 100 MG capsule  Commonly known as:  COLACE  Take 1 capsule by mouth 2 times daily Take while on narcotics     * DULoxetine 30 MG extended release capsule  Commonly known as:  CYMBALTA  Take 1 capsule by mouth daily Take 30 mg in addition to 60 mg to equil total dose 90     * DULoxetine 60 MG extended release capsule  Commonly known as:  CYMBALTA  TAKE 1 CAPSULE BY MOUTH ONCE DAILY     gabapentin 800 MG tablet  Commonly known as:  NEURONTIN  TAKE 1 TABLET BY MOUTH THREE TIMES DAILY     insulin aspart 100 UNIT/ML injection vial  Commonly known as:  NOVOLOG     levothyroxine 137 MCG tablet  Commonly known as:  SYNTHROID  Take 1 tablet by mouth daily     losartan 100 MG tablet  Commonly known as:  COZAAR  TAKE 1 TABLET BY MOUTH

## 2019-09-12 ENCOUNTER — HOSPITAL ENCOUNTER (OUTPATIENT)
Age: 59
Setting detail: SPECIMEN
Discharge: HOME | End: 2019-09-12
Payer: MEDICARE

## 2019-09-12 ENCOUNTER — OFFICE VISIT (OUTPATIENT)
Dept: INTERNAL MEDICINE CLINIC | Age: 59
End: 2019-09-12
Payer: MEDICARE

## 2019-09-12 VITALS
DIASTOLIC BLOOD PRESSURE: 80 MMHG | SYSTOLIC BLOOD PRESSURE: 122 MMHG | BODY MASS INDEX: 32.32 KG/M2 | HEIGHT: 65 IN | WEIGHT: 194 LBS

## 2019-09-12 DIAGNOSIS — N10 PYELONEPHRITIS, ACUTE: Primary | ICD-10-CM

## 2019-09-12 DIAGNOSIS — E10.319 TYPE 1 DIABETES MELLITUS WITH RETINOPATHY, MACULAR EDEMA PRESENCE UNSPECIFIED, UNSPECIFIED LATERALITY, UNSPECIFIED RETINOPATHY SEVERITY (HCC): ICD-10-CM

## 2019-09-12 DIAGNOSIS — I10 ESSENTIAL HYPERTENSION: ICD-10-CM

## 2019-09-12 DIAGNOSIS — N39.42 URINARY INCONTINENCE WITHOUT SENSORY AWARENESS: ICD-10-CM

## 2019-09-12 DIAGNOSIS — M54.6 ACUTE BILATERAL THORACIC BACK PAIN: ICD-10-CM

## 2019-09-12 DIAGNOSIS — N10 ACUTE PYELONEPHRITIS: ICD-10-CM

## 2019-09-12 DIAGNOSIS — N20.0 RENAL LITHIASIS: ICD-10-CM

## 2019-09-12 PROCEDURE — 3017F COLORECTAL CA SCREEN DOC REV: CPT | Performed by: INTERNAL MEDICINE

## 2019-09-12 PROCEDURE — 3046F HEMOGLOBIN A1C LEVEL >9.0%: CPT | Performed by: INTERNAL MEDICINE

## 2019-09-12 PROCEDURE — G8427 DOCREV CUR MEDS BY ELIG CLIN: HCPCS | Performed by: INTERNAL MEDICINE

## 2019-09-12 PROCEDURE — 1036F TOBACCO NON-USER: CPT | Performed by: INTERNAL MEDICINE

## 2019-09-12 PROCEDURE — 2022F DILAT RTA XM EVC RTNOPTHY: CPT | Performed by: INTERNAL MEDICINE

## 2019-09-12 PROCEDURE — G8417 CALC BMI ABV UP PARAM F/U: HCPCS | Performed by: INTERNAL MEDICINE

## 2019-09-12 PROCEDURE — 99214 OFFICE O/P EST MOD 30 MIN: CPT | Performed by: INTERNAL MEDICINE

## 2019-09-12 RX ORDER — CEFUROXIME AXETIL 500 MG/1
500 TABLET ORAL 2 TIMES DAILY
Qty: 20 TABLET | Refills: 0 | Status: SHIPPED | OUTPATIENT
Start: 2019-09-12 | End: 2019-09-22

## 2019-09-12 NOTE — PROGRESS NOTES
Urobilinogen, Urine Normal Normal    Nitrite, Urine NEGATIVE NEGATIVE    Leukocyte Esterase, Urine NEGATIVE NEGATIVE    Urinalysis Comments       Microscopic exam not performed based on chemical results unless requested in original order. VITALS INCLUDING BMI REVIEWED WITH PATIENT  Labs reviewed as noted above   Discussed with patient        ASSESSMENT / Alejandra Jennifer was seen today for abdominal pain and urinary frequency. Diagnoses and all orders for this visit:    Pyelonephritis, acute  Presented with lower urinary tract symptoms  Weakness fatigue  Bilateral costovertebral angle tenderness  Findings are suggestive of bilateral pyelonephritis  Will send urine culture and start her on Ceftin pending cultures  -     Cancel: Urinalysis Reflex to Culture; Future  -     cefUROXime (CEFTIN) 500 MG tablet; Take 1 tablet by mouth 2 times daily for 10 days    Renal lithiasis  Patient is known to have renal lithiasis and she was thinking that it could be renal stone related symptoms  However being bilateral costovertebral angle tenderness and UTI symptoms I do not think his renal lithiasis related  Type 1 diabetes mellitus with retinopathy, macular edema presence unspecified, unspecified laterality, unspecified retinopathy severity ([address]    Urinary incontinence without sensory awareness  To have a diabetic neurogenic bladder with urinary incontinence  And is prone for recurrent UTIs    Essential hypertension  Control good  Acute bilateral thoracic back pain  Pain secondary to pyelonephritis  Acute pyelonephritis  Bilateral pyelonephritis we will treat with Ceftin and await cultures  Possibility of renal colic is less likely          Diagnosis Orders   1. Pyelonephritis, acute  cefUROXime (CEFTIN) 500 MG tablet   2. Renal lithiasis     3. Type 1 diabetes mellitus with retinopathy, macular edema presence unspecified, unspecified laterality, unspecified retinopathy severity ([address]     4.  Urinary incontinence without sensory awareness     5. Essential hypertension     6. Acute bilateral thoracic back pain     7. Acute pyelonephritis           SALIENT  ACTIONS TODAYS VISIT       Today's office visit SALIENT ACTIONS    --Vented with bilateral costovertebral angle pain tenderness and urinary tract symptoms and most likely bilateral pyelonephritis--          Medications Discontinued During This Encounter   Medication Reason    cefUROXime (CEFTIN) 250 MG tablet         No orders of the defined types were placed in this encounter. There are no Patient Instructions on file for this visit. Medication List           Accurate as of 9/12/19 11:59 PM. If you have any questions, ask your nurse or doctor.                CHANGE how you take these medications    cefUROXime 500 MG tablet  Commonly known as:  CEFTIN  Take 1 tablet by mouth 2 times daily for 10 days  What changed:    · medication strength  · how much to take  Changed by:  Marcy Farias MD        CONTINUE taking these medications    amitriptyline 150 MG tablet  Commonly known as:  ELAVIL  TAKE 1 TABLET BY MOUTH NIGHTLY     amLODIPine 5 MG tablet  Commonly known as:  NORVASC  Take 1 tablet by mouth daily     aspirin 81 MG tablet     B-D INSULIN SYRINGE 29G X 1/2\" 0.5 ML Misc  Generic drug:  INSULIN SYRINGE .5CC/29G     BREO ELLIPTA 200-25 MCG/INH Aepb  Generic drug:  Fluticasone Furoate-Vilanterol     clonazePAM 0.5 MG tablet  Commonly known as:  KLONOPIN     docusate sodium 100 MG capsule  Commonly known as:  COLACE  Take 1 capsule by mouth 2 times daily Take while on narcotics     * DULoxetine 30 MG extended release capsule  Commonly known as:  CYMBALTA  Take 1 capsule by mouth daily Take 30 mg in addition to 60 mg to equil total dose 90     * DULoxetine 60 MG extended release capsule  Commonly known as:  CYMBALTA  TAKE 1 CAPSULE BY MOUTH ONCE DAILY     gabapentin 800 MG tablet  Commonly known as:  NEURONTIN  TAKE 1 TABLET BY MOUTH THREE

## 2019-09-12 NOTE — PROGRESS NOTES
Visit Information    Have you changed or started any medications since your last visit including any over-the-counter medicines, vitamins, or herbal medicines? no   Are you having any side effects from any of your medications? -  no  Have you stopped taking any of your medications? Is so, why? -  no    Have you seen any other physician or provider since your last visit? No  Have you had any other diagnostic tests since your last visit? No  Have you been seen in the emergency room and/or had an admission to a hospital since we last saw you? No  Have you had your routine dental cleaning in the past 6 months? no    Have you activated your [employer] account? If not, what are your barriers?  Yes     Patient Care Team:  Markel Santana MD as PCP - General  Isabella George MD as PCP - Pulmonology (Pulmonology)  Markel Santana MD as PCP - [facility]  Flor Cook MD as Consulting Physician (Urology)  Thang Wills MD as Consulting Physician (Infectious Diseases)    Medical History Review  Past Medical, Family, and Social History reviewed and does not contribute to the patient presenting condition    Health Maintenance   Topic Date Due    Hepatitis C screen  1960    HIV screen  10/05/1977    Hepatitis B Vaccine (1 of 3 - Risk 3-dose series) 10/05/1981    Shingles Vaccine (1 of 2) 10/05/2012    Cervical cancer screen  04/30/2018    Lipid screen  06/02/2018    Diabetic microalbuminuria test  04/26/2019    TSH testing  06/21/2019    Diabetic retinal exam  07/09/2019    Diabetic foot exam  08/21/2019    Flu vaccine (1) 09/01/2019    Annual Wellness Visit (AWV)  09/05/2019    A1C test (Diabetic or Prediabetic)  09/14/2019    Potassium monitoring  04/11/2020    Creatinine monitoring  04/11/2020    Breast cancer screen  05/08/2020    Colon cancer screen colonoscopy  01/20/2025    DTaP/Tdap/Td vaccine (2 - Td) 04/20/2025    Pneumococcal 0-64 years Vaccine  Completed     Chief Complaint Patient presents with    Abdominal Pain     one week follow up.  feels she passed a kidney stone

## 2019-09-13 ENCOUNTER — TELEPHONE (OUTPATIENT)
Dept: INTERNAL MEDICINE CLINIC | Age: 59
End: 2019-09-13

## 2019-09-13 PROBLEM — N10 ACUTE PYELONEPHRITIS: Status: ACTIVE | Noted: 2019-09-13

## 2019-09-13 PROBLEM — M54.6 ACUTE BILATERAL THORACIC BACK PAIN: Status: ACTIVE | Noted: 2019-09-13

## 2019-09-13 ASSESSMENT — ENCOUNTER SYMPTOMS
SHORTNESS OF BREATH: 0
COUGH: 0
EYE PAIN: 0
ABDOMINAL PAIN: 0
CONSTIPATION: 0
BACK PAIN: 1

## 2019-09-18 ENCOUNTER — TELEPHONE (OUTPATIENT)
Dept: INTERNAL MEDICINE CLINIC | Age: 59
End: 2019-09-18

## 2019-09-18 ENCOUNTER — OFFICE VISIT (OUTPATIENT)
Dept: INTERNAL MEDICINE CLINIC | Age: 59
End: 2019-09-18
Payer: MEDICARE

## 2019-09-18 VITALS
SYSTOLIC BLOOD PRESSURE: 148 MMHG | DIASTOLIC BLOOD PRESSURE: 88 MMHG | HEIGHT: 65 IN | WEIGHT: 195.6 LBS | BODY MASS INDEX: 32.59 KG/M2 | HEART RATE: 66 BPM

## 2019-09-18 DIAGNOSIS — N89.8 VAGINAL ODOR: Primary | ICD-10-CM

## 2019-09-18 DIAGNOSIS — N30.00 ACUTE CYSTITIS WITHOUT HEMATURIA: ICD-10-CM

## 2019-09-18 PROCEDURE — G8417 CALC BMI ABV UP PARAM F/U: HCPCS | Performed by: NURSE PRACTITIONER

## 2019-09-18 PROCEDURE — 1036F TOBACCO NON-USER: CPT | Performed by: NURSE PRACTITIONER

## 2019-09-18 PROCEDURE — G8427 DOCREV CUR MEDS BY ELIG CLIN: HCPCS | Performed by: NURSE PRACTITIONER

## 2019-09-18 PROCEDURE — 99213 OFFICE O/P EST LOW 20 MIN: CPT | Performed by: NURSE PRACTITIONER

## 2019-09-18 PROCEDURE — 3017F COLORECTAL CA SCREEN DOC REV: CPT | Performed by: NURSE PRACTITIONER

## 2019-09-18 NOTE — PROGRESS NOTES
[address] [address]  Dept: [phone]  Dept Fax: [phone]    Office Progress/Follow Up Note  Date of patient's visit: 9/18/2019   Patient's Name:  David Srinivasan  YOB: 1960            Patient Care Team:  Zhang Ibarra MD as PCP - General  Aníbal Kennedy MD as PCP - Pulmonology (Pulmonology)  Zhang Ibarra MD as PCP - [facility] Provider  Ligia Orlando MD as Consulting Physician (Urology)  Jaswant Tsang MD as Consulting Physician (Infectious Diseases)    REASON FOR VISIT: Urinary Tract Infection (back pain, lower abdomen pain, ); Vaginal Odor (on antibiotic since last friday, for kidney infection); and Groin Swelling (vaginal hurt and swelling. Morna Rout )        HISTORY OF PRESENT ILLNESS:      History was obtained from the patient. David Srinivasan is a 66 y.o. is here for vaginal odor, no itching, no discharge. She is completing antibiotic for pyelonephritis. She has neurogenic bladder, self caths multiple times daily, does follow with urology. She also feels that she has swelling in her left thigh and is worried she has a hernia, although though there is no visible swelling on exam, and no palpable inguinal mass sitting or standing, or with straining. She is anxious and is diabetic.     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 ([address]    Acquired hypothyroidism    Vitamin D deficiency    Mixed hyperlipidemia    Type 1 diabetes mellitus with retinopathy ([address]    Insulin pump in place    Incisional hernia without obstruction or gangrene    Moderate persistent asthma with status asthmaticus    Seasonal allergic rhinitis due to pollen    Chronic obstructive pulmonary disease (HCC)    Insulin pump in  simvastatin (ZOCOR) 20 MG tablet Take 1 tablet by mouth nightly 90 tablet 3    amitriptyline (ELAVIL) 150 MG tablet TAKE 1 TABLET BY MOUTH NIGHTLY 90 tablet 3    amLODIPine (NORVASC) 5 MG tablet Take 1 tablet by mouth daily 90 tablet 3    levothyroxine (SYNTHROID) 137 MCG tablet Take 1 tablet by mouth daily 90 tablet 3    metoprolol tartrate (LOPRESSOR) 50 MG tablet TAKE 1 TABLET BY MOUTH TWICE DAILY 180 tablet 3    gabapentin (NEURONTIN) 800 MG tablet TAKE 1 TABLET BY MOUTH THREE TIMES DAILY 270 tablet 0    insulin aspart (NOVOLOG) 100 UNIT/ML injection vial Inject into the skin Via insulin pump      BREO ELLIPTA 200-25 MCG/INH AEPB INL 1 PUFF PO DAILY  6    tiotropium (SPIRIVA HANDIHALER) 18 MCG inhalation capsule Inhale 1 capsule into the lungs Daily 30 capsule 5    ONE TOUCH ULTRA TEST strip   1    B-D INSULIN SYRINGE 29G X 1/2\" 0.5 ML MISC       vitamin D (CHOLECALCIFEROL) 1000 UNIT TABS tablet Take 4,000 Units by mouth daily Takes 4 tabs daily      docusate sodium (COLACE) 100 MG capsule Take 1 capsule by mouth 2 times daily Take while on narcotics 60 capsule 0    Probiotic Product (PROBIOTIC DAILY PO)   Take 1 tablet by mouth       aspirin 81 MG tablet Take 81 mg by mouth daily. No current facility-administered medications for this visit. SOCIAL HISTORY    Reviewed and updated. Colt Fuller  reports that she has never smoked. She has never used smokeless tobacco.    FAMILY HISTORY:    Reviewed and updated. family history includes Cancer in her father, maternal grandmother, and paternal grandmother; Diabetes in her maternal grandmother; Heart Disease in her father and maternal grandmother; High Blood Pressure in her father and mother; Migraines in her mother; Other in her brother; Parkinsonism in her maternal grandmother. REVIEW OF SYSTEMS:      Review of Systems   Constitutional: Positive for fatigue. Negative for chills and fever.    Respiratory: Negative for cough, shortness of

## 2019-09-19 ENCOUNTER — HOSPITAL ENCOUNTER (OUTPATIENT)
Age: 59
Setting detail: SPECIMEN
Discharge: HOME | End: 2019-09-19
Payer: MEDICARE

## 2019-09-19 DIAGNOSIS — N89.8 VAGINAL ODOR: ICD-10-CM

## 2019-09-19 DIAGNOSIS — N30.00 ACUTE CYSTITIS WITHOUT HEMATURIA: ICD-10-CM

## 2019-09-19 ASSESSMENT — ENCOUNTER SYMPTOMS
COUGH: 0
ABDOMINAL PAIN: 0
SHORTNESS OF BREATH: 0
NAUSEA: 0
WHEEZING: 0

## 2019-09-20 LAB
DIRECT EXAM: NORMAL
Lab: NORMAL
SPECIMEN DESCRIPTION: NORMAL

## 2019-09-27 ENCOUNTER — TELEPHONE (OUTPATIENT)
Dept: INTERNAL MEDICINE CLINIC | Age: 59
End: 2019-09-27

## 2019-10-01 DIAGNOSIS — G89.4 CHRONIC PAIN SYNDROME: Primary | ICD-10-CM

## 2019-10-09 RX ORDER — CLONAZEPAM 0.5 MG/1
TABLET ORAL
Qty: 60 TABLET | Refills: 2 | Status: SHIPPED | OUTPATIENT
Start: 2019-10-09 | End: 2022-05-05 | Stop reason: ALTCHOICE

## 2019-10-25 ENCOUNTER — HOSPITAL ENCOUNTER (EMERGENCY)
Age: 59
Discharge: HOME | End: 2019-10-25
Attending: EMERGENCY MEDICINE
Payer: MEDICARE

## 2019-10-25 VITALS
OXYGEN SATURATION: 94 % | TEMPERATURE: 98.4 F | DIASTOLIC BLOOD PRESSURE: 78 MMHG | SYSTOLIC BLOOD PRESSURE: 183 MMHG | HEART RATE: 77 BPM | RESPIRATION RATE: 16 BRPM

## 2019-10-25 DIAGNOSIS — Z76.0 ENCOUNTER FOR MEDICATION REFILL: Primary | ICD-10-CM

## 2019-10-25 PROCEDURE — 99281 EMR DPT VST MAYX REQ PHY/QHP: CPT

## 2019-10-28 ENCOUNTER — TELEPHONE (OUTPATIENT)
Dept: INTERNAL MEDICINE CLINIC | Age: 59
End: 2019-10-28

## 2019-11-07 ENCOUNTER — TELEPHONE (OUTPATIENT)
Dept: INTERNAL MEDICINE CLINIC | Age: 59
End: 2019-11-07

## 2019-11-07 DIAGNOSIS — F32.A DEPRESSION, UNSPECIFIED DEPRESSION TYPE: ICD-10-CM

## 2019-11-08 ENCOUNTER — TELEPHONE (OUTPATIENT)
Dept: INTERNAL MEDICINE CLINIC | Age: 59
End: 2019-11-08

## 2019-11-08 ENCOUNTER — OFFICE VISIT (OUTPATIENT)
Dept: INTERNAL MEDICINE CLINIC | Age: 59
End: 2019-11-08
Payer: MEDICARE

## 2019-11-08 VITALS
BODY MASS INDEX: 32.65 KG/M2 | HEIGHT: 65 IN | OXYGEN SATURATION: 91 % | WEIGHT: 196 LBS | DIASTOLIC BLOOD PRESSURE: 78 MMHG | SYSTOLIC BLOOD PRESSURE: 132 MMHG | HEART RATE: 67 BPM

## 2019-11-08 DIAGNOSIS — J01.10 ACUTE NON-RECURRENT FRONTAL SINUSITIS: Primary | ICD-10-CM

## 2019-11-08 DIAGNOSIS — E10.319 TYPE 1 DIABETES MELLITUS WITH RETINOPATHY, MACULAR EDEMA PRESENCE UNSPECIFIED, UNSPECIFIED LATERALITY, UNSPECIFIED RETINOPATHY SEVERITY (HCC): Primary | ICD-10-CM

## 2019-11-08 DIAGNOSIS — M79.622 PAIN IN BOTH UPPER ARMS: ICD-10-CM

## 2019-11-08 DIAGNOSIS — M79.621 PAIN IN BOTH UPPER ARMS: ICD-10-CM

## 2019-11-08 PROCEDURE — G8417 CALC BMI ABV UP PARAM F/U: HCPCS | Performed by: PHYSICIAN ASSISTANT

## 2019-11-08 PROCEDURE — G8427 DOCREV CUR MEDS BY ELIG CLIN: HCPCS | Performed by: PHYSICIAN ASSISTANT

## 2019-11-08 PROCEDURE — 3017F COLORECTAL CA SCREEN DOC REV: CPT | Performed by: PHYSICIAN ASSISTANT

## 2019-11-08 PROCEDURE — 1036F TOBACCO NON-USER: CPT | Performed by: PHYSICIAN ASSISTANT

## 2019-11-08 PROCEDURE — 99214 OFFICE O/P EST MOD 30 MIN: CPT | Performed by: PHYSICIAN ASSISTANT

## 2019-11-08 PROCEDURE — G8484 FLU IMMUNIZE NO ADMIN: HCPCS | Performed by: PHYSICIAN ASSISTANT

## 2019-11-08 RX ORDER — PREDNISONE 10 MG/1
10 TABLET ORAL 2 TIMES DAILY
Qty: 10 TABLET | Refills: 0 | Status: SHIPPED | OUTPATIENT
Start: 2019-11-08 | End: 2019-11-13

## 2019-11-08 RX ORDER — AMOXICILLIN AND CLAVULANATE POTASSIUM 875; 125 MG/1; MG/1
1 TABLET, FILM COATED ORAL 2 TIMES DAILY
Qty: 14 TABLET | Refills: 0 | Status: SHIPPED | OUTPATIENT
Start: 2019-11-08 | End: 2019-11-15

## 2019-11-11 ASSESSMENT — ENCOUNTER SYMPTOMS
WHEEZING: 0
CHEST TIGHTNESS: 0
TROUBLE SWALLOWING: 0
ABDOMINAL PAIN: 0
EYE DISCHARGE: 0
BLOOD IN STOOL: 0
SORE THROAT: 0
SINUS PAIN: 1
RHINORRHEA: 0
SINUS PRESSURE: 1
EYE ITCHING: 0
BACK PAIN: 1
COLOR CHANGE: 0
VOMITING: 0
DIARRHEA: 0
SHORTNESS OF BREATH: 0
NAUSEA: 0
COUGH: 1
EYE PAIN: 0
VOICE CHANGE: 0
CONSTIPATION: 0

## 2019-11-12 ENCOUNTER — TELEPHONE (OUTPATIENT)
Dept: INTERNAL MEDICINE CLINIC | Age: 59
End: 2019-11-12

## 2019-11-13 DIAGNOSIS — F32.A DEPRESSION, UNSPECIFIED DEPRESSION TYPE: ICD-10-CM

## 2019-11-13 RX ORDER — DULOXETIN HYDROCHLORIDE 60 MG/1
CAPSULE, DELAYED RELEASE ORAL
Qty: 90 CAPSULE | Refills: 1 | Status: SHIPPED | OUTPATIENT
Start: 2019-11-13 | End: 2020-10-16 | Stop reason: DRUGHIGH

## 2019-11-13 RX ORDER — DULOXETIN HYDROCHLORIDE 60 MG/1
60 CAPSULE, DELAYED RELEASE ORAL DAILY
Qty: 90 CAPSULE | Refills: 3 | Status: SHIPPED | OUTPATIENT
Start: 2019-11-13 | End: 2019-11-13

## 2019-12-11 ENCOUNTER — NURSE TRIAGE (OUTPATIENT)
Dept: OTHER | Facility: CLINIC | Age: 59
End: 2019-12-11

## 2019-12-11 ENCOUNTER — HOSPITAL ENCOUNTER (OUTPATIENT)
Age: 59
Setting detail: SPECIMEN
Discharge: HOME | End: 2019-12-11
Payer: MEDICARE

## 2019-12-11 ENCOUNTER — OFFICE VISIT (OUTPATIENT)
Dept: INTERNAL MEDICINE CLINIC | Age: 59
End: 2019-12-11
Payer: MEDICARE

## 2019-12-11 ENCOUNTER — CARE COORDINATION (OUTPATIENT)
Dept: CARE COORDINATION | Age: 59
End: 2019-12-11

## 2019-12-11 VITALS
WEIGHT: 193 LBS | DIASTOLIC BLOOD PRESSURE: 74 MMHG | HEIGHT: 65 IN | SYSTOLIC BLOOD PRESSURE: 132 MMHG | BODY MASS INDEX: 32.15 KG/M2

## 2019-12-11 DIAGNOSIS — F32.A DEPRESSION, UNSPECIFIED DEPRESSION TYPE: ICD-10-CM

## 2019-12-11 DIAGNOSIS — Z13.220 SCREENING FOR HYPERLIPIDEMIA: ICD-10-CM

## 2019-12-11 DIAGNOSIS — E03.9 ACQUIRED HYPOTHYROIDISM: ICD-10-CM

## 2019-12-11 DIAGNOSIS — I50.42 CHRONIC COMBINED SYSTOLIC AND DIASTOLIC CONGESTIVE HEART FAILURE (HCC): ICD-10-CM

## 2019-12-11 DIAGNOSIS — Z96.41 INSULIN PUMP IN PLACE: ICD-10-CM

## 2019-12-11 DIAGNOSIS — E10.319 TYPE 1 DIABETES MELLITUS WITH RETINOPATHY, MACULAR EDEMA PRESENCE UNSPECIFIED, UNSPECIFIED LATERALITY, UNSPECIFIED RETINOPATHY SEVERITY (HCC): Primary | ICD-10-CM

## 2019-12-11 DIAGNOSIS — N20.0 RENAL LITHIASIS: ICD-10-CM

## 2019-12-11 DIAGNOSIS — M79.622 PAIN IN BOTH UPPER ARMS: ICD-10-CM

## 2019-12-11 DIAGNOSIS — M79.621 PAIN IN BOTH UPPER ARMS: ICD-10-CM

## 2019-12-11 DIAGNOSIS — G89.4 CHRONIC PAIN SYNDROME: ICD-10-CM

## 2019-12-11 DIAGNOSIS — E10.319 TYPE 1 DIABETES MELLITUS WITH RETINOPATHY, MACULAR EDEMA PRESENCE UNSPECIFIED, UNSPECIFIED LATERALITY, UNSPECIFIED RETINOPATHY SEVERITY (HCC): ICD-10-CM

## 2019-12-11 LAB
-: ABNORMAL
ALBUMIN SERPL-MCNC: 4.1 G/DL (ref 3.5–5.2)
ALBUMIN/GLOBULIN RATIO: 1.5 (ref 1–2.5)
ALP BLD-CCNC: 110 U/L (ref 35–104)
ALT SERPL-CCNC: 10 U/L (ref 5–33)
AMORPHOUS: ABNORMAL
ANION GAP SERPL CALCULATED.3IONS-SCNC: 11 MMOL/L (ref 9–17)
AST SERPL-CCNC: 16 U/L
BACTERIA: ABNORMAL
BILIRUB SERPL-MCNC: 0.23 MG/DL (ref 0.3–1.2)
BILIRUBIN URINE: NEGATIVE
BUN BLDV-MCNC: 11 MG/DL (ref 6–20)
BUN/CREAT BLD: ABNORMAL (ref 9–20)
C-REACTIVE PROTEIN: 8 MG/L (ref 0–5)
CALCIUM SERPL-MCNC: 9.3 MG/DL (ref 8.6–10.4)
CASTS UA: ABNORMAL /LPF (ref 0–2)
CHLORIDE BLD-SCNC: 105 MMOL/L (ref 98–107)
CHOLESTEROL, FASTING: 232 MG/DL
CHOLESTEROL/HDL RATIO: 4.1
CO2: 24 MMOL/L (ref 20–31)
COLOR: YELLOW
COMMENT UA: ABNORMAL
CREAT SERPL-MCNC: 0.86 MG/DL (ref 0.5–0.9)
CREATININE URINE: 63.9 MG/DL (ref 28–217)
CRYSTALS, UA: ABNORMAL /HPF
EPITHELIAL CELLS UA: ABNORMAL /HPF (ref 0–5)
GFR AFRICAN AMERICAN: >60 ML/MIN
GFR NON-AFRICAN AMERICAN: >60 ML/MIN
GFR SERPL CREATININE-BSD FRML MDRD: ABNORMAL ML/MIN/{1.73_M2}
GFR SERPL CREATININE-BSD FRML MDRD: ABNORMAL ML/MIN/{1.73_M2}
GLUCOSE BLD-MCNC: 139 MG/DL (ref 70–99)
GLUCOSE URINE: NEGATIVE
HBA1C MFR BLD: 8.7 %
HCT VFR BLD CALC: 42.2 % (ref 36.3–47.1)
HDLC SERPL-MCNC: 57 MG/DL
HEMOGLOBIN: 13.2 G/DL (ref 11.9–15.1)
KETONES, URINE: NEGATIVE
LDL CHOLESTEROL: 137 MG/DL (ref 0–130)
LEUKOCYTE ESTERASE, URINE: ABNORMAL
MCH RBC QN AUTO: 29.7 PG (ref 25.2–33.5)
MCHC RBC AUTO-ENTMCNC: 31.3 G/DL (ref 28.4–34.8)
MCV RBC AUTO: 95 FL (ref 82.6–102.9)
MICROALBUMIN/CREAT 24H UR: 79 MG/L
MICROALBUMIN/CREAT UR-RTO: 124 MCG/MG CREAT
MUCUS: ABNORMAL
NITRITE, URINE: NEGATIVE
NRBC AUTOMATED: 0 PER 100 WBC
OTHER OBSERVATIONS UA: ABNORMAL
PDW BLD-RTO: 12.9 % (ref 11.8–14.4)
PH UA: 6.5 (ref 5–8)
PLATELET # BLD: 248 K/UL (ref 138–453)
PMV BLD AUTO: 10.2 FL (ref 8.1–13.5)
POTASSIUM SERPL-SCNC: 4.3 MMOL/L (ref 3.7–5.3)
PROTEIN UA: ABNORMAL
RBC # BLD: 4.44 M/UL (ref 3.95–5.11)
RBC UA: ABNORMAL /HPF (ref 0–2)
RENAL EPITHELIAL, UA: ABNORMAL /HPF
SEDIMENTATION RATE, ERYTHROCYTE: 24 MM (ref 0–20)
SODIUM BLD-SCNC: 140 MMOL/L (ref 135–144)
SPECIFIC GRAVITY UA: 1.02 (ref 1–1.03)
TOTAL PROTEIN: 6.9 G/DL (ref 6.4–8.3)
TRICHOMONAS: ABNORMAL
TRIGLYCERIDE, FASTING: 189 MG/DL
TSH SERPL DL<=0.05 MIU/L-ACNC: 4.68 MIU/L (ref 0.3–5)
TURBIDITY: ABNORMAL
URINE HGB: ABNORMAL
UROBILINOGEN, URINE: NORMAL
VLDLC SERPL CALC-MCNC: ABNORMAL MG/DL (ref 1–30)
WBC # BLD: 7.2 K/UL (ref 3.5–11.3)
WBC UA: ABNORMAL /HPF (ref 0–5)
YEAST: ABNORMAL

## 2019-12-11 PROCEDURE — 2022F DILAT RTA XM EVC RTNOPTHY: CPT | Performed by: INTERNAL MEDICINE

## 2019-12-11 PROCEDURE — 1036F TOBACCO NON-USER: CPT | Performed by: INTERNAL MEDICINE

## 2019-12-11 PROCEDURE — 99214 OFFICE O/P EST MOD 30 MIN: CPT | Performed by: INTERNAL MEDICINE

## 2019-12-11 PROCEDURE — G8417 CALC BMI ABV UP PARAM F/U: HCPCS | Performed by: INTERNAL MEDICINE

## 2019-12-11 PROCEDURE — G8484 FLU IMMUNIZE NO ADMIN: HCPCS | Performed by: INTERNAL MEDICINE

## 2019-12-11 PROCEDURE — G8427 DOCREV CUR MEDS BY ELIG CLIN: HCPCS | Performed by: INTERNAL MEDICINE

## 2019-12-11 PROCEDURE — 3017F COLORECTAL CA SCREEN DOC REV: CPT | Performed by: INTERNAL MEDICINE

## 2019-12-11 PROCEDURE — 83036 HEMOGLOBIN GLYCOSYLATED A1C: CPT | Performed by: INTERNAL MEDICINE

## 2019-12-11 PROCEDURE — 3045F PR MOST RECENT HEMOGLOBIN A1C LEVEL 7.0-9.0%: CPT | Performed by: INTERNAL MEDICINE

## 2019-12-11 RX ORDER — FAMOTIDINE 20 MG/1
20 TABLET, FILM COATED ORAL 2 TIMES DAILY
Refills: 6 | COMMUNITY
Start: 2019-11-19 | End: 2020-10-16

## 2019-12-12 ENCOUNTER — OFFICE VISIT (OUTPATIENT)
Dept: INTERNAL MEDICINE CLINIC | Age: 59
End: 2019-12-12
Payer: MEDICARE

## 2019-12-12 VITALS
WEIGHT: 192.9 LBS | BODY MASS INDEX: 32.14 KG/M2 | SYSTOLIC BLOOD PRESSURE: 126 MMHG | TEMPERATURE: 97.8 F | DIASTOLIC BLOOD PRESSURE: 70 MMHG | HEIGHT: 65 IN

## 2019-12-12 DIAGNOSIS — H92.02 LEFT EAR PAIN: ICD-10-CM

## 2019-12-12 DIAGNOSIS — I10 ESSENTIAL HYPERTENSION: ICD-10-CM

## 2019-12-12 DIAGNOSIS — H61.23 BILATERAL IMPACTED CERUMEN: ICD-10-CM

## 2019-12-12 PROCEDURE — 1036F TOBACCO NON-USER: CPT | Performed by: INTERNAL MEDICINE

## 2019-12-12 PROCEDURE — G8417 CALC BMI ABV UP PARAM F/U: HCPCS | Performed by: INTERNAL MEDICINE

## 2019-12-12 PROCEDURE — 99213 OFFICE O/P EST LOW 20 MIN: CPT | Performed by: INTERNAL MEDICINE

## 2019-12-12 PROCEDURE — G8484 FLU IMMUNIZE NO ADMIN: HCPCS | Performed by: INTERNAL MEDICINE

## 2019-12-12 PROCEDURE — G8427 DOCREV CUR MEDS BY ELIG CLIN: HCPCS | Performed by: INTERNAL MEDICINE

## 2019-12-12 PROCEDURE — 3017F COLORECTAL CA SCREEN DOC REV: CPT | Performed by: INTERNAL MEDICINE

## 2019-12-12 RX ORDER — AMLODIPINE BESYLATE 5 MG/1
5 TABLET ORAL DAILY
Qty: 90 TABLET | Refills: 3 | Status: SHIPPED | OUTPATIENT
Start: 2019-12-12 | End: 2021-01-08

## 2019-12-12 RX ORDER — SULFAMETHOXAZOLE AND TRIMETHOPRIM 800; 160 MG/1; MG/1
1 TABLET ORAL 2 TIMES DAILY
Qty: 10 TABLET | Refills: 0 | Status: SHIPPED | OUTPATIENT
Start: 2019-12-12 | End: 2019-12-17

## 2020-01-09 ENCOUNTER — TELEPHONE (OUTPATIENT)
Dept: INTERNAL MEDICINE CLINIC | Age: 60
End: 2020-01-09

## 2020-01-16 ENCOUNTER — OFFICE VISIT (OUTPATIENT)
Dept: INTERNAL MEDICINE CLINIC | Age: 60
End: 2020-01-16
Payer: MEDICARE

## 2020-01-16 VITALS
BODY MASS INDEX: 31.82 KG/M2 | SYSTOLIC BLOOD PRESSURE: 140 MMHG | HEIGHT: 65 IN | DIASTOLIC BLOOD PRESSURE: 90 MMHG | WEIGHT: 191 LBS

## 2020-01-16 PROCEDURE — G8417 CALC BMI ABV UP PARAM F/U: HCPCS | Performed by: INTERNAL MEDICINE

## 2020-01-16 PROCEDURE — G8427 DOCREV CUR MEDS BY ELIG CLIN: HCPCS | Performed by: INTERNAL MEDICINE

## 2020-01-16 PROCEDURE — 3017F COLORECTAL CA SCREEN DOC REV: CPT | Performed by: INTERNAL MEDICINE

## 2020-01-16 PROCEDURE — 1036F TOBACCO NON-USER: CPT | Performed by: INTERNAL MEDICINE

## 2020-01-16 PROCEDURE — G8484 FLU IMMUNIZE NO ADMIN: HCPCS | Performed by: INTERNAL MEDICINE

## 2020-01-16 PROCEDURE — 99214 OFFICE O/P EST MOD 30 MIN: CPT | Performed by: INTERNAL MEDICINE

## 2020-01-16 PROCEDURE — 3046F HEMOGLOBIN A1C LEVEL >9.0%: CPT | Performed by: INTERNAL MEDICINE

## 2020-01-16 PROCEDURE — 2022F DILAT RTA XM EVC RTNOPTHY: CPT | Performed by: INTERNAL MEDICINE

## 2020-01-16 NOTE — PROGRESS NOTES
No change from previous visit    REVIEW OF SYSTEMS:    Review of Systems        OBJECTIVE      Physical exam           Vitals:    01/16/20 1520   BP: (!) 140/90   Weight: 191 lb (86.6 kg)   Height: 5' 5\" (1.651 m)         Physical Exam   Vitals:  BP (!) 140/90   Ht 5' 5\" (1.651 m)   Wt 191 lb (86.6 kg)   BMI 31.78 kg/m²                 Body mass index is 31.78 kg/m².      Vitals:    01/16/20 1520   BP: (!) 140/90   Weight: 191 lb (86.6 kg)   Height: 5' 5\" (1.651 m)       General -alert, well appearing, and in no distress  Skin - normal coloration and turgor, no rashes,no suspicious skin lesions noted  Eyes - pupils equal and reactive, extraocular eye movementsintact  Ears - bilateral TM's and external ear canals normal  Nose - normal and patent, no erythema, discharge or polyps  Mouth - mucous membranes are moist, pharynx normal without lesions  Neck - supple, no significant adenopathy  Lymphatics - no palpable lymphadenopathy, no hepatosplenomegaly    Chest - clear to auscultation, no wheezes, rales or rhonchi, symmetric air entry    Heart - normal rate, regular rhythm, no murmurs, rubs, clicks or gallops    Extremities - peripheral pulses normal, no pedal edema       Abdomen - soft, nontender, nondistended, no masses or organomegaly    Back - full range of motion, notenderness, palpable spasm or pain on motion    Neurological - alert, oriented, normal speech, no focal sensory or motor deficit  Musculoskeletal - no joint tenderness, deformity or swelling                DIAGNOSTICS / INSERTS / IMAGES    [x] Reviewed       Labs      URINE ANALYSIS: No results found for: LABURIN     CBC:  Lab Results   Component Value Date    WBC 7.2 12/11/2019    HGB 13.2 12/11/2019     12/11/2019     05/14/2012        BMP:    Lab Results   Component Value Date     12/11/2019    K 4.3 12/11/2019     12/11/2019    CO2 24 12/11/2019    BUN 11 12/11/2019    CREATININE 0.86 12/11/2019    GLUCOSE 139 12/11/2019 NORVASC  Take 1 tablet by mouth daily     aspirin 81 MG tablet     B-D INSULIN SYRINGE 29G X 1/2\" 0.5 ML Misc  Generic drug:  INSULIN SYRINGE .5CC/29G     BREO ELLIPTA 200-25 MCG/INH Aepb inhaler  Generic drug:  Fluticasone furoate-vilanterol     clonazePAM 0.5 MG tablet  Commonly known as:  KLONOPIN  TAKE 1 TABLET BY MOUTH TWICE DAILY AS NEEDED FOR ANXIETY OR SLEEP     docusate sodium 100 MG capsule  Commonly known as:  COLACE  Take 1 capsule by mouth 2 times daily Take while on narcotics     DULoxetine 60 MG extended release capsule  Commonly known as:  CYMBALTA  TAKE 1 CAPSULE BY MOUTH ONCE DAILY     famotidine 20 MG tablet  Commonly known as:  PEPCID     gabapentin 800 MG tablet  Commonly known as:  NEURONTIN  TAKE 1 TABLET BY MOUTH THREE TIMES DAILY     HUMALOG 100 UNIT/ML injection vial  Generic drug:  insulin lispro     levothyroxine 137 MCG tablet  Commonly known as:  SYNTHROID  Take 1 tablet by mouth daily     losartan 100 MG tablet  Commonly known as:  COZAAR  TAKE 1 TABLET BY MOUTH ONCE DAILY     metoclopramide 5 MG tablet  Commonly known as:  REGLAN  TAKE 1 TABLET BY MOUTH TWICE DAILY     metoprolol tartrate 50 MG tablet  Commonly known as:  LOPRESSOR  TAKE 1 TABLET BY MOUTH TWICE DAILY     ONE TOUCH ULTRA TEST strip  Generic drug:  blood glucose test strips     pantoprazole 40 MG tablet  Commonly known as:  PROTONIX     PROBIOTIC DAILY PO     rOPINIRole 2 MG tablet  Commonly known as:  REQUIP  Take 1 tablet by mouth daily     simvastatin 20 MG tablet  Commonly known as:  ZOCOR  Take 1 tablet by mouth nightly     tiotropium 18 MCG inhalation capsule  Commonly known as:  SPIRIVA HANDIHALER  Inhale 1 capsule into the lungs Daily     topiramate 25 MG tablet  Commonly known as:  TOPAMAX     torsemide 10 MG tablet  Commonly known as:  DEMADEX  Take 1 tablet by mouth daily as needed (leg edema)     vitamin D 1000 UNIT Tabs tablet  Commonly known as:  CHOLECALCIFEROL                FOLLOW UP  PLANS     No follow-ups on file. MD JODI Jacobo [address], [address] [place] [address].    Phone [phone]   Fax: [phone]  Answering Service: [phone]

## 2020-01-16 NOTE — PROGRESS NOTES
Chief Complaint   Patient presents with    1 Month Follow-Up     following up on kidney stones. Pt feels she possibly passed two last week. Pt still has a lot of pain in lower back pain and bilateral side pain.  Other     Pt would like to discuss torsemide    Cough     cough non productive,chest tightness with bno other symptoms. Onset 1 week and nothing OTC was taken. Visit Information    Have you changed or started any medications since your last visit including any over-the-counter medicines, vitamins, or herbal medicines? no   Are you having any side effects from any of your medications? -  no  Have you stopped taking any of your medications? Is so, why? -  no    Have you seen any other physician or provider since your last visit? No  Have you had any other diagnostic tests since your last visit? No  Have you been seen in the emergency room and/or had an admission to a hospital since we last saw you? No  Have you had your routine dental cleaning in the past 6 months? no    Have you activated your [employer] account?  If not, what are your barriers? no     Patient Care Team:  Geraldine Mosley MD as PCP - General  Betsy Kebede MD as PCP - Pulmonology (Pulmonology)  [facility]  Luis Mckinnon MD as Consulting Physician (Urology)  Mathieu Ritter MD as Consulting Physician (Infectious Diseases)    Medical History Review  Past Medical, Family, and Social History reviewed and does contribute to the patient presenting condition    Health Maintenance   Topic Date Due    Hepatitis C screen  1960    HIV screen  10/05/1977    Hepatitis B vaccine (1 of 3 - Risk 3-dose series) 10/05/1981    Shingles Vaccine (1 of 2) 10/05/2012    Cervical cancer screen  04/30/2018    Diabetic retinal exam  07/09/2019    Annual Wellness Visit (AWV)  09/05/2019    A1C test (Diabetic or Prediabetic)  09/14/2019    Breast cancer screen  05/08/2020    Diabetic foot exam breath . No    chest pain . Patient here for evaluation and management  of  Diabetes Mellitus . -- No symptoms suggestive of hyperglycemia or hypoglycemia . - Blood sugar diary maintaine [] yes    [x] no           - TAKING MEDICATIONS  AS PRESCRIBED , NO ADVERSE EFFECTS .      Is known to have diabetic retinopathy   Also known to have hypothyroidism and hypertension and is compliant with his medications   Denies any new symptoms  MEDICATIONS:      Current Outpatient Medications   Medication Sig Dispense Refill    amLODIPine (NORVASC) 5 MG tablet Take 1 tablet by mouth daily 90 tablet 3    famotidine (PEPCID) 20 MG tablet Take 20 mg by mouth 2 times daily  6    DULoxetine (CYMBALTA) 60 MG extended release capsule TAKE 1 CAPSULE BY MOUTH ONCE DAILY 90 capsule 1    insulin lispro (HUMALOG) 100 UNIT/ML injection vial Inject into the skin 3 times daily (before meals) Indications: via insulin pump      pantoprazole (PROTONIX) 40 MG tablet Take 40 mg by mouth 2 times daily      topiramate (TOPAMAX) 25 MG tablet TAKE 1 TABLET BY MOUTH ONCE DAILY FOR 7 DAYS THEN 1 TWICE DAILY FOR 7 DAYS THEN 1 IN THE MORNING AND 2 IN THE EVENING FOR 7 DAYS THEN 2 TWIC  11    losartan (COZAAR) 100 MG tablet TAKE 1 TABLET BY MOUTH ONCE DAILY 90 tablet 3    metoclopramide (REGLAN) 5 MG tablet TAKE 1 TABLET BY MOUTH TWICE DAILY 180 tablet 3    torsemide (DEMADEX) 10 MG tablet Take 1 tablet by mouth daily as needed (leg edema) 90 tablet 3    rOPINIRole (REQUIP) 2 MG tablet Take 1 tablet by mouth daily 90 tablet 3    simvastatin (ZOCOR) 20 MG tablet Take 1 tablet by mouth nightly 90 tablet 3    levothyroxine (SYNTHROID) 137 MCG tablet Take 1 tablet by mouth daily 90 tablet 3    metoprolol tartrate (LOPRESSOR) 50 MG tablet TAKE 1 TABLET BY MOUTH TWICE DAILY 180 tablet 3    gabapentin (NEURONTIN) 800 MG tablet TAKE 1 TABLET BY MOUTH THREE TIMES DAILY 270 tablet 0    BREO ELLIPTA 200-25 MCG/INH AEPB INL 1 PUFF PO DAILY  6    tiotropium (SPIRIVA HANDIHALER) 18 MCG inhalation capsule Inhale 1 capsule into the lungs Daily 30 capsule 5    ONE TOUCH ULTRA TEST strip   1    B-D INSULIN SYRINGE 29G X 1/2\" 0.5 ML MISC       vitamin D (CHOLECALCIFEROL) 1000 UNIT TABS tablet Take 4,000 Units by mouth daily Takes 4 tabs daily      docusate sodium (COLACE) 100 MG capsule Take 1 capsule by mouth 2 times daily Take while on narcotics 60 capsule 0    Probiotic Product (PROBIOTIC DAILY PO)   Take 1 tablet by mouth       aspirin 81 MG tablet Take 81 mg by mouth daily.  amitriptyline (ELAVIL) 150 MG tablet TAKE 1 TABLET BY MOUTH NIGHTLY 30 tablet 0    clonazePAM (KLONOPIN) 0.5 MG tablet TAKE 1 TABLET BY MOUTH TWICE DAILY AS NEEDED FOR ANXIETY OR SLEEP 60 tablet 2     No current facility-administered medications for this visit. ALLERGIES:      Allergies   Allergen Reactions    Fioricet [Butalbital-Apap-Caffeine] Shortness Of Breath    Erythromycin      Other reaction(s): Unknown  Eye ointment caused swelling    Codeine Nausea And Vomiting and Nausea Only    Droperidol Anxiety    Tape [Adhesive Tape] Rash       SOCIAL HISTORY   Reviewed and no change from previous record. [facility]. She has never used smokeless tobacco.    FAMILY HISTORY:    Reviewed and No change from previous visit    REVIEW OF SYSTEMS:    Review of Systems        OBJECTIVE      Physical exam           Vitals:    01/16/20 1520   BP: (!) 140/90   Weight: 191 lb (86.6 kg)   Height: 5' 5\" (1.651 m)         Physical Exam   Vitals:  BP (!) 140/90   Ht 5' 5\" (1.651 m)   Wt 191 lb (86.6 kg)   BMI 31.78 kg/m²                 Body mass index is 31.78 kg/m².      Vitals:    01/16/20 1520   BP: (!) 140/90   Weight: 191 lb (86.6 kg)   Height: 5' 5\" (1.651 m)       General -alert, well appearing, and in no distress  Skin - normal coloration and turgor, no rashes,no suspicious skin lesions noted  Eyes - pupils equal and performed during the hospital encounter of 12/11/19   Sedimentation rate, automated   Result Value Ref Range    Sed Rate 24 (H) 0 - 20 mm   C-Reactive Protein   Result Value Ref Range    CRP 8.0 (H) 0.0 - 5.0 mg/L                     VITALS INCLUDING BMI REVIEWED WITH PATIENT  Labs reviewed as noted above   Discussed with patient        ASSESSMENT / Elmira Hadley was seen today for 1 month follow-up, other and cough. Diagnoses and all orders for this visit:    Essential hypertension  Control fair  Retinopathy due to secondary DM ([address]  Unchanged  Acquired hypothyroidism  On replacement therapy  Type 1 diabetes mellitus with retinopathy, macular edema presence unspecified, unspecified laterality, unspecified retinopathy severity ([address]  Hemoglobin A1c is 8.7             SALIENT  ACTIONS TODAYS VISIT       Today's office visit SALIENT ACTIONS    ----            Discussed use, benefit, and side effects of prescribed medications. [x] yes    All patient questions answered. Patient voiced understanding. Patient given educational materials - see patient instructions  [] yes         Medications Discontinued During This Encounter   Medication Reason    ofloxacin (OCUFLOX) 0.3 % solution LIST CLEANUP        No orders of the defined types were placed in this encounter. There are no Patient Instructions on file for this visit. Medication List          Accurate as of January 16, 2020 11:59 PM. If you have any questions, ask your nurse or doctor.             CONTINUE taking these medications    amitriptyline 150 MG tablet  Commonly known as:  ELAVIL  TAKE 1 TABLET BY MOUTH NIGHTLY     amLODIPine 5 MG tablet  Commonly known as:  NORVASC  Take 1 tablet by mouth daily     aspirin 81 MG tablet     B-D INSULIN SYRINGE 29G X 1/2\" 0.5 ML Misc  Generic drug:  INSULIN SYRINGE .5CC/29G     Breo Ellipta 200-25 MCG/INH Aepb inhaler  Generic drug:  Fluticasone furoate-vilanterol     clonazePAM 0.5 MG tablet  Commonly known as:  KLONOPIN  TAKE 1 TABLET BY MOUTH TWICE DAILY AS NEEDED FOR ANXIETY OR SLEEP     docusate sodium 100 MG capsule  Commonly known as:  Colace  Take 1 capsule by mouth 2 times daily Take while on narcotics     DULoxetine 60 MG extended release capsule  Commonly known as:  CYMBALTA  TAKE 1 CAPSULE BY MOUTH ONCE DAILY     famotidine 20 MG tablet  Commonly known as:  PEPCID     gabapentin 800 MG tablet  Commonly known as:  NEURONTIN  TAKE 1 TABLET BY MOUTH THREE TIMES DAILY     HumaLOG 100 UNIT/ML injection vial  Generic drug:  insulin lispro     levothyroxine 137 MCG tablet  Commonly known as:  SYNTHROID  Take 1 tablet by mouth daily     losartan 100 MG tablet  Commonly known as:  COZAAR  TAKE 1 TABLET BY MOUTH ONCE DAILY     metoclopramide 5 MG tablet  Commonly known as:  REGLAN  TAKE 1 TABLET BY MOUTH TWICE DAILY     metoprolol tartrate 50 MG tablet  Commonly known as:  LOPRESSOR  TAKE 1 TABLET BY MOUTH TWICE DAILY     ONE TOUCH ULTRA TEST strip  Generic drug:  blood glucose test strips     pantoprazole 40 MG tablet  Commonly known as:  PROTONIX     PROBIOTIC DAILY PO     rOPINIRole 2 MG tablet  Commonly known as:  REQUIP  Take 1 tablet by mouth daily     simvastatin 20 MG tablet  Commonly known as:  ZOCOR  Take 1 tablet by mouth nightly     tiotropium 18 MCG inhalation capsule  Commonly known as:  Spiriva HandiHaler  Inhale 1 capsule into the lungs Daily     topiramate 25 MG tablet  Commonly known as:  TOPAMAX     torsemide 10 MG tablet  Commonly known as:  DEMADEX  Take 1 tablet by mouth daily as needed (leg edema)     vitamin D 1000 UNIT Tabs tablet  Commonly known as:  CHOLECALCIFEROL                FOLLOW UP  PLANS     Return in about 4 months (around 5/16/2020). MD JODI Coronado [address].    Phone [phone]   Fax: [phone]  Answering Service: [phone]

## 2020-01-22 RX ORDER — AMITRIPTYLINE HYDROCHLORIDE 150 MG/1
TABLET, FILM COATED ORAL
Qty: 30 TABLET | Refills: 0 | Status: SHIPPED | OUTPATIENT
Start: 2020-01-22 | End: 2020-02-21

## 2020-02-07 ENCOUNTER — OFFICE VISIT (OUTPATIENT)
Dept: ORTHOPEDIC SURGERY | Age: 60
End: 2020-02-07
Payer: MEDICARE

## 2020-02-07 PROCEDURE — G8484 FLU IMMUNIZE NO ADMIN: HCPCS | Performed by: ORTHOPAEDIC SURGERY

## 2020-02-07 PROCEDURE — 1036F TOBACCO NON-USER: CPT | Performed by: ORTHOPAEDIC SURGERY

## 2020-02-07 PROCEDURE — G8417 CALC BMI ABV UP PARAM F/U: HCPCS | Performed by: ORTHOPAEDIC SURGERY

## 2020-02-07 PROCEDURE — G8428 CUR MEDS NOT DOCUMENT: HCPCS | Performed by: ORTHOPAEDIC SURGERY

## 2020-02-07 PROCEDURE — 99213 OFFICE O/P EST LOW 20 MIN: CPT | Performed by: ORTHOPAEDIC SURGERY

## 2020-02-07 PROCEDURE — 3017F COLORECTAL CA SCREEN DOC REV: CPT | Performed by: ORTHOPAEDIC SURGERY

## 2020-02-09 NOTE — PROGRESS NOTES
Orthopedic Knee Encounter Note     Chief complaint: Bilateral knee pain    HPI: Chin Farias is a 64 y.o. female who presents for evaluation of both knees which is been hurting for some time now. She indicates that her right knee hurts more than the left. In fact the left knee has not bothered her for some weeks now. She states that she has a difficult time going up stairs due to pain in her knees. Her pain is primarily localized to the anterior aspect of the knee. It usually bothers him with weightbearing activities. She also reports that her knees give out on her. She denies any locking or catching. Previous treatment:    NSAIDs: None    Injections:  None    Physical therapy: No    Surgeries: None    Review of Systems:     Constitution: no fever or chills   Pain level: 8/10  Musculoskeletal: As noted in the HPI   Neurologic: no neurologic symptoms    Past Medical History  Dennis  has a past medical history of Anesthesia complication, Anxiety, Arthritis, Back pain, CAD (coronary artery disease), Caffeine use, Cataract, COPD (chronic obstructive pulmonary disease) ([address] Deafness, Depression, Diabetes mellitus ([address] Diarrhea, Diverticulosis, Fibromyalgia, Gastroparalysis, GERD (gastroesophageal reflux disease), Hearing loss, Hyperlipidemia, Hyperlipidemia, Hypertension, Incontinence, MDRO (multiple drug resistant organisms) resistance, Neurogenic bladder, Neuropathy, Obesity, Osteoarthritis, Peripheral vascular disease, unspecified ([address] Restless leg syndrome, Rhabdomyolysis, Spinal stenosis, thoracic, Stroke ([address] Thyroid disease, Trigeminal neuralgia, and Type II or unspecified type diabetes mellitus without mention of complication, not stated as uncontrolled. Past Surgical History  Dennis  has a past surgical history that includes Cholecystectomy; Colon surgery; Tonsillectomy; Tubal ligation; Carpal tunnel release (Right);  Middle ear surgery (Left); cyst removal; cyst removal; 50 MG tablet TAKE 1 TABLET BY MOUTH TWICE DAILY 180 tablet 3    gabapentin (NEURONTIN) 800 MG tablet TAKE 1 TABLET BY MOUTH THREE TIMES DAILY 270 tablet 0    BREO ELLIPTA 200-25 MCG/INH AEPB INL 1 PUFF PO DAILY  6    tiotropium (SPIRIVA HANDIHALER) 18 MCG inhalation capsule Inhale 1 capsule into the lungs Daily 30 capsule 5    ONE TOUCH ULTRA TEST strip   1    B-D INSULIN SYRINGE 29G X 1/2\" 0.5 ML MISC       vitamin D (CHOLECALCIFEROL) 1000 UNIT TABS tablet Take 4,000 Units by mouth daily Takes 4 tabs daily      docusate sodium (COLACE) 100 MG capsule Take 1 capsule by mouth 2 times daily Take while on narcotics 60 capsule 0    Probiotic Product (PROBIOTIC DAILY PO)   Take 1 tablet by mouth       aspirin 81 MG tablet Take 81 mg by mouth daily. No current facility-administered medications for this visit. Allergies  Allergies have been reviewed. Peg Merlin is allergic to fioricet [butalbital-apap-caffeine]; erythromycin; codeine; droperidol; and tape [adhesive tape]. Social History  Peg Merlin  reports that she has never smoked. She has never used smokeless tobacco. She reports current drug use. Frequency: 4.00 times per week. Drug: Marijuana. She reports that she does not drink alcohol. Family History  [place] family history includes Cancer in her father, maternal grandmother, and paternal grandmother; Diabetes in her maternal grandmother; Heart Disease in her father and maternal grandmother; High Blood Pressure in her father and mother; Migraines in her mother; Other in her brother; Parkinsonism in her maternal grandmother. Physical Exam:     There were no vitals taken for this visit.    General Appearance: alert, well appearing, and in no distress  Mental Status: alert, oriented to person, place, and time  Gait: normal  Hips: Good ROM but pain with ROM through the left hip    Knee: Bilateral    Skin: warm and dry, no rash or erythema  Vasculature: 2+ pedal pulses bilaterally  Neuro: Sensation grossly intact to light touch diffusely  Alignment: Normal  Tenderness: Tender to palpation along medial and lateral joint lines of the right knee    ROM: (Degrees)    Right   A P   Left   A P    Extension  0    Extension  0   Flexion   120    Flexion   120      Crepitation  No    Crepitation  No      Muscle strength:    Right       Left    Flexion   5    Flexion   5  Extension  5    Extension  5  SLR   5    SLR   5    Extensor lag   n    Extensor lag  n      Special testing:    Right          Left    n    Pain with deep knee flexion   n  n    Patellar grind     n  n    Patellar apprehension    n  n    Patellar glide     n    n    Lachman     n  n    Anterior drawer    n  n    Pivot shift     n  n    Posterior drawer    n  n    Dial test     n  n    Posterolateral drawer    n  n    Posterior Sag     n  n    MCL      n  n    LCL      n    y    Medial joint line tenderness   n  y    Lateral joint line tenderness   n  n    Appley's     n      Imaging:  Xrays: 4 views of bilateral knees obtained on 2/7/20 were independently reviewed   Indications: Bilateral knee pain  Findings: Joint spaces appropriately preserved. No fracture, dislocation, or subluxation noted. Impression: Normal bilateral knee radiographs. Impression/Plan:     Chin Farias is a 64 y.o. old female with bilateral knee pain that appears to be patellofemoral in nature. I had a discussion with the patient regarding this and recommended proceeding with conservative management by way of physical therapy and a short course of a prescription anti-inflammatory. Both were provided today. I am then have her follow-up in my clinic with persistent or worsening pain but she may return or call at anytime as well with any questions under concerns.       NA = Not assessed  n = No  y = Yes  SLR = Straight leg raise  MCL = Medial collateral ligament  LCL = Lateral collateral ligament

## 2020-02-10 RX ORDER — METOPROLOL TARTRATE 50 MG/1
TABLET, FILM COATED ORAL
Qty: 180 TABLET | Refills: 0 | Status: SHIPPED | OUTPATIENT
Start: 2020-02-10 | End: 2020-10-16 | Stop reason: DRUGHIGH

## 2020-02-13 ENCOUNTER — OFFICE VISIT (OUTPATIENT)
Dept: INTERNAL MEDICINE CLINIC | Age: 60
End: 2020-02-13
Payer: MEDICARE

## 2020-02-13 VITALS
HEART RATE: 61 BPM | WEIGHT: 197 LBS | DIASTOLIC BLOOD PRESSURE: 64 MMHG | HEIGHT: 65 IN | BODY MASS INDEX: 32.82 KG/M2 | SYSTOLIC BLOOD PRESSURE: 138 MMHG | OXYGEN SATURATION: 98 %

## 2020-02-13 PROBLEM — R10.9 FLANK PAIN: Status: ACTIVE | Noted: 2020-02-13

## 2020-02-13 PROCEDURE — 3023F SPIROM DOC REV: CPT | Performed by: INTERNAL MEDICINE

## 2020-02-13 PROCEDURE — G8417 CALC BMI ABV UP PARAM F/U: HCPCS | Performed by: INTERNAL MEDICINE

## 2020-02-13 PROCEDURE — G8484 FLU IMMUNIZE NO ADMIN: HCPCS | Performed by: INTERNAL MEDICINE

## 2020-02-13 PROCEDURE — 3017F COLORECTAL CA SCREEN DOC REV: CPT | Performed by: INTERNAL MEDICINE

## 2020-02-13 PROCEDURE — G8428 CUR MEDS NOT DOCUMENT: HCPCS | Performed by: INTERNAL MEDICINE

## 2020-02-13 PROCEDURE — 1036F TOBACCO NON-USER: CPT | Performed by: INTERNAL MEDICINE

## 2020-02-13 PROCEDURE — G8926 SPIRO NO PERF OR DOC: HCPCS | Performed by: INTERNAL MEDICINE

## 2020-02-13 PROCEDURE — 2022F DILAT RTA XM EVC RTNOPTHY: CPT | Performed by: INTERNAL MEDICINE

## 2020-02-13 PROCEDURE — 3046F HEMOGLOBIN A1C LEVEL >9.0%: CPT | Performed by: INTERNAL MEDICINE

## 2020-02-13 PROCEDURE — 99214 OFFICE O/P EST MOD 30 MIN: CPT | Performed by: INTERNAL MEDICINE

## 2020-02-13 RX ORDER — CYCLOBENZAPRINE HCL 10 MG
10 TABLET ORAL NIGHTLY PRN
Qty: 30 TABLET | Refills: 0 | Status: SHIPPED | OUTPATIENT
Start: 2020-02-13 | End: 2020-03-14

## 2020-02-13 ASSESSMENT — ENCOUNTER SYMPTOMS
WHEEZING: 0
DIARRHEA: 0
SHORTNESS OF BREATH: 0
EYE PAIN: 0
BACK PAIN: 1
TROUBLE SWALLOWING: 0
ABDOMINAL DISTENTION: 0
COUGH: 0
BLOOD IN STOOL: 0
EYE DISCHARGE: 0
COLOR CHANGE: 0

## 2020-02-13 NOTE — PROGRESS NOTES
[address] [address]  Dept: [phone]  Dept Fax: [phone]     Tim Blum is a 64 y.o. female who presents today for her medical conditions/complaintsas noted below. Tim Blum is c/o of   Chief Complaint   Patient presents with    Hip Pain     left hip pain from fall on ice    Abdominal Pain     on left side severe, urologist states not stones, radiates to back    Numbness     left hand is numb         HPI:      HTN  Onset more than 2 years ago  sharee mild to mod  Controlled with current po meds  Not associated with headaches or blurry vision  No chest pain  Diabetes   Duration more than 7 years  Modifying factors on Glucophage and other med  Severity uncontrolled sever  Associated signs and symtoms neuropathy/ckd/ CAD. aggravated with sugar diet and better with low sugar diet     HLD  Onset more than 5 years ago  Severity is mild, not getting worse  Not associated with pancreatitis  Tolerating statin well no muscle pain        Hemoglobin A1C (%)   Date Value   12/11/2019 8.7   09/14/2018 8.3 (H)   06/21/2018 8.4             ( goal A1Cis < 7)   Microalb/Crt.  Ratio (mcg/mg creat)   Date Value   12/11/2019 124 (H)     LDL Cholesterol (mg/dL)   Date Value   12/11/2019 137 (H)   06/02/2017 82   09/20/2016 107       (goal LDL is <100)   AST (U/L)   Date Value   12/11/2019 16     ALT (U/L)   Date Value   12/11/2019 10     BUN (mg/dL)   Date Value   12/11/2019 11     BP Readings from Last 3 Encounters:   02/13/20 138/64   01/16/20 (!) 140/90   12/12/19 126/70          (goal 120/80)    Past Medical History:   Diagnosis Date    Anesthesia complication     \"lung collapsed after colon surgery    Anxiety     Arthritis     Back pain     CAD (coronary artery disease)     no stents    Caffeine use     3 coffee / day    Cataract     left    COPD (chronic obstructive pulmonary disease) (HCC)     EMPHYSEMA    Deafness     right ear    Depression     Diabetes mellitus ([facility]     Diarrhea     Diverticulosis     Fibromyalgia     Gastroparalysis     GERD (gastroesophageal reflux disease)     Hearing loss     DEAF IN RIGHT EAR    Hyperlipidemia     Hyperlipidemia     Hypertension     Incontinence     MDRO (multiple drug resistant organisms) resistance 2012    mrsa (nasal), eye, ear    Neurogenic bladder     CATHES HERSELF 7 TIMES A DAY, IS ABLE TO URINATE ON OWN    Neuropathy     feet    Obesity     Osteoarthritis     Peripheral vascular disease, unspecified ([address]     Restless leg syndrome     Rhabdomyolysis     [place] [address] 1 week, May 2014, then [facility] for rehab.     Spinal stenosis, thoracic 5/27/2014    Stroke [facility]) 2012    numbness on the left side of face    Thyroid disease     enlarged    Trigeminal neuralgia     Type II or unspecified type diabetes mellitus without mention of complication, not stated as uncontrolled       Past Surgical History:   Procedure Laterality Date    ABDOMEN SURGERY      LAPAROSCOPY    CARPAL TUNNEL RELEASE Right     CATARACT REMOVAL      CHOLECYSTECTOMY      CHOLECYSTECTOMY, OPEN      11/2012    COLON SURGERY      benign mass removed    COLONOSCOPY      COLONOSCOPY  07/13/2016    CYST REMOVAL      sebaceous cyst, under arm left side x5    CYST REMOVAL      right ankle    CYSTOSCOPY      EYE SURGERY Right     HOLE IN RETINA REPAIRED    FINGER TRIGGER RELEASE Right     INCISIONAL HERNIA REPAIR  07/07/15    open   [facility]  10/17/2017    MIDDLE EAR SURGERY Left     ear tube placement    POLYPECTOMY      colon polyp    [place] REPAIR INCISIONAL HERNIA,REDUCIBLE N/A 10/17/2017    HERNIA INCISIONAL REPAIR WITH MESH performed by Kaylyn Ricci MD at [facility] ENDOSCOPY  07/13/2016    UPPER GASTROINTESTINAL ENDOSCOPY  07/23/2018    with biopsy    UPPER GASTROINTESTINAL ENDOSCOPY N/A 7/23/2018    EGD edema) 90 tablet 3    rOPINIRole (REQUIP) 2 MG tablet Take 1 tablet by mouth daily 90 tablet 3    simvastatin (ZOCOR) 20 MG tablet Take 1 tablet by mouth nightly 90 tablet 3    levothyroxine (SYNTHROID) 137 MCG tablet Take 1 tablet by mouth daily 90 tablet 3    gabapentin (NEURONTIN) 800 MG tablet TAKE 1 TABLET BY MOUTH THREE TIMES DAILY 270 tablet 0    BREO ELLIPTA 200-25 MCG/INH AEPB INL 1 PUFF PO DAILY  6    tiotropium (SPIRIVA HANDIHALER) 18 MCG inhalation capsule Inhale 1 capsule into the lungs Daily 30 capsule 5    ONE TOUCH ULTRA TEST strip   1    B-D INSULIN SYRINGE 29G X 1/2\" 0.5 ML MISC       vitamin D (CHOLECALCIFEROL) 1000 UNIT TABS tablet Take 4,000 Units by mouth daily Takes 4 tabs daily      docusate sodium (COLACE) 100 MG capsule Take 1 capsule by mouth 2 times daily Take while on narcotics 60 capsule 0    Probiotic Product (PROBIOTIC DAILY PO)   Take 1 tablet by mouth       aspirin 81 MG tablet Take 81 mg by mouth daily.  clonazePAM (KLONOPIN) 0.5 MG tablet TAKE 1 TABLET BY MOUTH TWICE DAILY AS NEEDED FOR ANXIETY OR SLEEP 60 tablet 2     No current facility-administered medications for this visit.       Allergies   Allergen Reactions    Fioricet [Butalbital-Apap-Caffeine] Shortness Of Breath    Betamethasone     Erythromycin      Other reaction(s): Unknown  Eye ointment caused swelling    Codeine Nausea And Vomiting and Nausea Only    Droperidol Anxiety    Tape [Adhesive Tape] Rash       Health Maintenance   Topic Date Due    Hepatitis C screen  1960    HIV screen  10/05/1977    Hepatitis B vaccine (1 of 3 - Risk 3-dose series) 10/05/1981    Shingles Vaccine (1 of 2) 10/05/2012    Cervical cancer screen  04/30/2018    Diabetic retinal exam  07/09/2019    Annual Wellness Visit (AWV)  09/05/2019    A1C test (Diabetic or Prediabetic)  09/14/2019    Breast cancer screen  05/08/2020    Diabetic foot exam  12/11/2020    Diabetic microalbuminuria test  12/11/2020   

## 2020-02-17 ENCOUNTER — HOSPITAL ENCOUNTER (OUTPATIENT)
Dept: ULTRASOUND IMAGING | Age: 60
Discharge: HOME | End: 2020-02-19
Payer: MEDICARE

## 2020-02-17 ENCOUNTER — TELEPHONE (OUTPATIENT)
Dept: INTERNAL MEDICINE CLINIC | Age: 60
End: 2020-02-17

## 2020-02-17 PROCEDURE — 76770 US EXAM ABDO BACK WALL COMP: CPT

## 2020-02-17 NOTE — TELEPHONE ENCOUNTER
Order change US RENAL COMPLETE   Note: The Diagnosis on this testing does not go with the test that is ordered per insurance. I need this to be corrected before Mondays scheduled appt.     Per Dr Tesfaye Oh add diagnosis flank pain

## 2020-02-20 ENCOUNTER — TELEPHONE (OUTPATIENT)
Dept: INTERNAL MEDICINE CLINIC | Age: 60
End: 2020-02-20

## 2020-02-20 NOTE — TELEPHONE ENCOUNTER
Records request received from [facility] Internal Medicine Request emailed to HIM for completion. Janie Estrada

## 2020-02-21 RX ORDER — AMITRIPTYLINE HYDROCHLORIDE 150 MG/1
TABLET, FILM COATED ORAL
Qty: 30 TABLET | Refills: 0 | Status: SHIPPED | OUTPATIENT
Start: 2020-02-21 | End: 2022-08-04 | Stop reason: SDUPTHER

## 2020-03-02 RX ORDER — DULOXETIN HYDROCHLORIDE 30 MG/1
CAPSULE, DELAYED RELEASE ORAL
Qty: 90 CAPSULE | Refills: 0 | OUTPATIENT
Start: 2020-03-02

## 2020-03-02 RX ORDER — ROPINIROLE 2 MG/1
TABLET, FILM COATED ORAL
Qty: 60 TABLET | Refills: 0 | OUTPATIENT
Start: 2020-03-02

## 2020-03-23 ENCOUNTER — APPOINTMENT (OUTPATIENT)
Dept: GENERAL RADIOLOGY | Age: 60
End: 2020-03-23
Payer: MEDICARE

## 2020-03-23 ENCOUNTER — HOSPITAL ENCOUNTER (EMERGENCY)
Age: 60
Discharge: HOME | End: 2020-03-23
Attending: EMERGENCY MEDICINE
Payer: MEDICARE

## 2020-03-23 VITALS
HEART RATE: 65 BPM | TEMPERATURE: 99.4 F | RESPIRATION RATE: 14 BRPM | WEIGHT: 192 LBS | DIASTOLIC BLOOD PRESSURE: 81 MMHG | SYSTOLIC BLOOD PRESSURE: 169 MMHG | HEIGHT: 65 IN | BODY MASS INDEX: 31.99 KG/M2 | OXYGEN SATURATION: 94 %

## 2020-03-23 LAB
ABSOLUTE EOS #: 0 K/UL (ref 0–0.4)
ABSOLUTE IMMATURE GRANULOCYTE: ABNORMAL K/UL (ref 0–0.3)
ABSOLUTE LYMPH #: 2 K/UL (ref 1–4.8)
ABSOLUTE MONO #: 0.5 K/UL (ref 0.1–1.3)
ALBUMIN SERPL-MCNC: 4.1 G/DL (ref 3.5–5.2)
ALBUMIN/GLOBULIN RATIO: ABNORMAL (ref 1–2.5)
ALP BLD-CCNC: 105 U/L (ref 35–104)
ALT SERPL-CCNC: 9 U/L (ref 5–33)
ANION GAP SERPL CALCULATED.3IONS-SCNC: 12 MMOL/L (ref 9–17)
AST SERPL-CCNC: 12 U/L
BASOPHILS # BLD: 1 % (ref 0–2)
BASOPHILS ABSOLUTE: 0.1 K/UL (ref 0–0.2)
BILIRUB SERPL-MCNC: 0.3 MG/DL (ref 0.3–1.2)
BUN BLDV-MCNC: 17 MG/DL (ref 6–20)
BUN/CREAT BLD: ABNORMAL (ref 9–20)
CALCIUM SERPL-MCNC: 9.2 MG/DL (ref 8.6–10.4)
CHLORIDE BLD-SCNC: 102 MMOL/L (ref 98–107)
CO2: 23 MMOL/L (ref 20–31)
CREAT SERPL-MCNC: 0.83 MG/DL (ref 0.5–0.9)
DIFFERENTIAL TYPE: ABNORMAL
EOSINOPHILS RELATIVE PERCENT: 0 % (ref 0–4)
GFR AFRICAN AMERICAN: >60 ML/MIN
GFR NON-AFRICAN AMERICAN: >60 ML/MIN
GFR SERPL CREATININE-BSD FRML MDRD: ABNORMAL ML/MIN/{1.73_M2}
GFR SERPL CREATININE-BSD FRML MDRD: ABNORMAL ML/MIN/{1.73_M2}
GLUCOSE BLD-MCNC: 215 MG/DL (ref 70–99)
HCT VFR BLD CALC: 40 % (ref 36–46)
HEMOGLOBIN: 13.2 G/DL (ref 12–16)
IMMATURE GRANULOCYTES: ABNORMAL %
LYMPHOCYTES # BLD: 20 % (ref 24–44)
MCH RBC QN AUTO: 29.9 PG (ref 26–34)
MCHC RBC AUTO-ENTMCNC: 33.1 G/DL (ref 31–37)
MCV RBC AUTO: 90.5 FL (ref 80–100)
MONOCYTES # BLD: 6 % (ref 1–7)
NRBC AUTOMATED: ABNORMAL PER 100 WBC
PDW BLD-RTO: 13.6 % (ref 11.5–14.9)
PLATELET # BLD: 251 K/UL (ref 150–450)
PLATELET ESTIMATE: ABNORMAL
PMV BLD AUTO: 7.6 FL (ref 6–12)
POTASSIUM SERPL-SCNC: 4 MMOL/L (ref 3.7–5.3)
RBC # BLD: 4.42 M/UL (ref 4–5.2)
RBC # BLD: ABNORMAL 10*6/UL
SEG NEUTROPHILS: 73 % (ref 36–66)
SEGMENTED NEUTROPHILS ABSOLUTE COUNT: 7.2 K/UL (ref 1.3–9.1)
SODIUM BLD-SCNC: 137 MMOL/L (ref 135–144)
TOTAL PROTEIN: 6.8 G/DL (ref 6.4–8.3)
TROPONIN INTERP: NORMAL
TROPONIN INTERP: NORMAL
TROPONIN T: NORMAL NG/ML
TROPONIN T: NORMAL NG/ML
TROPONIN, HIGH SENSITIVITY: 7 NG/L (ref 0–14)
TROPONIN, HIGH SENSITIVITY: 7 NG/L (ref 0–14)
WBC # BLD: 9.8 K/UL (ref 3.5–11)
WBC # BLD: ABNORMAL 10*3/UL

## 2020-03-23 PROCEDURE — 84484 ASSAY OF TROPONIN QUANT: CPT

## 2020-03-23 PROCEDURE — 80053 COMPREHEN METABOLIC PANEL: CPT

## 2020-03-23 PROCEDURE — 71045 X-RAY EXAM CHEST 1 VIEW: CPT

## 2020-03-23 PROCEDURE — 93005 ELECTROCARDIOGRAM TRACING: CPT

## 2020-03-23 PROCEDURE — 99284 EMERGENCY DEPT VISIT MOD MDM: CPT

## 2020-03-23 PROCEDURE — 85025 COMPLETE CBC W/AUTO DIFF WBC: CPT

## 2020-03-23 PROCEDURE — 36415 COLL VENOUS BLD VENIPUNCTURE: CPT

## 2020-03-23 ASSESSMENT — PAIN SCALES - GENERAL: PAINLEVEL_OUTOF10: 7

## 2020-03-23 NOTE — ED NOTES
Pt given instructions for follow-up and discharge. Pt verbalizes understanding. Pt is A&O x4, PWD, eupneic, and ambulatory with steady, even gait upon discharge.       Mateusz Corona RN  03/23/20 0808

## 2020-03-24 ENCOUNTER — CARE COORDINATION (OUTPATIENT)
Dept: CARE COORDINATION | Age: 60
End: 2020-03-24

## 2020-03-25 NOTE — ED PROVIDER NOTES
[facility] ED  Emergency Department Encounter  Emergency Medicine Attending Note     Pt Name: Alexia Blackman  MRN: [id number]  [place] 1960   Date of evaluation: 3/23/2020  PCP:  Rosalia Babinski, MD    [address] [place] [address]       Chief Complaint   Patient presents with    Cough    Chest Pain       HISTORY OF PRESENT ILLNESS  (Location/Symptom, Timing/Onset, Context/Setting, Quality, Duration, Modifying Factors, Severity.)      Alexia Blackman is a 64 y.o. female who presents with cough and shortness of  Breath with associated chest pain. States that she has had a minimally productive cough. She has also had associated shortness of breath and a crushing left sided chest pressure. The chest pressure does not radiate. Has had some nausea but no vomiitng. Has a history of HTN, HLD, and DM. No significant MIs. Patient has had no fevers or chills. No history of COPD. PAST MEDICAL / SURGICAL / SOCIAL / FAMILY HISTORY     Past Medical History:  has a past medical history of Anesthesia complication, Anxiety, Arthritis, Back pain, CAD (coronary artery disease), Caffeine use, Cataract, COPD (chronic obstructive pulmonary disease) ([address] Deafness, Depression, Diabetes mellitus ([address] Diarrhea, Diverticulosis, Fibromyalgia, Gastroparalysis, GERD (gastroesophageal reflux disease), Hearing loss, Hyperlipidemia, Hyperlipidemia, Hypertension, Incontinence, MDRO (multiple drug resistant organisms) resistance, Neurogenic bladder, Neuropathy, Obesity, Osteoarthritis, Peripheral vascular disease, unspecified ([address] Restless leg syndrome, Rhabdomyolysis, Spinal stenosis, thoracic, Stroke ([address] Thyroid disease, Trigeminal neuralgia, and Type II or unspecified type diabetes mellitus without mention of complication, not stated as uncontrolled. Past Surgical History:  has a past surgical history that includes Cholecystectomy; Colon surgery; Tonsillectomy;  Tubal ligation; Carpal tunnel release BY MOUTH TWICE DAILY  Seun Villalba MD   torsemide (DEMADEX) 10 MG tablet Take 1 tablet by mouth daily as needed (leg edema)  Seun Villalba MD   rOPINIRole (REQUIP) 2 MG tablet Take 1 tablet by mouth daily  Seun Villalba MD   simvastatin (ZOCOR) 20 MG tablet Take 1 tablet by mouth nightly  Seun Villalba MD   levothyroxine (SYNTHROID) 137 MCG tablet Take 1 tablet by mouth daily  Seun Villalba MD   gabapentin (NEURONTIN) 800 MG tablet TAKE 1 TABLET BY MOUTH THREE TIMES DAILY  Seun Villalba MD   BREO ELLIPTA 200-25 MCG/INH AEPB INL 1 PUFF PO DAILY  Historical Provider, MD   tiotropium (SPIRIVA HANDIHALER) 18 MCG inhalation capsule Inhale 1 capsule into the lungs Daily  Kim Farrell MD   ONE TOUCH ULTRA TEST strip   Historical Provider, MD   B-D INSULIN SYRINGE 29G X 1/2\" 0.5 ML MISC   Historical Provider, MD   vitamin D (CHOLECALCIFEROL) 1000 UNIT TABS tablet Take 4,000 Units by mouth daily Takes 4 tabs daily  Historical Provider, MD   docusate sodium (COLACE) 100 MG capsule Take 1 capsule by mouth 2 times daily Take while on narcotics  Anayeli Vazquez MD   Probiotic Product (PROBIOTIC DAILY PO)   Take 1 tablet by mouth   Historical Provider, MD   aspirin 81 MG tablet Take 81 mg by mouth daily. Historical Provider, MD     Please note that medications prescribed at discharge will auto-populate into this medication list when note is refreshed. Please look at prescription date andprescriber to clarify. Family History:family history includes Cancer in her father, maternal grandmother, and paternal grandmother; Diabetes in her maternal grandmother; Heart Disease in her father and maternal grandmother; High Blood Pressure in her father and mother; Migraines in her mother; Other in her brother; Parkinsonism in her maternal grandmother. Social History: She reports that she has never smoked. She has never used smokeless tobacco. She reports current drug use. Frequency: 4.00 times per week.  Drug: Marijuana. She reports that she does not drink alcohol. She reports being sexually active. REVIEW OF SYSTEMS    (2-9 systems for level 4, 10 or more for level 5)      Review of Systems   Constitutional: Negative for chills and fever. HENT: Negative for congestion, rhinorrhea and sinus pain. Eyes: Negative for pain and visual disturbance. Respiratory: Positive for cough and shortness of breath. Negative for chest tightness. Cardiovascular: Positive for chest pain. Negative for palpitations. Gastrointestinal: Negative for abdominal pain, constipation, diarrhea, nausea and vomiting. Genitourinary: Negative for dysuria and frequency. Musculoskeletal: Positive for myalgias. Negative for arthralgias, back pain and joint swelling. Skin: Negative for rash and wound. Neurological: Negative for dizziness, light-headedness and headaches. PHYSICAL EXAM   (up to 7 for level 4, 8 or more for level 5)      Initial Vitals   ED Triage Vitals [03/23/20 1424]   BP Temp Temp Source Pulse Resp SpO2 Height Weight   (!) 170/73 99.4 °F (37.4 °C) Oral 65 16 94 % 5' 5\" (1.651 m) 192 lb (87.1 kg)       Physical Exam  Vitals signs and nursing note reviewed. Constitutional:       General: She is not in acute distress. Appearance: She is well-developed. She is not diaphoretic. HENT:      Head: Normocephalic and atraumatic. Nose: Nose normal.      Mouth/Throat:      Comments: Patient is currently wearing a facial mask. Given that patient is not complaining of sore throat or difficulty swallowing, mask was left in place to decrease risk of aersolization of particles in the setting of concern for CoVID-19. Eyes:      General: No scleral icterus. Conjunctiva/sclera: Conjunctivae normal.   Neck:      Musculoskeletal: Normal range of motion and neck supple. Cardiovascular:      Rate and Rhythm: Normal rate and regular rhythm. Heart sounds: Normal heart sounds. No murmur. No friction rub.  No

## 2020-03-26 ASSESSMENT — ENCOUNTER SYMPTOMS
DIARRHEA: 0
VOMITING: 0
COUGH: 1
BACK PAIN: 0
EYE PAIN: 0
ABDOMINAL PAIN: 0
CONSTIPATION: 0
CHEST TIGHTNESS: 0
NAUSEA: 0
RHINORRHEA: 0
SINUS PAIN: 0
SHORTNESS OF BREATH: 1

## 2020-03-28 LAB
EKG ATRIAL RATE: 67 BPM
EKG P AXIS: 33 DEGREES
EKG P-R INTERVAL: 164 MS
EKG Q-T INTERVAL: 384 MS
EKG QRS DURATION: 74 MS
EKG QTC CALCULATION (BAZETT): 405 MS
EKG R AXIS: 25 DEGREES
EKG T AXIS: 51 DEGREES
EKG VENTRICULAR RATE: 67 BPM

## 2020-04-07 ENCOUNTER — CARE COORDINATION (OUTPATIENT)
Dept: CARE COORDINATION | Age: 60
End: 2020-04-07

## 2020-04-09 RX ORDER — DULOXETIN HYDROCHLORIDE 30 MG/1
CAPSULE, DELAYED RELEASE ORAL
Qty: 90 CAPSULE | Refills: 0 | OUTPATIENT
Start: 2020-04-09

## 2020-05-21 ENCOUNTER — HOSPITAL ENCOUNTER (OUTPATIENT)
Dept: CT IMAGING | Age: 60
Discharge: HOME | End: 2020-05-23
Payer: MEDICARE

## 2020-05-21 LAB
BUN BLDV-MCNC: 10 MG/DL (ref 6–20)
CREAT SERPL-MCNC: 0.96 MG/DL (ref 0.5–0.9)
GFR AFRICAN AMERICAN: >60 ML/MIN
GFR NON-AFRICAN AMERICAN: 60 ML/MIN
GFR SERPL CREATININE-BSD FRML MDRD: ABNORMAL ML/MIN/{1.73_M2}
GFR SERPL CREATININE-BSD FRML MDRD: ABNORMAL ML/MIN/{1.73_M2}

## 2020-05-21 PROCEDURE — 84520 ASSAY OF UREA NITROGEN: CPT

## 2020-05-21 PROCEDURE — 6360000004 HC RX CONTRAST MEDICATION: Performed by: SURGERY

## 2020-05-21 PROCEDURE — 74177 CT ABD & PELVIS W/CONTRAST: CPT

## 2020-05-21 PROCEDURE — 2580000003 HC RX 258: Performed by: SURGERY

## 2020-05-21 PROCEDURE — 82565 ASSAY OF CREATININE: CPT

## 2020-05-21 RX ORDER — SODIUM CHLORIDE 0.9 % (FLUSH) 0.9 %
10 SYRINGE (ML) INJECTION PRN
Status: DISCONTINUED | OUTPATIENT
Start: 2020-05-21 | End: 2020-05-24 | Stop reason: HOSPADM

## 2020-05-21 RX ORDER — 0.9 % SODIUM CHLORIDE 0.9 %
80 INTRAVENOUS SOLUTION INTRAVENOUS ONCE
Status: COMPLETED | OUTPATIENT
Start: 2020-05-21 | End: 2020-05-21

## 2020-05-21 RX ADMIN — IOVERSOL 75 ML: 741 INJECTION INTRA-ARTERIAL; INTRAVENOUS at 11:41

## 2020-05-21 RX ADMIN — Medication 10 ML: at 11:41

## 2020-05-21 RX ADMIN — IOHEXOL 50 ML: 240 INJECTION, SOLUTION INTRATHECAL; INTRAVASCULAR; INTRAVENOUS; ORAL at 11:41

## 2020-05-21 RX ADMIN — SODIUM CHLORIDE 80 ML: 9 INJECTION, SOLUTION INTRAVENOUS at 11:41

## 2020-05-22 RX ORDER — METOPROLOL TARTRATE 50 MG/1
TABLET, FILM COATED ORAL
Qty: 180 TABLET | Refills: 0 | OUTPATIENT
Start: 2020-05-22

## 2020-05-29 ENCOUNTER — HOSPITAL ENCOUNTER (OUTPATIENT)
Dept: PREADMISSION TESTING | Age: 60
Setting detail: SPECIMEN
Discharge: HOME | End: 2020-06-02
Payer: MEDICARE

## 2020-05-29 ENCOUNTER — ANESTHESIA EVENT (OUTPATIENT)
Dept: OPERATING ROOM | Age: 60
End: 2020-05-29
Payer: MEDICARE

## 2020-05-29 PROCEDURE — U0003 INFECTIOUS AGENT DETECTION BY NUCLEIC ACID (DNA OR RNA); SEVERE ACUTE RESPIRATORY SYNDROME CORONAVIRUS 2 (SARS-COV-2) (CORONAVIRUS DISEASE [COVID-19]), AMPLIFIED PROBE TECHNIQUE, MAKING USE OF HIGH THROUGHPUT TECHNOLOGIES AS DESCRIBED BY CMS-2020-01-R: HCPCS

## 2020-05-30 LAB
SARS-COV-2, PCR: NORMAL
SARS-COV-2, RAPID: NORMAL
SARS-COV-2: NOT DETECTED
SOURCE: NORMAL

## 2020-05-31 ENCOUNTER — TELEPHONE (OUTPATIENT)
Dept: PRIMARY CARE CLINIC | Age: 60
End: 2020-05-31

## 2020-06-01 ENCOUNTER — ANESTHESIA (OUTPATIENT)
Dept: OPERATING ROOM | Age: 60
End: 2020-06-01
Payer: MEDICARE

## 2020-06-01 ENCOUNTER — HOSPITAL ENCOUNTER (OUTPATIENT)
Age: 60
Setting detail: OUTPATIENT SURGERY
Discharge: HOME | End: 2020-06-01
Attending: SURGERY | Admitting: SURGERY
Payer: MEDICARE

## 2020-06-01 VITALS
RESPIRATION RATE: 15 BRPM | SYSTOLIC BLOOD PRESSURE: 145 MMHG | OXYGEN SATURATION: 94 % | HEART RATE: 62 BPM | BODY MASS INDEX: 32.32 KG/M2 | WEIGHT: 194 LBS | HEIGHT: 65 IN | DIASTOLIC BLOOD PRESSURE: 88 MMHG | TEMPERATURE: 97.5 F

## 2020-06-01 VITALS
SYSTOLIC BLOOD PRESSURE: 149 MMHG | DIASTOLIC BLOOD PRESSURE: 77 MMHG | RESPIRATION RATE: 15 BRPM | OXYGEN SATURATION: 99 %

## 2020-06-01 LAB
GLUCOSE BLD-MCNC: 123 MG/DL (ref 65–105)
GLUCOSE BLD-MCNC: 172 MG/DL (ref 65–105)

## 2020-06-01 PROCEDURE — 82947 ASSAY GLUCOSE BLOOD QUANT: CPT

## 2020-06-01 PROCEDURE — 3609008400 HC SIGMOIDOSCOPY DIAGNOSTIC: Performed by: SURGERY

## 2020-06-01 PROCEDURE — 2500000003 HC RX 250 WO HCPCS: Performed by: NURSE ANESTHETIST, CERTIFIED REGISTERED

## 2020-06-01 PROCEDURE — 88305 TISSUE EXAM BY PATHOLOGIST: CPT

## 2020-06-01 PROCEDURE — 7100000011 HC PHASE II RECOVERY - ADDTL 15 MIN: Performed by: SURGERY

## 2020-06-01 PROCEDURE — 87077 CULTURE AEROBIC IDENTIFY: CPT

## 2020-06-01 PROCEDURE — 3609012400 HC EGD TRANSORAL BIOPSY SINGLE/MULTIPLE: Performed by: SURGERY

## 2020-06-01 PROCEDURE — 3700000000 HC ANESTHESIA ATTENDED CARE: Performed by: SURGERY

## 2020-06-01 PROCEDURE — 6360000002 HC RX W HCPCS: Performed by: NURSE ANESTHETIST, CERTIFIED REGISTERED

## 2020-06-01 PROCEDURE — 88342 IMHCHEM/IMCYTCHM 1ST ANTB: CPT

## 2020-06-01 PROCEDURE — 2709999900 HC NON-CHARGEABLE SUPPLY: Performed by: SURGERY

## 2020-06-01 PROCEDURE — 3700000001 HC ADD 15 MINUTES (ANESTHESIA): Performed by: SURGERY

## 2020-06-01 PROCEDURE — 7100000010 HC PHASE II RECOVERY - FIRST 15 MIN: Performed by: SURGERY

## 2020-06-01 PROCEDURE — 2580000003 HC RX 258: Performed by: ANESTHESIOLOGY

## 2020-06-01 RX ORDER — SODIUM CHLORIDE, SODIUM LACTATE, POTASSIUM CHLORIDE, CALCIUM CHLORIDE 600; 310; 30; 20 MG/100ML; MG/100ML; MG/100ML; MG/100ML
INJECTION, SOLUTION INTRAVENOUS CONTINUOUS
Status: DISCONTINUED | OUTPATIENT
Start: 2020-06-02 | End: 2020-06-01 | Stop reason: HOSPADM

## 2020-06-01 RX ORDER — SODIUM CHLORIDE 0.9 % (FLUSH) 0.9 %
10 SYRINGE (ML) INJECTION PRN
Status: DISCONTINUED | OUTPATIENT
Start: 2020-06-01 | End: 2020-06-01 | Stop reason: HOSPADM

## 2020-06-01 RX ORDER — SODIUM CHLORIDE 0.9 % (FLUSH) 0.9 %
10 SYRINGE (ML) INJECTION EVERY 12 HOURS SCHEDULED
Status: DISCONTINUED | OUTPATIENT
Start: 2020-06-01 | End: 2020-06-01 | Stop reason: HOSPADM

## 2020-06-01 RX ORDER — LIDOCAINE HYDROCHLORIDE 10 MG/ML
1 INJECTION, SOLUTION EPIDURAL; INFILTRATION; INTRACAUDAL; PERINEURAL
Status: DISCONTINUED | OUTPATIENT
Start: 2020-06-02 | End: 2020-06-01 | Stop reason: HOSPADM

## 2020-06-01 RX ORDER — LIDOCAINE HYDROCHLORIDE 20 MG/ML
INJECTION, SOLUTION EPIDURAL; INFILTRATION; INTRACAUDAL; PERINEURAL PRN
Status: DISCONTINUED | OUTPATIENT
Start: 2020-06-01 | End: 2020-06-01 | Stop reason: SDUPTHER

## 2020-06-01 RX ORDER — PROPOFOL 10 MG/ML
INJECTION, EMULSION INTRAVENOUS PRN
Status: DISCONTINUED | OUTPATIENT
Start: 2020-06-01 | End: 2020-06-01 | Stop reason: SDUPTHER

## 2020-06-01 RX ORDER — SODIUM CHLORIDE 9 MG/ML
INJECTION, SOLUTION INTRAVENOUS CONTINUOUS
Status: DISCONTINUED | OUTPATIENT
Start: 2020-06-02 | End: 2020-06-01

## 2020-06-01 RX ADMIN — PROPOFOL 50 MG: 10 INJECTION, EMULSION INTRAVENOUS at 07:40

## 2020-06-01 RX ADMIN — SODIUM CHLORIDE, POTASSIUM CHLORIDE, SODIUM LACTATE AND CALCIUM CHLORIDE: 600; 310; 30; 20 INJECTION, SOLUTION INTRAVENOUS at 06:25

## 2020-06-01 RX ADMIN — LIDOCAINE HYDROCHLORIDE 100 MG: 20 INJECTION, SOLUTION EPIDURAL; INFILTRATION; INTRACAUDAL; PERINEURAL at 07:22

## 2020-06-01 RX ADMIN — PROPOFOL 50 MG: 10 INJECTION, EMULSION INTRAVENOUS at 07:24

## 2020-06-01 RX ADMIN — PROPOFOL 50 MG: 10 INJECTION, EMULSION INTRAVENOUS at 07:22

## 2020-06-01 RX ADMIN — PROPOFOL 20 MG: 10 INJECTION, EMULSION INTRAVENOUS at 07:43

## 2020-06-01 RX ADMIN — PROPOFOL 50 MG: 10 INJECTION, EMULSION INTRAVENOUS at 07:29

## 2020-06-01 RX ADMIN — PROPOFOL 50 MG: 10 INJECTION, EMULSION INTRAVENOUS at 07:26

## 2020-06-01 ASSESSMENT — PULMONARY FUNCTION TESTS
PIF_VALUE: 1
PIF_VALUE: 2
PIF_VALUE: 1
PIF_VALUE: 2
PIF_VALUE: 1
PIF_VALUE: 2
PIF_VALUE: 1
PIF_VALUE: 2
PIF_VALUE: 1
PIF_VALUE: 1

## 2020-06-01 ASSESSMENT — PAIN SCALES - GENERAL
PAINLEVEL_OUTOF10: 0

## 2020-06-01 ASSESSMENT — PAIN - FUNCTIONAL ASSESSMENT: PAIN_FUNCTIONAL_ASSESSMENT: 0-10

## 2020-06-01 ASSESSMENT — PAIN DESCRIPTION - DESCRIPTORS: DESCRIPTORS: CRAMPING

## 2020-06-01 NOTE — H&P
meals) Indications: via insulin pump   Yes Historical Provider, MD   clonazePAM (KLONOPIN) 0.5 MG tablet TAKE 1 TABLET BY MOUTH TWICE DAILY AS NEEDED FOR ANXIETY OR SLEEP 10/9/19 6/1/20 Yes Rudy Glsas MD   pantoprazole (PROTONIX) 40 MG tablet Take 40 mg by mouth 2 times daily   Yes Historical Provider, MD   topiramate (TOPAMAX) 25 MG tablet TAKE 1 TABLET BY MOUTH ONCE DAILY FOR 7 DAYS THEN 1 TWICE DAILY FOR 7 DAYS THEN 1 IN THE MORNING AND 2 IN THE EVENING FOR 7 DAYS THEN 2 TWIC 6/14/19  Yes Historical Provider, MD   losartan (COZAAR) 100 MG tablet TAKE 1 TABLET BY MOUTH ONCE DAILY 5/14/19  Yes Rudy Glass MD   metoclopramide (REGLAN) 5 MG tablet TAKE 1 TABLET BY MOUTH TWICE DAILY 4/18/19  Yes Rudy Glass MD   torsemide (DEMADEX) 10 MG tablet Take 1 tablet by mouth daily as needed (leg edema) 4/16/19  Yes Rudy Glass MD   rOPINIRole (REQUIP) 2 MG tablet Take 1 tablet by mouth daily 4/16/19  Yes Rudy Glass MD   simvastatin (ZOCOR) 20 MG tablet Take 1 tablet by mouth nightly 4/16/19  Yes Rudy Glass MD   levothyroxine (SYNTHROID) 137 MCG tablet Take 1 tablet by mouth daily 4/16/19  Yes Rudy Glass MD   gabapentin (NEURONTIN) 800 MG tablet TAKE 1 TABLET BY MOUTH THREE TIMES DAILY 4/16/19 9/4/20 Yes Rudy Glass MD   BREO ELLIPTA 200-25 MCG/INH AEPB INL 1 PUFF PO DAILY 10/7/17  Yes Historical Provider, MD   tiotropium (SPIRIVA HANDIHALER) 18 MCG inhalation capsule Inhale 1 capsule into the lungs Daily 6/19/17  Yes Johanna Rivas MD   vitamin D (CHOLECALCIFEROL) 1000 UNIT TABS tablet Take 4,000 Units by mouth daily Takes 4 tabs daily   Yes Historical Provider, MD   ONE TOUCH ULTRA TEST strip  6/7/17   Historical Provider, MD COELHO-KELVIN INSULIN SYRINGE 29G X 1/2\" 0.5 ML MISC  5/9/17   Historical Provider, MD   docusate sodium (COLACE) 100 MG capsule Take 1 capsule by mouth 2 times daily Take while on narcotics 7/7/15   Joycelyn Ross MD   Probiotic Product (PROBIOTIC DAILY PO)

## 2020-06-01 NOTE — ANESTHESIA PRE PROCEDURE
Department of Anesthesiology  Preprocedure Note       Name:  Ligia Marcial   Age:  64 y.o.  :  1960                                          MRN:  [id number]         Date:  2020      Surgeon: Karyna Chappellr):  Nina Oneill MD    Procedure: Procedure(s):  EGD ESOPHAGOGASTRODUODENOSCOPY  COLORECTAL CANCER SCREENING, NOT HIGH RISK    Medications prior to admission:   Prior to Admission medications    Medication Sig Start Date End Date Taking?  Authorizing Provider   amitriptyline (ELAVIL) 150 MG tablet TAKE 1 TABLET BY MOUTH NIGHTLY 20  Yes Kaylie Vaughan MD   metoprolol tartrate (LOPRESSOR) 50 MG tablet TAKE 1 TABLET BY MOUTH TWICE DAILY 2/10/20  Yes Kaylie Vaughan MD   amLODIPine (NORVASC) 5 MG tablet Take 1 tablet by mouth daily 19  Yes Kaylie Vaughan MD   famotidine (PEPCID) 20 MG tablet Take 20 mg by mouth 2 times daily 19  Yes Historical Provider, MD   DULoxetine (CYMBALTA) 60 MG extended release capsule TAKE 1 CAPSULE BY MOUTH ONCE DAILY 19  Yes Kaylie Vaughan MD   insulin lispro (HUMALOG) 100 UNIT/ML injection vial Inject into the skin 3 times daily (before meals) Indications: via insulin pump   Yes Historical Provider, MD   clonazePAM (KLONOPIN) 0.5 MG tablet TAKE 1 TABLET BY MOUTH TWICE DAILY AS NEEDED FOR ANXIETY OR SLEEP 10/9/19 6/1/20 Yes Kaylie Vaughan MD   pantoprazole (PROTONIX) 40 MG tablet Take 40 mg by mouth 2 times daily   Yes Historical Provider, MD   topiramate (TOPAMAX) 25 MG tablet TAKE 1 TABLET BY MOUTH ONCE DAILY FOR 7 DAYS THEN 1 TWICE DAILY FOR 7 DAYS THEN 1 IN THE MORNING AND 2 IN THE EVENING FOR 7 DAYS THEN 2 TWIC 19  Yes Historical Provider, MD   losartan (COZAAR) 100 MG tablet TAKE 1 TABLET BY MOUTH ONCE DAILY 19  Yes Kaylie Vaughan MD   metoclopramide (REGLAN) 5 MG tablet TAKE 1 TABLET BY MOUTH TWICE DAILY 19  Yes Kaylie Vaughan MD   torsemide (DEMADEX) 10 MG tablet Take 1 tablet by mouth daily as needed (leg edema) 19 status: Never Smoker    Smokeless tobacco: Never Used   Substance Use Topics    Alcohol use: No     Alcohol/week: 0.0 standard drinks     Comment: once a month                                Counseling given: Not Answered      Vital Signs (Current):   Vitals:    06/01/20 0606 06/01/20 0617   BP: (!) 144/68    Pulse: 70    Resp: 16    Temp: 97.1 °F (36.2 °C)    TempSrc: Temporal    SpO2: 94%    Weight:  194 lb (88 kg)   Height:  5' 5\" (1.651 m)                                              BP Readings from Last 3 Encounters:   06/01/20 (!) 144/68   03/23/20 (!) 169/81   02/13/20 138/64       NPO Status: Time of last liquid consumption: 2355                        Time of last solid consumption: 2355                        Date of last liquid consumption: 05/31/20                        Date of last solid food consumption: 05/30/20    BMI:   Wt Readings from Last 3 Encounters:   06/01/20 194 lb (88 kg)   03/23/20 192 lb (87.1 kg)   02/13/20 197 lb (89.4 kg)     Body mass index is 32.28 kg/m².     CBC:   Lab Results   Component Value Date    WBC 9.8 03/23/2020    RBC 4.42 03/23/2020    RBC 4.34 05/14/2012    HGB 13.2 03/23/2020    HCT 40.0 03/23/2020    MCV 90.5 03/23/2020    RDW 13.6 03/23/2020     03/23/2020     05/14/2012       CMP:   Lab Results   Component Value Date     03/23/2020    K 4.0 03/23/2020     03/23/2020    CO2 23 03/23/2020    BUN 10 05/21/2020    CREATININE 0.96 05/21/2020    GFRAA >60 05/21/2020    LABGLOM 60 05/21/2020    GLUCOSE 215 03/23/2020    GLUCOSE 342 05/14/2012    PROT 6.8 03/23/2020    PROT 6.8 02/27/2013    CALCIUM 9.2 03/23/2020    BILITOT 0.30 03/23/2020    ALKPHOS 105 03/23/2020    AST 12 03/23/2020    ALT 9 03/23/2020       POC Tests:   Recent Labs     06/01/20  0623   POCGLU 123*       Coags: No results found for: PROTIME, INR, APTT    HCG (If Applicable): No results found for: PREGTESTUR, PREGSERUM, HCG, HCGQUANT     ABGs: No results found for: PHART,

## 2020-06-01 NOTE — OP NOTE
Operative Note      Patient: Abril Garner  YOB: 1960  MRN: [id number]    Date of Procedure: 6/1/2020    Pre-Op Diagnosis: DX ABD PAIN, WT LOSS    Post-Op Diagnosis:      Diffuse antral gastritis and retained stool in the colon  Procedure(s):  EGD with CLOtest biopsy and antral biopsy  Flexible sigmoidoscopy    Surgeon(s):  Ariana Chauhan MD    Assistant:   Joao Miles D.O. Anesthesia: [facility]est[facility]    Estimated Blood Loss (mL): Minimal    Complications: None    Specimens:   Antral biopsy and CLOtest biopsy    Implants:  None      Drains: None    Findings: Diffuse antral gastritis, flexible sigmoidoscopy secondary to poor prep with retained solid stool    Detailed Description of Procedure:   After verbal and written consent, the patient was brought to the endoscopy suite. A timeout was performed with the staff in the room confirming both the patient and the procedure to be performed. The procedure was begun by placing a bite block. Monitored anesthesia care was administered. An upper endoscope was inserted into the patient's posterior pharynx and under direct visualization was advanced down the esophagus and into the stomach. The pylorus was intubated and the duodenum appeared grossly normal.  The endoscope was withdrawn back into the stomach. There is noted to be evidence of antral gastritis and bile reflux. An antral biopsy was performed and a CLOtest biopsy were performed. The endoscope was retroflexed within the stomach demonstrating no proximal gastric lesions. The endoscope was then straightened. There was noted to be mild distal esophagitis. There was no evidence of esophageal lesions. The endoscope was removed. The patient was then repositioned. A rectal exam was performed demonstrating no masses. Retained fecal material was present. A colonoscope was inserted into the patient's anus and advanced to the level of the sigmoid colon.   At this point, the endoscopy was limited secondary to retained fecal material.  Within the mid to distal sigmoid colon and rectum, no obvious lesions were identified however the views were limited secondary to the poor prep. The endoscope was retroflexed within the rectum demonstrating no distal rectal lesions. The endoscope was straightened and removed. The patient was transported to the recovery in stable condition. There were no immediate complications.     Electronically signed by Aniya Cardona MD on 6/1/2020 at 7:21 AM

## 2020-06-02 LAB
DIRECT EXAM: NEGATIVE
Lab: NORMAL
SPECIMEN DESCRIPTION: NORMAL
SURGICAL PATHOLOGY REPORT: NORMAL

## 2020-07-03 ENCOUNTER — HOSPITAL ENCOUNTER (OUTPATIENT)
Dept: PREADMISSION TESTING | Age: 60
Setting detail: SPECIMEN
Discharge: HOME | End: 2020-07-07
Payer: MEDICARE

## 2020-07-03 LAB
SARS-COV-2, PCR: NOT DETECTED
SARS-COV-2, RAPID: NORMAL
SARS-COV-2: NORMAL
SOURCE: NORMAL

## 2020-07-03 PROCEDURE — U0004 COV-19 TEST NON-CDC HGH THRU: HCPCS

## 2020-07-04 ENCOUNTER — TELEPHONE (OUTPATIENT)
Dept: PRIMARY CARE CLINIC | Age: 60
End: 2020-07-04

## 2020-07-07 ENCOUNTER — ANESTHESIA (OUTPATIENT)
Dept: OPERATING ROOM | Age: 60
End: 2020-07-07
Payer: MEDICARE

## 2020-07-07 ENCOUNTER — ANESTHESIA EVENT (OUTPATIENT)
Dept: OPERATING ROOM | Age: 60
End: 2020-07-07
Payer: MEDICARE

## 2020-07-07 ENCOUNTER — HOSPITAL ENCOUNTER (OUTPATIENT)
Age: 60
Setting detail: OUTPATIENT SURGERY
Discharge: HOME | End: 2020-07-07
Attending: SURGERY | Admitting: SURGERY
Payer: MEDICARE

## 2020-07-07 VITALS
HEART RATE: 69 BPM | TEMPERATURE: 97.9 F | HEIGHT: 65 IN | OXYGEN SATURATION: 93 % | SYSTOLIC BLOOD PRESSURE: 135 MMHG | RESPIRATION RATE: 10 BRPM | BODY MASS INDEX: 31.49 KG/M2 | WEIGHT: 189 LBS | DIASTOLIC BLOOD PRESSURE: 76 MMHG

## 2020-07-07 VITALS
RESPIRATION RATE: 15 BRPM | OXYGEN SATURATION: 96 % | DIASTOLIC BLOOD PRESSURE: 59 MMHG | SYSTOLIC BLOOD PRESSURE: 117 MMHG

## 2020-07-07 LAB
GLUCOSE BLD-MCNC: 207 MG/DL (ref 65–105)
GLUCOSE BLD-MCNC: 227 MG/DL (ref 65–105)

## 2020-07-07 PROCEDURE — 7100000010 HC PHASE II RECOVERY - FIRST 15 MIN: Performed by: SURGERY

## 2020-07-07 PROCEDURE — 82947 ASSAY GLUCOSE BLOOD QUANT: CPT

## 2020-07-07 PROCEDURE — 2500000003 HC RX 250 WO HCPCS: Performed by: NURSE ANESTHETIST, CERTIFIED REGISTERED

## 2020-07-07 PROCEDURE — 3700000001 HC ADD 15 MINUTES (ANESTHESIA): Performed by: SURGERY

## 2020-07-07 PROCEDURE — 7100000011 HC PHASE II RECOVERY - ADDTL 15 MIN: Performed by: SURGERY

## 2020-07-07 PROCEDURE — 2580000003 HC RX 258: Performed by: ANESTHESIOLOGY

## 2020-07-07 PROCEDURE — 3700000000 HC ANESTHESIA ATTENDED CARE: Performed by: SURGERY

## 2020-07-07 PROCEDURE — 3609027000 HC COLONOSCOPY: Performed by: SURGERY

## 2020-07-07 PROCEDURE — 2709999900 HC NON-CHARGEABLE SUPPLY: Performed by: SURGERY

## 2020-07-07 PROCEDURE — 6360000002 HC RX W HCPCS: Performed by: NURSE ANESTHETIST, CERTIFIED REGISTERED

## 2020-07-07 RX ORDER — LIDOCAINE HYDROCHLORIDE 20 MG/ML
INJECTION, SOLUTION EPIDURAL; INFILTRATION; INTRACAUDAL; PERINEURAL PRN
Status: DISCONTINUED | OUTPATIENT
Start: 2020-07-07 | End: 2020-07-07 | Stop reason: SDUPTHER

## 2020-07-07 RX ORDER — SODIUM CHLORIDE 0.9 % (FLUSH) 0.9 %
10 SYRINGE (ML) INJECTION PRN
Status: DISCONTINUED | OUTPATIENT
Start: 2020-07-07 | End: 2020-07-07 | Stop reason: HOSPADM

## 2020-07-07 RX ORDER — SODIUM CHLORIDE, SODIUM LACTATE, POTASSIUM CHLORIDE, CALCIUM CHLORIDE 600; 310; 30; 20 MG/100ML; MG/100ML; MG/100ML; MG/100ML
INJECTION, SOLUTION INTRAVENOUS CONTINUOUS
Status: DISCONTINUED | OUTPATIENT
Start: 2020-07-07 | End: 2020-07-07 | Stop reason: HOSPADM

## 2020-07-07 RX ORDER — PROPOFOL 10 MG/ML
INJECTION, EMULSION INTRAVENOUS PRN
Status: DISCONTINUED | OUTPATIENT
Start: 2020-07-07 | End: 2020-07-07 | Stop reason: SDUPTHER

## 2020-07-07 RX ORDER — LIDOCAINE HYDROCHLORIDE 10 MG/ML
1 INJECTION, SOLUTION EPIDURAL; INFILTRATION; INTRACAUDAL; PERINEURAL
Status: DISCONTINUED | OUTPATIENT
Start: 2020-07-07 | End: 2020-07-07 | Stop reason: HOSPADM

## 2020-07-07 RX ORDER — SODIUM CHLORIDE 0.9 % (FLUSH) 0.9 %
10 SYRINGE (ML) INJECTION EVERY 12 HOURS SCHEDULED
Status: DISCONTINUED | OUTPATIENT
Start: 2020-07-07 | End: 2020-07-07 | Stop reason: HOSPADM

## 2020-07-07 RX ORDER — SODIUM CHLORIDE 9 MG/ML
INJECTION, SOLUTION INTRAVENOUS CONTINUOUS
Status: DISCONTINUED | OUTPATIENT
Start: 2020-07-07 | End: 2020-07-07

## 2020-07-07 RX ADMIN — SODIUM CHLORIDE, POTASSIUM CHLORIDE, SODIUM LACTATE AND CALCIUM CHLORIDE: 600; 310; 30; 20 INJECTION, SOLUTION INTRAVENOUS at 07:25

## 2020-07-07 RX ADMIN — PROPOFOL 20 MG: 10 INJECTION, EMULSION INTRAVENOUS at 07:55

## 2020-07-07 RX ADMIN — PROPOFOL 20 MG: 10 INJECTION, EMULSION INTRAVENOUS at 08:02

## 2020-07-07 RX ADMIN — LIDOCAINE HYDROCHLORIDE 60 MG: 20 INJECTION, SOLUTION EPIDURAL; INFILTRATION; INTRACAUDAL; PERINEURAL at 07:52

## 2020-07-07 RX ADMIN — PROPOFOL 20 MG: 10 INJECTION, EMULSION INTRAVENOUS at 07:57

## 2020-07-07 RX ADMIN — PROPOFOL 20 MG: 10 INJECTION, EMULSION INTRAVENOUS at 08:00

## 2020-07-07 RX ADMIN — PROPOFOL 70 MG: 10 INJECTION, EMULSION INTRAVENOUS at 07:52

## 2020-07-07 ASSESSMENT — PULMONARY FUNCTION TESTS
PIF_VALUE: 1
PIF_VALUE: 0
PIF_VALUE: 49
PIF_VALUE: 39
PIF_VALUE: 1
PIF_VALUE: 1
PIF_VALUE: 28

## 2020-07-07 ASSESSMENT — PAIN - FUNCTIONAL ASSESSMENT: PAIN_FUNCTIONAL_ASSESSMENT: 0-10

## 2020-07-07 ASSESSMENT — PAIN SCALES - GENERAL
PAINLEVEL_OUTOF10: 0

## 2020-07-07 ASSESSMENT — PAIN DESCRIPTION - DESCRIPTORS: DESCRIPTORS: BURNING

## 2020-07-07 NOTE — PROGRESS NOTES
Discharge instructions reviewed with patient and then reviewed with patient's mother on the phone. No questions voiced.

## 2020-07-07 NOTE — ANESTHESIA PRE PROCEDURE
Department of Anesthesiology  Preprocedure Note       Name:  Debby Deras   Age:  64 y.o.  :  1960                                          MRN:  [id number]         Date:  2020      Surgeon: Christina Mendes):  Ginna Song MD    Procedure: Procedure(s):  COLORECTAL CANCER SCREENING, NOT HIGH RISK    Medications prior to admission:   Prior to Admission medications    Medication Sig Start Date End Date Taking?  Authorizing Provider   amitriptyline (ELAVIL) 150 MG tablet TAKE 1 TABLET BY MOUTH NIGHTLY 20  Yes Herbert Velazquez MD   metoprolol tartrate (LOPRESSOR) 50 MG tablet TAKE 1 TABLET BY MOUTH TWICE DAILY 2/10/20  Yes Herbert Velazquez MD   amLODIPine (NORVASC) 5 MG tablet Take 1 tablet by mouth daily 19  Yes Herbert Velazquez MD   famotidine (PEPCID) 20 MG tablet Take 20 mg by mouth 2 times daily 19  Yes Historical Provider, MD   DULoxetine (CYMBALTA) 60 MG extended release capsule TAKE 1 CAPSULE BY MOUTH ONCE DAILY 19  Yes Herbert Velazquez MD   insulin lispro (HUMALOG) 100 UNIT/ML injection vial Inject into the skin 3 times daily (before meals) Indications: via insulin pump   Yes Historical Provider, MD   clonazePAM (KLONOPIN) 0.5 MG tablet TAKE 1 TABLET BY MOUTH TWICE DAILY AS NEEDED FOR ANXIETY OR SLEEP 10/9/19 7/7/20 Yes Herbert Velazquez MD   pantoprazole (PROTONIX) 40 MG tablet Take 40 mg by mouth 2 times daily   Yes Historical Provider, MD   topiramate (TOPAMAX) 25 MG tablet TAKE 1 TABLET BY MOUTH ONCE DAILY FOR 7 DAYS THEN 1 TWICE DAILY FOR 7 DAYS THEN 1 IN THE MORNING AND 2 IN THE EVENING FOR 7 DAYS THEN 2 TWIC 19  Yes Historical Provider, MD   losartan (COZAAR) 100 MG tablet TAKE 1 TABLET BY MOUTH ONCE DAILY 19  Yes Herbert Velazquez MD   metoclopramide (REGLAN) 5 MG tablet TAKE 1 TABLET BY MOUTH TWICE DAILY 19  Yes Herbert Velazquez MD   simvastatin (ZOCOR) 20 MG tablet Take 1 tablet by mouth nightly 19  Yes Herbert Velazquez MD   gabapentin (NEURONTIN) 800 MG tablet TAKE 1 TABLET BY MOUTH THREE TIMES DAILY 4/16/19 9/4/20 Yes Herbert Velazquez MD   BREO ELLIPTA 200-25 MCG/INH AEPB INL 1 PUFF PO DAILY 10/7/17  Yes Historical Provider, MD   tiotropium (Prabhjot Dilling) 18 MCG inhalation capsule Inhale 1 capsule into the lungs Daily 6/19/17  Yes Johanna Rivas MD   vitamin D (CHOLECALCIFEROL) 1000 UNIT TABS tablet Take 4,000 Units by mouth daily Takes 4 tabs daily   Yes Historical Provider, MD   docusate sodium (COLACE) 100 MG capsule Take 1 capsule by mouth 2 times daily Take while on narcotics 7/7/15  Yes Ginna Song MD   Probiotic Product (PROBIOTIC DAILY PO)   Take 1 tablet by mouth    Yes Historical Provider, MD   torsemide (DEMADEX) 10 MG tablet Take 1 tablet by mouth daily as needed (leg edema) 4/16/19   Herbert Velazquez MD   rOPINIRole (REQUIP) 2 MG tablet Take 1 tablet by mouth daily 4/16/19   Herbert Velazquez MD   levothyroxine (SYNTHROID) 137 MCG tablet Take 1 tablet by mouth daily 4/16/19   Herbert Velazquez MD   ONE TOUCH ULTRA TEST strip  6/7/17   Historical Provider, MD   B-D INSULIN SYRINGE 29G X 1/2\" 0.5 ML MISC  5/9/17   Historical Provider, MD   aspirin 81 MG tablet Take 81 mg by mouth daily. Historical Provider, MD       Current medications:    Current Facility-Administered Medications   Medication Dose Route Frequency Provider Last Rate Last Dose    lactated ringers infusion   Intravenous Continuous Elvis Branch  mL/hr at 07/07/20 0725      sodium chloride flush 0.9 % injection 10 mL  10 mL Intravenous 2 times per day Elvis Branch MD        sodium chloride flush 0.9 % injection 10 mL  10 mL Intravenous PRN Elvis Branch MD        lidocaine PF 1 % injection 1 mL  1 mL Intradermal Once PRN Elvis Branch MD           Allergies:     Allergies   Allergen Reactions    Fioricet [Butalbital-Apap-Caffeine] Shortness Of Breath    Betamethasone     Erythromycin      Other reaction(s): Unknown  Eye ointment caused swelling    Codeine Hypertension     Incontinence     MDRO (multiple drug resistant organisms) resistance 2012    mrsa (nasal), eye, ear    Neurogenic bladder     CATHES HERSELF 7 TIMES A DAY, IS ABLE TO URINATE ON OWN    Neuropathy     feet    Obesity     Osteoarthritis     Peripheral vascular disease, unspecified ([address]     Restless leg syndrome     Rhabdomyolysis     [facility] 1 week, May 2014, then [facility] for rehab.     Spinal stenosis, thoracic 5/27/2014    Stroke [facility]) 2012    numbness on the left side of face    Thyroid disease     enlarged    Trigeminal neuralgia     Type II or unspecified type diabetes mellitus without mention of complication, not stated as uncontrolled        Past Surgical History:        Procedure Laterality Date    ABDOMEN SURGERY      LAPAROSCOPY    CARPAL TUNNEL RELEASE Right     CATARACT REMOVAL      CHOLECYSTECTOMY      CHOLECYSTECTOMY, OPEN      11/2012    COLON SURGERY      benign mass removed    COLONOSCOPY      COLONOSCOPY  07/13/2016    COLONOSCOPY  06/01/2020    incomplete/poor prep    COLONOSCOPY  07/07/2020    CYST REMOVAL      sebaceous cyst, under arm left side x5    CYST REMOVAL      right ankle    CYSTOSCOPY      EYE SURGERY Right     HOLE IN RETINA REPAIRED    FINGER TRIGGER RELEASE Right     INCISIONAL HERNIA REPAIR  07/07/15    open    INCISIONAL HERNIA REPAIR  10/17/2017    MIDDLE EAR SURGERY Left     ear tube placement    POLYPECTOMY      colon polyp    VT REPAIR INCISIONAL HERNIA,REDUCIBLE N/A 10/17/2017    HERNIA INCISIONAL REPAIR WITH MESH performed by Pat Dye MD at [address] [place] [address] Road 6/1/2020    SIGMOIDOSCOPY DIAGNOSTIC FLEXIBLE performed by Pat Dye MD at [address] [place] [address] ENDOSCOPY  07/13/2016    UPPER GASTROINTESTINAL ENDOSCOPY  07/23/2018    with biopsy    UPPER GASTROINTESTINAL ENDOSCOPY N/A 7/23/2018    EGD BIOPSY performed by Pat Dye MD at [facility]    UPPER GASTROINTESTINAL ENDOSCOPY  06/01/2020    with biopsy    UPPER GASTROINTESTINAL ENDOSCOPY N/A 6/1/2020    EGD BIOPSY performed by Antionette Alford MD at [facility] History:    Social History     Tobacco Use    Smoking status: Never Smoker    Smokeless tobacco: Never Used   Substance Use Topics    Alcohol use: No     Alcohol/week: 0.0 standard drinks     Comment: once a month                                Counseling given: Not Answered      Vital Signs (Current):   Vitals:    07/07/20 0648 07/07/20 0809 07/07/20 0815   BP: (!) 112/52 122/64    Pulse: 74 69 71   Resp: 18 17 20   Temp: 98.4 °F (36.9 °C) 97.9 °F (36.6 °C)    TempSrc: Oral Temporal    SpO2: 92% 93% 92%   Weight: 189 lb (85.7 kg)     Height: 5' 5\" (1.651 m)                                                BP Readings from Last 3 Encounters:   07/07/20 122/64   07/07/20 (!) 117/59   06/01/20 (!) 149/77       NPO Status: Time of last liquid consumption: 2359                        Time of last solid consumption: 2359                        Date of last liquid consumption: 07/06/20                        Date of last solid food consumption: 07/04/20    BMI:   Wt Readings from Last 3 Encounters:   07/07/20 189 lb (85.7 kg)   06/01/20 194 lb (88 kg)   03/23/20 192 lb (87.1 kg)     Body mass index is 31.45 kg/m².     CBC:   Lab Results   Component Value Date    WBC 9.8 03/23/2020    RBC 4.42 03/23/2020    RBC 4.34 05/14/2012    HGB 13.2 03/23/2020    HCT 40.0 03/23/2020    MCV 90.5 03/23/2020    RDW 13.6 03/23/2020     03/23/2020     05/14/2012       CMP:   Lab Results   Component Value Date     03/23/2020    K 4.0 03/23/2020     03/23/2020    CO2 23 03/23/2020    BUN 10 05/21/2020    CREATININE 0.96 05/21/2020    GFRAA >60 05/21/2020    LABGLOM 60 05/21/2020    GLUCOSE 215 03/23/2020    GLUCOSE 342 05/14/2012    PROT 6.8 03/23/2020    PROT 6.8 02/27/2013    CALCIUM 9.2 03/23/2020    BILITOT 0.30 03/23/2020 ALKPHOS 105 03/23/2020    AST 12 03/23/2020    ALT 9 03/23/2020       POC Tests:   Recent Labs     07/07/20  0816   POCGLU 207*       Coags: No results found for: PROTIME, INR, APTT    HCG (If Applicable): No results found for: PREGTESTUR, PREGSERUM, HCG, HCGQUANT     ABGs: No results found for: PHART, PO2ART, JNG9LCT, HBL7NUC, BEART, W8ITZSSA     Type & Screen (If Applicable):  No results found for: LABABO, LABRH    Drug/Infectious Status (If Applicable):  No results found for: HIV, HEPCAB    COVID-19 Screening (If Applicable):   Lab Results   Component Value Date    COVID19 Not Detected 07/03/2020         Anesthesia Evaluation  Patient summary reviewed and Nursing notes reviewed no history of anesthetic complications:   Airway: Mallampati: II  TM distance: >3 FB   Neck ROM: full  Mouth opening: > = 3 FB Dental: normal exam         Pulmonary:normal exam    (+) COPD:  asthma:                            Cardiovascular:  Exercise tolerance: no interval change,   (+) hypertension:, CAD:, CHF:,           Rate: normal                    Neuro/Psych:   (+) CVA:, psychiatric history:            GI/Hepatic/Renal:   (+) GERD:,           Endo/Other:    (+) Diabetes, hypothyroidism::., .                 Abdominal:           Vascular:                                        Anesthesia Plan      MAC and general     ASA 3       Induction: intravenous. Anesthetic plan and risks discussed with patient. Plan discussed with CRNA.     Attending anesthesiologist reviewed and agrees with Pre Eval content              Edie Corral DO   7/7/2020

## 2020-07-07 NOTE — PROGRESS NOTES
Pt discharged with her dentures in her mouth, her insulin pump on her and her insulin pump controller with her.

## 2020-07-07 NOTE — H&P
History and Physical Service   [facility] [place] - INPATIENT    HISTORY AND PHYSICAL EXAMINATION            Date of Evaluation: 7/7/2020  Patient name:  Jayden Harmon  MRN:   [id number]  YOB: 1960  PCP:    Luisito García MD    History Obtained From:     Patient, medical records    History of Present Illness: This is Jayden Harmon a 64 y.o. female with multiple known cormorbidities managed by specialists  She presents today for colorectal cancer screening by Dr. Aris Ellis for abdominal pain. Patient with chronic gastrointestinal dysmotility, multiple previous abdominal surgeries and gastroparesis. She admits that the abdominal pain is less often or intense and nausea has subsided. S/p colonoscopy by Dr Yonatan Moe 6/1/20 which had retained stool and needed to be repeated  The patient presents today for her rescheduled exam.  She followed the bowel prep until watery yellow clear  No FH colon cancer Multiple previous colonoscopies.  She denies fever, chills, increased SOB, cough, palpitations, or chest pain  Known DM with insulin pump POC   Hx neuropathy and neurogenic bladder who self caths Last done this am  Last ASA 81mg 6/30/20     Past Medical History:     Past Medical History:   Diagnosis Date    Anesthesia complication     \"lung collapsed after colon surgery    Anxiety     Arthritis     Back pain     CAD (coronary artery disease)     no stents    Caffeine use     3 coffee / day    Cataract     left    COPD (chronic obstructive pulmonary disease) ([address]     EMPHYSEMA    Deafness     right ear    Depression     Diabetes mellitus ([address]     Diarrhea     Diverticulosis     Fibromyalgia     Gastroparalysis     GERD (gastroesophageal reflux disease)     Hearing loss     DEAF IN RIGHT EAR    Hyperlipidemia     Hyperlipidemia     Hypertension     Incontinence     MDRO (multiple drug resistant organisms) resistance 2012    mrsa (nasal), eye, ear    Neurogenic bladder     CATHES HERSELF 7 TIMES A DAY, IS ABLE TO URINATE ON OWN    Neuropathy     feet    Obesity     Osteoarthritis     Peripheral vascular disease, unspecified ([address]     Restless leg syndrome     Rhabdomyolysis     [facility] week[facility]     Spinal stenosis, thoracic 5/27/2014    Stroke [facility]) 2012    numbness on the left side of face    Thyroid disease     enlarged    Trigeminal neuralgia     Type II or unspecified type diabetes mellitus without mention of complication, not stated as uncontrolled         Past Surgical History:     Past Surgical History:   Procedure Laterality Date    ABDOMEN SURGERY      LAPAROSCOPY    CARPAL TUNNEL RELEASE Right     CATARACT REMOVAL      CHOLECYSTECTOMY      CHOLECYSTECTOMY, OPEN      11/2012    COLON SURGERY      benign mass removed    COLONOSCOPY      COLONOSCOPY  07/13/2016    COLONOSCOPY  06/01/2020    incomplete/poor prep    CYST REMOVAL      sebaceous cyst, under arm left side x5    CYST REMOVAL      right ankle    CYSTOSCOPY      EYE SURGERY Right     HOLE IN RETINA REPAIRED    FINGER TRIGGER RELEASE Right     INCISIONAL HERNIA REPAIR  07/07/15    open   [address]  10/17/2017    MIDDLE EAR SURGERY Left     ear tube placement    POLYPECTOMY      colon polyp    ND REPAIR INCISIONAL HERNIA,REDUCIBLE N/A 10/17/2017    HERNIA INCISIONAL REPAIR WITH MESH performed by Murali Marino MD at [address] N/A 6/1/2020    [address] [facility] performed by Murali Marino MD at [address] ENDOSCOPY  07/13/2016    UPPER GASTROINTESTINAL ENDOSCOPY  07/23/2018    with biopsy    UPPER GASTROINTESTINAL ENDOSCOPY N/A 7/23/2018    EGD BIOPSY performed by Murali Marino MD at [address]  06/01/2020    with biopsy    UPPER GASTROINTESTINAL ENDOSCOPY N/A 6/1/2020    EGD BIOPSY performed by Naya Chaney MD at [address] [place] [address]        Medications Prior to Admission:     Prior to Admission medications    Medication Sig Start Date End Date Taking?  Authorizing Provider   amitriptyline (ELAVIL) 150 MG tablet TAKE 1 TABLET BY MOUTH NIGHTLY 2/21/20   Kady Bermudez MD   metoprolol tartrate (LOPRESSOR) 50 MG tablet TAKE 1 TABLET BY MOUTH TWICE DAILY 2/10/20   Kady Bermudez MD   amLODIPine (NORVASC) 5 MG tablet Take 1 tablet by mouth daily 12/12/19   Kady Bermudez MD   famotidine (PEPCID) 20 MG tablet Take 20 mg by mouth 2 times daily 11/19/19   Historical Provider, MD   DULoxetine (CYMBALTA) 60 MG extended release capsule TAKE 1 CAPSULE BY MOUTH ONCE DAILY 11/13/19   Kady Bermudez MD   insulin lispro (HUMALOG) 100 UNIT/ML injection vial Inject into the skin 3 times daily (before meals) Indications: via insulin pump    Historical Provider, MD   clonazePAM (KLONOPIN) 0.5 MG tablet TAKE 1 TABLET BY MOUTH TWICE DAILY AS NEEDED FOR ANXIETY OR SLEEP 10/9/19 6/1/20  Kady Bermudez MD   pantoprazole (PROTONIX) 40 MG tablet Take 40 mg by mouth 2 times daily    Historical Provider, MD   topiramate (TOPAMAX) 25 MG tablet TAKE 1 TABLET BY MOUTH ONCE DAILY FOR 7 DAYS THEN 1 TWICE DAILY FOR 7 DAYS THEN 1 IN THE MORNING AND 2 IN THE EVENING FOR 7 DAYS THEN 2 TWIC 6/14/19   Historical Provider, MD   losartan (COZAAR) 100 MG tablet TAKE 1 TABLET BY MOUTH ONCE DAILY 5/14/19   Kady Bermudez MD   metoclopramide (REGLAN) 5 MG tablet TAKE 1 TABLET BY MOUTH TWICE DAILY 4/18/19   Kady Bermudez MD   torsemide (DEMADEX) 10 MG tablet Take 1 tablet by mouth daily as needed (leg edema) 4/16/19   Kady Bermudez MD   rOPINIRole (REQUIP) 2 MG tablet Take 1 tablet by mouth daily 4/16/19   Kady Bermudez MD   simvastatin (ZOCOR) 20 MG tablet Take 1 tablet by mouth nightly 4/16/19   Kady Bermudez MD   levothyroxine (SYNTHROID) 137 MCG tablet Take 1 tablet by mouth daily 4/16/19   Kady Bermudez MD   gabapentin (NEURONTIN) 800 MG tablet TAKE 1 TABLET BY MOUTH THREE TIMES DAILY 4/16/19 9/4/20  Sunny Kuldip, MD   BREKELLEY ELLIPTA 200-25 MCG/INH AEPB INL 1 PUFF PO DAILY 10/7/17   Historical Provider, MD   tiotropium (Barbaraann Distad) 18 MCG inhalation capsule Inhale 1 capsule into the lungs Daily 6/19/17   Pamela Villarreal MD   ONE TOUCH ULTRA TEST strip  6/7/17   Historical Provider, MD COELHO-KELVIN INSULIN SYRINGE 29G X 1/2\" 0.5 ML MISC  5/9/17   Historical Provider, MD   vitamin D (CHOLECALCIFEROL) 1000 UNIT TABS tablet Take 4,000 Units by mouth daily Takes 4 tabs daily    Historical Provider, MD   docusate sodium (COLACE) 100 MG capsule Take 1 capsule by mouth 2 times daily Take while on narcotics 7/7/15   Hernandez Spencer MD   Probiotic Product (PROBIOTIC DAILY PO)   Take 1 tablet by mouth     Historical Provider, MD   aspirin 81 MG tablet Take 81 mg by mouth daily. Historical Provider, MD        Allergies:     Fioricet [butalbital-apap-caffeine]; Betamethasone; Erythromycin; Codeine; Droperidol; and Tape [adhesive tape]    Social History:     Tobacco:    reports that she has never smoked. She has never used smokeless tobacco.  Alcohol:      reports no history of alcohol use. Drug Use:  reports current drug use. Frequency: 4.00 times per week. Drug: Marijuana. Family History:     Family History   Problem Relation Age of Onset    High Blood Pressure Mother     Migraines Mother     High Blood Pressure Father     Heart Disease Father     Cancer Father         skin    Diabetes Maternal Grandmother     Heart Disease Maternal Grandmother     Cancer Maternal Grandmother     Parkinsonism Maternal Grandmother     Cancer Paternal Grandmother     Other Brother         epilepsy       Review of Systems:     Positive and Negative as described in HPI.     CONSTITUTIONAL:  negative for fevers, chills, sweats, fatigue, weight loss  HEENT:  negative for vision, hearing changes, runny nose, throat pain  RESPIRATORY:  negative for shortness of breath, cough, congestion, wheezing. CARDIOVASCULAR:  negative for chest pain, palpitations. GASTROINTESTINAL: See HPI  negative for nausea, vomiting, diarrhea, constipation, change in bowel habits, abdominal pain   GENITOURINARY: Self caths  Did this am negative for difficulty of urination, burning with urination, frequency   INTEGUMENT:  negative for rash, skin lesions, easy bruising   HEMATOLOGIC/LYMPHATIC:  negative for swelling/edema   ALLERGIC/IMMUNOLOGIC:  negative for urticaria , itching  ENDOCRINE: DM with insulin pump on  negative increase in drinking, increase in urination, hot or cold intolerance  MUSCULOSKELETAL: left shoulder arthralgia \"thinks its her rotator cuff\" negative joint pains, muscle aches, swelling of joints  NEUROLOGICAL:  negative for headaches, dizziness, lightheadedness, numbness, pain, tingling extremities  BEHAVIOR/PSYCH:  negative for depression, anxiety    Physical Exam:   BP (!) 112/52   Pulse 74   Temp 98.4 °F (36.9 °C) (Oral)   Resp 18   SpO2 92%   No LMP recorded. Patient is postmenopausal. No results for input(s): POCGLU in the last 72 hours. General Appearance: obese alert, well appearing, and in no acute distress  Mental status: oriented to person, place, and time with normal affect  Head:  normocephalic, atraumatic. Eye: no icterus, redness, pupils equal and reactive, extraocular eye movements intact, conjunctiva clear  Ear: normal external ear, no discharge, hearing intact  Nose:  no drainage noted  Mouth: mucous membranes moist  Neck: supple, no carotid bruits, thyroid not palpable  Lungs: Bilateral equal air entry, clear to ausculation, no wheezing, rales or rhonchi, normal effort  Cardiovascular: HR 74  normal rate, regular rhythm, no murmur, gallop, rub.   Abdomen:  Round, soft, scars consistent with surgeries, nontender, nondistended, normal bowel sounds  Neurologic: There are no new focal motor or sensory deficits, normal muscle tone and bulk, no abnormal sensation, normal speech, cranial nerves II through XII grossly intact  Skin: No gross lesions, rashes, bruising or bleeding on exposed skin area  Extremities: decreased and mildly painful left shoulder ROM peripheral pulses palpable, no pedal edema or calf pain with palpation  Psych: normal affect     Investigations:      Laboratory Testing:  No results found for this or any previous visit (from the past 24 hour(s)). No results for input(s): HGB, HCT, WBC, MCV, PLATELET, NA, K, CL, CO2, BUN, CREATININE, GLUCOSE, INR, PROTIME, APTT, AST, ALT, LABALBU, HCG in the last 720 hours. Recent Labs     07/03/20  0830   COVID19 Not Detected                 Imaging/Diagnostics:    Diagnosis:      1. Abdominal pain    Plans:     1.  Colorectal cancer screening      KIMBERLEE Hernández CNP  7/7/2020  6:57 AM

## 2020-07-07 NOTE — OP NOTE
Operative Note      Patient: Baldo Virgen  YOB: 1960  MRN: [id number]    Date of Procedure: 7/7/2020    Pre-Op Diagnosis: Change in bowel habits, weight loss    Post-Op Diagnosis: Mild left-sided diverticulosis, external hemorrhoids       Procedure(s):   Diagnostic colonoscopy    Surgeon(s):  Audra Beltran MD    Assistant:   * No surgical staff found *    Anesthesia: Monitor Anesthesia Care    Estimated Blood Loss (mL): None    Complications: None    Specimens:   None    Implants:  None      Drains: None    Findings: This is a very pleasant 28-year-old female with a history of prior colon resection for a benign colon mass. The risks and benefits of colonoscopy given her weight loss and change in bowel habits with abdominal pain were discussed with the patient and verbal and written consent was obtained. Detailed Description of Procedure:   After verbal and written consent, the patient was brought to the endoscopy suite. A timeout was performed with the staff in the room confirming both the patient and the procedure to be performed. The procedure was begun with a rectal exam demonstrating normal tone, no gross blood, and no masses. The patient was noted to have mild external hemorrhoids. A colonoscope was then inserted in the patient's anus and under direct visualization was advanced to the level of the ileocolic anastomosis. This anastomosis was noted to be widely patent. There was some retained liquid stool. The colonoscope was slowly withdrawn carefully inspecting the colonic mucosa with manual pullback. There was no evidence of masses, polyps, or strictures within the limitations of the colon prep. Mild left-sided diverticular disease was appreciated. There was evidence of external hemorrhoids without bleeding or ulceration. The endoscope was retroflexed within the rectum demonstrating no distal rectal lesions. The endoscope was straightened and removed.   There were no

## 2020-07-07 NOTE — ANESTHESIA POSTPROCEDURE EVALUATION
Department of Anesthesiology  Postprocedure Note    Patient: Cece Chambers  MRN: [id number]  YOB: 1960  Date of evaluation: 7/7/2020  Time:  8:38 AM     Procedure Summary     Date:  07/07/20 Room / Location:  [facility]  / [facility] - INPATIENT    Anesthesia Start:  1834 Anesthesia Stop:  0813    Procedure:  COLORECTAL CANCER SCREENING, NOT HIGH RISK (N/A ) Diagnosis:  (DX ABD PAIN)    Surgeon:  Kimberley Doan MD Responsible Provider:  Urbano López DO    Anesthesia Type:  MAC, general ASA Status:  3          Anesthesia Type: No value filed. Melina Phase I:      Melina Phase II: Melina Score: 10    Last vitals: Reviewed and per EMR flowsheets.        Anesthesia Post Evaluation    Patient location during evaluation: PACU  Patient participation: complete - patient participated  Level of consciousness: awake and alert  Airway patency: patent  Nausea & Vomiting: no nausea and no vomiting  Complications: no  Cardiovascular status: hemodynamically stable  Respiratory status: acceptable  Hydration status: stable

## 2020-08-12 ENCOUNTER — APPOINTMENT (OUTPATIENT)
Dept: GENERAL RADIOLOGY | Age: 60
End: 2020-08-12
Payer: MEDICARE

## 2020-08-12 ENCOUNTER — HOSPITAL ENCOUNTER (EMERGENCY)
Age: 60
Discharge: HOME | End: 2020-08-13
Attending: EMERGENCY MEDICINE
Payer: MEDICARE

## 2020-08-12 VITALS
BODY MASS INDEX: 31.99 KG/M2 | HEART RATE: 76 BPM | RESPIRATION RATE: 20 BRPM | OXYGEN SATURATION: 96 % | SYSTOLIC BLOOD PRESSURE: 142 MMHG | HEIGHT: 65 IN | WEIGHT: 192 LBS | TEMPERATURE: 97.4 F | DIASTOLIC BLOOD PRESSURE: 74 MMHG

## 2020-08-12 PROCEDURE — 71046 X-RAY EXAM CHEST 2 VIEWS: CPT

## 2020-08-12 PROCEDURE — 99283 EMERGENCY DEPT VISIT LOW MDM: CPT

## 2020-08-12 PROCEDURE — 70360 X-RAY EXAM OF NECK: CPT

## 2020-08-12 ASSESSMENT — PAIN DESCRIPTION - ONSET: ONSET: SUDDEN

## 2020-08-12 ASSESSMENT — PAIN SCALES - GENERAL: PAINLEVEL_OUTOF10: 6

## 2020-08-12 ASSESSMENT — PAIN DESCRIPTION - PAIN TYPE: TYPE: ACUTE PAIN

## 2020-08-12 ASSESSMENT — PAIN DESCRIPTION - DESCRIPTORS: DESCRIPTORS: ACHING;OTHER (COMMENT)

## 2020-08-12 ASSESSMENT — PAIN DESCRIPTION - LOCATION: LOCATION: THROAT

## 2020-08-13 ENCOUNTER — APPOINTMENT (OUTPATIENT)
Dept: CT IMAGING | Age: 60
End: 2020-08-13
Payer: MEDICARE

## 2020-08-13 PROCEDURE — 70490 CT SOFT TISSUE NECK W/O DYE: CPT

## 2020-08-13 PROCEDURE — 6360000002 HC RX W HCPCS: Performed by: EMERGENCY MEDICINE

## 2020-08-13 PROCEDURE — 96372 THER/PROPH/DIAG INJ SC/IM: CPT

## 2020-08-13 PROCEDURE — 6370000000 HC RX 637 (ALT 250 FOR IP): Performed by: EMERGENCY MEDICINE

## 2020-08-13 RX ORDER — KETOROLAC TROMETHAMINE 30 MG/ML
30 INJECTION, SOLUTION INTRAMUSCULAR; INTRAVENOUS ONCE
Status: COMPLETED | OUTPATIENT
Start: 2020-08-13 | End: 2020-08-13

## 2020-08-13 RX ORDER — LIDOCAINE HYDROCHLORIDE 20 MG/ML
15 SOLUTION OROPHARYNGEAL ONCE
Status: COMPLETED | OUTPATIENT
Start: 2020-08-13 | End: 2020-08-13

## 2020-08-13 RX ADMIN — KETOROLAC TROMETHAMINE 30 MG: 30 INJECTION, SOLUTION INTRAMUSCULAR; INTRAVENOUS at 00:28

## 2020-08-13 RX ADMIN — LIDOCAINE HYDROCHLORIDE 15 ML: 20 SOLUTION ORAL; TOPICAL at 02:12

## 2020-08-13 ASSESSMENT — ENCOUNTER SYMPTOMS
NAUSEA: 0
SORE THROAT: 0
SHORTNESS OF BREATH: 0
CHOKING: 0
ABDOMINAL PAIN: 0
TROUBLE SWALLOWING: 1
VOICE CHANGE: 0
VOMITING: 0
COUGH: 0

## 2020-08-13 ASSESSMENT — PAIN SCALES - GENERAL: PAINLEVEL_OUTOF10: 8

## 2020-08-13 NOTE — ED NOTES
Started paging Dr. Rios Cruz at 0100. Left voicemail at 0100. Second page with [facility] answering service at [phone]. Third page at [phone] with answering service. Voicemail left. Fourth page at [address] [facility]. Unable to reach Dr. Rios Cruz. Answering service connected us with Dr. Davey Kingston.       Bill Kilo  08/13/20 6084

## 2020-08-13 NOTE — ED NOTES
Attempt to call GI. No answer. Message left.      Haily Andino RN  08/13/20 [address] [facility], [place]  08/13/20 8442

## 2020-08-13 NOTE — ED PROVIDER NOTES
unspecified type diabetes mellitus without mention of complication, not stated as uncontrolled. has a past surgical history that includes Cholecystectomy; Colon surgery; Tonsillectomy; Tubal ligation; Carpal tunnel release (Right); Middle ear surgery (Left); cyst removal; cyst removal; Cholecystectomy, open; Finger trigger release (Right); Colonoscopy; Abdomen surgery; polypectomy; Cystocopy; Cataract removal; Incisional hernia repair (07/07/15); Upper gastrointestinal endoscopy (07/13/2016); Colonoscopy (07/13/2016); eye surgery (Right); Incisional hernia repair (10/17/2017); pr repair incisional hernia,reducible (N/A, 10/17/2017); Upper gastrointestinal endoscopy (07/23/2018); Upper gastrointestinal endoscopy (N/A, 7/23/2018); Upper gastrointestinal endoscopy (06/01/2020); Colonoscopy (06/01/2020); Upper gastrointestinal endoscopy (N/A, 6/1/2020); sigmoidoscopy (N/A, 6/1/2020); Colonoscopy (07/07/2020); and Colonoscopy (N/A, 7/7/2020).     Social History     Socioeconomic History    Marital status:      Spouse name: Not on file    Number of children: 0    Years of education: 15    Highest education level: Not on file   Occupational History    Occupation: disability     Employer: N/A   Social Needs    Financial resource strain: Not on file    Food insecurity     Worry: Not on file     Inability: Not on file    Transportation needs     Medical: Not on file     Non-medical: Not on file   Tobacco Use    Smoking status: Never Smoker    Smokeless tobacco: Never Used   Substance and Sexual Activity    Alcohol use: No     Alcohol/week: 0.0 standard drinks     Comment: once a month    Drug use: Yes     Frequency: 4.0 times per week     Types: Marijuana    Sexual activity: Yes     Comment: 1 partner   Lifestyle    Physical activity     Days per week: Not on file     Minutes per session: Not on file    Stress: Not on file   Relationships    Social connections     Talks on phone: Not on file Gets together: Not on file     Attends Nondenominational service: Not on file     Active member of club or organization: Not on file     Attends meetings of clubs or organizations: Not on file     Relationship status: Not on file    Intimate partner violence     Fear of current or ex partner: Not on file     Emotionally abused: Not on file     Physically abused: Not on file     Forced sexual activity: Not on file   Other Topics Concern    Not on file   Social History Narrative    Not on file       Family History   Problem Relation Age of Onset    High Blood Pressure Mother     Migraines Mother     High Blood Pressure Father     Heart Disease Father     Cancer Father         skin    Diabetes Maternal Grandmother     Heart Disease Maternal Grandmother     Cancer Maternal Grandmother     Parkinsonism Maternal Grandmother     Cancer Paternal Grandmother     Other Brother         epilepsy       Allergies:  Fioricet [butalbital-apap-caffeine]; Betamethasone; Erythromycin; Codeine; Droperidol; and Tape [adhesive tape]    Home Medications:  Prior to Admission medications    Medication Sig Start Date End Date Taking?  Authorizing Provider   amitriptyline (ELAVIL) 150 MG tablet TAKE 1 TABLET BY MOUTH NIGHTLY 2/21/20   Jass Beltran MD   metoprolol tartrate (LOPRESSOR) 50 MG tablet TAKE 1 TABLET BY MOUTH TWICE DAILY 2/10/20   Jass Beltran MD   amLODIPine (NORVASC) 5 MG tablet Take 1 tablet by mouth daily 12/12/19   Jass Beltran MD   famotidine (PEPCID) 20 MG tablet Take 20 mg by mouth 2 times daily 11/19/19   Historical Provider, MD   DULoxetine (CYMBALTA) 60 MG extended release capsule TAKE 1 CAPSULE BY MOUTH ONCE DAILY 11/13/19   Jass Beltran MD   insulin lispro (HUMALOG) 100 UNIT/ML injection vial Inject into the skin 3 times daily (before meals) Indications: via insulin pump    Historical Provider, MD   clonazePAM (KLONOPIN) 0.5 MG tablet TAKE 1 TABLET BY MOUTH TWICE DAILY AS NEEDED FOR ANXIETY OR SLEEP 10/9/19 7/7/20  Nila Johnson MD   pantoprazole (PROTONIX) 40 MG tablet Take 40 mg by mouth 2 times daily    Historical Provider, MD   topiramate (TOPAMAX) 25 MG tablet TAKE 1 TABLET BY MOUTH ONCE DAILY FOR 7 DAYS THEN 1 TWICE DAILY FOR 7 DAYS THEN 1 IN THE MORNING AND 2 IN THE EVENING FOR 7 DAYS THEN 2 TWIC 6/14/19   Historical Provider, MD   losartan (COZAAR) 100 MG tablet TAKE 1 TABLET BY MOUTH ONCE DAILY 5/14/19   Nila Johnson MD   metoclopramide (REGLAN) 5 MG tablet TAKE 1 TABLET BY MOUTH TWICE DAILY 4/18/19   Nila Johnson MD   torsemide (DEMADEX) 10 MG tablet Take 1 tablet by mouth daily as needed (leg edema) 4/16/19   Nila Johnson MD   rOPINIRole (REQUIP) 2 MG tablet Take 1 tablet by mouth daily 4/16/19   Nila Johnson MD   simvastatin (ZOCOR) 20 MG tablet Take 1 tablet by mouth nightly 4/16/19   Nila Johnson MD   levothyroxine (SYNTHROID) 137 MCG tablet Take 1 tablet by mouth daily 4/16/19   Nila Johnson MD   gabapentin (NEURONTIN) 800 MG tablet TAKE 1 TABLET BY MOUTH THREE TIMES DAILY 4/16/19 9/4/20  Nila Johnson MD   BREO ELLIPTA 200-25 MCG/INH AEPB INL 1 PUFF PO DAILY 10/7/17   Historical Provider, MD   tiotropium (SPIRIVA HANDIHALER) 18 MCG inhalation capsule Inhale 1 capsule into the lungs Daily 6/19/17   Radha Keane MD   ONE TOUCH ULTRA TEST strip  6/7/17   Historical Provider, MD   B-D INSULIN SYRINGE 29G X 1/2\" 0.5 ML MISC  5/9/17   Historical Provider, MD   vitamin D (CHOLECALCIFEROL) 1000 UNIT TABS tablet Take 4,000 Units by mouth daily Takes 4 tabs daily    Historical Provider, MD   docusate sodium (COLACE) 100 MG capsule Take 1 capsule by mouth 2 times daily Take while on narcotics 7/7/15   Pam Evangelista MD   Probiotic Product (PROBIOTIC DAILY PO)   Take 1 tablet by mouth     Historical Provider, MD   aspirin 81 MG tablet Take 81 mg by mouth daily.     Historical Provider, MD       REVIEW OF SYSTEMS    (2-9 systems for level 4, 10 or more for level 5) sounds. No stridor. No rhonchi. Abdominal:      General: There is no distension. Palpations: Abdomen is soft. Musculoskeletal: Normal range of motion. General: No swelling, deformity or signs of injury. Skin:     General: Skin is warm and dry. Capillary Refill: Capillary refill takes less than 2 seconds. Coloration: Skin is not jaundiced. Findings: No bruising or lesion. Neurological:      General: No focal deficit present. Mental Status: She is alert and oriented to person, place, and time. Mental status is at baseline. Motor: No abnormal muscle tone. DIFFERENTIAL  DIAGNOSIS     PLAN (LABS / IMAGING / EKG):  Orders Placed This Encounter   Procedures    XR NECK SOFT TISSUE    XR CHEST (2 VW)    CT SOFT TISSUE NECK WO CONTRAST    Inpatient consult to GI       MEDICATIONS ORDERED:  Orders Placed This Encounter   Medications    ketorolac (TORADOL) injection 30 mg    lidocaine viscous hcl (XYLOCAINE) 2 % solution 15 mL       DDX: Janes Nicholas is a 64 y.o. female who presents to the emergency department with concern for toothpick stuck in throat. Differential diagnosis includes esophageal foreign body, esophageal perforation, mediastinal or subcutaneous air    DIAGNOSTIC RESULTS / EMERGENCY DEPARTMENT COURSE / MDM   LAB RESULTS:  No results found for this visit on 08/12/20. IMPRESSION: Janes Nicholas is a 64 y.o. female who presents to the emergency department with concern for toothpick stuck in throat. On examination her vital signs are unremarkable and examination demonstrates an otherwise well-appearing woman of stated age who is tolerating her own secretions. Plan for x-ray soft tissue neck, chest x-ray, likely CT scan of the neck, disposition pending imaging and symptomatic improvement. RADIOLOGY:  No results found.     EKG  None    All EKG's are interpreted by the Emergency Department Physician who either signs or co-signs this chart in the absence of a cardiologist.    EMERGENCY DEPARTMENT COURSE:  ED Course as of Aug 13 1505   Thu Aug 13, 2020   0059 Will obtain GI consultation for consideration for EGD.    [TS]   0132 Awaiting GI Q to call back. Patient updated on plan of care. Patient endorsing minor burning in the midsternal chest after deep inspiration with the PA and lateral chest x-rays. No squeezing chest pain or radiation. [TS]   [id number] Patient signed out to Dr. Adrian You. [TS]   6554 Spoke to Dr. Beto Sanchez MD  [TS] Luba Gonzales MD       PROCEDURES:  None    CONSULTS:  IP CONSULT TO GI    CRITICAL CARE:  Please see attending note. FINAL IMPRESSION      1. Swallowed foreign body, initial encounter          DISPOSITION / PLAN     DISPOSITION        PATIENT REFERRED TO:  Tone Denise MD  [address] [facility]. [phone]    Schedule an appointment as soon as possible for a visit       [address] [address]  [phone]  Go to   As needed, If symptoms worsen      DISCHARGE MEDICATIONS:  Discharge Medication List as of 8/13/2020  2:06 AM          Luba Gonzales MD  Emergency Medicine Resident    This patient was evaluated in the Emergency Department for symptoms described in the history of present illness. He/she was evaluated in the context of the global COVID-19 pandemic, which necessitated consideration that the patient might be at risk for infection with the SARS-CoV-2 virus that causes COVID-19. Institutional protocols and algorithms that pertain to the evaluation of patients at risk for COVID-19 are in a state of rapid change based on information released by regulatory bodies including the CDC and federal and state organizations. These policies and algorithms were followed during the patient's care in the ED.     (Please note that portions of thisnote were completed with a voice recognition program.  [employer]orts

## 2020-08-15 NOTE — ED PROVIDER NOTES
[address] [facility] ED  Emergency Department  Faculty Attestation       I performed a history and physical examination of the patient and discussed management with the resident. I reviewed the residents note and agree with the documented findings including all diagnostic interpretations and plan of care. Any areas of disagreement are noted on the chart. I was personally present for the key portions of any procedures. I have documented in the chart those procedures where I was not present during the key portions. I have reviewed the emergency nurses triage note. I agree with the chief complaint, past medical history, past surgical history, allergies, medications, social and family history as documented unless otherwise noted below. Documentation of the HPI, Physical Exam and Medical Decision Making performed by scribcierra is based on my personal performance of the HPI, PE and MDM. For Physician Assistant/ Nurse Practitioner cases/documentation I have personally evaluated this patient and have completed at least one if not all key elements of the E/M (history, physical exam, and MDM). Additional findings are as noted. Pertinent Comments     Primary Care Physician: Aimee Siegel MD    ED Triage Vitals [08/12/20 2244]   BP Temp Temp Source Pulse Resp SpO2 Height Weight   (!) 142/74 97.4 °F (36.3 °C) Oral 76 20 96 % 5' 5\" (1.651 m) 192 lb (87.1 kg)        History/Physical: This is a 64 y.o. female who presents to the Emergency Department with complaint of concerned that she swallowed a wooden toothpick. Was eating ventura wrapped jalapeno, then felt something sharp in the back of her throat. Now has the pain in the mid throat. On exam, patient is well-appearing in no acute distress. Controlling secretions well. And swallowing without difficulty. She does have some tenderness over the tracheal region, but no crepitus.   Posterior pharynx clear with no erythema or edema lung sounds clear to auscultation abdomen soft nontender. MDM/Plan: Possible swallowed foreign body. Start with basic chest x-ray. Soft tissue neck x-ray. Was read as negative, there is a small area of hyperdensity on the x-ray concerning therefore get CT scan of the neck. Also did bedside ultrasound, where there is a questionable foreign body but not able to fully visualize. CT negative. Resident signed care out to me at the end of his shift.     2:04 spoke to Dr. Evin Jeronimo who stated if the patient is able to swallow and tolerate p.o. intake, that she is okay for discharge home. Return if she has severe pain, difficulty swallowing, or any other concerns arise. Discussed with the patient and she is comfortable going home at this time. Did give her some viscous lidocaine to drink to help with the pain prior to leaving. PPE:  Patient was screened and has no clinical signs or symptoms of a CoVID-19 infection at this time. However, given current pandemic and atypical presentations, face mask, eye protection, and gloves were worn during examination.       CRITICAL CARE: None     Mer Parada MD  Attending Emergency Physician        Mer Parada MD  08/14/20 4390

## 2020-08-30 ENCOUNTER — OFFICE VISIT (OUTPATIENT)
Dept: PRIMARY CARE CLINIC | Age: 60
End: 2020-08-30
Payer: MEDICARE

## 2020-08-30 VITALS
DIASTOLIC BLOOD PRESSURE: 78 MMHG | HEART RATE: 76 BPM | SYSTOLIC BLOOD PRESSURE: 133 MMHG | HEIGHT: 65 IN | OXYGEN SATURATION: 95 % | WEIGHT: 194 LBS | BODY MASS INDEX: 32.32 KG/M2 | TEMPERATURE: 98.3 F

## 2020-08-30 PROCEDURE — 1036F TOBACCO NON-USER: CPT | Performed by: FAMILY MEDICINE

## 2020-08-30 PROCEDURE — G8427 DOCREV CUR MEDS BY ELIG CLIN: HCPCS | Performed by: FAMILY MEDICINE

## 2020-08-30 PROCEDURE — G8417 CALC BMI ABV UP PARAM F/U: HCPCS | Performed by: FAMILY MEDICINE

## 2020-08-30 PROCEDURE — 3017F COLORECTAL CA SCREEN DOC REV: CPT | Performed by: FAMILY MEDICINE

## 2020-08-30 PROCEDURE — 99214 OFFICE O/P EST MOD 30 MIN: CPT | Performed by: FAMILY MEDICINE

## 2020-08-30 RX ORDER — AMOXICILLIN AND CLAVULANATE POTASSIUM 875; 125 MG/1; MG/1
1 TABLET, FILM COATED ORAL 2 TIMES DAILY
Qty: 14 TABLET | Refills: 0 | Status: SHIPPED | OUTPATIENT
Start: 2020-08-30 | End: 2020-09-06

## 2020-08-30 ASSESSMENT — ENCOUNTER SYMPTOMS
RESPIRATORY NEGATIVE: 1
GASTROINTESTINAL NEGATIVE: 1

## 2020-08-30 NOTE — PATIENT INSTRUCTIONS
Patient Education        Animal Bites: Care Instructions  Your Care Instructions  After an animal bite, the biggest concern is infection. The chance of infection depends on the type of animal that bit you, where on your body you were bitten, and your general health. Many animal bites are not closed with stitches, because this can increase the chance of infection. Your bite may take as little as 7 days or as long as several months to heal, depending on how bad it is. Taking good care of your wound at home will help it heal and reduce your chance of infection. The doctor has checked you carefully, but problems can develop later. If you notice any problems or new symptoms, get medical treatment right away. Follow-up care is a key part of your treatment and safety. Be sure to make and go to all appointments, and call your doctor if you are having problems. It's also a good idea to know your test results and keep a list of the medicines you take. How can you care for yourself at home? · If your doctor told you how to care for your wound, follow your doctor's instructions. If you did not get instructions, follow this general advice:  ? After 24 to 48 hours, gently wash the wound with clean water 2 times a day. Do not scrub or soak the wound. Don't use hydrogen peroxide or alcohol, which can slow healing. ? You may cover the wound with a thin layer of petroleum jelly, such as Vaseline, and a nonstick bandage. ? Apply more petroleum jelly and replace the bandage as needed. · After you shower, gently dry the wound with a clean towel. · If your doctor has closed the wound, cover the bandage with a plastic bag before you take a shower. · A small amount of skin redness and swelling around the wound edges and the stitches or staples is normal. Your wound may itch or feel irritated. Do not scratch or rub the wound.   · Ask your doctor if you can take an over-the-counter pain medicine, such as acetaminophen (Tylenol), ibuprofen (Advil, Motrin), or naproxen (Aleve). Read and follow all instructions on the label. · Do not take two or more pain medicines at the same time unless the doctor told you to. Many pain medicines have acetaminophen, which is Tylenol. Too much acetaminophen (Tylenol) can be harmful. · If your bite puts you at risk for rabies, you will get a series of shots over the next few weeks to prevent rabies. Your doctor will tell you when to get the shots. It is very important that you get the full cycle of shots. Follow your doctor's instructions exactly. · You may need a tetanus shot if you have not received one in the last 5 years. · If your doctor prescribed antibiotics, take them as directed. Do not stop taking them just because you feel better. You need to take the full course of antibiotics. When should you call for help? Call your doctor now or seek immediate medical care if:  · The skin near the bite turns cold or pale or it changes color. · You lose feeling in the area near the bite, or it feels numb or tingly. · You have trouble moving a limb near the bite. · You have symptoms of infection, such as:  ? Increased pain, swelling, warmth, or redness near the wound. ? Red streaks leading from the wound. ? Pus draining from the wound. ? A fever. · Blood soaks through the bandage. Oozing small amounts of blood is normal.  · Your pain is getting worse. Watch closely for changes in your health, and be sure to contact your doctor if you are not getting better as expected. Where can you learn more? Go to [email or web address] and sign in to your [employer] account. Enter A467 in the [employer] box to learn more about \"Animal Bites: Care Instructions. \"     If you do not have an account, please click on the \"Sign Up Now\" link. Current as of: June 26, 2019               Content Version: 12.5  © 7804-6459 [employer].    Care instructions adapted under license by

## 2020-08-30 NOTE — PROGRESS NOTES
disease     enlarged    Trigeminal neuralgia     Type II or unspecified type diabetes mellitus without mention of complication, not stated as uncontrolled     Past medical history reviewed and pertinent positives/negatives in the HPI    Past Surgical History:   Procedure Laterality Date    ABDOMEN SURGERY      LAPAROSCOPY    CARPAL TUNNEL RELEASE Right     CATARACT REMOVAL      CHOLECYSTECTOMY      CHOLECYSTECTOMY, OPEN      11/2012    COLON SURGERY      benign mass removed    COLONOSCOPY      COLONOSCOPY  07/13/2016    COLONOSCOPY  06/01/2020    incomplete/poor prep    COLONOSCOPY  07/07/2020    COLONOSCOPY N/A 7/7/2020    COLORECTAL CANCER SCREENING, NOT HIGH RISK performed by Hernandez Spencer MD at [facility]      sebaceous cyst, under arm left side x5    CYST REMOVAL      right ankle    CYSTOSCOPY      EYE SURGERY Right     HOLE IN RETINA REPAIRED    FINGER TRIGGER RELEASE Right     INCISIONAL HERNIA REPAIR  07/07/15    open   [address]  10/17/2017    MIDDLE EAR SURGERY Left     ear tube placement    POLYPECTOMY      colon polyp    NH REPAIR INCISIONAL HERNIA,REDUCIBLE N/A 10/17/2017    HERNIA INCISIONAL REPAIR WITH MESH performed by Hernandez Spencer MD at [address] [place] [address] Road 6/1/2020    [address] [facility] performed by Hernandez Spencer MD at [facility] ENDOSCOPY  07/13/2016    UPPER GASTROINTESTINAL ENDOSCOPY  07/23/2018    with biopsy    UPPER GASTROINTESTINAL ENDOSCOPY N/A 7/23/2018    EGD BIOPSY performed by Hernandez Spencer MD at [address]  06/01/2020    with biopsy    UPPER GASTROINTESTINAL ENDOSCOPY N/A 6/1/2020    EGD BIOPSY performed by Hernandez Spencer MD at [address] [facility] History   Problem Relation Age of Onset    High Blood Pressure Mother     Migraines Mother     High Blood Pressure Father     Heart Disease Father    [place] Cancer Father         skin    Diabetes Maternal Grandmother     Heart Disease Maternal Grandmother     Cancer Maternal Grandmother     Parkinsonism Maternal Grandmother     Cancer Paternal Grandmother     Other Brother         epilepsy       Social History     Tobacco Use    Smoking status: Never Smoker    Smokeless tobacco: Never Used   Substance Use Topics    Alcohol use: No     Alcohol/week: 0.0 standard drinks     Comment: once a month      Current Outpatient Medications   Medication Sig Dispense Refill    amoxicillin-clavulanate (AUGMENTIN) 875-125 MG per tablet Take 1 tablet by mouth 2 times daily for 7 days 14 tablet 0    amitriptyline (ELAVIL) 150 MG tablet TAKE 1 TABLET BY MOUTH NIGHTLY 30 tablet 0    metoprolol tartrate (LOPRESSOR) 50 MG tablet TAKE 1 TABLET BY MOUTH TWICE DAILY 180 tablet 0    amLODIPine (NORVASC) 5 MG tablet Take 1 tablet by mouth daily 90 tablet 3    famotidine (PEPCID) 20 MG tablet Take 20 mg by mouth 2 times daily  6    DULoxetine (CYMBALTA) 60 MG extended release capsule TAKE 1 CAPSULE BY MOUTH ONCE DAILY 90 capsule 1    insulin lispro (HUMALOG) 100 UNIT/ML injection vial Inject into the skin 3 times daily (before meals) Indications: via insulin pump      pantoprazole (PROTONIX) 40 MG tablet Take 40 mg by mouth 2 times daily      topiramate (TOPAMAX) 25 MG tablet TAKE 1 TABLET BY MOUTH ONCE DAILY FOR 7 DAYS THEN 1 TWICE DAILY FOR 7 DAYS THEN 1 IN THE MORNING AND 2 IN THE EVENING FOR 7 DAYS THEN 2 TWIC  11    losartan (COZAAR) 100 MG tablet TAKE 1 TABLET BY MOUTH ONCE DAILY 90 tablet 3    metoclopramide (REGLAN) 5 MG tablet TAKE 1 TABLET BY MOUTH TWICE DAILY 180 tablet 3    torsemide (DEMADEX) 10 MG tablet Take 1 tablet by mouth daily as needed (leg edema) 90 tablet 3    rOPINIRole (REQUIP) 2 MG tablet Take 1 tablet by mouth daily 90 tablet 3    simvastatin (ZOCOR) 20 MG tablet Take 1 tablet by mouth nightly 90 tablet 3    levothyroxine (SYNTHROID) 137 MCG tablet Take 1 tablet by mouth daily 90 tablet 3    gabapentin (NEURONTIN) 800 MG tablet TAKE 1 TABLET BY MOUTH THREE TIMES DAILY 270 tablet 0    BREO ELLIPTA 200-25 MCG/INH AEPB INL 1 PUFF PO DAILY  6    tiotropium (SPIRIVA HANDIHALER) 18 MCG inhalation capsule Inhale 1 capsule into the lungs Daily 30 capsule 5    ONE TOUCH ULTRA TEST strip   1    B-D INSULIN SYRINGE 29G X 1/2\" 0.5 ML MISC       vitamin D (CHOLECALCIFEROL) 1000 UNIT TABS tablet Take 4,000 Units by mouth daily Takes 4 tabs daily      docusate sodium (COLACE) 100 MG capsule Take 1 capsule by mouth 2 times daily Take while on narcotics 60 capsule 0    Probiotic Product (PROBIOTIC DAILY PO)   Take 1 tablet by mouth       aspirin 81 MG tablet Take 81 mg by mouth daily.  clonazePAM (KLONOPIN) 0.5 MG tablet TAKE 1 TABLET BY MOUTH TWICE DAILY AS NEEDED FOR ANXIETY OR SLEEP 60 tablet 2     No current facility-administered medications for this visit.       Allergies   Allergen Reactions    Fioricet [Butalbital-Apap-Caffeine] Shortness Of Breath    Betamethasone     Erythromycin      Other reaction(s): Unknown  Eye ointment caused swelling    Codeine Nausea And Vomiting and Nausea Only    Droperidol Anxiety    Tape [Adhesive Tape] Rash       Health Maintenance   Topic Date Due    Hepatitis C screen  1960    HIV screen  10/05/1977    Hepatitis B vaccine (1 of 3 - Risk 3-dose series) 10/05/1981    Shingles Vaccine (1 of 2) 10/05/2012    Cervical cancer screen  04/30/2018    Diabetic retinal exam  07/09/2019    Annual Wellness Visit (AWV)  09/05/2019    A1C test (Diabetic or Prediabetic)  09/14/2019    Breast cancer screen  05/08/2020    Flu vaccine (1) 09/01/2020    Diabetic foot exam  12/11/2020    Diabetic microalbuminuria test  12/11/2020    Lipid screen  12/11/2020    TSH testing  12/11/2020    Potassium monitoring  03/23/2021    Creatinine monitoring  05/21/2021    Colon cancer screen colonoscopy  07/07/2030    DTaP/Tdap/Td vaccine (3 - Td) 08/30/2030    Pneumococcal 0-64 years Vaccine  Completed    Hepatitis A vaccine  Aged Out    Hib vaccine  Aged Out    Meningococcal (ACWY) vaccine  Aged Out       :      Review of Systems   Constitutional: Negative for chills and fever. HENT: Negative. Respiratory: Negative. Cardiovascular: Negative. Gastrointestinal: Negative. Skin: Positive for wound. Negative for pallor and rash. Neurological: Negative for tingling, focal weakness, weakness and numbness. Hematological: Negative for adenopathy. Objective:     Physical Exam  Vitals signs and nursing note reviewed. Constitutional:       Appearance: Normal appearance. HENT:      Head: Normocephalic and atraumatic. Right Ear: Hearing normal.      Left Ear: Hearing normal.      Nose: Nose normal.      Mouth/Throat:      Lips: Pink. Eyes:      Extraocular Movements: Extraocular movements intact. Conjunctiva/sclera: Conjunctivae normal.   Neck:      Musculoskeletal: Normal range of motion. Cardiovascular:      Rate and Rhythm: Normal rate and regular rhythm. Pulses: Normal pulses. Pulmonary:      Effort: Pulmonary effort is normal.   Musculoskeletal: Normal range of motion. General: Swelling (mild, left dorsal forearm) present. Skin:     General: Skin is warm and dry. Comments: Approx 4-5mm puncture wound to left mid dorsal forearm with mild swelling   Neurological:      General: No focal deficit present. Mental Status: She is alert and oriented to person, place, and time. Mental status is at baseline. Psychiatric:         Mood and Affect: Mood normal.         Behavior: Behavior normal.         Thought Content:  Thought content normal.         Judgment: Judgment normal.       /78 (Site: Right Upper Arm, Position: Sitting, Cuff Size: Large Adult)   Pulse 76   Temp 98.3 °F (36.8 °C) (Infrared)   Ht 5' 5\" (1.651 m)   Wt 194 lb (88 kg)   SpO2 95%   BMI 32.28 kg/m²      Wound was cleaned thoroughly with wound wash and dressed with bacitracin and nonstick bandage. No sutures needed at this time. Assessment:       Diagnosis Orders   1. Dog bite, initial encounter         Plan:   Tetanus vaccine updated today  Take antibiotic as prescribed to prevent infection  Keep wound clean and dry. May apply triple antibiotic ointment as needed  Watch pet for any signs of rabies. If any signs appear then take to vet for evaluation immediately. You will need rabies vaccine if pet is positive for rabies  If symptoms worsen or do not improve please follow-up with PCP or return to clinic     No orders of the defined types were placed in this encounter. Orders Placed This Encounter   Medications    amoxicillin-clavulanate (AUGMENTIN) 875-125 MG per tablet     Sig: Take 1 tablet by mouth 2 times daily for 7 days     Dispense:  14 tablet     Refill:  0    Tetanus-Diphth-Acell Pertussis (BOOSTRIX) injection 0.5 mL      Patient given educational materials - see patient instructions. Discussed use, benefit, and side effects of prescribed medications. All patient questions answered. Pt voiced understanding. Patient agreed with treatment plan. Follow up as directed.      Electronicallysigned by Tammy Dawn MD on 8/30/2020 at 12:58 PM

## 2020-09-04 RX ORDER — AMITRIPTYLINE HYDROCHLORIDE 150 MG/1
TABLET, FILM COATED ORAL
Qty: 30 TABLET | Refills: 0 | OUTPATIENT
Start: 2020-09-04

## 2020-10-16 ENCOUNTER — APPOINTMENT (OUTPATIENT)
Dept: CT IMAGING | Age: 60
End: 2020-10-16
Payer: MEDICARE

## 2020-10-16 ENCOUNTER — HOSPITAL ENCOUNTER (OUTPATIENT)
Age: 60
Setting detail: OBSERVATION
Discharge: HOME | End: 2020-10-17
Attending: EMERGENCY MEDICINE | Admitting: INTERNAL MEDICINE
Payer: MEDICARE

## 2020-10-16 PROBLEM — G45.9 TIA (TRANSIENT ISCHEMIC ATTACK): Status: ACTIVE | Noted: 2020-10-16

## 2020-10-16 LAB
ABSOLUTE EOS #: 0.3 K/UL (ref 0–0.4)
ABSOLUTE IMMATURE GRANULOCYTE: NORMAL K/UL (ref 0–0.3)
ABSOLUTE LYMPH #: 2.6 K/UL (ref 1–4.8)
ABSOLUTE MONO #: 0.6 K/UL (ref 0.1–1.3)
ALBUMIN SERPL-MCNC: 4 G/DL (ref 3.5–5.2)
ALBUMIN/GLOBULIN RATIO: ABNORMAL (ref 1–2.5)
ALP BLD-CCNC: 104 U/L (ref 35–104)
ALT SERPL-CCNC: 10 U/L (ref 5–33)
ANION GAP SERPL CALCULATED.3IONS-SCNC: 9 MMOL/L (ref 9–17)
AST SERPL-CCNC: 15 U/L
BASOPHILS # BLD: 1 % (ref 0–2)
BASOPHILS ABSOLUTE: 0.1 K/UL (ref 0–0.2)
BILIRUB SERPL-MCNC: 0.4 MG/DL (ref 0.3–1.2)
BUN BLDV-MCNC: 11 MG/DL (ref 6–20)
BUN/CREAT BLD: ABNORMAL (ref 9–20)
C-REACTIVE PROTEIN: 10.2 MG/L (ref 0–5)
CALCIUM SERPL-MCNC: 9.4 MG/DL (ref 8.6–10.4)
CHLORIDE BLD-SCNC: 102 MMOL/L (ref 98–107)
CO2: 26 MMOL/L (ref 20–31)
CREAT SERPL-MCNC: 0.63 MG/DL (ref 0.5–0.9)
DIFFERENTIAL TYPE: NORMAL
EOSINOPHILS RELATIVE PERCENT: 3 % (ref 0–4)
GFR AFRICAN AMERICAN: >60 ML/MIN
GFR NON-AFRICAN AMERICAN: >60 ML/MIN
GFR SERPL CREATININE-BSD FRML MDRD: ABNORMAL ML/MIN/{1.73_M2}
GFR SERPL CREATININE-BSD FRML MDRD: ABNORMAL ML/MIN/{1.73_M2}
GLUCOSE BLD-MCNC: 182 MG/DL (ref 70–99)
GLUCOSE BLD-MCNC: 254 MG/DL (ref 65–105)
HCT VFR BLD CALC: 41 % (ref 36–46)
HEMOGLOBIN: 14.1 G/DL (ref 12–16)
IMMATURE GRANULOCYTES: NORMAL %
LYMPHOCYTES # BLD: 30 % (ref 24–44)
MCH RBC QN AUTO: 30.2 PG (ref 26–34)
MCHC RBC AUTO-ENTMCNC: 34.2 G/DL (ref 31–37)
MCV RBC AUTO: 88.1 FL (ref 80–100)
MONOCYTES # BLD: 7 % (ref 1–7)
NRBC AUTOMATED: NORMAL PER 100 WBC
PDW BLD-RTO: 13.3 % (ref 11.5–14.9)
PLATELET # BLD: 252 K/UL (ref 150–450)
PLATELET ESTIMATE: NORMAL
PMV BLD AUTO: 8 FL (ref 6–12)
POTASSIUM SERPL-SCNC: 3.9 MMOL/L (ref 3.7–5.3)
RBC # BLD: 4.66 M/UL (ref 4–5.2)
RBC # BLD: NORMAL 10*6/UL
SEDIMENTATION RATE, ERYTHROCYTE: 28 MM (ref 0–30)
SEG NEUTROPHILS: 59 % (ref 36–66)
SEGMENTED NEUTROPHILS ABSOLUTE COUNT: 5 K/UL (ref 1.3–9.1)
SODIUM BLD-SCNC: 137 MMOL/L (ref 135–144)
TOTAL PROTEIN: 6.8 G/DL (ref 6.4–8.3)
TROPONIN INTERP: NORMAL
TROPONIN T: NORMAL NG/ML
TROPONIN, HIGH SENSITIVITY: 9 NG/L (ref 0–14)
WBC # BLD: 8.5 K/UL (ref 3.5–11)
WBC # BLD: NORMAL 10*3/UL

## 2020-10-16 PROCEDURE — 99285 EMERGENCY DEPT VISIT HI MDM: CPT

## 2020-10-16 PROCEDURE — 85652 RBC SED RATE AUTOMATED: CPT

## 2020-10-16 PROCEDURE — 96374 THER/PROPH/DIAG INJ IV PUSH: CPT

## 2020-10-16 PROCEDURE — 6370000000 HC RX 637 (ALT 250 FOR IP): Performed by: FAMILY MEDICINE

## 2020-10-16 PROCEDURE — 70450 CT HEAD/BRAIN W/O DYE: CPT

## 2020-10-16 PROCEDURE — 6360000002 HC RX W HCPCS: Performed by: EMERGENCY MEDICINE

## 2020-10-16 PROCEDURE — 82947 ASSAY GLUCOSE BLOOD QUANT: CPT

## 2020-10-16 PROCEDURE — 84484 ASSAY OF TROPONIN QUANT: CPT

## 2020-10-16 PROCEDURE — G0426 INPT/ED TELECONSULT50: HCPCS | Performed by: PSYCHIATRY & NEUROLOGY

## 2020-10-16 PROCEDURE — 85025 COMPLETE CBC W/AUTO DIFF WBC: CPT

## 2020-10-16 PROCEDURE — 6370000000 HC RX 637 (ALT 250 FOR IP): Performed by: EMERGENCY MEDICINE

## 2020-10-16 PROCEDURE — G0378 HOSPITAL OBSERVATION PER HR: HCPCS

## 2020-10-16 PROCEDURE — 2580000003 HC RX 258: Performed by: FAMILY MEDICINE

## 2020-10-16 PROCEDURE — 80053 COMPREHEN METABOLIC PANEL: CPT

## 2020-10-16 PROCEDURE — 93005 ELECTROCARDIOGRAM TRACING: CPT | Performed by: EMERGENCY MEDICINE

## 2020-10-16 PROCEDURE — 86140 C-REACTIVE PROTEIN: CPT

## 2020-10-16 RX ORDER — POLYETHYLENE GLYCOL 3350 17 G/17G
17 POWDER, FOR SOLUTION ORAL DAILY PRN
Status: DISCONTINUED | OUTPATIENT
Start: 2020-10-16 | End: 2020-10-17 | Stop reason: HOSPADM

## 2020-10-16 RX ORDER — AMITRIPTYLINE HYDROCHLORIDE 50 MG/1
50 TABLET, FILM COATED ORAL NIGHTLY
Status: DISCONTINUED | OUTPATIENT
Start: 2020-10-16 | End: 2020-10-17

## 2020-10-16 RX ORDER — LEVOTHYROXINE SODIUM 0.12 MG/1
125 TABLET ORAL DAILY
Status: DISCONTINUED | OUTPATIENT
Start: 2020-10-17 | End: 2020-10-17 | Stop reason: HOSPADM

## 2020-10-16 RX ORDER — PANTOPRAZOLE SODIUM 40 MG/1
40 TABLET, DELAYED RELEASE ORAL 2 TIMES DAILY
Status: DISCONTINUED | OUTPATIENT
Start: 2020-10-16 | End: 2020-10-17 | Stop reason: HOSPADM

## 2020-10-16 RX ORDER — ROPINIROLE 1 MG/1
2 TABLET, FILM COATED ORAL DAILY
Status: DISCONTINUED | OUTPATIENT
Start: 2020-10-17 | End: 2020-10-17 | Stop reason: HOSPADM

## 2020-10-16 RX ORDER — SODIUM CHLORIDE 0.9 % (FLUSH) 0.9 %
10 SYRINGE (ML) INJECTION PRN
Status: DISCONTINUED | OUTPATIENT
Start: 2020-10-16 | End: 2020-10-17 | Stop reason: HOSPADM

## 2020-10-16 RX ORDER — POTASSIUM CHLORIDE 7.45 MG/ML
10 INJECTION INTRAVENOUS PRN
Status: DISCONTINUED | OUTPATIENT
Start: 2020-10-16 | End: 2020-10-17 | Stop reason: HOSPADM

## 2020-10-16 RX ORDER — PROMETHAZINE HYDROCHLORIDE 25 MG/1
12.5 TABLET ORAL EVERY 6 HOURS PRN
Status: DISCONTINUED | OUTPATIENT
Start: 2020-10-16 | End: 2020-10-17 | Stop reason: HOSPADM

## 2020-10-16 RX ORDER — DEXTROSE MONOHYDRATE 25 G/50ML
12.5 INJECTION, SOLUTION INTRAVENOUS PRN
Status: DISCONTINUED | OUTPATIENT
Start: 2020-10-16 | End: 2020-10-17 | Stop reason: HOSPADM

## 2020-10-16 RX ORDER — TOPIRAMATE 25 MG/1
25 TABLET ORAL 2 TIMES DAILY
Status: DISCONTINUED | OUTPATIENT
Start: 2020-10-16 | End: 2020-10-17 | Stop reason: HOSPADM

## 2020-10-16 RX ORDER — BUDESONIDE AND FORMOTEROL FUMARATE DIHYDRATE 160; 4.5 UG/1; UG/1
2 AEROSOL RESPIRATORY (INHALATION) 2 TIMES DAILY
Status: DISCONTINUED | OUTPATIENT
Start: 2020-10-16 | End: 2020-10-17 | Stop reason: HOSPADM

## 2020-10-16 RX ORDER — GABAPENTIN 400 MG/1
800 CAPSULE ORAL 3 TIMES DAILY
Status: DISCONTINUED | OUTPATIENT
Start: 2020-10-16 | End: 2020-10-17 | Stop reason: HOSPADM

## 2020-10-16 RX ORDER — ASPIRIN 325 MG
325 TABLET ORAL ONCE
Status: COMPLETED | OUTPATIENT
Start: 2020-10-16 | End: 2020-10-16

## 2020-10-16 RX ORDER — SODIUM CHLORIDE 0.9 % (FLUSH) 0.9 %
10 SYRINGE (ML) INJECTION EVERY 12 HOURS SCHEDULED
Status: DISCONTINUED | OUTPATIENT
Start: 2020-10-16 | End: 2020-10-17 | Stop reason: HOSPADM

## 2020-10-16 RX ORDER — ACETAMINOPHEN 650 MG/1
650 SUPPOSITORY RECTAL EVERY 6 HOURS PRN
Status: DISCONTINUED | OUTPATIENT
Start: 2020-10-16 | End: 2020-10-17 | Stop reason: HOSPADM

## 2020-10-16 RX ORDER — INSULIN DEGLUDEC INJECTION 100 U/ML
INJECTION, SOLUTION SUBCUTANEOUS
COMMUNITY
End: 2020-10-16

## 2020-10-16 RX ORDER — ONDANSETRON 2 MG/ML
4 INJECTION INTRAMUSCULAR; INTRAVENOUS EVERY 6 HOURS PRN
Status: DISCONTINUED | OUTPATIENT
Start: 2020-10-16 | End: 2020-10-17 | Stop reason: HOSPADM

## 2020-10-16 RX ORDER — MAGNESIUM SULFATE 1 G/100ML
1 INJECTION INTRAVENOUS PRN
Status: DISCONTINUED | OUTPATIENT
Start: 2020-10-16 | End: 2020-10-17 | Stop reason: HOSPADM

## 2020-10-16 RX ORDER — DULOXETIN HYDROCHLORIDE 30 MG/1
90 CAPSULE, DELAYED RELEASE ORAL DAILY
COMMUNITY
End: 2022-07-27

## 2020-10-16 RX ORDER — METOPROLOL TARTRATE 50 MG/1
50 TABLET, FILM COATED ORAL 2 TIMES DAILY
Status: DISCONTINUED | OUTPATIENT
Start: 2020-10-16 | End: 2020-10-17 | Stop reason: HOSPADM

## 2020-10-16 RX ORDER — METOCLOPRAMIDE 5 MG/1
5 TABLET ORAL
Status: DISCONTINUED | OUTPATIENT
Start: 2020-10-17 | End: 2020-10-17

## 2020-10-16 RX ORDER — INSULIN ASPART 100 [IU]/ML
INJECTION, SOLUTION INTRAVENOUS; SUBCUTANEOUS
COMMUNITY
End: 2020-10-16 | Stop reason: DRUGHIGH

## 2020-10-16 RX ORDER — METOCLOPRAMIDE 10 MG/1
10 TABLET ORAL 2 TIMES DAILY
COMMUNITY
End: 2022-06-21 | Stop reason: SDUPTHER

## 2020-10-16 RX ORDER — DEXTROSE MONOHYDRATE 50 MG/ML
100 INJECTION, SOLUTION INTRAVENOUS PRN
Status: DISCONTINUED | OUTPATIENT
Start: 2020-10-16 | End: 2020-10-17 | Stop reason: HOSPADM

## 2020-10-16 RX ORDER — ACETAMINOPHEN 325 MG/1
650 TABLET ORAL EVERY 6 HOURS PRN
Status: DISCONTINUED | OUTPATIENT
Start: 2020-10-16 | End: 2020-10-17 | Stop reason: HOSPADM

## 2020-10-16 RX ORDER — NICOTINE POLACRILEX 4 MG
15 LOZENGE BUCCAL PRN
Status: DISCONTINUED | OUTPATIENT
Start: 2020-10-16 | End: 2020-10-17 | Stop reason: HOSPADM

## 2020-10-16 RX ORDER — AMLODIPINE BESYLATE 5 MG/1
5 TABLET ORAL DAILY
Status: DISCONTINUED | OUTPATIENT
Start: 2020-10-17 | End: 2020-10-17 | Stop reason: HOSPADM

## 2020-10-16 RX ORDER — CLONAZEPAM 1 MG/1
0.5 TABLET ORAL 2 TIMES DAILY PRN
Status: DISCONTINUED | OUTPATIENT
Start: 2020-10-16 | End: 2020-10-17 | Stop reason: HOSPADM

## 2020-10-16 RX ORDER — ATORVASTATIN CALCIUM 10 MG/1
10 TABLET, FILM COATED ORAL DAILY
Status: DISCONTINUED | OUTPATIENT
Start: 2020-10-17 | End: 2020-10-17 | Stop reason: HOSPADM

## 2020-10-16 RX ORDER — POTASSIUM CHLORIDE 20 MEQ/1
40 TABLET, EXTENDED RELEASE ORAL PRN
Status: DISCONTINUED | OUTPATIENT
Start: 2020-10-16 | End: 2020-10-17 | Stop reason: HOSPADM

## 2020-10-16 RX ORDER — DULOXETIN HYDROCHLORIDE 60 MG/1
60 CAPSULE, DELAYED RELEASE ORAL DAILY
Status: DISCONTINUED | OUTPATIENT
Start: 2020-10-17 | End: 2020-10-17 | Stop reason: HOSPADM

## 2020-10-16 RX ORDER — ASPIRIN 81 MG/1
81 TABLET, CHEWABLE ORAL DAILY
Status: DISCONTINUED | OUTPATIENT
Start: 2020-10-17 | End: 2020-10-17 | Stop reason: HOSPADM

## 2020-10-16 RX ORDER — LOSARTAN POTASSIUM 100 MG/1
100 TABLET ORAL DAILY
Status: DISCONTINUED | OUTPATIENT
Start: 2020-10-17 | End: 2020-10-17 | Stop reason: HOSPADM

## 2020-10-16 RX ORDER — INSULIN ASPART 100 [IU]/ML
INJECTION, SOLUTION INTRAVENOUS; SUBCUTANEOUS CONTINUOUS
COMMUNITY

## 2020-10-16 RX ORDER — METHYLPREDNISOLONE SODIUM SUCCINATE 125 MG/2ML
125 INJECTION, POWDER, LYOPHILIZED, FOR SOLUTION INTRAMUSCULAR; INTRAVENOUS ONCE
Status: COMPLETED | OUTPATIENT
Start: 2020-10-16 | End: 2020-10-16

## 2020-10-16 RX ORDER — METOPROLOL TARTRATE 100 MG/1
50 TABLET ORAL 2 TIMES DAILY
COMMUNITY
End: 2022-08-19 | Stop reason: SDUPTHER

## 2020-10-16 RX ADMIN — PANTOPRAZOLE SODIUM 40 MG: 40 TABLET, DELAYED RELEASE ORAL at 23:07

## 2020-10-16 RX ADMIN — GABAPENTIN 800 MG: 400 CAPSULE ORAL at 23:07

## 2020-10-16 RX ADMIN — ASPIRIN 325 MG ORAL TABLET 325 MG: 325 PILL ORAL at 19:33

## 2020-10-16 RX ADMIN — CLONAZEPAM 0.5 MG: 1 TABLET ORAL at 23:06

## 2020-10-16 RX ADMIN — AMITRIPTYLINE HYDROCHLORIDE 50 MG: 50 TABLET, FILM COATED ORAL at 23:23

## 2020-10-16 RX ADMIN — METOPROLOL TARTRATE 50 MG: 50 TABLET, FILM COATED ORAL at 23:07

## 2020-10-16 RX ADMIN — Medication 10 ML: at 23:10

## 2020-10-16 RX ADMIN — METHYLPREDNISOLONE SODIUM SUCCINATE 125 MG: 125 INJECTION, POWDER, FOR SOLUTION INTRAMUSCULAR; INTRAVENOUS at 19:33

## 2020-10-16 ASSESSMENT — PAIN DESCRIPTION - PAIN TYPE: TYPE: ACUTE PAIN

## 2020-10-16 ASSESSMENT — ENCOUNTER SYMPTOMS
ABDOMINAL PAIN: 0
BACK PAIN: 0
NAUSEA: 0
VOMITING: 0
COUGH: 1

## 2020-10-16 ASSESSMENT — PAIN DESCRIPTION - LOCATION: LOCATION: HEAD

## 2020-10-16 ASSESSMENT — PAIN DESCRIPTION - ORIENTATION: ORIENTATION: LEFT

## 2020-10-16 ASSESSMENT — PAIN SCALES - GENERAL
PAINLEVEL_OUTOF10: 0
PAINLEVEL_OUTOF10: 4

## 2020-10-16 ASSESSMENT — PAIN DESCRIPTION - ONSET: ONSET: SUDDEN

## 2020-10-16 ASSESSMENT — PAIN DESCRIPTION - DESCRIPTORS: DESCRIPTORS: ACHING

## 2020-10-16 NOTE — FLOWSHEET NOTE
Got medical update from [address]     10/16/20 1918   Encounter Summary   Services provided to: Patient not available  (patient face timing neurologist)

## 2020-10-16 NOTE — VIRTUAL HEALTH
Consults  Patient Location:  [address] [place] [address] ED    Provider Location ([facility]): [place], [place]  This virtual visit was conducted via interactive/real-time audio/video. [facility] [place] [facility]roke [facility] Vascular Neurology Consult for  [facility] Stroke Alert through [address] @ 18:38  10/16/2020 6:44 PM  Pt Name: Ana Chung  MRN: [id number]  YOB: 1960  Date of evaluation: 10/16/2020  Primary Care Physician: Joshua Amanda MD  Reason for Evaluation: Stroke Evaluation with Discussion with Ed or primary team with Telemedicine and stroke evaluation with Review of imaging and labs    Ana Chung is a 64 y.o. female with hx of DM1, HTN, osteoporosis,  Neuropathy of feet, depression, stroke 10 years. Around midnight she c/o shape pain in the left temporal head and left side hand and foot feeling numb, she woke up this AM feeling that her left arm and left leg now feel now, due to her hx of stroke 10 years ago, her PCP told her to come into the ED    Patient stated that she initially had a sharp head pain on the left temporal lobe but this resolved, she just felt that something is wrong on the left temporal lobe she felt like it is sore. She otherwise denies headache now, she does not complaint of any visual aura. No weakness or slurred speech. Her stroke 10 years ago he only complaint was left face numbess, she was told she had a stroke on MRI. She didn't take aspirin anymore, she does not know why.     [phone]  Allergies  is allergic to fioricet [butalbital-apap-caffeine]; betamethasone; erythromycin; codeine; droperidol; and tape [adhesive tape]. Medications  Prior to Admission medications    Medication Sig Start Date End Date Taking?  Authorizing Provider   amitriptyline (ELAVIL) 150 MG tablet TAKE 1 TABLET BY MOUTH NIGHTLY 2/21/20   Katharine Mon MD   metoprolol tartrate (LOPRESSOR) 50 MG tablet TAKE 1 TABLET BY MOUTH TWICE DAILY 2/10/20   Maria Victoria Garcia MD   amLODIPine (NORVASC) 5 MG tablet Take 1 tablet by mouth daily 12/12/19   Maria Victoria Garcia MD   famotidine (PEPCID) 20 MG tablet Take 20 mg by mouth 2 times daily 11/19/19   Historical Provider, MD   DULoxetine (CYMBALTA) 60 MG extended release capsule TAKE 1 CAPSULE BY MOUTH ONCE DAILY 11/13/19   Maria Victoria Garcia MD   insulin lispro (HUMALOG) 100 UNIT/ML injection vial Inject into the skin 3 times daily (before meals) Indications: via insulin pump    Historical Provider, MD   clonazePAM (KLONOPIN) 0.5 MG tablet TAKE 1 TABLET BY MOUTH TWICE DAILY AS NEEDED FOR ANXIETY OR SLEEP 10/9/19 7/7/20  Maria Victoria Garcia MD   pantoprazole (PROTONIX) 40 MG tablet Take 40 mg by mouth 2 times daily    Historical Provider, MD   topiramate (TOPAMAX) 25 MG tablet TAKE 1 TABLET BY MOUTH ONCE DAILY FOR 7 DAYS THEN 1 TWICE DAILY FOR 7 DAYS THEN 1 IN THE MORNING AND 2 IN THE EVENING FOR 7 DAYS THEN 2 TWIC 6/14/19   Historical Provider, MD   losartan (COZAAR) 100 MG tablet TAKE 1 TABLET BY MOUTH ONCE DAILY 5/14/19   Maria Victoria Garcia MD   metoclopramide (REGLAN) 5 MG tablet TAKE 1 TABLET BY MOUTH TWICE DAILY 4/18/19   Maria Victoria Garcia MD   torsemide (DEMADEX) 10 MG tablet Take 1 tablet by mouth daily as needed (leg edema) 4/16/19   Maria Victoria Garcia MD   rOPINIRole (REQUIP) 2 MG tablet Take 1 tablet by mouth daily 4/16/19   Maria Victoria Garcia MD   simvastatin (ZOCOR) 20 MG tablet Take 1 tablet by mouth nightly 4/16/19   Maria Victoria Garcia MD   levothyroxine (SYNTHROID) 137 MCG tablet Take 1 tablet by mouth daily 4/16/19   Maria Victoria Garcia MD   gabapentin (NEURONTIN) 800 MG tablet TAKE 1 TABLET BY MOUTH THREE TIMES DAILY 4/16/19 9/4/20  Maria Victoria Garcia MD   BREO ELLIPTA 200-25 MCG/INH AEPB INL 1 PUFF PO DAILY 10/7/17   Historical Provider, MD   tiotropium (Tanda Will) 18 MCG inhalation capsule Inhale 1 capsule into the lungs Daily 6/19/17   Jocelin Elias MD   ONE TOUCH ULTRA TEST strip  6/7/17 Historical ProviderMD TEJADA INSULIN SYRINGE 29G X 1/2\" 0.5 ML MISC  5/9/17   Historical Provider, MD   vitamin D (CHOLECALCIFEROL) 1000 UNIT TABS tablet Take 4,000 Units by mouth daily Takes 4 tabs daily    Historical Provider, MD   docusate sodium (COLACE) 100 MG capsule Take 1 capsule by mouth 2 times daily Take while on narcotics 7/7/15   Percy Pollard MD   Probiotic Product (PROBIOTIC DAILY PO)   Take 1 tablet by mouth     Historical Provider, MD   aspirin 81 MG tablet Take 81 mg by mouth daily. Historical Provider, MD    Scheduled Meds:  Continuous Infusions:  PRN Meds:.  Past Medical History   has a past medical history of Anesthesia complication, Anxiety, Arthritis, Back pain, CAD (coronary artery disease), Caffeine use, Cataract, COPD (chronic obstructive pulmonary disease) ([address] Deafness, Depression, Diabetes mellitus ([address] Diarrhea, Diverticulosis, Fibromyalgia, Gastroparalysis, GERD (gastroesophageal reflux disease), Hearing loss, Hyperlipidemia, Hyperlipidemia, Hypertension, Incontinence, MDRO (multiple drug resistant organisms) resistance, Neurogenic bladder, Neuropathy, Obesity, Osteoarthritis, Peripheral vascular disease, unspecified ([address] Restless leg syndrome, Rhabdomyolysis, Spinal stenosis, thoracic, Stroke ([address] Thyroid disease, Trigeminal neuralgia, and Type II or unspecified type diabetes mellitus without mention of complication, not stated as uncontrolled.   Social History  Social History     Socioeconomic History    Marital status:      Spouse name: Not on file    Number of children: 0    Years of education: 15    Highest education level: Not on file   Occupational History    Occupation: disability     Employer: N/A   Social Needs    Financial resource strain: Not on file    Food insecurity     Worry: Not on file     Inability: Not on file   [employer] needs     Medical: Not on file     Non-medical: Not on file   Tobacco Use    Smoking status: Never Smoker    Smokeless tobacco: Never Used   Substance and Sexual Activity    Alcohol use: No     Alcohol/week: 0.0 standard drinks     Comment: once a month    Drug use: Yes     Frequency: 4.0 times per week     Types: Marijuana    Sexual activity: Yes     Comment: 1 partner   Lifestyle    Physical activity     Days per week: Not on file     Minutes per session: Not on file    Stress: Not on file   Relationships    Social connections     Talks on phone: Not on file     Gets together: Not on file     Attends Faith service: Not on file     Active member of club or organization: Not on file     Attends meetings of clubs or organizations: Not on file     Relationship status: Not on file    Intimate partner violence     Fear of current or ex partner: Not on file     Emotionally abused: Not on file     Physically abused: Not on file     Forced sexual activity: Not on file   Other Topics Concern    Not on file   Social History Narrative    Not on file     Family History      Problem Relation Age of Onset    High Blood Pressure Mother     Migraines Mother     High Blood Pressure Father     Heart Disease Father     Cancer Father         skin    Diabetes Maternal Grandmother     Heart Disease Maternal Grandmother     Cancer Maternal Grandmother     Parkinsonism Maternal Grandmother     Cancer Paternal Grandmother     Other Brother         epilepsy       OBJECTIVE  BP (!) 151/78   Pulse 78   Temp 99 °F (37.2 °C) (Oral)   Resp 18   Ht 5' 5\" (1.651 m)   Wt 185 lb (83.9 kg)   SpO2 94%   BMI 30.79 kg/m²     NIH Stroke Scale  Interval: Baseline  Level of Consciousness (1a. ): Alert  LOC Questions (1b. ):  Answers both correctly  LOC Commands (1c. ): Performs both tasks correctly  Best Gaze (2. ): Normal  Visual (3. ): No visual loss  Facial Palsy (4. ): Normal symmetrical movement  Motor Arm, Left (5a. ): No drift  Motor Arm, Right (5b. ): No drift  Motor Leg, Left (6a. ): No drift  Motor Leg, Right (6b. ): No examining patient, reviewing the images personally, reviewing the chart, perform high complexity decision making and speaking with the nursing staff regarding recommendations        Imani Prince MD   Stroke, Neurocritical Care And/or [address] [place] [address] Stroke [address]  Electronically signed 10/16/2020 at 6:44 PM

## 2020-10-16 NOTE — ED TRIAGE NOTES
Patient started on Cefuroxime 250 mg twice daily x 7 days & a Medrol dose kell (per Dr Saintclair Murdoch) today.

## 2020-10-16 NOTE — FLOWSHEET NOTE
Writer responding to Stroke Alert; writer spoke with medical staff and got update; no family present at this time;     10/16/20 [id number]   Encounter Summary   Services provided to: Patient not available  (patient in [place])

## 2020-10-16 NOTE — ED PROVIDER NOTES
[address] [facility]      Pt Name: Ruchi Quintanilla  MRN: [id number]  [place] 1960  Date of evaluation: 10/16/20    CHIEF COMPLAINT       Chief Complaint   Patient presents with    Headache     (L) temporal    Numbness     (L) side face, arm, hand, leg & foot     HISTORY OF PRESENT ILLNESS   HPI 64 y.o. female presents with c/o l facial, arm and leg numbness. Symptoms started last night at 12:00am.  She went to sleep, woke up and they were still present. Denies and weakness. Denies any slurring speech. Pt reports that she is currently being treated for a copd exacerbation and a URI. She is currently on a course of steroids and antibiotics. Symptoms associated with an intermittent left sided headache which she reports is moderate in intensity (4/10). REVIEW OF SYSTEMS     Review of Systems   Constitutional: Negative for chills and fever. HENT: Positive for congestion. Eyes: Negative for visual disturbance. Respiratory: Positive for cough. Cardiovascular: Negative for chest pain. Gastrointestinal: Negative for abdominal pain, nausea and vomiting. Genitourinary: Negative for difficulty urinating. Musculoskeletal: Negative for back pain. Neurological: Positive for numbness and headaches. Negative for weakness. Hematological: Negative for adenopathy. Psychiatric/Behavioral: Negative for confusion.        PAST MEDICAL HISTORY     Past Medical History:   Diagnosis Date    Anesthesia complication     \"lung collapsed after colon surgery    Anxiety     Arthritis     Back pain     CAD (coronary artery disease)     no stents    Caffeine use     3 coffee / day    Cataract     left    COPD (chronic obstructive pulmonary disease) (HCC)     EMPHYSEMA    Deafness     right ear    Depression     Diabetes mellitus (HCC)     Diarrhea     Diverticulosis     Fibromyalgia     Gastroparalysis     GERD (gastroesophageal reflux disease)     Hearing loss DEAF IN RIGHT EAR    Hyperlipidemia     Hyperlipidemia     Hypertension     Incontinence     MDRO (multiple drug resistant organisms) resistance 2012    mrsa (nasal), eye, ear    Neurogenic bladder     CATHES HERSELF 7 TIMES A DAY, IS ABLE TO URINATE ON OWN    Neuropathy     feet    Obesity     Osteoarthritis     Peripheral vascular disease, unspecified ([address]     Restless leg syndrome     Rhabdomyolysis     [facility] 1 week, May 2014, then [facility] for rehab.     Spinal stenosis, thoracic 5/27/2014    Stroke [facility]) 2012    numbness on the left side of face    Thyroid disease     enlarged    Trigeminal neuralgia     Type II or unspecified type diabetes mellitus without mention of complication, not stated as uncontrolled        SURGICAL HISTORY       Past Surgical History:   Procedure Laterality Date    ABDOMEN SURGERY      LAPAROSCOPY    CARPAL TUNNEL RELEASE Right     CATARACT REMOVAL      CHOLECYSTECTOMY      CHOLECYSTECTOMY, OPEN      11/2012    COLON SURGERY      benign mass removed    COLONOSCOPY      COLONOSCOPY  07/13/2016    COLONOSCOPY  06/01/2020    incomplete/poor prep    COLONOSCOPY  07/07/2020    COLONOSCOPY N/A 7/7/2020    COLORECTAL CANCER SCREENING, NOT HIGH RISK performed by Jack Bolaños MD at [address]      sebaceous cyst, under arm left side x5    CYST REMOVAL      right ankle    CYSTOSCOPY      EYE SURGERY Right     HOLE IN RETINA REPAIRED    FINGER TRIGGER RELEASE Right     INCISIONAL HERNIA REPAIR  07/07/15    open   [address]  10/17/2017    MIDDLE EAR SURGERY Left     ear tube placement    POLYPECTOMY      colon polyp    [place] REPAIR INCISIONAL HERNIA,REDUCIBLE N/A 10/17/2017    HERNIA INCISIONAL REPAIR WITH MESH performed by Jack Bolaños MD at [address] [place] [address] Road 6/1/2020    SIGMOIDOSCOPY DIAGNOSTIC FLEXIBLE performed by Jack Bolaños MD at [address] Daily, Disp-30 capsule, R-5Normal      ONE TOUCH ULTRA TEST strip R-1, [facility]istor[facility] Med             ALLERGIES     is allergic to fioricet [butalbital-apap-caffeine]; betamethasone; erythromycin; codeine; droperidol; and tape [adhesive tape]. FAMILY HISTORY     She indicated that her mother is alive. She indicated that her father is alive. She indicated that her brother is . She indicated that the status of her maternal grandmother is unknown. She indicated that the status of her paternal grandmother is unknown. SOCIAL HISTORY      reports that she has never smoked. She has never used smokeless tobacco. She reports current drug use. Frequency: 4.00 times per week. Drug: Marijuana. She reports that she does not drink alcohol. PHYSICAL EXAM     INITIAL VITALS: BP (!) 175/86   Pulse 76   Temp 97.9 °F (36.6 °C) (Oral)   Resp 19   Ht 5' 5\" (1.651 m)   Wt 171 lb 8.3 oz (77.8 kg)   SpO2 92%   BMI 28.54 kg/m²   Gen: NAD  Head: Normocephalic, atraumatic  Eye: Pupils equal round reactive to light, no conjunctivitis, EOMI, visual fields intact  ENT: Dry oral mucosa  Neck: no JVD  Heart: Regular rate and rhythm no murmurs  Lungs: Clear to auscultation bilaterally, no respiratory distress  Chest wall: No crepitus, no tenderness palpation  Abdomen: Soft, nontender, nondistended, with no peritoneal signs  Neurologic: Patient is alert and oriented x3, CNII - XII intact except for some sesory loss over the left sided. motor is intact in all 4 extremities, fluent speech, coordination intact. Sensation reduced on the left upper and lower extremities. Extremities: no edema    MEDICAL DECISION MAKING:     MDM  64 y.o. female L sided numbness. We'll activate a stroke alert given onset of symptoms less than 24 hours. Initial NIHSS of 1. No TPA given delayed presentation. Emergency Department course:    ED Course as of Oct 18 0939   Fri Oct 16, 2020   1924 Spoke with Dr. Elaine Scanlon.   He did a [facility] evaluation. Concerned about TA. Would like the patient to have aspirin 325mg. Check ESR and CRP. Admit for neurology consult and MRI in AM.  Page to PCP      [KW]      ED Course User Index  [KW] Kiara Mann MD     Pt is not an endovascular candidate given low NIHSS and likely alternative diagnosis. DIAGNOSTIC RESULTS     EKG: All EKG's are interpreted by the Emergency Department Physician who either signs or Co-signs this chart in the absence of a cardiologist.  EKG shows a sinus rhythm. HR is 74, , QRS 78, , no CASA, No STD, No TWI, the axis is normal.          RADIOLOGY:All plain film, CT, MRI, and formal ultrasound images (except ED bedside ultrasound) are read by the radiologist and the images and interpretations are directly viewed by the emergency physician. MRI BRAIN WO CONTRAST   Final Result   No acute intracranial abnormality. Minimal chronic microvascular disease. CT HEAD WO CONTRAST   Final Result   No acute intracranial abnormality. The findings were sent to the Radiology Results [address] at 6:47   pm on 10/16/2020to be communicated to a licensed caregiver. VL DUP CAROTID BILATERAL    (Results Pending)   VL DUP CAROTID BILATERAL    (Results Pending)       LABS: All lab results were reviewed by myself, and all abnormals are listed below.   Labs Reviewed   COMPREHENSIVE METABOLIC PANEL - Abnormal; Notable for the following components:       Result Value    Glucose 182 (*)     All other components within normal limits   C-REACTIVE PROTEIN - Abnormal; Notable for the following components:    CRP 10.2 (*)     All other components within normal limits   COMPREHENSIVE METABOLIC PANEL W/ REFLEX TO MG FOR LOW K - Abnormal; Notable for the following components:    Glucose 340 (*)     Total Bilirubin 0.29 (*)     All other components within normal limits   CBC WITH AUTO DIFFERENTIAL - Abnormal; Notable for the following components:    Seg Neutrophils 87 (*)     Lymphocytes 11 (*)     Absolute Lymph # 0.90 (*)     All other components within normal limits   HEMOGLOBIN A1C - Abnormal; Notable for the following components:    Hemoglobin A1C 8.8 (*)     All other components within normal limits   LIPID PANEL - Abnormal; Notable for the following components:    Cholesterol 206 (*)     LDL Cholesterol 136 (*)     All other components within normal limits   POC GLUCOSE FINGERSTICK - Abnormal; Notable for the following components:    POC Glucose 254 (*)     All other components within normal limits   POC GLUCOSE FINGERSTICK - Abnormal; Notable for the following components:    POC Glucose 322 (*)     All other components within normal limits   POC GLUCOSE FINGERSTICK - Abnormal; Notable for the following components:    POC Glucose 453 (*)     All other components within normal limits   POC GLUCOSE FINGERSTICK - Abnormal; Notable for the following components:    POC Glucose 202 (*)     All other components within normal limits   CBC WITH AUTO DIFFERENTIAL   TROPONIN   SEDIMENTATION RATE   POCT GLUCOSE   POCT GLUCOSE   POCT GLUCOSE       EMERGENCY DEPARTMENT COURSE:   Vitals:    Vitals:    10/17/20 0715 10/17/20 0925 10/17/20 1315 10/17/20 1713   BP: (!) 141/71 (!) 169/78 (!) 175/86    Pulse: 81 94 76    Resp: 19  19    Temp: 98.6 °F (37 °C)  97.9 °F (36.6 °C)    TempSrc: Oral  Oral    SpO2: 94%  92%    Weight:       Height:    5' 5\" (1.651 m)       The patient was given the following medications while in the emergency department:  Orders Placed This Encounter   Medications    aspirin tablet 325 mg    methylPREDNISolone sodium (SOLU-MEDROL) injection 125 mg    DISCONTD: amitriptyline (ELAVIL) tablet 50 mg    DISCONTD: amLODIPine (NORVASC) tablet 5 mg    DISCONTD: aspirin chewable tablet 81 mg    DISCONTD: budesonide-formoterol (SYMBICORT) 160-4.5 MCG/ACT inhaler 2 puff    DISCONTD: clonazePAM (KLONOPIN) tablet 0.5 mg    DISCONTD: DULoxetine (CYMBALTA) mg    DISCONTD: metoclopramide (REGLAN) tablet 10 mg    DISCONTD: cefUROXime (CEFTIN) tablet 250 mg    influenza quadrivalent split vaccine (FLUZONE;FLUARIX;FLULAVAL;AFLURIA) injection 0.5 mL    aspirin 81 MG tablet     Sig: Take 1 tablet by mouth daily     Dispense:  30 tablet     Refill:  0    topiramate (TOPAMAX) 25 MG tablet     Sig: Take 1 tablet by mouth 2 times daily     Dispense:  60 tablet     Refill:  3    atorvastatin (LIPITOR) 40 MG tablet     Sig: Take 1 tablet by mouth daily     Dispense:  30 tablet     Refill:  0    methylPREDNISolone (MEDROL) 4 MG tablet     Sig: Take 1 tablet by mouth every morning (before breakfast) for 6 days     Dispense:  6 tablet     Refill:  0    methylPREDNISolone (MEDROL) 4 MG tablet     Sig: Take 1 tablet by mouth Daily with lunch for 6 days     Dispense:  6 tablet     Refill:  0    methylPREDNISolone (MEDROL) 4 MG tablet     Sig: Take 1 tablet by mouth Daily with supper for 6 days     Dispense:  6 tablet     Refill:  0    methylPREDNISolone (MEDROL) 4 MG tablet     Sig: Take 1 tablet by mouth nightly for 6 days     Dispense:  6 tablet     Refill:  0    methylPREDNISolone (MEDROL) 8 MG tablet     Sig: Take 1 tablet by mouth nightly for 6 days     Dispense:  6 tablet     Refill:  0    cefUROXime (CEFTIN) 250 MG tablet     Sig: Take 1 tablet by mouth every 12 hours for 6 days     Dispense:  12 tablet     Refill:  0     -------------------------  CRITICAL CARE:   CONSULTS: IP CONSULT TO INTERNAL MEDICINE  IP CONSULT TO NEUROLOGY  PROCEDURES: Procedures     FINAL IMPRESSION      1. Stroke-like symptoms    2.  Nonintractable headache, unspecified chronicity pattern, unspecified headache type          DISPOSITION/PLAN   DISPOSITION Admitted 10/16/2020 08:29:38 PM    PATIENT REFERRED TO:  Cruzito Quan MD  [address] [facility]. [phone]    In 1 week        DISCHARGE MEDICATIONS:  Discharge Medication List as of 10/17/2020  5:16 PM

## 2020-10-16 NOTE — ED NOTES
Unable to connect Tele[employer] in the room with Dr Morelia Murillo. Dr Morelia Murillo then facetimes the pt with this RN in the room.      Emilie Fisher RN  10/16/20 0287

## 2020-10-16 NOTE — ED NOTES
Pt comes to this ER with c/o intermittent \"sharp\" lt temporal headache that started around midnight today. Pt also reports that she started having tingling \"like my hand is falling asleep\" in her lt hand that has been constant \"shortly after\" her headache started. Pt states she then awoke around 0300 and felt her lt foot and ankle were tingling and numb. Pt reports a hx of neuropathy but states this feeling is \"No such comparison. \"    Pt also reports she is taking an ATB and steroid for a URI. Pt arrives A+O x 4, GCS = 15, PMS x 4 intact/equal/strong, eupneic, and PWD. Pulse is regular et strong. PERRLA and EOMI noted. No visible horizontal or vertical nystagmus, and the pt denies visual disturbances. Pt is having appropriate conversation with this nurse, and the pt speaks in complete sentences without difficulty. Pt has a symmetrical smile, eyebrow raise, and cheek puff. Equal shoulder shrug also noted. Pt denies loss of taste or smell. Pt has no visible tongue deviation and swallows without difficulty. Pt states she previously took aspirin, but she stopped taking it approximately 2 months ago.      Fauzia Britton RN  10/16/20 2931

## 2020-10-17 ENCOUNTER — APPOINTMENT (OUTPATIENT)
Dept: MRI IMAGING | Age: 60
End: 2020-10-17
Payer: MEDICARE

## 2020-10-17 VITALS
DIASTOLIC BLOOD PRESSURE: 86 MMHG | TEMPERATURE: 97.9 F | HEIGHT: 65 IN | WEIGHT: 171.52 LBS | HEART RATE: 76 BPM | OXYGEN SATURATION: 92 % | BODY MASS INDEX: 28.58 KG/M2 | RESPIRATION RATE: 19 BRPM | SYSTOLIC BLOOD PRESSURE: 175 MMHG

## 2020-10-17 PROBLEM — E44.0 MODERATE MALNUTRITION (HCC): Chronic | Status: ACTIVE | Noted: 2020-10-17

## 2020-10-17 LAB
ABSOLUTE EOS #: 0 K/UL (ref 0–0.4)
ABSOLUTE IMMATURE GRANULOCYTE: ABNORMAL K/UL (ref 0–0.3)
ABSOLUTE LYMPH #: 0.9 K/UL (ref 1–4.8)
ABSOLUTE MONO #: 0.1 K/UL (ref 0.1–1.3)
ALBUMIN SERPL-MCNC: 3.9 G/DL (ref 3.5–5.2)
ALBUMIN/GLOBULIN RATIO: ABNORMAL (ref 1–2.5)
ALP BLD-CCNC: 100 U/L (ref 35–104)
ALT SERPL-CCNC: 9 U/L (ref 5–33)
ANION GAP SERPL CALCULATED.3IONS-SCNC: 14 MMOL/L (ref 9–17)
AST SERPL-CCNC: 12 U/L
BASOPHILS # BLD: 1 % (ref 0–2)
BASOPHILS ABSOLUTE: 0 K/UL (ref 0–0.2)
BILIRUB SERPL-MCNC: 0.29 MG/DL (ref 0.3–1.2)
BUN BLDV-MCNC: 14 MG/DL (ref 6–20)
BUN/CREAT BLD: ABNORMAL (ref 9–20)
CALCIUM SERPL-MCNC: 8.7 MG/DL (ref 8.6–10.4)
CHLORIDE BLD-SCNC: 103 MMOL/L (ref 98–107)
CHOLESTEROL/HDL RATIO: 3.7
CHOLESTEROL: 206 MG/DL
CO2: 22 MMOL/L (ref 20–31)
CREAT SERPL-MCNC: 0.67 MG/DL (ref 0.5–0.9)
DIFFERENTIAL TYPE: ABNORMAL
EKG ATRIAL RATE: 74 BPM
EKG P AXIS: 39 DEGREES
EKG P-R INTERVAL: 168 MS
EKG Q-T INTERVAL: 412 MS
EKG QRS DURATION: 78 MS
EKG QTC CALCULATION (BAZETT): 457 MS
EKG R AXIS: 32 DEGREES
EKG T AXIS: 56 DEGREES
EKG VENTRICULAR RATE: 74 BPM
EOSINOPHILS RELATIVE PERCENT: 0 % (ref 0–4)
GFR AFRICAN AMERICAN: >60 ML/MIN
GFR NON-AFRICAN AMERICAN: >60 ML/MIN
GFR SERPL CREATININE-BSD FRML MDRD: ABNORMAL ML/MIN/{1.73_M2}
GFR SERPL CREATININE-BSD FRML MDRD: ABNORMAL ML/MIN/{1.73_M2}
GLUCOSE BLD-MCNC: 202 MG/DL (ref 65–105)
GLUCOSE BLD-MCNC: 322 MG/DL (ref 65–105)
GLUCOSE BLD-MCNC: 340 MG/DL (ref 70–99)
GLUCOSE BLD-MCNC: 453 MG/DL (ref 65–105)
HCT VFR BLD CALC: 40.8 % (ref 36–46)
HDLC SERPL-MCNC: 55 MG/DL
HEMOGLOBIN: 13.7 G/DL (ref 12–16)
IMMATURE GRANULOCYTES: ABNORMAL %
LDL CHOLESTEROL: 136 MG/DL (ref 0–130)
LYMPHOCYTES # BLD: 11 % (ref 24–44)
MCH RBC QN AUTO: 30.1 PG (ref 26–34)
MCHC RBC AUTO-ENTMCNC: 33.6 G/DL (ref 31–37)
MCV RBC AUTO: 89.8 FL (ref 80–100)
MONOCYTES # BLD: 1 % (ref 1–7)
NRBC AUTOMATED: ABNORMAL PER 100 WBC
PDW BLD-RTO: 13.5 % (ref 11.5–14.9)
PLATELET # BLD: 246 K/UL (ref 150–450)
PLATELET ESTIMATE: ABNORMAL
PMV BLD AUTO: 8.1 FL (ref 6–12)
POTASSIUM SERPL-SCNC: 4.3 MMOL/L (ref 3.7–5.3)
RBC # BLD: 4.55 M/UL (ref 4–5.2)
RBC # BLD: ABNORMAL 10*6/UL
SEG NEUTROPHILS: 87 % (ref 36–66)
SEGMENTED NEUTROPHILS ABSOLUTE COUNT: 7.3 K/UL (ref 1.3–9.1)
SODIUM BLD-SCNC: 139 MMOL/L (ref 135–144)
TOTAL PROTEIN: 6.7 G/DL (ref 6.4–8.3)
TRIGL SERPL-MCNC: 74 MG/DL
VLDLC SERPL CALC-MCNC: ABNORMAL MG/DL (ref 1–30)
WBC # BLD: 8.3 K/UL (ref 3.5–11)
WBC # BLD: ABNORMAL 10*3/UL

## 2020-10-17 PROCEDURE — 80061 LIPID PANEL: CPT

## 2020-10-17 PROCEDURE — 83036 HEMOGLOBIN GLYCOSYLATED A1C: CPT

## 2020-10-17 PROCEDURE — G0378 HOSPITAL OBSERVATION PER HR: HCPCS

## 2020-10-17 PROCEDURE — 80053 COMPREHEN METABOLIC PANEL: CPT

## 2020-10-17 PROCEDURE — 85025 COMPLETE CBC W/AUTO DIFF WBC: CPT

## 2020-10-17 PROCEDURE — 97161 PT EVAL LOW COMPLEX 20 MIN: CPT

## 2020-10-17 PROCEDURE — 6370000000 HC RX 637 (ALT 250 FOR IP): Performed by: FAMILY MEDICINE

## 2020-10-17 PROCEDURE — 6360000002 HC RX W HCPCS: Performed by: FAMILY MEDICINE

## 2020-10-17 PROCEDURE — 6360000002 HC RX W HCPCS: Performed by: INTERNAL MEDICINE

## 2020-10-17 PROCEDURE — 2580000003 HC RX 258: Performed by: FAMILY MEDICINE

## 2020-10-17 PROCEDURE — 82947 ASSAY GLUCOSE BLOOD QUANT: CPT

## 2020-10-17 PROCEDURE — 97166 OT EVAL MOD COMPLEX 45 MIN: CPT

## 2020-10-17 PROCEDURE — 93010 ELECTROCARDIOGRAM REPORT: CPT | Performed by: INTERNAL MEDICINE

## 2020-10-17 PROCEDURE — 90686 IIV4 VACC NO PRSV 0.5 ML IM: CPT | Performed by: INTERNAL MEDICINE

## 2020-10-17 PROCEDURE — 70551 MRI BRAIN STEM W/O DYE: CPT

## 2020-10-17 PROCEDURE — 36415 COLL VENOUS BLD VENIPUNCTURE: CPT

## 2020-10-17 PROCEDURE — G0008 ADMIN INFLUENZA VIRUS VAC: HCPCS | Performed by: INTERNAL MEDICINE

## 2020-10-17 RX ORDER — METHYLPREDNISOLONE 4 MG/1
12 TABLET ORAL ONCE
Status: COMPLETED | OUTPATIENT
Start: 2020-10-16 | End: 2020-10-17

## 2020-10-17 RX ORDER — ATORVASTATIN CALCIUM 40 MG/1
40 TABLET, FILM COATED ORAL DAILY
Qty: 30 TABLET | Refills: 0 | Status: SHIPPED | OUTPATIENT
Start: 2020-10-18 | End: 2021-06-25

## 2020-10-17 RX ORDER — AMITRIPTYLINE HYDROCHLORIDE 75 MG/1
75 TABLET, FILM COATED ORAL NIGHTLY
Status: DISCONTINUED | OUTPATIENT
Start: 2020-10-17 | End: 2020-10-17 | Stop reason: HOSPADM

## 2020-10-17 RX ORDER — METHYLPREDNISOLONE 4 MG/1
4 TABLET ORAL NIGHTLY
Status: DISCONTINUED | OUTPATIENT
Start: 2020-10-18 | End: 2020-10-17 | Stop reason: HOSPADM

## 2020-10-17 RX ORDER — METHYLPREDNISOLONE 4 MG/1
4 TABLET ORAL
Qty: 6 TABLET | Refills: 0 | Status: SHIPPED | OUTPATIENT
Start: 2020-10-17 | End: 2020-10-23

## 2020-10-17 RX ORDER — CEFUROXIME AXETIL 250 MG/1
250 TABLET ORAL EVERY 12 HOURS SCHEDULED
Status: DISCONTINUED | OUTPATIENT
Start: 2020-10-17 | End: 2020-10-17 | Stop reason: HOSPADM

## 2020-10-17 RX ORDER — METHYLPREDNISOLONE 4 MG/1
4 TABLET ORAL
Qty: 6 TABLET | Refills: 0 | Status: SHIPPED | OUTPATIENT
Start: 2020-10-18 | End: 2020-10-24

## 2020-10-17 RX ORDER — METHYLPREDNISOLONE 4 MG/1
8 TABLET ORAL NIGHTLY
Status: DISCONTINUED | OUTPATIENT
Start: 2020-10-17 | End: 2020-10-17 | Stop reason: HOSPADM

## 2020-10-17 RX ORDER — METOCLOPRAMIDE 10 MG/1
10 TABLET ORAL
Status: DISCONTINUED | OUTPATIENT
Start: 2020-10-17 | End: 2020-10-17 | Stop reason: HOSPADM

## 2020-10-17 RX ORDER — METHYLPREDNISOLONE 8 MG/1
8 TABLET ORAL NIGHTLY
Qty: 6 TABLET | Refills: 0 | Status: SHIPPED | OUTPATIENT
Start: 2020-10-17 | End: 2020-10-23

## 2020-10-17 RX ORDER — METHYLPREDNISOLONE 4 MG/1
4 TABLET ORAL
Status: DISCONTINUED | OUTPATIENT
Start: 2020-10-17 | End: 2020-10-17 | Stop reason: HOSPADM

## 2020-10-17 RX ORDER — METHYLPREDNISOLONE 4 MG/1
4 TABLET ORAL NIGHTLY
Qty: 6 TABLET | Refills: 0 | Status: SHIPPED | OUTPATIENT
Start: 2020-10-18 | End: 2020-10-24

## 2020-10-17 RX ORDER — CEFUROXIME AXETIL 250 MG/1
250 TABLET ORAL EVERY 12 HOURS SCHEDULED
Qty: 12 TABLET | Refills: 0 | Status: SHIPPED | OUTPATIENT
Start: 2020-10-17 | End: 2020-10-23

## 2020-10-17 RX ORDER — TOPIRAMATE 25 MG/1
25 TABLET ORAL 2 TIMES DAILY
Qty: 60 TABLET | Refills: 3 | Status: SHIPPED | OUTPATIENT
Start: 2020-10-17 | End: 2022-05-18 | Stop reason: SDUPTHER

## 2020-10-17 RX ADMIN — METHYLPREDNISOLONE 4 MG: 4 TABLET ORAL at 16:28

## 2020-10-17 RX ADMIN — METOCLOPRAMIDE 10 MG: 10 TABLET ORAL at 06:43

## 2020-10-17 RX ADMIN — METOCLOPRAMIDE 10 MG: 10 TABLET ORAL at 02:09

## 2020-10-17 RX ADMIN — LEVOTHYROXINE SODIUM 125 MCG: 125 TABLET ORAL at 06:43

## 2020-10-17 RX ADMIN — Medication 10 ML: at 09:28

## 2020-10-17 RX ADMIN — CLONAZEPAM 0.5 MG: 1 TABLET ORAL at 17:26

## 2020-10-17 RX ADMIN — ATORVASTATIN CALCIUM 10 MG: 10 TABLET, FILM COATED ORAL at 09:28

## 2020-10-17 RX ADMIN — AMLODIPINE BESYLATE 5 MG: 5 TABLET ORAL at 09:27

## 2020-10-17 RX ADMIN — INFLUENZA A VIRUS A/VICTORIA/2454/2019 IVR-207 (H1N1) ANTIGEN (PROPIOLACTONE INACTIVATED), INFLUENZA A VIRUS A/HONG KONG/2671/2019 IVR-208 (H3N2) ANTIGEN (PROPIOLACTONE INACTIVATED), INFLUENZA B VIRUS B/VICTORIA/705/2018 BVR-11 ANTIGEN (PROPIOLACTONE INACTIVATED), INFLUENZA B VIRUS B/PHUKET/3073/2013 BVR-1B ANTIGEN (PROPIOLACTONE INACTIVATED) 0.5 ML: 15; 15; 15; 15 INJECTION, SUSPENSION INTRAMUSCULAR at 13:12

## 2020-10-17 RX ADMIN — METHYLPREDNISOLONE 4 MG: 4 TABLET ORAL at 12:40

## 2020-10-17 RX ADMIN — METHYLPREDNISOLONE 12 MG: 4 TABLET ORAL at 02:09

## 2020-10-17 RX ADMIN — METOCLOPRAMIDE 10 MG: 10 TABLET ORAL at 09:28

## 2020-10-17 RX ADMIN — METOCLOPRAMIDE 10 MG: 10 TABLET ORAL at 16:27

## 2020-10-17 RX ADMIN — METHYLPREDNISOLONE 4 MG: 4 TABLET ORAL at 06:44

## 2020-10-17 RX ADMIN — GABAPENTIN 800 MG: 400 CAPSULE ORAL at 14:28

## 2020-10-17 RX ADMIN — ENOXAPARIN SODIUM 40 MG: 40 INJECTION SUBCUTANEOUS at 09:32

## 2020-10-17 RX ADMIN — METOPROLOL TARTRATE 50 MG: 50 TABLET, FILM COATED ORAL at 09:27

## 2020-10-17 RX ADMIN — ASPIRIN 81 MG: 81 TABLET, CHEWABLE ORAL at 09:28

## 2020-10-17 RX ADMIN — CEFUROXIME AXETIL 250 MG: 250 TABLET ORAL at 09:28

## 2020-10-17 RX ADMIN — PANTOPRAZOLE SODIUM 40 MG: 40 TABLET, DELAYED RELEASE ORAL at 09:28

## 2020-10-17 RX ADMIN — LOSARTAN POTASSIUM 100 MG: 100 TABLET, FILM COATED ORAL at 09:29

## 2020-10-17 RX ADMIN — GABAPENTIN 800 MG: 400 CAPSULE ORAL at 09:27

## 2020-10-17 ASSESSMENT — PAIN SCALES - GENERAL: PAINLEVEL_OUTOF10: 0

## 2020-10-17 NOTE — DISCHARGE INSTR - DIET

## 2020-10-17 NOTE — PROGRESS NOTES
Physical Therapy    Facility/Department: [facility] PROGRESSIVE CARE  Initial Assessment    NAME: Collin Mojica  : 1960  MRN: [id number]    Date of Service: 10/17/2020    Discharge Recommendations: The patient's needs are being met with no further therapy recommended at discharge. Assessment   Assessment: Pt demonstrates safe mobility, no need for skilled PT at this time  Prognosis: Good  Decision Making: Low Complexity  History: Hx of CVA ~ 8 yrs ago with no deficits. Exam: ROM, MMT, fucntion  No Skilled PT: Safe to return home  REQUIRES PT FOLLOW UP: No       Patient Diagnosis(es): The primary encounter diagnosis was Stroke-like symptoms. A diagnosis of Nonintractable headache, unspecified chronicity pattern, unspecified headache type was also pertinent to this visit. has a past medical history of Anesthesia complication, Anxiety, Arthritis, Back pain, CAD (coronary artery disease), Caffeine use, Cataract, COPD (chronic obstructive pulmonary disease) ([address] Deafness, Depression, Diabetes mellitus ([address] Diarrhea, Diverticulosis, Fibromyalgia, Gastroparalysis, GERD (gastroesophageal reflux disease), Hearing loss, Hyperlipidemia, Hyperlipidemia, Hypertension, Incontinence, MDRO (multiple drug resistant organisms) resistance, Neurogenic bladder, Neuropathy, Obesity, Osteoarthritis, Peripheral vascular disease, unspecified ([address] Restless leg syndrome, Rhabdomyolysis, Spinal stenosis, thoracic, Stroke ([address] Thyroid disease, Trigeminal neuralgia, and Type II or unspecified type diabetes mellitus without mention of complication, not stated as uncontrolled. has a past surgical history that includes Cholecystectomy; Colon surgery; Tonsillectomy; Tubal ligation; Carpal tunnel release (Right); Middle ear surgery (Left); cyst removal; cyst removal; Cholecystectomy, open; Finger trigger release (Right); Colonoscopy; Abdomen surgery; polypectomy; Cystocopy;  Cataract removal; Incisional hernia repair (07/07/15); Upper gastrointestinal endoscopy (07/13/2016); Colonoscopy (07/13/2016); eye surgery (Right); Incisional hernia repair (10/17/2017); pr repair incisional hernia,reducible (N/A, 10/17/2017); Upper gastrointestinal endoscopy (07/23/2018); Upper gastrointestinal endoscopy (N/A, 7/23/2018); Upper gastrointestinal endoscopy (06/01/2020); Colonoscopy (06/01/2020); Upper gastrointestinal endoscopy (N/A, 6/1/2020); sigmoidoscopy (N/A, 6/1/2020); Colonoscopy (07/07/2020); and Colonoscopy (N/A, 7/7/2020). Restrictions  Restrictions/Precautions  Restrictions/Precautions: Fall Risk  Implants present? : (Insulin pump.)  Position Activity Restriction  Other position/activity restrictions: Reports sensation is better in left hand than it was Yesterday / last night  Vision/Hearing  Vision: Impaired  Vision Exceptions: Wears glasses at all times  Hearing: Exceptions to [facility]  Hearing Exceptions: Hard of hearing/hearing concerns(R ear deaf, L ear hearing compromised)     Subjective  General  Patient assessed for rehabilitation services?: Yes  Additional Pertinent Hx: Around midnight she c/o shape pain in the left temporal head and left side hand and foot feeling numb, she woke up this AM feeling that her left arm and left leg now feel nunb, due to her hx of stroke 10 years ago, her PCP told her to come into the ED. CT brain negative, MRI brain results pending, neuro has been consulted. Referral Date : 10/16/20  Diagnosis: TIA  Follows Commands: Within Functional Limits  Subjective  Subjective: Reports her sensation in left hand is better than yesterday. Per pt, she has a lot of stressors at home and thinks thats what brought all this. Offered postoral care services, per pt , she has talked to them.   Pain Screening  Patient Currently in Pain: Denies  Vital Signs  Patient Currently in Pain: Denies       Orientation     Social/Functional History  Social/Functional History  Lives With: Alone  Type of Home: [facility] Layout: One level  Home Access: Elevator  Bathroom Shower/Tub: Walk-in shower, Curtain  Bathroom Toilet: Standard  Bathroom Equipment: Built-in shower seat, Grab bars in shower, Toilet raiser  Bathroom Accessibility: Accessible  Home Equipment: Rolling walker, Cane  ADL Assistance: Independent  Homemaking Assistance: Independent  Homemaking Responsibilities: Yes  Ambulation Assistance: Independent  Transfer Assistance: Independent  Active : Yes  Mode of Transportation: Car  Occupation: On disability  Additional Comments: Pt indeependnet for all IADL's and mobility  Cognition        Objective          AROM RLE (degrees)  RLE AROM: WFL  AROM LLE (degrees)  LLE AROM : WFL  Strength RLE  Strength RLE: WFL  Strength LLE  Strength LLE: WFL     Sensation  Overall Sensation Status: Impaired  Additional Comments: Left face, left hand, and left foot numbness. Bed mobility  Rolling to Left: Independent  Rolling to Right: Independent  Supine to Sit: Independent  Sit to Supine: Independent  Scooting: Independent  Transfers  Sit to Stand: Independent  Stand to sit:  Independent  Ambulation  Ambulation?: Yes  Ambulation 1  Surface: level tile  Device: No Device  Assistance: Independent  Quality of Gait: No LOB, no gati deviation noted  Distance: 120 ft     Balance  Posture: Good  Sitting - Static: Good  Sitting - Dynamic: Good  Standing - Static: Good  Standing - Dynamic: Good;-        Plan   Plan  Times per week: NA  Safety Devices  Type of devices: Left in bed, Call light within reach    G-Code       OutComes Score                                                  AM-PAC Score     AM-PAC Inpatient Mobility without Stair Climbing Raw Score : 20 (10/17/20 1057)  AM-PAC Inpatient without Stair Climbing T-Scale Score : 60.57 (10/17/20 1057)  Mobility Inpatient CMS 0-100% Score: 0 (10/17/20 1057)  Mobility Inpatient without Stair CMS G-Code Modifier : CH (10/17/20 1057)       Goals  Short term goals  Time Frame for Short term goals:  NA  Patient Goals   Patient goals : NA       Therapy Time   Individual Concurrent Group Co-treatment   Time In 3868         Time Out 1110         Minutes 13                 Char Hoskins, PT

## 2020-10-17 NOTE — H&P
History and Physical   [facility])  Internal Medicine    HISTORY AND PHYSICAL EXAMINATION            Date:   10/17/2020  Patient name:  Mingo Pond  MRN:   [id number]  Account:  [de-identified]  YOB: 1960  PCP:    Corky Orourke MD  Code Status:    Full Code    Chief Complaint:     Chief Complaint   Patient presents with    Headache     (L) temporal    Numbness     (L) side face, arm, hand, leg & foot         History Obtained From:     patient    History of Present Illness: The patient is a 64 y.o. Non-/non  female who presents for headache and left sided numbness. Numbness was present in the face, left upper and lower extremity. Symptoms were present for one day. Patient has history of uncontrolled diabetes and she is on an insulin pump. Blood glucose levels have been high. Patient states that all her symptoms are currently resolved and she wants to go home. In the ED, CT head was done that was negative for any acute intracranial pathology. Virtual consultation for stroke was done and patient was deemed not a candidate for TPA. Neurology evaluated the patient. They ordered MRI that showed no acute abnormality, minimal chronic microvascular disease.         Past Medical History:     Past Medical History:   Diagnosis Date    Anesthesia complication     \"lung collapsed after colon surgery    Anxiety     Arthritis     Back pain     CAD (coronary artery disease)     no stents    Caffeine use     3 coffee / day    Cataract     left    COPD (chronic obstructive pulmonary disease) (HCC)     EMPHYSEMA    Deafness     right ear    Depression     Diabetes mellitus (HCC)     Diarrhea     Diverticulosis     Fibromyalgia     Gastroparalysis     GERD (gastroesophageal reflux disease)     Hearing loss     DEAF IN RIGHT EAR    Hyperlipidemia     Hyperlipidemia     Hypertension     Incontinence     MDRO (multiple drug resistant organisms) resistance 2012    mrsa (nasal), eye, ear    Neurogenic bladder     CATHES HERSELF 7 TIMES A DAY, IS ABLE TO URINATE ON OWN    Neuropathy     feet    Obesity     Osteoarthritis     Peripheral vascular disease, unspecified ([address]     Restless leg syndrome     Rhabdomyolysis     [facility] 1 week, May 2014, then [facility] for rehab.     Spinal stenosis, thoracic 5/27/2014    Stroke [facility]) 2012    numbness on the left side of face    Thyroid disease     enlarged    Trigeminal neuralgia     Type II or unspecified type diabetes mellitus without mention of complication, not stated as uncontrolled         Past Surgical History:     Past Surgical History:   Procedure Laterality Date    ABDOMEN SURGERY      LAPAROSCOPY    CARPAL TUNNEL RELEASE Right     CATARACT REMOVAL      CHOLECYSTECTOMY      CHOLECYSTECTOMY, OPEN      11/2012    COLON SURGERY      benign mass removed    COLONOSCOPY      COLONOSCOPY  07/13/2016    COLONOSCOPY  06/01/2020    incomplete/poor prep    COLONOSCOPY  07/07/2020    COLONOSCOPY N/A 7/7/2020    COLORECTAL CANCER SCREENING, NOT HIGH RISK performed by Queenie Whitman MD at [facility]      sebaceous cyst, under arm left side x5    CYST REMOVAL      right ankle    CYSTOSCOPY      EYE SURGERY Right     HOLE IN RETINA REPAIRED    FINGER TRIGGER RELEASE Right     INCISIONAL HERNIA REPAIR  07/07/15    open   [address] [facility] [address]  10/17/2017    MIDDLE EAR SURGERY Left     ear tube placement    POLYPECTOMY      colon polyp    [facility] REPA[facility] INCISIONAL HERNIA,REDUCIBLE N/A 10/17/2017    HERNIA INCISIONAL REPAIR WITH MESH performed by Queenie Whitman MD at [address] N/A 6/1/2020    SIGMOIDOSCOPY DIAGNOSTIC FLEXIBLE performed by Queenie Whitman MD at [address] ENDOSCOPY  07/13/2016    UPPER GASTROINTESTINAL ENDOSCOPY  07/23/2018    with biopsy    UPPER GASTROINTESTINAL ENDOSCOPY N/A 7/23/2018    EGD BIOPSY performed by Raul Burton MD at [address] [place] [address]  06/01/2020    with biopsy    UPPER GASTROINTESTINAL ENDOSCOPY N/A 6/1/2020    EGD BIOPSY performed by Raul Burton MD at [address] [place] [address]        Medications Prior to Admission:     Prior to Admission medications    Medication Sig Start Date End Date Taking?  Authorizing Provider   DULoxetine (CYMBALTA) 30 MG extended release capsule Take 90 mg by mouth daily   Yes Historical Provider, MD   metoclopramide (REGLAN) 10 MG tablet Take 10 mg by mouth 4 times daily   Yes Historical Provider, MD   metoprolol (LOPRESSOR) 100 MG tablet Take 100 mg by mouth 2 times daily   Yes Historical Provider, MD   insulin aspart (NOVOLOG PENFILL) 100 UNIT/ML injection cartridge Inject into the skin continuous Per insulin pump   Yes Historical Provider, MD   amitriptyline (ELAVIL) 150 MG tablet TAKE 1 TABLET BY MOUTH NIGHTLY  Patient taking differently: Take 75 mg by mouth nightly TAKE 1 TABLET BY MOUTH NIGHTLY 2/21/20  Yes Brent Pollard MD   amLODIPine (NORVASC) 5 MG tablet Take 1 tablet by mouth daily 12/12/19  Yes Brent Pollard MD   clonazePAM (KLONOPIN) 0.5 MG tablet TAKE 1 TABLET BY MOUTH TWICE DAILY AS NEEDED FOR ANXIETY OR SLEEP 10/9/19 10/16/20 Yes Brent Pollard MD   pantoprazole (PROTONIX) 40 MG tablet Take 40 mg by mouth 2 times daily   Yes Historical Provider, MD   losartan (COZAAR) 100 MG tablet TAKE 1 TABLET BY MOUTH ONCE DAILY 5/14/19  Yes Brent Pollard MD   rOPINIRole (REQUIP) 2 MG tablet Take 1 tablet by mouth daily 4/16/19  Yes Brent Pollard MD   simvastatin (ZOCOR) 20 MG tablet Take 1 tablet by mouth nightly 4/16/19  Yes Brent Pollard MD   levothyroxine (SYNTHROID) 137 MCG tablet Take 1 tablet by mouth daily 4/16/19  Yes Brent Pollard MD   gabapentin (NEURONTIN) 800 MG tablet TAKE 1 TABLET BY MOUTH THREE TIMES DAILY 4/16/19 10/16/20 Yes Brent Pollard MD   BREO ELLIPTA 200-25 MCG/INH AEPB Inhale 1 puff into the lungs daily  10/7/17  Yes Historical Provider, MD   tiotropium (Nanci Apple) 18 MCG inhalation capsule Inhale 1 capsule into the lungs Daily 6/19/17  Yes Salma Figueroa MD   ONE TOUCH ULTRA TEST strip  6/7/17  Yes Historical Provider, MD COELHO-D INSULIN SYRINGE 29G X 1/2\" 0.5 ML MISC  5/9/17  Yes Historical Provider, MD        Allergies:     Fioricet [butalbital-apap-caffeine]; Betamethasone; Erythromycin; Codeine; Droperidol; and Tape [adhesive tape]    Social History:     Tobacco:    reports that she has never smoked. She has never used smokeless tobacco.  Alcohol:      reports no history of alcohol use. Drug Use:  reports current drug use. Frequency: 4.00 times per week. Drug: Marijuana. Family History:     Family History   Problem Relation Age of Onset    High Blood Pressure Mother     Migraines Mother     High Blood Pressure Father     Heart Disease Father     Cancer Father         skin    Diabetes Maternal Grandmother     Heart Disease Maternal Grandmother     Cancer Maternal Grandmother     Parkinsonism Maternal Grandmother     Cancer Paternal Grandmother     Other Brother         epilepsy       Review of Systems:     Positive and Negative as described in HPI. CONSTITUTIONAL:  negative for fevers, chills, sweats, fatigue, weight loss  HEENT:  negative for vision, hearing changes, runny nose, throat pain  RESPIRATORY:  negative for shortness of breath, cough, congestion, wheezing. CARDIOVASCULAR:  negative for chest pain, palpitations.   GASTROINTESTINAL:  negative for nausea, vomiting, diarrhea, constipation, change in bowel habits, abdominal pain   GENITOURINARY:  negative for difficulty of urination, burning with urination, frequency   INTEGUMENT:  negative for rash, skin lesions, easy bruising   HEMATOLOGIC/LYMPHATIC:  negative for swelling/edema   ALLERGIC/IMMUNOLOGIC:  negative for urticaria , itching  ENDOCRINE:  negative increase in drinking, increase in urination, hot or cold intolerance  BEHAVIOR/PSYCH:  negative for depression, anxiety    Physical Exam:   BP (!) 169/78   Pulse 94   Temp 98.6 °F (37 °C) (Oral)   Resp 19   Ht 5' 5\" (1.651 m)   Wt 171 lb 8.3 oz (77.8 kg)   SpO2 94%   BMI 28.54 kg/m²   Recent Labs     10/16/20  2222 10/17/20  0722 10/17/20  1135   POCGLU 254* 322* 453*       General Appearance:  alert, well appearing, and in no acute distress  Mental status: oriented to person, place, and time with normal affect  Head:  normocephalic, atraumatic. Eye: no icterus, redness, pupils equal and reactive, extraocular eye movements intact, conjunctiva clear  Mouth: mucous membranes moist  Neck: supple,   Lungs: Bilateral equal air entry, clear to ausculation, no wheezing, rales or rhonchi, normal effort  Cardiovascular: normal rate, regular rhythm, no murmur, gallop, rub.   Abdomen: Soft, nontender, nondistended, normal bowel sounds, no hepatomegaly or splenomegaly  Neurologic: There are no new focal motor or sensory deficits,   Skin: No gross lesions, rashes, bruising or bleeding on exposed skin area  Extremities:  , no pedal edema or calf pain with palpation      Investigations:      Laboratory Testing:  Recent Results (from the past 24 hour(s))   CBC with DIFF    Collection Time: 10/16/20  6:17 PM   Result Value Ref Range    WBC 8.5 3.5 - 11.0 k/uL    RBC 4.66 4.0 - 5.2 m/uL    Hemoglobin 14.1 12.0 - 16.0 g/dL    Hematocrit 41.0 36 - 46 %    MCV 88.1 80 - 100 fL    MCH 30.2 26 - 34 pg    MCHC 34.2 31 - 37 g/dL    RDW 13.3 11.5 - 14.9 %    Platelets 494 617 - 282 k/uL    MPV 8.0 6.0 - 12.0 fL    NRBC Automated NOT REPORTED per 100 WBC    Differential Type NOT REPORTED     Seg Neutrophils 59 36 - 66 %    Lymphocytes 30 24 - 44 %    Monocytes 7 1 - 7 %    Eosinophils % 3 0 - 4 %    Basophils 1 0 - 2 %    Immature Granulocytes NOT REPORTED 0 %    Segs Absolute 5.00 1.3 - 9.1 k/uL    Absolute Lymph # 2.60 1.0 - 4.8 k/uL    Absolute Mono # 0.60 0.1 - 1.3 k/uL    Absolute Eos # 340 (H) 70 - 99 mg/dL    BUN 14 6 - 20 mg/dL    CREATININE 0.67 0.50 - 0.90 mg/dL    Bun/Cre Ratio NOT REPORTED 9 - 20    Calcium 8.7 8.6 - 10.4 mg/dL    Sodium 139 135 - 144 mmol/L    Potassium 4.3 3.7 - 5.3 mmol/L    Chloride 103 98 - 107 mmol/L    CO2 22 20 - 31 mmol/L    Anion Gap 14 9 - 17 mmol/L    Alkaline Phosphatase 100 35 - 104 U/L    ALT 9 5 - 33 U/L    AST 12 <32 U/L    Total Bilirubin 0.29 (L) 0.3 - 1.2 mg/dL    Total Protein 6.7 6.4 - 8.3 g/dL    Alb 3.9 3.5 - 5.2 g/dL    Albumin/Globulin Ratio NOT REPORTED 1.0 - 2.5    GFR Non-African American >60 >60 mL/min    GFR African American >60 >60 mL/min    GFR Comment          GFR Staging NOT REPORTED    CBC auto differential    Collection Time: 10/17/20  4:24 AM   Result Value Ref Range    WBC 8.3 3.5 - 11.0 k/uL    RBC 4.55 4.0 - 5.2 m/uL    Hemoglobin 13.7 12.0 - 16.0 g/dL    Hematocrit 40.8 36 - 46 %    MCV 89.8 80 - 100 fL    MCH 30.1 26 - 34 pg    MCHC 33.6 31 - 37 g/dL    RDW 13.5 11.5 - 14.9 %    Platelets 397 870 - 367 k/uL    MPV 8.1 6.0 - 12.0 fL    NRBC Automated NOT REPORTED per 100 WBC    Differential Type NOT REPORTED     Immature Granulocytes NOT REPORTED 0 %    Absolute Immature Granulocyte NOT REPORTED 0.00 - 0.30 k/uL    WBC Morphology NOT REPORTED     RBC Morphology NOT REPORTED     Platelet Estimate NOT REPORTED     Seg Neutrophils 87 (H) 36 - 66 %    Lymphocytes 11 (L) 24 - 44 %    Monocytes 1 1 - 7 %    Eosinophils % 0 0 - 4 %    Basophils 1 0 - 2 %    Segs Absolute 7.30 1.3 - 9.1 k/uL    Absolute Lymph # 0.90 (L) 1.0 - 4.8 k/uL    Absolute Mono # 0.10 0.1 - 1.3 k/uL    Absolute Eos # 0.00 0.0 - 0.4 k/uL    Basophils Absolute 0.00 0.0 - 0.2 k/uL   Lipid Panel    Collection Time: 10/17/20  4:24 AM   Result Value Ref Range    Cholesterol 206 (H) <200 mg/dL    HDL 55 >40 mg/dL    LDL Cholesterol 136 (H) 0 - 130 mg/dL    Chol/HDL Ratio 3.7 <5    Triglycerides 74 <150 mg/dL    VLDL NOT REPORTED 1 - 30 mg/dL   POC Glucose Fingerstick Collection Time: 10/17/20  7:22 AM   Result Value Ref Range    POC Glucose 322 (H) 65 - 105 mg/dL   POC Glucose Fingerstick    Collection Time: 10/17/20 11:35 AM   Result Value Ref Range    POC Glucose 453 (HH) 65 - 105 mg/dL       Recent Labs     10/17/20  0424   HGB 13.7   HCT 40.8   WBC 8.3   MCV 89.8      K 4.3      CO2 22   BUN 14   CREATININE 0.67   GLUCOSE 340*   AST 12   ALT 9   LABALBU 3.9       Hematology:  Recent Labs     10/16/20  1817 10/17/20  0424   WBC 8.5 8.3   RBC 4.66 4.55   HGB 14.1 13.7   HCT 41.0 40.8   MCV 88.1 89.8   MCH 30.2 30.1   MCHC 34.2 33.6   RDW 13.3 13.5    246   MPV 8.0 8.1   SEDRATE 28  --    CRP 10.2*  --      Chemistry:  Recent Labs     10/16/20  1817 10/17/20  0424    139   K 3.9 4.3    103   CO2 26 22   GLUCOSE 182* 340*   BUN 11 14   CREATININE 0.63 0.67   ANIONGAP 9 14   LABGLOM >60 >60   GFRAA >60 >60   CALCIUM 9.4 8.7   TROPONINT NOT REPORTED  --      Recent Labs     10/16/20  1817 10/16/20  2222 10/17/20  0424 10/17/20  0722 10/17/20  1135   PROT 6.8  --  6.7  --   --    LABALBU 4.0  --  3.9  --   --    AST 15  --  12  --   --    ALT 10  --  9  --   --    ALKPHOS 104  --  100  --   --    BILITOT 0.40  --  0.29*  --   --    CHOL  --   --  206*  --   --    HDL  --   --  55  --   --    LDLCHOLESTEROL  --   --  136*  --   --    CHOLHDLRATIO  --   --  3.7  --   --    TRIG  --   --  74  --   --    VLDL  --   --  NOT REPORTED  --   --    POCGLU  --  254*  --  322* 453*       Imaging/Diagnostics:       Ct Head Wo Contrast    Result Date: 10/16/2020  EXAMINATION: CT OF THE HEAD WITHOUT CONTRAST  10/16/2020 6:35 pm TECHNIQUE: CT of the head was performed without the administration of intravenous contrast. Dose modulation, iterative reconstruction, and/or weight based adjustment of the mA/kV was utilized to reduce the radiation dose to as low as reasonably achievable.  COMPARISON: 01/01/2019 HISTORY: ORDERING SYSTEM PROVIDED HISTORY: L sided weakness and numbness TECHNOLOGIST PROVIDED HISTORY: L sided weakness and numbness Reason for Exam: pt stroke alert. left side headache, left side numbness and tingling since midnight FINDINGS: BRAIN/VENTRICLES: There is no acute intracranial hemorrhage, mass effect or midline shift. No abnormal extra-axial fluid collection. The gray-white differentiation is maintained without evidence of an acute infarct. There is no evidence of hydrocephalus. ORBITS: The visualized portion of the orbits demonstrate no acute abnormality. SINUSES: The visualized paranasal sinuses and mastoid air cells demonstrate no acute abnormality. SOFT TISSUES/SKULL:  No acute abnormality of the visualized skull or soft tissues. No acute intracranial abnormality. The findings were sent to the Radiology Results [address] at 6:47 pm on 10/16/2020to be communicated to a licensed caregiver. Impressions :      1. Active Problems:    TIA (transient ischemic attack)  Resolved Problems:    * No resolved hospital problems. *        2.  has a past medical history of Anesthesia complication, Anxiety, Arthritis, Back pain, CAD (coronary artery disease), Caffeine use, Cataract, COPD (chronic obstructive pulmonary disease) ([address] Deafness, Depression, Diabetes mellitus ([address] Diarrhea, Diverticulosis, Fibromyalgia, Gastroparalysis, GERD (gastroesophageal reflux disease), Hearing loss, Hyperlipidemia, Hyperlipidemia, Hypertension, Incontinence, MDRO (multiple drug resistant organisms) resistance (2012), Neurogenic bladder, Neuropathy, Obesity, Osteoarthritis, Peripheral vascular disease, unspecified ([address] Restless leg syndrome, Rhabdomyolysis, Spinal stenosis, thoracic (5/27/2014), Stroke ([address] (2012), Thyroid disease, Trigeminal neuralgia, and Type II or unspecified type diabetes mellitus without mention of complication, not stated as uncontrolled. Plans:      MRI brain noted. Patient wants to be discharged.  Will discharge her, outpatient Daily Hayley To MD   100 mg at 10/17/20 5824    metoprolol tartrate (LOPRESSOR) tablet 50 mg  50 mg Oral BID Hayley To MD   50 mg at 10/17/20 0524    pantoprazole (PROTONIX) tablet 40 mg  40 mg Oral BID Hayley To MD   40 mg at 10/17/20 2792    rOPINIRole (REQUIP) tablet 2 mg  2 mg Oral Daily Hayley To MD        atorvastatin (LIPITOR) tablet 10 mg  10 mg Oral Daily Hayley To MD   10 mg at 10/17/20 0928    tiotropium (SPIRIVA RESPIMAT) 2.5 MCG/ACT inhaler 2 puff  2 puff Inhalation Daily Hayley To MD        topiramate (TOPAMAX) tablet 25 mg  25 mg Oral BID Hayley To MD        sodium chloride flush 0.9 % injection 10 mL  10 mL Intravenous 2 times per day Hayley To MD   10 mL at 10/17/20 0928    sodium chloride flush 0.9 % injection 10 mL  10 mL Intravenous PRN Hayley To MD        potassium chloride (KLOR-CON M) extended release tablet 40 mEq  40 mEq Oral PRN Hayley To MD        Or    potassium bicarb-citric acid (EFFER-K) effervescent tablet 40 mEq  40 mEq Oral PRN Hayley To MD        Or    potassium chloride 10 mEq/100 mL IVPB (Peripheral Line)  10 mEq Intravenous PRN Hayley To MD        magnesium sulfate 1 g in dextrose 5% 100 mL IVPB  1 g Intravenous PRN Hayley To MD        acetaminophen (TYLENOL) tablet 650 mg  650 mg Oral Q6H PRN Hayley To MD        Or    acetaminophen (TYLENOL) suppository 650 mg  650 mg Rectal Q6H PRN Hayley To MD        polyethylene glycol (GLYCOLAX) packet 17 g  17 g Oral Daily PRN Hayley To MD        promethazine (PHENERGAN) tablet 12.5 mg  12.5 mg Oral Q6H PRN Hayley To MD        Or    ondansetron [facility]) injection 4 mg  4 mg Intravenous Q6H PRN Hayley To MD        enoxaparin (LOVENOX) injection 40 mg  40 mg Subcutaneous Daily Hayley To MD   40 mg at 10/17/20 0932    insulin lispro (HUMALOG) injection vial 0-12 Units  0-12 Units Subcutaneous TID  MD Jamila Gutierrez insulin lispro (HUMALOG) injection vial 0-6 Units  0-6 Units Subcutaneous Nightly Manasa Lnae MD        glucose (GLUTOSE) 40 % oral gel 15 g  15 g Oral PRN Manasa Lane MD        dextrose 50 % IV solution  12.5 g Intravenous PRN Manasa Lane MD        glucagon (rDNA) injection 1 mg  1 mg Intramuscular PRN Manasa Lane MD        dextrose 5 % solution  100 mL/hr Intravenous PRN MD Melissa Aguirre MD  10/17/2020  12:25 PM

## 2020-10-17 NOTE — PLAN OF CARE
Problem: Falls - Risk of:  Goal: Will remain free from falls  Description: Will remain free from falls  Outcome: Ongoing  Note: No falls noted this shift. Patient ambulates per self without difficulty. Bed kept in low position. Safe environment maintained. Bedside table & call light in reach. Uses call light appropriately when needing assistance. Problem: Anxiety/Stress:  Goal: Level of anxiety will decrease  Description: Level of anxiety will decrease  Outcome: Ongoing  Note: Pt has had intermittent tearful episodes. Pt stated that she is \"so stressed\". RN provided emotional support and offered resources, pt declined at this time. Problem: Nutrition Deficit:  Goal: Ability to achieve adequate nutritional intake will improve  Description: Ability to achieve adequate nutritional intake will improve  Outcome: Ongoing  Note: Pt stated that she does not eat that much and has a decreased appetite. Pt states she has a \"paralyzed stomach\" and she \"always feels full\"     Problem: Pain:  Goal: Pain level will decrease  Description: Pain level will decrease  Outcome: Ongoing  Note: No pain at this time. 0/10 pain scale       Problem: Serum Glucose Level - Abnormal:  Goal: Ability to maintain appropriate glucose levels will improve to within specified parameters  Description: Ability to maintain appropriate glucose levels will improve to within specified parameters  Outcome: Ongoing  Note: Pt blood glucose levels elevated this evening. Dr. Willie Calvin using pt's own insulin pump for glucose control.

## 2020-10-17 NOTE — PROGRESS NOTES
Discharge instructions given, IV removed, patient denies any questions. Patient refused wheelchair to main lobby. States that she will walk.      Electronically signed by Xena Purvis RN on 10/17/20 at 6:18 PM EDT

## 2020-10-17 NOTE — PROGRESS NOTES
[address]   Occupational Therapy Evaluation  Date: 10/17/20  Patient Name: Zabrina Rose       Room: 7788/3314-65  MRN: [id number]  Account: [de-identified]   : 1960  (64 y.o.) Gender: female     Discharge Recommendations: The patient would benefit from additional Occupational Therapy after discharge from the facility upon return to their Home. OT Equipment Recommendations  Equipment Needed: No    Referring Practitioner: Dr Koby Hyatt  Diagnosis: TIA          Past Medical History:  has a past medical history of Anesthesia complication, Anxiety, Arthritis, Back pain, CAD (coronary artery disease), Caffeine use, Cataract, COPD (chronic obstructive pulmonary disease) ([facility]), Deafness, Depression, Diabetes mellitus ([facility]), Diarrhea, Diverticulosis, Fibromyalgia, Gastroparalysis, GERD (gastroesophageal reflux disease), Hearing loss, Hyperlipidemia, Hyperlipidemia, Hypertension, Incontinence, MDRO (multiple drug resistant organisms) resistance, Neurogenic bladder, Neuropathy, Obesity, Osteoarthritis, Peripheral vascular disease, unspecified ([facility]), Restless leg syndrome, Rhabdomyolysis, Spinal stenosis, thoracic, Stroke ([facility]), Thyroid disease, Trigeminal neuralgia, and Type II or unspecified type diabetes mellitus without mention of complication, not stated as uncontrolled. Past Surgical History:   has a past surgical history that includes Cholecystectomy; Colon surgery; Tonsillectomy; Tubal ligation; Carpal tunnel release (Right); Middle ear surgery (Left); cyst removal; cyst removal; Cholecystectomy, open; Finger trigger release (Right); Colonoscopy; Abdomen surgery; polypectomy; Cystocopy; Cataract removal; Incisional hernia repair (07/07/15); Upper gastrointestinal endoscopy (2016); Colonoscopy (2016); eye surgery (Right); Incisional hernia repair (10/17/2017); pr repair incisional hernia,reducible (N/A, 10/17/2017); Upper gastrointestinal endoscopy (2018);  Upper gastrointestinal endoscopy (N/A, 7/23/2018); Upper gastrointestinal endoscopy (06/01/2020); Colonoscopy (06/01/2020); Upper gastrointestinal endoscopy (N/A, 6/1/2020); sigmoidoscopy (N/A, 6/1/2020); Colonoscopy (07/07/2020); and Colonoscopy (N/A, 7/7/2020).     Restrictions  Restrictions/Precautions: Fall Risk  Implants present? : (Insulin pump.)  Other position/activity restrictions: Reports sensation is better in left hand than it was Yesterday / last night     Vitals  Temp: 97.9 °F (36.6 °C)  Pulse: 76  Resp: 19  BP: (!) 175/86  Height: 5' 5\" (165.1 cm)  Weight: 171 lb 8.3 oz (77.8 kg)  BMI (Calculated): 28.6  Oxygen Therapy  SpO2: 92 %  O2 Device: None (Room air)  Level of Consciousness: Alert    Subjective     Overall Orientation Status: Within Functional Limits  Vision  Vision: Impaired  Vision Exceptions: Wears glasses at all times  Hearing  Hearing: Exceptions to [facility]  Hearing Exceptions: Hard of hearing/hearing concerns  Social/Functional History  Lives With: Alone  Type of Home: House  Home Layout: One level  Home Access: Elevator  Bathroom Shower/Tub: Walk-in shower, Curtain  Bathroom Toilet: Standard  Bathroom Equipment: Built-in shower seat, Grab bars in shower, Toilet raiser  Bathroom Accessibility: Accessible  Home Equipment: Rolling walker, Cane  ADL Assistance: Independent  Homemaking Assistance: Independent  Homemaking Responsibilities: Yes  Ambulation Assistance: Independent  Transfer Assistance: Independent  Active : Yes  Mode of Transportation: Car  Occupation: On disability  Additional Comments: Pt indeependnet for all IADL's and mobility       Objective      Cognition  Overall Cognitive Status: WFL  Cognition Comment: Generally appears to make safe decisons reagrding her care   Sensation  Overall Sensation Status: Impaired  Additional Comments: Some Left face, left hand, and left foot numbness, but less than prior days that prompted her coming to the hospital   ADL  Feeding: Independent  Grooming: Setup  UE Bathing: Setup  LE Bathing: Setup  UE Dressing: Setup  LE Dressing: Setup  Toileting: Setup    UE Function  Hand Dominance  Hand Dominance: Right        LUE Strength  Gross LUE Strength: WFL  L Hand General: 5/5  Left Hand Strength -  (lbs)  Handle Setting 2: 55# (norms 38-60# )  LUE Tone: Normotonic     LUE AROM (degrees)  LUE AROM : WFL     Left Hand AROM (degrees)  Left Hand AROM: WFL  RUE Strength  Gross RUE Strength: WFL  R Hand General: 5/5   Right Hand Strength -  (lbs)  Handle Setting 2: 52# (norms 38-60# )  RUE Tone: Normotonic     RUE AROM (degrees)  RUE AROM : WFL     Right Hand AROM (degrees)  Right Hand AROM: WFL    Fine Motor Skills  Coordination  Movements Are Fluid And Coordinated: Yes       Mobility      Balance  Sitting Balance: Modified independent    Bed mobility  Rolling to Left: Independent  Rolling to Right: Independent            Assessment  Performance deficits / Impairments: Decreased safe awareness, Decreased endurance  Assessment: REports she is back to her [facility] level of function  Prognosis: Good  Decision Making: Medium Complexity  History:  Moderate chart Review  Exam: 2 areas of altered performance  REQUIRES OT FOLLOW UP: No  No Skilled OT: At baseline function, No OT goals identified  Discharge Recommendations: Home with assist PRN  Activity Tolerance: Patient Tolerated treatment well         Functional Outcome Measures  AM-PAC Daily Activity Inpatient   How much help for putting on and taking off regular lower body clothing?: A Little  How much help for Bathing?: A Little  How much help for Toileting?: A Little  How much help for putting on and taking off regular upper body clothing?: None  How much help for taking care of personal grooming?: None  How much help for eating meals?: None  AM-[facility] Inpatient Daily Activity Raw Score: 21  AM-PAC Inpatient ADL T-Scale Score : 44.27  ADL Inpatient CMS 0-100% Score: 32.79  ADL Inpatient CMS G-Code Modifier : CJ       Goals  Patient Goals   Patient goals : REturn Home as soon as possible  Short term goals  Time Frame for Short term goals: 1 day  Short term goal 1: D/V understanding of fall prevention and home safety strategies with application to care needs  Short term goal 2: Participate in care using safe techniques for self  and mobility tasks    Plan  Safety Devices  Safety Devices in place: Yes  Type of devices: Left in bed, Nurse notified, Call light within reach     Plan  Times per week: 1-3 sesisons  Times per day: Daily  Current Treatment Recommendations: Functional Mobility Training, Endurance Training, Patient/Caregiver Education & Training, Self-Care / ADL, Safety Education & Training  OT Education  OT Education: OT Role, Plan of Care, Precautions, ADL Adaptive Strategies    OT Equipment Recommendations  Equipment Needed: No  OT Individual Minutes  Time In: 1100  Time Out: 1120  Minutes: 20    Electronically signed by Avila Singh OT on 10/17/20 at 2:26 PM EDT

## 2020-10-17 NOTE — CARE COORDINATION
CASE MANAGEMENT NOTE:    Admission Date:  10/16/2020 Nkechi Maxwell is a 64 y.o.  female    Admitted for : TIA (transient ischemic attack) [G45.9]    Met with:  Patient    PCP:  Dr Olivas Pae:  Demetri Mckenzie:  independently at home             Current Services PTA:  No    Is patient agreeable to VNS: No    Freedom of choice provided:  Yes    List of [address] provided: NA    VNS chosen:  NA    DME: insulin pump and monitor    Home Oxygen: No    Nebulizer: No    CPAP/BIPAP: No    Supplier: N/A    Potential Assistance Needed: Will follow    SNF needed: No    Freedom of choice and list provided: No    Pharmacy:  [facility]        Does Patient want to use MEDS to BEDS? No    Is patient currently receiving oral anticoagulation therapy? No    Is the Patient an [facility] with Readmission Risk Score greater than 14%? No  If yes, pt needs a follow up appointment made within 7 days. Family Members/Caregivers that pt would like involved in their care:    Yes    If yes, list name here:  Lucien Benson Kitani:  Family             Discharge Plan:  10/17/2020 [employer] MED[employer]; From single story home alone- stated independent and drives; DME- insulin pump and monitor; Declines VNS and discharge needs; Per PT no further therapy recommendations on discharge; Neurology consulted//[facility]                 Electronically signed by:  Eneida Marcos RN on 10/17/2020 at 2:24 PM

## 2020-10-17 NOTE — PROGRESS NOTES
Medication History completed:    New medications: Novolog    Medications discontinued: aspirin, docusate, famotidine, Tresiba, Humalog, probiotic, topiramate, torsemide, vitamin D    Changes to dosing:   Metoclopramide changed to 10 mg four times daily  Metoprolol tartrate changed to 100 mg twice daily    Stated allergies: As listed    Other pertinent information: Medications confirmed with [facility].      Thank you,  Thaddeus Palumbo PharmD, BCPS  [phone]

## 2020-10-17 NOTE — ED NOTES
Admission Dx: stroke-like symtpoms    Pts Chief Complaints on Arrival: left side numbness (face, leg)    ADL's - Self-care    Pending Diagnostics:  MRI in morning    Residence PTA: single story    Special Considerations/Circumstances:  Pt self cath    Vitals: Current vital signs:  BP (!) 160/70   Pulse 74   Temp 99 °F (37.2 °C) (Oral)   Resp 22   Ht 5' 5\" (1.651 m)   Wt 185 lb (83.9 kg)   SpO2 (!) 89%   BMI 30.79 kg/m²                MEWS Score: [id number] [facility], RN  10/16/20 2284

## 2020-10-17 NOTE — FLOWSHEET NOTE
10/17/20 0740   Provider Notification   Reason for Communication Evaluate   Provider Name Dr. Adalid Bell   Provider Notification Physician   Method of Communication Call   Response See orders   Notification Time 0740     This RN contacted Dr. Adalid Bell of new consult for TIA. Dr. Adalid Bell would like a MRI of brain wo contrast, see orders.

## 2020-10-17 NOTE — PROGRESS NOTES
Dr. Prince Hameed rounded. Patient ok for discharge today. Carotids and Echo can be done outpatient. Sedimentation rate already completed. Follow up outpatient with Dr. Austen Plata in 1-2 weeks.

## 2020-10-17 NOTE — CONSULTS
[place] [id number] of Neurology      Requesting Physician:  Bharath Freeman MD/Alexander Bray MD     CHIEF COMPLAINT:     Consultation:  61-year-old longstanding juvenile diabetic known history of diabetic complications. Previous history of headaches. History of cervical and lumbosacral disc disease. Has not been taking aspirin. Yesterday evening early morning, this morning, became aware of left temporal pain followed by left facial numbness and burning followed by left hand and foot numbness. Most symptoms have resolved. No difficulty functioning or walking. Also, became aware of restricted vision right temporal visual field. No difficulty with speech or swallowing. No bouts of neuro loss of consciousness. Does not smoke. Family history unremarkable for neurological disorders. CT brain unremarkable. MRI brain personally reviewed, left thalamic and left corona radiata infarctions, await final report. Unsure if these are acute or chronic. Diffusion-weighted images observation and report expected soon. Heavyset middle-aged lady awake alert and cooperative. No temporal tenderness. Pupils 4 mm could not focus on discs. Minimal flattening right nasolabial fold. Faint carotid bruit. Cardiac auscultation unremarkable. Strength normal.  Diminished distal perception. Upper extremity reflexes trace right knee and ankle jerk trace left knee and ankle jerk 2. Romberg's test negative eyes closed and open, gait normal.  Assessment:  Rule out giant cell arteritis patient already taking steroids. New lacunar infarction. High risk for stroke. Recommendations:  Urgent sedimentation rate. Await interpretation of MRI reports. Urgent ophthalmology consultation post discharge for right visual field deficit which does not correlate with any new MRI findings, local pathology such as retinopathy cannot be excluded  Will start on low-dose aspirin.   Can be discharged at your discretion if sedimentation rate is satisfactory. HISTORY OF PRESENT ILLNESS:                 The patient is a 64 y.o. female who presents with     Allergies:  Fioricet [butalbital-apap-caffeine]; Betamethasone; Erythromycin; Codeine; Droperidol; and Tape [adhesive tape]    Current Medications:   Scheduled Meds:   amitriptyline  75 mg Oral Nightly    methylPREDNISolone  4 mg Oral QAM AC    methylPREDNISolone  4 mg Oral Lunch    methylPREDNISolone  4 mg Oral Dinner    methylPREDNISolone  8 mg Oral Nightly    [START ON 10/18/2020] methylPREDNISolone  4 mg Oral Nightly    metoclopramide  10 mg Oral 4x Daily AC & HS    cefUROXime  250 mg Oral 2 times per day    amLODIPine  5 mg Oral Daily    aspirin  81 mg Oral Daily    budesonide-formoterol  2 puff Inhalation BID    DULoxetine  60 mg Oral Daily    gabapentin  800 mg Oral TID    levothyroxine  125 mcg Oral Daily    losartan  100 mg Oral Daily    metoprolol tartrate  50 mg Oral BID    pantoprazole  40 mg Oral BID    rOPINIRole  2 mg Oral Daily    atorvastatin  10 mg Oral Daily    tiotropium  2 puff Inhalation Daily    topiramate  25 mg Oral BID    sodium chloride flush  10 mL Intravenous 2 times per day    enoxaparin  40 mg Subcutaneous Daily    insulin lispro  0-12 Units Subcutaneous TID WC    insulin lispro  0-6 Units Subcutaneous Nightly     Continuous Infusions:   dextrose       PRN Meds:.clonazePAM, sodium chloride flush, potassium chloride **OR** potassium alternative oral replacement **OR** potassium chloride, magnesium sulfate, acetaminophen **OR** acetaminophen, polyethylene glycol, promethazine **OR** ondansetron, glucose, dextrose, glucagon (rDNA), dextrose     REVIEW OF SYSTEMS:       Saul Singh is a 64 y.o. female with hx of DM1, HTN, osteoporosis,  Neuropathy of feet, depression, stroke 10 years.  Around midnight she c/o shape pain in the left temporal head and left side hand and foot feeling numb, she woke up this AM feeling that her left arm and left leg now feel now, due to her hx of stroke 10 years ago, her PCP told her to come into the ED     Patient stated that she initially had a sharp head pain on the left temporal lobe but this resolved, she just felt that something is wrong on the left temporal lobe she felt like it is sore.     She otherwise denies headache now, she does not complaint of any visual aura. No weakness or slurred speech. Her stroke 10 years ago he only complaint was left face numbess, she was told she had a stroke on MRI. She didn't take aspirin anymore, she does not know why.        Allergies  is allergic to fioricet [butalbital-apap-caffeine]; betamethasone; erythromycin; codeine; droperidol; and tape [adhesive tape]. Medications  Home Medications           Prior to Admission medications    Medication Sig Start Date End Date Taking?  Authorizing Provider   amitriptyline (ELAVIL) 150 MG tablet TAKE 1 TABLET BY MOUTH NIGHTLY 2/21/20     Brent Pollard MD   metoprolol tartrate (LOPRESSOR) 50 MG tablet TAKE 1 TABLET BY MOUTH TWICE DAILY 2/10/20     Brent Pollard MD   amLODIPine (NORVASC) 5 MG tablet Take 1 tablet by mouth daily 12/12/19     Brent Pollard MD   famotidine (PEPCID) 20 MG tablet Take 20 mg by mouth 2 times daily 11/19/19     Historical Provider, MD   DULoxetine (CYMBALTA) 60 MG extended release capsule TAKE 1 CAPSULE BY MOUTH ONCE DAILY 11/13/19     Brent Pollard MD   insulin lispro (HUMALOG) 100 UNIT/ML injection vial Inject into the skin 3 times daily (before meals) Indications: via insulin pump       Historical Provider, MD   clonazePAM (KLONOPIN) 0.5 MG tablet TAKE 1 TABLET BY MOUTH TWICE DAILY AS NEEDED FOR ANXIETY OR SLEEP 10/9/19 7/7/20   Brent Pollard MD   pantoprazole (PROTONIX) 40 MG tablet Take 40 mg by mouth 2 times daily       Historical Provider, MD   topiramate (TOPAMAX) 25 MG tablet TAKE 1 TABLET BY MOUTH ONCE DAILY FOR 7 DAYS THEN 1 TWICE DAILY FOR 7 DAYS THEN 1 IN THE MORNING AND 2 IN THE EVENING FOR 7 DAYS THEN 2 TWIC 6/14/19     Historical Provider, MD   losartan (COZAAR) 100 MG tablet TAKE 1 TABLET BY MOUTH ONCE DAILY 5/14/19     Lyric Young MD   metoclopramide (REGLAN) 5 MG tablet TAKE 1 TABLET BY MOUTH TWICE DAILY 4/18/19     Lyric Young MD   torsemide (DEMADEX) 10 MG tablet Take 1 tablet by mouth daily as needed (leg edema) 4/16/19     Lyric Young MD   rOPINIRole (REQUIP) 2 MG tablet Take 1 tablet by mouth daily 4/16/19     Lyric Young MD   simvastatin (ZOCOR) 20 MG tablet Take 1 tablet by mouth nightly 4/16/19     Lyric Young MD   levothyroxine (SYNTHROID) 137 MCG tablet Take 1 tablet by mouth daily 4/16/19     Lyric Young MD   gabapentin (NEURONTIN) 800 MG tablet TAKE 1 TABLET BY MOUTH THREE TIMES DAILY 4/16/19 9/4/20   Lyric Young MD   BREO ELLIPTA 200-25 MCG/INH AEPB INL 1 PUFF PO DAILY 10/7/17     Historical Provider, MD   tiotropium (Nancy Thee) 18 MCG inhalation capsule Inhale 1 capsule into the lungs Daily 6/19/17     Jamar Pimentel MD   ONE TOUCH ULTRA TEST strip   6/7/17     Historical Provider, MD   B-D INSULIN SYRINGE 29G X 1/2\" 0.5 ML MISC   5/9/17     Historical Provider, MD   vitamin D (CHOLECALCIFEROL) 1000 UNIT TABS tablet Take 4,000 Units by mouth daily Takes 4 tabs daily       Historical Provider, MD   docusate sodium (COLACE) 100 MG capsule Take 1 capsule by mouth 2 times daily Take while on narcotics 7/7/15     Ольга Hood MD   Probiotic Product (PROBIOTIC DAILY PO)    Take 1 tablet by mouth        Historical Provider, MD   aspirin 81 MG tablet Take 81 mg by mouth daily.       Historical Provider, MD       Scheduled Meds:  Continuous Infusions:  PRN Meds:.  Past Medical History   has a past medical history of Anesthesia complication, Anxiety, Arthritis, Back pain, CAD (coronary artery disease), Caffeine use, Cataract, COPD (chronic obstructive pulmonary disease) ([address] Deafness, Depression, Diabetes mellitus ([facility] Diarrhea, Diverticulosis, Fibromyalgia, Gastroparalysis, GERD (gastroesophageal reflux disease), Hearing loss, Hyperlipidemia, Hyperlipidemia, Hypertension, Incontinence, MDRO (multiple drug resistant organisms) resistance, Neurogenic bladder, Neuropathy, Obesity, Osteoarthritis, Peripheral vascular disease, unspecified (HCC), Restless leg syndrome, Rhabdomyolysis, Spinal stenosis, thoracic, Stroke ([facility] Thyroid disease, Trigeminal neuralgia, and Type II or unspecified type diabetes mellitus without mention of complication, not stated as uncontrolled. Social History  Social History               Socioeconomic History    Marital status:        Spouse name: Not on file    Number of children: 0    Years of education: 15    Highest education level: Not on file   Occupational History    Occupation: disability       Employer: N/A   Social Needs    Financial resource strain: Not on file    Food insecurity       Worry: Not on file       Inability: Not on file    Transportation needs       Medical: Not on file       Non-medical: Not on file   Tobacco Use    Smoking status: Never Smoker    Smokeless tobacco: Never Used   Substance and Sexual Activity    Alcohol use: No       Alcohol/week: 0.0 standard drinks       Comment: once a month    Drug use:  Yes       Frequency: 4.0 times per week       Types: Marijuana    Sexual activity: Yes       Comment: 1 partner   Lifestyle    Physical activity       Days per week: Not on file       Minutes per session: Not on file    Stress: Not on file   Relationships    Social connections       Talks on phone: Not on file       Gets together: Not on file       Attends Holiness service: Not on file       Active member of club or organization: Not on file       Attends meetings of clubs or organizations: Not on file       Relationship status: Not on file    Intimate partner violence       Fear of current or ex partner: Not on file       Emotionally abused: Not on file       Physically abused: Not on file       Forced sexual activity: Not on file   Other Topics Concern    Not on file   Social History Narrative    Not on file        Family History  Family History             Problem Relation Age of Onset    High Blood Pressure Mother      Migraines Mother      High Blood Pressure Father      Heart Disease Father      Cancer Father           skin    Diabetes Maternal Grandmother      Heart Disease Maternal Grandmother      Cancer Maternal Grandmother      Parkinsonism Maternal Grandmother      Cancer Paternal Grandmother      Other Brother           epilepsy           OBJECTIVE  BP (!) 151/78   Pulse 78   Temp 99 °F (37.2 °C) (Oral)   Resp 18   Ht 5' 5\" (1.651 m)   Wt 185 lb (83.9 kg)   SpO2 94%   BMI 30.79 kg/m²      NIH Stroke Scale  Interval: Baseline  Level of Consciousness (1a. ): Alert  LOC Questions (1b. ):  Answers both correctly  LOC Commands (1c. ): Performs both tasks correctly  Best Gaze (2. ): Normal  Visual (3. ): No visual loss  Facial Palsy (4. ): Normal symmetrical movement  Motor Arm, Left (5a. ): No drift  Motor Arm, Right (5b. ): No drift  Motor Leg, Left (6a. ): No drift  Motor Leg, Right (6b. ): No drift  Limb Ataxia (7. ): Absent  Sensory (8. ): Normal  Best Language (9. ): No aphasia  Dysarthria (10. ): Normal  Extinction and Inattention (11): No abnormality  Total: 0  Pre-Morbid mRS: 0     AAO x 4, follow all commmands  Face symmetrical, no drift on arms and legs  Able to walk normal  Language and speech normal  Sensation: decrease on the left face and leg, normal on the body and both arms  No ataxia        Imaging:  Images were personally reviewed in pacs with diane if available and used to review images including:  CT brain without contrast: normal  CTA imaging: N/A     Assessment     64year old woman with hx of stroke 10 years ago with no deficit, she has DM1, HTN, HLD, c/o left side numbness and left temporal area \"soreness\" since yesterday.     Recommendations:  1. NIH 1  2. Recommend Inpatient Neurology Consult for further assessment and evaluation   3.  consider . [id number]  4. Not a tPA candidate as she is out of window  5. I do not think this is a stroke, however given her history she is high risk for stroke and I am also concerned for temporal arteritis  6. Recommend starting aspirin 325mg x 1 dose and con't with 81mg daily, patient is current not taking any antiplatelets  7. con't simvastatin  8. MRI brain w/o contrast during admission to r/o stroke  9. Carotid dopple US to screen for carotid, if MRI brain showed a stroke, patient will need CTA head/neck with contrast or MRA head/neck without contrast to complete stroke workup  10. lovenox for DVT prophylaxis.   11. Please sent ESR and CRP                 PHYSICAL EXAM:       BP (!) 175/86   Pulse 76   Temp 97.9 °F (36.6 °C) (Oral)   Resp 19   Ht 5' 5\" (1.651 m)   Wt 171 lb 8.3 oz (77.8 kg)   SpO2 92%   BMI 28.54 kg/m²       LABS AND IMAGING:       Admission on 10/16/2020   Component Date Value Ref Range Status    WBC 10/16/2020 8.5  3.5 - 11.0 k/uL Final    RBC 10/16/2020 4.66  4.0 - 5.2 m/uL Final    Hemoglobin 10/16/2020 14.1  12.0 - 16.0 g/dL Final    Hematocrit 10/16/2020 41.0  36 - 46 % Final    MCV 10/16/2020 88.1  80 - 100 fL Final    MCH 10/16/2020 30.2  26 - 34 pg Final    MCHC 10/16/2020 34.2  31 - 37 g/dL Final    RDW 10/16/2020 13.3  11.5 - 14.9 % Final    Platelets 52/37/0903 252  150 - 450 k/uL Final    MPV 10/16/2020 8.0  6.0 - 12.0 fL Final    NRBC Automated 10/16/2020 NOT REPORTED  per 100 WBC Final    Differential Type 10/16/2020 NOT REPORTED   Final    Seg Neutrophils 10/16/2020 59  36 - 66 % Final    Lymphocytes 10/16/2020 30  24 - 44 % Final    Monocytes 10/16/2020 7  1 - 7 % Final    Eosinophils % 10/16/2020 3  0 - 4 % Final    Basophils 10/16/2020 1  0 - 2 % Final    Immature Granulocytes 10/16/2020 NOT REPORTED  0 % Final    Segs Absolute 10/16/2020 5.00  1.3 - 9.1 k/uL Final    Absolute Lymph # 10/16/2020 2.60  1.0 - 4.8 k/uL Final    Absolute Mono # 10/16/2020 0.60  0.1 - 1.3 k/uL Final    Absolute Eos # 10/16/2020 0.30  0.0 - 0.4 k/uL Final    Basophils Absolute 10/16/2020 0.10  0.0 - 0.2 k/uL Final    Absolute Immature Granulocyte 10/16/2020 NOT REPORTED  0.00 - 0.30 k/uL Final    WBC Morphology 10/16/2020 NOT REPORTED   Final    RBC Morphology 10/16/2020 NOT REPORTED   Final    Platelet Estimate 60/30/5804 NOT REPORTED   Final    Glucose 10/16/2020 182* 70 - 99 mg/dL Final    BUN 10/16/2020 11  6 - 20 mg/dL Final    CREATININE 10/16/2020 0.63  0.50 - 0.90 mg/dL Final    Bun/Cre Ratio 10/16/2020 NOT REPORTED  9 - 20 Final    Calcium 10/16/2020 9.4  8.6 - 10.4 mg/dL Final    Sodium 10/16/2020 137  135 - 144 mmol/L Final    Potassium 10/16/2020 3.9  3.7 - 5.3 mmol/L Final    Chloride 10/16/2020 102  98 - 107 mmol/L Final    CO2 10/16/2020 26  20 - 31 mmol/L Final    Anion Gap 10/16/2020 9  9 - 17 mmol/L Final    Alkaline Phosphatase 10/16/2020 104  35 - 104 U/L Final    ALT 10/16/2020 10  5 - 33 U/L Final    AST 10/16/2020 15  <32 U/L Final    Total Bilirubin 10/16/2020 0.40  0.3 - 1.2 mg/dL Final    Total Protein 10/16/2020 6.8  6.4 - 8.3 g/dL Final    Alb 10/16/2020 4.0  3.5 - 5.2 g/dL Final    Albumin/Globulin Ratio 10/16/2020 NOT REPORTED  1.0 - 2.5 Final    GFR Non- 10/16/2020 >60  >60 mL/min Final    GFR  10/16/2020 >60  >60 mL/min Final    GFR Comment 10/16/2020        Final    Comment: Average GFR for 52-63 years old:   80 mL/min/1.73sq m  Chronic Kidney Disease:   <60 mL/min/1.73sq m  Kidney failure:   <15 mL/min/1.73sq m              eGFR calculated using average adult body mass.  Additional eGFR calculator available at:        [email or web address]            GFR Staging 10/16/2020 NOT REPORTED   Final    Troponin, 6.7  6.4 - 8.3 g/dL Final    Alb 10/17/2020 3.9  3.5 - 5.2 g/dL Final    Albumin/Globulin Ratio 10/17/2020 NOT REPORTED  1.0 - 2.5 Final    GFR Non- 10/17/2020 >60  >60 mL/min Final    GFR  10/17/2020 >60  >60 mL/min Final    GFR Comment 10/17/2020        Final    Comment: Average GFR for 52-63 years old:   80 mL/min/1.73sq m  Chronic Kidney Disease:   <60 mL/min/1.73sq m  Kidney failure:   <15 mL/min/1.73sq m              eGFR calculated using average adult body mass.  Additional eGFR calculator available at:        [email or web address]            GFR Staging 10/17/2020 NOT REPORTED   Final    WBC 10/17/2020 8.3  3.5 - 11.0 k/uL Final    RBC 10/17/2020 4.55  4.0 - 5.2 m/uL Final    Hemoglobin 10/17/2020 13.7  12.0 - 16.0 g/dL Final    Hematocrit 10/17/2020 40.8  36 - 46 % Final    MCV 10/17/2020 89.8  80 - 100 fL Final    MCH 10/17/2020 30.1  26 - 34 pg Final    MCHC 10/17/2020 33.6  31 - 37 g/dL Final    RDW 10/17/2020 13.5  11.5 - 14.9 % Final    Platelets 20/35/6449 246  150 - 450 k/uL Final    MPV 10/17/2020 8.1  6.0 - 12.0 fL Final    NRBC Automated 10/17/2020 NOT REPORTED  per 100 WBC Final    Differential Type 10/17/2020 NOT REPORTED   Final    Immature Granulocytes 10/17/2020 NOT REPORTED  0 % Final    Absolute Immature Granulocyte 10/17/2020 NOT REPORTED  0.00 - 0.30 k/uL Final    WBC Morphology 10/17/2020 NOT REPORTED   Final    RBC Morphology 10/17/2020 NOT REPORTED   Final    Platelet Estimate 44/11/9842 NOT REPORTED   Final    Seg Neutrophils 10/17/2020 87* 36 - 66 % Final    Lymphocytes 10/17/2020 11* 24 - 44 % Final    Monocytes 10/17/2020 1  1 - 7 % Final    Eosinophils % 10/17/2020 0  0 - 4 % Final    Basophils 10/17/2020 1  0 - 2 % Final    Segs Absolute 10/17/2020 7.30  1.3 - 9.1 k/uL Final    Absolute Lymph # 10/17/2020 0.90* 1.0 - 4.8 k/uL Final    Absolute Mono # 10/17/2020 0.10  0.1 - 1.3 k/uL Final    Absolute Eos # 10/17/2020 0.00  0.0 - 0.4 k/uL Final    Basophils Absolute 10/17/2020 0.00  0.0 - 0.2 k/uL Final    Cholesterol 10/17/2020 206* <200 mg/dL Final    Comment:    Cholesterol Guidelines:      <200  Desirable   200-240  Borderline      >240  Undesirable         HDL 10/17/2020 55  >40 mg/dL Final    Comment:    HDL Guidelines:    <40     Undesirable   40-59    Borderline    >59     Desirable         LDL Cholesterol 10/17/2020 136* 0 - 130 mg/dL Final    Comment:    LDL Guidelines:     <100    Desirable   100-129   Near to/above Desirable   130-159   Borderline      >159   Undesirable     Direct (measured) LDL and calculated LDL are not interchangeable tests.  Chol/HDL Ratio 10/17/2020 3.7  <5 Final            Triglycerides 10/17/2020 74  <150 mg/dL Final    Comment:    Triglyceride Guidelines:     <150   Desirable   150-199  Borderline   200-499  High     >499   Very high   Based on AHA Guidelines for fasting triglyceride, October 2012.  VLDL 10/17/2020 NOT REPORTED  1 - 30 mg/dL Final    POC Glucose 10/16/2020 254* 65 - 105 mg/dL Final    POC Glucose 10/17/2020 322* 65 - 105 mg/dL Final    POC Glucose 10/17/2020 453* 65 - 105 mg/dL Final    Critical Noted       Radiology Review:  Ct Head Wo Contrast    Result Date: 10/16/2020  EXAMINATION: CT OF THE HEAD WITHOUT CONTRAST  10/16/2020 6:35 pm TECHNIQUE: CT of the head was performed without the administration of intravenous contrast. Dose modulation, iterative reconstruction, and/or weight based adjustment of the mA/kV was utilized to reduce the radiation dose to as low as reasonably achievable. COMPARISON: 01/01/2019 HISTORY: ORDERING SYSTEM PROVIDED HISTORY: L sided weakness and numbness TECHNOLOGIST PROVIDED HISTORY: L sided weakness and numbness Reason for Exam: pt stroke alert.  left side headache, left side numbness and tingling since midnight FINDINGS: BRAIN/VENTRICLES: There is no acute intracranial hemorrhage, mass effect or

## 2020-10-17 NOTE — FLOWSHEET NOTE
10/16/20 3389   Provider Notification   Reason for Communication Evaluate   Provider Name Dr. Kirsty Loomis   Provider Notification Physician   Method of Communication Call   Response See orders   Notification Time [phone]     RN called Dr. Kirsty Loomis regarding pt's home medications. Okay to restart pt's methylprednisolone and cefuroxime. Dose changed to home dose for Reglan and Amitriptyline per pt. Okay to use pt's insulin pump.

## 2020-10-17 NOTE — PROGRESS NOTES
Comprehensive Nutrition Assessment    Type and Reason for Visit:  Initial, Positive Nutrition Screen(Poor appetite and intake, wt loss)    Nutrition Recommendations/Plan: Provide Glucerna twice daily, Continue current diet. Nutrition Assessment:  Pt admitted with possible TIA with neurologist also indicating concern for possible temporal arteritis. Pt plans to go home today. Ate all of breakfast with minimal intake at lunch but ikely will eat dinner. States issues with gastroparesis, as well as lack of socialization at times, has contributed to decreased oral intake x 8-9 months with wt loss noted. Has an \"insulin \" and uses an insulin pump at home. Interested in trying Glucerna at home, but unable to afford it. Referral made to [place].    Malnutrition Assessment:  Malnutrition Status: Moderate malnutrition    Context:  Chronic Illness     Findings of the 6 clinical characteristics of malnutrition:  Energy Intake:  7 - 75% or less estimated energy requirements for 1 month or longer  Weight Loss:  7 - Greater than 10% over 6 months     Body Fat Loss:  Unable to assess     Muscle Mass Loss:  Unable to assess    Fluid Accumulation:  No significant fluid accumulation Extremities   Strength:  Not Performed    Estimated Daily Nutrient Needs:  Energy (kcal):  1630 based on [place]-St. Jeor with 1.2 factor using wt of 77.8kg; Weight Used for Energy Requirements:  Current     Protein (g):  74-80 1.3-1.4 gm per kg using wt of 56.8kg; Weight Used for Protein Requirements:  Ideal       Nutrition Related Findings:  Hypoactive bowel sounds. No edema. Glucose: 340. On insulin pump at home.  Wt loss was desired but not intentional.      Wounds:  None       Current Nutrition Therapies:    DIET GENERAL; Carb Control: 4 carb choices (60 gms)/meal  Dietary Nutrition Supplements: Diabetic Oral Supplement    Anthropometric Measures:  · Height: 5' 5\" (165.1 cm)  · Current Body Weight: 171 lb 8.3 oz (77.8 kg)   · Admission Body Weight: 175 lb 4.3 oz (79.5 kg)    · Usual Body Weight: 194 lb 0.1 oz (88 kg)(6/1/20)     · Ideal Body Weight: 125 lbs; % Ideal Body Weight 137.2 %   · BMI: 28.5  · BMI Categories: Overweight (BMI 25.0-29. 9)       Nutrition Diagnosis:   · Moderate malnutrition related to altered GI function, psychological cause or life stress as evidenced by weight loss 7.5% in 3 months, poor intake prior to admission    Nutrition Interventions:   Food and/or Nutrient Delivery:  Continue Current Diet, Start Oral Nutrition Supplement  Nutrition Education/Counseling:  Education not indicated(Pt has been diabetic since age 17 and follows with an insulin )   Coordination of Nutrition Care:  Continued Inpatient Monitoring    Goals:  PO intake % of meals and supplements       Nutrition Monitoring and Evaluation:   Behavioral-Environmental Outcomes:      Food/Nutrient Intake Outcomes:  Food and Nutrient Intake, Supplement Intake  Physical Signs/Symptoms Outcomes:  Biochemical Data, Weight     Discharge Planning:    Continue current diet, Continue Oral Nutrition Supplement     Some areas of assessment may be incomplete due to standard COVID-19 Precautions. Brandy Chowdary R.D., L.D.   Phone: [phone]

## 2020-10-17 NOTE — DISCHARGE SUMMARY
[address] [place] [address]    Discharge Summary     Patient ID: Carole Holm  :  1960   MRN: [id number]     ACCOUNT:  [de-identified]   Patient's PCP: Michael Patel MD  Admit Date: 10/16/2020   Discharge Date: 10/17/2020    Length of Stay: 0  Code Status:  Full Code  Admitting Physician: Michael Patel MD  Discharge Physician: Misael Morales MD     Active Discharge Diagnoses:     Hospital Problem Lists:  Active Problems:    TIA (transient ischemic attack)  Resolved Problems:    * No resolved hospital problems. *      Admission Condition:  TIA    Discharged Condition: TIA    Hospital Stay:     Hospital Course:  Carole Holm is a 64 y.o. female who was admitted with headache and left sided numbness which was resolved after a few hours. Patient was evaluated by neurology, MRI did not show any acute process. Patient was discharged home in stable condition and she needs outpatient ECHO and carotid dopplers. Significant Diagnostic Studies:     Radiology:  Ct Head Wo Contrast    Result Date: 10/16/2020  No acute intracranial abnormality. The findings were sent to the Radiology Results [address] at 6:47 pm on 10/16/2020to be communicated to a licensed caregiver. Mri Brain Wo Contrast    Result Date: 10/17/2020  No acute intracranial abnormality. Minimal chronic microvascular disease. Consultations:    Consults:     Final Specialist Recommendations/Findings:   IP CONSULT TO INTERNAL MEDICINE  IP CONSULT TO NEUROLOGY      The patient was seen and examined on day of discharge and this discharge summary is in conjunction with any daily progress note from day of discharge. Discharge plan:     Disposition: Home    Physician Follow Up: Follow up with PCP in 1 week  No follow-up provider specified.      Requiring Further Evaluation/Follow Up POST HOSPITALIZATION/Incidental Findings:     Diet: diabetic diet    Activity: As KLONOPIN  TAKE 1 TABLET BY MOUTH TWICE DAILY AS NEEDED FOR ANXIETY OR SLEEP     DULoxetine 30 MG extended release capsule  Commonly known as:  CYMBALTA     gabapentin 800 MG tablet  Commonly known as:  NEURONTIN  TAKE 1 TABLET BY MOUTH THREE TIMES DAILY     levothyroxine 137 MCG tablet  Commonly known as:  SYNTHROID  Take 1 tablet by mouth daily     losartan 100 MG tablet  Commonly known as:  COZAAR  TAKE 1 TABLET BY MOUTH ONCE DAILY     metoclopramide 10 MG tablet  Commonly known as:  REGLAN     metoprolol 100 MG tablet  Commonly known as:  LOPRESSOR     NovoLOG PenFill 100 UNIT/ML injection cartridge  Generic drug:  insulin aspart     ONE TOUCH ULTRA TEST strip  Generic drug:  blood glucose test strips     pantoprazole 40 MG tablet  Commonly known as:  PROTONIX     rOPINIRole 2 MG tablet  Commonly known as:  REQUIP  Take 1 tablet by mouth daily     tiotropium 18 MCG inhalation capsule  Commonly known as:  Spiriva HandiHaler  Inhale 1 capsule into the lungs Daily        STOP taking these medications    B-D INSULIN SYRINGE 29G X 1/2\" 0.5 ML Misc  Generic drug:  INSULIN SYRINGE .5CC/29G     PROBIOTIC DAILY PO     simvastatin 20 MG tablet  Commonly known as:  ZOCOR  Replaced by:  atorvastatin 40 MG tablet     torsemide 10 MG tablet  Commonly known as:  DEMADEX     vitamin D 1000 UNIT Tabs tablet  Commonly known as:  CHOLECALCIFEROL           Where to Get Your Medications      These medications were sent to [facility], [address] [place] [address]    Phone:  [phone]   · aspirin 81 MG tablet  · atorvastatin 40 MG tablet  · cefUROXime 250 MG tablet  · methylPREDNISolone 4 MG tablet  · methylPREDNISolone 4 MG tablet  · methylPREDNISolone 4 MG tablet  · methylPREDNISolone 4 MG tablet  · methylPREDNISolone 8 MG tablet  · topiramate 25 MG tablet         Discharge Procedure Orders   VL DUP CAROTID BILATERAL   Standing Status: Future Standing Exp. Date: 11/17/20     ECHO Complete 2D W Doppler W Color   Standing Status: Future Standing Exp. Date: 11/17/20     Order Specific Question Answer Comments   Reason for exam: Stroke like symptoms        Time Spent on discharge is  33 mins in patient examination, evaluation, counseling as well as medication reconciliation, prescriptions for required medications, discharge plan and follow up. Electronically signed by   Pee Garcia MD  10/17/2020  4:45 PM      Thank you Dr. Emilio Koyanagi, MD for the opportunity to be involved in this patient's care.

## 2020-10-17 NOTE — FLOWSHEET NOTE
10/17/20 0620   Provider Notification   Reason for Communication Evaluate   Provider Name Dr. Coon Linear    Provider Notification Physician   Method of Communication Page   Response Waiting for response   Notification Time [phone]     Left message with agency, stated they will get out a page at [phone]

## 2020-10-17 NOTE — PROGRESS NOTES
Pt arrived to floor via stretcher from ED and was transfered to bed. Vitals taken. Call light within reach, and pt educated on its use. Bed in lowest position, and locked. Side rails up x 2. Denied further questions or needs at this time. Will continue to monitor.

## 2020-10-18 LAB
ESTIMATED AVERAGE GLUCOSE: 206 MG/DL
HBA1C MFR BLD: 8.8 % (ref 4–6)

## 2020-12-14 ENCOUNTER — HOSPITAL ENCOUNTER (OUTPATIENT)
Dept: VASCULAR LAB | Age: 60
Discharge: HOME | End: 2020-12-14
Payer: MEDICARE

## 2020-12-14 PROCEDURE — 93880 EXTRACRANIAL BILAT STUDY: CPT

## 2021-01-08 DIAGNOSIS — I10 ESSENTIAL HYPERTENSION: ICD-10-CM

## 2021-01-08 RX ORDER — AMLODIPINE BESYLATE 5 MG/1
TABLET ORAL
Qty: 90 TABLET | Refills: 1 | Status: SHIPPED | OUTPATIENT
Start: 2021-01-08 | End: 2022-08-04 | Stop reason: SDUPTHER

## 2021-05-06 ENCOUNTER — TELEPHONE (OUTPATIENT)
Dept: FAMILY MEDICINE CLINIC | Age: 61
End: 2021-05-06

## 2021-05-06 NOTE — TELEPHONE ENCOUNTER
Pt. Called stating her dog had bit her lip yesterday and wanted to know if she needed any shots or to be seen. Pt. States there are no open wounds, swelling or discharge at the site. Pt. States her dog is up to date on its shots. After checking the pt. Immunization records it showed she is up to date on her tetanus as of 08/2020. Spoke with provider , advised that if she is up to date on her tetanus and the site looks good and is healing well then she should just follow up with her primary care if still concerned. Pt. Advised and verbalized understanding.

## 2021-05-25 ENCOUNTER — HOSPITAL ENCOUNTER (OUTPATIENT)
Dept: NON INVASIVE DIAGNOSTICS | Age: 61
Discharge: HOME | End: 2021-05-25
Payer: MEDICARE

## 2021-05-25 ENCOUNTER — HOSPITAL ENCOUNTER (OUTPATIENT)
Dept: VASCULAR LAB | Age: 61
Discharge: HOME | End: 2021-05-25
Payer: MEDICARE

## 2021-05-25 DIAGNOSIS — I50.42 CHRONIC COMBINED SYSTOLIC AND DIASTOLIC HEART FAILURE (HCC): ICD-10-CM

## 2021-05-25 DIAGNOSIS — I73.9 PERIPHERAL VASCULAR DISEASE, UNSPECIFIED (HCC): ICD-10-CM

## 2021-05-25 LAB
LV EF: 55 %
LVEF MODALITY: NORMAL

## 2021-05-25 PROCEDURE — 93923 UPR/LXTR ART STDY 3+ LVLS: CPT

## 2021-05-25 PROCEDURE — 93306 TTE W/DOPPLER COMPLETE: CPT

## 2021-06-25 ENCOUNTER — HOSPITAL ENCOUNTER (OUTPATIENT)
Dept: PREADMISSION TESTING | Age: 61
Discharge: HOME | End: 2021-06-29
Payer: MEDICARE

## 2021-06-25 VITALS
HEIGHT: 65 IN | BODY MASS INDEX: 30.85 KG/M2 | SYSTOLIC BLOOD PRESSURE: 106 MMHG | DIASTOLIC BLOOD PRESSURE: 57 MMHG | TEMPERATURE: 98.2 F | WEIGHT: 185.19 LBS | HEART RATE: 75 BPM | RESPIRATION RATE: 16 BRPM | OXYGEN SATURATION: 100 %

## 2021-06-25 LAB
ANION GAP SERPL CALCULATED.3IONS-SCNC: 14 MMOL/L (ref 9–17)
BUN BLDV-MCNC: 14 MG/DL (ref 6–20)
BUN/CREAT BLD: 13 (ref 9–20)
CALCIUM SERPL-MCNC: 9.2 MG/DL (ref 8.6–10.4)
CHLORIDE BLD-SCNC: 100 MMOL/L (ref 98–107)
CO2: 20 MMOL/L (ref 20–31)
CREAT SERPL-MCNC: 1.08 MG/DL (ref 0.5–0.9)
GFR AFRICAN AMERICAN: >60 ML/MIN
GFR NON-AFRICAN AMERICAN: 52 ML/MIN
GFR SERPL CREATININE-BSD FRML MDRD: ABNORMAL ML/MIN/{1.73_M2}
GFR SERPL CREATININE-BSD FRML MDRD: ABNORMAL ML/MIN/{1.73_M2}
GLUCOSE BLD-MCNC: 254 MG/DL (ref 70–99)
HCT VFR BLD CALC: 40.4 % (ref 36.3–47.1)
HEMOGLOBIN: 13.1 G/DL (ref 11.9–15.1)
MCH RBC QN AUTO: 29.6 PG (ref 25.2–33.5)
MCHC RBC AUTO-ENTMCNC: 32.4 G/DL (ref 28.4–34.8)
MCV RBC AUTO: 91.2 FL (ref 82.6–102.9)
NRBC AUTOMATED: 0 PER 100 WBC
PDW BLD-RTO: 12.4 % (ref 11.8–14.4)
PLATELET # BLD: 271 K/UL (ref 138–453)
PMV BLD AUTO: 9.5 FL (ref 8.1–13.5)
POTASSIUM SERPL-SCNC: 3.9 MMOL/L (ref 3.7–5.3)
RBC # BLD: 4.43 M/UL (ref 3.95–5.11)
SODIUM BLD-SCNC: 134 MMOL/L (ref 135–144)
WBC # BLD: 8.5 K/UL (ref 3.5–11.3)

## 2021-06-25 PROCEDURE — 83036 HEMOGLOBIN GLYCOSYLATED A1C: CPT

## 2021-06-25 PROCEDURE — 80048 BASIC METABOLIC PNL TOTAL CA: CPT

## 2021-06-25 PROCEDURE — 93005 ELECTROCARDIOGRAM TRACING: CPT

## 2021-06-25 PROCEDURE — 36415 COLL VENOUS BLD VENIPUNCTURE: CPT

## 2021-06-25 PROCEDURE — 85027 COMPLETE CBC AUTOMATED: CPT

## 2021-06-25 RX ORDER — ACETAMINOPHEN 325 MG/1
650 TABLET ORAL EVERY 6 HOURS PRN
COMMUNITY
End: 2022-01-16 | Stop reason: CLARIF

## 2021-06-25 RX ORDER — CLOPIDOGREL BISULFATE 75 MG/1
75 TABLET ORAL DAILY
COMMUNITY
End: 2022-06-21 | Stop reason: SDUPTHER

## 2021-06-25 RX ORDER — SIMVASTATIN 20 MG
20 TABLET ORAL NIGHTLY
COMMUNITY
End: 2022-05-05 | Stop reason: ALTCHOICE

## 2021-06-25 NOTE — H&P
History and Physical Service   [facility]    HISTORY AND PHYSICAL EXAMINATION            Date of Evaluation: 6/25/2021  Patient name:  Kashif Elizondo  MRN:   [id number]  YOB: 1960  PCP:    Angy Herzog MD    History Obtained From:     Patient, Medical records    History of Present Illness: This is Kashif Elizondo a 64 y.o. female who presents for a pre-admission testing appointment for an upcoming excision of left thigh mass by Dr. Collazo Party scheduled on 07/06/2021 at 1330 due to left thigh mass. The left thigh mass has grown in size since the pt first noticed it 2 years ago. The pt has 1-2/10 pain at the site of the mass. Pt denies wounds on the left thigh. History of diabetes. The pt has an insulin pump. She follows-up with Dr. Alex Chavez from endocrinology. History of multiple MRSA infections. History of PVD, stroke/TIA x 2 (residual numbness on the left side of her face), trigeminal neuralgia, CAD, hyperlipidemia, hypertension, COPD, diabetes, neurogenic bladder/gastroparalysis (due to diabetes), and a colon mass. Pt states her lung collapsed during colon resection in 2012 at [facility]. The pt follows-up with Dr. Joselito Lopez from pulmonology, Dr. Lee Greenwood from neurology, and Dr Alex Chavez from endocrinology. Pt does not follow-up with a cardiologist.    ECHO 05/25/2021   CONCLUSIONS     Summary  Small LV cavity. Normal left ventricular ejection fraction (>55%). Mild left ventricular hypertrophy. No significant valvular regurgitation or stenosis seen. Functional Capacity per pt:   1) Pt is able to walk 2 city blocks on level ground without SOB. 2) Pt is not able to climb 2 flights of stairs without SOB. 3) Pt is not able to walk up a hill for 1-2 city blocks without SOB.     Past Medical History:     Past Medical History:   Diagnosis Date    Anesthesia complication     \"lung collapsed after colon surgery    Anxiety     Arthritis     Back pain     CAD 07/07/15    open   [facility]  10/17/2017    MIDDLE EAR SURGERY Left     ear tube placement    POLYPECTOMY      colon polyp    OK REPAIR INCISIONAL HERNIA,REDUCIBLE N/A 10/17/2017    HERNIA INCISIONAL REPAIR WITH MESH performed by Aga Dorado MD at [address] [place] [address] 6/1/2020    SIGMOIDOSCOPY DIAGNOSTIC FLEXIBLE performed by Aga Dorado MD at [address] [facility] ENDOSCOPY  07/13/2016    UPPER GASTROINTESTINAL ENDOSCOPY  07/23/2018    with biopsy    UPPER GASTROINTESTINAL ENDOSCOPY N/A 7/23/2018    EGD BIOPSY performed by Aga Dorado MD at [facility]  06/01/2020    with biopsy    UPPER GASTROINTESTINAL ENDOSCOPY N/A 6/1/2020    EGD BIOPSY performed by Aga Dorado MD at [facility]        Medications Prior to Admission:     Prior to Admission medications    Medication Sig Start Date End Date Taking?  Authorizing Provider   acetaminophen (TYLENOL) 325 MG tablet Take 650 mg by mouth every 6 hours as needed for Pain   Yes Historical Provider, MD   clopidogrel (PLAVIX) 75 MG tablet Take 75 mg by mouth daily   Yes Historical Provider, MD   Albuterol Sulfate (PROAIR HFA IN) Inhale into the lungs   Yes Historical Provider, MD   simvastatin (ZOCOR) 20 MG tablet Take 20 mg by mouth nightly   Yes Historical Provider, MD   amLODIPine (NORVASC) 5 MG tablet Take 1 tablet by mouth once daily 1/8/21   Liliya Lorenz MD   aspirin 81 MG tablet Take 1 tablet by mouth daily 10/17/20   Jayne Mckinnon MD   topiramate (TOPAMAX) 25 MG tablet Take 1 tablet by mouth 2 times daily 10/17/20   Jayne Mckinnon MD   DULoxetine (CYMBALTA) 30 MG extended release capsule Take 90 mg by mouth daily    Historical Provider, MD   metoclopramide (REGLAN) 10 MG tablet Take 10 mg by mouth 2 times daily     Historical Provider, MD   metoprolol (LOPRESSOR) 100 MG tablet Take 100 mg by mouth 2 times daily    Historical Provider, MD   insulin aspart (NOVOLOG PENFILL) 100 UNIT/ML injection cartridge Inject into the skin continuous Per insulin pump    Historical Provider, MD   amitriptyline (ELAVIL) 150 MG tablet TAKE 1 TABLET BY MOUTH NIGHTLY  Patient taking differently: Take 75 mg by mouth nightly TAKE 1 TABLET BY MOUTH NIGHTLY 2/21/20   Antonia Farrell MD   clonazePAM (KLONOPIN) 0.5 MG tablet TAKE 1 TABLET BY MOUTH TWICE DAILY AS NEEDED FOR ANXIETY OR SLEEP 10/9/19 10/16/20  Antonia Farrell MD   pantoprazole (PROTONIX) 40 MG tablet Take 40 mg by mouth 2 times daily    Historical Provider, MD   losartan (COZAAR) 100 MG tablet TAKE 1 TABLET BY MOUTH ONCE DAILY 5/14/19   Antonia Farrell MD   rOPINIRole (REQUIP) 2 MG tablet Take 1 tablet by mouth daily 4/16/19   Antonia Farrell MD   levothyroxine (SYNTHROID) 137 MCG tablet Take 1 tablet by mouth daily 4/16/19   Antonia Farrell MD   gabapentin (NEURONTIN) 800 MG tablet TAKE 1 TABLET BY MOUTH THREE TIMES DAILY 4/16/19 10/16/20  Antonia Farrell MD   BREO ELLIPTA 200-25 MCG/INH AEPB Inhale 1 puff into the lungs daily  10/7/17   Historical Provider, MD   tiotropium (Cary Delbert) 18 MCG inhalation capsule Inhale 1 capsule into the lungs Daily 6/19/17   Estevan Steward MD   ONE TOUCH ULTRA TEST strip  6/7/17   Historical Provider, MD        Allergies:     Fioricet [butalbital-apap-caffeine], Betamethasone, Erythromycin, Xarelto [rivaroxaban], Codeine, Droperidol, and Tape [adhesive tape]    Social History:     Tobacco:    reports that she has never smoked. She has never used smokeless tobacco.  Alcohol:      reports no history of alcohol use. Drug Use:  reports current drug use. Frequency: 4.00 times per week. Drug: Marijuana.     Family History:     Family History   Problem Relation Age of Onset    High Blood Pressure Mother     Migraines Mother     High Blood Pressure Father     Heart Disease Father     Cancer Father         skin    Diabetes Maternal Grandmother     Heart Disease Maternal Grandmother     Cancer Maternal Grandmother     Parkinsonism Maternal Grandmother     Cancer Paternal Grandmother     Other Brother         epilepsy       Review of Systems:     Positive and Negative as described in HPI. CONSTITUTIONAL: Negative for fevers, chills, sweats, fatigue, and weight loss. HEENT: Pt wears glasses. 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 Pt states she has an eardrum perforation in the left ear. History of a MRSA infection in the left ear. Severe TMJ. Negative for rhinorrhea and throat pain. RESPIRATORY: Dyspnea with exertion. Dry cough. Pt states she \"never\" uses an albuterol inhaler. History of COPD. Negative for congestion and wheezing. CARDIOVASCULAR: Negative for chest pain, blood clot, irregular heartbeat, and palpitations. GASTROINTESTINAL: Occasional acid reflux. Gastroparalysis due to diabetes. Pt was vomiting 2 days ago when her blood glucose was 500. Negative for diarrhea, constipation, change in bowel habits, and abdominal pain. GENITOURINARY: Neurogenic bladder due to diabetes. Pt self-catheterizes 7 times per day and states that she can sometimes void without catheterizing. Negative for burning with urination, hematuria, and frequency. INTEGUMENT: Skin tears on bilateral arms. Negative for rash, skin lesions, and easy bruising. HEMATOLOGIC/LYMPHATIC: Pt takes Plavix and aspirin for PVD. Negative for swelling/edema. ALLERGIC/IMMUNOLOGIC: Negative for urticaria and itching. ENDOCRINE: Diabetes. Pt's blood glucose was 500 for 24 hours on 06/23/2021 due to an issue with her insulin pump. Pt states her insulin pump is functioning properly now. Hypoglycemic events occur 1-2 times per day. Advised pt to contact Dr. Bernardo Hernández for insulin instructions for upcoming surgery. Pt voiced understanding to this instruction. Negative for increase in thirst and heat or cold intolerance. MUSCULOSKELETAL: See HPI.   NEUROLOGICAL: History of strokes/TIA, trigeminal neuralgia, and neuropathy. Numbness in the left side of the face. Negative for headaches, dizziness, lightheadedness, and tingling extremities. Pt denies history of seizures. BEHAVIOR/PSYCH: Treated depression and anxiety. Physical Exam:   BP (!) 106/57   Pulse 75   Temp 98.2 °F (36.8 °C)   Resp 16   Ht 5' 5\" (1.651 m)   Wt 185 lb 3 oz (84 kg)   SpO2 100%   BMI 30.82 kg/m²   No LMP recorded. Patient is postmenopausal.  No obstetric history on file. No results for input(s): POCGLU in the last 72 hours. General Appearance:  Alert, well appearing, and in no acute distress. Obese. Mental status: Oriented to person, place, and time. Head: Normocephalic and atraumatic. Eye: No icterus, redness, pupils equal and reactive, extraocular eye movements intact, and conjunctiva clear. Ear: Hard of hearing. (Dear in the right ear). Nose: No drainage noted. Mouth: Mucous membranes moist.  Neck: Supple and no carotid bruits noted. Lungs: Bilateral equal air entry, clear to auscultation, no wheezing, rales or rhonchi, and normal effort. Cardiovascular: Normal rate, regular rhythm, no murmur, gallop, or rub. Abdomen: Rotund. Insulin pump in the LUQ. Soft, nontender, and active bowel sounds. Neurologic: Dear in the right ear/ cranial nerve VIII abnormality. Normal speech and cranial nerves II through VII and IX through XII grossly intact. Strength 5/5 bilaterally. Skin: Two small scabbed abrasions on each lower arm. No erythema surrounding the abrasions. No open wounds, rashes, bruising, or bleeding on exposed skin area. Extremities: Posterior tibial pulses are palpable bilaterally. No pedal edema. No calf tenderness with palpation. Psych: Normal affect.      Investigations:      Laboratory Testing:  Recent Results (from the past 24 hour(s))   Basic Metabolic Panel    Collection Time: 06/25/21  2:28 PM   Result Value Ref Range    Glucose 254 (H) 70 - 99 mg/dL    BUN 14 6 - 20 mg/dL    CREATININE 1.08 (H) 0.50 - 0.90 mg/dL    Bun/Cre Ratio 13 9 - 20    Calcium 9.2 8.6 - 10.4 mg/dL    Sodium 134 (L) 135 - 144 mmol/L    Potassium 3.9 3.7 - 5.3 mmol/L    Chloride 100 98 - 107 mmol/L    CO2 20 20 - 31 mmol/L    Anion Gap 14 9 - 17 mmol/L    GFR Non-African American 52 (L) >60 mL/min    GFR African American >60 >60 mL/min    GFR Comment          GFR Staging NOT REPORTED    CBC    Collection Time: 21  2:28 PM   Result Value Ref Range    WBC 8.5 3.5 - 11.3 k/uL    RBC 4.43 3.95 - 5.11 m/uL    Hemoglobin 13.1 11.9 - 15.1 g/dL    Hematocrit 40.4 36.3 - 47.1 %    MCV 91.2 82.6 - 102.9 fL    MCH 29.6 25.2 - 33.5 pg    MCHC 32.4 28.4 - 34.8 g/dL    RDW 12.4 11.8 - 14.4 %    Platelets 425 786 - 300 k/uL    MPV 9.5 8.1 - 13.5 fL    NRBC Automated 0.0 0.0 per 100 WBC       Recent Labs     21  1428   HGB 13.1   HCT 40.4   WBC 8.5   MCV 91.2   *   K 3.9      CO2 20   BUN 14   CREATININE 1.08*   GLUCOSE 254*       No results for input(s): COVID19 in the last 720 hours. [id number]. See Epic. Diagnosis:      1. Left thigh mass    Plans:     1.  Excision of left thigh mass      KIMBERLEE Sosa CNP  2021  4:10 PM

## 2021-06-25 NOTE — PRE-PROCEDURE INSTRUCTIONS
On the Day of Your Surgery, Tuesday, July 6,2021, Please Arrive At 11:30 AM     Enter the hospital through the Main Entrance, take the lobby elevators to the second floor and check in at the Surgery Registration desk. Continue to take your home medications as you normally do up to and including the night before surgery with the exception of blood thinning medications. Blood Thinning Medications:  Please stop prescription blood thinning medications such as Apixaban (Eliquis); Clopidogrel (Plavix); Dabigatran (Pradaxa); Prasugrel (Effient); Rivaroxaban (Xarelto); Ticagrelor (Brilinta); Warfarin (Coumadin) only as directed by your surgeon and/or the prescribing physician    Some common examples of other medications that can thin your blood are: Aspirin, Ibuprofen (Advil, Motrin), Naproxen (Aleve), Meloxicam (Mobic), Celecoxib (Celebrex), Fish Oil, many Herbal Supplements. These medications should usually be stopped at least 7 days prior to surgery. Stop plavix & aspirin as directed by MD Taylor is OK to take for pain the week prior to surgery. Failure to stop certain medications may interfere with your scheduled surgery. If you receive instructions from your surgeon regarding what medications to stop prior to surgery, please follow those specific instructions. If You Have Diabetes:    Do not take any of your diabetic medications, (injectables or by mouth) the morning of surgery unless otherwise instructed by the doctor who manages your diabetes. If you are taking insulin, contact the doctor the manages your diabetes for instructions about any changes to your insulin dosages the day before surgery. Please take the following medication(s) the day of surgery with small sips of water:  Amlodipine,gabapentin,levothyroxine,pantoprazole,topiramate                  Please use your inhaler(s) if needed and bring your inhaler(s) from home the day of surgery. Showering Before Surgery:      You can play an important role in your own health by carefully washing before surgery    If you were given Chlorhexidine soap, please follow the instructions included with the soap to shower the night before and the morning of surgery. If you were not given Chlorhexidine, please shower or bathe the morning of surgery using an antibacterial soap. Please dress in clean clothing after showering. Do Not apply any powder, deodorant, lotion/cream/oil, perfume/aftershave, cosmetics, or alcohol-based skin or hair products after showering. Do Not shave near the planned surgical site unless specifically instructed to. Additional Instructions:      1. If you are having any type of anesthesia, you are to have NOTHING to eat or drink after midnight the night before surgery. This includes no gum, hard candy, mints or water. The only exception to this is small sips of water to take the medications listed above. No smoking or chewing tobacco after midnight. No alcoholic beverages for 24 hours prior to surgery. 2. Brush your teeth but do not swallow any water. 3. If you wear glasses bring a case for them if you have one. No contacts should be worn the day of surgery. You may also bring your hearing aids. If you have dentures, most surgical procedures involving anesthesia will require that you remove them prior to surgery. 5. Do not wear any jewelry or body piercings the day of surgery. No nail polish on the operative extremity (arm/hand or leg/foot surgeries). 7. Please wear loose, comfortable clothing. If you are potentially going to have a cast, sling, brace or bulky dressing, make sure to wear clothing that will fit over it. 8. In case of illness - If you have cold or flu like symptoms (high fever, runny nose, sore throat, cough, etc.) rash, nausea, vomiting, loose stools, and/or recent contact with someone who has a contagious disease (chicken pox, measles, COVID-19, etc.).   Please call your surgeon before coming to the hospital.    Transportation After Your Surgery/Procedure: If you are going home the same day of surgery you need someone to drive you home. Your  must be at least 25years of age. A taxi cab or other nonmedical public transportation is not acceptable unless you have someone to ride home in the vehicle with you. For your safety, someone must remain with you for the first 24 hours after your surgery if you receive anesthesia or medication. If you do not have someone to stay with you, your procedure may be cancelled. As a patient at [facility] you can expect quality medical and nursing care that is centered on you individual needs. Our goal is to make your surgical experience as comfortable as possible.     Any questions about preparing for your surgery please call [phone].      ____________________________   ____________________________  Signature (Patient)                                 Signature (Nurse)                     Date

## 2021-06-26 LAB
EKG ATRIAL RATE: 79 BPM
EKG P AXIS: 43 DEGREES
EKG P-R INTERVAL: 168 MS
EKG Q-T INTERVAL: 402 MS
EKG QRS DURATION: 74 MS
EKG QTC CALCULATION (BAZETT): 460 MS
EKG R AXIS: 36 DEGREES
EKG T AXIS: 61 DEGREES
EKG VENTRICULAR RATE: 79 BPM
ESTIMATED AVERAGE GLUCOSE: 180 MG/DL
HBA1C MFR BLD: 7.9 % (ref 4–6)

## 2021-07-02 ENCOUNTER — ANESTHESIA EVENT (OUTPATIENT)
Dept: OPERATING ROOM | Age: 61
End: 2021-07-02
Payer: MEDICARE

## 2021-07-06 ENCOUNTER — ANESTHESIA (OUTPATIENT)
Dept: OPERATING ROOM | Age: 61
End: 2021-07-06
Payer: MEDICARE

## 2021-07-06 ENCOUNTER — HOSPITAL ENCOUNTER (OUTPATIENT)
Age: 61
Setting detail: OUTPATIENT SURGERY
Discharge: HOME | End: 2021-07-06
Attending: SURGERY | Admitting: SURGERY
Payer: MEDICARE

## 2021-07-06 VITALS
SYSTOLIC BLOOD PRESSURE: 165 MMHG | HEART RATE: 66 BPM | TEMPERATURE: 97.2 F | OXYGEN SATURATION: 97 % | HEIGHT: 65 IN | WEIGHT: 188.7 LBS | BODY MASS INDEX: 31.44 KG/M2 | RESPIRATION RATE: 16 BRPM | DIASTOLIC BLOOD PRESSURE: 82 MMHG

## 2021-07-06 VITALS
SYSTOLIC BLOOD PRESSURE: 125 MMHG | TEMPERATURE: 95.4 F | OXYGEN SATURATION: 99 % | RESPIRATION RATE: 14 BRPM | DIASTOLIC BLOOD PRESSURE: 64 MMHG

## 2021-07-06 DIAGNOSIS — Z41.9 SURGERY, ELECTIVE: Primary | ICD-10-CM

## 2021-07-06 LAB
GLUCOSE BLD-MCNC: 247 MG/DL (ref 65–105)
GLUCOSE BLD-MCNC: 276 MG/DL (ref 65–105)

## 2021-07-06 PROCEDURE — 6360000002 HC RX W HCPCS: Performed by: NURSE ANESTHETIST, CERTIFIED REGISTERED

## 2021-07-06 PROCEDURE — 3700000000 HC ANESTHESIA ATTENDED CARE: Performed by: SURGERY

## 2021-07-06 PROCEDURE — 7100000010 HC PHASE II RECOVERY - FIRST 15 MIN: Performed by: SURGERY

## 2021-07-06 PROCEDURE — 82947 ASSAY GLUCOSE BLOOD QUANT: CPT

## 2021-07-06 PROCEDURE — 2580000003 HC RX 258: Performed by: ANESTHESIOLOGY

## 2021-07-06 PROCEDURE — 3600000012 HC SURGERY LEVEL 2 ADDTL 15MIN: Performed by: SURGERY

## 2021-07-06 PROCEDURE — 2500000003 HC RX 250 WO HCPCS: Performed by: SURGERY

## 2021-07-06 PROCEDURE — 3600000002 HC SURGERY LEVEL 2 BASE: Performed by: SURGERY

## 2021-07-06 PROCEDURE — 2709999900 HC NON-CHARGEABLE SUPPLY: Performed by: SURGERY

## 2021-07-06 PROCEDURE — 2500000003 HC RX 250 WO HCPCS: Performed by: NURSE ANESTHETIST, CERTIFIED REGISTERED

## 2021-07-06 PROCEDURE — 3700000001 HC ADD 15 MINUTES (ANESTHESIA): Performed by: SURGERY

## 2021-07-06 PROCEDURE — 7100000011 HC PHASE II RECOVERY - ADDTL 15 MIN: Performed by: SURGERY

## 2021-07-06 PROCEDURE — 2580000003 HC RX 258: Performed by: NURSE ANESTHETIST, CERTIFIED REGISTERED

## 2021-07-06 PROCEDURE — 88304 TISSUE EXAM BY PATHOLOGIST: CPT

## 2021-07-06 PROCEDURE — 7100000000 HC PACU RECOVERY - FIRST 15 MIN: Performed by: SURGERY

## 2021-07-06 PROCEDURE — 7100000001 HC PACU RECOVERY - ADDTL 15 MIN: Performed by: SURGERY

## 2021-07-06 RX ORDER — SODIUM CHLORIDE 9 MG/ML
INJECTION, SOLUTION INTRAVENOUS CONTINUOUS
Status: DISCONTINUED | OUTPATIENT
Start: 2021-07-07 | End: 2021-07-06 | Stop reason: HOSPADM

## 2021-07-06 RX ORDER — SODIUM CHLORIDE 9 MG/ML
25 INJECTION, SOLUTION INTRAVENOUS PRN
Status: DISCONTINUED | OUTPATIENT
Start: 2021-07-06 | End: 2021-07-06 | Stop reason: HOSPADM

## 2021-07-06 RX ORDER — SODIUM CHLORIDE, SODIUM LACTATE, POTASSIUM CHLORIDE, CALCIUM CHLORIDE 600; 310; 30; 20 MG/100ML; MG/100ML; MG/100ML; MG/100ML
INJECTION, SOLUTION INTRAVENOUS CONTINUOUS
Status: DISCONTINUED | OUTPATIENT
Start: 2021-07-07 | End: 2021-07-06 | Stop reason: HOSPADM

## 2021-07-06 RX ORDER — OXYCODONE HYDROCHLORIDE AND ACETAMINOPHEN 5; 325 MG/1; MG/1
1 TABLET ORAL EVERY 6 HOURS PRN
Qty: 28 TABLET | Refills: 0 | Status: SHIPPED | OUTPATIENT
Start: 2021-07-06 | End: 2021-07-13

## 2021-07-06 RX ORDER — LIDOCAINE HYDROCHLORIDE 20 MG/ML
INJECTION, SOLUTION EPIDURAL; INFILTRATION; INTRACAUDAL; PERINEURAL PRN
Status: DISCONTINUED | OUTPATIENT
Start: 2021-07-06 | End: 2021-07-06 | Stop reason: SDUPTHER

## 2021-07-06 RX ORDER — FENTANYL CITRATE 50 UG/ML
50 INJECTION, SOLUTION INTRAMUSCULAR; INTRAVENOUS EVERY 5 MIN PRN
Status: DISCONTINUED | OUTPATIENT
Start: 2021-07-06 | End: 2021-07-06 | Stop reason: HOSPADM

## 2021-07-06 RX ORDER — LIDOCAINE HYDROCHLORIDE 10 MG/ML
1 INJECTION, SOLUTION EPIDURAL; INFILTRATION; INTRACAUDAL; PERINEURAL
Status: DISCONTINUED | OUTPATIENT
Start: 2021-07-07 | End: 2021-07-06 | Stop reason: HOSPADM

## 2021-07-06 RX ORDER — ONDANSETRON 2 MG/ML
INJECTION INTRAMUSCULAR; INTRAVENOUS PRN
Status: DISCONTINUED | OUTPATIENT
Start: 2021-07-06 | End: 2021-07-06 | Stop reason: SDUPTHER

## 2021-07-06 RX ORDER — SODIUM CHLORIDE 0.9 % (FLUSH) 0.9 %
10 SYRINGE (ML) INJECTION PRN
Status: DISCONTINUED | OUTPATIENT
Start: 2021-07-06 | End: 2021-07-06 | Stop reason: HOSPADM

## 2021-07-06 RX ORDER — SODIUM CHLORIDE, SODIUM LACTATE, POTASSIUM CHLORIDE, CALCIUM CHLORIDE 600; 310; 30; 20 MG/100ML; MG/100ML; MG/100ML; MG/100ML
INJECTION, SOLUTION INTRAVENOUS CONTINUOUS PRN
Status: DISCONTINUED | OUTPATIENT
Start: 2021-07-06 | End: 2021-07-06 | Stop reason: SDUPTHER

## 2021-07-06 RX ORDER — AMOXICILLIN 250 MG
1 CAPSULE ORAL DAILY
Qty: 30 TABLET | Refills: 0 | Status: SHIPPED | OUTPATIENT
Start: 2021-07-06 | End: 2021-08-05

## 2021-07-06 RX ORDER — PROPOFOL 10 MG/ML
INJECTION, EMULSION INTRAVENOUS PRN
Status: DISCONTINUED | OUTPATIENT
Start: 2021-07-06 | End: 2021-07-06 | Stop reason: SDUPTHER

## 2021-07-06 RX ORDER — MIDAZOLAM HYDROCHLORIDE 1 MG/ML
INJECTION INTRAMUSCULAR; INTRAVENOUS PRN
Status: DISCONTINUED | OUTPATIENT
Start: 2021-07-06 | End: 2021-07-06 | Stop reason: SDUPTHER

## 2021-07-06 RX ORDER — ONDANSETRON 4 MG/1
4 TABLET, FILM COATED ORAL EVERY 8 HOURS PRN
Qty: 30 TABLET | Refills: 0 | Status: SHIPPED | OUTPATIENT
Start: 2021-07-06 | End: 2022-07-27

## 2021-07-06 RX ORDER — FENTANYL CITRATE 50 UG/ML
INJECTION, SOLUTION INTRAMUSCULAR; INTRAVENOUS PRN
Status: DISCONTINUED | OUTPATIENT
Start: 2021-07-06 | End: 2021-07-06 | Stop reason: SDUPTHER

## 2021-07-06 RX ORDER — CEFAZOLIN SODIUM 1 G/3ML
INJECTION, POWDER, FOR SOLUTION INTRAMUSCULAR; INTRAVENOUS PRN
Status: DISCONTINUED | OUTPATIENT
Start: 2021-07-06 | End: 2021-07-06 | Stop reason: SDUPTHER

## 2021-07-06 RX ORDER — ONDANSETRON 2 MG/ML
4 INJECTION INTRAMUSCULAR; INTRAVENOUS
Status: DISCONTINUED | OUTPATIENT
Start: 2021-07-06 | End: 2021-07-06 | Stop reason: HOSPADM

## 2021-07-06 RX ORDER — FENTANYL CITRATE 50 UG/ML
25 INJECTION, SOLUTION INTRAMUSCULAR; INTRAVENOUS EVERY 5 MIN PRN
Status: DISCONTINUED | OUTPATIENT
Start: 2021-07-06 | End: 2021-07-06 | Stop reason: HOSPADM

## 2021-07-06 RX ORDER — SODIUM CHLORIDE 0.9 % (FLUSH) 0.9 %
10 SYRINGE (ML) INJECTION EVERY 12 HOURS SCHEDULED
Status: DISCONTINUED | OUTPATIENT
Start: 2021-07-06 | End: 2021-07-06 | Stop reason: HOSPADM

## 2021-07-06 RX ADMIN — PROPOFOL 170 MG: 10 INJECTION, EMULSION INTRAVENOUS at 12:50

## 2021-07-06 RX ADMIN — CEFAZOLIN 2000 MG: 1 INJECTION, POWDER, FOR SOLUTION INTRAMUSCULAR; INTRAVENOUS at 12:55

## 2021-07-06 RX ADMIN — SODIUM CHLORIDE, POTASSIUM CHLORIDE, SODIUM LACTATE AND CALCIUM CHLORIDE: 600; 310; 30; 20 INJECTION, SOLUTION INTRAVENOUS at 12:45

## 2021-07-06 RX ADMIN — SODIUM CHLORIDE, POTASSIUM CHLORIDE, SODIUM LACTATE AND CALCIUM CHLORIDE: 600; 310; 30; 20 INJECTION, SOLUTION INTRAVENOUS at 12:12

## 2021-07-06 RX ADMIN — Medication 100 MCG: at 12:50

## 2021-07-06 RX ADMIN — ONDANSETRON 4 MG: 2 INJECTION, SOLUTION INTRAMUSCULAR; INTRAVENOUS at 12:55

## 2021-07-06 RX ADMIN — LIDOCAINE HYDROCHLORIDE 80 MG: 20 INJECTION, SOLUTION EPIDURAL; INFILTRATION; INTRACAUDAL; PERINEURAL at 12:50

## 2021-07-06 RX ADMIN — MIDAZOLAM 2 MG: 1 INJECTION INTRAMUSCULAR; INTRAVENOUS at 12:45

## 2021-07-06 ASSESSMENT — PULMONARY FUNCTION TESTS
PIF_VALUE: 1
PIF_VALUE: 14
PIF_VALUE: 18
PIF_VALUE: 18
PIF_VALUE: 1
PIF_VALUE: 18
PIF_VALUE: 18
PIF_VALUE: 14
PIF_VALUE: 0
PIF_VALUE: 16
PIF_VALUE: 14
PIF_VALUE: 1
PIF_VALUE: 18
PIF_VALUE: 17
PIF_VALUE: 14
PIF_VALUE: 14
PIF_VALUE: 18
PIF_VALUE: 2
PIF_VALUE: 18
PIF_VALUE: 14
PIF_VALUE: 14
PIF_VALUE: 0
PIF_VALUE: 14
PIF_VALUE: 6
PIF_VALUE: 18
PIF_VALUE: 1
PIF_VALUE: 18
PIF_VALUE: 18
PIF_VALUE: 14
PIF_VALUE: 18

## 2021-07-06 ASSESSMENT — PAIN SCALES - GENERAL
PAINLEVEL_OUTOF10: 0

## 2021-07-06 ASSESSMENT — PAIN - FUNCTIONAL ASSESSMENT: PAIN_FUNCTIONAL_ASSESSMENT: 0-10

## 2021-07-06 ASSESSMENT — ENCOUNTER SYMPTOMS: SHORTNESS OF BREATH: 0

## 2021-07-06 NOTE — ANESTHESIA PRE PROCEDURE
Department of Anesthesiology  Preprocedure Note       Name:  Darlene Reynolds   Age:  64 y.o.  :  1960                                          MRN:  [id number]         Date:  2021      Surgeon: Keri Mehta):  Ольга Hood MD    Procedure: Procedure(s):  EXCISION OF LEFT THIGH MASS    Medications prior to admission:   Prior to Admission medications    Medication Sig Start Date End Date Taking? Authorizing Provider   ondansetron (ZOFRAN) 4 MG tablet Take 1 tablet by mouth every 8 hours as needed for Nausea or Vomiting 21  Yes Alanis Edouard DO   senna-docusate (PERICOLACE) 8.6-50 MG per tablet Take 1 tablet by mouth daily 21 Yes Alanis Edouard DO   oxyCODONE-acetaminophen (PERCOCET) 5-325 MG per tablet Take 1 tablet by mouth every 6 hours as needed for Pain for up to 7 days. Intended supply: 7 days.  Take lowest dose possible to manage pain 21 Yes Alanis Edouard DO   acetaminophen (TYLENOL) 325 MG tablet Take 650 mg by mouth every 6 hours as needed for Pain   Yes Historical Provider, MD   simvastatin (ZOCOR) 20 MG tablet Take 20 mg by mouth nightly   Yes Historical Provider, MD   amLODIPine (NORVASC) 5 MG tablet Take 1 tablet by mouth once daily 21  Yes Lyric Young MD   topiramate (TOPAMAX) 25 MG tablet Take 1 tablet by mouth 2 times daily 10/17/20  Yes Kodi Worthy MD   DULoxetine (CYMBALTA) 30 MG extended release capsule Take 90 mg by mouth daily   Yes Historical Provider, MD   metoclopramide (REGLAN) 10 MG tablet Take 10 mg by mouth 2 times daily    Yes Historical Provider, MD   metoprolol (LOPRESSOR) 100 MG tablet Take 100 mg by mouth 2 times daily   Yes Historical Provider, MD   clonazePAM (KLONOPIN) 0.5 MG tablet TAKE 1 TABLET BY MOUTH TWICE DAILY AS NEEDED FOR ANXIETY OR SLEEP 10/9/19 7/6/21 Yes Lyric Young MD   pantoprazole (PROTONIX) 40 MG tablet Take 40 mg by mouth 2 times daily   Yes Historical Provider, MD   losartan (COZAAR) 100 MG tablet TAKE 1 DO        [START ON 7/7/2021] lidocaine PF 1 % injection 1 mL  1 mL Intradermal Once PRN Daniel Keyes DO        ceFAZolin (ANCEF) 2000 mg in dextrose 5 % 50 mL IVPB  2,000 mg Intravenous Once Leroy d'[place], DO           Allergies:     Allergies   Allergen Reactions    Fioricet [Butalbital-Apap-Caffeine] Shortness Of Breath    Betamethasone      Unsure reaction      Erythromycin      Other reaction(s): Unknown  Eye ointment caused swelling    Xarelto [Rivaroxaban]      Dizziness & \"felt like I was going to pass out\"    Codeine Nausea And Vomiting and Nausea Only    Droperidol Anxiety    Tape Loyce Edilia Tape] Rash       Problem List:    Patient Active Problem List   Diagnosis Code    Peripheral vascular disease ([facility] [address] I73.9    Restless legs syndrome G25.81    Spinal stenosis, thoracic M48.04    Thoracic radiculopathy M54.14    Chronic pain syndrome G89.4    Depression F32.9    Essential hypertension I10    Retinopathy due to secondary DM (HCC) E13.319    Acquired hypothyroidism E03.9    Vitamin D deficiency E55.9    Mixed hyperlipidemia E78.2    Type 1 diabetes mellitus with retinopathy (HCC) E10.319    Incisional hernia without obstruction or gangrene K43.2    Moderate persistent asthma with status asthmaticus J45.42    Seasonal allergic rhinitis due to pollen J30.1    Chronic obstructive pulmonary disease (HCC) J44.9    Insulin pump in place Z96.41    Contusion of left chest wall S20.212A    Chronic retention of urine R33.9    Chronic combined systolic and diastolic congestive heart failure (HCC) I50.42    Major depressive disorder, recurrent, in full remission ([facility] [address] F33.42    Urinary incontinence without sensory awareness N39.42    Arthritis of knee, right M17.11    Acute bilateral thoracic back pain M54.6    Acute pyelonephritis N10    Bilateral impacted cerumen H61.23    Left ear pain H92.02    Flank pain R10.9    TIA (transient ischemic attack) G45.9    Moderate side x5    CYST REMOVAL      right ankle    CYSTOSCOPY      EYE SURGERY Right     HOLE IN RETINA REPAIRED    FINGER TRIGGER RELEASE Right     INCISIONAL HERNIA REPAIR  07/07/15    open   [address]  10/17/2017    MIDDLE EAR SURGERY Left     ear tube placement    POLYPECTOMY      colon polyp    ID REPAIR INCISIONAL HERNIA,REDUCIBLE N/A 10/17/2017    HERNIA INCISIONAL REPAIR WITH MESH performed by Lillie Ponce MD at [address] N/A 6/1/2020    SIGMOIDOSCOPY DIAGNOSTIC FLEXIBLE [facility] [address] [facility]  07/13/2016    UPPER GASTROINTESTINAL ENDOSCOPY  07/23/2018    with biopsy    UPPER GASTROINTESTINAL ENDOSCOPY N/A 7/23/2018    EGD BIOPSY performed by Lillie Ponce MD at [address]  06/01/2020    with biopsy    UPPER GASTROINTESTINAL ENDOSCOPY N/A 6/1/2020    EGD BIOPSY performed by Lillie Ponce MD at [place] [address] History:    Social History     Tobacco Use    Smoking status: Never Smoker    Smokeless tobacco: Never Used   Substance Use Topics    Alcohol use: No     Alcohol/week: 0.0 standard drinks     Comment: once a month                                Counseling given: Not Answered      Vital Signs (Current):   Vitals:    07/06/21 1145 07/06/21 1147   BP:  (!) 148/65   Pulse:  70   Resp:  20   Temp:  97.5 °F (36.4 °C)   TempSrc:  Temporal   SpO2:  95%   Weight: 188 lb 11.2 oz (85.6 kg)    Height: 5' 5\" (1.651 m)                                               BP Readings from Last 3 Encounters:   07/06/21 (!) 148/65   06/25/21 (!) 106/57   10/17/20 (!) 175/86       NPO Status: Time of last liquid consumption: 2330                        Time of last solid consumption: 2330                        Date of last liquid consumption: 07/05/21                        Date of last solid food consumption: 07/05/21    BMI:   Wt Readings from Last 3 Encounters:   07/06/21 188 lb 11.2 oz (85.6 kg)   06/25/21 185 lb 3 oz (84 kg)   10/17/20 171 lb 8.3 oz (77.8 kg)     Body mass index is 31.4 kg/m². CBC:   Lab Results   Component Value Date    WBC 8.5 06/25/2021    RBC 4.43 06/25/2021    RBC 4.34 05/14/2012    HGB 13.1 06/25/2021    HCT 40.4 06/25/2021    MCV 91.2 06/25/2021    RDW 12.4 06/25/2021     06/25/2021     05/14/2012       CMP:   Lab Results   Component Value Date     06/25/2021    K 3.9 06/25/2021     06/25/2021    CO2 20 06/25/2021    BUN 14 06/25/2021    CREATININE 1.08 06/25/2021    GFRAA >60 06/25/2021    LABGLOM 52 06/25/2021    GLUCOSE 254 06/25/2021    GLUCOSE 342 05/14/2012    PROT 6.7 10/17/2020    PROT 6.8 02/27/2013    CALCIUM 9.2 06/25/2021    BILITOT 0.29 10/17/2020    ALKPHOS 100 10/17/2020    AST 12 10/17/2020    ALT 9 10/17/2020       POC Tests:   Recent Labs     07/06/21  1213   POCGLU 276*       Coags: No results found for: PROTIME, INR, APTT    HCG (If Applicable): No results found for: PREGTESTUR, PREGSERUM, HCG, HCGQUANT     ABGs: No results found for: PHART, PO2ART, NTS6EIU, XLV3JGT, BEART, K7UYMOCH     Type & Screen (If Applicable):  No results found for: LABABO, LABRH    Drug/Infectious Status (If Applicable):  No results found for: HIV, HEPCAB    COVID-19 Screening (If Applicable):   Lab Results   Component Value Date    COVID19 Not Detected 07/03/2020           Anesthesia Evaluation    Airway: Mallampati: I  TM distance: >3 FB   Neck ROM: full  Mouth opening: > = 3 FB Dental:          Pulmonary:   (+) COPD:      (-) shortness of breath                           Cardiovascular:    (+) CAD:,     (-)  angina and murmur                Neuro/Psych:   (+) CVA: no interval change,             GI/Hepatic/Renal:             Endo/Other:    (+) Diabetes, . Abdominal:             Vascular:           Other Findings:             Anesthesia Plan      general     ASA 3             Anesthetic plan and risks discussed with patient.                       Jefferson Briones MD   7/6/2021

## 2021-07-06 NOTE — OP NOTE
Operative Note      Patient: Saul Singh  YOB: 1960  MRN: [id number]    Date of Procedure: 7/6/2021    Pre-Op Diagnosis: DX LEFT THIGH MASS    Post-Op Diagnosis: Same       Procedure: Excision of left proximal thigh subcutaneous mass, 2.4 cm    Surgeon(s):  Lana Key MD    Assistant:   Resident: Nadine Kaplan DO    Anesthesia: General    Estimated Blood Loss (mL): Minimal    Complications: None    Specimens:   ID Type Source Tests Collected by Time Destination   A : LEFT THIGH MASS Tissue Thigh SURGICAL PATHOLOGY Lana Key MD 7/6/2021 1302        Implants:  * No implants in log *      Drains: * No LDAs found *    Findings: Left thigh lobulated subcutaneous mass clinically consistent with a lipoma    Detailed Description of Procedure:   After verbal and written consent, the patient was brought to the operating room. After appropriate monitoring, general anesthesia was administered. A timeout was performed with the staff in the room confirming both the patient and the procedure to be performed. The procedure was begun with a sterile prep and drape. Local anesthesia was infiltrated into the skin and subcutaneous tissue as a field block. A left groin incision was made with a scalpel. Dissection was carried down to the subcutaneous tissue with cautery. Within the subcutaneous tissue, a lobulated mass was identified. This was excised with grossly negative margins. This mass extended down to but did not involve the underlying muscular fascia. Once excised, the specimen was placed in formalin and sent for permanent pathologic evaluation. The incision was then closed in layers with interrupted Vicryl suture used to close the deep dermal tissue and a running Monocryl suture used to close the skin. Dermabond was applied to the wound. Sponge, lap, needle, and instrument count were correct at the end of the case.   The patient was awakened from anesthesia and transported to the recovery in stable condition. There were no immediate complications.     Electronically signed by Fannie Nyhan, MD on 7/6/2021 at 1:20 PM

## 2021-07-06 NOTE — H&P
Interval H&P Note    Pt Name: Carole Holm  MRN: [id number]  YOB: 1960  Date of evaluation: 7/6/2021      [x] I have reviewed in Epic the H&P by GEOVANNA Turcios CNP dated 6/25/2021 attached below which meets the criteria for an Interval History and Physical note. [x] I have examined  Carole   There are no changes to the patient who is scheduled for a excision of left thigh mass by Dr. Kim Kidd for left thigh mass. Patient has had dry, non-productive cough and has been taking OTC mucinex. Patient straight-catheterized herself this morning. The patient denies new health changes, fever, chills, wheezing, increased SOB, chest pain, open sores or wounds. Hx diabetes. POC . Last ASA 81mg 6/28/2021 and Plavix 6/28/2021. Vital signs: BP (!) 148/65   Pulse 70   Temp 97.5 °F (36.4 °C) (Temporal)   Resp 20   Ht 5' 5\" (1.651 m)   Wt 188 lb 11.2 oz (85.6 kg)   SpO2 95%   BMI 31.40 kg/m²     Allergies:  Fioricet [butalbital-apap-caffeine], Betamethasone, Erythromycin, Xarelto [rivaroxaban], Codeine, Droperidol, and Tape [adhesive tape]    Medications:    Prior to Admission medications    Medication Sig Start Date End Date Taking?  Authorizing Provider   acetaminophen (TYLENOL) 325 MG tablet Take 650 mg by mouth every 6 hours as needed for Pain    Historical Provider, MD   clopidogrel (PLAVIX) 75 MG tablet Take 75 mg by mouth daily    Historical Provider, MD   Albuterol Sulfate (PROAIR HFA IN) Inhale into the lungs    Historical Provider, MD   simvastatin (ZOCOR) 20 MG tablet Take 20 mg by mouth nightly    Historical Provider, MD   amLODIPine (NORVASC) 5 MG tablet Take 1 tablet by mouth once daily 1/8/21   Elizabeth Conroy MD   aspirin 81 MG tablet Take 1 tablet by mouth daily 10/17/20   Misael Morales MD   topiramate (TOPAMAX) 25 MG tablet Take 1 tablet by mouth 2 times daily 10/17/20   Misael Morales MD   DULoxetine (CYMBALTA) 30 MG extended release capsule Take 90 mg by mouth daily Historical Provider, MD   metoclopramide (REGLAN) 10 MG tablet Take 10 mg by mouth 2 times daily     Historical Provider, MD   metoprolol (LOPRESSOR) 100 MG tablet Take 100 mg by mouth 2 times daily    Historical Provider, MD   insulin aspart (NOVOLOG PENFILL) 100 UNIT/ML injection cartridge Inject into the skin continuous Per insulin pump    Historical Provider, MD   amitriptyline (ELAVIL) 150 MG tablet TAKE 1 TABLET BY MOUTH NIGHTLY  Patient taking differently: Take 75 mg by mouth nightly TAKE 1 TABLET BY MOUTH NIGHTLY 2/21/20   Sunny Younger MD   clonazePAM (KLONOPIN) 0.5 MG tablet TAKE 1 TABLET BY MOUTH TWICE DAILY AS NEEDED FOR ANXIETY OR SLEEP 10/9/19 10/16/20  Sunny Younger MD   pantoprazole (PROTONIX) 40 MG tablet Take 40 mg by mouth 2 times daily    Historical Provider, MD   losartan (COZAAR) 100 MG tablet TAKE 1 TABLET BY MOUTH ONCE DAILY 5/14/19   Sunny Younger MD   rOPINIRole (REQUIP) 2 MG tablet Take 1 tablet by mouth daily 4/16/19   Sunny Younger MD   levothyroxine (SYNTHROID) 137 MCG tablet Take 1 tablet by mouth daily 4/16/19   Sunny Younger MD   gabapentin (NEURONTIN) 800 MG tablet TAKE 1 TABLET BY MOUTH THREE TIMES DAILY 4/16/19 10/16/20  Sunny Younger MD   BREO ELLIPTA 200-25 MCG/INH AEPB Inhale 1 puff into the lungs daily  10/7/17   Historical Provider, MD   tiotropium (Kiara Killings) 18 MCG inhalation capsule Inhale 1 capsule into the lungs Daily 6/19/17   Roderick Cesar MD   ONE TOUCH ULTRA TEST strip  6/7/17   Historical Provider, MD         This is a 64 y.o. female who is pleasant, cooperative, alert and oriented x3, in no acute distress. Heart: Heart sounds are normal.  HR 70 regular rate and rhythm without murmur, gallop or rub. Lungs: Normal respiratory effort with equal expansion, good air exchange, unlabored and clear to auscultation without wheezes or rales bilaterally   Abdomen: soft, nontender, nondistended with bowel sounds active.        Labs:  Recent Labs     06/25/21  1428   HGB 13.1   HCT 40.4   WBC 8.5   MCV 91.2      *   K 3.9      CO2 20   BUN 14   CREATININE 1.08*   GLUCOSE 254*       No results for input(s): COVID19 in the last 720 hours. KIMBERLEE Mandujano CNP  Electronically signed 7/6/2021 at 11:49 AM    KIMBERLEE Kim CNP   Nurse Practitioner   General Surgery   H&P       Signed   Date of Service:  6/25/2021  2:00 PM         Related encounter: Pre-Admission Testing Visit from 6/25/2021 in [facility] PRE-ADMIT TESTING         Signed        Expand AllCollapse All     Show:Clear all  [x]Manual[x]Template[x]Copied    Added by:  [x]KIMBERLEE Sosa CNP    []Elizabeth for details  History and Physical Service   [facility]            Date of Evaluation:     6/25/2021  Patient name:              Svitlana Woodward  MRN:                           [id number]  YOB: 1960  PCP:                            Shanta Beck MD     History Obtained From:      Patient, Medical records     History of Present Illness: This is Svitlana Woodward a 64 y.o. female who presents for a pre-admission testing appointment for an upcoming excision of left thigh mass by Dr. Carito Vazquez scheduled on 07/06/2021 at 1330 due to left thigh mass. The left thigh mass has grown in size since the pt first noticed it 2 years ago. The pt has 1-2/10 pain at the site of the mass. Pt denies wounds on the left thigh. History of diabetes. The pt has an insulin pump. She follows-up with Dr. Steven Kasper from endocrinology. History of multiple MRSA infections. History of PVD, stroke/TIA x 2 (residual numbness on the left side of her face), trigeminal neuralgia, CAD, hyperlipidemia, hypertension, COPD, diabetes, neurogenic bladder/gastroparalysis (due to diabetes), and a colon mass.  Pt states her lung collapsed during colon resection in 2012 at [facility]. The pt follows-up with Dr. Mely Workman from pulmonology, Dr. Endy Scanlon from neurology, and Dr Jessica Bynum from endocrinology. Pt does not follow-up with a cardiologist.     ECHO 05/25/2021   CONCLUSIONS     Summary  Small LV cavity. Normal left ventricular ejection fraction (>55%). Mild left ventricular hypertrophy. No significant valvular regurgitation or stenosis seen. Functional Capacity per pt:              1) Pt is able to walk 2 city blocks on level ground without SOB. 2) Pt is not able to climb 2 flights of stairs without SOB. 3) Pt is not able to walk up a hill for 1-2 city blocks without SOB. Past Medical History:      Past Medical History        Past Medical History:   Diagnosis Date    Anesthesia complication       \"lung collapsed after colon surgery    Anxiety      Arthritis      Back pain      CAD (coronary artery disease)       no stents    Caffeine use       3 coffee / day    Cataract       left    COPD (chronic obstructive pulmonary disease) (HCC)       EMPHYSEMA    Deafness       right ear    Depression      Diabetes mellitus (HCC)      Diarrhea      Diverticulosis      Fibromyalgia      Gastroparalysis      GERD (gastroesophageal reflux disease)      Hearing loss       DEAF IN RIGHT EAR    Hyperlipidemia      Hyperlipidemia      Hypertension      Incontinence      MDRO (multiple drug resistant organisms) resistance 2012     mrsa (nasal), eye, ear    Neurogenic bladder       CATHES HERSELF 7 TIMES A DAY, IS ABLE TO URINATE ON OWN    Neuropathy       feet    Obesity      Osteoarthritis      Peripheral vascular disease, unspecified ([facility])      Restless leg syndrome      Rhabdomyolysis       [place] 1 week, May 2014, then [facility] for rehab.     Spinal stenosis, thoracic 05/27/2014    Stroke [facility]) 2012     numbness on the left side of face, Oct 2020 TIA     Thyroid disease       enlarged    TMJ (dislocation of temporomandibular joint)      Trigeminal neuralgia      Type II or unspecified type diabetes mellitus without mention of complication, not stated as uncontrolled              Past Surgical History:      Past Surgical History         Past Surgical History:   Procedure Laterality Date    ABDOMEN SURGERY         LAPAROSCOPY    CARPAL TUNNEL RELEASE Right      CATARACT REMOVAL        CHOLECYSTECTOMY        CHOLECYSTECTOMY, OPEN         11/2012    COLON SURGERY         benign mass removed    COLONOSCOPY        COLONOSCOPY   07/13/2016    COLONOSCOPY   06/01/2020     incomplete/poor prep    COLONOSCOPY   07/07/2020    COLONOSCOPY N/A 7/7/2020     COLORECTAL CANCER SCREENING, NOT HIGH RISK performed by Maki Cortés MD at [address] [facility]         sebaceous cyst, under arm left side x5    CYST REMOVAL         right ankle    CYSTOSCOPY        EYE SURGERY Right       HOLE IN RETINA REPAIRED    FINGER TRIGGER RELEASE Right      INCISIONAL HERNIA REPAIR   07/07/15     open    INCISIONAL HERNIA REPAIR   10/17/2017    MIDDLE EAR SURGERY Left       ear tube placement    POLYPECTOMY         colon polyp    OK REPAIR INCISIONAL HERNIA,REDUCIBLE N/A 10/17/2017     HERNIA INCISIONAL REPAIR WITH MESH performed by Maki Cortés MD at [address] [facility] 6/1/2020     SIGMOIDOSCOPY DIAGNOSTIC FLEXIBLE performed by Maki Cortés MD at [address] [facility] ENDOSCOPY   07/13/2016    UPPER GASTROINTESTINAL ENDOSCOPY   07/23/2018     with biopsy    UPPER GASTROINTESTINAL ENDOSCOPY N/A 7/23/2018     EGD BIOPSY performed by Maki Cortés MD at [address] [facility]   06/01/2020     with biopsy    UPPER GASTROINTESTINAL ENDOSCOPY N/A 6/1/2020     EGD BIOPSY performed by Maki Cortés MD at [address] [facility]            Medications Prior to Admission:      Home Medications           Prior to Admission medications    Medication Sig Start Date End Date Taking?  Authorizing Provider   acetaminophen (TYLENOL) 325 MG tablet Take 650 mg by mouth every 6 hours as needed for Pain     Yes Historical Provider, MD   clopidogrel (PLAVIX) 75 MG tablet Take 75 mg by mouth daily     Yes Historical Provider, MD   Albuterol Sulfate (PROAIR HFA IN) Inhale into the lungs     Yes Historical Provider, MD   simvastatin (ZOCOR) 20 MG tablet Take 20 mg by mouth nightly     Yes Historical Provider, MD   amLODIPine (NORVASC) 5 MG tablet Take 1 tablet by mouth once daily 1/8/21     Dustin Mathews MD   aspirin 81 MG tablet Take 1 tablet by mouth daily 10/17/20     Thanh King MD   topiramate (TOPAMAX) 25 MG tablet Take 1 tablet by mouth 2 times daily 10/17/20     Thanh King MD   DULoxetine (CYMBALTA) 30 MG extended release capsule Take 90 mg by mouth daily       Historical Provider, MD   metoclopramide (REGLAN) 10 MG tablet Take 10 mg by mouth 2 times daily        Historical Provider, MD   metoprolol (LOPRESSOR) 100 MG tablet Take 100 mg by mouth 2 times daily       Historical Provider, MD   insulin aspart (NOVOLOG PENFILL) 100 UNIT/ML injection cartridge Inject into the skin continuous Per insulin pump       Historical Provider, MD   amitriptyline (ELAVIL) 150 MG tablet TAKE 1 TABLET BY MOUTH NIGHTLY  Patient taking differently: Take 75 mg by mouth nightly TAKE 1 TABLET BY MOUTH NIGHTLY 2/21/20     Dustin Mathews MD   clonazePAM (KLONOPIN) 0.5 MG tablet TAKE 1 TABLET BY MOUTH TWICE DAILY AS NEEDED FOR ANXIETY OR SLEEP 10/9/19 10/16/20   Dustin Mathews MD   pantoprazole (PROTONIX) 40 MG tablet Take 40 mg by mouth 2 times daily       Historical Provider, MD   losartan (COZAAR) 100 MG tablet TAKE 1 TABLET BY MOUTH ONCE DAILY 5/14/19     Dustin Mathews MD   rOPINIRole (REQUIP) 2 MG tablet Take 1 tablet by mouth daily 4/16/19     Dustin Mathews MD   levothyroxine (SYNTHROID) 137 MCG tablet Take 1 tablet by mouth daily 4/16/19     Dustin Mathews MD   gabapentin (NEURONTIN) 800 MG tablet TAKE 1 TABLET BY MOUTH THREE TIMES DAILY 4/16/19 10/16/20   Elizabeth Conroy MD   BREO ELLIPTA 200-25 MCG/INH AEPB Inhale 1 puff into the lungs daily  10/7/17     Historical Provider, MD   tiotropium (Ashley Marcum) 18 MCG inhalation capsule Inhale 1 capsule into the lungs Daily 6/19/17     Buck Dozier MD   ONE TOUCH ULTRA TEST strip   6/7/17     Historical Provider, MD            Allergies:      Fioricet [butalbital-apap-caffeine], Betamethasone, Erythromycin, Xarelto [rivaroxaban], Codeine, Droperidol, and Tape [adhesive tape]     Social History:      Tobacco:    reports that she has never smoked. She has never used smokeless tobacco.  Alcohol:      reports no history of alcohol use. Drug Use:  reports current drug use. Frequency: 4.00 times per week. Drug: Marijuana. Family History:      Family History         Family History   Problem Relation Age of Onset    High Blood Pressure Mother      Migraines Mother      High Blood Pressure Father      Heart Disease Father      Cancer Father           skin    Diabetes Maternal Grandmother      Heart Disease Maternal Grandmother      Cancer Maternal Grandmother      Parkinsonism Maternal Grandmother      Cancer Paternal Grandmother      Other Brother           epilepsy            Review of Systems:      Positive and Negative as described in HPI. CONSTITUTIONAL: Negative for fevers, chills, sweats, fatigue, and weight loss. HEENT: Pt wears glasses. 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 Pt states she has an eardrum perforation in the left ear. History of a MRSA infection in the left ear. Severe TMJ. Negative for rhinorrhea and throat pain. RESPIRATORY: Dyspnea with exertion. Dry cough. Pt states she \"never\" uses an albuterol inhaler. History of COPD. Negative for congestion and wheezing. CARDIOVASCULAR: Negative for chest pain, blood clot, irregular heartbeat, and palpitations. GASTROINTESTINAL: Occasional acid reflux. Gastroparalysis due to diabetes.  Pt was vomiting 2 days ago when her blood glucose was 500. Negative for diarrhea, constipation, change in bowel habits, and abdominal pain. GENITOURINARY: Neurogenic bladder due to diabetes. Pt self-catheterizes 7 times per day and states that she can sometimes void without catheterizing. Negative for burning with urination, hematuria, and frequency. INTEGUMENT: Skin tears on bilateral arms. Negative for rash, skin lesions, and easy bruising. HEMATOLOGIC/LYMPHATIC: Pt takes Plavix and aspirin for PVD. Negative for swelling/edema. ALLERGIC/IMMUNOLOGIC: Negative for urticaria and itching. ENDOCRINE: Diabetes. Pt's blood glucose was 500 for 24 hours on 06/23/2021 due to an issue with her insulin pump. Pt states her insulin pump is functioning properly now. Hypoglycemic events occur 1-2 times per day. Advised pt to contact Dr. Anjum Lockett for insulin instructions for upcoming surgery. Pt voiced understanding to this instruction. Negative for increase in thirst and heat or cold intolerance. MUSCULOSKELETAL: See HPI. NEUROLOGICAL: History of strokes/TIA, trigeminal neuralgia, and neuropathy. Numbness in the left side of the face. Negative for headaches, dizziness, lightheadedness, and tingling extremities. Pt denies history of seizures. BEHAVIOR/PSYCH: Treated depression and anxiety. Physical Exam:   BP (!) 106/57   Pulse 75   Temp 98.2 °F (36.8 °C)   Resp 16   Ht 5' 5\" (1.651 m)   Wt 185 lb 3 oz (84 kg)   SpO2 100%   BMI 30.82 kg/m²   No LMP recorded. Patient is postmenopausal.  No obstetric history on file. No results for input(s): POCGLU in the last 72 hours. General Appearance:  Alert, well appearing, and in no acute distress. Obese. Mental status: Oriented to person, place, and time. Head: Normocephalic and atraumatic. Eye: No icterus, redness, pupils equal and reactive, extraocular eye movements intact, and conjunctiva clear. Ear: Hard of hearing. (Dear in the right ear).    Nose: No drainage noted. Mouth: Mucous membranes moist.  Neck: Supple and no carotid bruits noted. Lungs: Bilateral equal air entry, clear to auscultation, no wheezing, rales or rhonchi, and normal effort. Cardiovascular: Normal rate, regular rhythm, no murmur, gallop, or rub. Abdomen: Rotund. Insulin pump in the LUQ. Soft, nontender, and active bowel sounds. Neurologic: Dear in the right ear/ cranial nerve VIII abnormality. Normal speech and cranial nerves II through VII and IX through XII grossly intact. Strength 5/5 bilaterally. Skin: Two small scabbed abrasions on each lower arm. No erythema surrounding the abrasions. No open wounds, rashes, bruising, or bleeding on exposed skin area. Extremities: Posterior tibial pulses are palpable bilaterally. No pedal edema. No calf tenderness with palpation. Psych: Normal affect.       Investigations:       Laboratory Testing:  Recent Results   Recent Results (from the past 24 hour(s))   Basic Metabolic Panel     Collection Time: 06/25/21  2:28 PM   Result Value Ref Range     Glucose 254 (H) 70 - 99 mg/dL     BUN 14 6 - 20 mg/dL     CREATININE 1.08 (H) 0.50 - 0.90 mg/dL     Bun/Cre Ratio 13 9 - 20     Calcium 9.2 8.6 - 10.4 mg/dL     Sodium 134 (L) 135 - 144 mmol/L     Potassium 3.9 3.7 - 5.3 mmol/L     Chloride 100 98 - 107 mmol/L     CO2 20 20 - 31 mmol/L     Anion Gap 14 9 - 17 mmol/L     GFR Non-African American 52 (L) >60 mL/min     GFR African American >60 >60 mL/min     GFR Comment            GFR Staging NOT REPORTED     CBC     Collection Time: 06/25/21  2:28 PM   Result Value Ref Range     WBC 8.5 3.5 - 11.3 k/uL     RBC 4.43 3.95 - 5.11 m/uL     Hemoglobin 13.1 11.9 - 15.1 g/dL     Hematocrit 40.4 36.3 - 47.1 %     MCV 91.2 82.6 - 102.9 fL     MCH 29.6 25.2 - 33.5 pg     MCHC 32.4 28.4 - 34.8 g/dL     RDW 12.4 11.8 - 14.4 %     Platelets 905 711 - 943 k/uL     MPV 9.5 8.1 - 13.5 fL     NRBC Automated 0.0 0.0 per 100 WBC            Recent Labs     06/25/21  9417 HGB 13.1   HCT 40.4   WBC 8.5   MCV 91.2   *   K 3.9      CO2 20   BUN 14   CREATININE 1.08*   GLUCOSE 254*         No results for input(s): COVID19 in the last 720 hours. [id number]. See Epic. Diagnosis:       1. Left thigh mass     Plans:      1.  Excision of left thigh mass        KIMBERLEE Devine - CNP  2021  4:10 PM               Cosigned by: Giovanny Carson MD at 2021 12:04 PM   Revision History                    Routing History

## 2021-07-06 NOTE — ANESTHESIA POSTPROCEDURE EVALUATION
Department of Anesthesiology  Postprocedure Note    Patient: Ashley Carias  MRN: [id number]  YOB: 1960  Date of evaluation: 7/6/2021  Time:  3:23 PM     Procedure Summary     Date: 07/06/21 Room / Location: [address] [place] [address] / [facility] - INPATIENT    Anesthesia Start: 1926 Anesthesia Stop: 8568    Procedure: EXCISION OF LEFT THIGH MASS (Left ) Diagnosis: (DX LEFT THIGH MASS)    Surgeons: Percy Pollard MD Responsible Provider: Maryuri Tilley MD    Anesthesia Type: general ASA Status: 3          Anesthesia Type: general    Melina Phase I: Melina Score: 10    Melina Phase II:      Last vitals: Reviewed and per EMR flowsheets.        Anesthesia Post Evaluation    Complications: no

## 2021-07-08 LAB — SURGICAL PATHOLOGY REPORT: NORMAL

## 2021-12-15 ENCOUNTER — OFFICE VISIT (OUTPATIENT)
Dept: FAMILY MEDICINE CLINIC | Age: 61
End: 2021-12-15
Payer: MEDICARE

## 2021-12-15 ENCOUNTER — HOSPITAL ENCOUNTER (OUTPATIENT)
Age: 61
Setting detail: SPECIMEN
Discharge: HOME | End: 2021-12-15

## 2021-12-15 VITALS
OXYGEN SATURATION: 95 % | HEART RATE: 76 BPM | TEMPERATURE: 98.6 F | DIASTOLIC BLOOD PRESSURE: 79 MMHG | SYSTOLIC BLOOD PRESSURE: 135 MMHG

## 2021-12-15 DIAGNOSIS — R05.9 COUGH: ICD-10-CM

## 2021-12-15 DIAGNOSIS — Z20.822 CONTACT WITH AND (SUSPECTED) EXPOSURE TO COVID-19: Primary | ICD-10-CM

## 2021-12-15 PROCEDURE — G8427 DOCREV CUR MEDS BY ELIG CLIN: HCPCS

## 2021-12-15 PROCEDURE — G8417 CALC BMI ABV UP PARAM F/U: HCPCS

## 2021-12-15 PROCEDURE — G8484 FLU IMMUNIZE NO ADMIN: HCPCS

## 2021-12-15 PROCEDURE — 3017F COLORECTAL CA SCREEN DOC REV: CPT

## 2021-12-15 PROCEDURE — 99213 OFFICE O/P EST LOW 20 MIN: CPT

## 2021-12-15 PROCEDURE — 1036F TOBACCO NON-USER: CPT

## 2021-12-15 RX ORDER — BENZONATATE 100 MG/1
100 CAPSULE ORAL 3 TIMES DAILY PRN
Qty: 30 CAPSULE | Refills: 0 | Status: SHIPPED | OUTPATIENT
Start: 2021-12-15 | End: 2021-12-25

## 2021-12-15 ASSESSMENT — ENCOUNTER SYMPTOMS
SINUS COMPLAINT: 1
CHEST TIGHTNESS: 0
SORE THROAT: 0
SHORTNESS OF BREATH: 0
HOARSE VOICE: 0
SINUS PAIN: 1
RESPIRATORY NEGATIVE: 1
EYE DISCHARGE: 0
STRIDOR: 0
WHEEZING: 0
EYE ITCHING: 0
SINUS PRESSURE: 0
EYES NEGATIVE: 1
CHOKING: 0
APNEA: 0
SWOLLEN GLANDS: 0
COUGH: 0
COLOR CHANGE: 0

## 2021-12-15 NOTE — PATIENT INSTRUCTIONS
Patient Education        Learning About Coronavirus [phone])  What is coronavirus (COVID-19)? COVID-19 is a disease caused by a type of coronavirus. This illness was first found in December 2019. It has since spread worldwide. Coronaviruses are a large group of viruses. They cause the common cold. They also cause more serious illnesses like [place] respiratory syndrome (MERS) and severe acute respiratory syndrome (SARS). COVID-19 is caused by a novel coronavirus. That means it's a new type that has not been seen in people before. What are the symptoms? COVID-19 symptoms may include:  · Fever. · Cough. · Trouble breathing. · Chills or repeated shaking with chills. · Muscle and body aches. · Headache. · Sore throat. · New loss of taste or smell. · Vomiting. · Diarrhea. In severe cases, COVID-19 can cause pneumonia and make it hard to breathe without help from a machine. It can cause death. How is it diagnosed? COVID-19 is diagnosed with a viral test. This may also be called a PCR test or antigen test. It looks for evidence of the virus in your breathing passages or lungs (respiratory system). The test is most often done on a sample from the nose, throat, or lungs. It's sometimes done on a sample of saliva. One way a sample is collected is by putting a long swab into the back of your nose. How is it treated? Mild cases of COVID-19 can be treated at home. Serious cases need treatment in the hospital. Treatment may include medicines to reduce symptoms, plus breathing support such as oxygen therapy or a ventilator. Some people may be placed on their belly to help their oxygen levels. Treatments that may help people who have COVID-19 include:  Antiviral medicines. These medicines treat viral infections. Remdesivir is an example. Immune-based therapy. These medicines help the immune system fight COVID-19. Examples include monoclonal antibodies. Blood thinners.    These medicines help prevent blood clots. People with severe illness are at risk for blood clots. How can you protect yourself and others? The best way to protect yourself from getting sick is to:  · Get vaccinated. · Avoid sick people. · If you are not fully vaccinated:  ? Wear a mask if you have to go to public areas. ? Avoid crowds and try to stay at least 6 feet away from other people. · Cover your mouth with a tissue when you cough or sneeze. · Wash your hands often, especially after you cough or sneeze. Use soap and water, and scrub for at least 20 seconds. If soap and water aren't available, use an alcohol-based hand . · Avoid touching your mouth, nose, and eyes. To help avoid spreading the virus to others:  · Get vaccinated. · Cover your mouth with a tissue when you cough or sneeze. · Wash your hands often, especially after you cough or sneeze. Use soap and water, and scrub for at least 20 seconds. If soap and water aren't available, use an alcohol-based hand . · If you have been exposed to the virus and are not fully vaccinated:  ? Stay home. Don't go to school, work, or public areas. And don't use public transportation, ride-shares, or taxis unless you have no choice. ? Wear a mask if you have to go to public areas, like the pharmacy. · If you're sick:  ? Leave your home only if you need to get medical care. But call the doctor's office first so they know you're coming. And wear a mask. ? Wear a mask whenever you're around other people. ? Limit contact with pets and people in your home. If possible, stay in a separate bedroom and use a separate bathroom. ? Clean and disinfect your home every day. Use household  and disinfectant wipes or sprays. Take special care to clean things that you touch with your hands. How can you self-isolate when you have COVID-19? If you have COVID-19, there are things you can do to help avoid spreading the virus to others.   · Limit contact with people in your home. If possible, stay in a separate bedroom and use a separate bathroom. · Wear a mask when you are around other people. · If you have to leave home, avoid crowds and try to stay at least 6 feet away from other people. · Avoid contact with pets and other animals. · Cover your mouth and nose with a tissue when you cough or sneeze. Then throw it in the trash right away. · Wash your hands often, especially after you cough or sneeze. Use soap and water, and scrub for at least 20 seconds. If soap and water aren't available, use an alcohol-based hand . · Don't share personal household items. These include bedding, towels, cups and glasses, and eating utensils. · [address] in the warmest water allowed for the fabric type, and dry it completely. It's okay to wash other people's laundry with yours. · Clean and disinfect your home. Use household  and disinfectant wipes or sprays. When should you call for help? Call 911 anytime you think you may need emergency care. For example, call if you have life-threatening symptoms, such as:    · You have severe trouble breathing. (You can't talk at all.)     · You have constant chest pain or pressure.     · You are severely dizzy or lightheaded.     · You are confused or can't think clearly.     · You have pale, gray, or blue-colored skin or lips.     · You pass out (lose consciousness) or are very hard to wake up. Call your doctor now or seek immediate medical care if:    · You have moderate trouble breathing. (You can't speak a full sentence.)     · You are coughing up blood (more than about 1 teaspoon).     · You have signs of low blood pressure. These include feeling lightheaded; being too weak to stand; and having cold, pale, clammy skin.    Watch closely for changes in your health, and be sure to contact your doctor if:    · Your symptoms get worse.     · You are not getting better as expected.     · You have new or worse symptoms of anxiety, depression, nightmares, or flashbacks. Call before you go to the doctor's office. Follow their instructions. And wear a mask. Current as of: July 1, 2021               Content Version: 13.0  © 2006-2021 [employer]. Care instructions adapted under license by [facility] ([facility]). If you have questions about a medical condition or this instruction, always ask your healthcare professional. [place] [address] any warranty or liability for your use of this information.

## 2021-12-15 NOTE — PROGRESS NOTES
[facility]  [facility]   [address] [id number]    [facility]  [facility] WALK-IN FAMILY [facility]   [address] [place] [address] [place] [id number]  Dept: [phone]    Kamran Painter is a 63 y.o. female Established patient, who presents to the walk-in clinic today with conditions/complaints as noted below:    Chief Complaint   Patient presents with    Diarrhea     onset today    Nasal Congestion     onset yesterday with other symptoms    Sinus Problem    Headache         HPI:     Sinus Problem  This is a new problem. The current episode started yesterday. The problem has been gradually worsening since onset. There has been no fever. The pain is mild. Associated symptoms include congestion and headaches. Pertinent negatives include no chills, coughing, diaphoresis, ear pain, hoarse voice, neck pain, shortness of breath, sinus pressure, sneezing, sore throat or swollen glands. (Diarrhea) Treatments tried: Mucinex, Tylenol, Allegra. The treatment provided mild relief. Patient states diarrhea started today but is improved by the time she did her visit with me.      Past Medical History:   Diagnosis Date    Anesthesia complication     \"lung collapsed after colon surgery    Anxiety     Arthritis     Back pain     CAD (coronary artery disease)     no stents    Caffeine use     3 coffee / day    Cataract     left    COPD (chronic obstructive pulmonary disease) (HCC)     EMPHYSEMA    Deafness     right ear    Depression     Diabetes mellitus (HCC)     Diarrhea     Diverticulosis     Fibromyalgia     Gastroparalysis     GERD (gastroesophageal reflux disease)     Hearing loss     DEAF IN RIGHT EAR    Hyperlipidemia     Hyperlipidemia     Hypertension     Incontinence     MDRO (multiple drug resistant organisms) resistance 2012    mrsa (nasal), eye, ear    Neurogenic bladder     CATHES HERSELF 7 TIMES A DAY, IS ABLE TO URINATE ON OWN    Neuropathy     feet    Obesity     Osteoarthritis     Peripheral vascular disease, unspecified ([address]     Restless leg syndrome     Rhabdomyolysis     ROBBIE 1 week, May 2014, then Consuelo for rehab.     Spinal stenosis, thoracic 05/27/2014    Stroke [facility]) 2012    numbness on the left side of face, Oct 2020 TIA     Thyroid disease     enlarged    TMJ (dislocation of temporomandibular joint)     Trigeminal neuralgia     Type II or unspecified type diabetes mellitus without mention of complication, not stated as uncontrolled        Current Outpatient Medications   Medication Sig Dispense Refill    benzonatate (TESSALON PERLES) 100 MG capsule Take 1 capsule by mouth 3 times daily as needed for Cough 30 capsule 0    ondansetron (ZOFRAN) 4 MG tablet Take 1 tablet by mouth every 8 hours as needed for Nausea or Vomiting 30 tablet 0    acetaminophen (TYLENOL) 325 MG tablet Take 650 mg by mouth every 6 hours as needed for Pain      clopidogrel (PLAVIX) 75 MG tablet Take 75 mg by mouth daily      Albuterol Sulfate (PROAIR HFA IN) Inhale into the lungs      simvastatin (ZOCOR) 20 MG tablet Take 20 mg by mouth nightly      amLODIPine (NORVASC) 5 MG tablet Take 1 tablet by mouth once daily 90 tablet 1    aspirin 81 MG tablet Take 1 tablet by mouth daily 30 tablet 0    topiramate (TOPAMAX) 25 MG tablet Take 1 tablet by mouth 2 times daily 60 tablet 3    DULoxetine (CYMBALTA) 30 MG extended release capsule Take 90 mg by mouth daily      metoclopramide (REGLAN) 10 MG tablet Take 10 mg by mouth 2 times daily       metoprolol (LOPRESSOR) 100 MG tablet Take 100 mg by mouth 2 times daily      insulin aspart (NOVOLOG PENFILL) 100 UNIT/ML injection cartridge Inject into the skin continuous Per insulin pump      amitriptyline (ELAVIL) 150 MG tablet TAKE 1 TABLET BY MOUTH NIGHTLY (Patient taking differently: Take 75 mg by mouth nightly TAKE 1 TABLET BY MOUTH NIGHTLY) 30 tablet 0    pantoprazole (PROTONIX) 40 MG tablet Take 40 mg by mouth 2 times daily      losartan (COZAAR) 100 MG tablet TAKE 1 TABLET BY MOUTH ONCE DAILY 90 tablet 3    rOPINIRole (REQUIP) 2 MG tablet Take 1 tablet by mouth daily 90 tablet 3    levothyroxine (SYNTHROID) 137 MCG tablet Take 1 tablet by mouth daily 90 tablet 3    BREO ELLIPTA 200-25 MCG/INH AEPB Inhale 1 puff into the lungs daily   6    tiotropium (SPIRIVA HANDIHALER) 18 MCG inhalation capsule Inhale 1 capsule into the lungs Daily 30 capsule 5    ONE TOUCH ULTRA TEST strip   1    clonazePAM (KLONOPIN) 0.5 MG tablet TAKE 1 TABLET BY MOUTH TWICE DAILY AS NEEDED FOR ANXIETY OR SLEEP 60 tablet 2    gabapentin (NEURONTIN) 800 MG tablet TAKE 1 TABLET BY MOUTH THREE TIMES DAILY 270 tablet 0     No current facility-administered medications for this visit. Allergies   Allergen Reactions    Fioricet [Butalbital-Apap-Caffeine] Shortness Of Breath    Betamethasone      Unsure reaction      Erythromycin      Other reaction(s): Unknown  Eye ointment caused swelling    Xarelto [Rivaroxaban]      Dizziness & \"felt like I was going to pass out\"    Codeine Nausea And Vomiting and Nausea Only    Droperidol Anxiety    Tape Electa Ghazi Tape] Rash       Review of Systems:     Review of Systems   Constitutional: Negative. Negative for chills, diaphoresis and fatigue. HENT: Positive for congestion and sinus pain. Negative for ear pain, hoarse voice, postnasal drip, sinus pressure, sneezing and sore throat. Eyes: Negative. Negative for discharge and itching. Respiratory: Negative. Negative for apnea, cough, choking, chest tightness, shortness of breath, wheezing and stridor. Cardiovascular: Negative. Negative for chest pain, palpitations and leg swelling. Musculoskeletal: Negative for neck pain. Skin: Negative. Negative for color change, pallor, rash and wound. Neurological: Positive for headaches.  Negative for tremors, seizures, syncope, facial asymmetry, speech difficulty, weakness, light-headedness and numbness. Physical Exam:      /79 (Site: Left Upper Arm, Position: Sitting, Cuff Size: Medium Adult)   Pulse 76   Temp 98.6 °F (37 °C) (Infrared)   SpO2 95%     Physical Exam  Vitals reviewed. Constitutional:       Appearance: Normal appearance. She is normal weight. HENT:      Head: Normocephalic and atraumatic. Right Ear: Tympanic membrane, ear canal and external ear normal.      Left Ear: Tympanic membrane, ear canal and external ear normal.      Nose: Congestion present. No rhinorrhea. Mouth/Throat:      Mouth: Mucous membranes are moist.      Pharynx: Oropharynx is clear. No oropharyngeal exudate or posterior oropharyngeal erythema. Eyes:      Extraocular Movements: Extraocular movements intact. Conjunctiva/sclera: Conjunctivae normal.      Pupils: Pupils are equal, round, and reactive to light. Cardiovascular:      Rate and Rhythm: Normal rate and regular rhythm. Pulses: Normal pulses. Heart sounds: Normal heart sounds. Pulmonary:      Effort: Pulmonary effort is normal.      Breath sounds: Normal air entry. Examination of the right-upper field reveals decreased breath sounds. Examination of the left-upper field reveals decreased breath sounds. Examination of the right-middle field reveals decreased breath sounds. Examination of the left-middle field reveals decreased breath sounds. Decreased breath sounds present. Abdominal:      General: Abdomen is flat. Bowel sounds are normal. There is no distension. Palpations: Abdomen is soft. There is no mass. Tenderness: There is generalized abdominal tenderness. There is no right CVA tenderness, left CVA tenderness, guarding or rebound. Negative signs include Kam's sign, Rovsing's sign, McBurney's sign, psoas sign and obturator sign. Hernia: No hernia is present. Musculoskeletal:         General: Normal range of motion.       Cervical back: Normal range of motion and neck supple. Skin:     General: Skin is warm and dry. Capillary Refill: Capillary refill takes less than 2 seconds. Neurological:      General: No focal deficit present. Mental Status: She is alert and oriented to person, place, and time. Mental status is at baseline. Psychiatric:         Mood and Affect: Mood normal.         Behavior: Behavior normal.         Plan:          1. Contact with and (suspected) exposure to covid-19  -     COVID-19  2. Cough  -     benzonatate (TESSALON PERLES) 100 MG capsule; Take 1 capsule by mouth 3 times daily as needed for Cough, Disp-30 capsule, R-0Normal     Advised to continue over-the-counter medication she has been taking. Follow-up with PCP if symptoms do not improve/worsen. Covid pending. Follow Up Instructions:      Return if symptoms worsen or fail to improve, for Follow up with PCP within 1 week, SOB, chest pain go to ER. Orders Placed This Encounter   Medications    benzonatate (TESSALON PERLES) 100 MG capsule     Sig: Take 1 capsule by mouth 3 times daily as needed for Cough     Dispense:  30 capsule     Refill:  0           Will send out COVID19 testing. Possible treatment alterations based on the results. Patient instructed to self-quarantine until testing results are back. Patient instructed not to return to work until results are back. Tylenol as needed for fever/pain. Encouraged adequate hydration and rest.  The patient indicates understanding of these issues and agrees with the plan. Educational materials provided on AVS.  Follow up if symptoms do not improve/worsen. Discussed symptoms that will warrant urgent ED evaluation/treatment. Patient and/or parent given educational materials - see patient instructions. Discussed use, benefit, and side effects of prescribed medications. All patient questions answered. Patient and/or parent voiced understanding.       Electronically signed by James Antonio [address] [facility], [place] - [place] 12/15/2021 at 7:34 PM

## 2021-12-16 LAB
SARS-COV-2: ABNORMAL
SARS-COV-2: DETECTED
SOURCE: ABNORMAL

## 2021-12-20 ENCOUNTER — HOSPITAL ENCOUNTER (INPATIENT)
Age: 61
LOS: 4 days | Discharge: HOME | DRG: 177 | End: 2021-12-24
Attending: EMERGENCY MEDICINE | Admitting: INTERNAL MEDICINE
Payer: MEDICARE

## 2021-12-20 ENCOUNTER — APPOINTMENT (OUTPATIENT)
Dept: GENERAL RADIOLOGY | Age: 61
DRG: 177 | End: 2021-12-20
Payer: MEDICARE

## 2021-12-20 DIAGNOSIS — U07.1 COVID: Primary | ICD-10-CM

## 2021-12-20 PROBLEM — I50.9 CHF (CONGESTIVE HEART FAILURE) (HCC): Status: ACTIVE | Noted: 2021-12-20

## 2021-12-20 PROBLEM — N17.9 AKI (ACUTE KIDNEY INJURY) (HCC): Status: ACTIVE | Noted: 2021-12-20

## 2021-12-20 PROBLEM — E10.10 DIABETIC KETOACIDOSIS WITHOUT COMA ASSOCIATED WITH TYPE 1 DIABETES MELLITUS (HCC): Status: ACTIVE | Noted: 2021-12-20

## 2021-12-20 LAB
ABSOLUTE EOS #: <0.03 K/UL (ref 0–0.44)
ABSOLUTE IMMATURE GRANULOCYTE: 0.05 K/UL (ref 0–0.3)
ABSOLUTE LYMPH #: 1.48 K/UL (ref 1.1–3.7)
ABSOLUTE MONO #: 0.75 K/UL (ref 0.1–1.2)
ANION GAP SERPL CALCULATED.3IONS-SCNC: 19 MMOL/L (ref 9–17)
BASOPHILS # BLD: 0 % (ref 0–2)
BASOPHILS ABSOLUTE: <0.03 K/UL (ref 0–0.2)
BETA-HYDROXYBUTYRATE: 0.73 MMOL/L (ref 0.02–0.27)
BNP INTERPRETATION: ABNORMAL
BUN BLDV-MCNC: 24 MG/DL (ref 6–20)
BUN/CREAT BLD: ABNORMAL (ref 9–20)
C-REACTIVE PROTEIN: 229.1 MG/L (ref 0–5)
CALCIUM SERPL-MCNC: 8.6 MG/DL (ref 8.6–10.4)
CHLORIDE BLD-SCNC: 95 MMOL/L (ref 98–107)
CO2: 13 MMOL/L (ref 20–31)
CREAT SERPL-MCNC: 1.41 MG/DL (ref 0.5–0.9)
D-DIMER QUANTITATIVE: 0.64 MG/L FEU
DIFFERENTIAL TYPE: ABNORMAL
EOSINOPHILS RELATIVE PERCENT: 0 % (ref 1–4)
FERRITIN: 344 UG/L (ref 13–150)
FIBRINOGEN: 903 MG/DL (ref 140–420)
GFR AFRICAN AMERICAN: 46 ML/MIN
GFR NON-AFRICAN AMERICAN: 38 ML/MIN
GFR SERPL CREATININE-BSD FRML MDRD: ABNORMAL ML/MIN/{1.73_M2}
GFR SERPL CREATININE-BSD FRML MDRD: ABNORMAL ML/MIN/{1.73_M2}
GLUCOSE BLD-MCNC: 267 MG/DL (ref 65–105)
GLUCOSE BLD-MCNC: 417 MG/DL (ref 70–99)
HCT VFR BLD CALC: 33.8 % (ref 36.3–47.1)
HEMOGLOBIN: 11.5 G/DL (ref 11.9–15.1)
IMMATURE GRANULOCYTES: 1 %
LACTATE DEHYDROGENASE: 385 U/L (ref 135–214)
LYMPHOCYTES # BLD: 17 % (ref 24–43)
MCH RBC QN AUTO: 29.9 PG (ref 25.2–33.5)
MCHC RBC AUTO-ENTMCNC: 34 G/DL (ref 28.4–34.8)
MCV RBC AUTO: 87.8 FL (ref 82.6–102.9)
MONOCYTES # BLD: 8 % (ref 3–12)
NRBC AUTOMATED: 0 PER 100 WBC
PDW BLD-RTO: 12.4 % (ref 11.8–14.4)
PLATELET # BLD: 252 K/UL (ref 138–453)
PLATELET ESTIMATE: ABNORMAL
PMV BLD AUTO: 10.3 FL (ref 8.1–13.5)
POTASSIUM SERPL-SCNC: 3.8 MMOL/L (ref 3.7–5.3)
PRO-BNP: 369 PG/ML
PROCALCITONIN: 0.58 NG/ML
RBC # BLD: 3.85 M/UL (ref 3.95–5.11)
RBC # BLD: ABNORMAL 10*6/UL
SEDIMENTATION RATE, ERYTHROCYTE: 50 MM (ref 0–30)
SEG NEUTROPHILS: 74 % (ref 36–65)
SEGMENTED NEUTROPHILS ABSOLUTE COUNT: 6.62 K/UL (ref 1.5–8.1)
SODIUM BLD-SCNC: 127 MMOL/L (ref 135–144)
TROPONIN INTERP: ABNORMAL
TROPONIN INTERP: ABNORMAL
TROPONIN T: ABNORMAL NG/ML
TROPONIN T: ABNORMAL NG/ML
TROPONIN, HIGH SENSITIVITY: 29 NG/L (ref 0–14)
TROPONIN, HIGH SENSITIVITY: 29 NG/L (ref 0–14)
WBC # BLD: 8.9 K/UL (ref 3.5–11.3)
WBC # BLD: ABNORMAL 10*3/UL

## 2021-12-20 PROCEDURE — 6360000002 HC RX W HCPCS: Performed by: STUDENT IN AN ORGANIZED HEALTH CARE EDUCATION/TRAINING PROGRAM

## 2021-12-20 PROCEDURE — 99223 1ST HOSP IP/OBS HIGH 75: CPT | Performed by: INTERNAL MEDICINE

## 2021-12-20 PROCEDURE — 83880 ASSAY OF NATRIURETIC PEPTIDE: CPT

## 2021-12-20 PROCEDURE — 83036 HEMOGLOBIN GLYCOSYLATED A1C: CPT

## 2021-12-20 PROCEDURE — 85379 FIBRIN DEGRADATION QUANT: CPT

## 2021-12-20 PROCEDURE — 83615 LACTATE (LD) (LDH) ENZYME: CPT

## 2021-12-20 PROCEDURE — 85025 COMPLETE CBC W/AUTO DIFF WBC: CPT

## 2021-12-20 PROCEDURE — 82728 ASSAY OF FERRITIN: CPT

## 2021-12-20 PROCEDURE — 93005 ELECTROCARDIOGRAM TRACING: CPT

## 2021-12-20 PROCEDURE — 82947 ASSAY GLUCOSE BLOOD QUANT: CPT

## 2021-12-20 PROCEDURE — 85652 RBC SED RATE AUTOMATED: CPT

## 2021-12-20 PROCEDURE — 84145 PROCALCITONIN (PCT): CPT

## 2021-12-20 PROCEDURE — 82010 KETONE BODYS QUAN: CPT

## 2021-12-20 PROCEDURE — 2060000000 HC ICU INTERMEDIATE R&B

## 2021-12-20 PROCEDURE — 80048 BASIC METABOLIC PNL TOTAL CA: CPT

## 2021-12-20 PROCEDURE — 71045 X-RAY EXAM CHEST 1 VIEW: CPT

## 2021-12-20 PROCEDURE — 1200000000 HC SEMI PRIVATE

## 2021-12-20 PROCEDURE — 86140 C-REACTIVE PROTEIN: CPT

## 2021-12-20 PROCEDURE — 99284 EMERGENCY DEPT VISIT MOD MDM: CPT

## 2021-12-20 PROCEDURE — 84484 ASSAY OF TROPONIN QUANT: CPT

## 2021-12-20 PROCEDURE — 85384 FIBRINOGEN ACTIVITY: CPT

## 2021-12-20 PROCEDURE — 96374 THER/PROPH/DIAG INJ IV PUSH: CPT

## 2021-12-20 RX ORDER — ATORVASTATIN CALCIUM 20 MG/1
20 TABLET, FILM COATED ORAL DAILY
Status: DISCONTINUED | OUTPATIENT
Start: 2021-12-21 | End: 2021-12-24 | Stop reason: HOSPADM

## 2021-12-20 RX ORDER — ONDANSETRON 2 MG/ML
4 INJECTION INTRAMUSCULAR; INTRAVENOUS EVERY 6 HOURS PRN
Status: DISCONTINUED | OUTPATIENT
Start: 2021-12-20 | End: 2021-12-20

## 2021-12-20 RX ORDER — SODIUM CHLORIDE 0.9 % (FLUSH) 0.9 %
5-40 SYRINGE (ML) INJECTION EVERY 12 HOURS SCHEDULED
Status: DISCONTINUED | OUTPATIENT
Start: 2021-12-20 | End: 2021-12-24 | Stop reason: HOSPADM

## 2021-12-20 RX ORDER — ACETAMINOPHEN 325 MG/1
650 TABLET ORAL EVERY 4 HOURS PRN
Status: DISCONTINUED | OUTPATIENT
Start: 2021-12-20 | End: 2021-12-24 | Stop reason: HOSPADM

## 2021-12-20 RX ORDER — DEXAMETHASONE SODIUM PHOSPHATE 10 MG/ML
6 INJECTION INTRAMUSCULAR; INTRAVENOUS DAILY
Status: DISCONTINUED | OUTPATIENT
Start: 2021-12-21 | End: 2021-12-24 | Stop reason: HOSPADM

## 2021-12-20 RX ORDER — SODIUM CHLORIDE 9 MG/ML
25 INJECTION, SOLUTION INTRAVENOUS PRN
Status: DISCONTINUED | OUTPATIENT
Start: 2021-12-20 | End: 2021-12-22

## 2021-12-20 RX ORDER — POLYETHYLENE GLYCOL 3350 17 G/17G
17 POWDER, FOR SOLUTION ORAL DAILY PRN
Status: DISCONTINUED | OUTPATIENT
Start: 2021-12-20 | End: 2021-12-24 | Stop reason: HOSPADM

## 2021-12-20 RX ORDER — DEXAMETHASONE SODIUM PHOSPHATE 10 MG/ML
6 INJECTION INTRAMUSCULAR; INTRAVENOUS ONCE
Status: COMPLETED | OUTPATIENT
Start: 2021-12-20 | End: 2021-12-20

## 2021-12-20 RX ORDER — PANTOPRAZOLE SODIUM 40 MG/1
40 TABLET, DELAYED RELEASE ORAL 2 TIMES DAILY
Status: DISCONTINUED | OUTPATIENT
Start: 2021-12-21 | End: 2021-12-24 | Stop reason: HOSPADM

## 2021-12-20 RX ORDER — SODIUM CHLORIDE 9 MG/ML
25 INJECTION, SOLUTION INTRAVENOUS PRN
Status: DISCONTINUED | OUTPATIENT
Start: 2021-12-20 | End: 2021-12-24 | Stop reason: HOSPADM

## 2021-12-20 RX ORDER — LANOLIN ALCOHOL/MO/W.PET/CERES
1000 CREAM (GRAM) TOPICAL DAILY
COMMUNITY
End: 2022-08-04 | Stop reason: ALTCHOICE

## 2021-12-20 RX ORDER — DULOXETIN HYDROCHLORIDE 30 MG/1
90 CAPSULE, DELAYED RELEASE ORAL DAILY
Status: DISCONTINUED | OUTPATIENT
Start: 2021-12-21 | End: 2021-12-24 | Stop reason: HOSPADM

## 2021-12-20 RX ORDER — LEVOTHYROXINE SODIUM 0.12 MG/1
125 TABLET ORAL DAILY
Status: DISCONTINUED | OUTPATIENT
Start: 2021-12-21 | End: 2021-12-24 | Stop reason: HOSPADM

## 2021-12-20 RX ORDER — ALBUTEROL SULFATE 90 UG/1
2 AEROSOL, METERED RESPIRATORY (INHALATION) 4 TIMES DAILY
Status: DISCONTINUED | OUTPATIENT
Start: 2021-12-21 | End: 2021-12-24 | Stop reason: HOSPADM

## 2021-12-20 RX ORDER — CLONAZEPAM 1 MG/1
0.5 TABLET ORAL DAILY
Status: DISCONTINUED | OUTPATIENT
Start: 2021-12-21 | End: 2021-12-21

## 2021-12-20 RX ORDER — ONDANSETRON 4 MG/1
4 TABLET, ORALLY DISINTEGRATING ORAL EVERY 8 HOURS PRN
Status: DISCONTINUED | OUTPATIENT
Start: 2021-12-20 | End: 2021-12-20

## 2021-12-20 RX ORDER — ONDANSETRON 4 MG/1
4 TABLET, ORALLY DISINTEGRATING ORAL EVERY 8 HOURS PRN
Status: DISCONTINUED | OUTPATIENT
Start: 2021-12-20 | End: 2021-12-24 | Stop reason: HOSPADM

## 2021-12-20 RX ORDER — NICOTINE POLACRILEX 4 MG
15 LOZENGE BUCCAL PRN
Status: DISCONTINUED | OUTPATIENT
Start: 2021-12-20 | End: 2021-12-24 | Stop reason: HOSPADM

## 2021-12-20 RX ORDER — BUDESONIDE AND FORMOTEROL FUMARATE DIHYDRATE 80; 4.5 UG/1; UG/1
2 AEROSOL RESPIRATORY (INHALATION) 2 TIMES DAILY
Refills: 6 | Status: DISCONTINUED | OUTPATIENT
Start: 2021-12-21 | End: 2021-12-24 | Stop reason: HOSPADM

## 2021-12-20 RX ORDER — ROPINIROLE 1 MG/1
2 TABLET, FILM COATED ORAL DAILY
Status: DISCONTINUED | OUTPATIENT
Start: 2021-12-21 | End: 2021-12-24 | Stop reason: HOSPADM

## 2021-12-20 RX ORDER — DEXTROSE MONOHYDRATE 50 MG/ML
100 INJECTION, SOLUTION INTRAVENOUS PRN
Status: DISCONTINUED | OUTPATIENT
Start: 2021-12-20 | End: 2021-12-24 | Stop reason: HOSPADM

## 2021-12-20 RX ORDER — NICOTINE POLACRILEX 4 MG
15 LOZENGE BUCCAL PRN
Status: DISCONTINUED | OUTPATIENT
Start: 2021-12-20 | End: 2021-12-22

## 2021-12-20 RX ORDER — ONDANSETRON 2 MG/ML
4 INJECTION INTRAMUSCULAR; INTRAVENOUS EVERY 6 HOURS PRN
Status: DISCONTINUED | OUTPATIENT
Start: 2021-12-20 | End: 2021-12-24 | Stop reason: HOSPADM

## 2021-12-20 RX ORDER — DEXTROSE MONOHYDRATE 50 MG/ML
100 INJECTION, SOLUTION INTRAVENOUS PRN
Status: DISCONTINUED | OUTPATIENT
Start: 2021-12-20 | End: 2021-12-22

## 2021-12-20 RX ORDER — METOPROLOL TARTRATE 50 MG/1
100 TABLET, FILM COATED ORAL 2 TIMES DAILY
Status: DISCONTINUED | OUTPATIENT
Start: 2021-12-21 | End: 2021-12-24 | Stop reason: HOSPADM

## 2021-12-20 RX ORDER — SODIUM CHLORIDE 0.9 % (FLUSH) 0.9 %
5-40 SYRINGE (ML) INJECTION PRN
Status: DISCONTINUED | OUTPATIENT
Start: 2021-12-20 | End: 2021-12-22

## 2021-12-20 RX ORDER — AMLODIPINE BESYLATE 5 MG/1
5 TABLET ORAL DAILY
Status: DISCONTINUED | OUTPATIENT
Start: 2021-12-21 | End: 2021-12-24 | Stop reason: HOSPADM

## 2021-12-20 RX ORDER — HEPARIN SODIUM 5000 [USP'U]/ML
5000 INJECTION, SOLUTION INTRAVENOUS; SUBCUTANEOUS EVERY 8 HOURS SCHEDULED
Status: DISCONTINUED | OUTPATIENT
Start: 2021-12-20 | End: 2021-12-24 | Stop reason: HOSPADM

## 2021-12-20 RX ORDER — SODIUM CHLORIDE 9 MG/ML
INJECTION, SOLUTION INTRAVENOUS CONTINUOUS
Status: DISCONTINUED | OUTPATIENT
Start: 2021-12-20 | End: 2021-12-22

## 2021-12-20 RX ORDER — FAMOTIDINE 20 MG/1
20 TABLET, FILM COATED ORAL 2 TIMES DAILY
Status: ON HOLD | COMMUNITY
End: 2021-12-23 | Stop reason: HOSPADM

## 2021-12-20 RX ORDER — BENZONATATE 100 MG/1
100 CAPSULE ORAL 3 TIMES DAILY PRN
Status: DISCONTINUED | OUTPATIENT
Start: 2021-12-20 | End: 2021-12-24 | Stop reason: HOSPADM

## 2021-12-20 RX ORDER — SODIUM CHLORIDE 0.9 % (FLUSH) 0.9 %
5-40 SYRINGE (ML) INJECTION PRN
Status: DISCONTINUED | OUTPATIENT
Start: 2021-12-20 | End: 2021-12-24 | Stop reason: HOSPADM

## 2021-12-20 RX ORDER — METOCLOPRAMIDE 10 MG/1
10 TABLET ORAL 2 TIMES DAILY
Status: DISCONTINUED | OUTPATIENT
Start: 2021-12-21 | End: 2021-12-20

## 2021-12-20 RX ORDER — ASPIRIN 81 MG/1
81 TABLET ORAL DAILY
Status: DISCONTINUED | OUTPATIENT
Start: 2021-12-21 | End: 2021-12-24 | Stop reason: HOSPADM

## 2021-12-20 RX ORDER — DEXTROSE MONOHYDRATE 25 G/50ML
12.5 INJECTION, SOLUTION INTRAVENOUS PRN
Status: DISCONTINUED | OUTPATIENT
Start: 2021-12-20 | End: 2021-12-24 | Stop reason: HOSPADM

## 2021-12-20 RX ORDER — CHOLECALCIFEROL (VITAMIN D3) 125 MCG
1000 CAPSULE ORAL DAILY
Status: DISCONTINUED | OUTPATIENT
Start: 2021-12-21 | End: 2021-12-24 | Stop reason: HOSPADM

## 2021-12-20 RX ORDER — CLOPIDOGREL BISULFATE 75 MG/1
75 TABLET ORAL DAILY
Status: DISCONTINUED | OUTPATIENT
Start: 2021-12-21 | End: 2021-12-24 | Stop reason: HOSPADM

## 2021-12-20 RX ORDER — TOPIRAMATE 25 MG/1
25 TABLET ORAL 2 TIMES DAILY
Status: DISCONTINUED | OUTPATIENT
Start: 2021-12-21 | End: 2021-12-24 | Stop reason: HOSPADM

## 2021-12-20 RX ORDER — DEXTROSE MONOHYDRATE 25 G/50ML
12.5 INJECTION, SOLUTION INTRAVENOUS PRN
Status: DISCONTINUED | OUTPATIENT
Start: 2021-12-20 | End: 2021-12-22

## 2021-12-20 RX ORDER — LOSARTAN POTASSIUM 50 MG/1
100 TABLET ORAL DAILY
Status: DISCONTINUED | OUTPATIENT
Start: 2021-12-21 | End: 2021-12-24 | Stop reason: HOSPADM

## 2021-12-20 RX ADMIN — DEXAMETHASONE SODIUM PHOSPHATE 6 MG: 10 INJECTION INTRAMUSCULAR; INTRAVENOUS at 18:05

## 2021-12-20 ASSESSMENT — ENCOUNTER SYMPTOMS
STRIDOR: 0
DIARRHEA: 0
APNEA: 0
WHEEZING: 1
VOMITING: 0
CHOKING: 0
BLOOD IN STOOL: 0
NAUSEA: 1
SHORTNESS OF BREATH: 1
NAUSEA: 0
ABDOMINAL DISTENTION: 0
ABDOMINAL PAIN: 0
COUGH: 0
DIARRHEA: 1
COUGH: 1
CONSTIPATION: 0

## 2021-12-20 NOTE — ED PROVIDER NOTES
[facility] [address] [address] [facility]     Emergency Department     Faculty Attestation        I performed a history and physical examination of the patient and discussed management with the resident. I reviewed the residents note and agree with the documented findings and plan of care. Any areas of disagreement are noted on the chart. I was personally present for the key portions of any procedures. I have documented in the chart those procedures where I was not present during the key portions. I have reviewed the emergency nurses triage note. I agree with the chief complaint, past medical history, past surgical history, allergies, medications, social and family history as documented unless otherwise noted below. For mid-level providers such as nurse practitioners as well as physicians assistants:    I have personally seen and evaluated the patient. I find the patient's history and physical exam are consistent with NP/PA documentation. I agree with the care provided, treatment rendered, disposition, & follow-up plan. Additional findings are as noted. Vital Signs: Pulse 89   Temp 98.4 °F (36.9 °C) (Oral)   Resp 30   Wt 180 lb (81.6 kg)   SpO2 (!) 88%   BMI 29.95 kg/m²   PCP:  Orion Zacarias MD    Pertinent Comments:     Shortness of breath tested positive for Covid about a week ago. Did receive monitoring her vaccination but did not receive booster. She is hypoxic in the mid 80s on room air requiring oxygen to maintain her sats.       Critical Care  None          Shireen Li MD    Attending Emergency Medicine Physician              Yenny Ward MD  12/20/21 [id number]

## 2021-12-20 NOTE — ED PROVIDER NOTES
[facility]. 2  Emergency Department Encounter  EmergencyMedicine Resident     Pt Pippa Mcguire  MRN: [id number]  [place] 1960  Date of evaluation: 12/20/21  PCP:  Rayo Miller MD    [address] [place] [address]       Chief Complaint   Patient presents with    Shortness of Breath     Covid +       HISTORY OF PRESENT ILLNESS  (Location/Symptom, Timing/Onset, Context/Setting, Quality, Duration, Modifying Factors, Severity.)      Alana Blackwell is a 63 y.o. female who presents with breath, cough. Patient reports that she tested positive for Covid approximately 1 week ago and has had worsening shortness of breath for the past 2 days. Patient reports she has history of COPD and CHF as well. States she has been using nebulizer treatments at home with minimal relief. Denies any associated fevers or chills. Does report some diarrhea over the past 2 to 3 days as well. No associated abdominal pain, nausea or vomiting. PAST MEDICAL / SURGICAL / SOCIAL / FAMILY HISTORY      has a past medical history of Anesthesia complication, Anxiety, Arthritis, Back pain, CAD (coronary artery disease), Caffeine use, Cataract, COPD (chronic obstructive pulmonary disease) ([address] Deafness, Depression, Diabetes mellitus ([address] Diarrhea, Diverticulosis, Fibromyalgia, Gastroparalysis, GERD (gastroesophageal reflux disease), Hearing loss, Hyperlipidemia, Hyperlipidemia, Hypertension, Incontinence, MDRO (multiple drug resistant organisms) resistance, Neurogenic bladder, Neuropathy, Obesity, Osteoarthritis, Peripheral vascular disease, unspecified (HCC), Restless leg syndrome, Rhabdomyolysis, Spinal stenosis, thoracic, Stroke ([address] Thyroid disease, TMJ (dislocation of temporomandibular joint), Trigeminal neuralgia, and Type II or unspecified type diabetes mellitus without mention of complication, not stated as uncontrolled. has a past surgical history that includes Cholecystectomy; Colon surgery; Tonsillectomy;  Tubal ligation; Carpal tunnel release (Right); Middle ear surgery (Left); cyst removal; cyst removal; Cholecystectomy, open; Finger trigger release (Right); Colonoscopy; Abdomen surgery; polypectomy; Cystocopy; Cataract removal; Incisional hernia repair (07/07/15); Upper gastrointestinal endoscopy (07/13/2016); Colonoscopy (07/13/2016); eye surgery (Right); Incisional hernia repair (10/17/2017); pr repair incisional hernia,reducible (N/A, 10/17/2017); Upper gastrointestinal endoscopy (07/23/2018); Upper gastrointestinal endoscopy (N/A, 7/23/2018); Upper gastrointestinal endoscopy (06/01/2020); Colonoscopy (06/01/2020); Upper gastrointestinal endoscopy (N/A, 6/1/2020); sigmoidoscopy (N/A, 6/1/2020); Colonoscopy (07/07/2020); Colonoscopy (N/A, 7/7/2020); and Leg biopsy excision (Left, 7/6/2021). Social History     Socioeconomic History    Marital status:      Spouse name: Not on file    Number of children: 0    Years of education: 15    Highest education level: Not on file   Occupational History    Occupation: disability     Employer: N/A   Tobacco Use    Smoking status: Never Smoker    Smokeless tobacco: Never Used   Vaping Use    Vaping Use: Never used   Substance and Sexual Activity    Alcohol use: No     Alcohol/week: 0.0 standard drinks     Comment: once a month    Drug use: Yes     Frequency: 4.0 times per week     Types: Marijuana Estil Catena)    Sexual activity: Yes     Partners: Male     Comment: 1 partner   Other Topics Concern    Not on file   Social History Narrative    Not on file     Social Determinants of Health     Financial Resource Strain:     Difficulty of Paying Living Expenses: Not on file   Food Insecurity:     Worried About Running Out of Food in the Last Year: Not on file    Mela of Food in the Last Year: Not on file   Transportation Needs:     Lack of Transportation (Medical): Not on file    Lack of Transportation (Non-Medical):  Not on file   Physical Activity:     Days of Exercise per Week: Not on file    Minutes of Exercise per Session: Not on file   Stress:     Feeling of Stress : Not on file   Social Connections:     Frequency of Communication with Friends and Family: Not on file    Frequency of Social Gatherings with Friends and Family: Not on file    Attends [employer] Services: Not on file    Active Member of [address] [place] [address] [employer] or Organizations: Not on file    Attends Club or Organization Meetings: Not on file    Marital Status: Not on file   Intimate Partner Violence:     Fear of Current or Ex-Partner: Not on file    Emotionally Abused: Not on file    Physically Abused: Not on file    Sexually Abused: Not on file   Housing Stability:     Unable to Pay for Housing in the Last Year: Not on file    Number of [place] in the Last Year: Not on file    Unstable Housing in the Last Year: Not on file       Family History   Problem Relation Age of Onset    High Blood Pressure Mother     Migraines Mother     High Blood Pressure Father     Heart Disease Father     Cancer Father         skin    Diabetes Maternal Grandmother     Heart Disease Maternal Grandmother     Cancer Maternal Grandmother     Parkinsonism Maternal Grandmother     Cancer Paternal Grandmother     Other Brother         epilepsy       Allergies:  Fioricet [butalbital-apap-caffeine], Betamethasone, Erythromycin, Xarelto [rivaroxaban], Codeine, Droperidol, and Tape [adhesive tape]    Home Medications:  Prior to Admission medications    Medication Sig Start Date End Date Taking?  Authorizing Provider   famotidine (PEPCID) 20 MG tablet Take 20 mg by mouth 2 times daily   Yes Historical Provider, MD   vitamin B-12 (CYANOCOBALAMIN) 1000 MCG tablet Take 1,000 mcg by mouth daily   Yes Historical Provider, MD   benzonatate (TESSALON PERLES) 100 MG capsule Take 1 capsule by mouth 3 times daily as needed for Cough 12/15/21 12/25/21 Yes Fara Monroe APRN - CNP   ondansetron (ZOFRAN) 4 MG tablet Take 1 tablet by mouth every 8 hours as needed for Nausea or Vomiting 7/6/21  Yes Titus Toledo DO   acetaminophen (TYLENOL) 325 MG tablet Take 650 mg by mouth every 6 hours as needed for Pain   Yes Historical Provider, MD   clopidogrel (PLAVIX) 75 MG tablet Take 75 mg by mouth daily   Yes Historical Provider, MD   Albuterol Sulfate (PROAIR HFA IN) Inhale into the lungs   Yes Historical Provider, MD   simvastatin (ZOCOR) 20 MG tablet Take 20 mg by mouth nightly   Yes Historical Provider, MD   amLODIPine (NORVASC) 5 MG tablet Take 1 tablet by mouth once daily 1/8/21  Yes Nichole Mack MD   aspirin 81 MG tablet Take 1 tablet by mouth daily 10/17/20  Yes Yumiko Brady MD   topiramate (TOPAMAX) 25 MG tablet Take 1 tablet by mouth 2 times daily 10/17/20  Yes Yumiko Brady MD   DULoxetine (CYMBALTA) 30 MG extended release capsule Take 90 mg by mouth daily   Yes Historical Provider, MD   metoclopramide (REGLAN) 10 MG tablet Take 10 mg by mouth 2 times daily    Yes Historical Provider, MD   metoprolol (LOPRESSOR) 100 MG tablet Take 100 mg by mouth 2 times daily   Yes Historical Provider, MD   insulin aspart (NOVOLOG PENFILL) 100 UNIT/ML injection cartridge Inject into the skin continuous Per insulin pump   Yes Historical Provider, MD   amitriptyline (ELAVIL) 150 MG tablet TAKE 1 TABLET BY MOUTH NIGHTLY  Patient taking differently: Take 75 mg by mouth nightly TAKE 1 TABLET BY MOUTH NIGHTLY 2/21/20  Yes Nichole Mack MD   clonazePAM (KLONOPIN) 0.5 MG tablet TAKE 1 TABLET BY MOUTH TWICE DAILY AS NEEDED FOR ANXIETY OR SLEEP 10/9/19 12/20/21 Yes Nichole Mack MD   pantoprazole (PROTONIX) 40 MG tablet Take 40 mg by mouth 2 times daily   Yes Historical Provider, MD   losartan (COZAAR) 100 MG tablet TAKE 1 TABLET BY MOUTH ONCE DAILY 5/14/19  Yes Nichole Mack MD   rOPINIRole (REQUIP) 2 MG tablet Take 1 tablet by mouth daily 4/16/19  Yes Nichole Mack MD   levothyroxine (SYNTHROID) 137 MCG tablet Take 1 tablet by mouth daily 4/16/19  Yes Kelli oJhnson MD   gabapentin (NEURONTIN) 800 MG tablet TAKE 1 TABLET BY MOUTH THREE TIMES DAILY 4/16/19 12/20/21 Yes Kelli Johnson MD   BREO ELLIPTA 200-25 MCG/INH AEPB Inhale 1 puff into the lungs daily  10/7/17  Yes Historical Provider, MD   tiotropium (Greene Begin) 18 MCG inhalation capsule Inhale 1 capsule into the lungs Daily 6/19/17  Yes Julissa Hartley MD   ONE TOUCH ULTRA TEST strip  6/7/17  Yes Historical Provider, MD       REVIEW OF SYSTEMS    (2-9 systems for level 4, 10 or more for level 5)      Review of Systems   Constitutional: Positive for fatigue and fever. Respiratory: Positive for cough and shortness of breath. Cardiovascular: Negative for chest pain. Gastrointestinal: Positive for diarrhea and nausea. Negative for vomiting. Genitourinary: Negative for dysuria and flank pain. Musculoskeletal: Negative for neck pain and neck stiffness. Skin: Negative for rash. Neurological: Negative for weakness, light-headedness and headaches. Psychiatric/Behavioral: Negative for confusion. PHYSICAL EXAM   (up to 7 for level 4, 8 or more for level 5)      INITIAL VITALS:   BP (!) 151/79   Pulse 87   Temp 98 °F (36.7 °C) (Oral)   Resp 16   Wt 178 lb 9.6 oz (81 kg)   SpO2 93%   BMI 29.72 kg/m²     Physical Exam  Constitutional:       General: She is not in acute distress. Appearance: She is not toxic-appearing. Cardiovascular:      Rate and Rhythm: Normal rate. No extrasystoles are present. Pulses: No decreased pulses. Heart sounds: No murmur heard. No friction rub. No gallop. Pulmonary:      Effort: No respiratory distress. Breath sounds: Examination of the right-lower field reveals wheezing. Examination of the left-lower field reveals wheezing. Wheezing present. No decreased breath sounds, rhonchi or rales. Musculoskeletal:      Right lower leg: No edema. Left lower leg: No edema.          DIFFERENTIAL  DIAGNOSIS PLAN (LABS / IMAGING / EKG):  Orders Placed This Encounter   Procedures    C DIFF TOXIN/ANTIGEN    XR CHEST PORTABLE    CT CHEST WO CONTRAST    CBC Auto Differential    Troponin    Basic Metabolic Panel w/ Reflex to MG    Brain Natriuretic Peptide    D-Dimer, Quantitative    FERRITIN    FIBRINOGEN    C-REACTIVE PROTEIN    Sedimentation Rate    LACTATE DEHYDROGENASE    Procalcitonin    URINALYSIS WITH MICROSCOPIC    Basic Metabolic Panel w/ Reflex to MG    CBC    Beta-Hydroxybutyrate    BASIC METABOLIC PANEL    Hemoglobin A1C    ADULT DIET;  Regular; 3 carb choices (45 gm/meal)    Vital signs per unit routine    Notify physician    Notify physician    Up as tolerated    Vital signs per unit routine    Up as tolerated    HYPOGLYCEMIA TREATMENT: blood glucose less than 50 mg/dL and patient  ALERT and TOLERATING PO    HYPOGLYCEMIA TREATMENT: blood glucose less than 70 mg/dL and patient ALERT and TOLERATING PO    HYPOGLYCEMIA TREATMENT: blood glucose less than 70 mg/dL and patient NOT ALERT or NPO    HYPOGLYCEMIA TREATMENT: blood glucose less than 50 mg/dL and patient  ALERT and TOLERATING PO    HYPOGLYCEMIA TREATMENT: blood glucose less than 70 mg/dL and patient ALERT and TOLERATING PO    HYPOGLYCEMIA TREATMENT: blood glucose less than 70 mg/dL and patient NOT ALERT or NPO    Full Code    Inpatient consult to Internal Medicine    Droplet Plus Isolation    Initiate Oxygen Therapy Protocol    POCT glucose    POCT Glucose    POCT glucose    POCT Glucose    POC Glucose Fingerstick    EKG 12 Lead    PATIENT STATUS (DIRECT) Inpatient    PATIENT STATUS (FROM ED OR OR/PROCEDURAL) Inpatient       MEDICATIONS ORDERED:  Orders Placed This Encounter   Medications    dexamethasone (DECADRON) injection 6 mg    sodium chloride flush 0.9 % injection 5-40 mL    sodium chloride flush 0.9 % injection 5-40 mL    0.9 % sodium chloride infusion    DISCONTD: enoxaparin (LOVENOX) injection 40 mg    acetaminophen (TYLENOL) tablet 650 mg    DISCONTD: ondansetron (ZOFRAN-ODT) disintegrating tablet 4 mg    DISCONTD: ondansetron (ZOFRAN) injection 4 mg    sodium chloride flush 0.9 % injection 5-40 mL    sodium chloride flush 0.9 % injection 5-40 mL    0.9 % sodium chloride infusion    OR Linked Order Group     ondansetron (ZOFRAN-ODT) disintegrating tablet 4 mg     ondansetron (ZOFRAN) injection 4 mg    polyethylene glycol (GLYCOLAX) packet 17 g    DISCONTD: insulin lispro (HUMALOG) injection vial 0-12 Units    DISCONTD: insulin lispro (HUMALOG) injection vial 0-6 Units    glucose (GLUTOSE) 40 % oral gel 15 g    dextrose 50 % IV solution    glucagon (rDNA) injection 1 mg    dextrose 5 % solution    insulin lispro (HUMALOG) injection vial 10 Units    insulin regular (HUMULIN R;NOVOLIN R) 100 Units in sodium chloride 0.9 % 100 mL infusion    glucose (GLUTOSE) 40 % oral gel 15 g    dextrose 50 % IV solution    glucagon (rDNA) injection 1 mg    dextrose 5 % solution    heparin (porcine) injection 5,000 Units    0.9 % sodium chloride infusion       DDX: Covid, COPD exacerbation, bronchitis, pneumonia, influenza, nonspecific viral URI  DIAGNOSTIC RESULTS / EMERGENCY DEPARTMENT COURSE / MDM   LAB RESULTS:  Results for orders placed or performed during the hospital encounter of 12/20/21   CBC Auto Differential   Result Value Ref Range    WBC 8.9 3.5 - 11.3 k/uL    RBC 3.85 (L) 3.95 - 5.11 m/uL    Hemoglobin 11.5 (L) 11.9 - 15.1 g/dL    Hematocrit 33.8 (L) 36.3 - 47.1 %    MCV 87.8 82.6 - 102.9 fL    MCH 29.9 25.2 - 33.5 pg    MCHC 34.0 28.4 - 34.8 g/dL    RDW 12.4 11.8 - 14.4 %    Platelets 633 583 - 306 k/uL    MPV 10.3 8.1 - 13.5 fL    NRBC Automated 0.0 0.0 per 100 WBC    Differential Type NOT REPORTED     Seg Neutrophils 74 (H) 36 - 65 %    Lymphocytes 17 (L) 24 - 43 %    Monocytes 8 3 - 12 %    Eosinophils % 0 (L) 1 - 4 %    Basophils 0 0 - 2 %    Immature Granulocytes 1 (H) 0 % Segs Absolute 6.62 1.50 - 8.10 k/uL    Absolute Lymph # 1.48 1.10 - 3.70 k/uL    Absolute Mono # 0.75 0.10 - 1.20 k/uL    Absolute Eos # <0.03 0.00 - 0.44 k/uL    Basophils Absolute <0.03 0.00 - 0.20 k/uL    Absolute Immature Granulocyte 0.05 0.00 - 0.30 k/uL    WBC Morphology NOT REPORTED     RBC Morphology NOT REPORTED     Platelet Estimate NOT REPORTED    Troponin   Result Value Ref Range    Troponin, High Sensitivity 29 (H) 0 - 14 ng/L    Troponin T NOT REPORTED <0.03 ng/mL    Troponin Interp NOT REPORTED    Troponin   Result Value Ref Range    Troponin, High Sensitivity 29 (H) 0 - 14 ng/L    Troponin T NOT REPORTED <0.03 ng/mL    Troponin Interp NOT REPORTED    Basic Metabolic Panel w/ Reflex to MG   Result Value Ref Range    Glucose 417 (HH) 70 - 99 mg/dL    BUN 24 (H) 6 - 20 mg/dL    CREATININE 1.41 (H) 0.50 - 0.90 mg/dL    Bun/Cre Ratio NOT REPORTED 9 - 20    Calcium 8.6 8.6 - 10.4 mg/dL    Sodium 127 (L) 135 - 144 mmol/L    Potassium 3.8 3.7 - 5.3 mmol/L    Chloride 95 (L) 98 - 107 mmol/L    CO2 13 (L) 20 - 31 mmol/L    Anion Gap 19 (H) 9 - 17 mmol/L    GFR Non-African American 38 (L) >60 mL/min    GFR  46 (L) >60 mL/min    GFR Comment          GFR Staging NOT REPORTED    Brain Natriuretic Peptide   Result Value Ref Range    Pro- (H) <300 pg/mL    BNP Interpretation NOT REPORTED    D-Dimer, Quantitative   Result Value Ref Range    D-Dimer, Quant 0.64 mg/L FEU   FERRITIN   Result Value Ref Range    Ferritin 344 (H) 13 - 150 ug/L   FIBRINOGEN   Result Value Ref Range    Fibrinogen 903 (H) 140 - 420 mg/dL   C-REACTIVE PROTEIN   Result Value Ref Range    .1 (H) 0.0 - 5.0 mg/L   Sedimentation Rate   Result Value Ref Range    Sed Rate 50 (H) 0 - 30 mm   LACTATE DEHYDROGENASE   Result Value Ref Range     (H) 135 - 214 U/L   Procalcitonin   Result Value Ref Range    Procalcitonin 0.58 (H) <0.09 ng/mL   Beta-Hydroxybutyrate   Result Value Ref Range    Beta-Hydroxybutyrate 0.73 (H) 0.02 - 0.27 mmol/L   POC Glucose Fingerstick   Result Value Ref Range    POC Glucose 267 (H) 65 - 105 mg/dL       IMPRESSION: 65-year-old female with complaints of shortness of breath ongoing for past 2 days. Patient reporting that she test positive for Covid 5 days ago. Patient with oxygen saturation 87% on room air on arrival.  Does not normally wear oxygen at home. Patient placed on 2 L nasal cannula improvement ox saturation 93%. Labs markable for elevated inflammatory markers. Patient also noted to have hyperglycemia, is type I diabetic with insulin pump in place. Admitted to internal medicine service for COVID-19 pneumonia with new O2 requirement    RADIOLOGY:  XR CHEST PORTABLE    Result Date: 12/20/2021  EXAMINATION: ONE XRAY VIEW OF THE CHEST 12/20/2021 3:02 pm COMPARISON: 08/12/2020, 03/23/2020 HISTORY: ORDERING SYSTEM PROVIDED HISTORY: Shortness of breath, COVID + TECHNOLOGIST PROVIDED HISTORY: Shortness of breath, COVID + Reason for Exam: port Upright FINDINGS: Peripheral basal predominant pulmonary opacities bilaterally with a 1.5 cm masslike area of consolidation in the periphery of the right lung base. No pneumothorax or pleural effusion. Cardiomediastinal contours are within normal limits. No acute bony findings. Peripheral and basal predominant pulmonary opacities which can be seen in the setting of COVID-19 pneumonia. 1.5 cm masslike area of consolidation in the periphery of the right lung base. Imaging follow-up to resolution is advised to exclude the presence of an underlying nodule. CONSULTS:  IP CONSULT TO INTERNAL MEDICINE    CRITICAL CARE:  Please see attending note    FINAL IMPRESSION      1. COVID          DISPOSITION / Xochilt Rick Admitted 12/20/2021 06:55:19 PM      PATIENT REFERRED TO:  No follow-up provider specified.     DISCHARGE MEDICATIONS:  Current Discharge Medication List          Emily Grullon DO  Emergency Medicine Resident    (Please note that portions of thisnote were completed with a voice recognition program.  Efforts were made to edit the dictations but occasionally words are mis-transcribed.)        Alessandro Bah DO  Resident  12/20/21 [id number]

## 2021-12-20 NOTE — ED NOTES
Pt reports to the ED with complaints of SOB. Pt states she has been COVID positive for a few days now. Pt has a hx of COPD. Pt tachypneic and initial saturation was 88% on RA. Pt placed on 5 L nasal cannula and saturation now 93%. Pt denies chest pain.  Pt placed on full continuous monitor     Kitty Hodges RN  12/20/21 [id number]

## 2021-12-20 NOTE — H&P
[address]     Department of Internal Medicine - Staff Internal Medicine Teaching Service          ADMISSION NOTE/HISTORY AND PHYSICAL EXAMINATION   Date: 12/20/2021  Patient Name: Lito Lehman  Date of admission: 12/20/2021  4:33 PM  YOB: 1960  PCP: Tyrell Oneill MD  History Obtained From:  patient    CHIEF COMPLAINT     Chief complaint: SOB     HISTORY OF PRESENTING ILLNESS     The patient is a pleasant 63 y.o. female pmh COPD, CAD, GERD, HTN, HLD, T1DM, CHF presents with a chief complaint of SOB that started 2 days ago. Patient was recently tested positive for COVID 1 week ago. Has been on neublizer tx with no relief. Has had some diarrhea for the past 2-3 days. Patient at the ED was satting at 88% and tachypenic. Was placed on 5L NC and is satting in the 90s. Denies smoking hx, no alcohol or any illicit drug use. Of note patient's insulin pump is broken for a day and his glucose today is in 400s. Vitals:    12/20/21 1846   BP: (!) 153/74   Pulse: 84   Resp: 19   Temp:    SpO2: 95%       On my evaluation,  Vitals:    12/20/21 1846   BP: (!) 153/74   Pulse: 84   Resp: 19   Temp:    SpO2: 95%         Review of Systems:  Review of Systems   Constitutional: Negative for chills, diaphoresis, fatigue and fever. Respiratory: Positive for shortness of breath and wheezing. Negative for apnea, cough, choking and stridor. Cardiovascular: Negative for chest pain and leg swelling. Gastrointestinal: Negative for abdominal distention, abdominal pain, blood in stool, constipation, diarrhea, nausea and vomiting. Genitourinary: Negative for decreased urine volume, dysuria and urgency. Skin: Negative for pallor and wound. Neurological: Negative for syncope, weakness, numbness and headaches. Psychiatric/Behavioral: Negative for agitation, behavioral problems, confusion, decreased concentration and self-injury.          PAST MEDICAL HISTORY     Past Medical History:   Diagnosis Date    Anesthesia complication     \"lung collapsed after colon surgery    Anxiety     Arthritis     Back pain     CAD (coronary artery disease)     no stents    Caffeine use     3 coffee / day    Cataract     left    COPD (chronic obstructive pulmonary disease) (HCC)     EMPHYSEMA    Deafness     right ear    Depression     Diabetes mellitus (HCC)     Diarrhea     Diverticulosis     Fibromyalgia     Gastroparalysis     GERD (gastroesophageal reflux disease)     Hearing loss     DEAF IN RIGHT EAR    Hyperlipidemia     Hyperlipidemia     Hypertension     Incontinence     MDRO (multiple drug resistant organisms) resistance 2012    mrsa (nasal), eye, ear    Neurogenic bladder     CATHES HERSELF 7 TIMES A DAY, IS ABLE TO URINATE ON OWN    Neuropathy     feet    Obesity     Osteoarthritis     Peripheral vascular disease, unspecified ([facility])     Restless leg syndrome     Rhabdomyolysis     [place] 1 week, May 2014, then [facility] for rehab.     Spinal stenosis, thoracic 05/27/2014    Stroke [facility]) 2012    numbness on the left side of face, Oct 2020 TIA     Thyroid disease     enlarged    TMJ (dislocation of temporomandibular joint)     Trigeminal neuralgia     Type II or unspecified type diabetes mellitus without mention of complication, not stated as uncontrolled        PAST SURGICAL HISTORY     Past Surgical History:   Procedure Laterality Date    ABDOMEN SURGERY      LAPAROSCOPY    CARPAL TUNNEL RELEASE Right     CATARACT REMOVAL      CHOLECYSTECTOMY      CHOLECYSTECTOMY, OPEN      11/2012    COLON SURGERY      benign mass removed    COLONOSCOPY      COLONOSCOPY  07/13/2016    COLONOSCOPY  06/01/2020    incomplete/poor prep    COLONOSCOPY  07/07/2020    COLONOSCOPY N/A 7/7/2020    COLORECTAL CANCER SCREENING, NOT HIGH RISK performed by Zay Avilez MD at [address] [place] [address]      sebaceous cyst, under arm left side x5    CYST REMOVAL      right ankle    CYSTOSCOPY      EYE SURGERY Right     HOLE IN RETINA REPAIRED    FINGER TRIGGER RELEASE Right     INCISIONAL HERNIA REPAIR  07/07/15    open    INCISIONAL HERNIA REPAIR  10/17/2017    LEG BIOPSY EXCISION Left 7/6/2021    EXCISION OF LEFT THIGH MASS performed by Edwin Martin MD at [address] Left     ear tube placement    POLYPECTOMY      colon polyp    DC REPAIR INCISIONAL HERNIA,REDUCIBLE N/A 10/17/2017    HERNIA INCISIONAL REPAIR WITH MESH performed by Edwin Martin MD at [address] 6/1/2020    SIGMOIDOSCOPY DIAGNOSTIC FLEXIBLE performed by Edwin Martin MD at [facility] ENDOSCOPY  07/13/2016    UPPER GASTROINTESTINAL ENDOSCOPY  07/23/2018    with biopsy    UPPER GASTROINTESTINAL ENDOSCOPY N/A 7/23/2018    EGD BIOPSY performed by Edwin Martin MD at [address]  06/01/2020    with biopsy    UPPER GASTROINTESTINAL ENDOSCOPY N/A 6/1/2020    EGD BIOPSY performed by Edwin Martin MD at [address] [butalbital-apap-caffeine], Betamethasone, Erythromycin, Xarelto [rivaroxaban], Codeine, Droperidol, and Tape [adhesive tape]    MEDICATIONS PRIOR TO ADMISSION     Prior to Admission medications    Medication Sig Start Date End Date Taking?  Authorizing Provider   benzonatate (TESSALON PERLES) 100 MG capsule Take 1 capsule by mouth 3 times daily as needed for Cough 12/15/21 12/25/21  KIMBERLEE Higuera - CNP   ondansetron (ZOFRAN) 4 MG tablet Take 1 tablet by mouth every 8 hours as needed for Nausea or Vomiting 7/6/21   Valencia Argueta DO   acetaminophen (TYLENOL) 325 MG tablet Take 650 mg by mouth every 6 hours as needed for Pain    Historical Provider, MD   clopidogrel (PLAVIX) 75 MG tablet Take 75 mg by mouth daily    Historical Provider, MD   Albuterol Sulfate (PROAIR HFA IN) Inhale into the lungs    Historical Provider, MD   simvastatin (ZOCOR) 20 MG tablet Take 20 mg by mouth nightly    Historical Provider, MD   amLODIPine (NORVASC) 5 MG tablet Take 1 tablet by mouth once daily 1/8/21   Nichole Mack MD   aspirin 81 MG tablet Take 1 tablet by mouth daily 10/17/20   Yumiko Brady MD   topiramate (TOPAMAX) 25 MG tablet Take 1 tablet by mouth 2 times daily 10/17/20   Yumiko Brady MD   DULoxetine (CYMBALTA) 30 MG extended release capsule Take 90 mg by mouth daily    Historical Provider, MD   metoclopramide (REGLAN) 10 MG tablet Take 10 mg by mouth 2 times daily     Historical Provider, MD   metoprolol (LOPRESSOR) 100 MG tablet Take 100 mg by mouth 2 times daily    Historical Provider, MD   insulin aspart (NOVOLOG PENFILL) 100 UNIT/ML injection cartridge Inject into the skin continuous Per insulin pump    Historical Provider, MD   amitriptyline (ELAVIL) 150 MG tablet TAKE 1 TABLET BY MOUTH NIGHTLY  Patient taking differently: Take 75 mg by mouth nightly TAKE 1 TABLET BY MOUTH NIGHTLY 2/21/20   Nichole Mack MD   clonazePAM (KLONOPIN) 0.5 MG tablet TAKE 1 TABLET BY MOUTH TWICE DAILY AS NEEDED FOR ANXIETY OR SLEEP 10/9/19 7/6/21  Nichole Mack MD   pantoprazole (PROTONIX) 40 MG tablet Take 40 mg by mouth 2 times daily    Historical Provider, MD   losartan (COZAAR) 100 MG tablet TAKE 1 TABLET BY MOUTH ONCE DAILY 5/14/19   Nichole Mack MD   rOPINIRole (REQUIP) 2 MG tablet Take 1 tablet by mouth daily 4/16/19   Nichole Mack MD   levothyroxine (SYNTHROID) 137 MCG tablet Take 1 tablet by mouth daily 4/16/19   Nichole Mack MD   gabapentin (NEURONTIN) 800 MG tablet TAKE 1 TABLET BY MOUTH THREE TIMES DAILY 4/16/19 7/6/21  Nichole Mack MD   BREO ELLIPTA 200-25 MCG/INH AEPB Inhale 1 puff into the lungs daily  10/7/17   Historical Provider, MD   tiotropium (Marguerite Balo) 18 MCG inhalation capsule Inhale 1 capsule into the lungs Daily 6/19/17   Anthony Alvarado MD   ONE TOUCH ULTRA TEST strip 17   Historical Provider, MD       SOCIAL HISTORY     Tobacco: none  Alcohol: none  Illicits: marijuana   Occupation: [employer]    FAMILY HISTORY     Family History   Problem Relation Age of Onset    High Blood Pressure Mother     Migraines Mother     High Blood Pressure Father     Heart Disease Father     Cancer Father         skin    Diabetes Maternal Grandmother     Heart Disease Maternal Grandmother     Cancer Maternal Grandmother     Parkinsonism Maternal Grandmother     Cancer Paternal Grandmother     Other Brother         epilepsy       PHYSICAL EXAM     Vitals: BP (!) 153/74   Pulse 84   Temp 98.4 °F (36.9 °C) (Oral)   Resp 19   Wt 180 lb (81.6 kg)   SpO2 95%   BMI 29.95 kg/m²   Tmax: Temp (24hrs), Av.4 °F (36.9 °C), Min:98.4 °F (36.9 °C), Max:98.4 °F (36.9 °C)    Last Body weight:   Wt Readings from Last 3 Encounters:   21 180 lb (81.6 kg)   21 188 lb 11.2 oz (85.6 kg)   21 185 lb 3 oz (84 kg)     Body Mass Index : Body mass index is 29.95 kg/m². PHYSICAL EXAMINATION:  Physical Exam  Constitutional:       General: She is not in acute distress. Appearance: Normal appearance. She is obese. She is not ill-appearing, toxic-appearing or diaphoretic. HENT:      Mouth/Throat:      Mouth: Mucous membranes are moist.      Pharynx: Oropharynx is clear. Eyes:      General: No scleral icterus. Cardiovascular:      Rate and Rhythm: Normal rate and regular rhythm. Pulses: Normal pulses. Heart sounds: Normal heart sounds. No murmur heard. No friction rub. No gallop. Pulmonary:      Effort: Respiratory distress present. Breath sounds: Wheezing present. Abdominal:      General: Abdomen is flat. Bowel sounds are normal. There is no distension. Palpations: Abdomen is soft. There is no mass. Tenderness: There is no abdominal tenderness. There is no guarding or rebound. Hernia: No hernia is present.    Musculoskeletal: General: No swelling, tenderness, deformity or signs of injury. Right lower leg: No edema. Left lower leg: No edema. Skin:     General: Skin is warm and dry. Coloration: Skin is not jaundiced or pale. Findings: No bruising. Neurological:      General: No focal deficit present. Mental Status: She is alert and oriented to person, place, and time. Cranial Nerves: No cranial nerve deficit. Motor: No weakness. Psychiatric:         Mood and Affect: Mood normal.         Behavior: Behavior normal.         Thought Content:  Thought content normal.         Judgment: Judgment normal.           INVESTIGATIONS     Laboratory Testing:     Recent Results (from the past 24 hour(s))   CBC Auto Differential    Collection Time: 12/20/21  5:02 PM   Result Value Ref Range    WBC 8.9 3.5 - 11.3 k/uL    RBC 3.85 (L) 3.95 - 5.11 m/uL    Hemoglobin 11.5 (L) 11.9 - 15.1 g/dL    Hematocrit 33.8 (L) 36.3 - 47.1 %    MCV 87.8 82.6 - 102.9 fL    MCH 29.9 25.2 - 33.5 pg    MCHC 34.0 28.4 - 34.8 g/dL    RDW 12.4 11.8 - 14.4 %    Platelets 193 224 - 865 k/uL    MPV 10.3 8.1 - 13.5 fL    NRBC Automated 0.0 0.0 per 100 WBC    Differential Type NOT REPORTED     Seg Neutrophils 74 (H) 36 - 65 %    Lymphocytes 17 (L) 24 - 43 %    Monocytes 8 3 - 12 %    Eosinophils % 0 (L) 1 - 4 %    Basophils 0 0 - 2 %    Immature Granulocytes 1 (H) 0 %    Segs Absolute 6.62 1.50 - 8.10 k/uL    Absolute Lymph # 1.48 1.10 - 3.70 k/uL    Absolute Mono # 0.75 0.10 - 1.20 k/uL    Absolute Eos # <0.03 0.00 - 0.44 k/uL    Basophils Absolute <0.03 0.00 - 0.20 k/uL    Absolute Immature Granulocyte 0.05 0.00 - 0.30 k/uL    WBC Morphology NOT REPORTED     RBC Morphology NOT REPORTED     Platelet Estimate NOT REPORTED    Troponin    Collection Time: 12/20/21  5:02 PM   Result Value Ref Range    Troponin, High Sensitivity 29 (H) 0 - 14 ng/L    Troponin T NOT REPORTED <0.03 ng/mL    Troponin Interp NOT REPORTED    Basic Metabolic Panel w/ lung base. Imaging follow-up to resolution is advised to exclude the presence of an underlying nodule. ASSESSMENT & PLAN     ASSESSMENT:     Primary Problem  <principal problem not specified>    Active Hospital Problems    Diagnosis Date Noted    COVID-19 [U07.1] 12/20/2021    CHF [facility]congestive heart failure) ([facility] [I50.9] 12/20/2021    Chronic obstructive pulmonary disease ([facility] [J44.9] 08/15/2017    Type 1 diabetes mellitus with retinopathy ([facility] [E10.319] 06/08/2016    Essential hypertension [I10] 11/18/2015       PLAN:     IMPRESSION  This is a 63 y.o. female who presented with above mentioned complaints and was admitted to inpatient service for further management as follows: Active Problems:    Essential hypertension    Type 1 diabetes mellitus with retinopathy (HCC)    Chronic obstructive pulmonary disease ([facility]    COVID-19    CHF (congestive heart failure) ([facility]  Resolved Problems:    * No resolved hospital problems. *    Covid 19  - tested positive 1 week ago  - cxr shows peripheral and basal pulmonary opacities. Also 1.5 cm consolidation   - decadron  - O2 as needed  - ID consult    COPD  - O2 as needed  - home bronchodilators as needed. CHF  - last EF is 55%  - will monitor for fluid overload     T1DM  - insulin pump is broken   - glucose is 400s  - f/u beta-hydroxybutaryate       PT/OT/SW-consulted  Discharge Planning:consulted       Diana Oliver MD  Internal Medicine Resident  [facility]  12/20/2021 7:05 PM      Attestation and add on       I have discussed the care of Shraee Rao , including pertinent history and exam findings,      12/20/21    with the resident. I have seen and examined the patient and the key elements of all parts of the encounter have been performed by me . I agree with the assessment, plan and orders as documented by the resident.      Active Problems:    Essential hypertension    Type 1 diabetes mellitus with retinopathy ([facility]    Hypoxia    Chronic obstructive pulmonary disease ([facility]    COVID-19    CHF (congestive heart failure) ([facility]    COVID    Diabetic ketoacidosis type 1 diabetes mellitus ([facility]    PARUL (acute kidney injury) ([facility]    UTI (urinary tract infection)  Resolved Problems:    * No resolved hospital problems. *         ---- ;     MD JODI Blakely[address], [address] [place] [address].    Phone [phone]   Fax: [phone]  Answering Service: [phone]

## 2021-12-21 ENCOUNTER — APPOINTMENT (OUTPATIENT)
Dept: CT IMAGING | Age: 61
DRG: 177 | End: 2021-12-21
Payer: MEDICARE

## 2021-12-21 PROBLEM — N39.0 UTI (URINARY TRACT INFECTION): Status: ACTIVE | Noted: 2021-12-21

## 2021-12-21 LAB
-: ABNORMAL
AMORPHOUS: ABNORMAL
ANION GAP SERPL CALCULATED.3IONS-SCNC: 11 MMOL/L (ref 9–17)
ANION GAP SERPL CALCULATED.3IONS-SCNC: 12 MMOL/L (ref 9–17)
ANION GAP SERPL CALCULATED.3IONS-SCNC: 13 MMOL/L (ref 9–17)
ANION GAP SERPL CALCULATED.3IONS-SCNC: 15 MMOL/L (ref 9–17)
ANION GAP SERPL CALCULATED.3IONS-SCNC: 17 MMOL/L (ref 9–17)
BACTERIA: ABNORMAL
BILIRUBIN URINE: NEGATIVE
BUN BLDV-MCNC: 16 MG/DL (ref 6–20)
BUN BLDV-MCNC: 17 MG/DL (ref 6–20)
BUN BLDV-MCNC: 18 MG/DL (ref 6–20)
BUN/CREAT BLD: ABNORMAL (ref 9–20)
CALCIUM SERPL-MCNC: 8 MG/DL (ref 8.6–10.4)
CALCIUM SERPL-MCNC: 8.3 MG/DL (ref 8.6–10.4)
CALCIUM SERPL-MCNC: 8.6 MG/DL (ref 8.6–10.4)
CALCIUM SERPL-MCNC: 8.7 MG/DL (ref 8.6–10.4)
CALCIUM SERPL-MCNC: 8.8 MG/DL (ref 8.6–10.4)
CASTS UA: ABNORMAL /LPF (ref 0–8)
CHLORIDE BLD-SCNC: 100 MMOL/L (ref 98–107)
CHLORIDE BLD-SCNC: 100 MMOL/L (ref 98–107)
CHLORIDE BLD-SCNC: 103 MMOL/L (ref 98–107)
CHLORIDE BLD-SCNC: 105 MMOL/L (ref 98–107)
CHLORIDE BLD-SCNC: 105 MMOL/L (ref 98–107)
CHLORIDE, UR: <20 MMOL/L
CO2: 16 MMOL/L (ref 20–31)
CO2: 17 MMOL/L (ref 20–31)
CO2: 17 MMOL/L (ref 20–31)
CO2: 18 MMOL/L (ref 20–31)
CO2: 19 MMOL/L (ref 20–31)
COLOR: YELLOW
CREAT SERPL-MCNC: 0.75 MG/DL (ref 0.5–0.9)
CREAT SERPL-MCNC: 0.84 MG/DL (ref 0.5–0.9)
CREAT SERPL-MCNC: 0.88 MG/DL (ref 0.5–0.9)
CREAT SERPL-MCNC: 0.97 MG/DL (ref 0.5–0.9)
CREAT SERPL-MCNC: 1.04 MG/DL (ref 0.5–0.9)
CREATININE URINE: 109.6 MG/DL (ref 28–217)
CRYSTALS, UA: ABNORMAL /HPF
EOSINOPHIL,URINE: POSITIVE
EPITHELIAL CELLS UA: ABNORMAL /HPF (ref 0–5)
ESTIMATED AVERAGE GLUCOSE: 189 MG/DL
GFR AFRICAN AMERICAN: >60 ML/MIN
GFR NON-AFRICAN AMERICAN: 54 ML/MIN
GFR NON-AFRICAN AMERICAN: 59 ML/MIN
GFR NON-AFRICAN AMERICAN: >60 ML/MIN
GFR SERPL CREATININE-BSD FRML MDRD: ABNORMAL ML/MIN/{1.73_M2}
GLUCOSE BLD-MCNC: 103 MG/DL (ref 65–105)
GLUCOSE BLD-MCNC: 132 MG/DL (ref 65–105)
GLUCOSE BLD-MCNC: 132 MG/DL (ref 70–99)
GLUCOSE BLD-MCNC: 133 MG/DL (ref 65–105)
GLUCOSE BLD-MCNC: 142 MG/DL (ref 65–105)
GLUCOSE BLD-MCNC: 143 MG/DL (ref 65–105)
GLUCOSE BLD-MCNC: 156 MG/DL (ref 65–105)
GLUCOSE BLD-MCNC: 174 MG/DL (ref 65–105)
GLUCOSE BLD-MCNC: 182 MG/DL (ref 65–105)
GLUCOSE BLD-MCNC: 206 MG/DL (ref 65–105)
GLUCOSE BLD-MCNC: 210 MG/DL (ref 65–105)
GLUCOSE BLD-MCNC: 212 MG/DL (ref 65–105)
GLUCOSE BLD-MCNC: 220 MG/DL (ref 70–99)
GLUCOSE BLD-MCNC: 226 MG/DL (ref 65–105)
GLUCOSE BLD-MCNC: 229 MG/DL (ref 65–105)
GLUCOSE BLD-MCNC: 231 MG/DL (ref 70–99)
GLUCOSE BLD-MCNC: 271 MG/DL (ref 65–105)
GLUCOSE BLD-MCNC: 274 MG/DL (ref 65–105)
GLUCOSE BLD-MCNC: 283 MG/DL (ref 65–105)
GLUCOSE BLD-MCNC: 296 MG/DL (ref 70–99)
GLUCOSE BLD-MCNC: 62 MG/DL (ref 65–105)
GLUCOSE BLD-MCNC: 82 MG/DL (ref 65–105)
GLUCOSE BLD-MCNC: 86 MG/DL (ref 70–99)
GLUCOSE URINE: ABNORMAL
HBA1C MFR BLD: 8.2 % (ref 4–6)
HCT VFR BLD CALC: 33.8 % (ref 36.3–47.1)
HEMOGLOBIN: 11.4 G/DL (ref 11.9–15.1)
KETONES, URINE: ABNORMAL
LEUKOCYTE ESTERASE, URINE: ABNORMAL
MAGNESIUM: 2 MG/DL (ref 1.6–2.6)
MCH RBC QN AUTO: 29.3 PG (ref 25.2–33.5)
MCHC RBC AUTO-ENTMCNC: 33.7 G/DL (ref 28.4–34.8)
MCV RBC AUTO: 86.9 FL (ref 82.6–102.9)
MUCUS: ABNORMAL
NITRITE, URINE: POSITIVE
NRBC AUTOMATED: 0 PER 100 WBC
OTHER OBSERVATIONS UA: ABNORMAL
PDW BLD-RTO: 12.3 % (ref 11.8–14.4)
PH UA: 6 (ref 5–8)
PHOSPHORUS: 1.3 MG/DL (ref 2.6–4.5)
PHOSPHORUS: 2 MG/DL (ref 2.6–4.5)
PHOSPHORUS: 2.3 MG/DL (ref 2.6–4.5)
PLATELET # BLD: 265 K/UL (ref 138–453)
PMV BLD AUTO: 10 FL (ref 8.1–13.5)
POTASSIUM SERPL-SCNC: 3.4 MMOL/L (ref 3.7–5.3)
POTASSIUM SERPL-SCNC: 3.5 MMOL/L (ref 3.7–5.3)
POTASSIUM SERPL-SCNC: 3.7 MMOL/L (ref 3.7–5.3)
POTASSIUM SERPL-SCNC: 4.4 MMOL/L (ref 3.7–5.3)
POTASSIUM SERPL-SCNC: 4.8 MMOL/L (ref 3.7–5.3)
PROCALCITONIN: 0.49 NG/ML
PROTEIN UA: ABNORMAL
RBC # BLD: 3.89 M/UL (ref 3.95–5.11)
RBC UA: ABNORMAL /HPF (ref 0–4)
RENAL EPITHELIAL, UA: ABNORMAL /HPF
SODIUM BLD-SCNC: 131 MMOL/L (ref 135–144)
SODIUM BLD-SCNC: 134 MMOL/L (ref 135–144)
SODIUM BLD-SCNC: 135 MMOL/L (ref 135–144)
SODIUM,UR: <20 MMOL/L
SPECIFIC GRAVITY UA: 1.02 (ref 1–1.03)
TOTAL PROTEIN, URINE: 32 MG/DL
TRICHOMONAS: ABNORMAL
TURBIDITY: ABNORMAL
URINE HGB: ABNORMAL
URINE TOTAL PROTEIN CREATININE RATIO: 0.29 (ref 0–0.2)
UROBILINOGEN, URINE: NORMAL
WBC # BLD: 7.6 K/UL (ref 3.5–11.3)
WBC UA: ABNORMAL /HPF (ref 0–5)
YEAST: ABNORMAL

## 2021-12-21 PROCEDURE — 87205 SMEAR GRAM STAIN: CPT

## 2021-12-21 PROCEDURE — XW033H5 INTRODUCTION OF TOCILIZUMAB INTO PERIPHERAL VEIN, PERCUTANEOUS APPROACH, NEW TECHNOLOGY GROUP 5: ICD-10-PCS | Performed by: INTERNAL MEDICINE

## 2021-12-21 PROCEDURE — 84156 ASSAY OF PROTEIN URINE: CPT

## 2021-12-21 PROCEDURE — 2060000000 HC ICU INTERMEDIATE R&B

## 2021-12-21 PROCEDURE — 87086 URINE CULTURE/COLONY COUNT: CPT

## 2021-12-21 PROCEDURE — 80048 BASIC METABOLIC PNL TOTAL CA: CPT

## 2021-12-21 PROCEDURE — 84145 PROCALCITONIN (PCT): CPT

## 2021-12-21 PROCEDURE — 6370000000 HC RX 637 (ALT 250 FOR IP): Performed by: STUDENT IN AN ORGANIZED HEALTH CARE EDUCATION/TRAINING PROGRAM

## 2021-12-21 PROCEDURE — 83735 ASSAY OF MAGNESIUM: CPT

## 2021-12-21 PROCEDURE — 84100 ASSAY OF PHOSPHORUS: CPT

## 2021-12-21 PROCEDURE — 87077 CULTURE AEROBIC IDENTIFY: CPT

## 2021-12-21 PROCEDURE — 82570 ASSAY OF URINE CREATININE: CPT

## 2021-12-21 PROCEDURE — 6360000002 HC RX W HCPCS: Performed by: NURSE PRACTITIONER

## 2021-12-21 PROCEDURE — 36415 COLL VENOUS BLD VENIPUNCTURE: CPT

## 2021-12-21 PROCEDURE — 81001 URINALYSIS AUTO W/SCOPE: CPT

## 2021-12-21 PROCEDURE — 82436 ASSAY OF URINE CHLORIDE: CPT

## 2021-12-21 PROCEDURE — 2580000003 HC RX 258: Performed by: STUDENT IN AN ORGANIZED HEALTH CARE EDUCATION/TRAINING PROGRAM

## 2021-12-21 PROCEDURE — 6360000002 HC RX W HCPCS: Performed by: STUDENT IN AN ORGANIZED HEALTH CARE EDUCATION/TRAINING PROGRAM

## 2021-12-21 PROCEDURE — 99233 SBSQ HOSP IP/OBS HIGH 50: CPT | Performed by: INTERNAL MEDICINE

## 2021-12-21 PROCEDURE — 84300 ASSAY OF URINE SODIUM: CPT

## 2021-12-21 PROCEDURE — 87186 SC STD MICRODIL/AGAR DIL: CPT

## 2021-12-21 PROCEDURE — 82947 ASSAY GLUCOSE BLOOD QUANT: CPT

## 2021-12-21 PROCEDURE — 71250 CT THORAX DX C-: CPT

## 2021-12-21 PROCEDURE — 2580000003 HC RX 258: Performed by: NURSE PRACTITIONER

## 2021-12-21 PROCEDURE — 85027 COMPLETE CBC AUTOMATED: CPT

## 2021-12-21 PROCEDURE — 99222 1ST HOSP IP/OBS MODERATE 55: CPT | Performed by: INTERNAL MEDICINE

## 2021-12-21 RX ORDER — DEXTROSE AND SODIUM CHLORIDE 5; .45 G/100ML; G/100ML
INJECTION, SOLUTION INTRAVENOUS CONTINUOUS PRN
Status: DISCONTINUED | OUTPATIENT
Start: 2021-12-21 | End: 2021-12-24 | Stop reason: HOSPADM

## 2021-12-21 RX ORDER — INSULIN GLARGINE 100 [IU]/ML
15 INJECTION, SOLUTION SUBCUTANEOUS NIGHTLY
Status: DISCONTINUED | OUTPATIENT
Start: 2021-12-21 | End: 2021-12-22

## 2021-12-21 RX ORDER — DEXTROSE MONOHYDRATE 25 G/50ML
12.5 INJECTION, SOLUTION INTRAVENOUS PRN
Status: DISCONTINUED | OUTPATIENT
Start: 2021-12-21 | End: 2021-12-24 | Stop reason: HOSPADM

## 2021-12-21 RX ORDER — SODIUM CHLORIDE 9 MG/ML
INJECTION, SOLUTION INTRAVENOUS CONTINUOUS
Status: DISCONTINUED | OUTPATIENT
Start: 2021-12-21 | End: 2021-12-21

## 2021-12-21 RX ORDER — POTASSIUM CHLORIDE 20 MEQ/1
60 TABLET, EXTENDED RELEASE ORAL ONCE
Status: DISCONTINUED | OUTPATIENT
Start: 2021-12-21 | End: 2021-12-21

## 2021-12-21 RX ORDER — 0.9 % SODIUM CHLORIDE 0.9 %
15 INTRAVENOUS SOLUTION INTRAVENOUS ONCE
Status: DISCONTINUED | OUTPATIENT
Start: 2021-12-21 | End: 2021-12-21

## 2021-12-21 RX ORDER — POTASSIUM CHLORIDE 20 MEQ/1
40 TABLET, EXTENDED RELEASE ORAL ONCE
Status: DISCONTINUED | OUTPATIENT
Start: 2021-12-21 | End: 2021-12-24 | Stop reason: HOSPADM

## 2021-12-21 RX ORDER — POTASSIUM CHLORIDE 20 MEQ/1
40 TABLET, EXTENDED RELEASE ORAL ONCE
Status: DISCONTINUED | OUTPATIENT
Start: 2021-12-21 | End: 2021-12-21

## 2021-12-21 RX ORDER — CLONAZEPAM 1 MG/1
0.5 TABLET ORAL 2 TIMES DAILY
Status: DISCONTINUED | OUTPATIENT
Start: 2021-12-21 | End: 2021-12-24 | Stop reason: HOSPADM

## 2021-12-21 RX ORDER — POTASSIUM CHLORIDE 7.45 MG/ML
10 INJECTION INTRAVENOUS PRN
Status: DISCONTINUED | OUTPATIENT
Start: 2021-12-21 | End: 2021-12-24 | Stop reason: HOSPADM

## 2021-12-21 RX ADMIN — DEXTROSE AND SODIUM CHLORIDE: 5; 450 INJECTION, SOLUTION INTRAVENOUS at 16:27

## 2021-12-21 RX ADMIN — HEPARIN SODIUM 5000 UNITS: 5000 INJECTION INTRAVENOUS; SUBCUTANEOUS at 13:54

## 2021-12-21 RX ADMIN — LEVOTHYROXINE SODIUM 125 MCG: 125 TABLET ORAL at 08:42

## 2021-12-21 RX ADMIN — Medication 81 MG: at 08:43

## 2021-12-21 RX ADMIN — HEPARIN SODIUM 5000 UNITS: 5000 INJECTION INTRAVENOUS; SUBCUTANEOUS at 06:02

## 2021-12-21 RX ADMIN — SODIUM CHLORIDE 6.7 UNITS/HR: 9 INJECTION, SOLUTION INTRAVENOUS at 01:44

## 2021-12-21 RX ADMIN — CLOPIDOGREL 75 MG: 75 TABLET, FILM COATED ORAL at 08:43

## 2021-12-21 RX ADMIN — DEXAMETHASONE SODIUM PHOSPHATE 6 MG: 10 INJECTION INTRAMUSCULAR; INTRAVENOUS at 08:43

## 2021-12-21 RX ADMIN — Medication 2 PUFF: at 08:44

## 2021-12-21 RX ADMIN — SODIUM CHLORIDE: 9 INJECTION, SOLUTION INTRAVENOUS at 01:42

## 2021-12-21 RX ADMIN — METOPROLOL TARTRATE 100 MG: 50 TABLET, FILM COATED ORAL at 08:42

## 2021-12-21 RX ADMIN — ROPINIROLE HYDROCHLORIDE 2 MG: 1 TABLET, FILM COATED ORAL at 08:42

## 2021-12-21 RX ADMIN — INSULIN LISPRO 10 UNITS: 100 INJECTION, SOLUTION INTRAVENOUS; SUBCUTANEOUS at 01:45

## 2021-12-21 RX ADMIN — METOPROLOL TARTRATE 100 MG: 50 TABLET, FILM COATED ORAL at 21:00

## 2021-12-21 RX ADMIN — BUDESONIDE AND FORMOTEROL FUMARATE DIHYDRATE 2 PUFF: 80; 4.5 AEROSOL RESPIRATORY (INHALATION) at 08:45

## 2021-12-21 RX ADMIN — PANTOPRAZOLE SODIUM 40 MG: 40 TABLET, DELAYED RELEASE ORAL at 21:00

## 2021-12-21 RX ADMIN — METOPROLOL TARTRATE 100 MG: 50 TABLET, FILM COATED ORAL at 01:58

## 2021-12-21 RX ADMIN — ATORVASTATIN CALCIUM 20 MG: 20 TABLET, FILM COATED ORAL at 08:43

## 2021-12-21 RX ADMIN — CLONAZEPAM 0.5 MG: 1 TABLET ORAL at 21:25

## 2021-12-21 RX ADMIN — AMITRIPTYLINE HYDROCHLORIDE 75 MG: 25 TABLET, FILM COATED ORAL at 21:00

## 2021-12-21 RX ADMIN — INSULIN GLARGINE 15 UNITS: 100 INJECTION, SOLUTION SUBCUTANEOUS at 21:00

## 2021-12-21 RX ADMIN — POTASSIUM CHLORIDE 10 MEQ: 7.46 INJECTION, SOLUTION INTRAVENOUS at 05:01

## 2021-12-21 RX ADMIN — CEFTRIAXONE SODIUM 1000 MG: 1 INJECTION, POWDER, FOR SOLUTION INTRAMUSCULAR; INTRAVENOUS at 09:33

## 2021-12-21 RX ADMIN — INSULIN LISPRO 6 UNITS: 100 INJECTION, SOLUTION INTRAVENOUS; SUBCUTANEOUS at 17:54

## 2021-12-21 RX ADMIN — DULOXETINE HYDROCHLORIDE 90 MG: 30 CAPSULE, DELAYED RELEASE ORAL at 08:43

## 2021-12-21 RX ADMIN — TOCILIZUMAB 600 MG: 20 INJECTION, SOLUTION, CONCENTRATE INTRAVENOUS at 19:00

## 2021-12-21 RX ADMIN — POTASSIUM CHLORIDE 10 MEQ: 7.46 INJECTION, SOLUTION INTRAVENOUS at 06:03

## 2021-12-21 RX ADMIN — PANTOPRAZOLE SODIUM 40 MG: 40 TABLET, DELAYED RELEASE ORAL at 01:58

## 2021-12-21 RX ADMIN — TOPIRAMATE 25 MG: 25 TABLET, FILM COATED ORAL at 08:44

## 2021-12-21 RX ADMIN — INSULIN LISPRO 2 UNITS: 100 INJECTION, SOLUTION INTRAVENOUS; SUBCUTANEOUS at 21:00

## 2021-12-21 RX ADMIN — SODIUM CHLORIDE, PRESERVATIVE FREE 10 ML: 5 INJECTION INTRAVENOUS at 01:57

## 2021-12-21 RX ADMIN — TOPIRAMATE 25 MG: 25 TABLET, FILM COATED ORAL at 01:59

## 2021-12-21 RX ADMIN — Medication 2 PUFF: at 21:00

## 2021-12-21 RX ADMIN — AMLODIPINE BESYLATE 5 MG: 5 TABLET ORAL at 08:43

## 2021-12-21 RX ADMIN — Medication 1000 MCG: at 08:44

## 2021-12-21 RX ADMIN — PANTOPRAZOLE SODIUM 40 MG: 40 TABLET, DELAYED RELEASE ORAL at 08:42

## 2021-12-21 RX ADMIN — TOPIRAMATE 25 MG: 25 TABLET, FILM COATED ORAL at 21:00

## 2021-12-21 RX ADMIN — CLONAZEPAM 0.5 MG: 1 TABLET ORAL at 08:42

## 2021-12-21 RX ADMIN — BUDESONIDE AND FORMOTEROL FUMARATE DIHYDRATE 2 PUFF: 80; 4.5 AEROSOL RESPIRATORY (INHALATION) at 21:00

## 2021-12-21 RX ADMIN — TIOTROPIUM BROMIDE INHALATION SPRAY 2 PUFF: 3.12 SPRAY, METERED RESPIRATORY (INHALATION) at 12:54

## 2021-12-21 RX ADMIN — HEPARIN SODIUM 5000 UNITS: 5000 INJECTION INTRAVENOUS; SUBCUTANEOUS at 21:00

## 2021-12-21 ASSESSMENT — ENCOUNTER SYMPTOMS
SHORTNESS OF BREATH: 1
EYE DISCHARGE: 0
COUGH: 1
DIARRHEA: 1
COLOR CHANGE: 0

## 2021-12-21 ASSESSMENT — PAIN SCALES - GENERAL: PAINLEVEL_OUTOF10: 0

## 2021-12-21 NOTE — CONSULTS
[facility] -   Infectious diseases evaluation  admission date 12/20/2021    reason for consultation:   covid pneumonia     Impression :   Current:  · covid pneumonia w hypoxemia  · Elevated infl markers  · CHF  · COPD    Other:  · DM  Discussion / summary of stay / plan of care   ·   Recommendations   · U cx pend - ok for ceftriaxone in the meantime  · actemra 8 mg per kg x 1 -n 12/21  · decadron 6 mg daily x 10  · anticoag    Infection Control Recommendations   · [place] Precautions  · Contact Isolation   · Airborne isolation  · Droplet Isolation      Antimicrobial Stewardship Recommendations   · Simplification of therapy  · Targeted therapy  ·     Coordination ofOutpatient Care:   · Estimated Length of IV antimicrobials:  · Patient will need Midline / picc Catheter Insertion:   · Patient will need SNF:  · Patient will need outpatient wound care:     History of Present Illness:   Initial history:  Marion Chavira is a 63y.o.-year-old female with a history of diabetes and COPD heart failure, presented with shortness of breath cough fatigue, tested positive for Covid 12/15, had no stools. Admitted and was hypoxic in the ER chest x-ray as below showed bilateral Covid pneumonia, she was started on 5 L of oxygen.     Sick since 12/13  Tested + 12/15  Past UTIs but no dysuria now    Interval changes  12/21/2021   Patient Vitals for the past 8 hrs:   BP Temp Temp src Pulse Resp SpO2   12/21/21 0830 (!) 140/69 98.1 °F (36.7 °C) Oral 65 17 96 %   12/21/21 0400 (!) 148/77 -- -- 58 16 97 %   12/21  4 L NC    Summary of relevant labs:  Labs:  Sed Rate50 High    Fhwljczlds218 High    Procalcitonin0.58 High     High    VJN423.1   IYALTRRO428 High    WBC7.6   Procalcitonin0.49 High      WBC, UATOO NUMEROUS TO COUNT/HPF RBC, UANone   Micro:    Imaging:  CXR  Impression:  Peripheral and basal predominant pulmonary opacities which can be seen in the   setting of COVID-19 pneumonia. 1.5 cm masslike area of consolidation in the periphery of the right lung   base. Imaging follow-up to resolution is advised to exclude the presence of   an underlying nodule. I have personally reviewed the past medical history, past surgical history, medications, social history, and family history, and I haveupdated the database accordingly. Allergies:   Fioricet [butalbital-apap-caffeine], Betamethasone, Erythromycin, Xarelto [rivaroxaban], Codeine, Droperidol, and Tape [adhesive tape]     Review of Systems:     Review of Systems   Constitutional: Negative for activity change. HENT: Positive for congestion. Eyes: Negative for discharge. Respiratory: Positive for cough and shortness of breath. Cardiovascular: Negative for leg swelling. Gastrointestinal: Positive for diarrhea. Endocrine: Negative for polydipsia. Genitourinary: Negative for dysuria. Musculoskeletal: Negative for arthralgias. Skin: Negative for color change. Allergic/Immunologic: Negative for immunocompromised state. Neurological: Negative for dizziness. Hematological: Negative for adenopathy. Psychiatric/Behavioral: Negative for agitation. Physical Examination :       Physical Exam  Constitutional:       Appearance: Normal appearance. She is not ill-appearing. HENT:      Head: Normocephalic and atraumatic. Nose: Nose normal.      Mouth/Throat:      Mouth: Mucous membranes are moist.   Eyes:      General: No scleral icterus. Conjunctiva/sclera: Conjunctivae normal.   Cardiovascular:      Rate and Rhythm: Normal rate and regular rhythm. Heart sounds: Normal heart sounds. No murmur heard. Pulmonary:      Breath sounds: Normal breath sounds. No rhonchi. Abdominal:      Palpations: There is no mass. Hernia: No hernia is present. Genitourinary:     Comments: No lara  Musculoskeletal:         General: No swelling or deformity. Cervical back: Neck supple.  No rigidity. Skin:     General: Skin is dry. Coloration: Skin is not jaundiced. Neurological:      General: No focal deficit present. Mental Status: She is oriented to person, place, and time. Psychiatric:         Mood and Affect: Mood normal.         Thought Content: Thought content normal.         Past Medical History:     Past Medical History:   Diagnosis Date    Anesthesia complication     \"lung collapsed after colon surgery    Anxiety     Arthritis     Back pain     CAD (coronary artery disease)     no stents    Caffeine use     3 coffee / day    Cataract     left    COPD [facility]chronic obstructive [facility]) [facility]     EMPHYSEMA    Deafness     right ear    Depression     Diabetes mellitus ([facility])     Diarrhea     Diverticulosis     Fibromyalgia     Gastroparalysis     GERD (gastroesophageal reflux disease)     Hearing loss     DEAF IN RIGHT EAR    Hyperlipidemia     Hyperlipidemia     Hypertension     Incontinence     MDRO (multiple drug resistant organisms) resistance 2012    mrsa (nasal), eye, ear    Neurogenic bladder     CATHES HERSELF 7 TIMES A DAY, IS ABLE TO URINATE ON OWN    Neuropathy     feet    Obesity     Osteoarthritis     Peripheral vascular disease, unspecified ([facility])     Restless leg syndrome     Rhabdomyolysis     ROBBIE 1 week, May 2014, then [facility] for rehab.     Spinal stenosis, thoracic 05/27/2014    Stroke [facility]) 2012    numbness on the left side of face, Oct 2020 TIA     Thyroid disease     enlarged    TMJ (dislocation of temporomandibular joint)     Trigeminal neuralgia     Type II or unspecified type diabetes mellitus without mention of complication, not stated as uncontrolled        Past Surgical  History:     Past Surgical History:   Procedure Laterality Date    ABDOMEN SURGERY      LAPAROSCOPY    CARPAL TUNNEL RELEASE Right     CATARACT REMOVAL      CHOLECYSTECTOMY      CHOLECYSTECTOMY, OPEN      11/2012    COLON SURGERY      benign mass removed    COLONOSCOPY      COLONOSCOPY  07/13/2016    COLONOSCOPY  06/01/2020    incomplete/poor prep    COLONOSCOPY  07/07/2020    COLONOSCOPY N/A 7/7/2020    COLORECTAL CANCER SCREENING, NOT HIGH RISK performed by Lynda Ash MD at [address]      sebaceous cyst, under arm left side x5    CYST REMOVAL      right ankle    CYSTOSCOPY      EYE SURGERY Right     HOLE IN RETINA REPAIRED    FINGER TRIGGER RELEASE Right     INCISIONAL HERNIA REPAIR  07/07/15    open   [address]  10/17/2017    LEG BIOPSY EXCISION Left 7/6/2021    EXCISION OF LEFT THIGH MASS performed by Lynad Ash MD at [address] [facility] Left     ear tube placement    POLYPECTOMY      colon polyp    TN REPAIR INCISIONAL HERNIA,REDUCIBLE N/A 10/17/2017    HERNIA INCISIONAL REPAIR WITH MESH performed by Lynda Ash MD at [address] [place] [address] Road 6/1/2020    SIGMOIDOSCOPY DIAGNOSTIC FLEXIBLE performed by Lynda Ash MD at [address] [facility] ENDOSCOPY  07/13/2016    UPPER GASTROINTESTINAL ENDOSCOPY  07/23/2018    with biopsy    UPPER GASTROINTESTINAL ENDOSCOPY N/A 7/23/2018    EGD BIOPSY performed by Lynda Ash MD at [address]  06/01/2020    with biopsy    UPPER GASTROINTESTINAL ENDOSCOPY N/A 6/1/2020    EGD BIOPSY performed by Lynda Ash MD at [address] [facility]       Medications:      cefTRIAXone (ROCEPHIN) IV  1,000 mg IntraVENous Q24H    potassium chloride  40 mEq Oral Once    sodium chloride flush  5-40 mL IntraVENous 2 times per day    sodium chloride flush  5-40 mL IntraVENous 2 times per day    heparin (porcine)  5,000 Units SubCUTAneous 3 times per day    albuterol sulfate HFA  2 puff Inhalation 4x daily    amitriptyline  75 mg Oral Nightly    amLODIPine  5 mg Oral Daily    aspirin  81 mg Oral Daily    budesonide-formoterol  2 puff Inhalation BID    Sabianist Services: Not on file    Active Member of Clubs or Organizations: Not on file    Attends Club or Organization Meetings: Not on file    Marital Status: Not on file   Intimate Partner Violence:     Fear of Current or Ex-Partner: Not on file    Emotionally Abused: Not on file    Physically Abused: Not on file    Sexually Abused: Not on file   Housing Stability:     Unable to Pay for Housing in the Last Year: Not on file    Number of [place] in the Last Year: Not on file    Unstable Housing in the Last Year: Not on file       Family History:     Family History   Problem Relation Age of Onset    High Blood Pressure Mother     Migraines Mother     High Blood Pressure Father     Heart Disease Father     Cancer Father         skin    Diabetes Maternal Grandmother     Heart Disease Maternal Grandmother     Cancer Maternal Grandmother     Parkinsonism Maternal Grandmother     Cancer Paternal Grandmother     Other Brother         epilepsy      Medical Decision Making:   I have independently reviewed/ordered the following labs:    CBC with Differential:   Recent Labs     12/20/21  1702 12/21/21  0250   WBC 8.9 7.6   HGB 11.5* 11.4*   HCT 33.8* 33.8*    265   LYMPHOPCT 17*  --    MONOPCT 8  --      BMP:  Recent Labs     12/21/21  0250 12/21/21  0654   * 135   K 3.4* 4.8    105   CO2 18* 19*   BUN 18 17   CREATININE 1.04* 0.97*   MG 2.0  --      Hepatic Function Panel: No results for input(s): PROT, LABALBU, BILIDIR, IBILI, BILITOT, ALKPHOS, ALT, AST in the last 72 hours. No results for input(s): RPR in the last 72 hours. No results for input(s): HIV in the last 72 hours. No results for input(s): BC in the last 72 hours. Lab Results   Component Value Date    CREATININE 0.97 12/21/2021    GLUCOSE 86 12/21/2021    GLUCOSE 342 05/14/2012       Detailed results: Thank you for allowing us to participate in the care of this patient. Please call with questions.     This note is created with the assistance of a speech recognition program.  While intending to generate adocument that actually reflects the content of the visit, the document can still have some errors including those of syntax and sound a like substitutions which may escape proof reading. It such instances, actual meaningcan be extrapolated by contextual diversion.     Hazel Potts MD  Office: [phone]  Perfect serve / office [phone]

## 2021-12-21 NOTE — CARE COORDINATION
must be made within 7 days of discharge.      Goals of Care: comfort      Educated pt on transitional options, provided freedom of choice and are agreeable with plan      Discharge Plan: goal is home, monitor for O2 needs, will use [facility] or [facility] if needs, has transportation          Electronically signed by Nathaniel Watson RN on 12/21/21 at 4:50 PM EST

## 2021-12-21 NOTE — PROGRESS NOTES
[facility]  Internal Medicine Teaching Residency Program  Inpatient Daily Progress Note  ______________________________________________________________________________    Patient: Samraa Kimble  YOB: 1960   RVD:[id number]    Acct: [de-identified]     Room:   Admit date: 2021  Today's date: 21  Number of days in the hospital: 1    SUBJECTIVE   Admitting Diagnosis: <principal problem not specified>  CC: COVID DKA   Pt examined at bedside. Chart & results reviewed. HDS afebrile ON. Currently on insulin gtt. Replenishing K+. On NC 5L       ROS:  Constitutional:  negative for chills, fevers, sweats  Respiratory:  negative for cough, dyspnea on exertion, hemoptysis, shortness of breath, wheezing  Cardiovascular:  negative for chest pain, chest pressure/discomfort, lower extremity edema, palpitations  Gastrointestinal:  negative for abdominal pain, constipation, diarrhea, nausea, vomiting  Neurological:  negative for dizziness, headache    BRIEF HISTORY     63 Y/F who has past medical history of type 1 DM with retinopathy on insulin pump which is currently not working properly,COPD, essential hypertension, diastolic heart failure,hypothyroidism, restless leg syndrome present and CAD presented in ed for concern of shortness of breathe who was tested positive for Covid on 12/15/2021 but did not required any medical treatment. Had loose stool for couple of days. Denied chest pain, fever, bilateral leg swelling. On my evaluation patient was hemodynamically stable and saturating well in 5 liter of oxygen.     OBJECTIVE     Vital Signs:  BP (!) 148/77   Pulse 58   Temp 98 °F (36.7 °C) (Oral)   Resp 16   Wt 178 lb 9.6 oz (81 kg)   SpO2 97%   BMI 29.72 kg/m²     Temp (24hrs), Av.2 °F (36.8 °C), Min:98 °F (36.7 °C), Max:98.4 °F (36.9 °C)    In: 800   Out: -     Physical Exam:  Physical Exam  Constitutional:       General: She is not in acute distress. Appearance: Normal appearance. She is obese. She is not ill-appearing, toxic-appearing or diaphoretic. HENT:      Mouth/Throat:      Mouth: Mucous membranes are moist.      Pharynx: Oropharynx is clear. Eyes:      General: No scleral icterus. Cardiovascular:      Rate and Rhythm: Normal rate and regular rhythm. Pulses: Normal pulses. Heart sounds: Normal heart sounds. No murmur heard. No friction rub. No gallop. Pulmonary:      Effort: Pulmonary effort is normal.      Breath sounds: Normal breath sounds. Abdominal:      General: Abdomen is flat. Bowel sounds are normal. There is no distension. Palpations: Abdomen is soft. There is no mass. Tenderness: There is no abdominal tenderness. There is no guarding or rebound. Hernia: No hernia is present. Musculoskeletal:         General: No swelling, tenderness, deformity or signs of injury. Right lower leg: No edema. Left lower leg: No edema. Skin:     General: Skin is warm and dry. Coloration: Skin is not jaundiced or pale. Findings: No bruising. Neurological:      General: No focal deficit present. Mental Status: She is alert and oriented to person, place, and time. Cranial Nerves: No cranial nerve deficit. Motor: No weakness. Psychiatric:         Mood and Affect: Mood normal.         Behavior: Behavior normal.         Thought Content:  Thought content normal.         Judgment: Judgment normal.           Medications:  Scheduled Medications:    cefTRIAXone (ROCEPHIN) IV  1,000 mg IntraVENous Q24H    potassium chloride  40 mEq Oral Once    sodium chloride flush  5-40 mL IntraVENous 2 times per day    sodium chloride flush  5-40 mL IntraVENous 2 times per day    heparin (porcine)  5,000 Units SubCUTAneous 3 times per day    albuterol sulfate HFA  2 puff Inhalation 4x daily    amitriptyline  75 mg Oral Nightly    amLODIPine  5 mg Oral Daily    aspirin  81 mg Oral Daily  budesonide-formoterol  2 puff Inhalation BID    clonazePAM  0.5 mg Oral Daily    clopidogrel  75 mg Oral Daily    DULoxetine  90 mg Oral Daily    levothyroxine  125 mcg Oral Daily    [Held by provider] losartan  100 mg Oral Daily    metoprolol  100 mg Oral BID    pantoprazole  40 mg Oral BID    rOPINIRole  2 mg Oral Daily    atorvastatin  20 mg Oral Daily    tiotropium  2 puff Inhalation Daily    topiramate  25 mg Oral BID    vitamin B-12  1,000 mcg Oral Daily    dexamethasone  6 mg IntraVENous Daily     Continuous Infusions:    sodium chloride      sodium chloride      dextrose      insulin Stopped (12/21/21 0603)    dextrose      sodium chloride 200 mL/hr at 12/21/21 0142     PRN Medicationspotassium chloride, 10 mEq, PRN  sodium phosphate IVPB, 0.16 mmol/kg, PRN   Or  sodium phosphate IVPB, 0.32 mmol/kg, PRN  sodium chloride flush, 5-40 mL, PRN  sodium chloride, 25 mL, PRN  acetaminophen, 650 mg, Q4H PRN  sodium chloride flush, 5-40 mL, PRN  sodium chloride, 25 mL, PRN  ondansetron, 4 mg, Q8H PRN   Or  ondansetron, 4 mg, Q6H PRN  polyethylene glycol, 17 g, Daily PRN  glucose, 15 g, PRN  dextrose, 12.5 g, PRN  glucagon (rDNA), 1 mg, PRN  dextrose, 100 mL/hr, PRN  glucose, 15 g, PRN  dextrose, 12.5 g, PRN  glucagon (rDNA), 1 mg, PRN  dextrose, 100 mL/hr, PRN  benzonatate, 100 mg, TID PRN        Diagnostic Labs:  CBC:   Recent Labs     12/20/21  1702 12/21/21  0250   WBC 8.9 7.6   RBC 3.85* 3.89*   HGB 11.5* 11.4*   HCT 33.8* 33.8*   MCV 87.8 86.9   RDW 12.4 12.3    265     BMP:   Recent Labs     12/20/21  1702 12/21/21  0250 12/21/21  0654   * 131* 135   K 3.8 3.4* 4.8   CL 95* 100 105   CO2 13* 18* 19*   PHOS  --   --  2.3*   BUN 24* 18 17   CREATININE 1.41* 1.04* 0.97*     BNP: No results for input(s): BNP in the last 72 hours. PT/INR: No results for input(s): PROTIME, INR in the last 72 hours. APTT: No results for input(s): APTT in the last 72 hours.   CARDIAC ENZYMES: No the key elements of all parts of the encounter have been performed by me . I agree with the assessment, plan and orders as documented by the resident. Active Problems:    Essential hypertension    Type 1 diabetes mellitus with retinopathy ([facility])    Hypoxia    Chronic obstructive pulmonary disease ([facility]    COVID-19    CHF (congestive heart failure) ([facility])    COVID    Diabetic ketoacidosis type 1 diabetes mellitus ([facility]    PARUL (acute kidney injury) ([facility]    UTI (urinary tract infection)  Resolved Problems:    * No resolved hospital problems. *         ---- ;     MD JODI Burnett[address], [address] [place] [address].    Phone [phone]   Fax: [phone]  Answering Service: [phone]

## 2021-12-21 NOTE — ED NOTES
ED to inpatient nurses report    Chief Complaint   Patient presents with    Shortness of Breath     Covid +      Present to ED from home. LOC: alert and orientated to name, place, date  Vital signs   Vitals:    12/20/21 1646 12/20/21 1702 12/20/21 1705 12/20/21 1846   BP:  124/75  (!) 153/74   Pulse: 89  89 84   Resp: 30  17 19   Temp: 98.4 °F (36.9 °C)      TempSrc: Oral      SpO2: (!) 88%  93% 95%   Weight: 180 lb (81.6 kg)         Oxygen Baseline RA    Current needs required 5L NC  LDAs:   Peripheral IV 12/20/21 Left Forearm (Active)   Site Assessment Clean;Dry; Intact 12/20/21 1706   Line Status Blood return noted; Flushed 12/20/21 1706     Mobility: Requires assistance * 1  Pending ED orders: N/A  Present condition: RR even and unlabored, slight improvement in SOB. VSS, NAD noted.   Code Status: [unfilled]   Consults:  []  Hospitalist   Completed  [] yes [] no  [x]  Medicine  Completed  [x] yes [] No  []  Cardiology  Completed  [] yes [] No  []  GI   Completed  [] yes [] No  []  Neurology  Completed  [] yes [] No  []  Nephrology Completed  [] yes [] No  []  Vascular  Completed  [] yes [] No   []  Surgery  Completed  [] yes [] No   []  Urology  Completed  [] yes [] No   []  Plastics  Completed  [] yes [] No   []  ENT  Completed  [] yes [] No   []  Other   Completed  [] yes [] No    Pertinent event(s) n/a    Electronically signed by Dustin Burnette RN on 12/20/2021 at 9:13 PM     Dustin Burnette, [facility]  12/20/21 3609

## 2021-12-21 NOTE — PROGRESS NOTES
Senior Note    63 Y/F who has past medical history of type 1 DM with retinopathy on insulin pump which is currently not working properly,COPD, essential hypertension, diastolic heart failure,hypothyroidism, restless leg syndrome present and CAD presented in ed for concern of shortness of breathe who was tested positive for Covid on 12/15/2021 but did not required any medical treatment. Had loose stool for couple of days. Denied chest pain, fever, bilateral leg swelling. On my evaluation patient was hemodynamically stable and saturating well in 5 liter of oxygen. Na 127, HCO3-13. BUN 24, creatinine 1.41 anion gap- 19 blood sugar level 400 beta hydroxybutyrate -0.73 received 10 unit regular insulin stat. Last  HBa1c 7 and patient mention of insulin pump not working well since 12/18/2021  , LD-385, Pro  , ferritin 344,sed rate 50 and fibrinogen level- 903    CXR- Peripheral and basal predominant pulmonary opacities which can be seen in the  setting of COVID-19 pneumonia. 1.5 cm masslike area of consolidation in the periphery of the right lung  Base. Assessment and plan    Acute hypoxic respiratory failure secondary to covid pneumonia  - continue oxygen and wean down as tolerated. Covid pneumonia  - continue decadron 6 mg iv daily. May need baricitinib for elevated crp. Will defer to ID descion. Diabetic ketoacidosis  -NPO effective immediately  -BMP q4h, initial Mag and Phos levels  -Glucose checks every hour  -IV Normal Saline at 200 mL/hr  -Insulin Regular started at 0.1 Units/kg/hr  -D5 and 0.45% NS at 150 mL/hr when blood glucose less than 250 mg/dL  -Will monitor bicarb and anion gap, bridge with home lantus dose and begin diet PO when bicarb >15 and gap closed x 2 measurements  -Discontinue Insulin drip 2 hours post PO intake. -Hypoglycemia, phos and potassium replacement protocols in place    Type 1 DM  - will continue DKA protocol for now.     Essential hypertension  - continue home meds    COPD   Continue home meds    Hypothyroidism  - continue levothyroxine    Right lung mass like consolidation  - will review ct chest without contrast.    PARUL probable secondary to dehydration  - will continue fluid and review fena , microscopic urinalysis and  Urinary protein / creatinine ratio.       Electronically signed by Chloe Liu MD on 12/20/2021 at 11:33 PM

## 2021-12-22 LAB
ANION GAP SERPL CALCULATED.3IONS-SCNC: 16 MMOL/L (ref 9–17)
BUN BLDV-MCNC: 18 MG/DL (ref 6–20)
BUN/CREAT BLD: ABNORMAL (ref 9–20)
CALCIUM SERPL-MCNC: 8.4 MG/DL (ref 8.6–10.4)
CHLORIDE BLD-SCNC: 102 MMOL/L (ref 98–107)
CO2: 16 MMOL/L (ref 20–31)
CREAT SERPL-MCNC: 0.91 MG/DL (ref 0.5–0.9)
GFR AFRICAN AMERICAN: >60 ML/MIN
GFR NON-AFRICAN AMERICAN: >60 ML/MIN
GFR SERPL CREATININE-BSD FRML MDRD: ABNORMAL ML/MIN/{1.73_M2}
GFR SERPL CREATININE-BSD FRML MDRD: ABNORMAL ML/MIN/{1.73_M2}
GLUCOSE BLD-MCNC: 145 MG/DL (ref 65–105)
GLUCOSE BLD-MCNC: 275 MG/DL (ref 65–105)
GLUCOSE BLD-MCNC: 301 MG/DL (ref 65–105)
GLUCOSE BLD-MCNC: 344 MG/DL (ref 65–105)
GLUCOSE BLD-MCNC: 399 MG/DL (ref 65–105)
GLUCOSE BLD-MCNC: 399 MG/DL (ref 70–99)
GLUCOSE BLD-MCNC: 450 MG/DL (ref 65–105)
PHOSPHORUS: 2.6 MG/DL (ref 2.6–4.5)
POTASSIUM SERPL-SCNC: 4.8 MMOL/L (ref 3.7–5.3)
SODIUM BLD-SCNC: 134 MMOL/L (ref 135–144)

## 2021-12-22 PROCEDURE — 2700000000 HC OXYGEN THERAPY PER DAY

## 2021-12-22 PROCEDURE — 99232 SBSQ HOSP IP/OBS MODERATE 35: CPT | Performed by: INTERNAL MEDICINE

## 2021-12-22 PROCEDURE — 6370000000 HC RX 637 (ALT 250 FOR IP): Performed by: STUDENT IN AN ORGANIZED HEALTH CARE EDUCATION/TRAINING PROGRAM

## 2021-12-22 PROCEDURE — 94640 AIRWAY INHALATION TREATMENT: CPT

## 2021-12-22 PROCEDURE — 2060000000 HC ICU INTERMEDIATE R&B

## 2021-12-22 PROCEDURE — 2580000003 HC RX 258: Performed by: STUDENT IN AN ORGANIZED HEALTH CARE EDUCATION/TRAINING PROGRAM

## 2021-12-22 PROCEDURE — 6360000002 HC RX W HCPCS: Performed by: STUDENT IN AN ORGANIZED HEALTH CARE EDUCATION/TRAINING PROGRAM

## 2021-12-22 PROCEDURE — 36415 COLL VENOUS BLD VENIPUNCTURE: CPT

## 2021-12-22 PROCEDURE — 99233 SBSQ HOSP IP/OBS HIGH 50: CPT | Performed by: INTERNAL MEDICINE

## 2021-12-22 PROCEDURE — 82947 ASSAY GLUCOSE BLOOD QUANT: CPT

## 2021-12-22 PROCEDURE — 94761 N-INVAS EAR/PLS OXIMETRY MLT: CPT

## 2021-12-22 PROCEDURE — 94664 DEMO&/EVAL PT USE INHALER: CPT

## 2021-12-22 PROCEDURE — 84100 ASSAY OF PHOSPHORUS: CPT

## 2021-12-22 PROCEDURE — 80048 BASIC METABOLIC PNL TOTAL CA: CPT

## 2021-12-22 RX ORDER — CYCLOBENZAPRINE HCL 10 MG
10 TABLET ORAL 3 TIMES DAILY PRN
Status: DISCONTINUED | OUTPATIENT
Start: 2021-12-22 | End: 2021-12-24 | Stop reason: HOSPADM

## 2021-12-22 RX ORDER — 0.9 % SODIUM CHLORIDE 0.9 %
500 INTRAVENOUS SOLUTION INTRAVENOUS ONCE
Status: COMPLETED | OUTPATIENT
Start: 2021-12-22 | End: 2021-12-22

## 2021-12-22 RX ORDER — INSULIN GLARGINE 100 [IU]/ML
20 INJECTION, SOLUTION SUBCUTANEOUS NIGHTLY
Status: DISCONTINUED | OUTPATIENT
Start: 2021-12-22 | End: 2021-12-24 | Stop reason: HOSPADM

## 2021-12-22 RX ORDER — INSULIN GLARGINE 100 [IU]/ML
15 INJECTION, SOLUTION SUBCUTANEOUS EVERY MORNING
Status: DISCONTINUED | OUTPATIENT
Start: 2021-12-22 | End: 2021-12-23

## 2021-12-22 RX ADMIN — ACETAMINOPHEN 650 MG: 325 TABLET ORAL at 08:19

## 2021-12-22 RX ADMIN — HEPARIN SODIUM 5000 UNITS: 5000 INJECTION INTRAVENOUS; SUBCUTANEOUS at 13:21

## 2021-12-22 RX ADMIN — ATORVASTATIN CALCIUM 20 MG: 20 TABLET, FILM COATED ORAL at 08:18

## 2021-12-22 RX ADMIN — INSULIN GLARGINE 15 UNITS: 100 INJECTION, SOLUTION SUBCUTANEOUS at 14:10

## 2021-12-22 RX ADMIN — DEXAMETHASONE SODIUM PHOSPHATE 6 MG: 10 INJECTION INTRAMUSCULAR; INTRAVENOUS at 08:19

## 2021-12-22 RX ADMIN — PANTOPRAZOLE SODIUM 40 MG: 40 TABLET, DELAYED RELEASE ORAL at 08:19

## 2021-12-22 RX ADMIN — Medication 2 PUFF: at 17:45

## 2021-12-22 RX ADMIN — INSULIN LISPRO 12 UNITS: 100 INJECTION, SOLUTION INTRAVENOUS; SUBCUTANEOUS at 12:29

## 2021-12-22 RX ADMIN — SODIUM CHLORIDE, PRESERVATIVE FREE 10 ML: 5 INJECTION INTRAVENOUS at 21:57

## 2021-12-22 RX ADMIN — BUDESONIDE AND FORMOTEROL FUMARATE DIHYDRATE 2 PUFF: 80; 4.5 AEROSOL RESPIRATORY (INHALATION) at 08:22

## 2021-12-22 RX ADMIN — HEPARIN SODIUM 5000 UNITS: 5000 INJECTION INTRAVENOUS; SUBCUTANEOUS at 21:54

## 2021-12-22 RX ADMIN — DULOXETINE HYDROCHLORIDE 90 MG: 30 CAPSULE, DELAYED RELEASE ORAL at 08:18

## 2021-12-22 RX ADMIN — Medication 81 MG: at 08:18

## 2021-12-22 RX ADMIN — INSULIN LISPRO 18 UNITS: 100 INJECTION, SOLUTION INTRAVENOUS; SUBCUTANEOUS at 08:00

## 2021-12-22 RX ADMIN — CLONAZEPAM 0.5 MG: 1 TABLET ORAL at 08:21

## 2021-12-22 RX ADMIN — METOPROLOL TARTRATE 100 MG: 50 TABLET, FILM COATED ORAL at 21:38

## 2021-12-22 RX ADMIN — LEVOTHYROXINE SODIUM 125 MCG: 125 TABLET ORAL at 08:19

## 2021-12-22 RX ADMIN — Medication 2 PUFF: at 08:22

## 2021-12-22 RX ADMIN — AMLODIPINE BESYLATE 5 MG: 5 TABLET ORAL at 08:18

## 2021-12-22 RX ADMIN — CLOPIDOGREL 75 MG: 75 TABLET, FILM COATED ORAL at 08:18

## 2021-12-22 RX ADMIN — TOPIRAMATE 25 MG: 25 TABLET, FILM COATED ORAL at 08:19

## 2021-12-22 RX ADMIN — SODIUM CHLORIDE 500 ML: 9 INJECTION, SOLUTION INTRAVENOUS at 13:21

## 2021-12-22 RX ADMIN — ROPINIROLE HYDROCHLORIDE 2 MG: 1 TABLET, FILM COATED ORAL at 08:19

## 2021-12-22 RX ADMIN — METOPROLOL TARTRATE 100 MG: 50 TABLET, FILM COATED ORAL at 08:19

## 2021-12-22 RX ADMIN — BUDESONIDE AND FORMOTEROL FUMARATE DIHYDRATE 2 PUFF: 80; 4.5 AEROSOL RESPIRATORY (INHALATION) at 21:38

## 2021-12-22 RX ADMIN — PANTOPRAZOLE SODIUM 40 MG: 40 TABLET, DELAYED RELEASE ORAL at 21:38

## 2021-12-22 RX ADMIN — SODIUM CHLORIDE, PRESERVATIVE FREE 10 ML: 5 INJECTION INTRAVENOUS at 08:21

## 2021-12-22 RX ADMIN — SODIUM CHLORIDE, PRESERVATIVE FREE 10 ML: 5 INJECTION INTRAVENOUS at 09:33

## 2021-12-22 RX ADMIN — CLONAZEPAM 0.5 MG: 1 TABLET ORAL at 21:39

## 2021-12-22 RX ADMIN — INSULIN GLARGINE 20 UNITS: 100 INJECTION, SOLUTION SUBCUTANEOUS at 21:54

## 2021-12-22 RX ADMIN — TOPIRAMATE 25 MG: 25 TABLET, FILM COATED ORAL at 21:38

## 2021-12-22 RX ADMIN — INSULIN LISPRO 3 UNITS: 100 INJECTION, SOLUTION INTRAVENOUS; SUBCUTANEOUS at 17:30

## 2021-12-22 RX ADMIN — TIOTROPIUM BROMIDE INHALATION SPRAY 2 PUFF: 3.12 SPRAY, METERED RESPIRATORY (INHALATION) at 08:22

## 2021-12-22 RX ADMIN — INSULIN LISPRO 6 UNITS: 100 INJECTION, SOLUTION INTRAVENOUS; SUBCUTANEOUS at 21:54

## 2021-12-22 RX ADMIN — AMITRIPTYLINE HYDROCHLORIDE 75 MG: 25 TABLET, FILM COATED ORAL at 21:38

## 2021-12-22 RX ADMIN — Medication 1000 MCG: at 08:19

## 2021-12-22 RX ADMIN — Medication 2 PUFF: at 21:38

## 2021-12-22 ASSESSMENT — ENCOUNTER SYMPTOMS
DIARRHEA: 1
COUGH: 1
SHORTNESS OF BREATH: 1
EYE DISCHARGE: 0
COLOR CHANGE: 0

## 2021-12-22 ASSESSMENT — PAIN DESCRIPTION - PAIN TYPE: TYPE: ACUTE PAIN

## 2021-12-22 ASSESSMENT — PAIN DESCRIPTION - LOCATION: LOCATION: BACK

## 2021-12-22 ASSESSMENT — PAIN SCALES - GENERAL
PAINLEVEL_OUTOF10: 3
PAINLEVEL_OUTOF10: 0
PAINLEVEL_OUTOF10: 0

## 2021-12-22 NOTE — PROGRESS NOTES
[facility]  Internal Medicine Teaching Residency Program  Inpatient Daily Progress Note  ______________________________________________________________________________    Patient: Ely Ruano  YOB: 1960   [facility]:[id number]    Acct: [de-identified]     Room:   Admit date: 2021  Today's date: 21  Number of days in the hospital: 2    SUBJECTIVE   Admitting Diagnosis: <principal problem not specified>  CC: COVID DKA   Pt examined at bedside. Chart & results reviewed. HDS afebrile ON. Bridged to sub cue insulin ON. Still having high glucose levels will start 15 lantus in AM and increase nightly lantus to 20. ROS:  Constitutional:  negative for chills, fevers, sweats  Respiratory:  negative for cough, dyspnea on exertion, hemoptysis, shortness of breath, wheezing  Cardiovascular:  negative for chest pain, chest pressure/discomfort, lower extremity edema, palpitations  Gastrointestinal:  negative for abdominal pain, constipation, diarrhea, nausea, vomiting  Neurological:  negative for dizziness, headache    BRIEF HISTORY     63 Y/F who has past medical history of type 1 DM with retinopathy on insulin pump which is currently not working properly,COPD, essential hypertension, diastolic heart failure,hypothyroidism, restless leg syndrome present and CAD presented in ed for concern of shortness of breathe who was tested positive for Covid on 12/15/2021 but did not required any medical treatment. Had loose stool for couple of days. Denied chest pain, fever, bilateral leg swelling. On my evaluation patient was hemodynamically stable and saturating well in 5 liter of oxygen.     OBJECTIVE     Vital Signs:  /81   Pulse 63   Temp 98.2 °F (36.8 °C) (Oral)   Resp 19   Wt 178 lb 9.6 oz (81 kg)   SpO2 92%   BMI 29.72 kg/m²     Temp (24hrs), Av.1 °F (36.7 °C), Min:97.9 °F (36.6 °C), Max:98.3 °F (36.8 °C)    In: 240   Out: - Physical Exam:  Physical Exam  Constitutional:       General: She is not in acute distress. Appearance: Normal appearance. She is obese. She is not ill-appearing, toxic-appearing or diaphoretic. HENT:      Mouth/Throat:      Mouth: Mucous membranes are moist.      Pharynx: Oropharynx is clear. Eyes:      General: No scleral icterus. Cardiovascular:      Rate and Rhythm: Normal rate and regular rhythm. Pulses: Normal pulses. Heart sounds: Normal heart sounds. No murmur heard. No friction rub. No gallop. Pulmonary:      Effort: Pulmonary effort is normal.      Breath sounds: Normal breath sounds. Abdominal:      General: Abdomen is flat. Bowel sounds are normal. There is no distension. Palpations: Abdomen is soft. There is no mass. Tenderness: There is no abdominal tenderness. There is no guarding or rebound. Hernia: No hernia is present. Musculoskeletal:         General: No swelling, tenderness, deformity or signs of injury. Right lower leg: No edema. Left lower leg: No edema. Skin:     General: Skin is warm and dry. Coloration: Skin is not jaundiced or pale. Findings: No bruising. Neurological:      General: No focal deficit present. Mental Status: She is alert and oriented to person, place, and time. Cranial Nerves: No cranial nerve deficit. Motor: No weakness. Psychiatric:         Mood and Affect: Mood normal.         Behavior: Behavior normal.         Thought Content:  Thought content normal.         Judgment: Judgment normal.           Medications:  Scheduled Medications:    insulin glargine  15 Units SubCUTAneous QAM    insulin glargine  20 Units SubCUTAneous Nightly    sodium chloride  500 mL IntraVENous Once    cefTRIAXone (ROCEPHIN) IV  1,000 mg IntraVENous Q24H    potassium chloride  40 mEq Oral Once    insulin lispro  0-18 Units SubCUTAneous TID WC    insulin lispro  0-9 Units SubCUTAneous Nightly    clonazePAM 0.5 mg Oral BID    sodium chloride flush  5-40 mL IntraVENous 2 times per day    sodium chloride flush  5-40 mL IntraVENous 2 times per day    heparin (porcine)  5,000 Units SubCUTAneous 3 times per day    albuterol sulfate HFA  2 puff Inhalation 4x daily    amitriptyline  75 mg Oral Nightly    amLODIPine  5 mg Oral Daily    aspirin  81 mg Oral Daily    budesonide-formoterol  2 puff Inhalation BID    clopidogrel  75 mg Oral Daily    DULoxetine  90 mg Oral Daily    levothyroxine  125 mcg Oral Daily    [Held by provider] losartan  100 mg Oral Daily    metoprolol  100 mg Oral BID    pantoprazole  40 mg Oral BID    rOPINIRole  2 mg Oral Daily    atorvastatin  20 mg Oral Daily    tiotropium  2 puff Inhalation Daily    topiramate  25 mg Oral BID    vitamin B-12  1,000 mcg Oral Daily    dexamethasone  6 mg IntraVENous Daily     Continuous Infusions:    dextrose 5 % and 0.45 % NaCl 150 mL/hr at 12/21/21 1627    sodium chloride      sodium chloride      dextrose      dextrose Stopped (12/21/21 0900)    sodium chloride 200 mL/hr at 12/21/21 0142     PRN Medicationscyclobenzaprine, 10 mg, TID PRN  potassium chloride, 10 mEq, PRN  sodium phosphate IVPB, 0.16 mmol/kg, PRN   Or  sodium phosphate IVPB, 0.32 mmol/kg, PRN  dextrose, 12.5 g, PRN  dextrose 5 % and 0.45 % NaCl, , Continuous PRN  sodium chloride flush, 5-40 mL, PRN  sodium chloride, 25 mL, PRN  acetaminophen, 650 mg, Q4H PRN  sodium chloride flush, 5-40 mL, PRN  sodium chloride, 25 mL, PRN  ondansetron, 4 mg, Q8H PRN   Or  ondansetron, 4 mg, Q6H PRN  polyethylene glycol, 17 g, Daily PRN  glucose, 15 g, PRN  dextrose, 12.5 g, PRN  glucagon (rDNA), 1 mg, PRN  dextrose, 100 mL/hr, PRN  glucose, 15 g, PRN  dextrose, 12.5 g, PRN  glucagon (rDNA), 1 mg, PRN  dextrose, 100 mL/hr, PRN  benzonatate, 100 mg, TID PRN        Diagnostic Labs:  CBC:   Recent Labs     12/20/21  1702 12/21/21  0250   WBC 8.9 7.6   RBC 3.85* 3.89*   HGB 11.5* 11.4*   HCT 33.8* 33.8*   MCV 87.8 86.9   RDW 12.4 12.3    265     BMP:   Recent Labs     12/21/21  1515 12/21/21  1949 12/22/21  0443   * 134* 134*   K 3.7 3.5* 4.8    105 102   CO2 16* 17* 16*   PHOS 2.0* 1.3* 2.6   BUN 16 17 18   CREATININE 0.84 0.75 0.91*     BNP: No results for input(s): BNP in the last 72 hours. PT/INR: No results for input(s): PROTIME, INR in the last 72 hours. APTT: No results for input(s): APTT in the last 72 hours. CARDIAC ENZYMES: No results for input(s): CKMB, CKMBINDEX, TROPONINI in the last 72 hours. Invalid input(s): CKTOTAL;3  FASTING LIPID PANEL:  Lab Results   Component Value Date    CHOL 206 (H) 10/17/2020    HDL 55 10/17/2020    TRIG 74 10/17/2020     LIVER PROFILE: No results for input(s): AST, ALT, ALB, BILIDIR, BILITOT, ALKPHOS in the last 72 hours. MICROBIOLOGY:   Lab Results   Component Value Date/Time    CULTURE NO GROWTH 06/25/2019 08:09 PM       Imaging:    XR CHEST PORTABLE    Result Date: 12/20/2021  Peripheral and basal predominant pulmonary opacities which can be seen in the setting of COVID-19 pneumonia. 1.5 cm masslike area of consolidation in the periphery of the right lung base. Imaging follow-up to resolution is advised to exclude the presence of an underlying nodule. ASSESSMENT & PLAN     Assessment and Plan: Active Problems:    Essential hypertension    Type 1 diabetes mellitus with retinopathy (HCC)    Hypoxia    Chronic obstructive pulmonary disease ([facility] [address]    COVID-19    CHF (congestive heart failure) (HCC)    COVID    Diabetic ketoacidosis type 1 diabetes mellitus ([facility] [address]    PARUL (acute kidney injury) ([facility] [address]    UTI (urinary tract infection)  Resolved Problems:    * No resolved hospital problems. *    Covid 19  - tested positive 1 week ago  - cxr shows peripheral and basal pulmonary opacities.  Also 1.5 cm consolidation   - decadron  - O2 as needed  - ID consult     COPD  - O2 as needed  - home bronchodilators as needed.      CHF  - last EF is

## 2021-12-22 NOTE — PROGRESS NOTES
[id number] High    WBC7.6   Procalcitonin0.49 High      WBC, UATOO NUMEROUS TO COUNT/HPF RBC, UANone   Micro:    Imaging:  CXR  Impression:  Peripheral and basal predominant pulmonary opacities which can be seen in the   setting of COVID-19 pneumonia. 1.5 cm masslike area of consolidation in the periphery of the right lung   base. Imaging follow-up to resolution is advised to exclude the presence of   an underlying nodule. I have personally reviewed the past medical history, past surgical history, medications, social history, and family history, and I haveupdated the database accordingly. Allergies:   Fioricet [butalbital-apap-caffeine], Betamethasone, Erythromycin, Xarelto [rivaroxaban], Codeine, Droperidol, and Tape [adhesive tape]     Review of Systems:     Review of Systems   Constitutional: Negative for activity change. HENT: Positive for congestion. Eyes: Negative for discharge. Respiratory: Positive for cough and shortness of breath. Cardiovascular: Negative for leg swelling. Gastrointestinal: Positive for diarrhea. Endocrine: Negative for polydipsia. Genitourinary: Negative for dysuria. Musculoskeletal: Negative for arthralgias. Skin: Negative for color change. Allergic/Immunologic: Negative for immunocompromised state. Neurological: Negative for dizziness, light-headedness and headaches. Hematological: Negative for adenopathy. Psychiatric/Behavioral: Negative for agitation. Physical Examination :       Physical Exam  Constitutional:       Appearance: Normal appearance. She is not ill-appearing. HENT:      Head: Normocephalic and atraumatic. Nose: Nose normal.      Mouth/Throat:      Mouth: Mucous membranes are moist.   Eyes:      General: No scleral icterus. Conjunctiva/sclera: Conjunctivae normal.   Cardiovascular:      Rate and Rhythm: Normal rate and regular rhythm. Heart sounds: Normal heart sounds. No murmur heard.       Pulmonary: Breath sounds: Normal breath sounds. No rhonchi. Abdominal:      Palpations: There is no mass. Hernia: No hernia is present. Genitourinary:     Comments: No lara  Musculoskeletal:         General: No swelling, tenderness, deformity or signs of injury. Cervical back: Neck supple. No rigidity. Skin:     General: Skin is dry. Coloration: Skin is not jaundiced. Neurological:      General: No focal deficit present. Mental Status: She is oriented to person, place, and time. Psychiatric:         Mood and Affect: Mood normal.         Thought Content: Thought content normal.         Past Medical History:     Past Medical History:   Diagnosis Date    Anesthesia complication     \"lung collapsed after colon surgery    Anxiety     Arthritis     Back pain     CAD (coronary artery disease)     no stents    Caffeine use     3 coffee / day    Cataract     left    COPD [facility]chronic obstructive [facility] disease) [facility]     EMPHYSEMA    Deafness     right ear    Depression     Diabetes mellitus ([facility]     Diarrhea     Diverticulosis     Fibromyalgia     Gastroparalysis     GERD (gastroesophageal reflux disease)     Hearing loss     DEAF IN RIGHT EAR    Hyperlipidemia     Hyperlipidemia     Hypertension     Incontinence     MDRO (multiple drug resistant organisms) resistance 2012    mrsa (nasal), eye, ear    Neurogenic bladder     CATHES HERSELF 7 TIMES A DAY, IS ABLE TO URINATE ON OWN    Neuropathy     feet    Obesity     Osteoarthritis     Peripheral vascular disease, unspecified ([facility])     Restless leg syndrome     Rhabdomyolysis     [place] 1 week, May 2014, then [facility] for rehab.     Spinal stenosis, thoracic 05/27/2014    Stroke [facility]) 2012    numbness on the left side of face, Oct 2020 TIA     Thyroid disease     enlarged    TMJ (dislocation of temporomandibular joint)     Trigeminal neuralgia     Type II or unspecified type diabetes mellitus without mention of complication, not stated as uncontrolled        Past Surgical  History:     Past Surgical History:   Procedure Laterality Date    ABDOMEN SURGERY      LAPAROSCOPY    CARPAL TUNNEL RELEASE Right     CATARACT REMOVAL      CHOLECYSTECTOMY      CHOLECYSTECTOMY, OPEN      11/2012    COLON SURGERY      benign mass removed    COLONOSCOPY      COLONOSCOPY  07/13/2016    COLONOSCOPY  06/01/2020    incomplete/poor prep    COLONOSCOPY  07/07/2020    COLONOSCOPY N/A 7/7/2020    COLORECTAL CANCER SCREENING, NOT HIGH RISK performed by Zay Avilez MD at [facility]      sebaceous cyst, under arm left side x5    CYST REMOVAL      right ankle    CYSTOSCOPY      EYE SURGERY Right     HOLE IN RETINA REPAIRED    FINGER TRIGGER RELEASE Right     INCISIONAL HERNIA REPAIR  07/07/15    open    INCISIONAL HERNIA REPAIR  10/17/2017    LEG BIOPSY EXCISION Left 7/6/2021    EXCISION OF LEFT THIGH MASS performed by Zay Avilez MD at [facility] Left     ear tube placement    POLYPECTOMY      colon polyp    SD REPAIR INCISIONAL HERNIA,REDUCIBLE N/A 10/17/2017    HERNIA INCISIONAL REPAIR WITH MESH performed by Zay Avilez MD at [address] [place] [address] 6/1/2020    SIGMOIDOSCOPY DIAGNOSTIC FLEXIBLE performed by Zay Avilez MD at [facility] ENDOSCOPY  07/13/2016    UPPER GASTROINTESTINAL ENDOSCOPY  07/23/2018    with biopsy    UPPER GASTROINTESTINAL ENDOSCOPY N/A 7/23/2018    [facility]  06/01/2020    with biopsy    UPPER GASTROINTESTINAL ENDOSCOPY N/A 6/1/2020    EGD BIOPSY performed by Zay Avilez MD at [facility]       Medications:      cefTRIAXone (ROCEPHIN) IV  1,000 mg IntraVENous Q24H    potassium chloride  40 mEq Oral Once    insulin glargine  15 Units SubCUTAneous Nightly    insulin lispro  0-18 Units SubCUTAneous TID WC    insulin lispro  0-9 Units SubCUTAneous Nightly    clonazePAM  0.5 mg Oral BID    sodium chloride flush  5-40 mL IntraVENous 2 times per day    sodium chloride flush  5-40 mL IntraVENous 2 times per day    heparin (porcine)  5,000 Units SubCUTAneous 3 times per day    albuterol sulfate HFA  2 puff Inhalation 4x daily    amitriptyline  75 mg Oral Nightly    amLODIPine  5 mg Oral Daily    aspirin  81 mg Oral Daily    budesonide-formoterol  2 puff Inhalation BID    clopidogrel  75 mg Oral Daily    DULoxetine  90 mg Oral Daily    levothyroxine  125 mcg Oral Daily    [Held by provider] losartan  100 mg Oral Daily    metoprolol  100 mg Oral BID    pantoprazole  40 mg Oral BID    rOPINIRole  2 mg Oral Daily    atorvastatin  20 mg Oral Daily    tiotropium  2 puff Inhalation Daily    topiramate  25 mg Oral BID    vitamin B-12  1,000 mcg Oral Daily    dexamethasone  6 mg IntraVENous Daily       Social History:     Social History     Socioeconomic History    Marital status:      Spouse name: Not on file    Number of children: 0    Years of education: 15    Highest education level: Not on file   Occupational History    Occupation: disability     Employer: N/A   Tobacco Use    Smoking status: Never Smoker    Smokeless tobacco: Never Used   Vaping Use    Vaping Use: Never used   Substance and Sexual Activity    Alcohol use: No     Alcohol/week: 0.0 standard drinks     Comment: once a month    Drug use: Yes     Frequency: 4.0 times per week     Types: Marijuana Jayna Danny)    Sexual activity: Yes     Partners: Male     Comment: 1 partner   Other Topics Concern    Not on file   Social History Narrative    Not on file     Social Determinants of Health     Financial Resource Strain:     Difficulty of Paying Living Expenses: Not on file   Food Insecurity:     Worried About Running Out of Food in the Last Year: Not on file    Mela of Food in the Last Year: Not on file Transportation Needs:     Lack of Transportation (Medical): Not on file    Lack of Transportation (Non-Medical):  Not on file   Physical Activity:     Days of Exercise per Week: Not on file    Minutes of Exercise per Session: Not on file   Stress:     Feeling of Stress : Not on file   Social Connections:     Frequency of Communication with Friends and Family: Not on file    Frequency of Social Gatherings with Friends and Family: Not on file    Attends Protestant Services: Not on file    Active Member of Clubs or Organizations: Not on file    Attends Club or Organization Meetings: Not on file    Marital Status: Not on file   Intimate Partner Violence:     Fear of Current or Ex-Partner: Not on file    Emotionally Abused: Not on file    Physically Abused: Not on file    Sexually Abused: Not on file   Housing Stability:     Unable to Pay for Housing in the Last Year: Not on file    Number of [place] in the Last Year: Not on file    Unstable Housing in the Last Year: Not on file       Family History:     Family History   Problem Relation Age of Onset    High Blood Pressure Mother     Migraines Mother     High Blood Pressure Father     Heart Disease Father     Cancer Father         skin    Diabetes Maternal Grandmother     Heart Disease Maternal Grandmother     Cancer Maternal Grandmother     Parkinsonism Maternal Grandmother     Cancer Paternal Grandmother     Other Brother         epilepsy      Medical Decision Making:   I have independently reviewed/ordered the following labs:    CBC with Differential:   Recent Labs     12/20/21  1702 12/21/21  0250   WBC 8.9 7.6   HGB 11.5* 11.4*   HCT 33.8* 33.8*    265   LYMPHOPCT 17*  --    MONOPCT 8  --      BMP:  Recent Labs     12/21/21  0250 12/21/21  0654 12/21/21  1949 12/22/21  0443   *   < > 134* 134*   K 3.4*   < > 3.5* 4.8      < > 105 102   CO2 18*   < > 17* 16*   BUN 18   < > 17 18   CREATININE 1.04*   < > 0.75 0.91* MG 2.0  --   --   --     < > = values in this interval not displayed. Hepatic Function Panel: No results for input(s): PROT, LABALBU, BILIDIR, IBILI, BILITOT, ALKPHOS, ALT, AST in the last 72 hours. No results for input(s): RPR in the last 72 hours. No results for input(s): HIV in the last 72 hours. No results for input(s): BC in the last 72 hours. Lab Results   Component Value Date    CREATININE 0.91 12/22/2021    GLUCOSE 399 12/22/2021    GLUCOSE 342 05/14/2012       Detailed results: Thank you for allowing us to participate in the care of this patient. Please call with questions. This note is created with the assistance of a speech recognition program.  While intending to generate adocument that actually reflects the content of the visit, the document can still have some errors including those of syntax and sound a like substitutions which may escape proof reading. It such instances, actual meaningcan be extrapolated by contextual diversion.     Meg Bray MD  Office: [phone]  Perfect serve / office [phone]

## 2021-12-22 NOTE — PLAN OF CARE
Problem: Airway Clearance - Ineffective  Goal: Achieve or maintain patent airway  12/22/2021 0309 by Yoandy Barone RN  Outcome: Ongoing     Problem: Gas Exchange - Impaired  Goal: Absence of hypoxia  12/22/2021 0309 by Yoandy Barone RN  Outcome: Ongoing     Problem: Gas Exchange - Impaired  Goal: Promote optimal lung function  12/22/2021 0309 by oYandy Barone RN  Outcome: Ongoing     Problem: Breathing Pattern - Ineffective  Goal: Ability to achieve and maintain a regular respiratory rate  12/22/2021 0309 by Yoandy Barone RN  Outcome: Ongoing     Problem: Body Temperature -  Risk of, Imbalanced  Goal: Ability to maintain a body temperature within defined limits  12/22/2021 0309 by Yoandy Barone RN  Outcome: Ongoing     Problem: Body Temperature -  Risk of, Imbalanced  Goal: Will regain or maintain usual level of consciousness  12/22/2021 0309 by Yoandy Barone RN  Outcome: Ongoing     Problem:  Body Temperature -  Risk of, Imbalanced  Goal: Complications related to the disease process, condition or treatment will be avoided or minimized  12/22/2021 0309 by Yoandy Barone RN  Outcome: Ongoing     Problem: Isolation Precautions - Risk of Spread of Infection  Goal: Prevent transmission of infection  12/22/2021 0309 by Yoandy Barone RN  Outcome: Ongoing     Problem: Nutrition Deficits  Goal: Optimize nutritional status  12/22/2021 0309 by Yoandy Barone RN  Outcome: Ongoing     Problem: Risk for Fluid Volume Deficit  Goal: Maintain normal heart rhythm  12/22/2021 0309 by Yoandy Barone RN  Outcome: Ongoing     Problem: Risk for Fluid Volume Deficit  Goal: Maintain absence of muscle cramping  12/22/2021 0309 by Yoandy Barone RN  Outcome: Ongoing     Problem: Risk for Fluid Volume Deficit  Goal: Maintain normal serum potassium, sodium, calcium, phosphorus, and pH  12/22/2021 0309 by Yoandy Barone RN  Outcome: Ongoing     Problem: Loneliness or Risk for Loneliness  Goal: Demonstrate positive use of time alone when socialization is not possible  12/22/2021 0309 by Carlotta Nesbitt RN  Outcome: Ongoing     Problem: Fatigue  Goal: Verbalize increase energy and improved vitality  12/22/2021 0309 by Carlotta Nesbitt RN  Outcome: Ongoing     Problem: Patient Education: Go to Patient Education Activity  Goal: Patient/Family Education  12/22/2021 0309 by Carlotta Nesbitt RN  Outcome: Ongoing     Problem: Falls - Risk of:  Goal: Will remain free from falls  Description: Will remain free from falls  12/22/2021 0309 by Carlotta Nesbitt RN  Outcome: Ongoing     Problem: Falls - Risk of:  Goal: Absence of physical injury  Description: Absence of physical injury  12/22/2021 0309 by Carlotta Nesbitt RN  Outcome: Ongoing

## 2021-12-23 LAB
ABSOLUTE EOS #: 0.04 K/UL (ref 0–0.44)
ABSOLUTE IMMATURE GRANULOCYTE: 0.04 K/UL (ref 0–0.3)
ABSOLUTE LYMPH #: 2.66 K/UL (ref 1.1–3.7)
ABSOLUTE MONO #: 0.71 K/UL (ref 0.1–1.2)
ANION GAP SERPL CALCULATED.3IONS-SCNC: 11 MMOL/L (ref 9–17)
BASOPHILS # BLD: 0 % (ref 0–2)
BASOPHILS ABSOLUTE: <0.03 K/UL (ref 0–0.2)
BUN BLDV-MCNC: 18 MG/DL (ref 6–20)
BUN/CREAT BLD: ABNORMAL (ref 9–20)
CALCIUM SERPL-MCNC: 8.3 MG/DL (ref 8.6–10.4)
CHLORIDE BLD-SCNC: 108 MMOL/L (ref 98–107)
CO2: 19 MMOL/L (ref 20–31)
CREAT SERPL-MCNC: 0.78 MG/DL (ref 0.5–0.9)
CULTURE: ABNORMAL
DIFFERENTIAL TYPE: ABNORMAL
EOSINOPHILS RELATIVE PERCENT: 0 % (ref 1–4)
GFR AFRICAN AMERICAN: >60 ML/MIN
GFR NON-AFRICAN AMERICAN: >60 ML/MIN
GFR SERPL CREATININE-BSD FRML MDRD: ABNORMAL ML/MIN/{1.73_M2}
GFR SERPL CREATININE-BSD FRML MDRD: ABNORMAL ML/MIN/{1.73_M2}
GLUCOSE BLD-MCNC: 145 MG/DL (ref 65–105)
GLUCOSE BLD-MCNC: 145 MG/DL (ref 70–99)
GLUCOSE BLD-MCNC: 154 MG/DL (ref 65–105)
GLUCOSE BLD-MCNC: 170 MG/DL (ref 65–105)
GLUCOSE BLD-MCNC: 265 MG/DL (ref 65–105)
GLUCOSE BLD-MCNC: 269 MG/DL (ref 65–105)
HCT VFR BLD CALC: 33.1 % (ref 36.3–47.1)
HEMOGLOBIN: 10.8 G/DL (ref 11.9–15.1)
IMMATURE GRANULOCYTES: 0 %
LYMPHOCYTES # BLD: 25 % (ref 24–43)
Lab: ABNORMAL
MCH RBC QN AUTO: 29.3 PG (ref 25.2–33.5)
MCHC RBC AUTO-ENTMCNC: 32.6 G/DL (ref 28.4–34.8)
MCV RBC AUTO: 89.9 FL (ref 82.6–102.9)
MONOCYTES # BLD: 7 % (ref 3–12)
NRBC AUTOMATED: 0 PER 100 WBC
PDW BLD-RTO: 12.7 % (ref 11.8–14.4)
PHOSPHORUS: 2.3 MG/DL (ref 2.6–4.5)
PLATELET # BLD: 326 K/UL (ref 138–453)
PLATELET ESTIMATE: ABNORMAL
PMV BLD AUTO: 10.2 FL (ref 8.1–13.5)
POTASSIUM SERPL-SCNC: 3.8 MMOL/L (ref 3.7–5.3)
RBC # BLD: 3.68 M/UL (ref 3.95–5.11)
RBC # BLD: ABNORMAL 10*6/UL
SEG NEUTROPHILS: 68 % (ref 36–65)
SEGMENTED NEUTROPHILS ABSOLUTE COUNT: 7.07 K/UL (ref 1.5–8.1)
SODIUM BLD-SCNC: 138 MMOL/L (ref 135–144)
SPECIMEN DESCRIPTION: ABNORMAL
WBC # BLD: 10.5 K/UL (ref 3.5–11.3)
WBC # BLD: ABNORMAL 10*3/UL

## 2021-12-23 PROCEDURE — 80048 BASIC METABOLIC PNL TOTAL CA: CPT

## 2021-12-23 PROCEDURE — 6370000000 HC RX 637 (ALT 250 FOR IP): Performed by: STUDENT IN AN ORGANIZED HEALTH CARE EDUCATION/TRAINING PROGRAM

## 2021-12-23 PROCEDURE — 2580000003 HC RX 258: Performed by: STUDENT IN AN ORGANIZED HEALTH CARE EDUCATION/TRAINING PROGRAM

## 2021-12-23 PROCEDURE — 85025 COMPLETE CBC W/AUTO DIFF WBC: CPT

## 2021-12-23 PROCEDURE — 99232 SBSQ HOSP IP/OBS MODERATE 35: CPT | Performed by: INTERNAL MEDICINE

## 2021-12-23 PROCEDURE — 84100 ASSAY OF PHOSPHORUS: CPT

## 2021-12-23 PROCEDURE — 6360000002 HC RX W HCPCS: Performed by: STUDENT IN AN ORGANIZED HEALTH CARE EDUCATION/TRAINING PROGRAM

## 2021-12-23 PROCEDURE — 36415 COLL VENOUS BLD VENIPUNCTURE: CPT

## 2021-12-23 PROCEDURE — 82947 ASSAY GLUCOSE BLOOD QUANT: CPT

## 2021-12-23 PROCEDURE — 94761 N-INVAS EAR/PLS OXIMETRY MLT: CPT

## 2021-12-23 PROCEDURE — 2700000000 HC OXYGEN THERAPY PER DAY

## 2021-12-23 PROCEDURE — 2060000000 HC ICU INTERMEDIATE R&B

## 2021-12-23 RX ORDER — INSULIN GLARGINE 100 [IU]/ML
20 INJECTION, SOLUTION SUBCUTANEOUS EVERY MORNING
Status: DISCONTINUED | OUTPATIENT
Start: 2021-12-23 | End: 2021-12-24 | Stop reason: HOSPADM

## 2021-12-23 RX ORDER — CEPHALEXIN 500 MG/1
500 CAPSULE ORAL 2 TIMES DAILY
Qty: 10 CAPSULE | Refills: 0 | Status: SHIPPED | OUTPATIENT
Start: 2021-12-23 | End: 2021-12-28

## 2021-12-23 RX ORDER — DEXAMETHASONE 6 MG/1
6 TABLET ORAL
Qty: 6 TABLET | Refills: 0 | Status: SHIPPED | OUTPATIENT
Start: 2021-12-23 | End: 2021-12-29

## 2021-12-23 RX ADMIN — SODIUM CHLORIDE, PRESERVATIVE FREE 10 ML: 5 INJECTION INTRAVENOUS at 08:11

## 2021-12-23 RX ADMIN — HEPARIN SODIUM 5000 UNITS: 5000 INJECTION INTRAVENOUS; SUBCUTANEOUS at 22:51

## 2021-12-23 RX ADMIN — TOPIRAMATE 25 MG: 25 TABLET, FILM COATED ORAL at 08:03

## 2021-12-23 RX ADMIN — PANTOPRAZOLE SODIUM 40 MG: 40 TABLET, DELAYED RELEASE ORAL at 08:03

## 2021-12-23 RX ADMIN — HEPARIN SODIUM 5000 UNITS: 5000 INJECTION INTRAVENOUS; SUBCUTANEOUS at 14:36

## 2021-12-23 RX ADMIN — CLONAZEPAM 0.5 MG: 1 TABLET ORAL at 08:07

## 2021-12-23 RX ADMIN — TOPIRAMATE 25 MG: 25 TABLET, FILM COATED ORAL at 19:55

## 2021-12-23 RX ADMIN — METOPROLOL TARTRATE 100 MG: 50 TABLET, FILM COATED ORAL at 08:02

## 2021-12-23 RX ADMIN — INSULIN GLARGINE 20 UNITS: 100 INJECTION, SOLUTION SUBCUTANEOUS at 08:15

## 2021-12-23 RX ADMIN — TIOTROPIUM BROMIDE INHALATION SPRAY 2 PUFF: 3.12 SPRAY, METERED RESPIRATORY (INHALATION) at 08:05

## 2021-12-23 RX ADMIN — SODIUM CHLORIDE, PRESERVATIVE FREE 10 ML: 5 INJECTION INTRAVENOUS at 22:51

## 2021-12-23 RX ADMIN — BUDESONIDE AND FORMOTEROL FUMARATE DIHYDRATE 2 PUFF: 80; 4.5 AEROSOL RESPIRATORY (INHALATION) at 19:56

## 2021-12-23 RX ADMIN — CLONAZEPAM 0.5 MG: 1 TABLET ORAL at 19:55

## 2021-12-23 RX ADMIN — CEFTRIAXONE SODIUM 1000 MG: 1 INJECTION, POWDER, FOR SOLUTION INTRAMUSCULAR; INTRAVENOUS at 08:07

## 2021-12-23 RX ADMIN — Medication 2 PUFF: at 08:05

## 2021-12-23 RX ADMIN — BUDESONIDE AND FORMOTEROL FUMARATE DIHYDRATE 2 PUFF: 80; 4.5 AEROSOL RESPIRATORY (INHALATION) at 08:06

## 2021-12-23 RX ADMIN — PANTOPRAZOLE SODIUM 40 MG: 40 TABLET, DELAYED RELEASE ORAL at 19:55

## 2021-12-23 RX ADMIN — AMITRIPTYLINE HYDROCHLORIDE 75 MG: 25 TABLET, FILM COATED ORAL at 19:55

## 2021-12-23 RX ADMIN — INSULIN LISPRO 3 UNITS: 100 INJECTION, SOLUTION INTRAVENOUS; SUBCUTANEOUS at 08:16

## 2021-12-23 RX ADMIN — Medication 2 PUFF: at 19:56

## 2021-12-23 RX ADMIN — DEXAMETHASONE SODIUM PHOSPHATE 6 MG: 10 INJECTION INTRAMUSCULAR; INTRAVENOUS at 08:02

## 2021-12-23 RX ADMIN — METOPROLOL TARTRATE 100 MG: 50 TABLET, FILM COATED ORAL at 19:55

## 2021-12-23 RX ADMIN — AMLODIPINE BESYLATE 5 MG: 5 TABLET ORAL at 08:04

## 2021-12-23 RX ADMIN — LEVOTHYROXINE SODIUM 125 MCG: 125 TABLET ORAL at 08:03

## 2021-12-23 RX ADMIN — CLOPIDOGREL 75 MG: 75 TABLET, FILM COATED ORAL at 08:03

## 2021-12-23 RX ADMIN — DULOXETINE HYDROCHLORIDE 90 MG: 30 CAPSULE, DELAYED RELEASE ORAL at 08:03

## 2021-12-23 RX ADMIN — ATORVASTATIN CALCIUM 20 MG: 20 TABLET, FILM COATED ORAL at 08:03

## 2021-12-23 RX ADMIN — Medication 1000 MCG: at 08:03

## 2021-12-23 RX ADMIN — INSULIN LISPRO 5 UNITS: 100 INJECTION, SOLUTION INTRAVENOUS; SUBCUTANEOUS at 20:00

## 2021-12-23 RX ADMIN — Medication 81 MG: at 08:03

## 2021-12-23 RX ADMIN — HEPARIN SODIUM 5000 UNITS: 5000 INJECTION INTRAVENOUS; SUBCUTANEOUS at 05:50

## 2021-12-23 RX ADMIN — INSULIN LISPRO 3 UNITS: 100 INJECTION, SOLUTION INTRAVENOUS; SUBCUTANEOUS at 12:05

## 2021-12-23 RX ADMIN — INSULIN GLARGINE 20 UNITS: 100 INJECTION, SOLUTION SUBCUTANEOUS at 22:00

## 2021-12-23 RX ADMIN — ROPINIROLE HYDROCHLORIDE 2 MG: 1 TABLET, FILM COATED ORAL at 08:03

## 2021-12-23 ASSESSMENT — PAIN SCALES - GENERAL: PAINLEVEL_OUTOF10: 0

## 2021-12-23 NOTE — PROGRESS NOTES
Home Oxygen Evaluation    Home Oxygen Evaluation completed. Patient is on 4 liters per minute via NC. Resting SpO2 = 95%  Resting SpO2 on room air = 94%    SpO2 on room air with exercise = 91%  SpO2 on oxygen as above with exercise = 93%    Nocturnal Oximetry with patient on room air is recommended is SpO2 is between 89% and 95% (requires additional order).     [address], [place]  5:52 PM

## 2021-12-23 NOTE — PROGRESS NOTES
[facility]  Internal Medicine Teaching Residency Program  Inpatient Daily Progress Note  ______________________________________________________________________________    Patient: Sharee Rao  YOB: 1960   XSE:[id number]    Acct: [de-identified]     Room:   Admit date: 2021  Today's date: 21  Number of days in the hospital: 3    SUBJECTIVE   Admitting Diagnosis: <principal problem not specified>  CC: COVID DKA   Pt examined at bedside. Chart & results reviewed. HDS afebrile ON. Blood glucose is controlled. Currently on 5L NC will attempt to wean. ROS:  Constitutional:  negative for chills, fevers, sweats  Respiratory:  negative for cough, dyspnea on exertion, hemoptysis, shortness of breath, wheezing  Cardiovascular:  negative for chest pain, chest pressure/discomfort, lower extremity edema, palpitations  Gastrointestinal:  negative for abdominal pain, constipation, diarrhea, nausea, vomiting  Neurological:  negative for dizziness, headache    BRIEF HISTORY     63 Y/F who has past medical history of type 1 DM with retinopathy on insulin pump which is currently not working properly,COPD, essential hypertension, diastolic heart failure,hypothyroidism, restless leg syndrome present and CAD presented in ed for concern of shortness of breathe who was tested positive for Covid on 12/15/2021 but did not required any medical treatment. Had loose stool for couple of days. Denied chest pain, fever, bilateral leg swelling. On my evaluation patient was hemodynamically stable and saturating well in 5 liter of oxygen.     OBJECTIVE     Vital Signs:  /66   Pulse 59   Temp 97.8 °F (36.6 °C) (Oral)   Resp 16   Wt 178 lb 9.6 oz (81 kg)   SpO2 97%   BMI 29.72 kg/m²     Temp (24hrs), Av.9 °F (36.6 °C), Min:97.7 °F (36.5 °C), Max:98 °F (36.7 °C)    In: 450   Out: -     Physical Exam:  Physical Exam  Constitutional:       General: She is not in acute distress. Appearance: Normal appearance. She is obese. She is not ill-appearing, toxic-appearing or diaphoretic. HENT:      Mouth/Throat:      Mouth: Mucous membranes are moist.      Pharynx: Oropharynx is clear. Eyes:      General: No scleral icterus. Cardiovascular:      Rate and Rhythm: Normal rate and regular rhythm. Pulses: Normal pulses. Heart sounds: Normal heart sounds. No murmur heard. No friction rub. No gallop. Pulmonary:      Effort: Pulmonary effort is normal.      Breath sounds: Normal breath sounds. Abdominal:      General: Abdomen is flat. Bowel sounds are normal. There is no distension. Palpations: Abdomen is soft. There is no mass. Tenderness: There is no abdominal tenderness. There is no guarding or rebound. Hernia: No hernia is present. Musculoskeletal:         General: No swelling, tenderness, deformity or signs of injury. Right lower leg: No edema. Left lower leg: No edema. Skin:     General: Skin is warm and dry. Coloration: Skin is not jaundiced or pale. Findings: No bruising. Neurological:      General: No focal deficit present. Mental Status: She is alert and oriented to person, place, and time. Cranial Nerves: No cranial nerve deficit. Motor: No weakness. Psychiatric:         Mood and Affect: Mood normal.         Behavior: Behavior normal.         Thought Content:  Thought content normal.         Judgment: Judgment normal.           Medications:  Scheduled Medications:    insulin glargine  20 Units SubCUTAneous QAM    insulin glargine  20 Units SubCUTAneous Nightly    cefTRIAXone (ROCEPHIN) IV  1,000 mg IntraVENous Q24H    potassium chloride  40 mEq Oral Once    insulin lispro  0-18 Units SubCUTAneous TID WC    insulin lispro  0-9 Units SubCUTAneous Nightly    clonazePAM  0.5 mg Oral BID    sodium chloride flush  5-40 mL IntraVENous 2 times per day    sodium chloride flush  5-40 mL IntraVENous 2 times per day    heparin (porcine)  5,000 Units SubCUTAneous 3 times per day    albuterol sulfate HFA  2 puff Inhalation 4x daily    amitriptyline  75 mg Oral Nightly    amLODIPine  5 mg Oral Daily    aspirin  81 mg Oral Daily    budesonide-formoterol  2 puff Inhalation BID    clopidogrel  75 mg Oral Daily    DULoxetine  90 mg Oral Daily    levothyroxine  125 mcg Oral Daily    [Held by provider] losartan  100 mg Oral Daily    metoprolol  100 mg Oral BID    pantoprazole  40 mg Oral BID    rOPINIRole  2 mg Oral Daily    atorvastatin  20 mg Oral Daily    tiotropium  2 puff Inhalation Daily    topiramate  25 mg Oral BID    vitamin B-12  1,000 mcg Oral Daily    dexamethasone  6 mg IntraVENous Daily     Continuous Infusions:    dextrose 5 % and 0.45 % NaCl 150 mL/hr at 12/21/21 1627    sodium chloride      dextrose       PRN Medicationscyclobenzaprine, 10 mg, TID PRN  potassium chloride, 10 mEq, PRN  sodium phosphate IVPB, 0.16 mmol/kg, PRN   Or  sodium phosphate IVPB, 0.32 mmol/kg, PRN  dextrose, 12.5 g, PRN  dextrose 5 % and 0.45 % NaCl, , Continuous PRN  sodium chloride, 25 mL, PRN  acetaminophen, 650 mg, Q4H PRN  sodium chloride flush, 5-40 mL, PRN  ondansetron, 4 mg, Q8H PRN   Or  ondansetron, 4 mg, Q6H PRN  polyethylene glycol, 17 g, Daily PRN  dextrose, 12.5 g, PRN  dextrose, 100 mL/hr, PRN  glucose, 15 g, PRN  glucagon (rDNA), 1 mg, PRN  benzonatate, 100 mg, TID PRN        Diagnostic Labs:  CBC:   Recent Labs     12/20/21  1702 12/21/21  0250 12/23/21  0651   WBC 8.9 7.6 10.5   RBC 3.85* 3.89* 3.68*   HGB 11.5* 11.4* 10.8*   HCT 33.8* 33.8* 33.1*   MCV 87.8 86.9 89.9   RDW 12.4 12.3 12.7    265 326     BMP:   Recent Labs     12/21/21  1949 12/22/21  0443 12/23/21  0651   * 134* 138   K 3.5* 4.8 3.8    102 108*   CO2 17* 16* 19*   PHOS 1.3* 2.6 2.3*   BUN 17 18 18   CREATININE 0.75 0.91* 0.78     BNP: No results for input(s): BNP in the last 72 hours.  PT/INR: No results for input(s): PROTIME, INR in the last 72 hours. APTT: No results for input(s): APTT in the last 72 hours. CARDIAC ENZYMES: No results for input(s): CKMB, CKMBINDEX, TROPONINI in the last 72 hours. Invalid input(s): CKTOTAL;3  FASTING LIPID PANEL:  Lab Results   Component Value Date    CHOL 206 (H) 10/17/2020    HDL 55 10/17/2020    TRIG 74 10/17/2020     LIVER PROFILE: No results for input(s): AST, ALT, ALB, BILIDIR, BILITOT, ALKPHOS in the last 72 hours. MICROBIOLOGY:   Lab Results   Component Value Date/Time    CULTURE ESCHERICHIA COLI >948959 CFU/ML (A) 12/21/2021 05:27 AM       Imaging:    XR CHEST PORTABLE    Result Date: 12/20/2021  Peripheral and basal predominant pulmonary opacities which can be seen in the setting of COVID-19 pneumonia. 1.5 cm masslike area of consolidation in the periphery of the right lung base. Imaging follow-up to resolution is advised to exclude the presence of an underlying nodule. ASSESSMENT & PLAN     Assessment and Plan: Active Problems:    Essential hypertension    Type 1 diabetes mellitus with retinopathy (HCC)    Hypoxia    Chronic obstructive pulmonary disease ([address]    COVID-19    CHF (congestive heart failure) (HCC)    COVID    Diabetic ketoacidosis type 1 diabetes mellitus ([address]    PARUL (acute kidney injury) ([facility]    UTI (urinary tract infection)  Resolved Problems:    * No resolved hospital problems. *    Covid 19  - tested positive 1 week ago  - cxr shows peripheral and basal pulmonary opacities.  Also 1.5 cm consolidation   - decadron  - O2 as needed  - ID consult     COPD  - O2 as needed  - home bronchodilators as needed.      CHF  - last EF is 55%  - will monitor for fluid overload      T1DM  - insulin pump is broken   - glucose is controlled   - beta-hydroxybutaryate positive   - 20 lantus in AM and 20in PM  - bmp Q6     Pulmonary nodule  - 3mm nodule found on CT chest  - will need imaging fu in 1 year        Anahy Maria MD  Internal Medicine Resident  [address] [facility]  12/23/2021 12:56 PM      Attestation and add on       I have discussed the care of Quinton Spaulding , including pertinent history and exam findings,      12/23/21    with the resident. I have seen and examined the patient and the key elements of all parts of the encounter have been performed by me . I agree with the assessment, plan and orders as documented by the resident. Active Problems:    Essential hypertension    Type 1 diabetes mellitus with retinopathy (HCC)    Hypoxia    Chronic obstructive pulmonary disease ([address]    COVID-19    CHF (congestive heart failure) (HCC)    COVID    Diabetic ketoacidosis type 1 diabetes mellitus ([address]    PARUL (acute kidney injury) ([address]    UTI (urinary tract infection)  Resolved Problems:    * No resolved hospital problems. *         ---- ;     MD JODI Goddard [address], [address] [place] [address].    Phone [phone]   Fax: [phone]  Answering Service: [phone]

## 2021-12-23 NOTE — PLAN OF CARE
Problem: Airway Clearance - Ineffective  Goal: Achieve or maintain patent airway  12/23/2021 0754 by Kim Lujan RN  Outcome: Ongoing  12/22/2021 1856 by Alfredito Abad RN  Outcome: Ongoing     Problem: Gas Exchange - Impaired  Goal: Absence of hypoxia  12/23/2021 0754 by Kim Lujan RN  Outcome: Ongoing  12/22/2021 1856 by Alfredito Abad RN  Outcome: Ongoing  Goal: Promote optimal lung function  12/23/2021 0754 by Kim Lujan RN  Outcome: Ongoing  12/22/2021 1856 by Alfredito Abad RN  Outcome: Ongoing     Problem: Breathing Pattern - Ineffective  Goal: Ability to achieve and maintain a regular respiratory rate  12/23/2021 0754 by Kim Lujan RN  Outcome: Ongoing  12/22/2021 1856 by Alfredito Abad RN  Outcome: Ongoing     Problem:  Body Temperature -  Risk of, Imbalanced  Goal: Ability to maintain a body temperature within defined limits  12/23/2021 0754 by Kim Lujan RN  Outcome: Ongoing  12/22/2021 1856 by Alfredito Abad RN  Outcome: Ongoing  Goal: Will regain or maintain usual level of consciousness  12/23/2021 0754 by Kim Lujan RN  Outcome: Ongoing  12/22/2021 1856 by Alfredito Abad RN  Outcome: Ongoing  Goal: Complications related to the disease process, condition or treatment will be avoided or minimized  12/23/2021 0754 by Kim Lujan RN  Outcome: Ongoing  12/22/2021 1856 by Alfredito Abad RN  Outcome: Ongoing     Problem: Isolation Precautions - Risk of Spread of Infection  Goal: Prevent transmission of infection  12/23/2021 0754 by Kim Lujan RN  Outcome: Ongoing  12/22/2021 1856 by lAfredito Abad RN  Outcome: Ongoing     Problem: Nutrition Deficits  Goal: Optimize nutritional status  12/23/2021 0754 by Kim Lujan RN  Outcome: Ongoing  12/22/2021 1856 by Alfredito Abad RN  Outcome: Ongoing     Problem: Risk for Fluid Volume Deficit  Goal: Maintain normal heart rhythm  12/23/2021 0754 by Kim Lujan RN  Outcome: Ongoing  12/22/2021 1856 by Alfredito Abad RN  Outcome: Ongoing  Goal: Maintain absence of muscle cramping  12/23/2021 0754 by Lizandro Leigh RN  Outcome: Ongoing  12/22/2021 1856 by Keesha Mi RN  Outcome: Ongoing  Goal: Maintain normal serum potassium, sodium, calcium, phosphorus, and pH  12/23/2021 0754 by Lizandro Leigh RN  Outcome: Ongoing  12/22/2021 1856 by Keesha Mi RN  Outcome: Ongoing     Problem: Loneliness or Risk for Loneliness  Goal: Demonstrate positive use of time alone when socialization is not possible  12/23/2021 0754 by Lizandro Leigh RN  Outcome: Ongoing  12/22/2021 1856 by Keesha Mi RN  Outcome: Ongoing     Problem: Fatigue  Goal: Verbalize increase energy and improved vitality  12/23/2021 0754 by Lizandro Leigh RN  Outcome: Ongoing  12/22/2021 1856 by Keesha Mi RN  Outcome: Ongoing     Problem: Patient Education: Go to Patient Education Activity  Goal: Patient/Family Education  12/23/2021 0754 by Lizandro Leigh RN  Outcome: Ongoing  12/22/2021 1856 by Keesha Mi RN  Outcome: Ongoing     Problem: Falls - Risk of:  Goal: Will remain free from falls  Description: Will remain free from falls  12/23/2021 0754 by Lizandro Leigh RN  Outcome: Ongoing  12/22/2021 1856 by Keesha Mi RN  Outcome: Ongoing  Goal: Absence of physical injury  Description: Absence of physical injury  12/23/2021 0754 by Lizandro Leigh RN  Outcome: Ongoing  12/22/2021 1856 by Keesha Mi RN  Outcome: Ongoing

## 2021-12-23 NOTE — PROGRESS NOTES
CLINICAL PHARMACY NOTE: MEDS TO BEDS    Total # of Prescriptions Filled: 2   The following medications were delivered to the patient:  · Dexamethasone 6 mg tab  · Cephalexin 500 mg cap    Additional Documentation:Medications delivered I left by computer out side of room per RN request she was in a nother room ,but came out and got them just as I was walking away.  @ 3:38 pm

## 2021-12-23 NOTE — PLAN OF CARE
Problem: Airway Clearance - Ineffective  Goal: Achieve or maintain patent airway  12/23/2021 1809 by Vani Palma RN  Outcome: Ongoing  12/23/2021 0754 by Eulogio Sandoval RN  Outcome: Ongoing     Problem: Gas Exchange - Impaired  Goal: Absence of hypoxia  12/23/2021 1809 by Vani Palma RN  Outcome: Ongoing  12/23/2021 0754 by Eulogio Sandoval RN  Outcome: Ongoing  Goal: Promote optimal lung function  12/23/2021 1809 by Vani Palma RN  Outcome: Ongoing  12/23/2021 0754 by Eulogio Sandoval RN  Outcome: Ongoing     Problem: Breathing Pattern - Ineffective  Goal: Ability to achieve and maintain a regular respiratory rate  12/23/2021 1809 by Vani Palma RN  Outcome: Ongoing  12/23/2021 0754 by Eulogio Sandoval RN  Outcome: Ongoing     Problem:  Body Temperature -  Risk of, Imbalanced  Goal: Ability to maintain a body temperature within defined limits  12/23/2021 1809 by Vani Palma RN  Outcome: Ongoing  12/23/2021 0754 by Eulogio Sandoval RN  Outcome: Ongoing  Goal: Will regain or maintain usual level of consciousness  12/23/2021 1809 by Vani Palma RN  Outcome: Ongoing  12/23/2021 0754 by Eulogio Sandvoal RN  Outcome: Ongoing  Goal: Complications related to the disease process, condition or treatment will be avoided or minimized  12/23/2021 1809 by Vani Palma RN  Outcome: Ongoing  12/23/2021 0754 by Eulogio Sandoval RN  Outcome: Ongoing     Problem: Isolation Precautions - Risk of Spread of Infection  Goal: Prevent transmission of infection  12/23/2021 1809 by Vani Palma RN  Outcome: Ongoing  12/23/2021 0754 by Eulogio Sandoval RN  Outcome: Ongoing     Problem: Nutrition Deficits  Goal: Optimize nutritional status  12/23/2021 1809 by Vani Palma RN  Outcome: Ongoing  12/23/2021 0754 by Eulogio Sandoval RN  Outcome: Ongoing     Problem: Risk for Fluid Volume Deficit  Goal: Maintain normal heart rhythm  12/23/2021 1809 by Vani Palma RN  Outcome: Ongoing  12/23/2021 0754 by Donnalee Rochester, RN  Outcome: Ongoing  Goal: Maintain absence of muscle cramping  12/23/2021 1809 by Deborah Pulliam RN  Outcome: Ongoing  12/23/2021 0754 by Kim Lujan RN  Outcome: Ongoing  Goal: Maintain normal serum potassium, sodium, calcium, phosphorus, and pH  12/23/2021 1809 by Deborah Pulliam RN  Outcome: Ongoing  12/23/2021 0754 by Kim Lujan RN  Outcome: Ongoing     Problem: Loneliness or Risk for Loneliness  Goal: Demonstrate positive use of time alone when socialization is not possible  12/23/2021 1809 by Deborah Pulliam RN  Outcome: Ongoing  12/23/2021 0754 by Kim Lujan RN  Outcome: Ongoing     Problem: Fatigue  Goal: Verbalize increase energy and improved vitality  12/23/2021 1809 by Deborah Pulliam RN  Outcome: Ongoing  12/23/2021 0754 by Kim Lujan RN  Outcome: Ongoing     Problem: Patient Education: Go to Patient Education Activity  Goal: Patient/Family Education  12/23/2021 1809 by Deborah Pulliam RN  Outcome: Ongoing  12/23/2021 0754 by Kim Lujan RN  Outcome: Ongoing     Problem: Falls - Risk of:  Goal: Will remain free from falls  Description: Will remain free from falls  12/23/2021 1809 by Deborah Pulliam RN  Outcome: Ongoing  12/23/2021 0754 by Kim Lujan RN  Outcome: Ongoing  Goal: Absence of physical injury  Description: Absence of physical injury  12/23/2021 1809 by Deborah Pulliam RN  Outcome: Ongoing  12/23/2021 0754 by Kim Lujan RN  Outcome: Ongoing

## 2021-12-24 VITALS
RESPIRATION RATE: 18 BRPM | HEART RATE: 71 BPM | BODY MASS INDEX: 29.72 KG/M2 | WEIGHT: 178.6 LBS | OXYGEN SATURATION: 95 % | DIASTOLIC BLOOD PRESSURE: 77 MMHG | SYSTOLIC BLOOD PRESSURE: 139 MMHG | TEMPERATURE: 98.5 F

## 2021-12-24 LAB
ABSOLUTE EOS #: 0.03 K/UL (ref 0–0.44)
ABSOLUTE IMMATURE GRANULOCYTE: 0.08 K/UL (ref 0–0.3)
ABSOLUTE LYMPH #: 2.7 K/UL (ref 1.1–3.7)
ABSOLUTE MONO #: 0.64 K/UL (ref 0.1–1.2)
ANION GAP SERPL CALCULATED.3IONS-SCNC: 11 MMOL/L (ref 9–17)
BASOPHILS # BLD: 0 % (ref 0–2)
BASOPHILS ABSOLUTE: <0.03 K/UL (ref 0–0.2)
BUN BLDV-MCNC: 15 MG/DL (ref 6–20)
BUN/CREAT BLD: ABNORMAL (ref 9–20)
C-REACTIVE PROTEIN: 9.7 MG/L (ref 0–5)
CALCIUM SERPL-MCNC: 8.3 MG/DL (ref 8.6–10.4)
CHLORIDE BLD-SCNC: 106 MMOL/L (ref 98–107)
CO2: 21 MMOL/L (ref 20–31)
CREAT SERPL-MCNC: 0.86 MG/DL (ref 0.5–0.9)
DIFFERENTIAL TYPE: ABNORMAL
EOSINOPHILS RELATIVE PERCENT: 0 % (ref 1–4)
GFR AFRICAN AMERICAN: >60 ML/MIN
GFR NON-AFRICAN AMERICAN: >60 ML/MIN
GFR SERPL CREATININE-BSD FRML MDRD: ABNORMAL ML/MIN/{1.73_M2}
GFR SERPL CREATININE-BSD FRML MDRD: ABNORMAL ML/MIN/{1.73_M2}
GLUCOSE BLD-MCNC: 152 MG/DL (ref 70–99)
GLUCOSE BLD-MCNC: 153 MG/DL (ref 65–105)
HCT VFR BLD CALC: 33.7 % (ref 36.3–47.1)
HEMOGLOBIN: 10.8 G/DL (ref 11.9–15.1)
IMMATURE GRANULOCYTES: 1 %
LYMPHOCYTES # BLD: 35 % (ref 24–43)
MCH RBC QN AUTO: 28.6 PG (ref 25.2–33.5)
MCHC RBC AUTO-ENTMCNC: 32 G/DL (ref 28.4–34.8)
MCV RBC AUTO: 89.4 FL (ref 82.6–102.9)
MONOCYTES # BLD: 8 % (ref 3–12)
NRBC AUTOMATED: 0 PER 100 WBC
PDW BLD-RTO: 12.7 % (ref 11.8–14.4)
PHOSPHORUS: 3 MG/DL (ref 2.6–4.5)
PLATELET # BLD: 322 K/UL (ref 138–453)
PLATELET ESTIMATE: ABNORMAL
PMV BLD AUTO: 9.9 FL (ref 8.1–13.5)
POTASSIUM SERPL-SCNC: 3.8 MMOL/L (ref 3.7–5.3)
RBC # BLD: 3.77 M/UL (ref 3.95–5.11)
RBC # BLD: ABNORMAL 10*6/UL
SEG NEUTROPHILS: 56 % (ref 36–65)
SEGMENTED NEUTROPHILS ABSOLUTE COUNT: 4.24 K/UL (ref 1.5–8.1)
SODIUM BLD-SCNC: 138 MMOL/L (ref 135–144)
WBC # BLD: 7.7 K/UL (ref 3.5–11.3)
WBC # BLD: ABNORMAL 10*3/UL

## 2021-12-24 PROCEDURE — 84100 ASSAY OF PHOSPHORUS: CPT

## 2021-12-24 PROCEDURE — 6370000000 HC RX 637 (ALT 250 FOR IP): Performed by: STUDENT IN AN ORGANIZED HEALTH CARE EDUCATION/TRAINING PROGRAM

## 2021-12-24 PROCEDURE — 36415 COLL VENOUS BLD VENIPUNCTURE: CPT

## 2021-12-24 PROCEDURE — 82947 ASSAY GLUCOSE BLOOD QUANT: CPT

## 2021-12-24 PROCEDURE — 6360000002 HC RX W HCPCS: Performed by: STUDENT IN AN ORGANIZED HEALTH CARE EDUCATION/TRAINING PROGRAM

## 2021-12-24 PROCEDURE — 80048 BASIC METABOLIC PNL TOTAL CA: CPT

## 2021-12-24 PROCEDURE — 85025 COMPLETE CBC W/AUTO DIFF WBC: CPT

## 2021-12-24 PROCEDURE — 99232 SBSQ HOSP IP/OBS MODERATE 35: CPT | Performed by: INTERNAL MEDICINE

## 2021-12-24 PROCEDURE — 86140 C-REACTIVE PROTEIN: CPT

## 2021-12-24 RX ADMIN — TIOTROPIUM BROMIDE INHALATION SPRAY 2 PUFF: 3.12 SPRAY, METERED RESPIRATORY (INHALATION) at 08:24

## 2021-12-24 RX ADMIN — DULOXETINE HYDROCHLORIDE 90 MG: 30 CAPSULE, DELAYED RELEASE ORAL at 08:26

## 2021-12-24 RX ADMIN — INSULIN LISPRO 3 UNITS: 100 INJECTION, SOLUTION INTRAVENOUS; SUBCUTANEOUS at 08:28

## 2021-12-24 RX ADMIN — PANTOPRAZOLE SODIUM 40 MG: 40 TABLET, DELAYED RELEASE ORAL at 08:25

## 2021-12-24 RX ADMIN — AMLODIPINE BESYLATE 5 MG: 5 TABLET ORAL at 08:26

## 2021-12-24 RX ADMIN — Medication 2 PUFF: at 08:20

## 2021-12-24 RX ADMIN — CLOPIDOGREL 75 MG: 75 TABLET, FILM COATED ORAL at 08:26

## 2021-12-24 RX ADMIN — ROPINIROLE HYDROCHLORIDE 2 MG: 1 TABLET, FILM COATED ORAL at 08:26

## 2021-12-24 RX ADMIN — LEVOTHYROXINE SODIUM 125 MCG: 125 TABLET ORAL at 08:26

## 2021-12-24 RX ADMIN — Medication 81 MG: at 08:26

## 2021-12-24 RX ADMIN — CLONAZEPAM 0.5 MG: 1 TABLET ORAL at 08:27

## 2021-12-24 RX ADMIN — TOPIRAMATE 25 MG: 25 TABLET, FILM COATED ORAL at 08:25

## 2021-12-24 RX ADMIN — METOPROLOL TARTRATE 100 MG: 50 TABLET, FILM COATED ORAL at 08:25

## 2021-12-24 RX ADMIN — INSULIN GLARGINE 20 UNITS: 100 INJECTION, SOLUTION SUBCUTANEOUS at 08:36

## 2021-12-24 RX ADMIN — HEPARIN SODIUM 5000 UNITS: 5000 INJECTION INTRAVENOUS; SUBCUTANEOUS at 06:02

## 2021-12-24 RX ADMIN — Medication 1000 MCG: at 08:25

## 2021-12-24 RX ADMIN — ATORVASTATIN CALCIUM 20 MG: 20 TABLET, FILM COATED ORAL at 08:25

## 2021-12-24 RX ADMIN — CYCLOBENZAPRINE 10 MG: 10 TABLET, FILM COATED ORAL at 08:26

## 2021-12-24 RX ADMIN — BUDESONIDE AND FORMOTEROL FUMARATE DIHYDRATE 2 PUFF: 80; 4.5 AEROSOL RESPIRATORY (INHALATION) at 08:20

## 2021-12-24 NOTE — PLAN OF CARE
Leonie Vasquez RN  Outcome: Ongoing  12/23/2021 1809 by Jarred Barton RN  Outcome: Ongoing     Problem: Patient Education: Go to Patient Education Activity  Goal: Patient/Family Education  12/23/2021 2332 by Kamilla Mcclelland RN  Outcome: Ongoing  12/23/2021 1809 by Jarred Barton RN  Outcome: Ongoing     Problem: Falls - Risk of:  Goal: Will remain free from falls  Description: Will remain free from falls  12/23/2021 2332 by Kamilla Mcclelland RN  Outcome: Ongoing  12/23/2021 1809 by Jarred Barton RN  Outcome: Ongoing  Goal: Absence of physical injury  Description: Absence of physical injury  12/23/2021 2332 by Kamilla Mcclelland RN  Outcome: Ongoing  12/23/2021 1809 by Jarred Barton RN  Outcome: Ongoing

## 2021-12-24 NOTE — PROGRESS NOTES
[facility]  Internal Medicine Teaching Residency Program  Inpatient Daily Progress Note  ______________________________________________________________________________    Patient: Leslie Ray  YOB: 1960   [facility]:[id number]    Acct: [de-identified]     Room:   Admit date: 2021  Today's date: 21  Number of days in the hospital: 4    SUBJECTIVE   Admitting Diagnosis: <principal problem not specified>  CC: COVID DKA   Pt examined at bedside. Chart & results reviewed. HDS afebrile ON. Blood glucose is controlled. On RA will DC today. ROS:  Constitutional:  negative for chills, fevers, sweats  Respiratory:  negative for cough, dyspnea on exertion, hemoptysis, shortness of breath, wheezing  Cardiovascular:  negative for chest pain, chest pressure/discomfort, lower extremity edema, palpitations  Gastrointestinal:  negative for abdominal pain, constipation, diarrhea, nausea, vomiting  Neurological:  negative for dizziness, headache    BRIEF HISTORY     63 Y/F who has past medical history of type 1 DM with retinopathy on insulin pump which is currently not working properly,COPD, essential hypertension, diastolic heart failure,hypothyroidism, restless leg syndrome present and CAD presented in ed for concern of shortness of breathe who was tested positive for Covid on 12/15/2021 but did not required any medical treatment. Had loose stool for couple of days. Denied chest pain, fever, bilateral leg swelling. On my evaluation patient was hemodynamically stable and saturating well in 5 liter of oxygen.     OBJECTIVE     Vital Signs:  /77   Pulse 71   Temp 98.5 °F (36.9 °C) (Oral)   Resp 18   Wt 178 lb 9.6 oz (81 kg)   SpO2 95%   BMI 29.72 kg/m²     Temp (24hrs), Av.7 °F (36.5 °C), Min:97.3 °F (36.3 °C), Max:98.5 °F (36.9 °C)    In: 300   Out: 200 [Urine:200]    Physical Exam:  Physical Exam  Constitutional:       General: She is not in acute distress. Appearance: Normal appearance. She is obese. She is not ill-appearing, toxic-appearing or diaphoretic. HENT:      Mouth/Throat:      Mouth: Mucous membranes are moist.      Pharynx: Oropharynx is clear. Eyes:      General: No scleral icterus. Cardiovascular:      Rate and Rhythm: Normal rate and regular rhythm. Pulses: Normal pulses. Heart sounds: Normal heart sounds. No murmur heard. No friction rub. No gallop. Pulmonary:      Effort: Pulmonary effort is normal.      Breath sounds: Normal breath sounds. Abdominal:      General: Abdomen is flat. Bowel sounds are normal. There is no distension. Palpations: Abdomen is soft. There is no mass. Tenderness: There is no abdominal tenderness. There is no guarding or rebound. Hernia: No hernia is present. Musculoskeletal:         General: No swelling, tenderness, deformity or signs of injury. Right lower leg: No edema. Left lower leg: No edema. Skin:     General: Skin is warm and dry. Coloration: Skin is not jaundiced or pale. Findings: No bruising. Neurological:      General: No focal deficit present. Mental Status: She is alert and oriented to person, place, and time. Cranial Nerves: No cranial nerve deficit. Motor: No weakness. Psychiatric:         Mood and Affect: Mood normal.         Behavior: Behavior normal.         Thought Content:  Thought content normal.         Judgment: Judgment normal.           Medications:  Scheduled Medications:    insulin glargine  20 Units SubCUTAneous QAM    insulin glargine  20 Units SubCUTAneous Nightly    cefTRIAXone (ROCEPHIN) IV  1,000 mg IntraVENous Q24H    potassium chloride  40 mEq Oral Once    insulin lispro  0-18 Units SubCUTAneous TID WC    insulin lispro  0-9 Units SubCUTAneous Nightly    clonazePAM  0.5 mg Oral BID    sodium chloride flush  5-40 mL IntraVENous 2 times per day    sodium chloride flush  5-40 mL IntraVENous 2 times per day    heparin (porcine)  5,000 Units SubCUTAneous 3 times per day    albuterol sulfate HFA  2 puff Inhalation 4x daily    amitriptyline  75 mg Oral Nightly    amLODIPine  5 mg Oral Daily    aspirin  81 mg Oral Daily    budesonide-formoterol  2 puff Inhalation BID    clopidogrel  75 mg Oral Daily    DULoxetine  90 mg Oral Daily    levothyroxine  125 mcg Oral Daily    [Held by provider] losartan  100 mg Oral Daily    metoprolol  100 mg Oral BID    pantoprazole  40 mg Oral BID    rOPINIRole  2 mg Oral Daily    atorvastatin  20 mg Oral Daily    tiotropium  2 puff Inhalation Daily    topiramate  25 mg Oral BID    vitamin B-12  1,000 mcg Oral Daily    dexamethasone  6 mg IntraVENous Daily     Continuous Infusions:    dextrose 5 % and 0.45 % NaCl 150 mL/hr at 12/21/21 1627    sodium chloride      dextrose       PRN Medicationscyclobenzaprine, 10 mg, TID PRN  potassium chloride, 10 mEq, PRN  sodium phosphate IVPB, 0.16 mmol/kg, PRN   Or  sodium phosphate IVPB, 0.32 mmol/kg, PRN  dextrose, 12.5 g, PRN  dextrose 5 % and 0.45 % NaCl, , Continuous PRN  sodium chloride, 25 mL, PRN  acetaminophen, 650 mg, Q4H PRN  sodium chloride flush, 5-40 mL, PRN  ondansetron, 4 mg, Q8H PRN   Or  ondansetron, 4 mg, Q6H PRN  polyethylene glycol, 17 g, Daily PRN  dextrose, 12.5 g, PRN  dextrose, 100 mL/hr, PRN  glucose, 15 g, PRN  glucagon (rDNA), 1 mg, PRN  benzonatate, 100 mg, TID PRN        Diagnostic Labs:  CBC:   Recent Labs     12/23/21  0651 12/24/21  0617   WBC 10.5 7.7   RBC 3.68* 3.77*   HGB 10.8* 10.8*   HCT 33.1* 33.7*   MCV 89.9 89.4   RDW 12.7 12.7    322     BMP:   Recent Labs     12/22/21  0443 12/23/21  0651 12/24/21  0617   * 138 138   K 4.8 3.8 3.8    108* 106   CO2 16* 19* 21   PHOS 2.6 2.3* 3.0   BUN 18 18 15   CREATININE 0.91* 0.78 0.86     BNP: No results for input(s): BNP in the last 72 hours.   PT/INR: No results for input(s): PROTIME, INR in the last 72 hours.  APTT: No results for input(s): APTT in the last 72 hours. CARDIAC ENZYMES: No results for input(s): CKMB, CKMBINDEX, TROPONINI in the last 72 hours. Invalid input(s): CKTOTAL;3  FASTING LIPID PANEL:  Lab Results   Component Value Date    CHOL 206 (H) 10/17/2020    HDL 55 10/17/2020    TRIG 74 10/17/2020     LIVER PROFILE: No results for input(s): AST, ALT, ALB, BILIDIR, BILITOT, ALKPHOS in the last 72 hours. MICROBIOLOGY:   Lab Results   Component Value Date/Time    CULTURE ESCHERICHIA COLI >861980 CFU/ML (A) 12/21/2021 05:27 AM       Imaging:    XR CHEST PORTABLE    Result Date: 12/20/2021  Peripheral and basal predominant pulmonary opacities which can be seen in the setting of COVID-19 pneumonia. 1.5 cm masslike area of consolidation in the periphery of the right lung base. Imaging follow-up to resolution is advised to exclude the presence of an underlying nodule. ASSESSMENT & PLAN     Assessment and Plan: Active Problems:    Essential hypertension    Type 1 diabetes mellitus with retinopathy (HCC)    Hypoxia    Chronic obstructive pulmonary disease ([address]    COVID-19    CHF (congestive heart failure) (HCC)    COVID    Diabetic ketoacidosis type 1 diabetes mellitus ([address]    PARUL (acute kidney injury) ([facility]    UTI (urinary tract infection)  Resolved Problems:    * No resolved hospital problems. *    Covid 19  - tested positive 1 week ago  - cxr shows peripheral and basal pulmonary opacities.  Also 1.5 cm consolidation   - decadron  - O2 as needed  - ID consult     COPD  - O2 as needed  - home bronchodilators as needed.      CHF  - last EF is 55%  - will monitor for fluid overload      T1DM  - insulin pump is broken   - glucose is controlled   - beta-hydroxybutaryate positive   - 20 lantus in AM and 20in PM  - bmp Q6     Pulmonary nodule  - 3mm nodule found on CT chest  - will need imaging fu in 1 year    Dispo: discharge today        Daniel Harris MD  Internal Medicine Resident  [facility] [address]  12/24/2021 9:46 AM      Attestation and add on       I have discussed the care of Horton Cheadle , including pertinent history and exam findings,      12/24/21    with the resident. I have seen and examined the patient and the key elements of all parts of the encounter have been performed by me . I agree with the assessment, plan and orders as documented by the resident. Active Problems:    Essential hypertension    Type 1 diabetes mellitus with retinopathy (HCC)    Hypoxia    Chronic obstructive pulmonary disease ([facility]    COVID-19    CHF (congestive heart failure) ([facility]    COVID    Diabetic ketoacidosis type 1 diabetes mellitus ([facility]    PARUL (acute kidney injury) ([facility]    UTI (urinary tract infection)  Resolved Problems:    * No resolved hospital problems. *         ---- ;     MD JODI Espitia [address], [address] [place] [address].    Phone [phone]   Fax: [phone]  Answering Service: [phone]

## 2021-12-26 ENCOUNTER — CARE COORDINATION (OUTPATIENT)
Dept: CASE MANAGEMENT | Age: 61
End: 2021-12-26

## 2021-12-26 NOTE — DISCHARGE SUMMARY
[facility]     Department of Internal Medicine - Staff Internal Medicine Teaching Service    INPATIENT DISCHARGE SUMMARY      Patient Identification:  Brittany Peña is a 63 y.o. female. :  1960  MRN: [id number]     Acct: [de-identified]   PCP: Kimmy Hood MD  Admit Date:  2021  Discharge date and time: 21  Attending Provider: No att. providers found                                     [address] Problem Lists:  Active Problems:    Essential hypertension    Type 1 diabetes mellitus with retinopathy ([address]    Hypoxia    Chronic obstructive pulmonary disease ([address]    COVID-19    CHF (congestive heart failure) ([address]    COVID    Diabetic ketoacidosis type 1 diabetes mellitus ([address]    PARUL (acute kidney injury) ([address]    UTI (urinary tract infection)  Resolved Problems:    * No resolved hospital problems. *      HOSPITAL STAY     Brief Inpatient course:   63 Y/F who has past medical history of type 1 DM with retinopathy on insulin pump which is currently not working properly,COPD, essential hypertension, diastolic heart failure,hypothyroidism, restless leg syndrome present and CAD presented in ed for concern of shortness of breathe who was tested positive for Covid on 12/15/2021 but did not required any medical treatment. Had loose stool for couple of days. Denied chest pain, fever, bilateral leg swelling. initially on 5L of O2. Patient was also found to be in DKA. Started on fluids and insulin. Anion gap closed and started bridging the next day. O2 weaned down to 2L and was DC with home O2 eval     Procedures/ Significant Interventions:      CT CHEST WO CONTRAST    Result Date: 2021  Commonly reported imaging features of COVID-19 pneumonia are present. Other processes such as influenza pneumonia and organizing pneumonia, as can be seen with drug toxicity and connective tissue disease, can cause a similar imaging pattern. Finding on prior chest radiograph corresponds to a focal opacity in the lateral segment right middle lobe. 3 mm subpleural nodule in the right upper lobe. See recommendations below. 3 vessel coronary artery disease. Hypodense intravascular blood pool which can be seen in the setting of anemia. Correlate clinically. Cholecystectomy with a 4 mm adjacent choledocholith. 1 mm left upper pole nonobstructing nephrolith. Fat containing midline ventral hernia 5 cm below the xiphoid with mild associated fat stranding. Correlate with physical exam. RECOMMENDATIONS: 3 mm right solid pulmonary nodule within the upper lobe. If patient is low risk for malignancy, no routine follow-up imaging is recommended; if patient is high risk for malignancy, a non-contrast Chest CT at 12 months is optional. If performed and the nodule is stable at 12 months, no further follow-up is recommended. These guidelines do not apply to immunocompromised patients and patients with cancer. Follow up in patients with significant comorbidities as clinically warranted. For lung cancer screening, adhere to Lung-RADS guidelines. Reference: Radiology. 2017; 284(1):228-43. XR CHEST PORTABLE    Result Date: 12/20/2021  Peripheral and basal predominant pulmonary opacities which can be seen in the setting of COVID-19 pneumonia. 1.5 cm masslike area of consolidation in the periphery of the right lung base. Imaging follow-up to resolution is advised to exclude the presence of an underlying nodule.            Consults:     Consults:     Final Specialist Recommendations/Findings:   IP CONSULT TO INTERNAL MEDICINE  IP CONSULT TO INFECTIOUS DISEASES      Any Hospital Acquired Infections: none    Discharge Functional Status:  stable    DISCHARGE PLAN     Disposition: home    Patient Instructions:   Discharge Medication List as of 12/24/2021  9:09 AM        START taking these medications    Details   dexamethasone (DECADRON) 6 MG tablet Take 1 tablet by mouth daily (with breakfast) for 6 days, Disp-6 tablet, R-0Normal      cephALEXin (KEFLEX) 500 MG capsule Take 1 capsule by mouth 2 times daily for 5 days, Disp-10 capsule, R-0Normal           CONTINUE these medications which have NOT CHANGED    Details   vitamin B-12 (CYANOCOBALAMIN) 1000 MCG tablet Take 1,000 mcg by mouth dailyHistorical Med      benzonatate (TESSALON PERLES) 100 MG capsule Take 1 capsule by mouth 3 times daily as needed for Cough, Disp-30 capsule, R-0Normal      ondansetron (ZOFRAN) 4 MG tablet Take 1 tablet by mouth every 8 hours as needed for Nausea or Vomiting, Disp-30 tablet, R-0Print      acetaminophen (TYLENOL) 325 MG tablet Take 650 mg by mouth every 6 hours as needed for PainHistorical Med      clopidogrel (PLAVIX) 75 MG tablet Take 75 mg by mouth dailyHistorical Med      Albuterol Sulfate (PROAIR HFA IN) Inhale into the lungsHistorical Med      simvastatin (ZOCOR) 20 MG tablet Take 20 mg by mouth nightlyHistorical Med      amLODIPine (NORVASC) 5 MG tablet Take 1 tablet by mouth once daily, Disp-90 tablet, R-1Normal      aspirin 81 MG tablet Take 1 tablet by mouth daily, Disp-30 tablet,R-0Normal      topiramate (TOPAMAX) 25 MG tablet Take 1 tablet by mouth 2 times daily, Disp-60 tablet,R-3Normal      DULoxetine (CYMBALTA) 30 MG extended release capsule Take 90 mg by mouth dailyHistorical Med      metoclopramide (REGLAN) 10 MG tablet Take 10 mg by mouth 2 times daily Historical Med      metoprolol (LOPRESSOR) 100 MG tablet Take 100 mg by mouth 2 times dailyHistorical Med      insulin aspart (NOVOLOG PENFILL) 100 UNIT/ML injection cartridge Inject into the skin continuous Per insulin pumpHistorical Med      amitriptyline (ELAVIL) 150 MG tablet TAKE 1 TABLET BY MOUTH NIGHTLY, Disp-30 tablet, R-0Normal      clonazePAM (KLONOPIN) 0.5 MG tablet TAKE 1 TABLET BY MOUTH TWICE DAILY AS NEEDED FOR ANXIETY OR SLEEP, Disp-60 tablet, R-2Print      pantoprazole (PROTONIX) 40 MG tablet Take 40 mg by mouth 2 times dailyHistorical Med      losartan (COZAAR) 100 MG tablet TAKE 1 TABLET BY MOUTH ONCE DAILY, Disp-90 tablet, R-3Normal      rOPINIRole (REQUIP) 2 MG tablet Take 1 tablet by mouth daily, Disp-90 tablet, R-3Normal      levothyroxine (SYNTHROID) 137 MCG tablet Take 1 tablet by mouth daily, Disp-90 tablet, R-3Normal      gabapentin (NEURONTIN) 800 MG tablet TAKE 1 TABLET BY MOUTH THREE TIMES DAILY, Disp-270 tablet, R-0Normal      BREO ELLIPTA 200-25 MCG/INH AEPB Inhale 1 puff into the lungs daily , R-6,DAWHistorical Med      tiotropium (SPIRIVA HANDIHALER) 18 MCG inhalation capsule Inhale 1 capsule into the lungs Daily, Disp-30 capsule, R-5Normal      ONE TOUCH ULTRA TEST strip R-1, DAWHistorical Med           STOP taking these medications       famotidine (PEPCID) 20 MG tablet Comments:   Reason for Stopping:               Activity: activity as tolerated    Diet: regular diet    Follow-up:    Solange Perea MD  [address] [place] [address]  [phone]    In 3 days        Patient Instructions:   Coronavirus (COVID-19): Care Instructions  Overview  The coronavirus disease (COVID-19) is caused by a virus. Symptoms may include a fever, a cough, and shortness of breath. It can spread through droplets from coughing and sneezing, breathing, and singing. The virus also can spread when people are in close contact with someone who is infected. Most people have mild symptoms and can take care of themselves at home. If their symptoms get worse, they may need care in a hospital. Treatment may include medicines to reduce symptoms, plus breathing support such as oxygen therapy or a ventilator. It's important to not spread the virus to others. If you have COVID-19, wear a mask anytime you are around other people. It can help stop the spread of the virus. You need to isolate yourself while you are sick. Leave your home only if you need to get medical care or testing.   Follow-up care is a key part of your treatment and safety. Be sure to make and go to all appointments, and call your doctor if you are having problems. It's also a good idea to know your test results and keep a list of the medicines you take. How can you care for yourself at home? Get extra rest. It can help you feel better. Drink plenty of fluids. This helps replace fluids lost from fever. Fluids may also help ease a scratchy throat. You can take acetaminophen (Tylenol) or ibuprofen (Advil, Motrin) to reduce a fever. It may also help with muscle and body aches. Read and follow all instructions on the label. Use petroleum jelly on sore skin. This can help if the skin around your nose and lips becomes sore from rubbing a lot with tissues. If you use oxygen, use a water-based product instead of petroleum jelly. Keep track of symptoms such as fever and shortness of breath. This can help you know if you need to call your doctor. It can also help you know when it's safe to be around other people. In some cases, your doctor might suggest that you get a pulse oximeter. How can you self-isolate when you have COVID-19? If you have COVID-19, there are things you can do to help avoid spreading the virus to others. Limit contact with people in your home. If possible, stay in a separate bedroom and use a separate bathroom. Wear a mask when you are around other people. If you have to leave home, avoid crowds and try to stay at least 6 feet away from other people. Avoid contact with pets and other animals. Cover your mouth and nose with a tissue when you cough or sneeze. Then throw it in the trash right away. Wash your hands often, especially after you cough or sneeze. Use soap and water, and scrub for at least 20 seconds. If soap and water aren't available, use an alcohol-based hand . Don't share personal household items. These include bedding, towels, cups and glasses, and eating utensils.   [address] [place] [address] laundry in the warmest water allowed for the fabric type, and dry it completely. It's okay to wash other people's laundry with yours. Clean and disinfect your home. Use household  and disinfectant wipes or sprays. When can you end self-isolation for COVID-19? If you know or think that you have the virus, you will need to self-isolate. You can be around others after:  It's been at least 10 days since your symptoms started and  You haven't had a fever for 24 hours without taking medicines to lower the fever and  Your symptoms are improving. If you tested positive but have no symptoms, you can end isolation after 10 days. But if you start to have symptoms, follow the steps above. Ask your doctor if you need to be tested before you end isolation. This is especially important if you have a weakened immune system. When should you call for help? Call 911 anytime you think you may need emergency care. For example, call if you have life-threatening symptoms, such as: You have severe trouble breathing. (You can't talk at all.)     You have constant chest pain or pressure. You are severely dizzy or lightheaded. You are confused or can't think clearly. You have pale, gray, or blue-colored skin or lips. You pass out (lose consciousness) or are very hard to wake up. Call your doctor now or seek immediate medical care if:    You have moderate trouble breathing. (You can't speak a full sentence.)     You are coughing up blood (more than about 1 teaspoon). You have signs of low blood pressure. These include feeling lightheaded; being too weak to stand; and having cold, pale, clammy skin. Watch closely for changes in your health, and be sure to contact your doctor if:    Your symptoms get worse. You are not getting better as expected. You have new or worse symptoms of anxiety, depression, nightmares, or flashbacks.          Note that over 30 minutes was spent in preparing discharge papers, discussing discharge with patient, medication review, etc.      Quinton Mares MD  Internal Medicine Resident  [facility]  12/26/2021 11:44 AM  Attending Physician Statement  I have reviewed and edited the discharge summary of  Salvador Layne AS NEEDED  ,   including pertinent history and exam findings. I have personally seen the patient and participated in discharge planning and evaluation . I have reviewed the key elements of all parts of the discharge summary . I agree with the information and plans as documented by the resident. Time spent on discharge planning ;          [] less than 30 minutes . [x]   more  than 30 minutes . Electronically signed by MD natasha Kerr.

## 2021-12-26 NOTE — CARE COORDINATION
Provider Reji Jay   12/30/2021  3:00 PM [facility] DIGITAL RM STCZ MAMMO [facility] Radiolog     Non-[facility] follow up appointment(s):     Non-face-to-face services provided:  Obtained and reviewed discharge summary and/or continuity of care documents  Assessment and support for treatment adherence and medication management-reviewed     Advance Care Planning:   Does patient have an Advance Directive:  No ACP, educated, decision maker verified. Educated patient about risk for severe COVID-19 due to risk factors according to CDC guidelines. CTN reviewed discharge instructions, medical action plan and red flag symptoms with the patient who verbalized understanding. Discussed COVID vaccination status: Yes and pt vaccinated. Education provided on COVID-19 vaccination as appropriate. Discussed exposure protocols and quarantine with CDC Guidelines. Patient was given an opportunity to verbalize any questions and concerns and agrees to contact CTN or health care provider for questions related to their healthcare. Reviewed and educated patient on any new and changed medications related to discharge diagnosis     Was patient discharged with a pulse oximeter? No       CTN provided contact information. Plan for follow-up call in 5-7 days based on severity of symptoms and risk factors. Care Transitions 24 Hour Call    Do you have all of your prescriptions and are they filled?: Yes  Post Acute Services: Home Health (Comment:  [address] to Stay)  Patient DME: Other, Straight cane  Other Patient DME: Glucometer  Patient Home Equipment: Oxygen, Nebulizer (Comment: [phone] [facility])  Do you have support at home?: Partner/Spouse/SO  Are you an active caregiver in your home?: No  Care Transitions Interventions         Follow Up  Future Appointments   Date Time Provider Reji Jay   12/30/2021  3:00 PM [address] [place] [address] [facility] Radiolog       Steve Jackson RN

## 2021-12-27 LAB
EKG ATRIAL RATE: 89 BPM
EKG P AXIS: 34 DEGREES
EKG P-R INTERVAL: 156 MS
EKG Q-T INTERVAL: 366 MS
EKG QRS DURATION: 66 MS
EKG QTC CALCULATION (BAZETT): 445 MS
EKG R AXIS: 18 DEGREES
EKG T AXIS: 30 DEGREES
EKG VENTRICULAR RATE: 89 BPM

## 2021-12-30 ENCOUNTER — CARE COORDINATION (OUTPATIENT)
Dept: CASE MANAGEMENT | Age: 61
End: 2021-12-30

## 2021-12-30 NOTE — CARE COORDINATION
[facility] Transitions Follow Up Call    2021    Patient: Bernardo Vasquez  Patient : 1960   MRN: [id number]  Reason for Admission: Covid-19+  Discharge Date: 21 RARS: Readmission Risk Score: 10 ( )         Attempt to reach patient for Covid-19 monitoring. No answer and voice mail box full. Will attempt to contact at a later date/time. Care Transitions Subsequent and Final Call    Subsequent and Final Calls  Are you currently active with any services?: Home Health  Care Transitions Interventions  Other Interventions:            Follow Up  Future Appointments   Date Time Provider Reji Jay   2022  1:45  [address] ANDI DANIKA Chavira RN

## 2022-01-03 ENCOUNTER — CARE COORDINATION (OUTPATIENT)
Dept: CASE MANAGEMENT | Age: 62
End: 2022-01-03

## 2022-01-03 NOTE — CARE COORDINATION
[facility]    1/3/2022    Patient: Aurelia Hutchison  Patient : 1960   MRN: [id number]  Reason for Admission: Covid-19+  Discharge Date: 21 RARS: Readmission Risk Score: 10 ( )         Spoke with: Aurelia Hutchison    Was able to contact [place] for Covid-19 follow up. She stated that she was doing \"fine\". She denied any symptoms except weakness and fatigue. She stated that she will be seeing her PCP tomorrow. She had no further questions or concerns. Patient contacted regarding COVID-19 diagnosis. Discussed COVID-19 related testing which was available at this time. Test results were positive. Patient informed of results, if available? Yes    Care Transition Nurse contacted the patient by telephone to perform follow-up assessment. Verified name and  with patient as identifiers. Patient has following risk factors of: heart failure and diabetes. Symptoms reviewed with patient who verbalized the following symptoms: fatigue and no new symptoms. Due to no new or worsening symptoms encounter was not routed to provider for escalation. Educated patient about risk for severe COVID-19 due to risk factors according to CDC guidelines. CTN reviewed discharge instructions, medical action plan and red flag symptoms with the patient who verbalized understanding. Discussed COVID vaccination status: No. Education provided on COVID-19 vaccination as appropriate. Discussed exposure protocols and quarantine with CDC Guidelines. Patient was given an opportunity to verbalize any questions and concerns and agrees to contact CTN or health care provider for questions related to their healthcare. Was patient discharged with a pulse oximeter? No     CTN provided contact information. Plan for follow-up call in 5-7 days based on severity of symptoms and risk factors.         Care Transitions Subsequent and Final Call    Subsequent and Final Calls  Are you currently active with any services?: Home Health  Care Transitions Interventions  Other Interventions:            Follow Up  Future Appointments   Date Time Provider Reji Jay   1/5/2022  1:45 PM [facility] [address] [facility] ANDI DANIKA Chavira RN

## 2022-01-11 ENCOUNTER — CARE COORDINATION (OUTPATIENT)
Dept: CASE MANAGEMENT | Age: 62
End: 2022-01-11

## 2022-01-11 NOTE — CARE COORDINATION
[place] [address] Trans[place] Follow Up Call    2022    Patient: Michael Keyes  Patient : 1960   MRN: [id number]  Reason for Admission: Covid-19+  Discharge Date: 21 RARS: Readmission Risk Score: 10 ( )         Spoke with: Michael Keyes    Was able to contact Ben [facility] for [employer]. She stated that she was doing. She said that she fell recently and fell on her insulin pump and broke it. She just got a replacement and will be setting it up today with of the help of a friend. She said that she also hurt her knee. She said that she can walk, but is hard to get up from a sitting position. She will be calling her PCP for an injection. She denied any viral symptoms except the weakness. She had no further questions or concerns. Will end episode due to resolution of symptoms. Patient contacted regarding COVID-19 diagnosis. Discussed COVID-19 related testing which was available at this time. Test results were positive. Patient informed of results, if available? Yes     Care Transition Nurse contacted the patient by telephone to perform follow-up assessment. Verified name and  with patient as identifiers. Patient has following risk factors of: heart failure and diabetes.        Symptoms reviewed with patient who verbalized the following symptoms: fatigue and no new symptoms. Due to no new or worsening symptoms encounter was not routed to provider for escalation. Educated patient about risk for severe COVID-19 due to risk factors according to CDC guidelines. CTN reviewed discharge instructions, medical action plan and red flag symptoms with the patient who verbalized understanding. Discussed COVID vaccination status: No. Education provided on COVID-19 vaccination as appropriate. Discussed exposure protocols and quarantine with CDC Guidelines.  Patient was given an opportunity to verbalize any questions and concerns and agrees to contact CTN or health care provider for questions related to their healthcare.     Was patient discharged with a pulse oximeter? No      CTN provided contact information. No further outreaches needed. Episode ended. Care Transitions Subsequent and Final Call    Subsequent and Final Calls  Are you currently active with any services?: Home Health  Care Transitions Interventions  Other Interventions: Follow Up  No future appointments.     Mine Aguilar RN

## 2022-01-16 ENCOUNTER — HOSPITAL ENCOUNTER (OUTPATIENT)
Age: 62
Setting detail: SPECIMEN
Discharge: HOME | End: 2022-01-16

## 2022-01-16 ENCOUNTER — OFFICE VISIT (OUTPATIENT)
Dept: FAMILY MEDICINE CLINIC | Age: 62
End: 2022-01-16
Payer: MEDICARE

## 2022-01-16 VITALS
SYSTOLIC BLOOD PRESSURE: 136 MMHG | OXYGEN SATURATION: 98 % | TEMPERATURE: 97.2 F | HEART RATE: 63 BPM | DIASTOLIC BLOOD PRESSURE: 82 MMHG

## 2022-01-16 DIAGNOSIS — J44.1 COPD EXACERBATION (HCC): Primary | ICD-10-CM

## 2022-01-16 PROCEDURE — G8417 CALC BMI ABV UP PARAM F/U: HCPCS | Performed by: FAMILY MEDICINE

## 2022-01-16 PROCEDURE — 1111F DSCHRG MED/CURRENT MED MERGE: CPT | Performed by: FAMILY MEDICINE

## 2022-01-16 PROCEDURE — 1036F TOBACCO NON-USER: CPT | Performed by: FAMILY MEDICINE

## 2022-01-16 PROCEDURE — 99213 OFFICE O/P EST LOW 20 MIN: CPT | Performed by: FAMILY MEDICINE

## 2022-01-16 PROCEDURE — 3023F SPIROM DOC REV: CPT | Performed by: FAMILY MEDICINE

## 2022-01-16 PROCEDURE — 3017F COLORECTAL CA SCREEN DOC REV: CPT | Performed by: FAMILY MEDICINE

## 2022-01-16 PROCEDURE — G8484 FLU IMMUNIZE NO ADMIN: HCPCS | Performed by: FAMILY MEDICINE

## 2022-01-16 PROCEDURE — G8427 DOCREV CUR MEDS BY ELIG CLIN: HCPCS | Performed by: FAMILY MEDICINE

## 2022-01-16 RX ORDER — AZITHROMYCIN 250 MG/1
TABLET, FILM COATED ORAL
Qty: 1 PACKET | Refills: 0 | Status: SHIPPED | OUTPATIENT
Start: 2022-01-16 | End: 2022-07-25 | Stop reason: ALTCHOICE

## 2022-01-16 RX ORDER — PREDNISONE 20 MG/1
40 TABLET ORAL DAILY
Qty: 10 TABLET | Refills: 0 | Status: SHIPPED | OUTPATIENT
Start: 2022-01-16 | End: 2022-01-21

## 2022-01-16 ASSESSMENT — ENCOUNTER SYMPTOMS
COUGH: 1
WHEEZING: 0
TROUBLE SWALLOWING: 0
SINUS PRESSURE: 0

## 2022-01-16 NOTE — PROGRESS NOTES
[facility]  [address] [address]  Dept: [phone]    Blanca Costello is a 63 y.o. female who presents today for hermedical conditions/complaints as noted below.   Blanca Costello is here today c/o Cough and Congestion       HPI:     HPI    Patient presents to the walk-in clinic for evaluation of cough, congestion, fatigue, myalgias that started 2 days ago  COPD, on Spiriva, breo ellipta, albuterol - not using the albuterol this past week as she has not had much dyspnea, her COPD is generally well controlled  Symptoms initially began with rhinorrhea and fatigue, she then began to develop chest pressure with her coughing, currently does not have any chest pressure, denies any past history of MI  Endorses mild bilateral ear pain, no throat pain, no fevers, no change in her GI symptoms, occasional phlegm with her cough  Denies wheezing, has very slight shortness of breath at times  Using Tylenol for her symptoms  Vaccinated against COVID but due for booster, no known COVID-positive contacts      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 ([address]    Acquired hypothyroidism    Vitamin D deficiency    Mixed hyperlipidemia    Type 1 diabetes mellitus with retinopathy ([address]    Incisional hernia without obstruction or gangrene    Hypoxia    Moderate persistent asthma with status asthmaticus    Seasonal allergic rhinitis due to pollen    Chronic obstructive pulmonary disease ([address]    Insulin pump in place    Contusion of left chest wall    Chronic retention of urine    Chronic combined systolic and diastolic congestive heart failure ([address]    Major depressive disorder, recurrent, in full remission ([address]    Urinary incontinence without sensory awareness    Arthritis of knee, right    Acute bilateral thoracic back pain    Acute pyelonephritis    Bilateral impacted cerumen    Left ear pain    Flank pain    TIA (transient ischemic attack)    Moderate malnutrition ([address]    COVID-19    CHF (congestive heart failure) ([facility])    COVID    Diabetic ketoacidosis type 1 diabetes mellitus ([address]    PARUL (acute kidney injury) ([address]    UTI (urinary tract infection)       Past Medical History:   Diagnosis Date    Anesthesia complication     \"lung collapsed after colon surgery    Anxiety     Arthritis     Back pain     CAD (coronary artery disease)     no stents    Caffeine use     3 coffee / day    Cataract     left    COPD (chronic obstructive pulmonary disease) ([facility])     EMPHYSEMA    Deafness     right ear    Depression     Diabetes mellitus ([facility])     Diarrhea     Diverticulosis     Fibromyalgia     Gastroparalysis     GERD (gastroesophageal reflux disease)     Hearing loss     DEAF IN RIGHT EAR    Hyperlipidemia     Hyperlipidemia     Hypertension     Incontinence     MDRO (multiple drug resistant organisms) resistance 2012    mrsa (nasal), eye, ear    Neurogenic bladder     CATHES HERSELF 7 TIMES A DAY, IS ABLE TO URINATE ON OWN    Neuropathy     feet    Obesity     Osteoarthritis     Peripheral vascular disease, unspecified ([facility])     Restless leg syndrome     Rhabdomyolysis     [place] 1 week, May 2014, then [facility] for rehab.     Spinal stenosis, thoracic 05/27/2014    Stroke [facility]) 2012    numbness on the left side of face, Oct 2020 TIA     Thyroid disease     enlarged    TMJ (dislocation of temporomandibular joint)     Trigeminal neuralgia     Type II or unspecified type diabetes mellitus without mention of complication, not stated as uncontrolled      Past Surgical History:   Procedure Laterality Date    ABDOMEN SURGERY      LAPAROSCOPY    CARPAL TUNNEL RELEASE Right     CATARACT REMOVAL      CHOLECYSTECTOMY      CHOLECYSTECTOMY, OPEN      11/2012    COLON SURGERY      benign mass removed    COLONOSCOPY      COLONOSCOPY  07/13/2016    COLONOSCOPY  06/01/2020    incomplete/poor prep    COLONOSCOPY  07/07/2020    COLONOSCOPY N/A 7/7/2020    COLORECTAL CANCER SCREENING, NOT HIGH RISK performed by Mer Duval MD at [facility]      sebaceous cyst, under arm left side x5    CYST REMOVAL      right ankle    CYSTOSCOPY      EYE SURGERY Right     HOLE IN RETINA REPAIRED    FINGER TRIGGER RELEASE Right     INCISIONAL HERNIA REPAIR  07/07/15    open   [address] [facility] [address]  10/17/2017    LEG BIOPSY EXCISION Left 7/6/2021    EXCISION OF LEFT THIGH MASS performed by Mer Duval MD at [facility] Left     ear tube placement    POLYPECTOMY      colon polyp    AK REPAIR INCISIONAL HERNIA,REDUCIBLE N/A 10/17/2017    HERNIA INCISIONAL REPAIR WITH MESH performed by Mer Duval MD at [address] [place] [address] N/A 6/1/2020    SIGMOIDOSCOPY DIAGNOSTIC FLEXIBLE performed by Mer Duval MD at [address]  07/13/2016    UPPER GASTROINTESTINAL ENDOSCOPY  07/23/2018    with biopsy    UPPER GASTROINTESTINAL ENDOSCOPY N/A 7/23/2018    EGD BIOPSY performed by Mer Duval MD at [address]  06/01/2020    with biopsy    UPPER GASTROINTESTINAL ENDOSCOPY N/A 6/1/2020    EGD BIOPSY performed by Mer Duval MD at [facility] History   Problem Relation Age of Onset    High Blood Pressure Mother     Migraines Mother     High Blood Pressure Father     Heart Disease Father     Cancer Father         skin    Diabetes Maternal Grandmother     Heart Disease Maternal Grandmother     Cancer Maternal Grandmother     Parkinsonism Maternal Grandmother     Cancer Paternal Grandmother     Other Brother         epilepsy     Social History     Tobacco Use    Smoking status: Never Smoker    Smokeless tobacco: Never Used   Vaping Use    Vaping Use: Never used   Substance Use Topics    Alcohol use: No     Alcohol/week: 0.0 standard drinks     Comment: once a month    Drug use: Yes     Frequency: 4.0 times per week     Types: Marijuana Benjie Grimes)       Current Outpatient Medications:     predniSONE (DELTASONE) 20 MG tablet, Take 2 tablets by mouth daily for 5 days, Disp: 10 tablet, Rfl: 0    azithromycin (ZITHROMAX) 250 MG tablet, Take 2 tabs (500 mg) on Day 1, and take 1 tab (250 mg) on days 2 through 5., Disp: 1 packet, Rfl: 0    vitamin B-12 (CYANOCOBALAMIN) 1000 MCG tablet, Take 1,000 mcg by mouth daily, Disp: , Rfl:     ondansetron (ZOFRAN) 4 MG tablet, Take 1 tablet by mouth every 8 hours as needed for Nausea or Vomiting, Disp: 30 tablet, Rfl: 0    clopidogrel (PLAVIX) 75 MG tablet, Take 75 mg by mouth daily, Disp: , Rfl:     Albuterol Sulfate (PROAIR HFA IN), Inhale into the lungs, Disp: , Rfl:     simvastatin (ZOCOR) 20 MG tablet, Take 20 mg by mouth nightly, Disp: , Rfl:     amLODIPine (NORVASC) 5 MG tablet, Take 1 tablet by mouth once daily, Disp: 90 tablet, Rfl: 1    aspirin 81 MG tablet, Take 1 tablet by mouth daily, Disp: 30 tablet, Rfl: 0    topiramate (TOPAMAX) 25 MG tablet, Take 1 tablet by mouth 2 times daily, Disp: 60 tablet, Rfl: 3    DULoxetine (CYMBALTA) 30 MG extended release capsule, Take 90 mg by mouth daily, Disp: , Rfl:     metoclopramide (REGLAN) 10 MG tablet, Take 10 mg by mouth 2 times daily , Disp: , Rfl:     metoprolol (LOPRESSOR) 100 MG tablet, Take 100 mg by mouth 2 times daily, Disp: , Rfl:     insulin aspart (NOVOLOG PENFILL) 100 UNIT/ML injection cartridge, Inject into the skin continuous Per insulin pump, Disp: , Rfl:     amitriptyline (ELAVIL) 150 MG tablet, TAKE 1 TABLET BY MOUTH NIGHTLY (Patient taking differently: Take 75 mg by mouth nightly TAKE 1 TABLET BY MOUTH NIGHTLY), Disp: 30 tablet, Rfl: 0    clonazePAM (KLONOPIN) 0.5 MG tablet, TAKE 1 TABLET BY MOUTH TWICE DAILY AS NEEDED FOR ANXIETY OR SLEEP, Disp: 60 tablet, Rfl: 2    pantoprazole (PROTONIX) 40 MG tablet, Take 40 mg by mouth 2 times daily, Disp: , Rfl:     losartan (COZAAR) 100 MG tablet, TAKE 1 TABLET BY MOUTH ONCE DAILY, Disp: 90 tablet, Rfl: 3    rOPINIRole (REQUIP) 2 MG tablet, Take 1 tablet by mouth daily, Disp: 90 tablet, Rfl: 3    levothyroxine (SYNTHROID) 137 MCG tablet, Take 1 tablet by mouth daily, Disp: 90 tablet, Rfl: 3    gabapentin (NEURONTIN) 800 MG tablet, TAKE 1 TABLET BY MOUTH THREE TIMES DAILY, Disp: 270 tablet, Rfl: 0    BREO ELLIPTA 200-25 MCG/INH AEPB, Inhale 1 puff into the lungs daily , Disp: , Rfl: 6    tiotropium (SPIRIVA HANDIHALER) 18 MCG inhalation capsule, Inhale 1 capsule into the lungs Daily, Disp: 30 capsule, Rfl: 5    ONE TOUCH ULTRA TEST strip, , Disp: , Rfl: 1    Subjective:     Review of Systems   Constitutional: Positive for fatigue. Negative for fever. HENT: Positive for congestion and ear pain. Negative for ear discharge, sinus pressure and trouble swallowing. Respiratory: Positive for cough. Negative for wheezing. Musculoskeletal: Positive for myalgias. Neurological: Negative for headaches. Objective:     /82 (Site: Left Upper Arm, Position: Sitting, Cuff Size: Large Adult)   Pulse 63   Temp 97.2 °F (36.2 °C)   SpO2 98%   Breastfeeding No     Physical Exam  Constitutional:       Appearance: Normal appearance. She is well-developed. She is not ill-appearing, toxic-appearing or diaphoretic. HENT:      Head: Normocephalic. Right Ear: Tympanic membrane normal.      Left Ear: Tympanic membrane normal.   Eyes:      General:         Right eye: No discharge. Left eye: No discharge.       Conjunctiva/sclera: Conjunctivae normal.   Cardiovascular:      Rate and Rhythm: Normal rate and regular rhythm. Heart sounds: Normal heart sounds. No murmur heard. Pulmonary:      Effort: Pulmonary effort is normal. No respiratory distress. Breath sounds: Normal breath sounds. No wheezing. Musculoskeletal:      Cervical back: No tenderness. Lymphadenopathy:      Cervical: No cervical adenopathy. Neurological:      Mental Status: She is alert. Psychiatric:         Behavior: Behavior normal.         Thought Content: Thought content normal.         Judgment: Judgment normal.         Assessment & Plan:      1. COPD exacerbation (HCC)  COVID swab taken. We will treat for COPD exacerbation with azithromycin and prednisone. She has taken both of these in the past. Discussed potential side effects of steroids include avascular necrosis, osteoporosis, elevated blood sugars, elevated blood pressure, insomnia, mood changes, vision changes. Recommended albuterol as needed. Continue current COPD maintenance inhalers. Discussed that chest pressure could be cardiac in that should it recur she should be reevaluated in the ER with EKG and stat lab work for potential MI. Patient verbalized understanding.  - COVID-19; Future  - predniSONE (DELTASONE) 20 MG tablet; Take 2 tablets by mouth daily for 5 days  Dispense: 10 tablet; Refill: 0  - azithromycin (ZITHROMAX) 250 MG tablet; Take 2 tabs (500 mg) on Day 1, and take 1 tab (250 mg) on days 2 through 5. Dispense: 1 packet; Refill: 0      Call or return to clinic prn if these symptoms worsen or fail to improve as anticipated. I have reviewed the instructions with the patient, answering all questions to their satisfaction.     Electronically signed by Dulce Mark MD on 1/16/2022 at 2:16 PM

## 2022-01-17 DIAGNOSIS — J44.1 COPD EXACERBATION (HCC): ICD-10-CM

## 2022-01-17 LAB
SARS-COV-2: NORMAL
SARS-COV-2: NOT DETECTED
SOURCE: NORMAL

## 2022-01-20 PROBLEM — N39.0 UTI (URINARY TRACT INFECTION): Status: RESOLVED | Noted: 2021-12-21 | Resolved: 2022-01-20

## 2022-01-26 ENCOUNTER — HOSPITAL ENCOUNTER (OUTPATIENT)
Dept: WOMENS IMAGING | Age: 62
Discharge: HOME | End: 2022-01-28
Payer: MEDICARE

## 2022-01-26 DIAGNOSIS — Z12.31 SCREENING MAMMOGRAM FOR BREAST CANCER: ICD-10-CM

## 2022-01-26 PROCEDURE — 77067 SCR MAMMO BI INCL CAD: CPT

## 2022-04-12 ENCOUNTER — HOSPITAL ENCOUNTER (EMERGENCY)
Age: 62
Discharge: HOME | End: 2022-04-12
Attending: EMERGENCY MEDICINE
Payer: MEDICARE

## 2022-04-12 ENCOUNTER — APPOINTMENT (OUTPATIENT)
Dept: GENERAL RADIOLOGY | Age: 62
End: 2022-04-12
Payer: MEDICARE

## 2022-04-12 ENCOUNTER — APPOINTMENT (OUTPATIENT)
Dept: CT IMAGING | Age: 62
End: 2022-04-12
Payer: MEDICARE

## 2022-04-12 VITALS
SYSTOLIC BLOOD PRESSURE: 175 MMHG | OXYGEN SATURATION: 98 % | DIASTOLIC BLOOD PRESSURE: 79 MMHG | BODY MASS INDEX: 30.66 KG/M2 | TEMPERATURE: 98.5 F | HEIGHT: 65 IN | HEART RATE: 66 BPM | WEIGHT: 184 LBS | RESPIRATION RATE: 18 BRPM

## 2022-04-12 DIAGNOSIS — M43.16 ANTEROLISTHESIS OF LUMBAR SPINE: ICD-10-CM
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
## 2022-04-12 DIAGNOSIS — S81.831A PUNCTURE WOUND OF RIGHT LOWER LEG, INITIAL ENCOUNTER: ICD-10-CM

## 2022-04-12 DIAGNOSIS — S40.811A ABRASION OF RIGHT UPPER EXTREMITY, INITIAL ENCOUNTER: ICD-10-CM

## 2022-04-12 DIAGNOSIS — S09.90XA CLOSED HEAD INJURY, INITIAL ENCOUNTER: ICD-10-CM

## 2022-04-12 DIAGNOSIS — M54.50 ACUTE BILATERAL LOW BACK PAIN WITHOUT SCIATICA: ICD-10-CM

## 2022-04-12 PROCEDURE — 70450 CT HEAD/BRAIN W/O DYE: CPT

## 2022-04-12 PROCEDURE — 73090 X-RAY EXAM OF FOREARM: CPT

## 2022-04-12 PROCEDURE — 73590 X-RAY EXAM OF LOWER LEG: CPT

## 2022-04-12 PROCEDURE — 72100 X-RAY EXAM L-S SPINE 2/3 VWS: CPT

## 2022-04-12 PROCEDURE — 6370000000 HC RX 637 (ALT 250 FOR IP): Performed by: PHYSICIAN ASSISTANT

## 2022-04-12 PROCEDURE — 99285 EMERGENCY DEPT VISIT HI MDM: CPT

## 2022-04-12 RX ORDER — CEPHALEXIN 500 MG/1
500 CAPSULE ORAL 4 TIMES DAILY
Qty: 40 CAPSULE | Refills: 0 | Status: SHIPPED | OUTPATIENT
Start: 2022-04-12 | End: 2022-04-22

## 2022-04-12 RX ORDER — THROMB-CAL-CELL-DRESSING,HEMOS 3" X 3"
1 PADS, MEDICATED (EA) TOPICAL PRN
Status: DISCONTINUED | OUTPATIENT
Start: 2022-04-12 | End: 2022-04-12 | Stop reason: HOSPADM

## 2022-04-12 RX ADMIN — Medication 1 EACH: at 15:00

## 2022-04-12 ASSESSMENT — PAIN SCALES - GENERAL: PAINLEVEL_OUTOF10: 7

## 2022-04-12 ASSESSMENT — PAIN DESCRIPTION - PAIN TYPE: TYPE: ACUTE PAIN

## 2022-04-12 ASSESSMENT — VISUAL ACUITY: OU: 1

## 2022-04-12 ASSESSMENT — PAIN DESCRIPTION - LOCATION: LOCATION: BACK

## 2022-04-12 ASSESSMENT — PAIN DESCRIPTION - ORIENTATION: ORIENTATION: LOWER

## 2022-04-12 ASSESSMENT — PAIN - FUNCTIONAL ASSESSMENT: PAIN_FUNCTIONAL_ASSESSMENT: 0-10

## 2022-04-12 NOTE — ED NOTES
C= \"Have you ever felt that you should Cut down on your drinking? \"  No  A= \"Have people Annoyed you by criticizing your drinking? \"  No  G= \"Have you ever felt bad or Guilty about your drinking? \"  No  E= \"Have you ever had a drink as an Eye-opener first thing in the morning to steady your nerves or to help a hangover? \"  N0      Deferred []      Reason for deferring: N/A    *If yes to two or more: probable alcohol abuse. Nancy Swift RN  04/12/22 [id number]

## 2022-04-12 NOTE — ED TRIAGE NOTES
Reports tripping on a cement duck, she reports falling and hitting her head. She takes Plavix daily. Denies any loss of consciousness. Complains of back, (R) arm and (L) knee pain.

## 2022-04-12 NOTE — ED NOTES
Wrapped right arm and right leg in thrombi pad and kerlex with bacitracin. Pt tolerated well.       CLEVE Amos  04/12/22 0994

## 2022-04-12 NOTE — ED PROVIDER NOTES
eMERGENCY dEPARTMENT eNCOUnter   [address] [place] Name: Nkechi Maxwell  MRN: [id number]  [place] 1960  Date of evaluation: 4/12/22     Nkechi Maxwell is a 63 y.o. female with CC: Fall, Arm Pain (right), and Knee Pain (left)      Based on the medical record the care appears appropriate. I was personally available for consultation in the Emergency Department. The care is provided during an unprecedented national emergency due to the novel coronavirus, COVID 19.     Regulo Mcdermott DO  Attending Emergency Physician                  Regulo Mcdermott DO  04/12/22 3019

## 2022-04-12 NOTE — ED PROVIDER NOTES
[address]  eMERGENCY dEPARTMENT eNCOUnter      Pt Name: Amaris Rivera  MRN: [id number]  [place] 1960  Date of evaluation: 4/12/22      CHIEF COMPLAINT:   Chief Complaint   Patient presents with    Fall    Arm Pain     right    Knee Pain     left     HISTORY OF PRESENT ILLNESS    Amaris Rivera is a 63 y.o. female who presents with complaints of fall. Pt reports PTA she was outside and tripped over a cement duct. Reports injuring right forearm, left hand, lower back, and hit her head. Denies loc or emesis. Pt is on plavix. Reports mild headache. Denies dizziness, visual changes, neck pain, cp, sob, nausea, emesis, abd pain, loss of bowel or bladder control, numbness, weakness. Additionally, pt states she was moving furniture yesterday and sustained a puncture wound to the right lower leg. States it is still bleeding. Tetanus Is up to date. No other complaints. REVIEW OF SYSTEMS     Fall  Laceration   Puncture wound   Head injury  Headache  Arm pain   Low back pain   Leg pain       Negative in 10 essential Systems except as mentioned above and in the HPI.       PAST MEDICAL HISTORY   PMH:  has a past medical history of Anesthesia complication, Anxiety, Arthritis, Back pain, CAD (coronary artery disease), Caffeine use, Cataract, COPD (chronic obstructive pulmonary disease) ([address] Deafness, Depression, Diabetes mellitus ([address] Diarrhea, Diverticulosis, Fibromyalgia, Gastroparalysis, GERD (gastroesophageal reflux disease), Hearing loss, Hyperlipidemia, Hyperlipidemia, Hypertension, Incontinence, MDRO (multiple drug resistant organisms) resistance, Neurogenic bladder, Neuropathy, Obesity, Osteoarthritis, Peripheral vascular disease, unspecified ([address] Restless leg syndrome, Rhabdomyolysis, Spinal stenosis, thoracic, Stroke ([address] Thyroid disease, TMJ (dislocation of temporomandibular joint), Trigeminal neuralgia, and Type II or unspecified type diabetes mellitus without mention of complication, not stated as uncontrolled. none otherwise stated from nurses notes  Surgical History:  has a past surgical history that includes Cholecystectomy; Colon surgery; Tonsillectomy; Tubal ligation; Carpal tunnel release (Right); Middle ear surgery (Left); cyst removal; cyst removal; Cholecystectomy, open; Finger trigger release (Right); Colonoscopy; Abdomen surgery; polypectomy; Cystocopy; Cataract removal; Incisional hernia repair (07/07/15); Upper gastrointestinal endoscopy (07/13/2016); Colonoscopy (07/13/2016); eye surgery (Right); Incisional hernia repair (10/17/2017); pr repair incisional hernia,reducible (N/A, 10/17/2017); Upper gastrointestinal endoscopy (07/23/2018); Upper gastrointestinal endoscopy (N/A, 7/23/2018); Upper gastrointestinal endoscopy (06/01/2020); Colonoscopy (06/01/2020); Upper gastrointestinal endoscopy (N/A, 6/1/2020); sigmoidoscopy (N/A, 6/1/2020); Colonoscopy (07/07/2020); Colonoscopy (N/A, 7/7/2020); and Leg biopsy excision (Left, 7/6/2021). none otherwise stated from nurses notes  Social History:  reports that she has never smoked. She has never used smokeless tobacco. She reports current drug use. Frequency: 4.00 times per week. Drug: Marijuana [facility] [place]). She reports that she does not drink alcohol. , lives at home with others  Family History: none  Psychiatric History: none    Allergies:is allergic to fioricet [butalbital-apap-caffeine], betamethasone, butalbital-apap-caff-cod, erythromycin, xarelto [rivaroxaban], codeine, droperidol, and tape [adhesive tape]. PHYSICAL EXAM     INITIAL VITALS: BP (!) 175/79   Pulse 66   Temp 98.5 °F (36.9 °C) (Oral)   Resp 18   Ht 5' 5\" (1.651 m)   Wt 184 lb (83.5 kg)   SpO2 98%   BMI 30.62 kg/m²   Physical Exam  Vitals and nursing note reviewed. Constitutional:       General: She is awake. Appearance: Normal appearance. She is well-developed, well-groomed and normal weight.    HENT:      Head: Normocephalic and atraumatic. No raccoon eyes, Valencia's sign, abrasion, contusion, masses, right periorbital erythema, left periorbital erythema or laceration. Hair is normal.      Nose: Nose normal.   Eyes:      General: Lids are normal. Vision grossly intact. Gaze aligned appropriately. Pupils: Pupils are equal, round, and reactive to light. Cardiovascular:      Rate and Rhythm: Normal rate and regular rhythm. Pulses: Normal pulses. Dorsalis pedis pulses are 2+ on the right side and 2+ on the left side. Posterior tibial pulses are 2+ on the right side and 2+ on the left side. Heart sounds: Normal heart sounds, S1 normal and S2 normal.   Pulmonary:      Effort: Pulmonary effort is normal. No respiratory distress. Breath sounds: Normal breath sounds and air entry. No stridor. No decreased breath sounds, wheezing, rhonchi or rales. Chest:      Chest wall: No tenderness. Musculoskeletal:      Right shoulder: Normal.      Left shoulder: Normal.      Right upper arm: Normal.      Left upper arm: Normal.      Right elbow: Normal.      Left elbow: No swelling, deformity, effusion or lacerations. Normal range of motion. Tenderness present. Right forearm: Laceration (superifical abrasion. bleeding controlled. no foreign bodies) present. No swelling, edema, deformity, tenderness or bony tenderness. Left forearm: Normal.      Right wrist: Normal.      Left wrist: Normal.      Right hand: Normal.      Left hand: Normal.      Cervical back: Normal and full passive range of motion without pain. No spinous process tenderness or muscular tenderness. Thoracic back: Normal.      Lumbar back: Tenderness and bony tenderness present. No swelling, edema, deformity, signs of trauma, lacerations or spasms. Normal range of motion. No scoliosis.         Back:       Right hip: Normal.      Left hip: Normal.      Right upper leg: Normal.      Left upper leg: Normal.      Right knee: Normal.      Left knee: Normal.      Right lower leg: Laceration (puncture wound) present. No swelling, deformity, tenderness or bony tenderness. No edema. Left lower leg: Normal.      Right ankle: Normal.      Left ankle: Normal.      Right foot: Normal.      Left foot: Normal.   Skin:     General: Skin is warm. Capillary Refill: Capillary refill takes less than 2 seconds. Neurological:      General: No focal deficit present. Mental Status: She is alert and oriented to person, place, and time. Mental status is at baseline. Cranial Nerves: Cranial nerves are intact. Sensory: Sensation is intact. Motor: Motor function is intact. Coordination: Coordination is intact. Gait: Gait is intact. Psychiatric:         Behavior: Behavior is cooperative. [place] Coma Scale*  Patient characteristics Points   Eyes open   Spontaneously 4   To speech 3   To pain 2   Never 1   Best verbal response   Oriented 5   Confused 4   Inappropriate words 3   Incomprehensible sounds 2   Silent 1   Best motor response   Obeys commands 6   Localizes pain 5   Flexion withdrawal 4   Decerebrate flexion 3   Decerebrate extension 2   No response 1   Total 15         EMERGENCY DEPARTMENT COURSE:     Orders Placed This Encounter   Medications    Thrombi-Pad 3\"X3\" PADS 1 each    cephALEXin (KEFLEX) 500 MG capsule     Sig: Take 1 capsule by mouth 4 times daily for 10 days     Dispense:  40 capsule     Refill:  0       Fall yesterday and today. 1) hit head today. On Plavix. Ct head is unremarkable. 2) low back pain. No neurological deficits on exam.  Afebrile. Xray shows no fracture. New anterolisthesis. Will refer to Dr. Ramakrishna Bashir. No clinical concern for discitis, osteomyelitis. 3) injury to right arm. Superficial abrasion noted. Mild oozing due to being on Plavix. Thrombi pad and wound dressing applied. 4) puncture wound to right leg from fall yesterday. Xray unremarkable. Will start on keflex.    5) 4/12/2022  2:59 PM      START taking these medications    Details   cephALEXin (KEFLEX) 500 MG capsule Take 1 capsule by mouth 4 times daily for 10 days, Disp-40 capsule, R-0Normal             (Please note that portions of this note were completed with a voice recognition program.  Efforts were made to edit the dictations but occasionally words are mis-transcribed.)       Karlene Monroe PA-C  04/12/22 1427

## 2022-04-19 ENCOUNTER — TELEPHONE (OUTPATIENT)
Dept: INTERNAL MEDICINE CLINIC | Age: 62
End: 2022-04-19

## 2022-04-19 NOTE — TELEPHONE ENCOUNTER
----- Message from Дмитрий Higuera sent at 4/19/2022 11:47 AM EDT -----  Subject: Message to Provider    QUESTIONS  Information for Provider? Pt is established with Dr. Deangelo Sierra at the   practice and is interested in establishing with a new doctor at location   when first available. Pt prefers Dr. Saan Rivera if possible. Screened green  ---------------------------------------------------------------------------  --------------  CALL BACK INFO  What is the best way for the office to contact you? OK to leave message on   voicemail  Preferred Call Back Phone Number? [phone]  ---------------------------------------------------------------------------  --------------  SCRIPT ANSWERS  Relationship to Patient?  Self

## 2022-04-22 ENCOUNTER — OFFICE VISIT (OUTPATIENT)
Dept: FAMILY MEDICINE CLINIC | Age: 62
End: 2022-04-22
Payer: MEDICARE

## 2022-04-22 VITALS
OXYGEN SATURATION: 97 % | DIASTOLIC BLOOD PRESSURE: 82 MMHG | HEART RATE: 66 BPM | SYSTOLIC BLOOD PRESSURE: 137 MMHG | TEMPERATURE: 98.1 F

## 2022-04-22 DIAGNOSIS — L08.9 INFECTED PUNCTURE WOUND: Primary | ICD-10-CM

## 2022-04-22 DIAGNOSIS — T14.8XXA INFECTED PUNCTURE WOUND: Primary | ICD-10-CM

## 2022-04-22 PROCEDURE — 1036F TOBACCO NON-USER: CPT

## 2022-04-22 PROCEDURE — 99213 OFFICE O/P EST LOW 20 MIN: CPT

## 2022-04-22 PROCEDURE — G8417 CALC BMI ABV UP PARAM F/U: HCPCS

## 2022-04-22 PROCEDURE — G8427 DOCREV CUR MEDS BY ELIG CLIN: HCPCS

## 2022-04-22 PROCEDURE — 3017F COLORECTAL CA SCREEN DOC REV: CPT

## 2022-04-22 RX ORDER — SULFAMETHOXAZOLE AND TRIMETHOPRIM 800; 160 MG/1; MG/1
1 TABLET ORAL 2 TIMES DAILY
Qty: 14 TABLET | Refills: 0 | Status: SHIPPED | OUTPATIENT
Start: 2022-04-22 | End: 2022-04-29

## 2022-04-22 RX ORDER — DULOXETIN HYDROCHLORIDE 60 MG/1
60 CAPSULE, DELAYED RELEASE ORAL NIGHTLY
COMMUNITY
Start: 2022-04-14 | End: 2022-10-24 | Stop reason: SDUPTHER

## 2022-04-22 RX ORDER — DOCUSATE SODIUM 100 MG/1
CAPSULE, LIQUID FILLED ORAL
COMMUNITY
Start: 2015-07-07 | End: 2022-07-27

## 2022-04-22 ASSESSMENT — PATIENT HEALTH QUESTIONNAIRE - PHQ9
2. FEELING DOWN, DEPRESSED OR HOPELESS: 0
SUM OF ALL RESPONSES TO PHQ9 QUESTIONS 1 & 2: 0
1. LITTLE INTEREST OR PLEASURE IN DOING THINGS: 0
SUM OF ALL RESPONSES TO PHQ QUESTIONS 1-9: 0

## 2022-04-22 ASSESSMENT — ENCOUNTER SYMPTOMS
EYE REDNESS: 0
CHOKING: 0
SORE THROAT: 0
SHORTNESS OF BREATH: 0
SINUS PAIN: 0
EYES NEGATIVE: 1
COUGH: 0
FACIAL SWELLING: 0
VOMITING: 0
SWOLLEN GLANDS: 0
CONSTIPATION: 0
ABDOMINAL DISTENTION: 0
TROUBLE SWALLOWING: 0
EYE DISCHARGE: 0
SINUS PRESSURE: 0
APNEA: 0
GASTROINTESTINAL NEGATIVE: 1
VISUAL CHANGE: 0
BACK PAIN: 0
WHEEZING: 0
CHANGE IN BOWEL HABIT: 0
RHINORRHEA: 0
EYE ITCHING: 0
STRIDOR: 0
VOICE CHANGE: 0
ANAL BLEEDING: 0
RECTAL PAIN: 0
ABDOMINAL PAIN: 0
COLOR CHANGE: 0
CHEST TIGHTNESS: 0
DIARRHEA: 0
BLOOD IN STOOL: 0
RESPIRATORY NEGATIVE: 1
PHOTOPHOBIA: 0
EYE PAIN: 0
ROS SKIN COMMENTS: RIGHT LOWER ARM
NAUSEA: 0

## 2022-04-22 NOTE — PATIENT INSTRUCTIONS
Patient Education        Puncture Wounds: Care Instructions  Overview     A puncture wound can happen anywhere on your body. These wounds tend to benarrower and deeper than cuts. A puncture wound is usually left open instead of being closed. This is because a puncture wound can be easily infected, and closing it can make infection evenmore likely. You will probably have a bandage over the wound. The doctor has checked you carefully, but problems can develop later. If you notice any problems or new symptoms, get medical treatment right away. Follow-up care is a key part of your treatment and safety. Be sure to make and go to all appointments, and call your doctor if you are having problems. It's also a good idea to know your test results and keep alist of the medicines you take. How can you care for yourself at home?  Keep the wound dry for the first 24 to 48 hours. After this, you can shower if your doctor okays it. Pat the wound dry.  Don't soak the wound, such as in a bathtub. Your doctor will tell you when it's safe to get the wound wet.  If your doctor told you how to care for your wound, follow your doctor's instructions. If you did not get instructions, follow this general advice:  ? After the first 24 to 48 hours, wash the wound with clean water 2 times a day. Don't use hydrogen peroxide or alcohol, which can slow healing. ? You may cover the wound with a thin layer of petroleum jelly, such as Vaseline, and a nonstick bandage. ? Apply more petroleum jelly and replace the bandage as needed.  Prop up the sore area on pillows anytime you sit or lie down during the next 3 days. Try to keep it above the level of your heart. This helps reduce swelling.  Avoid any activity that could cause your wound to get worse.  Be safe with medicines. Read and follow all instructions on the label. ? If the doctor gave you a prescription medicine for pain, take it as prescribed.   ? If you are not taking a

## 2022-04-22 NOTE — PROGRESS NOTES
[facility]  [facility]  [address]  Dept: [phone]    Sendy Marino is a 63 y.o. female Established patient, who presents to the walk-in clinic today with conditions/complaints as noted below:    Chief Complaint   Patient presents with    Other     cut on right arm wants to make sure it is not infected         HPI:     Other  This is a new (a cut) problem. Episode onset: 10 days ago. The problem occurs constantly. The problem has been gradually worsening. Pertinent negatives include no abdominal pain, anorexia, arthralgias, change in bowel habit, chest pain, chills, congestion, coughing, diaphoresis, fatigue, fever, headaches, joint swelling, myalgias, nausea, neck pain, numbness, rash, sore throat, swollen glands, urinary symptoms, vertigo, visual change, vomiting or weakness. Nothing aggravates the symptoms. Treatments tried: dial soap, triple antibiotics. The treatment provided moderate relief. Patient fell X10 days ago and was seen at the ER for the fall. They gave her Cephalexin. She is concerned that the wound is infected.    Past Medical History:   Diagnosis Date    Anesthesia complication     \"lung collapsed after colon surgery    Anxiety     Arthritis     Back pain     CAD (coronary artery disease)     no stents    Caffeine use     3 coffee / day    Cataract     left    COPD (chronic obstructive pulmonary disease) (HCC)     EMPHYSEMA    Deafness     right ear    Depression     Diabetes mellitus (HCC)     Diarrhea     Diverticulosis     Fibromyalgia     Gastroparalysis     GERD (gastroesophageal reflux disease)     Hearing loss     DEAF IN RIGHT EAR    Hyperlipidemia     Hyperlipidemia     Hypertension     Incontinence     MDRO (multiple drug resistant organisms) resistance 2012    mrsa (nasal), eye, ear    Neurogenic bladder     CATHES HERSELF 7 TIMES A DAY, IS ABLE TO URINATE ON OWN    Neuropathy     feet    Obesity     Osteoarthritis     Peripheral vascular disease, unspecified ([address]     Restless leg syndrome     Rhabdomyolysis     [facility] 1 week, May 2014, then [facility] for rehab.  Spinal stenosis, thoracic 05/27/2014    Stroke [facility]) 2012    numbness on the left side of face, Oct 2020 TIA     Thyroid disease     enlarged    TMJ (dislocation of temporomandibular joint)     Trigeminal neuralgia     Type II or unspecified type diabetes mellitus without mention of complication, not stated as uncontrolled        Current Outpatient Medications   Medication Sig Dispense Refill    docusate sodium (COLACE) 100 MG capsule Take by mouth      mupirocin (BACTROBAN) 2 % ointment Apply topically 3 times daily.  30 g 0    sulfamethoxazole-trimethoprim (BACTRIM DS;SEPTRA DS) 800-160 MG per tablet Take 1 tablet by mouth 2 times daily for 7 days 14 tablet 0    cephALEXin (KEFLEX) 500 MG capsule Take 1 capsule by mouth 4 times daily for 10 days 40 capsule 0    azithromycin (ZITHROMAX) 250 MG tablet Take 2 tabs (500 mg) on Day 1, and take 1 tab (250 mg) on days 2 through 5. 1 packet 0    vitamin B-12 (CYANOCOBALAMIN) 1000 MCG tablet Take 1,000 mcg by mouth daily      ondansetron (ZOFRAN) 4 MG tablet Take 1 tablet by mouth every 8 hours as needed for Nausea or Vomiting 30 tablet 0    clopidogrel (PLAVIX) 75 MG tablet Take 75 mg by mouth daily      Albuterol Sulfate (PROAIR HFA IN) Inhale into the lungs      simvastatin (ZOCOR) 20 MG tablet Take 20 mg by mouth nightly      amLODIPine (NORVASC) 5 MG tablet Take 1 tablet by mouth once daily 90 tablet 1    aspirin 81 MG tablet Take 1 tablet by mouth daily 30 tablet 0    topiramate (TOPAMAX) 25 MG tablet Take 1 tablet by mouth 2 times daily 60 tablet 3    DULoxetine (CYMBALTA) 30 MG extended release capsule Take 90 mg by mouth daily      metoclopramide (REGLAN) 10 MG tablet Take 10 mg by mouth 2 times daily       metoprolol (LOPRESSOR) 100 MG tablet Take 100 mg by mouth 2 times daily      insulin aspart (NOVOLOG PENFILL) 100 UNIT/ML injection cartridge Inject into the skin continuous Per insulin pump      amitriptyline (ELAVIL) 150 MG tablet TAKE 1 TABLET BY MOUTH NIGHTLY (Patient taking differently: Take 75 mg by mouth nightly TAKE 1 TABLET BY MOUTH NIGHTLY) 30 tablet 0    pantoprazole (PROTONIX) 40 MG tablet Take 40 mg by mouth 2 times daily      losartan (COZAAR) 100 MG tablet TAKE 1 TABLET BY MOUTH ONCE DAILY 90 tablet 3    rOPINIRole (REQUIP) 2 MG tablet Take 1 tablet by mouth daily 90 tablet 3    levothyroxine (SYNTHROID) 137 MCG tablet Take 1 tablet by mouth daily 90 tablet 3    BREO ELLIPTA 200-25 MCG/INH AEPB Inhale 1 puff into the lungs daily   6    tiotropium (SPIRIVA HANDIHALER) 18 MCG inhalation capsule Inhale 1 capsule into the lungs Daily 30 capsule 5    ONE TOUCH ULTRA TEST strip   1    DULoxetine (CYMBALTA) 60 MG extended release capsule TAKE 1 CAPSULE BY MOUTH IN THE MORNING      diclofenac sodium (VOLTAREN) 1 % GEL APPLY 2 GRAMS TOPICALLY 4 TIMES DAILY      clonazePAM (KLONOPIN) 0.5 MG tablet TAKE 1 TABLET BY MOUTH TWICE DAILY AS NEEDED FOR ANXIETY OR SLEEP 60 tablet 2    gabapentin (NEURONTIN) 800 MG tablet TAKE 1 TABLET BY MOUTH THREE TIMES DAILY 270 tablet 0     No current facility-administered medications for this visit.        Allergies   Allergen Reactions    Fioricet [Butalbital-Apap-Caffeine] Shortness Of Breath    Betamethasone      Unsure reaction      Butalbital-Apap-Caff-Cod Other (See Comments)    Erythromycin      Other reaction(s): Unknown  Eye ointment caused swelling    Xarelto [Rivaroxaban]      Dizziness & \"felt like I was going to pass out\"    Codeine Nausea And Vomiting and Nausea Only    Droperidol Anxiety    Tape Pharynx: Oropharynx is clear. Eyes:      Extraocular Movements: Extraocular movements intact. Conjunctiva/sclera: Conjunctivae normal.      Pupils: Pupils are equal, round, and reactive to light. Cardiovascular:      Rate and Rhythm: Normal rate and regular rhythm. Pulses: Normal pulses. Heart sounds: Normal heart sounds. Pulmonary:      Effort: Pulmonary effort is normal.      Breath sounds: Normal breath sounds. Abdominal:      General: Abdomen is flat. Bowel sounds are normal.      Palpations: Abdomen is soft. Musculoskeletal:         General: Normal range of motion. Cervical back: Normal range of motion and neck supple. Skin:     General: Skin is warm and dry. Capillary Refill: Capillary refill takes less than 2 seconds. Findings: Wound present. Comments: approx 6 in linear wound noted on right lower arm. Wound is healing and borders are well approximated. Slight erythema and swelling surrounding wound. Neurological:      General: No focal deficit present. Mental Status: She is alert and oriented to person, place, and time. Mental status is at baseline. Psychiatric:         Mood and Affect: Mood normal.         Behavior: Behavior normal.         Plan:          1. Infected puncture wound  -     mupirocin (BACTROBAN) 2 % ointment; Apply topically 3 times daily. , Disp-30 g, R-0, Normal  -     sulfamethoxazole-trimethoprim (BACTRIM DS;SEPTRA DS) 800-160 MG per tablet; Take 1 tablet by mouth 2 times daily for 7 days, Disp-14 tablet, R-0Normal     Clean the wound with antibacterial/mild soap and warm water, dry and apply ointment. Instructed to apply non adherent gauze and wrap wound up while at work and air dry at nighttime. Monitor for swelling, drainage, pain. F/u with PCP. Patient verbalized understanding. Follow Up Instructions:      Return if symptoms worsen or fail to improve, for SOB, chest pain go to ER.     Orders Placed This Encounter   Medications    mupirocin (BACTROBAN) 2 % ointment     Sig: Apply topically 3 times daily. Dispense:  30 g     Refill:  0    sulfamethoxazole-trimethoprim (BACTRIM DS;SEPTRA DS) 800-160 MG per tablet     Sig: Take 1 tablet by mouth 2 times daily for 7 days     Dispense:  14 tablet     Refill:  0             Patient and/or parent given educational materials - see patient instructions. Discussed use, benefit, and side effects of prescribed medications. All patient questions answered. Patient and/or parent voiced understanding.       Electronically signed by KIMBERLEE Wheatley 4/22/2022 at 2:05 PM

## 2022-05-05 ENCOUNTER — OFFICE VISIT (OUTPATIENT)
Dept: INTERNAL MEDICINE CLINIC | Age: 62
End: 2022-05-05
Payer: MEDICARE

## 2022-05-05 VITALS — WEIGHT: 185 LBS | DIASTOLIC BLOOD PRESSURE: 70 MMHG | SYSTOLIC BLOOD PRESSURE: 132 MMHG | BODY MASS INDEX: 30.79 KG/M2

## 2022-05-05 DIAGNOSIS — I50.42 CHRONIC COMBINED SYSTOLIC AND DIASTOLIC CONGESTIVE HEART FAILURE (HCC): ICD-10-CM

## 2022-05-05 DIAGNOSIS — E13.319 RETINOPATHY DUE TO SECONDARY DM (HCC): ICD-10-CM

## 2022-05-05 DIAGNOSIS — J43.1 PANLOBULAR EMPHYSEMA (HCC): ICD-10-CM

## 2022-05-05 DIAGNOSIS — I10 ESSENTIAL HYPERTENSION: ICD-10-CM

## 2022-05-05 DIAGNOSIS — E10.319 TYPE 1 DIABETES MELLITUS WITH RETINOPATHY, MACULAR EDEMA PRESENCE UNSPECIFIED, UNSPECIFIED LATERALITY, UNSPECIFIED RETINOPATHY SEVERITY (HCC): Primary | ICD-10-CM

## 2022-05-05 DIAGNOSIS — G89.4 CHRONIC PAIN SYNDROME: Chronic | ICD-10-CM

## 2022-05-05 DIAGNOSIS — G47.00 INSOMNIA, UNSPECIFIED TYPE: ICD-10-CM

## 2022-05-05 DIAGNOSIS — Z13.220 SCREENING FOR HYPERLIPIDEMIA: ICD-10-CM

## 2022-05-05 DIAGNOSIS — M25.562 CHRONIC PAIN OF BOTH KNEES: ICD-10-CM

## 2022-05-05 DIAGNOSIS — N39.42 URINARY INCONTINENCE WITHOUT SENSORY AWARENESS: ICD-10-CM

## 2022-05-05 DIAGNOSIS — F33.42 MAJOR DEPRESSIVE DISORDER, RECURRENT, IN FULL REMISSION (HCC): ICD-10-CM

## 2022-05-05 DIAGNOSIS — M25.561 CHRONIC PAIN OF BOTH KNEES: ICD-10-CM

## 2022-05-05 DIAGNOSIS — G89.29 CHRONIC PAIN OF BOTH KNEES: ICD-10-CM

## 2022-05-05 PROCEDURE — 3023F SPIROM DOC REV: CPT | Performed by: INTERNAL MEDICINE

## 2022-05-05 PROCEDURE — G8427 DOCREV CUR MEDS BY ELIG CLIN: HCPCS | Performed by: INTERNAL MEDICINE

## 2022-05-05 PROCEDURE — G8417 CALC BMI ABV UP PARAM F/U: HCPCS | Performed by: INTERNAL MEDICINE

## 2022-05-05 PROCEDURE — 2022F DILAT RTA XM EVC RTNOPTHY: CPT | Performed by: INTERNAL MEDICINE

## 2022-05-05 PROCEDURE — 1036F TOBACCO NON-USER: CPT | Performed by: INTERNAL MEDICINE

## 2022-05-05 PROCEDURE — 3017F COLORECTAL CA SCREEN DOC REV: CPT | Performed by: INTERNAL MEDICINE

## 2022-05-05 PROCEDURE — 3046F HEMOGLOBIN A1C LEVEL >9.0%: CPT | Performed by: INTERNAL MEDICINE

## 2022-05-05 PROCEDURE — 99214 OFFICE O/P EST MOD 30 MIN: CPT | Performed by: INTERNAL MEDICINE

## 2022-05-05 RX ORDER — CLONAZEPAM 0.5 MG/1
0.5 TABLET ORAL NIGHTLY PRN
Qty: 30 TABLET | Refills: 0 | Status: SHIPPED | OUTPATIENT
Start: 2022-05-05 | End: 2022-06-07 | Stop reason: SDUPTHER

## 2022-05-05 RX ORDER — GABAPENTIN 800 MG/1
800 TABLET ORAL 2 TIMES DAILY
Qty: 180 TABLET | Refills: 1 | Status: SHIPPED | OUTPATIENT
Start: 2022-05-05 | End: 2022-05-09 | Stop reason: ALTCHOICE

## 2022-05-05 RX ORDER — ATORVASTATIN CALCIUM 20 MG/1
20 TABLET, FILM COATED ORAL DAILY
Qty: 30 TABLET | Refills: 3 | Status: SHIPPED | OUTPATIENT
Start: 2022-05-05 | End: 2022-07-28

## 2022-05-05 SDOH — ECONOMIC STABILITY: TRANSPORTATION INSECURITY
IN THE PAST 12 MONTHS, HAS THE LACK OF TRANSPORTATION KEPT YOU FROM MEDICAL APPOINTMENTS OR FROM GETTING MEDICATIONS?: NO

## 2022-05-05 SDOH — ECONOMIC STABILITY: FOOD INSECURITY: WITHIN THE PAST 12 MONTHS, YOU WORRIED THAT YOUR FOOD WOULD RUN OUT BEFORE YOU GOT MONEY TO BUY MORE.: NEVER TRUE

## 2022-05-05 SDOH — ECONOMIC STABILITY: TRANSPORTATION INSECURITY
IN THE PAST 12 MONTHS, HAS LACK OF TRANSPORTATION KEPT YOU FROM MEETINGS, WORK, OR FROM GETTING THINGS NEEDED FOR DAILY LIVING?: NO

## 2022-05-05 SDOH — ECONOMIC STABILITY: FOOD INSECURITY: WITHIN THE PAST 12 MONTHS, THE FOOD YOU BOUGHT JUST DIDN'T LAST AND YOU DIDN'T HAVE MONEY TO GET MORE.: NEVER TRUE

## 2022-05-05 ASSESSMENT — LIFESTYLE VARIABLES
HOW MANY STANDARD DRINKS CONTAINING ALCOHOL DO YOU HAVE ON A TYPICAL DAY: 1 OR 2
HOW OFTEN DO YOU HAVE A DRINK CONTAINING ALCOHOL: 2-4 TIMES A MONTH

## 2022-05-05 ASSESSMENT — SOCIAL DETERMINANTS OF HEALTH (SDOH): HOW HARD IS IT FOR YOU TO PAY FOR THE VERY BASICS LIKE FOOD, HOUSING, MEDICAL CARE, AND HEATING?: NOT VERY HARD

## 2022-05-05 NOTE — PROGRESS NOTES
Visit Information    Have you changed or started any medications since your last visit including any over-the-counter medicines, vitamins, or herbal medicines? no   Are you having any side effects from any of your medications? -  no  Have you stopped taking any of your medications? Is so, why? -  no    Have you seen any other physician or provider since your last visit? No  Have you had any other diagnostic tests since your last visit? Yes - Records Obtained  Have you been seen in the emergency room and/or had an admission to a hospital since we last saw you? No  Have you had your routine dental cleaning in the past 6 months? no    Have you activated your [employer] account? If not, what are your barriers?  No:      Patient Care Team:  Oral Birmingham MD as PCP - General (Internal Medicine)  Nader Gaytan MD as Consulting Physician (Urology)  Moises Baker MD as Consulting Physician (Infectious Diseases)    Medical History Review  Past Medical, Family, and Social History reviewed and does not contribute to the patient presenting condition    Health Maintenance   Topic Date Due    Annual Wellness Visit (AWV)  Never done    HIV screen  Never done    Hepatitis C screen  Never done    Hepatitis B vaccine (1 of 3 - Risk 3-dose series) Never done    Shingles vaccine (1 of 2) Never done    Cervical cancer screen  04/30/2018    Diabetic retinal exam  07/09/2019    Pneumococcal 0-64 years Vaccine (2 - PCV) 11/29/2019    Diabetic foot exam  12/11/2020    Diabetic microalbuminuria test  12/11/2020    TSH  12/11/2020    COVID-19 Vaccine (3 - Booster for Moderna series) 10/15/2021    Lipids  10/17/2021    Flu vaccine (Season Ended) 09/01/2022    A1C test (Diabetic or Prediabetic)  12/20/2022    Potassium  12/24/2022    Creatinine  12/24/2022    Depression Monitoring  04/22/2023    Breast cancer screen  01/26/2024    Colorectal Cancer Screen  07/07/2030    DTaP/Tdap/Td vaccine (3 - Td or Tdap) 08/30/2030    Hepatitis A vaccine  Aged Out    Hib vaccine  Aged Out    Meningococcal (ACWY) vaccine  Aged Out     SUBJECTIVE:  Micheal Molina is a 63 y.o. female who  comes for complaints of   Chief Complaint   Patient presents with    Knee Pain     patient fell recently, knee pain is now worsening. requesting orthopedic referral    Diabetes         Specialities pt is following with:  Endo-Dr Jessie Neff- T1 DM  Gen surgeon- h/o colectomy, for benign coloni mass, has gastroparesis  Pulm- Dr Margarito Ritter- COPD  Podiatry  ENT- perforated TM  Urology- bladder atony    Chronic conditions being monitored:    COPD  gen well controlled  On breo and spiriva  Albuterol prn- rare use    Chronic bladder atony  Pt straight cath 4-5X a day  Following with urology    DIABETES MELLITUS Ty 1    diagnosed more than 5 years ago  Modifying factors on med:   Severity: uncontrolled   Associated signs and symtoms: neuropathy   aggravated with sugar diet and better with low sugar diet      - Follows a diabetic diet most of the time.   -Is compliant with medication(s) and is tolerating med(s) without any side effects.    -  Reports checking his/her glucose on a once a day schedule with sugars in the fasting range. Hemoglobin A1C   Date Value Ref Range Status   12/20/2021 8.2 (H) 4.0 - 6.0 % Final   06/25/2021 7.9 (H) 4.0 - 6.0 % Final   10/17/2020 8.8 (H) 4.0 - 6.0 % Final     Has dexcom and insulin pump  Last HbA1c 7.8 March 2022  Gets annual TOM  On asa, zocor, losartan    Urine ACR ordered, fasting lipids ordered     H/o stroke  Taking ASA, Plavix,      HYPERTENSION:    Onset more than 2 years ago  Nicolette: mild to mod  Usually controlled with current po meds:   Not associated with headaches or blurry vision  No chest pain   -- Patient denies any side effects of their medication(s) and is compliant with their regimen.       Last 3 Encounter BP Readings:     Date:        BP:  BP Readings from Last 3 Encounters:   05/05/22 132/70   04/22/22 137/82   04/12/22 (!) 175/79     Losartan metorplol, norvasc     HYPERLIPIDEMIA:   Patient reports doing well on current therapy of statin:  zocor 20  - Denies side effects of muscle weakness or achiness. - Exercise  -last lipid panel  Lab Results   Component Value Date    CHOL 206 (H) 10/17/2020    CHOL 153 06/02/2017    CHOL 210 (H) 09/20/2016     Lab Results   Component Value Date    TRIG 74 10/17/2020    TRIG 179 (H) 06/02/2017    TRIG 293 (H) 09/20/2016     Lab Results   Component Value Date    HDL 55 10/17/2020    HDL 57 12/11/2019    HDL 35 (L) 06/02/2017     Lab Results   Component Value Date    LDLCHOLESTEROL 136 (H) 10/17/2020    LDLCHOLESTEROL 137 (H) 12/11/2019    LDLCHOLESTEROL 82 06/02/2017     Lab Results   Component Value Date    VLDL NOT REPORTED 10/17/2020    VLDL NOT REPORTED (H) 12/11/2019    VLDL NOT REPORTED 06/02/2017     Lab Results   Component Value Date    CHOLHDLRATIO 3.7 10/17/2020    CHOLHDLRATIO 4.1 12/11/2019    CHOLHDLRATIO 4.4 06/02/2017       H/o hypothyorid  Pt was taken off of synthroid      Diabetic neuropathy, trigeminal neuralgia  Chronic pain  On neurointin, cymbalta, elavil    Anxiety  With diff sleeping  Taking klonopin 0.5mg BID  8-9yr  Pt says she sleeps \"only 6hours\"  Discussed tapering off- pt agreeable.      Concerns today:   recent fall,   Tripped over a [place] duck      Alcohol/smoking- occ alxohol, marijuana smoker, once a week    REVIEW OF SYSTEMS (except Subjective (HPI))    CONSTITUTIONAL: No weight loss, malaise or fevers  HEENT: Negative for frequent or significant headaches, No changes in hearing or vision, no nose bleeds or other nasal problems  NECK: Negative for lumps, goiter, pain and significant neck swelling  RESPIRATORY: Negative for cough, hemoptysis, wheezing, or shortness of breath  CARDIOVASCULAR: Negative for chest pain, leg swelling,or palpitations  GI: No nausea, vomiting, or diarrhea or Constipation  : No history of dysuria, frequency or incontinence, or hematuria  MUSCULOSKELETAL: Negative for joint pain or swelling  SKIN: Negative for lesions, rash, and itching  PSYCH: Negative for sleep disturbance, mood disorder and recent psychosocial stressors  NEURO: No history of headaches, syncope, paralysis, seizures or tremors    ACTIVE PROBLEM LIST  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 ([address]    Acquired hypothyroidism    Vitamin D deficiency    Mixed hyperlipidemia    Type 1 diabetes mellitus with retinopathy ([address]    Incisional hernia without obstruction or gangrene    Hypoxia    Moderate persistent asthma with status asthmaticus    Seasonal allergic rhinitis due to pollen    Chronic obstructive pulmonary disease ([facility])    Insulin pump in place    Contusion of left chest wall    Chronic retention of urine    Chronic combined systolic and diastolic congestive heart failure (HCC)    Major depressive disorder, recurrent, in full remission ([address]    Urinary incontinence without sensory awareness    Arthritis of knee, right    Acute bilateral thoracic back pain    Acute pyelonephritis    Bilateral impacted cerumen    Left ear pain    Flank pain    TIA (transient ischemic attack)    Moderate malnutrition ([address]    COVID-19    CHF (congestive heart failure) ([address]    COVID    Diabetic ketoacidosis type 1 diabetes mellitus ([address]    PARUL (acute kidney injury) ([address]         PAST MEDICAL HISTORY:    Past Medical History:   Diagnosis Date    Anesthesia complication     \"lung collapsed after colon surgery    Anxiety     Arthritis     Back pain     CAD (coronary artery disease)     no stents    Caffeine use     3 coffee / day    Cataract     left    COPD (chronic obstructive pulmonary disease) ([facility]) EMPHYSEMA    Deafness     right ear    Depression     Diabetes mellitus (HCC)     Diarrhea     Diverticulosis     Fibromyalgia     Gastroparalysis     GERD (gastroesophageal reflux disease)     Hearing loss     DEAF IN RIGHT EAR    Hyperlipidemia     Hyperlipidemia     Hypertension     Incontinence     MDRO (multiple drug resistant organisms) resistance 2012    mrsa (nasal), eye, ear    Neurogenic bladder     CATHES HERSELF 7 TIMES A DAY, IS ABLE TO URINATE ON OWN    Neuropathy     feet    Obesity     Osteoarthritis     Peripheral vascular disease, unspecified (HCC)     Restless leg syndrome     Rhabdomyolysis     [facility] 1 week, May 2014, then [facility] for rehab.     Spinal stenosis, thoracic 05/27/2014    Stroke [facility]) 2012    numbness on the left side of face, Oct 2020 TIA     Thyroid disease     enlarged    TMJ (dislocation of temporomandibular joint)     Trigeminal neuralgia     Type II or unspecified type diabetes mellitus without mention of complication, not stated as uncontrolled        PAST SURGICAL HISTORY:    Past Surgical History:   Procedure Laterality Date    ABDOMEN SURGERY      LAPAROSCOPY    CARPAL TUNNEL RELEASE Right     CATARACT REMOVAL      CHOLECYSTECTOMY      CHOLECYSTECTOMY, OPEN      11/2012    COLON SURGERY      benign mass removed    COLONOSCOPY      COLONOSCOPY  07/13/2016    COLONOSCOPY  06/01/2020    incomplete/poor prep    COLONOSCOPY  07/07/2020    COLONOSCOPY N/A 7/7/2020    COLORECTAL CANCER SCREENING, NOT HIGH RISK performed by Esa Gonzalez MD at [address] [place] [address]      sebaceous cyst, under arm left side x5    CYST REMOVAL      right ankle    CYSTOSCOPY      EYE SURGERY Right     HOLE IN RETINA REPAIRED    FINGER TRIGGER RELEASE Right     INCISIONAL HERNIA REPAIR  07/07/15    open    INCISIONAL HERNIA REPAIR  10/17/2017    LEG BIOPSY EXCISION Left 7/6/2021    EXCISION OF LEFT THIGH MASS performed by Esa Gonzalez MD at [address] [facility] Left     ear tube placement    POLYPECTOMY      colon polyp    OK REPAIR INCISIONAL HERNIA,REDUCIBLE N/A 10/17/2017    HERNIA INCISIONAL REPAIR WITH MESH performed by Percy Pollard MD at [address] [facility] N/A 6/1/2020    [address] [facility] performed by Percy Pollard MD at [address] [facility] ENDOSCOPY  07/13/2016    UPPER GASTROINTESTINAL ENDOSCOPY  07/23/2018    with biopsy    UPPER GASTROINTESTINAL ENDOSCOPY N/A 7/23/2018    EGD BIOPSY performed by Percy Pollard MD at [address]  06/01/2020    with biopsy    UPPER GASTROINTESTINAL ENDOSCOPY N/A 6/1/2020    EGD BIOPSY performed by Percy Pollard MD at [address]:    Family History   Problem Relation Age of Onset    High Blood Pressure Mother     Migraines Mother     High Blood Pressure Father     Heart Disease Father     Cancer Father         skin    Diabetes Maternal Grandmother     Heart Disease Maternal Grandmother     Cancer Maternal Grandmother     Parkinsonism Maternal Grandmother     Cancer Paternal Grandmother     Other Brother         epilepsy        SOCIAL HISTORY:    Social History     Socioeconomic History    Marital status:      Spouse name: Not on file    Number of children: 0    Years of education: 15    Highest education level: Not on file   Occupational History    Occupation: disability     Employer: N/A   Tobacco Use    Smoking status: Never Smoker    Smokeless tobacco: Never Used   Vaping Use    Vaping Use: Never used   Substance and Sexual Activity    Alcohol use: No    Drug use: Yes     Frequency: 4.0 times per week     Types: Marijuana Brittany Umana)    Sexual activity: Yes     Partners: Male     Comment: 1 partner   Other Topics Concern    Not on file   Social History Narrative    Not on file     Social Determinants of Health     Financial Resource Strain: Low Risk     Difficulty of Paying Living Expenses: Not very hard   Food Insecurity: No Food Insecurity    Worried About Running Out of Food in the Last Year: Never true    Mela of Food in the Last Year: Never true   Transportation Needs: No Transportation Needs    Lack of Transportation (Medical): No    Lack of Transportation (Non-Medical):  No   Physical Activity:     Days of Exercise per Week: Not on file    Minutes of Exercise per Session: Not on file   Stress:     Feeling of Stress : Not on file   Social Connections:     Frequency of Communication with Friends and Family: Not on file    Frequency of Social Gatherings with Friends and Family: Not on file    Attends Druze Services: Not on file    Active Member of [address] [place] [address] or Organizations: Not on file    Attends Club or Organization Meetings: Not on file    Marital Status: Not on file   Intimate Partner Violence:     Fear of Current or Ex-Partner: Not on file    Emotionally Abused: Not on file    Physically Abused: Not on file    Sexually Abused: Not on file   Housing Stability:     Unable to Pay for Housing in the Last Year: Not on file    Number of [place] in the Last Year: Not on file    Unstable Housing in the Last Year: Not on file       CURRENT MEDICATIONS:  Current Outpatient Medications   Medication Sig Dispense Refill    DULoxetine (CYMBALTA) 60 MG extended release capsule TAKE 1 CAPSULE BY MOUTH IN THE MORNING      diclofenac sodium (VOLTAREN) 1 % GEL APPLY 2 GRAMS TOPICALLY 4 TIMES DAILY      docusate sodium (COLACE) 100 MG capsule Take by mouth      vitamin B-12 (CYANOCOBALAMIN) 1000 MCG tablet Take 1,000 mcg by mouth daily      ondansetron (ZOFRAN) 4 MG tablet Take 1 tablet by mouth every 8 hours as needed for Nausea or Vomiting 30 tablet 0    clopidogrel (PLAVIX) 75 MG tablet Take 75 mg by mouth daily      Albuterol Sulfate (PROAIR HFA IN) Inhale into the lungs      simvastatin (ZOCOR) 20 MG tablet Take 20 mg by mouth nightly      amLODIPine (NORVASC) 5 MG tablet Take 1 tablet by mouth once daily 90 tablet 1    aspirin 81 MG tablet Take 1 tablet by mouth daily 30 tablet 0    topiramate (TOPAMAX) 25 MG tablet Take 1 tablet by mouth 2 times daily 60 tablet 3    DULoxetine (CYMBALTA) 30 MG extended release capsule Take 90 mg by mouth daily      metoclopramide (REGLAN) 10 MG tablet Take 10 mg by mouth 2 times daily       metoprolol (LOPRESSOR) 100 MG tablet Take 100 mg by mouth 2 times daily      insulin aspart (NOVOLOG PENFILL) 100 UNIT/ML injection cartridge Inject into the skin continuous Per insulin pump      amitriptyline (ELAVIL) 150 MG tablet TAKE 1 TABLET BY MOUTH NIGHTLY (Patient taking differently: Take 75 mg by mouth nightly TAKE 1 TABLET BY MOUTH NIGHTLY) 30 tablet 0    pantoprazole (PROTONIX) 40 MG tablet Take 40 mg by mouth 2 times daily      losartan (COZAAR) 100 MG tablet TAKE 1 TABLET BY MOUTH ONCE DAILY 90 tablet 3    rOPINIRole (REQUIP) 2 MG tablet Take 1 tablet by mouth daily 90 tablet 3    levothyroxine (SYNTHROID) 137 MCG tablet Take 1 tablet by mouth daily 90 tablet 3    BREO ELLIPTA 200-25 MCG/INH AEPB Inhale 1 puff into the lungs daily   6    tiotropium (SPIRIVA HANDIHALER) 18 MCG inhalation capsule Inhale 1 capsule into the lungs Daily 30 capsule 5    ONE TOUCH ULTRA TEST strip   1    azithromycin (ZITHROMAX) 250 MG tablet Take 2 tabs (500 mg) on Day 1, and take 1 tab (250 mg) on days 2 through 5. (Patient not taking: Reported on 5/5/2022) 1 packet 0    clonazePAM (KLONOPIN) 0.5 MG tablet TAKE 1 TABLET BY MOUTH TWICE DAILY AS NEEDED FOR ANXIETY OR SLEEP 60 tablet 2    gabapentin (NEURONTIN) 800 MG tablet TAKE 1 TABLET BY MOUTH THREE TIMES DAILY 270 tablet 0     No current facility-administered medications for this visit.        OBJECTIVE:  Vitals:    05/05/22 1442   BP: 132/70         General exam (except above):  Constitutional - well appearing, alert, in no acute distress  Eyes: Anicteric sclera. Pupils are equally round and reactive to light. Extraocular movements are intact. Skin: Skin color, texture, turgor normal  Respiratory - Lungs clear to auscultation. No wheezing, rhonchi, rales  Cardiovascular - RRR. S1S2 present. No leg swelling. Gastrointestinal - Abdomen soft, non-tender. Bowel sounds normal. No masses, organomegaly  Musculoskeletal - No joint swelling, deformity, or tenderness  Neuro - Pt Alert, awake and oriented x 3. No gross focal neurological deficits      ASSESSMENT AND PLAN (MEDICAL DECISION MAKING):  Fabián Escobar was seen today for knee pain and diabetes. Diagnoses and all orders for this visit:    Type 1 diabetes mellitus with retinopathy, macular edema presence unspecified, unspecified laterality, unspecified retinopathy severity ([facility]  Comments:  controlled, c/w current meds  Orders:  -     Microalbumin, Ur; Future    Screening for hyperlipidemia  -     Lipid Panel; Future  -     atorvastatin (LIPITOR) 20 MG tablet; Take 1 tablet by mouth daily    Essential hypertension  Comments:  contorlled, c/w current meds    Chronic pain of both knees  Comments:  manyyears, refer to ortho  Orders:  -     Agnieszka Elaine MD, Orthopedic Surgery, [facility])    Chronic combined systolic and diastolic congestive heart failure ([address]  Comments:  stable, c/w current meds    Major depressive disorder, recurrent, in full remission ([address]    Urinary incontinence without sensory awareness    Chronic pain syndrome  -     Discontinue: gabapentin (NEURONTIN) 800 MG tablet; Take 1 tablet by mouth 2 times daily for 90 days. TAKE 1 TABLET BY MOUTH THREE TIMES DAILY    Retinopathy due to secondary DM (HCC)    Insomnia, unspecified type  -     clonazePAM (KLONOPIN) 0.5 MG tablet; Take 1 tablet by mouth nightly as needed for Anxiety for up to 30 days.         Follow up in:  Julietta Dakins, MD

## 2022-05-09 ENCOUNTER — TELEPHONE (OUTPATIENT)
Dept: INTERNAL MEDICINE CLINIC | Age: 62
End: 2022-05-09

## 2022-05-09 RX ORDER — GABAPENTIN 800 MG/1
800 TABLET ORAL 2 TIMES DAILY
Qty: 180 TABLET | Refills: 1 | Status: SHIPPED | OUTPATIENT
Start: 2022-05-09 | End: 2022-06-21 | Stop reason: SDUPTHER

## 2022-05-12 ENCOUNTER — OFFICE VISIT (OUTPATIENT)
Dept: ORTHOPEDIC SURGERY | Age: 62
End: 2022-05-12
Payer: MEDICARE

## 2022-05-12 VITALS — BODY MASS INDEX: 30.82 KG/M2 | HEIGHT: 65 IN | WEIGHT: 185 LBS

## 2022-05-12 DIAGNOSIS — G89.29 CHRONIC PAIN OF RIGHT KNEE: Primary | ICD-10-CM

## 2022-05-12 DIAGNOSIS — M25.562 CHRONIC PAIN OF LEFT KNEE: ICD-10-CM

## 2022-05-12 DIAGNOSIS — M25.561 CHRONIC PAIN OF RIGHT KNEE: Primary | ICD-10-CM

## 2022-05-12 DIAGNOSIS — G89.29 CHRONIC PAIN OF LEFT KNEE: ICD-10-CM

## 2022-05-12 PROCEDURE — 1036F TOBACCO NON-USER: CPT | Performed by: PHYSICIAN ASSISTANT

## 2022-05-12 PROCEDURE — 99204 OFFICE O/P NEW MOD 45 MIN: CPT | Performed by: PHYSICIAN ASSISTANT

## 2022-05-12 PROCEDURE — G8428 CUR MEDS NOT DOCUMENT: HCPCS | Performed by: PHYSICIAN ASSISTANT

## 2022-05-12 PROCEDURE — 3017F COLORECTAL CA SCREEN DOC REV: CPT | Performed by: PHYSICIAN ASSISTANT

## 2022-05-12 PROCEDURE — G8417 CALC BMI ABV UP PARAM F/U: HCPCS | Performed by: PHYSICIAN ASSISTANT

## 2022-05-12 NOTE — PROGRESS NOTES
[address] [facility], [address] [facility], [address] [place] [address], [address] [place] [address]           Dept Phone: [phone]           Dept Fax:  [phone]           Jennifer Saunders          Dept Phone: [phone]           Dept Fax:  [phone]      Chief Compliant:  Chief Complaint   Patient presents with    Knee Pain     B/L        History of Present Illness: This is a 63 y.o. female who presents to the clinic today for evaluation of had concerns including Knee Pain (B/L). Ms. Isael Ryan is a 51-year-old female who presents for acute exacerbation of chronic bilateral knee pain. Patient reports that she has been doing with chronic knee pain for a number of years however she tripped over a concrete porch decoration at her mother's house causing her to flip over and landed directly on both knees since that her pain has been significantly increased. Patient reports fall occurred on 4/12/2022 she was evaluated in the ED at that time where she had extensive work-up including x-rays of the right lower leg, lumbar spine and right forearm without any evidence of acute fracture or other acute osseous abnormality. Patient reports pain is most severe to the anterior aspect of both knees but does seem to be diffuse in nature. Pain is aggravated by walking associate with intermittent weakness however she reports that the weakness is chronic prior to the falls. Patient reports she has undergone corticosteroid injections multiple times in the left knee in the past for arthritis but none recently. No history of Visco injections or recent physical therapy. No history of knee surgery. Past History:    Current Outpatient Medications:     gabapentin (NEURONTIN) 800 MG tablet, Take 1 tablet by mouth 2 times daily for 180 days. , Disp: 180 tablet, Rfl: 1   atorvastatin (LIPITOR) 20 MG tablet, Take 1 tablet by mouth daily, Disp: 30 tablet, Rfl: 3    clonazePAM (KLONOPIN) 0.5 MG tablet, Take 1 tablet by mouth nightly as needed for Anxiety for up to 30 days. , Disp: 30 tablet, Rfl: 0    DULoxetine (CYMBALTA) 60 MG extended release capsule, TAKE 1 CAPSULE BY MOUTH IN THE MORNING, Disp: , Rfl:     diclofenac sodium (VOLTAREN) 1 % GEL, APPLY 2 GRAMS TOPICALLY 4 TIMES DAILY, Disp: , Rfl:     docusate sodium (COLACE) 100 MG capsule, Take by mouth, Disp: , Rfl:     azithromycin (ZITHROMAX) 250 MG tablet, Take 2 tabs (500 mg) on Day 1, and take 1 tab (250 mg) on days 2 through 5.  (Patient not taking: Reported on 5/5/2022), Disp: 1 packet, Rfl: 0    vitamin B-12 (CYANOCOBALAMIN) 1000 MCG tablet, Take 1,000 mcg by mouth daily, Disp: , Rfl:     ondansetron (ZOFRAN) 4 MG tablet, Take 1 tablet by mouth every 8 hours as needed for Nausea or Vomiting, Disp: 30 tablet, Rfl: 0    clopidogrel (PLAVIX) 75 MG tablet, Take 75 mg by mouth daily, Disp: , Rfl:     Albuterol Sulfate (PROAIR HFA IN), Inhale into the lungs, Disp: , Rfl:     amLODIPine (NORVASC) 5 MG tablet, Take 1 tablet by mouth once daily, Disp: 90 tablet, Rfl: 1    aspirin 81 MG tablet, Take 1 tablet by mouth daily, Disp: 30 tablet, Rfl: 0    topiramate (TOPAMAX) 25 MG tablet, Take 1 tablet by mouth 2 times daily, Disp: 60 tablet, Rfl: 3    DULoxetine (CYMBALTA) 30 MG extended release capsule, Take 90 mg by mouth daily, Disp: , Rfl:     metoclopramide (REGLAN) 10 MG tablet, Take 10 mg by mouth 2 times daily , Disp: , Rfl:     metoprolol (LOPRESSOR) 100 MG tablet, Take 100 mg by mouth 2 times daily, Disp: , Rfl:     insulin aspart (NOVOLOG PENFILL) 100 UNIT/ML injection cartridge, Inject into the skin continuous Per insulin pump, Disp: , Rfl:     amitriptyline (ELAVIL) 150 MG tablet, TAKE 1 TABLET BY MOUTH NIGHTLY (Patient taking differently: Take 75 mg by mouth nightly TAKE 1 TABLET BY MOUTH NIGHTLY), Disp: 30 tablet, Rfl: 0    pantoprazole (PROTONIX) 40 MG tablet, Take 40 mg by mouth 2 times daily, Disp: , Rfl:     losartan (COZAAR) 100 MG tablet, TAKE 1 TABLET BY MOUTH ONCE DAILY, Disp: 90 tablet, Rfl: 3    rOPINIRole (REQUIP) 2 MG tablet, Take 1 tablet by mouth daily, Disp: 90 tablet, Rfl: 3    BREO ELLIPTA 200-25 MCG/INH AEPB, Inhale 1 puff into the lungs daily , Disp: , Rfl: 6    tiotropium (SPIRIVA HANDIHALER) 18 MCG inhalation capsule, Inhale 1 capsule into the lungs Daily, Disp: 30 capsule, Rfl: 5    ONE TOUCH ULTRA TEST strip, , Disp: , Rfl: 1  Allergies   Allergen Reactions    Fioricet [Butalbital-Apap-Caffeine] Shortness Of Breath    Betamethasone      Unsure reaction      Butalbital-Apap-Caff-Cod Other (See Comments)    Erythromycin      Other reaction(s): Unknown  Eye ointment caused swelling    Xarelto [Rivaroxaban]      Dizziness & \"felt like I was going to pass out\"    Codeine Nausea And Vomiting and Nausea Only    Droperidol Anxiety    Tape [employer] Tape] Rash     Social History     Socioeconomic History    Marital status:      Spouse name: Not on file    Number of children: 0    Years of education: 15    Highest education level: Not on file   Occupational History    Occupation: disability     Employer: N/A   Tobacco Use    Smoking status: Never Smoker    Smokeless tobacco: Never Used   Vaping Use    Vaping Use: Never used   Substance and Sexual Activity    Alcohol use: No    Drug use: Yes     Frequency: 4.0 times per week     Types: Marijuana Zollie Axe)    Sexual activity: Yes     Partners: Male     Comment: 1 partner   Other Topics Concern    Not on file   Social History Narrative    Not on file     Social Determinants of Health     Financial Resource Strain: Low Risk     Difficulty of Paying Living Expenses: Not very hard   Food Insecurity: No Food Insecurity    Worried About Running Out of Food in the Last Year: Never true    Mela of Food in the Last Year: Never true   Transportation Needs: No Transportation Needs    Lack of Transportation (Medical): No    Lack of Transportation (Non-Medical):  No   Physical Activity:     Days of Exercise per Week: Not on file    Minutes of Exercise per Session: Not on file   Stress:     Feeling of Stress : Not on file   Social Connections:     Frequency of Communication with Friends and Family: Not on file    Frequency of Social Gatherings with Friends and Family: Not on file    Attends Mandaen Services: Not on file    Active Member of [address] [place] [address] [employer] or Organizations: Not on file    Attends Club or Organization Meetings: Not on file    Marital Status: Not on file   Intimate Partner Violence:     Fear of Current or Ex-Partner: Not on file    Emotionally Abused: Not on file    Physically Abused: Not on file    Sexually Abused: Not on file   Housing Stability:     Unable to Pay for Housing in the Last Year: Not on file    Number of [place] in the Last Year: Not on file    Unstable Housing in the Last Year: Not on file     Past Medical History:   Diagnosis Date    Anesthesia complication     \"lung collapsed after colon surgery    Anxiety     Arthritis     Back pain     CAD (coronary artery disease)     no stents    Caffeine use     3 coffee / day    Cataract     left    COPD (chronic obstructive pulmonary disease) ([address]     EMPHYSEMA    Deafness     right ear    Depression     Diabetes mellitus ([address]     Diarrhea     Diverticulosis     Fibromyalgia     Gastroparalysis     GERD (gastroesophageal reflux disease)     Hearing loss     DEAF IN RIGHT EAR    Hyperlipidemia     Hyperlipidemia     Hypertension     Incontinence     MDRO (multiple drug resistant organisms) resistance 2012    mrsa (nasal), eye, ear    Neurogenic bladder     CATHES HERSELF 7 TIMES A DAY, IS ABLE TO URINATE ON OWN    Neuropathy     feet    Obesity     Osteoarthritis     Peripheral vascular disease, unspecified ([address]     Restless leg syndrome     Rhabdomyolysis     [place] 1 week, May 2014, then [facility] for rehab.     Spinal stenosis, thoracic 05/27/2014    Stroke [facility]) 2012    numbness on the left side of face, Oct 2020 TIA     Thyroid disease     enlarged    TMJ (dislocation of temporomandibular joint)     Trigeminal neuralgia     Type II or unspecified type diabetes mellitus without mention of complication, not stated as uncontrolled      Past Surgical History:   Procedure Laterality Date    ABDOMEN SURGERY      LAPAROSCOPY    CARPAL TUNNEL RELEASE Right     CATARACT REMOVAL      CHOLECYSTECTOMY      CHOLECYSTECTOMY, OPEN      11/2012    COLON SURGERY      benign mass removed    COLONOSCOPY      COLONOSCOPY  07/13/2016    COLONOSCOPY  06/01/2020    incomplete/poor prep    COLONOSCOPY  07/07/2020    COLONOSCOPY N/A 7/7/2020    COLORECTAL CANCER SCREENING, NOT HIGH RISK performed by Beckie Oates MD at [address] [place] [address]      sebaceous cyst, under arm left side x5    CYST REMOVAL      right ankle    CYSTOSCOPY      EYE SURGERY Right     HOLE IN RETINA REPAIRED    FINGER TRIGGER RELEASE Right     INCISIONAL HERNIA REPAIR  07/07/15    open    INCISIONAL HERNIA REPAIR  10/17/2017    LEG BIOPSY EXCISION Left 7/6/2021    EXCISION OF LEFT THIGH MASS performed by Beckie Oates MD at 2015 [facility] Left     ear tube placement    POLYPECTOMY      colon polyp    [place] REPAIR INCISIONAL HERNIA,REDUCIBLE N/A 10/17/2017    HERNIA INCISIONAL REPAIR WITH MESH performed by Beckie Oates MD at [address] [place] [address] Road 6/1/2020    SIGMOIDOSCOPY DIAGNOSTIC FLEXIBLE performed by Beckie Oates MD at [address] ENDOSCOPY  07/13/2016    UPPER GASTROINTESTINAL ENDOSCOPY  07/23/2018    with biopsy    UPPER GASTROINTESTINAL ENDOSCOPY N/A 7/23/2018    EGD BIOPSY performed by Beckie Oates MD at [facility] END[facility]COPY  06/01/2020    with biopsy    UPPER GASTROINTESTINAL ENDOSCOPY N/A 6/1/2020    EGD BIOPSY performed by Jessica Molina MD at [address] [place] [address] History   Problem Relation Age of Onset    High Blood Pressure Mother     Migraines Mother     High Blood Pressure Father     Heart Disease Father     Cancer Father         skin    Diabetes Maternal Grandmother     Heart Disease Maternal Grandmother     Cancer Maternal Grandmother     Parkinsonism Maternal Grandmother     Cancer Paternal Grandmother     Other Brother         epilepsy        Review of Systems   Constitutional: Negative for fever, chills, sweats. Eyes: Negative for changes in vision, or pain. HENT: Negative for ear ache, epistaxis, or sore throat. Respiratory/Cardio: Negative for Chest pain, palpitations, SOB, or cough. Gastrointestinal: Negative for abdominal pain, N/V/D. Genitourinary: Negative for dysuria, frequency, urgency, or hematuria. Neurological: Negative for headache, numbness, or weakness. Integumentary: Negative for rash, itching, laceration, or abrasion. Musculoskeletal: Positive for Knee Pain (B/L)       Physical Exam:  Constitutional: Patient is oriented to person, place, and time. Patient appears well-developed and well nourished. HENT: Negative otherwise noted  Head: Normocephalic and Atraumatic  Nose: Normal  Eyes: Conjunctivae and EOM are normal  Neck: Normal range of motion Neck supple. Respiratory/Cardio: Effort normal. No respiratory distress. Musculoskeletal:    knee: Bilateral    Skin: warm and dry, no rash or erythema  Vasculature: 2+ pedal pulses bilaterally  Neuro: Sensation grossly intact to light touch diffusely  Alignment: Genu varum left knee  Tenderness: Left knee: Moderate tenderness to the medial joint line. No tenderness to lateral joint line, quad or patellar tendon, posterior knee. Right knee: No tenderness.     ROM: (Degrees)    Right   A P   Left   A P    Extension  0 0   Extension  -5 -5  Flexion   120 125   Flexion   120  125    Crepitation  No    Crepitation  Yes      Muscle strength:    Right       Left    Flexion   5    Flexion   5  Extension  5    Extension  5  SLR   5    SLR   5    Extensor lag   n    Extensor lag  y      Special testing:    Right          Left    y    Pain with deep knee flexion   y  y    Patellar grind     y  n    Patellar apprehension    n  y    Patellar glide     y    n    Lachman     n  n    Anterior drawer    n  n    Pivot shift     n  n    Posterior drawer    n  n    Dial test     n  n    Posterolateral drawer    n  n    Posterior Sag     n  n    MCL      n  n    LCL      n    n    Medial joint line tenderness   y  n    Lateral joint line tenderness   n  n    Appley's     n    Neurological: Patient is alert and oriented to person, place, and time. Normal strenght. No sensory deficit. Skin: Skin is warm and dry  Psychiatric: Behavior is normal. Thought content normal.  Nursing note and vitals reviewed. Labs and Imaging:     XR RADIUS ULNA RIGHT (2 VIEWS)  Narrative: EXAMINATION:  TWO XRAY VIEWS OF THE RIGHT FOREARM    4/12/2022 1:18 pm    COMPARISON:  None    HISTORY:  ORDERING SYSTEM PROVIDED HISTORY: fall, laceration  TECHNOLOGIST PROVIDED HISTORY:  fall, laceration  Reason for Exam: pain following fall. FINDINGS:  There is mild soft tissue irregularity and swelling at the dorsal mid  forearm. No acute fracture or dislocation. No evidence of joint effusion at  the right elbow. Impression: Mild soft tissue swelling and irregularity at the mid forearm. No evidence  of acute osseous abnormality. XR TIBIA FIBULA RIGHT (2 VIEWS)  Narrative: EXAMINATION:  4  XRAY VIEWS OF THE RIGHT TIBIA AND FIBULA    4/12/2022 1:18 pm    COMPARISON:  None.     HISTORY:  ORDERING SYSTEM PROVIDED HISTORY: fall, puncture wound  TECHNOLOGIST PROVIDED HISTORY:  fall, puncture wound  Reason for Exam: pain following fall. FINDINGS:  There is no acute osseous abnormality. The knee and ankle joint spaces are  maintained. The surrounding soft tissues are unremarkable. There is no  radiopaque foreign body. Impression: No acute osseous or soft tissue abnormality. XR LUMBAR SPINE (2-3 VIEWS)  Narrative: EXAMINATION:  THREE XRAY VIEWS OF THE LUMBAR SPINE    4/12/2022 10:18 am    COMPARISON:  11/05/2014    HISTORY:  ORDERING SYSTEM PROVIDED HISTORY: fall, pain  TECHNOLOGIST PROVIDED HISTORY:  fall, pain  Reason for Exam: Pain following fall. FINDINGS:  The lumbar vertebral bodies are maintained in height. There is 2 mm grade 1  degenerative anterolisthesis L4 on L5 due to facet arthrosis, new from remote  prior imaging. Mild disc height loss at L4-L5. Facet arthrosis throughout  the lumbar levels. The bones appear relatively demineralized with the  exception of mild sclerosis and endplate irregularity at the inferior  endplate of L4. Vascular calcifications in the prevertebral soft tissues. Cholecystectomy clips. Moderate to large volume of stool in the transverse  and descending colon. Impression: Lumbar vertebral bodies are maintained in height without a definite acute  compression fracture. Grade 1 degenerative anterolisthesis L4 on L5, new from remote prior, on a  background of mild multilevel spondylosis. Inferior endplate irregularity and sclerosis at L4 is likely degenerative in  etiology, though clinical correlation is required as early sequelae of  discitis/osteomyelitis could appear similar. CT HEAD WO CONTRAST  Narrative: EXAMINATION:  CT OF THE HEAD WITHOUT CONTRAST  4/12/2022 1:51 pm    TECHNIQUE:  CT of the head was performed without the administration of intravenous  contrast. Dose modulation, iterative reconstruction, and/or weight based  adjustment of the mA/kV was utilized to reduce the radiation dose to as low  as reasonably achievable. COMPARISON:  None.     HISTORY:  ORDERING SYSTEM PROVIDED HISTORY: fall, on plavix  TECHNOLOGIST PROVIDED HISTORY:  fall, on plavix    Decision Support Exception - unselect if not a suspected or confirmed  emergency medical condition->Emergency Medical Condition (MA)  Reason for Exam: fall, pt currently taking plavix    FINDINGS:  BRAIN/VENTRICLES: There is no acute intracranial hemorrhage, mass effect or  midline shift. No abnormal extra-axial fluid collection. The gray-white  differentiation is maintained without evidence of an acute infarct. There is  no evidence of hydrocephalus. ORBITS: The visualized portion of the orbits demonstrate no acute abnormality. SINUSES: The visualized paranasal sinuses and mastoid air cells demonstrate  no acute abnormality. SOFT TISSUES/SKULL:  No acute abnormality of the visualized skull or soft  tissues. Impression: No acute intracranial abnormality. X-rays taken in clinic today and preliminarily reviewed by me 5/12/22:  AP bilateral knees standing lateral view of both knees demonstrate left knee with mild to moderate tricompartmental degenerative changes most significant in the medial compartment. Right knee with less severe mild medial patellofemoral compartmental DJD. No evidence of acute fracture, obvious effusion or other acute osseous abnormality. Orders Placed This Encounter   Procedures    XR KNEE RIGHT (1-2 VIEWS)     Standing Status:   Future     Number of Occurrences:   1     Standing Expiration Date:   5/11/2023    XR KNEE LEFT (1-2 VIEWS)     Standing Status:   Future     Number of Occurrences:   1     Standing Expiration Date:   5/12/2023   [facility] Physical Therapy - [address] [place] [address]     Referral Priority:   Routine     Referral Type:   Eval and Treat     Referral Reason:   Specialty Services Required     Requested Specialty:   Physical Therapy     Number of Visits Requested:   1       Assessment and Plan:  1. Chronic pain of right knee    2.  Chronic pain of left knee PLAN:  Baljit Nassar is a 63 y.o. old female with bilateral (L>R) knee osteoarthritis. Also considered in the differential includes fracture, meniscal injury, ligamentous injury, gouty arthropathy and septic arthropathy. Radiographically there is no evidence of acute fracture on exam there is no evidence of meniscal or ligamentous pathology. Low suspicion for gouty or septic arthropathy based on exam.   1. I had a discussion with the patient with regards to the nature and extent of her problem. We also discussed treatment options available to her including non-operative and operative intervention. 2. To this end we discussed use of NSAIDs, cortisone and viscosupplementation injections, weight loss, activity modification, physical therapy, bracing, and use of assistive walking devices. We also had discussions about total knee arthroplasties. 3. As outlined above she has attempted treatment to include use of regular steroid injections but none recently. At this time she would like to proceed with submitting for prior authorization for Visco injections of both knees as well as a referral to physical therapy which is provided today. Electronically signed by Edward Rodriguez on 5/12/22 at 9:01 AM EDT        Please note that this chart was generated using voice recognition Dragon dictation software. Although every effort was made to ensure the accuracy of this automated transcription, some errors in transcription may have occurred.

## 2022-05-17 NOTE — TELEPHONE ENCOUNTER
----- Message from [facility] sent at 5/17/2022 10:21 AM EDT -----  Subject: Refill Request    QUESTIONS  Name of Medication? topiramate (TOPAMAX) 25 MG tablet  Patient-reported dosage and instructions? Take 1 tablet by mouth 2 times   daily  How many days do you have left? 0  [facility] Pharm[facility]? [facility] [address]  Pharmacy phone number (if available)? [phone]  Additional Information for Provider? Pt is currently out. Please call pt   if Dr Uriel Rehman won't fill this and tell her why.   ---------------------------------------------------------------------------  --------------  CALL BACK INFO  What is the best way for the office to contact you? OK to leave message on   voicemail  Preferred Call Back Phone Number? [phone]  ---------------------------------------------------------------------------  --------------  SCRIPT ANSWERS  Relationship to Patient?  Self

## 2022-05-18 RX ORDER — TOPIRAMATE 25 MG/1
25 TABLET ORAL 2 TIMES DAILY
Qty: 60 TABLET | Refills: 3 | Status: SHIPPED | OUTPATIENT
Start: 2022-05-18 | End: 2022-10-24 | Stop reason: SDUPTHER

## 2022-05-20 ENCOUNTER — TELEPHONE (OUTPATIENT)
Dept: ORTHOPEDIC SURGERY | Age: 62
End: 2022-05-20

## 2022-05-20 NOTE — TELEPHONE ENCOUNTER
Called and spoke with patients insurance and a prior authorization isn't needed for for Monovisc, an letter will be faxed to office  from insurance company.  [facility]#[id number]  Patient LVM to set up appointment

## 2022-05-24 ENCOUNTER — OFFICE VISIT (OUTPATIENT)
Dept: ORTHOPEDIC SURGERY | Age: 62
End: 2022-05-24
Payer: MEDICARE

## 2022-05-24 VITALS — WEIGHT: 185 LBS | BODY MASS INDEX: 30.82 KG/M2 | HEIGHT: 65 IN

## 2022-05-24 DIAGNOSIS — M25.561 CHRONIC PAIN OF RIGHT KNEE: Primary | ICD-10-CM

## 2022-05-24 DIAGNOSIS — G89.29 CHRONIC PAIN OF RIGHT KNEE: Primary | ICD-10-CM

## 2022-05-24 DIAGNOSIS — G89.29 CHRONIC PAIN OF LEFT KNEE: ICD-10-CM

## 2022-05-24 DIAGNOSIS — M25.562 CHRONIC PAIN OF LEFT KNEE: ICD-10-CM

## 2022-05-24 PROCEDURE — 20610 DRAIN/INJ JOINT/BURSA W/O US: CPT | Performed by: PHYSICIAN ASSISTANT

## 2022-05-24 RX ORDER — LIDOCAINE HYDROCHLORIDE 10 MG/ML
2 INJECTION, SOLUTION INFILTRATION; PERINEURAL ONCE
Status: COMPLETED | OUTPATIENT
Start: 2022-05-24 | End: 2022-05-24

## 2022-05-24 RX ORDER — BUPIVACAINE HYDROCHLORIDE 5 MG/ML
2 INJECTION, SOLUTION PERINEURAL ONCE
Status: COMPLETED | OUTPATIENT
Start: 2022-05-24 | End: 2022-05-24

## 2022-05-24 RX ADMIN — LIDOCAINE HYDROCHLORIDE 2 ML: 10 INJECTION, SOLUTION INFILTRATION; PERINEURAL at 10:00

## 2022-05-24 RX ADMIN — BUPIVACAINE HYDROCHLORIDE 10 MG: 5 INJECTION, SOLUTION PERINEURAL at 09:59

## 2022-05-24 RX ADMIN — BUPIVACAINE HYDROCHLORIDE 10 MG: 5 INJECTION, SOLUTION PERINEURAL at 10:00

## 2022-05-24 RX ADMIN — LIDOCAINE HYDROCHLORIDE 2 ML: 10 INJECTION, SOLUTION INFILTRATION; PERINEURAL at 10:01

## 2022-05-24 NOTE — PROGRESS NOTES
[address] [facility], [address] [facility], [address] [place] [address], [facility] [address].           Dept Phone: [phone]           Dept Fax:  [phone]  320 [facility]           Jennifer Saunders          Dept Phone: [phone]           Dept Fax:  [phone]      Chief Compliant:  Chief Complaint   Patient presents with    Follow-up     B/L Knee        History of Present Illness: This is a 63 y.o. female who presents to the clinic today for Monovisc injection. Previous treatment includes corticosteroid injections. Last injection was performed years ago. Patient denies any fever, chills, knee joint warmth, redness, numbness or tingling. Patient has been dealing with chronic bilateral knee pain but had a fall on 4/12/2022 which significantly aggravated her knee pain she was seen by me on 5/12/2022 and after discussion elected proceed with prior authorization for Visco injection patient returns today for bilateral knee Monovisc injections which have been approved by her insurance at this point. Past History:    Current Outpatient Medications:     topiramate (TOPAMAX) 25 MG tablet, Take 1 tablet by mouth 2 times daily, Disp: 60 tablet, Rfl: 3    gabapentin (NEURONTIN) 800 MG tablet, Take 1 tablet by mouth 2 times daily for 180 days. , Disp: 180 tablet, Rfl: 1    atorvastatin (LIPITOR) 20 MG tablet, Take 1 tablet by mouth daily, Disp: 30 tablet, Rfl: 3    clonazePAM (KLONOPIN) 0.5 MG tablet, Take 1 tablet by mouth nightly as needed for Anxiety for up to 30 days. , Disp: 30 tablet, Rfl: 0    DULoxetine (CYMBALTA) 60 MG extended release capsule, TAKE 1 CAPSULE BY MOUTH IN THE MORNING, Disp: , Rfl:     diclofenac sodium (VOLTAREN) 1 % GEL, APPLY 2 GRAMS TOPICALLY 4 TIMES DAILY, Disp: , Rfl:     docusate sodium (COLACE) 100 MG capsule, Take by mouth, Disp: , Rfl:    azithromycin (ZITHROMAX) 250 MG tablet, Take 2 tabs (500 mg) on Day 1, and take 1 tab (250 mg) on days 2 through 5.  (Patient not taking: Reported on 5/5/2022), Disp: 1 packet, Rfl: 0    vitamin B-12 (CYANOCOBALAMIN) 1000 MCG tablet, Take 1,000 mcg by mouth daily, Disp: , Rfl:     ondansetron (ZOFRAN) 4 MG tablet, Take 1 tablet by mouth every 8 hours as needed for Nausea or Vomiting, Disp: 30 tablet, Rfl: 0    clopidogrel (PLAVIX) 75 MG tablet, Take 75 mg by mouth daily, Disp: , Rfl:     Albuterol Sulfate (PROAIR HFA IN), Inhale into the lungs, Disp: , Rfl:     amLODIPine (NORVASC) 5 MG tablet, Take 1 tablet by mouth once daily, Disp: 90 tablet, Rfl: 1    aspirin 81 MG tablet, Take 1 tablet by mouth daily, Disp: 30 tablet, Rfl: 0    DULoxetine (CYMBALTA) 30 MG extended release capsule, Take 90 mg by mouth daily, Disp: , Rfl:     metoclopramide (REGLAN) 10 MG tablet, Take 10 mg by mouth 2 times daily , Disp: , Rfl:     metoprolol (LOPRESSOR) 100 MG tablet, Take 100 mg by mouth 2 times daily, Disp: , Rfl:     insulin aspart (NOVOLOG PENFILL) 100 UNIT/ML injection cartridge, Inject into the skin continuous Per insulin pump, Disp: , Rfl:     amitriptyline (ELAVIL) 150 MG tablet, TAKE 1 TABLET BY MOUTH NIGHTLY (Patient taking differently: Take 75 mg by mouth nightly TAKE 1 TABLET BY MOUTH NIGHTLY), Disp: 30 tablet, Rfl: 0    pantoprazole (PROTONIX) 40 MG tablet, Take 40 mg by mouth 2 times daily, Disp: , Rfl:     losartan (COZAAR) 100 MG tablet, TAKE 1 TABLET BY MOUTH ONCE DAILY, Disp: 90 tablet, Rfl: 3    rOPINIRole (REQUIP) 2 MG tablet, Take 1 tablet by mouth daily, Disp: 90 tablet, Rfl: 3    BREO ELLIPTA 200-25 MCG/INH AEPB, Inhale 1 puff into the lungs daily , Disp: , Rfl: 6    tiotropium (SPIRIVA HANDIHALER) 18 MCG inhalation capsule, Inhale 1 capsule into the lungs Daily, Disp: 30 capsule, Rfl: 5    ONE TOUCH ULTRA TEST strip, , Disp: , Rfl: 1  Allergies   Allergen Reactions    Fioricet [Butalbital-Apap-Caffeine] Shortness Of Breath    Betamethasone      Unsure reaction      Butalbital-Apap-Caff-Cod Other (See Comments)    Erythromycin      Other reaction(s): Unknown  Eye ointment caused swelling    Xarelto [Rivaroxaban]      Dizziness & \"felt like I was going to pass out\"    Codeine Nausea And Vomiting and Nausea Only    Droperidol Anxiety    Tape Lindia Cluck Tape] Rash     Social History     Socioeconomic History    Marital status:      Spouse name: Not on file    Number of children: 0    Years of education: 15    Highest education level: Not on file   Occupational History    Occupation: disability     Employer: N/A   Tobacco Use    Smoking status: Never Smoker    Smokeless tobacco: Never Used   Vaping Use    Vaping Use: Never used   Substance and Sexual Activity    Alcohol use: No    Drug use: Yes     Frequency: 4.0 times per week     Types: Marijuana Yu [place]en)    Sexual activity: Yes     Partners: Male     Comment: 1 partner   Other Topics Concern    Not on file   Social History Narrative    Not on file     Social Determinants of Health     Financial Resource Strain: Low Risk     Difficulty of Paying Living Expenses: Not very hard   Food Insecurity: No Food Insecurity    Worried About Running Out of Food in the Last Year: Never true    Mela of Food in the Last Year: Never true   Transportation Needs: No Transportation Needs    Lack of Transportation (Medical): No    Lack of Transportation (Non-Medical):  No   Physical Activity:     Days of Exercise per Week: Not on file    Minutes of Exercise per Session: Not on file   Stress:     Feeling of Stress : Not on file   Social Connections:     Frequency of Communication with Friends and Family: Not on file    Frequency of Social Gatherings with Friends and Family: Not on file    Attends Catholic Services: Not on file    Active Member of Clubs or Organizations: Not on file    Attends Club or Organization Meetings: Not on file    Marital Status: Not on file   Intimate Partner Violence:     Fear of Current or Ex-Partner: Not on file    Emotionally Abused: Not on file    Physically Abused: Not on file    Sexually Abused: Not on file   Housing Stability:     Unable to Pay for Housing in the Last Year: Not on file    Number of [place] in the Last Year: Not on file    Unstable Housing in the Last Year: Not on file     Past Medical History:   Diagnosis Date    Anesthesia complication     \"lung collapsed after colon surgery    Anxiety     Arthritis     Back pain     CAD (coronary artery disease)     no stents    Caffeine use     3 coffee / day    Cataract     left    COPD (chronic obstructive pulmonary disease) ([address]     EMPHYSEMA    Deafness     right ear    Depression     Diabetes mellitus ([address]     Diarrhea     Diverticulosis     Fibromyalgia     Gastroparalysis     GERD (gastroesophageal reflux disease)     Hearing loss     DEAF IN RIGHT EAR    Hyperlipidemia     Hyperlipidemia     Hypertension     Incontinence     MDRO (multiple drug resistant organisms) resistance 2012    mrsa (nasal), eye, ear    Neurogenic bladder     CATHES HERSELF 7 TIMES A DAY, IS ABLE TO URINATE ON OWN    Neuropathy     feet    Obesity     Osteoarthritis     Peripheral vascular disease, unspecified ([address]     Restless leg syndrome     Rhabdomyolysis     ROBBIE 1 week, May 2014, then Consuelo for rehab.     Spinal stenosis, thoracic 05/27/2014    Stroke [facility]) 2012    numbness on the left side of face, Oct 2020 TIA     Thyroid disease     enlarged    TMJ (dislocation of temporomandibular joint)     Trigeminal neuralgia     Type II or unspecified type diabetes mellitus without mention of complication, not stated as uncontrolled      Past Surgical History:   Procedure Laterality Date    ABDOMEN SURGERY      LAPAROSCOPY    CARPAL TUNNEL RELEASE Right     CATARACT REMOVAL      CHOLECYSTECTOMY      CHOLECYSTECTOMY, OPEN      11/2012    COLON SURGERY      benign mass removed    COLONOSCOPY      COLONOSCOPY  07/13/2016    COLONOSCOPY  06/01/2020    incomplete/poor prep    COLONOSCOPY  07/07/2020    COLONOSCOPY N/A 7/7/2020    COLORECTAL CANCER SCREENING, NOT HIGH RISK performed by Esa Gonzalez MD at [address]      sebaceous cyst, under arm left side x5    CYST REMOVAL      right ankle    CYSTOSCOPY      EYE SURGERY Right     HOLE IN RETINA REPAIRED    FINGER TRIGGER RELEASE Right     INCISIONAL HERNIA REPAIR  07/07/15    open   [facility]  10/17/2017    LEG BIOPSY EXCISION Left 7/6/2021    EXCISION OF LEFT THIGH MASS performed by Esa Gonzalez MD at [facility] Left     ear tube placement    POLYPECTOMY      colon polyp    [place] REPAIR INCISIONAL HERNIA,REDUCIBLE N/A 10/17/2017    HERNIA INCISIONAL REPAIR WITH MESH performed by Esa Gonzalez MD at [place] [address] N/A 6/1/2020    SIGMOIDOSCOPY DIAGNOSTIC FLEXIBLE performed by Esa Gonzalez MD at [facility] ENDOSCOPY  07/13/2016    UPPER GASTROINTESTINAL ENDOSCOPY  07/23/2018    with biopsy    UPPER GASTROINTESTINAL ENDOSCOPY N/A 7/23/2018    EGD BIOPSY performed by Esa Gonzalez MD at [address]  06/01/2020    with biopsy    UPPER GASTROINTESTINAL ENDOSCOPY N/A 6/1/2020    EGD BIOPSY performed by Esa Gonzalez MD at [address] [place] [address] History   Problem Relation Age of Onset    High Blood Pressure Mother     Migraines Mother     High Blood Pressure Father     Heart Disease Father     Cancer Father         skin    Diabetes Maternal Grandmother     Heart Disease Maternal Grandmother     Cancer Maternal Grandmother     Parkinsonism Maternal Grandmother     Cancer Paternal Grandmother     Other Brother         epilepsy        Review of Systems Constitutional: Negative for fever, chills, sweats. Neurological: Negative for headache, numbness, or weakness. Integumentary: Negative for rash, itching, laceration, or abrasion. Musculoskeletal: Positive for Follow-up (B/L Knee)       Physical Exam:  Constitutional: Patient is oriented to person, place, and time. Patient appears well-developed and well nourished. HENT: Negative otherwise noted  Head: Normocephalic and Atraumatic  Nose: Normal  Eyes: Conjunctivae and EOM are normal  Neck: Normal range of motion Neck supple. Respiratory/Cardio: Effort normal. No respiratory distress. Musculoskeletal:    bilateral Knee    Swelling: Mild   Effusion: Trace   Tenderness: Medial joint line       Range of Motion:      Extension: 0     Flexion: 120       McMurrays: Negative   Anterior Lachmann: Negative   Posterior Lachmann: Negative   Anterior Drawer: Negative   Posterior Drawer: Negative   Varus Stress Test: Negative   Valgus Stress Test: Negative   Pivot Shift: Negative   Patellar Apprehension: No     Neurological: Patient is alert and oriented to person, place, and time. Normal strenght. No sensory deficit. Skin: Skin is warm and dry  Psychiatric: Behavior is normal. Thought content normal.  Nursing note and vitals reviewed. Labs and Imaging:     XR taken today:  No results found. No new x-rays taken today however those from 5/12/2022 AP bilateral knees standing lateral view of both knees demonstrate mild to moderate tricompartmental degenerative changes on the left most significant the medial compartment. Right knee with less severe mild medial and patellofemoral compartmental DJD. Orders Placed This Encounter   Procedures    20610 - DRAIN/INJECT LARGE JOINT BURSA       Assessment and Plan:  1. Chronic pain of right knee    2.  Chronic pain of left knee            PLAN:   This is a 63 y.o. femalewith history of osteoarthritis of bilateral knee (s) who presents for [facility] Chio injection. 1. Monovisc Injection given bilateral knees as outlined below  2. Patient is instructed to keep activity tonight to a minimum after being injected with viscosupplementation today. Instructed to ice and elevated area tonight and return to normal activity as tolerated tomorrow. Patient is educated on appropriate length of time to allow for visco to start to take affect. 3. RTC in 6 months patient may call return sooner for any questions or concerns    Procedure Note: Monovisc Injection of bilateral knee (s)  An informed verbal consent for the procedure was obtained and risks including, but not limited to: allergy to medications, injection, bleeding, stiffness of joint, recurrence of symptoms, loss of function, swelling, drainage, irrigation, need for surgery and pseudo-septic inflammation, were explained to the patient. Also, discussed was the potential for further injections, irrigation and debridement and surgery. Alternate means of treatment have also been discussed with the patient. Following an appropriate discussion with the patient regarding the risks and benefits of the procedure she consented to proceed. her bilateral knees was prepped using betadine solution and alcohol swab. Using aseptic technique and through a lateral joint line approach, her bilateral knee (s) was injected superficially with 4 cc of 1% lidocaine without epinephrine and subsequently the Monovisc solution was injected intra-articularly. A band aid was applied to the injection site. she tolerated the injection with no immediate adverse reactions. Please note that this chart was generated using voice recognition Dragon dictation software. Although every effort was made to ensure the accuracy of this automated transcription, some errors in transcription may have occurred.

## 2022-06-02 ENCOUNTER — OFFICE VISIT (OUTPATIENT)
Dept: ORTHOPEDIC SURGERY | Age: 62
End: 2022-06-02
Payer: MEDICARE

## 2022-06-02 VITALS — BODY MASS INDEX: 30.82 KG/M2 | WEIGHT: 185 LBS | HEIGHT: 65 IN

## 2022-06-02 DIAGNOSIS — G89.29 CHRONIC PAIN OF RIGHT KNEE: Primary | ICD-10-CM

## 2022-06-02 DIAGNOSIS — M25.561 CHRONIC PAIN OF RIGHT KNEE: Primary | ICD-10-CM

## 2022-06-02 PROCEDURE — 3017F COLORECTAL CA SCREEN DOC REV: CPT | Performed by: PHYSICIAN ASSISTANT

## 2022-06-02 PROCEDURE — 1036F TOBACCO NON-USER: CPT | Performed by: PHYSICIAN ASSISTANT

## 2022-06-02 PROCEDURE — G8427 DOCREV CUR MEDS BY ELIG CLIN: HCPCS | Performed by: PHYSICIAN ASSISTANT

## 2022-06-02 PROCEDURE — 99214 OFFICE O/P EST MOD 30 MIN: CPT | Performed by: PHYSICIAN ASSISTANT

## 2022-06-02 PROCEDURE — G8417 CALC BMI ABV UP PARAM F/U: HCPCS | Performed by: PHYSICIAN ASSISTANT

## 2022-06-02 RX ORDER — METHYLPREDNISOLONE 4 MG/1
4 TABLET ORAL SEE ADMIN INSTRUCTIONS
Qty: 1 KIT | Refills: 0 | Status: SHIPPED | OUTPATIENT
Start: 2022-06-02 | End: 2022-06-08

## 2022-06-02 NOTE — PROGRESS NOTES
[address], [address] [facility], [address] [place], [place] [address]           Dept Phone: [phone]           Dept Fax:  [phone] 320 [facility], Jennifer          Dept Phone: [phone]           Dept Fax:  [phone]      Chief Compliant:  Chief Complaint   Patient presents with    Knee Pain     right        History of Present Illness:  Roxana Norman returns today. This is a 63 y.o. female who presents to the clinic today for follow up of right knee pain. Patient with history of chronic bilateral knee pain and actually underwent Monovisc injections of both knees on 5/24/2022. Patient reports her left knee is actually doing very well today but over the last 4 to 5 days she has had significant pain, swelling and stiffness in the right knee. Patient states she did have an incident in which she fell at a convenience store and may have hit her right knee however unsure if this aggravated her pain or if it was the injection that she had last week. Patient notes swelling but denies any knee joint warmth, redness, fever or chills. She has tolerated injection very well in the left knee and feels like it is already providing adequate pain relief. Review of Systems   Constitutional: Negative for fever, chills, sweats, recent illness, or recent injury. Neurological: Negative for headaches, numbness, or weakness. Integumentary: Negative for rash, itching, ecchymosis, abrasions, or laceration. Musculoskeletal: Positive for Knee Pain (right)       Physical Exam:  Constitutional: Patient is oriented to person, place, and time. Patient appears well-developed and well nourished.    Musculoskeletal:    Right Knee:     Skin: warm and dry, no rash or erythema  Vasculature: 2+ pedal pulses bilaterally  Neuro: Sensation grossly intact to light touch diffusely  Alignment: Normal  Tenderness: Mild tenderness to medial joint line. Tenderness to the lateral joint line, quad tendon, patellar tendon or posterior knee. Effusion: small    ROM: (Degrees)       A P       Extension  0 0       Flexion   125 125       Crepitation  No       Muscle strength:         Flexion   5      Extension  5      SLR   5        Extensor lag   n          Special testing:  y    Pain with deep knee flexion     n    Patellar grind       n    Patellar apprehension      n    Patellar glide         n    Lachman       n    Anterior drawer      n    Pivot shift       n    Posterior drawer      n    Dial test       n    Posterolateral drawer      n    Posterior Sag       n    MCL        n    LCL          y    Medial joint line tenderness     n    Lateral joint line tenderness     n    Appley's         Neurological: Patient is alert and oriented to person, place, and time. Normal strenght. No sensory deficit. Skin: Skin is warm and dry  Psychiatric: Behavior is normal. Thought content normal.  Nursing note and vitals reviewed. Labs and Imaging:     XR taken today:  No results found. X-rays on 5/12/2022 are again reviewed by me  AP bilateral knees standing lateral view of both knees demonstrate left knee with early degenerative changes of the medial and patellofemoral compartment. Right knee with less severe medial compartmental narrowing but similar patellofemoral degenerative changes. No evidence of acute fracture      Assessment and Plan:  1. Chronic pain of right knee              PLAN:  This is a 63 y.o. female who presents to the clinic today for follow up right knee pain and swelling.   1.  Patient went underwent Monovisc injections of bilateral knees on 5/24/2022 and reports that her left knee already feels better but unfortunately right knee seems to be worse since the injection of note patient did have a fall in which she thinks she hit her knee on a counter in a convenience store.  2.  On examination patient has small effusion there is no evidence of infectious process. Injection site is well-healed without any surrounding erythema. 3.  Recommend continued monitoring of the right knee given that she tolerated the injection on the left knee I do not suspect an allergic or inflammatory reaction to the Visco injection. Pain is likely related to the fall/twisting patient had last week since the injection. 4.  Recommend starting patient on a Medrol Dosepak. 5.  Patient is given reassurance I do not feel there is a ligamentous or meniscal tearing on examination. She is educated that these Visco injections can take several weeks to kick in and only been 8 days since her last injection instructed to give it some more time. 6.  All question concerns were addressed today patient is agreeable plan at this time we will see her back as previously scheduled however she may call return sooner for any questions or concerns. Please note that this chart was generated using voice recognition Dragon dictation software. Although every effort was made to ensure the accuracy of this automated transcription, some errors in transcription may have occurred.

## 2022-06-03 RX ORDER — CEPHALEXIN 500 MG/1
CAPSULE ORAL
COMMUNITY
Start: 2022-05-26 | End: 2022-07-25 | Stop reason: ALTCHOICE

## 2022-06-07 ENCOUNTER — OFFICE VISIT (OUTPATIENT)
Dept: INTERNAL MEDICINE CLINIC | Age: 62
End: 2022-06-07
Payer: MEDICARE

## 2022-06-07 VITALS
SYSTOLIC BLOOD PRESSURE: 130 MMHG | WEIGHT: 188 LBS | HEIGHT: 65 IN | BODY MASS INDEX: 31.32 KG/M2 | DIASTOLIC BLOOD PRESSURE: 76 MMHG | HEART RATE: 66 BPM | OXYGEN SATURATION: 96 %

## 2022-06-07 DIAGNOSIS — E10.319 TYPE 1 DIABETES MELLITUS WITH RETINOPATHY, MACULAR EDEMA PRESENCE UNSPECIFIED, UNSPECIFIED LATERALITY, UNSPECIFIED RETINOPATHY SEVERITY (HCC): ICD-10-CM

## 2022-06-07 DIAGNOSIS — G47.00 INSOMNIA, UNSPECIFIED TYPE: ICD-10-CM

## 2022-06-07 DIAGNOSIS — E10.10 DIABETIC KETOACIDOSIS WITHOUT COMA ASSOCIATED WITH TYPE 1 DIABETES MELLITUS (HCC): ICD-10-CM

## 2022-06-07 DIAGNOSIS — Z00.00 INITIAL MEDICARE ANNUAL WELLNESS VISIT: ICD-10-CM

## 2022-06-07 DIAGNOSIS — Z00.00 MEDICARE ANNUAL WELLNESS VISIT, SUBSEQUENT: ICD-10-CM

## 2022-06-07 DIAGNOSIS — F33.42 MAJOR DEPRESSIVE DISORDER, RECURRENT, IN FULL REMISSION (HCC): ICD-10-CM

## 2022-06-07 DIAGNOSIS — N17.9 AKI (ACUTE KIDNEY INJURY) (HCC): ICD-10-CM

## 2022-06-07 DIAGNOSIS — E44.0 MODERATE MALNUTRITION (HCC): Chronic | ICD-10-CM

## 2022-06-07 PROCEDURE — 3017F COLORECTAL CA SCREEN DOC REV: CPT | Performed by: INTERNAL MEDICINE

## 2022-06-07 PROCEDURE — 3046F HEMOGLOBIN A1C LEVEL >9.0%: CPT | Performed by: INTERNAL MEDICINE

## 2022-06-07 PROCEDURE — 1036F TOBACCO NON-USER: CPT | Performed by: INTERNAL MEDICINE

## 2022-06-07 PROCEDURE — 2022F DILAT RTA XM EVC RTNOPTHY: CPT | Performed by: INTERNAL MEDICINE

## 2022-06-07 PROCEDURE — 99213 OFFICE O/P EST LOW 20 MIN: CPT | Performed by: INTERNAL MEDICINE

## 2022-06-07 PROCEDURE — G8417 CALC BMI ABV UP PARAM F/U: HCPCS | Performed by: INTERNAL MEDICINE

## 2022-06-07 PROCEDURE — G0438 PPPS, INITIAL VISIT: HCPCS | Performed by: INTERNAL MEDICINE

## 2022-06-07 PROCEDURE — G8427 DOCREV CUR MEDS BY ELIG CLIN: HCPCS | Performed by: INTERNAL MEDICINE

## 2022-06-07 RX ORDER — SIMVASTATIN 20 MG
TABLET ORAL
COMMUNITY
Start: 2022-06-02

## 2022-06-07 RX ORDER — CLONAZEPAM 0.5 MG/1
0.5 TABLET ORAL NIGHTLY PRN
Qty: 30 TABLET | Refills: 0 | Status: SHIPPED | OUTPATIENT
Start: 2022-06-07 | End: 2022-08-04 | Stop reason: ALTCHOICE

## 2022-06-07 ASSESSMENT — LIFESTYLE VARIABLES
HOW OFTEN DO YOU HAVE A DRINK CONTAINING ALCOHOL: 2-4 TIMES A MONTH
HOW MANY STANDARD DRINKS CONTAINING ALCOHOL DO YOU HAVE ON A TYPICAL DAY: 1 OR 2

## 2022-06-07 ASSESSMENT — PATIENT HEALTH QUESTIONNAIRE - PHQ9
5. POOR APPETITE OR OVEREATING: 3
SUM OF ALL RESPONSES TO PHQ QUESTIONS 1-9: 11
7. TROUBLE CONCENTRATING ON THINGS, SUCH AS READING THE NEWSPAPER OR WATCHING TELEVISION: 0
SUM OF ALL RESPONSES TO PHQ QUESTIONS 1-9: 11
6. FEELING BAD ABOUT YOURSELF - OR THAT YOU ARE A FAILURE OR HAVE LET YOURSELF OR YOUR FAMILY DOWN: 2
1. LITTLE INTEREST OR PLEASURE IN DOING THINGS: 1
2. FEELING DOWN, DEPRESSED OR HOPELESS: 1
8. MOVING OR SPEAKING SO SLOWLY THAT OTHER PEOPLE COULD HAVE NOTICED. OR THE OPPOSITE, BEING SO FIGETY OR RESTLESS THAT YOU HAVE BEEN MOVING AROUND A LOT MORE THAN USUAL: 0
SUM OF ALL RESPONSES TO PHQ QUESTIONS 1-9: 11
9. THOUGHTS THAT YOU WOULD BE BETTER OFF DEAD, OR OF HURTING YOURSELF: 0
SUM OF ALL RESPONSES TO PHQ9 QUESTIONS 1 & 2: 2
4. FEELING TIRED OR HAVING LITTLE ENERGY: 2
10. IF YOU CHECKED OFF ANY PROBLEMS, HOW DIFFICULT HAVE THESE PROBLEMS MADE IT FOR YOU TO DO YOUR WORK, TAKE CARE OF THINGS AT HOME, OR GET ALONG WITH OTHER PEOPLE: 1
3. TROUBLE FALLING OR STAYING ASLEEP: 2
SUM OF ALL RESPONSES TO PHQ QUESTIONS 1-9: 11

## 2022-06-07 NOTE — PATIENT INSTRUCTIONS
Personalized Preventive Plan for Florence Charlton - 6/7/2022  Medicare offers a range of preventive health benefits. Some of the tests and screenings are paid in full while other may be subject to a deductible, co-insurance, and/or copay. Some of these benefits include a comprehensive review of your medical history including lifestyle, illnesses that may run in your family, and various assessments and screenings as appropriate. After reviewing your medical record and screening and assessments performed today your provider may have ordered immunizations, labs, imaging, and/or referrals for you. A list of these orders (if applicable) as well as your Preventive Care list are included within your After Visit Summary for your review. Other Preventive Recommendations:    · A preventive eye exam performed by an eye specialist is recommended every 1-2 years to screen for glaucoma; cataracts, macular degeneration, and other eye disorders. · A preventive dental visit is recommended every 6 months. · Try to get at least 150 minutes of exercise per week or 10,000 steps per day on a pedometer . · Order or download the FREE \"Exercise & Physical Activity: Your Everyday Guide\" from [employer]. Call 4-696.842.6270 or search [employer] online. · You need 5565-3467 mg of calcium and 9312-4053 IU of vitamin D per day. It is possible to meet your calcium requirement with diet alone, but a vitamin D supplement is usually necessary to meet this goal.  · When exposed to the sun, use a sunscreen that protects against both UVA and UVB radiation with an SPF of 30 or greater. Reapply every 2 to 3 hours or after sweating, drying off with a towel, or swimming. · Always wear a seat belt when traveling in a car. Always wear a helmet when riding a bicycle or motorcycle.

## 2022-06-07 NOTE — PROGRESS NOTES
Visit Information    Have you changed or started any medications since your last visit including any over-the-counter medicines, vitamins, or herbal medicines? yes - Medrol Dose Cosmo    Are you having any side effects from any of your medications? -  no  Have you stopped taking any of your medications? Is so, why? -  no    Have you seen any other physician or provider since your last visit? Yes - Records Obtained  Have you had any other diagnostic tests since your last visit? No  Have you been seen in the emergency room and/or had an admission to a hospital since we last saw you? No  Have you had your routine dental cleaning in the past 6 months? no    Have you activated your [employer] account? If not, what are your barriers?  No:      Patient Care Team:  Tiana Montes MD as PCP - General (Internal Medicine)  Tiana Montes MD as PCP [facility]  Faustino Glass MD as Consulting Physician (Urology)  Epifanio Villanueva MD as Consulting Physician (Infectious Diseases)    Medical History Review  Past Medical, Family, and Social History reviewed and does contribute to the patient presenting condition    Health Maintenance   Topic Date Due    Annual Wellness Visit (AWV)  Never done    HIV screen  Never done    Hepatitis C screen  Never done    Hepatitis B vaccine (1 of 3 - Risk 3-dose series) Never done    Shingles vaccine (1 of 2) Never done    Cervical cancer screen  04/30/2018    Diabetic retinal exam  07/09/2019    Pneumococcal 0-64 years Vaccine (2 - PCV) 11/29/2019    Diabetic foot exam  12/11/2020    Diabetic microalbuminuria test  12/11/2020    COVID-19 Vaccine (3 - Booster for Moderna series) 10/15/2021    Lipids  10/17/2021    Flu vaccine (Season Ended) 09/01/2022    A1C test (Diabetic or Prediabetic)  12/20/2022    Depression Monitoring  04/22/2023    Breast cancer screen  01/26/2024    Colorectal Cancer Screen  07/07/2030    DTaP/Tdap/Td vaccine (3 - Td or Tdap) 08/30/2030  Hepatitis A vaccine  Aged Out    Hib vaccine  Aged Out    Meningococcal (ACWY) vaccine  Aged Out     SUBJECTIVE:  Riley Hanson is a 63 y.o. female patient who  comes for complaints of   Chief Complaint   Patient presents with    Medicare AWV     Anxiety, insomnia  Pt reports a lot of stress from mother who has dementia  Pt has been on klonopin for >20yrs  Wants to continue on 0.5mg once daily as needed- we can c/w this dose for 1-2mth and attempt to reduce  Advise try melatonin alternate days, and try to reduce Klonopin, pt agrees. REVIEW OF SYSTEMS (except Subjective (HPI))  GENERAL: No fevers / chills  RESPIRATORY: Negative for cough, wheezing or shortness of breath  CARDIOVASCULAR: Negative for chest pain or palpitations. GI: no nausea, vomiting, or diarrhea  NEURO: No history of headaches    Past Medical History:   Diagnosis Date    Anesthesia complication     \"lung collapsed after colon surgery    Anxiety     Arthritis     Back pain     CAD (coronary artery disease)     no stents    Caffeine use     3 coffee / day    Cataract     left    COPD (chronic obstructive pulmonary disease) ([address]     EMPHYSEMA    Deafness     right ear    Depression     Diabetes mellitus (HCC)     Diarrhea     Diverticulosis     Fibromyalgia     Gastroparalysis     GERD (gastroesophageal reflux disease)     Hearing loss     DEAF IN RIGHT EAR    Hyperlipidemia     Hyperlipidemia     Hypertension     Incontinence     MDRO (multiple drug resistant organisms) resistance 2012    mrsa (nasal), eye, ear    Neurogenic bladder     CATHES HERSELF 7 TIMES A DAY, IS ABLE TO URINATE ON OWN    Neuropathy     feet    Obesity     Osteoarthritis     Peripheral vascular disease, unspecified ([address]     Restless leg syndrome     Rhabdomyolysis     [facility] 1 week, May 2014, then [facility] for rehab.     Spinal stenosis, thoracic 05/27/2014    Stroke [facility]) 2012    numbness on the left side of face, Oct 2020 TIA     Thyroid disease     enlarged    TMJ (dislocation of temporomandibular joint)     Trigeminal neuralgia     Type II or unspecified type diabetes mellitus without mention of complication, not stated as uncontrolled        SOCIAL HISTORY:  Social History     Socioeconomic History    Marital status:      Spouse name: Not on file    Number of children: 0    Years of education: 15    Highest education level: Not on file   Occupational History    Occupation: disability     Employer: N/A   Tobacco Use    Smoking status: Never Smoker    Smokeless tobacco: Never Used   Vaping Use    Vaping Use: Never used   Substance and Sexual Activity    Alcohol use: No    Drug use: Yes     Frequency: 4.0 times per week     Types: Marijuana Curlie Opal)    Sexual activity: Yes     Partners: Male     Comment: 1 partner   Other Topics Concern    Not on file   Social History Narrative    Not on file     Social Determinants of Health     Financial Resource Strain: Low Risk     Difficulty of Paying Living Expenses: Not very hard   Food Insecurity: No Food Insecurity    Worried About Running Out of Food in the Last Year: Never true    Mela of Food in the Last Year: Never true   Transportation Needs: No Transportation Needs    Lack of Transportation (Medical): No    Lack of Transportation (Non-Medical):  No   Physical Activity: Inactive    Days of Exercise per Week: 0 days    Minutes of Exercise per Session: 0 min   Stress:     Feeling of Stress : Not on file   Social Connections:     Frequency of Communication with Friends and Family: Not on file    Frequency of Social Gatherings with Friends and Family: Not on file    Attends Jehovah's witness Services: Not on file    Active Member of Clubs or Organizations: Not on file    Attends Club or Organization Meetings: Not on file    Marital Status: Not on file   Intimate Partner Violence:     Fear of Current or Ex-Partner: Not on file   [employer] Abused: Not on file    Physically Abused: Not on file    Sexually Abused: Not on file   Housing Stability:     Unable to Pay for Housing in the Last Year: Not on file    Number of Places Lived in the Last Year: Not on file    Unstable Housing in the Last Year: Not on file           CURRENT MEDICATIONS:  Current Outpatient Medications   Medication Sig Dispense Refill    simvastatin (ZOCOR) 20 MG tablet TAKE 1 TABLET BY MOUTH NIGHTLY      cephALEXin (KEFLEX) 500 MG capsule TAKE 1 CAPSULE BY MOUTH 4 TIMES DAILY FOR 5 DAYS      methylPREDNISolone (MEDROL DOSEPACK) 4 MG tablet Take 1 tablet by mouth See Admin Instructions for 6 days Take by mouth. 1 kit 0    topiramate (TOPAMAX) 25 MG tablet Take 1 tablet by mouth 2 times daily 60 tablet 3    gabapentin (NEURONTIN) 800 MG tablet Take 1 tablet by mouth 2 times daily for 180 days.  180 tablet 1    atorvastatin (LIPITOR) 20 MG tablet Take 1 tablet by mouth daily 30 tablet 3    DULoxetine (CYMBALTA) 60 MG extended release capsule TAKE 1 CAPSULE BY MOUTH IN THE MORNING      diclofenac sodium (VOLTAREN) 1 % GEL APPLY 2 GRAMS TOPICALLY 4 TIMES DAILY      docusate sodium (COLACE) 100 MG capsule Take by mouth      vitamin B-12 (CYANOCOBALAMIN) 1000 MCG tablet Take 1,000 mcg by mouth daily      ondansetron (ZOFRAN) 4 MG tablet Take 1 tablet by mouth every 8 hours as needed for Nausea or Vomiting 30 tablet 0    clopidogrel (PLAVIX) 75 MG tablet Take 75 mg by mouth daily      Albuterol Sulfate (PROAIR HFA IN) Inhale into the lungs      amLODIPine (NORVASC) 5 MG tablet Take 1 tablet by mouth once daily 90 tablet 1    aspirin 81 MG tablet Take 1 tablet by mouth daily 30 tablet 0    DULoxetine (CYMBALTA) 30 MG extended release capsule Take 90 mg by mouth daily      metoclopramide (REGLAN) 10 MG tablet Take 10 mg by mouth 2 times daily       metoprolol (LOPRESSOR) 100 MG tablet Take 100 mg by mouth 2 times daily      insulin aspart (NOVOLOG PENFILL) 100 UNIT/ML discussed reducing klonopin  c/w cymbalta    Moderate malnutrition (HCC)    PARUL (acute kidney injury) ([address]    Type 1 diabetes mellitus with retinopathy, macular edema presence unspecified, unspecified laterality, unspecified retinopathy severity (HCC)  -     Hemoglobin A1C; Future    Diabetic ketoacidosis type 1 diabetes mellitus (HCC)    Insomnia, unspecified type  -     clonazePAM (KLONOPIN) 0.5 MG tablet; Take 1 tablet by mouth nightly as needed for Anxiety for up to 30 days. Gilford Meth, MD  Medicare Annual Wellness Visit    Arcelia Paganálvaroarvind is here for Medicare AWV    Assessment & Plan   Major depressive disorder, recurrent, in full remission ([address]  Comments:  anxiety- discussed reducing klonopin  Moderate malnutrition ([address]  PARUL (acute kidney injury) ([address]  Type 1 diabetes mellitus with retinopathy, macular edema presence unspecified, unspecified laterality, unspecified retinopathy severity ([address]  -     Hemoglobin A1C; Future  Diabetic ketoacidosis type 1 diabetes mellitus ([address]      Recommendations for Preventive Services Due: see orders and patient instructions/AVS.  Recommended screening schedule for the next 5-10 years is provided to the patient in written form: see Patient Instructions/AVS.     No follow-ups on file. [place]   Andre Bonds was seen today for medicare awv. Diagnoses and all orders for this visit:    Major depressive disorder, recurrent, in full remission ([address]  Comments:  anxiety- discussed reducing klonopin  c/w cymbalta    Moderate malnutrition ([address]    PARUL (acute kidney injury) ([address]    Type 1 diabetes mellitus with retinopathy, macular edema presence unspecified, unspecified laterality, unspecified retinopathy severity ([address]  -     Hemoglobin A1C; Future    Diabetic ketoacidosis type 1 diabetes mellitus ([address]    Insomnia, unspecified type  -     clonazePAM (KLONOPIN) 0.5 MG tablet; Take 1 tablet by mouth nightly as needed for Anxiety for up to 30 days.     Medicare annual wellness visit, subsequent           Patient's complete Health Risk Assessment and screening values have been reviewed and are found in Flowsheets. The following problems were reviewed today and where indicated follow up appointments were made and/or referrals ordered.     Positive Risk Factor Screenings with Interventions:    Fall Risk:  Do you feel unsteady or are you worried about falling? : (!) yes  2 or more falls in past year?: (!) yes  Fall with injury in past year?: (!) yes     Fall Risk Interventions:    · Home safety tips provided     Depression:  PHQ-2 Score: 2  PHQ-9 Total Score: 11    Severity:1-4 = minimal depression, 5-9 = mild depression, 10-14 = moderate depression, 15-19 = moderately severe depression, 20-27 = severe depression    Depression Interventions:  · Patient declines any further evaluation/treatment for this issue      Drug Use Screening: DAST-10 Score: 1  DAST-10 Score Interpretation:  1-2: Low level - Monitor, re-assess at a later date; 3-5: Moderate level - Further Investigation; 6-8: Substantial level - Intensive Assessment; 9-10: Severe level - Intensive Assessment    Substance Use - Drug Use Interventions:  benzo use- discussed       General Health and ACP:  General  In general, how would you say your health is?: Good  In the past 7 days, have you experienced any of the following: New or Increased Pain, New or Increased Fatigue, Loneliness, Social Isolation, Stress or Anger?: (!) Yes  Select all that apply: (!) New or Increased Pain,Anger,Stress,Social Isolation,Loneliness  Do you get the social and emotional support that you need?: Yes  Do you have a Living Will?: (!) No    Advance Directives     Power of  Living Will ACP-Advance Directive ACP-Power of     Not on File Not on File Not on File Not on File      General Health Risk Interventions:  · No Living Will: Advance Care Planning addressed with patient today    Health Habits/Nutrition:     Physical Activity: Inactive    Days of Exercise per Week: 0 days    Minutes of Exercise per Session: 0 min     Have you lost any weight without trying in the past 3 months?: No  Body mass index: (!) 31.28  Have you seen the dentist within the past year?: (!) No    Health Habits/Nutrition Interventions:  · recommend regular exercise    Hearing/Vision:  Do you or your family notice any trouble with your hearing that hasn't been managed with hearing aids?: (!) Yes  Do you have difficulty driving, watching TV, or doing any of your daily activities because of your eyesight?: (!) Yes  Have you had an eye exam within the past year?: Yes  No exam data present    Hearing/Vision Interventions:  · pt gets regular eye and ear exams            Objective   Vitals:    06/07/22 1515   BP: 130/76   Pulse: 66   SpO2: 96%   Weight: 188 lb (85.3 kg)   Height: 5' 5\" (1.651 m)      Body mass index is 31.28 kg/m². General appearance: alert, cooperative and no distress  Eyes: Anicteric sclera. Pupils are equally round and reactive to light. Extraocular movements are intact. Lungs:  clear to auscultation bilaterally, normal effort  Heart:  regular rate and rhythm, no murmur  Abdomen:  soft, nontender, nondistended, normal bowel sounds, no masses,   Extremities: no edema, redness, tenderness in the calves  Skin:  no gross lesions, rashes, induration  Neuro:  Alert, oriented X 3, Gait normal. Non-focal.        Allergies   Allergen Reactions    Fioricet [Butalbital-Apap-Caffeine] Shortness Of Breath    Betamethasone      Unsure reaction      Butalbital-Apap-Caff-Cod Other (See Comments)    Erythromycin      Other reaction(s): Unknown  Eye ointment caused swelling    Xarelto [Rivaroxaban]      Dizziness & \"felt like I was going to pass out\"    Codeine Nausea And Vomiting and Nausea Only    Droperidol Anxiety    Tape [Adhesive Tape] Rash     Prior to Visit Medications    Medication Sig Taking?  Authorizing Provider   simvastatin (ZOCOR) 20 MG tablet TAKE 1 TABLET BY MOUTH NIGHTLY Yes Historical Provider, MD   cephALEXin (KEFLEX) 500 MG capsule TAKE 1 CAPSULE BY MOUTH 4 TIMES DAILY FOR 5 DAYS Yes Historical Provider, MD   methylPREDNISolone (MEDROL DOSEPACK) 4 MG tablet Take 1 tablet by mouth See Admin Instructions for 6 days Take by mouth. Yes SALOMON Beard   topiramate (TOPAMAX) 25 MG tablet Take 1 tablet by mouth 2 times daily Yes Parish Raya MD   gabapentin (NEURONTIN) 800 MG tablet Take 1 tablet by mouth 2 times daily for 180 days.  Yes Jose Ngo MD   atorvastatin (LIPITOR) 20 MG tablet Take 1 tablet by mouth daily Yes Jose Ngo MD   DULoxetine (CYMBALTA) 60 MG extended release capsule TAKE 1 CAPSULE BY MOUTH IN THE MORNING Yes Historical Provider, MD   diclofenac sodium (VOLTAREN) 1 % GEL APPLY 2 GRAMS TOPICALLY 4 TIMES DAILY Yes Historical Provider, MD   docusate sodium (COLACE) 100 MG capsule Take by mouth Yes Historical Provider, MD   vitamin B-12 (CYANOCOBALAMIN) 1000 MCG tablet Take 1,000 mcg by mouth daily Yes Historical Provider, MD   ondansetron (ZOFRAN) 4 MG tablet Take 1 tablet by mouth every 8 hours as needed for Nausea or Vomiting Yes Rosa Bautista,    clopidogrel (PLAVIX) 75 MG tablet Take 75 mg by mouth daily Yes Historical Provider, MD   Albuterol Sulfate (PROAIR HFA IN) Inhale into the lungs Yes Historical Provider, MD   amLODIPine (NORVASC) 5 MG tablet Take 1 tablet by mouth once daily Yes Sunny Younger MD   aspirin 81 MG tablet Take 1 tablet by mouth daily Yes Evonne Goins MD   DULoxetine (CYMBALTA) 30 MG extended release capsule Take 90 mg by mouth daily Yes Historical Provider, MD   metoclopramide (REGLAN) 10 MG tablet Take 10 mg by mouth 2 times daily  Yes Historical Provider, MD   metoprolol (LOPRESSOR) 100 MG tablet Take 100 mg by mouth 2 times daily Yes Historical Provider, MD   insulin aspart (NOVOLOG PENFILL) 100 UNIT/ML injection cartridge Inject into the skin continuous Per insulin pump Yes Historical Provider, MD   amitriptyline (ELAVIL) 150 MG tablet TAKE 1 TABLET BY MOUTH NIGHTLY  Patient taking differently: Take 75 mg by mouth nightly TAKE 1 TABLET BY MOUTH NIGHTLY Yes Quang Shelby MD   pantoprazole (PROTONIX) 40 MG tablet Take 40 mg by mouth 2 times daily Yes Historical Provider, MD   losartan (COZAAR) 100 MG tablet TAKE 1 TABLET BY MOUTH ONCE DAILY Yes Quang Shelby MD   rOPINIRole (REQUIP) 2 MG tablet Take 1 tablet by mouth daily Yes Quang Shelby MD   BREO ELLIPTA 200-25 MCG/INH AEPB Inhale 1 puff into the lungs daily  Yes Historical Provider, MD   tiotropium (SPIRIVA HANDIHALER) 18 MCG inhalation capsule Inhale 1 capsule into the lungs Daily Yes Johanna Rivas MD   ONE TOUCH ULTRA TEST strip  Yes Historical Provider, MD   clonazePAM (KLONOPIN) 0.5 MG tablet Take 1 tablet by mouth nightly as needed for Anxiety for up to 30 days. Gaurav Hamm MD   azithromycin (ZITHROMAX) 250 MG tablet Take 2 tabs (500 mg) on Day 1, and take 1 tab (250 mg) on days 2 through 5.   Patient not taking: Reported on 5/5/2022  Ashanti Stuart MD       [facility] (Including outside providers/suppliers regularly involved in providing care):   Patient Care Team:  Gaurav Hamm MD as PCP - General (Internal Medicine)  Gaurav Hamm MD as PCP - [facility]  Berry Herrera MD as Consulting Physician (Urology)  Dulce Rios MD as Consulting Physician (Infectious Diseases)     Reviewed and updated this visit:  Allergies  Meds

## 2022-06-21 RX ORDER — GABAPENTIN 800 MG/1
800 TABLET ORAL 2 TIMES DAILY
Qty: 180 TABLET | Refills: 1 | Status: SHIPPED | OUTPATIENT
Start: 2022-06-21 | End: 2022-12-18

## 2022-06-21 RX ORDER — CLOPIDOGREL BISULFATE 75 MG/1
75 TABLET ORAL DAILY
Qty: 30 TABLET | Refills: 0 | Status: SHIPPED | OUTPATIENT
Start: 2022-06-21 | End: 2022-08-04 | Stop reason: SDUPTHER

## 2022-06-21 RX ORDER — METOCLOPRAMIDE 10 MG/1
10 TABLET ORAL 2 TIMES DAILY
Qty: 120 TABLET | Refills: 0 | Status: SHIPPED | OUTPATIENT
Start: 2022-06-21 | End: 2022-07-28

## 2022-06-21 NOTE — TELEPHONE ENCOUNTER
Reglan, plavix  neurontin start date- 5/9/22 end date- 11/5/22  LOV- 6/7/22  No upcoming appointments   Medications pended would you like to refill?

## 2022-07-25 ENCOUNTER — OFFICE VISIT (OUTPATIENT)
Dept: FAMILY MEDICINE CLINIC | Age: 62
End: 2022-07-25
Payer: MEDICARE

## 2022-07-25 VITALS
OXYGEN SATURATION: 93 % | SYSTOLIC BLOOD PRESSURE: 169 MMHG | TEMPERATURE: 98.1 F | DIASTOLIC BLOOD PRESSURE: 82 MMHG | HEART RATE: 70 BPM

## 2022-07-25 DIAGNOSIS — B96.89 ACUTE BACTERIAL SINUSITIS: Primary | ICD-10-CM

## 2022-07-25 DIAGNOSIS — J01.90 ACUTE BACTERIAL SINUSITIS: Primary | ICD-10-CM

## 2022-07-25 PROCEDURE — 99213 OFFICE O/P EST LOW 20 MIN: CPT

## 2022-07-25 RX ORDER — AMOXICILLIN AND CLAVULANATE POTASSIUM 875; 125 MG/1; MG/1
1 TABLET, FILM COATED ORAL 2 TIMES DAILY
Qty: 20 TABLET | Refills: 0 | Status: ON HOLD | OUTPATIENT
Start: 2022-07-25 | End: 2022-07-28 | Stop reason: SDUPTHER

## 2022-07-25 ASSESSMENT — ENCOUNTER SYMPTOMS
EYE ITCHING: 0
HOARSE VOICE: 1
NAUSEA: 0
EYE PAIN: 0
COUGH: 1
SINUS PRESSURE: 1
SHORTNESS OF BREATH: 0
SWOLLEN GLANDS: 0
SINUS COMPLAINT: 1
DIARRHEA: 0
SORE THROAT: 1

## 2022-07-25 NOTE — PROGRESS NOTES
[facility]  [facility] [address] [facility] [address]  Dept: [phone]  Dept Fax: [phone]    Yovana Martin is a 63 y.o. female who presents to the urgent care today for her medical conditions/complaints as notedbelow. Yovana Martin is c/o of Sinus Problem (Pt stated that she has been having sinus problems, stated that she has a sinus HA with some congestion. No concerns for covid )      HPI:     COVID vaccine? Yes 2 doses  Patient deferred COVID testing at this time    Sinus Problem  This is a new problem. The current episode started in the past 7 days (about 2 days ago). The problem has been gradually worsening since onset. There has been no fever. Associated symptoms include congestion, coughing, ear pain, headaches, a hoarse voice, sinus pressure, sneezing and a sore throat. Pertinent negatives include no chills, diaphoresis, neck pain, shortness of breath or swollen glands. Past treatments include nothing.      Past Medical History:   Diagnosis Date    Anesthesia complication     \"lung collapsed after colon surgery    Anxiety     Arthritis     Back pain     CAD (coronary artery disease)     no stents    Caffeine use     3 coffee / day    Cataract     left    COPD (chronic obstructive pulmonary disease) (HCC)     EMPHYSEMA    Deafness     right ear    Depression     Diabetes mellitus (HCC)     Diarrhea     Diverticulosis     Fibromyalgia     Gastroparalysis     GERD (gastroesophageal reflux disease)     Hearing loss     DEAF IN RIGHT EAR    Hyperlipidemia     Hyperlipidemia     Hypertension     Incontinence     MDRO (multiple drug resistant organisms) resistance 2012    mrsa (nasal), eye, ear    Neurogenic bladder     CATHES HERSELF 7 TIMES A DAY, IS ABLE TO URINATE ON OWN    Neuropathy     feet    Obesity     Osteoarthritis     Peripheral vascular disease, unspecified (HCC)     Restless leg syndrome Rhabdomyolysis     ROBBIE 1 week, May 2014, then [facility] for rehab. Spinal stenosis, thoracic 05/27/2014    Stroke [facility]) 2012    numbness on the left side of face, Oct 2020 TIA     Thyroid disease     enlarged    TMJ (dislocation of temporomandibular joint)     Trigeminal neuralgia     Type II or unspecified type diabetes mellitus without mention of complication, not stated as uncontrolled         Current Outpatient Medications   Medication Sig Dispense Refill    amoxicillin-clavulanate (AUGMENTIN) 875-125 MG per tablet Take 1 tablet by mouth in the morning and 1 tablet before bedtime. Do all this for 10 days. 20 tablet 0    gabapentin (NEURONTIN) 800 MG tablet Take 1 tablet by mouth 2 times daily for 180 days.  180 tablet 1    metoclopramide (REGLAN) 10 MG tablet Take 1 tablet by mouth 2 times daily 120 tablet 0    simvastatin (ZOCOR) 20 MG tablet TAKE 1 TABLET BY MOUTH NIGHTLY      topiramate (TOPAMAX) 25 MG tablet Take 1 tablet by mouth 2 times daily 60 tablet 3    atorvastatin (LIPITOR) 20 MG tablet Take 1 tablet by mouth daily 30 tablet 3    DULoxetine (CYMBALTA) 60 MG extended release capsule TAKE 1 CAPSULE BY MOUTH IN THE MORNING      diclofenac sodium (VOLTAREN) 1 % GEL APPLY 2 GRAMS TOPICALLY 4 TIMES DAILY      docusate sodium (COLACE) 100 MG capsule Take by mouth      vitamin B-12 (CYANOCOBALAMIN) 1000 MCG tablet Take 1,000 mcg by mouth daily      ondansetron (ZOFRAN) 4 MG tablet Take 1 tablet by mouth every 8 hours as needed for Nausea or Vomiting 30 tablet 0    Albuterol Sulfate (PROAIR HFA IN) Inhale into the lungs      amLODIPine (NORVASC) 5 MG tablet Take 1 tablet by mouth once daily 90 tablet 1    aspirin 81 MG tablet Take 1 tablet by mouth daily 30 tablet 0    DULoxetine (CYMBALTA) 30 MG extended release capsule Take 90 mg by mouth daily      metoprolol (LOPRESSOR) 100 MG tablet Take 100 mg by mouth 2 times daily      insulin aspart (NOVOLOG PENFILL) 100 UNIT/ML injection cartridge Inject into the skin continuous Per insulin pump      pantoprazole (PROTONIX) 40 MG tablet Take 40 mg by mouth 2 times daily      losartan (COZAAR) 100 MG tablet TAKE 1 TABLET BY MOUTH ONCE DAILY 90 tablet 3    rOPINIRole (REQUIP) 2 MG tablet Take 1 tablet by mouth daily 90 tablet 3    BREO ELLIPTA 200-25 MCG/INH AEPB Inhale 1 puff into the lungs daily   6    tiotropium (SPIRIVA HANDIHALER) 18 MCG inhalation capsule Inhale 1 capsule into the lungs Daily 30 capsule 5    ONE TOUCH ULTRA TEST strip   1    clopidogrel (PLAVIX) 75 MG tablet Take 1 tablet by mouth daily (Patient not taking: Reported on 7/25/2022) 30 tablet 0    clonazePAM (KLONOPIN) 0.5 MG tablet Take 1 tablet by mouth nightly as needed for Anxiety for up to 30 days. 30 tablet 0    amitriptyline (ELAVIL) 150 MG tablet TAKE 1 TABLET BY MOUTH NIGHTLY (Patient not taking: Reported on 7/25/2022) 30 tablet 0     No current facility-administered medications for this visit. Allergies   Allergen Reactions    Fioricet [Butalbital-Apap-Caffeine] Shortness Of Breath    Betamethasone      Unsure reaction      Butalbital-Apap-Caff-Cod Other (See Comments)    Erythromycin      Other reaction(s): Unknown  Eye ointment caused swelling    Xarelto [Rivaroxaban]      Dizziness & \"felt like I was going to pass out\"    Codeine Nausea And Vomiting and Nausea Only    Droperidol Anxiety    Tape Claude Erm Tape] Rash       Subjective:      Review of Systems   Constitutional:  Negative for chills and diaphoresis. HENT:  Positive for congestion, ear pain, hoarse voice, sinus pressure, sneezing and sore throat. Eyes:  Negative for pain and itching. Respiratory:  Positive for cough. Negative for shortness of breath. Gastrointestinal:  Negative for diarrhea and nausea. Genitourinary:  Negative for difficulty urinating and dysuria. Musculoskeletal:  Negative for neck pain. Neurological:  Positive for headaches.    All other systems reviewed and are negative. 14 systems reviewed and negative except as listed in HPI. Objective:     Physical Exam  Vitals reviewed. Constitutional:       General: She is not in acute distress. Appearance: She is obese. She is ill-appearing. She is not toxic-appearing or diaphoretic. HENT:      Head: Normocephalic. Right Ear: A middle ear effusion is present. Tympanic membrane is not erythematous. Left Ear: A middle ear effusion is present. Tympanic membrane is not erythematous. Nose:      Right Sinus: Maxillary sinus tenderness and frontal sinus tenderness present. Left Sinus: Maxillary sinus tenderness and frontal sinus tenderness present. Mouth/Throat:      Mouth: Mucous membranes are moist.      Pharynx: Oropharynx is clear. Posterior oropharyngeal erythema present. No pharyngeal swelling or oropharyngeal exudate. Eyes:      General:         Right eye: No discharge. Left eye: No discharge. Conjunctiva/sclera: Conjunctivae normal.   Cardiovascular:      Rate and Rhythm: Normal rate and regular rhythm. Heart sounds: Normal heart sounds. No murmur heard. No friction rub. Pulmonary:      Effort: Pulmonary effort is normal. No respiratory distress. Breath sounds: Normal breath sounds. No stridor. No wheezing, rhonchi or rales. Musculoskeletal:         General: No swelling, tenderness or signs of injury. Normal range of motion. Cervical back: Normal range of motion and neck supple. No rigidity or tenderness. Right lower leg: No edema. Left lower leg: No edema. Lymphadenopathy:      Cervical: Cervical adenopathy present. Skin:     General: Skin is warm and dry. Capillary Refill: Capillary refill takes less than 2 seconds. Findings: No erythema or rash. Neurological:      Mental Status: She is alert and oriented to person, place, and time. Motor: No weakness.       Coordination: Coordination normal.      Gait: Gait normal. Psychiatric:         Mood and Affect: Mood normal.         Behavior: Behavior normal.         Thought Content: Thought content normal.         Judgment: Judgment normal.     BP (!) 169/82   Pulse 70   Temp 98.1 °F (36.7 °C)   SpO2 93%     Assessment:       Diagnosis Orders   1. Acute bacterial sinusitis  amoxicillin-clavulanate (AUGMENTIN) 875-125 MG per tablet          Plan:   -Based on the duration and severity of the symptoms-- I will treat this as bacterial at this time.  -Patient instructed to complete antibiotic prescription fully. -May use Motrin/Tylenol for fever/pain.  -Saline washes, salt water gargles and over the counter preparations if desired.  -Patient agreeable to treatment plan.  -Educational materials provided on AVS.  -Follow up if symptoms do not improve/worsen. No follow-ups on file. Orders Placed This Encounter   Medications    amoxicillin-clavulanate (AUGMENTIN) 875-125 MG per tablet     Sig: Take 1 tablet by mouth in the morning and 1 tablet before bedtime. Do all this for 10 days. Dispense:  20 tablet     Refill:  0         Patient given educational materials - see patient instructions. Discussed use, benefit, and side effects of prescribed medications. All patient questions answered. Pt voiced understanding.     Electronically signed by KIMBERLEE Agarwal NP on 7/25/2022 at 12:52 PM

## 2022-07-26 ENCOUNTER — APPOINTMENT (OUTPATIENT)
Dept: CT IMAGING | Age: 62
DRG: 195 | End: 2022-07-26
Payer: MEDICARE

## 2022-07-26 ENCOUNTER — APPOINTMENT (OUTPATIENT)
Dept: GENERAL RADIOLOGY | Age: 62
DRG: 195 | End: 2022-07-26
Payer: MEDICARE

## 2022-07-26 ENCOUNTER — HOSPITAL ENCOUNTER (INPATIENT)
Age: 62
LOS: 1 days | Discharge: HOME | DRG: 195 | End: 2022-07-28
Attending: EMERGENCY MEDICINE | Admitting: INTERNAL MEDICINE
Payer: MEDICARE

## 2022-07-26 DIAGNOSIS — J18.9 PNEUMONIA OF RIGHT LOWER LOBE DUE TO INFECTIOUS ORGANISM: Primary | ICD-10-CM

## 2022-07-26 DIAGNOSIS — B96.89 ACUTE BACTERIAL SINUSITIS: ICD-10-CM

## 2022-07-26 DIAGNOSIS — J01.90 ACUTE BACTERIAL SINUSITIS: ICD-10-CM

## 2022-07-26 LAB
ABSOLUTE BANDS #: 0.16 K/UL (ref 0–1)
ABSOLUTE EOS #: 0.1 K/UL (ref 0–0.4)
ABSOLUTE LYMPH #: 0.42 K/UL (ref 1–4.8)
ABSOLUTE MONO #: 0.57 K/UL (ref 0.1–1.3)
ANION GAP SERPL CALCULATED.3IONS-SCNC: 12 MMOL/L (ref 9–17)
BANDS: 3 % (ref 0–10)
BASOPHILS # BLD: 0 % (ref 0–2)
BASOPHILS ABSOLUTE: 0 K/UL (ref 0–0.2)
BUN BLDV-MCNC: 13 MG/DL (ref 6–20)
CALCIUM SERPL-MCNC: 8.7 MG/DL (ref 8.6–10.4)
CHLORIDE BLD-SCNC: 104 MMOL/L (ref 98–107)
CO2: 20 MMOL/L (ref 20–31)
CREAT SERPL-MCNC: 0.92 MG/DL (ref 0.5–0.9)
D-DIMER QUANTITATIVE: 1.18 MG/L FEU (ref 0–0.59)
EOSINOPHILS RELATIVE PERCENT: 2 % (ref 0–4)
GFR AFRICAN AMERICAN: >60 ML/MIN
GFR NON-AFRICAN AMERICAN: >60 ML/MIN
GFR SERPL CREATININE-BSD FRML MDRD: ABNORMAL ML/MIN/{1.73_M2}
GLUCOSE BLD-MCNC: 161 MG/DL (ref 70–99)
HCT VFR BLD CALC: 36.2 % (ref 36–46)
HEMOGLOBIN: 11.9 G/DL (ref 12–16)
INFLUENZA A: NOT DETECTED
INFLUENZA B: NOT DETECTED
LYMPHOCYTES # BLD: 8 % (ref 24–44)
MCH RBC QN AUTO: 29.7 PG (ref 26–34)
MCHC RBC AUTO-ENTMCNC: 32.9 G/DL (ref 31–37)
MCV RBC AUTO: 90.4 FL (ref 80–100)
MONOCYTES # BLD: 11 % (ref 1–7)
MORPHOLOGY: NORMAL
MYELOCYTES ABSOLUTE COUNT: 0.05 K/UL
MYELOCYTES: 1 %
PDW BLD-RTO: 14.1 % (ref 11.5–14.9)
PLATELET # BLD: 89 K/UL (ref 150–450)
PMV BLD AUTO: 7.9 FL (ref 6–12)
POTASSIUM SERPL-SCNC: 4.4 MMOL/L (ref 3.7–5.3)
RBC # BLD: 4.01 M/UL (ref 4–5.2)
SARS-COV-2 RNA, RT PCR: NOT DETECTED
SEG NEUTROPHILS: 75 % (ref 36–66)
SEGMENTED NEUTROPHILS ABSOLUTE COUNT: 3.9 K/UL (ref 1.3–9.1)
SODIUM BLD-SCNC: 136 MMOL/L (ref 135–144)
SOURCE: NORMAL
SPECIMEN DESCRIPTION: NORMAL
WBC # BLD: 5.2 K/UL (ref 3.5–11)

## 2022-07-26 PROCEDURE — 99285 EMERGENCY DEPT VISIT HI MDM: CPT

## 2022-07-26 PROCEDURE — 87636 SARSCOV2 & INF A&B AMP PRB: CPT

## 2022-07-26 PROCEDURE — 6360000004 HC RX CONTRAST MEDICATION: Performed by: STUDENT IN AN ORGANIZED HEALTH CARE EDUCATION/TRAINING PROGRAM

## 2022-07-26 PROCEDURE — 80048 BASIC METABOLIC PNL TOTAL CA: CPT

## 2022-07-26 PROCEDURE — 6370000000 HC RX 637 (ALT 250 FOR IP): Performed by: STUDENT IN AN ORGANIZED HEALTH CARE EDUCATION/TRAINING PROGRAM

## 2022-07-26 PROCEDURE — 96365 THER/PROPH/DIAG IV INF INIT: CPT

## 2022-07-26 PROCEDURE — 85379 FIBRIN DEGRADATION QUANT: CPT

## 2022-07-26 PROCEDURE — 71260 CT THORAX DX C+: CPT | Performed by: STUDENT IN AN ORGANIZED HEALTH CARE EDUCATION/TRAINING PROGRAM

## 2022-07-26 PROCEDURE — 2580000003 HC RX 258: Performed by: STUDENT IN AN ORGANIZED HEALTH CARE EDUCATION/TRAINING PROGRAM

## 2022-07-26 PROCEDURE — 36415 COLL VENOUS BLD VENIPUNCTURE: CPT

## 2022-07-26 PROCEDURE — 85025 COMPLETE CBC W/AUTO DIFF WBC: CPT

## 2022-07-26 PROCEDURE — 71045 X-RAY EXAM CHEST 1 VIEW: CPT

## 2022-07-26 PROCEDURE — 6360000002 HC RX W HCPCS: Performed by: STUDENT IN AN ORGANIZED HEALTH CARE EDUCATION/TRAINING PROGRAM

## 2022-07-26 RX ORDER — ACETAMINOPHEN 325 MG/1
650 TABLET ORAL ONCE
Status: COMPLETED | OUTPATIENT
Start: 2022-07-26 | End: 2022-07-26

## 2022-07-26 RX ORDER — 0.9 % SODIUM CHLORIDE 0.9 %
80 INTRAVENOUS SOLUTION INTRAVENOUS ONCE
Status: COMPLETED | OUTPATIENT
Start: 2022-07-26 | End: 2022-07-27

## 2022-07-26 RX ORDER — SODIUM CHLORIDE 0.9 % (FLUSH) 0.9 %
10 SYRINGE (ML) INJECTION 2 TIMES DAILY
Status: DISCONTINUED | OUTPATIENT
Start: 2022-07-26 | End: 2022-07-28 | Stop reason: HOSPADM

## 2022-07-26 RX ADMIN — ACETAMINOPHEN 325MG 650 MG: 325 TABLET ORAL at 19:47

## 2022-07-26 RX ADMIN — SODIUM CHLORIDE 80 ML: 9 INJECTION, SOLUTION INTRAVENOUS at 22:57

## 2022-07-26 RX ADMIN — CEFTRIAXONE SODIUM 1000 MG: 1 INJECTION, POWDER, FOR SOLUTION INTRAMUSCULAR; INTRAVENOUS at 23:45

## 2022-07-26 RX ADMIN — SODIUM CHLORIDE, PRESERVATIVE FREE 10 ML: 5 INJECTION INTRAVENOUS at 22:56

## 2022-07-26 RX ADMIN — IOPAMIDOL 75 ML: 755 INJECTION, SOLUTION INTRAVENOUS at 22:56

## 2022-07-26 ASSESSMENT — PAIN - FUNCTIONAL ASSESSMENT: PAIN_FUNCTIONAL_ASSESSMENT: 0-10

## 2022-07-26 ASSESSMENT — PAIN SCALES - GENERAL: PAINLEVEL_OUTOF10: 1

## 2022-07-26 ASSESSMENT — ENCOUNTER SYMPTOMS
COUGH: 0
DIARRHEA: 0
TROUBLE SWALLOWING: 0
SINUS PRESSURE: 1
ABDOMINAL PAIN: 0
RHINORRHEA: 1
NAUSEA: 0
SORE THROAT: 0
SHORTNESS OF BREATH: 1
VOMITING: 0

## 2022-07-26 ASSESSMENT — PAIN DESCRIPTION - ORIENTATION: ORIENTATION: RIGHT

## 2022-07-26 ASSESSMENT — PAIN DESCRIPTION - FREQUENCY: FREQUENCY: INTERMITTENT

## 2022-07-26 ASSESSMENT — PAIN DESCRIPTION - PAIN TYPE: TYPE: ACUTE PAIN

## 2022-07-26 ASSESSMENT — PAIN DESCRIPTION - LOCATION: LOCATION: ARM

## 2022-07-26 ASSESSMENT — PAIN DESCRIPTION - DESCRIPTORS: DESCRIPTORS: ACHING

## 2022-07-26 NOTE — ED PROVIDER NOTES
[address]  Emergency Department Encounter  EmergencyMedicine Resident     Pt Gabe Weston  MRN: [id number]  [place] 1960  Date of evaluation: 7/26/22  PCP:  No primary care provider on file. This patient was evaluated in the Emergency Department for symptoms described in the history of present illness. The patient was evaluated in the context of the global COVID-19 pandemic, which necessitated consideration that the patient might be at risk for infection with the SARS-CoV-2 virus that causes COVID-19. Institutional protocols and algorithms that pertain to the evaluation of patients at risk for COVID-19 are in a state of rapid change based on information released by regulatory bodies including the CDC and federal and state organizations. These policies and algorithms were followed during the patient's care in the ED. CHIEF COMPLAINT       Chief Complaint   Patient presents with    Shortness of Breath    Chills    Fever       HISTORY OF PRESENT ILLNESS  (Location/Symptom, Timing/Onset, Context/Setting, Quality, Duration, Modifying Factors, Severity.)      Leslie Ray is a 63 y.o. female who presents with shortness of breath, rhinorrhea, and sinus pressure. Patient states she has a history of COPD and has been using her Breo inhaler more often than usual over the past 2 days. Patient states she was evaluated in urgent care yesterday for rhinorrhea and sinus pressure, where she received a prescription for amoxicillin which she started yesterday. She declined a COVID test at that time. Since yesterday, however, she developed sudden onset shortness of breath and chills. She denies chest pain, headache, changes in vision, lightheadedness, dizziness, syncope, palpitations, abdominal pain, nausea, vomiting, diarrhea, numbness, tingling, weakness. She has the initial 2 vaccines for COVID but has not been boosted.     PAST MEDICAL / SURGICAL / SOCIAL / FAMILY HISTORY Occupational History    Occupation: disability     Employer: N/A   Tobacco Use    Smoking status: Never    Smokeless tobacco: Never   Vaping Use    Vaping Use: Never used   Substance and Sexual Activity    Alcohol use: No    Drug use: Yes     Frequency: 4.0 times per week     Types: Marijuana Simona Eglin)    Sexual activity: Yes     Partners: Male     Comment: 1 partner   Other Topics Concern    Not on file   Social History Narrative    Not on file     Social Determinants of Health     Financial Resource Strain: Low Risk     Difficulty of Paying Living Expenses: Not very hard   Food Insecurity: No Food Insecurity    Worried About Running Out of Food in the Last Year: Never true    Ran Out of Food in the Last Year: Never true   Transportation Needs: No Transportation Needs    Lack of Transportation (Medical): No    Lack of Transportation (Non-Medical): No   Physical Activity: Inactive    Days of Exercise per Week: 0 days    Minutes of Exercise per Session: 0 min   Stress: Not on file   Social Connections: Not on file   Intimate Partner Violence: Not on file   Housing Stability: Not on file       Family History   Problem Relation Age of Onset    High Blood Pressure Mother     Migraines Mother     High Blood Pressure Father     Heart Disease Father     Cancer Father         skin    Diabetes Maternal Grandmother     Heart Disease Maternal Grandmother     Cancer Maternal Grandmother     Parkinsonism Maternal Grandmother     Cancer Paternal Grandmother     Other Brother         epilepsy       Allergies:  Fioricet [butalbital-apap-caffeine], Betamethasone, Butalbital-apap-caff-cod, Erythromycin, Xarelto [rivaroxaban], Codeine, Droperidol, and Tape [adhesive tape]    Home Medications:  Prior to Admission medications    Medication Sig Start Date End Date Taking? Authorizing Provider   amoxicillin-clavulanate (AUGMENTIN) 875-125 MG per tablet Take 1 tablet by mouth in the morning and 1 tablet before bedtime.  Do all this for 10 days. 7/25/22 8/4/22 Yes Spencer Mayfield, APRN - NP   gabapentin (NEURONTIN) 800 MG tablet Take 1 tablet by mouth 2 times daily for 180 days. 6/21/22 12/18/22 Yes Ally Bennett MD   clopidogrel (PLAVIX) 75 MG tablet Take 1 tablet by mouth daily 6/21/22  Yes Ally Bennett MD   metoclopramide (REGLAN) 10 MG tablet Take 1 tablet by mouth 2 times daily 6/21/22  Yes Ally Bennett MD   simvastatin (ZOCOR) 20 MG tablet TAKE 1 TABLET BY MOUTH NIGHTLY 6/2/22  Yes Historical Provider, MD   topiramate (TOPAMAX) 25 MG tablet Take 1 tablet by mouth 2 times daily 5/18/22  Yes Natalie Arreola MD   atorvastatin (LIPITOR) 20 MG tablet Take 1 tablet by mouth daily 5/5/22  Yes Ally Bennett MD   DULoxetine (CYMBALTA) 60 MG extended release capsule Take 60 mg by mouth at bedtime 4/14/22  Yes Historical Provider, MD   amLODIPine (NORVASC) 5 MG tablet Take 1 tablet by mouth once daily 1/8/21  Yes Janet Orourke MD   aspirin 81 MG tablet Take 1 tablet by mouth daily 10/17/20  Yes Nehemiah Grace MD   metoprolol (LOPRESSOR) 100 MG tablet Take 50 mg by mouth in the morning and 50 mg before bedtime.    Yes Historical Provider, MD   insulin aspart (NOVOLOG PENFILL) 100 UNIT/ML injection cartridge Inject into the skin continuous Per insulin pump   Yes Historical Provider, MD   amitriptyline (ELAVIL) 150 MG tablet TAKE 1 TABLET BY MOUTH NIGHTLY 2/21/20  Yes Janet Orourke MD   pantoprazole (PROTONIX) 40 MG tablet Take 40 mg by mouth 2 times daily   Yes Historical Provider, MD   losartan (COZAAR) 100 MG tablet TAKE 1 TABLET BY MOUTH ONCE DAILY 5/14/19  Yes Janet Orourke MD   rOPINIRole (REQUIP) 2 MG tablet Take 1 tablet by mouth daily  Patient taking differently: Take 2 mg by mouth nightly 4/16/19  Yes MD PATO Potter ELLIPTA 200-25 MCG/INH AEPB Inhale 1 puff into the lungs daily  10/7/17  Yes Historical Provider, MD   tiotropium (SPIRIVA HANDIHALER) 18 MCG inhalation capsule Inhale 1 capsule into the lungs Daily 6/19/17  Yes Zahra Locke MD   ONE TOUCH ULTRA TEST strip  6/7/17  Yes Historical Provider, MD   clonazePAM (KLONOPIN) 0.5 MG tablet Take 1 tablet by mouth nightly as needed for Anxiety for up to 30 days. 6/7/22 7/7/22  Estefanía Urbina MD   diclofenac sodium (VOLTAREN) 1 % GEL APPLY 2 GRAMS TOPICALLY 4 TIMES DAILY 1/24/22   Historical Provider, MD   vitamin B-12 (CYANOCOBALAMIN) 1000 MCG tablet Take 1,000 mcg by mouth daily    Historical Provider, MD   Albuterol Sulfate (PROAIR HFA IN) Inhale into the lungs    Historical Provider, MD       REVIEW OF SYSTEMS    (2-9 systems for level 4, 10 or more for level 5)      Review of Systems   Constitutional:  Positive for chills. Negative for fatigue and fever. HENT:  Positive for rhinorrhea and sinus pressure. Negative for congestion, sore throat, tinnitus and trouble swallowing. Eyes:  Negative for visual disturbance. Respiratory:  Positive for shortness of breath. Negative for cough. Cardiovascular:  Negative for chest pain. Gastrointestinal:  Negative for abdominal pain, diarrhea, nausea and vomiting. Genitourinary:  Negative for dysuria and hematuria. Musculoskeletal:  Negative for arthralgias and myalgias. Skin:  Negative for pallor and rash. Neurological:  Negative for dizziness, tremors, syncope, weakness, light-headedness, numbness and headaches. All other systems reviewed and are negative. PHYSICAL EXAM   (up to 7 for level 4, 8 or more for level 5)      INITIAL VITALS:   BP (!) 159/65   Pulse 65   Temp 98.1 °F (36.7 °C) (Oral)   Resp 18   Wt 197 lb 5 oz (89.5 kg)   SpO2 97%   BMI 32.83 kg/m²     Physical Exam  Vitals reviewed. Constitutional:       General: She is not in acute distress. Appearance: She is not toxic-appearing. HENT:      Head: Normocephalic. Mouth/Throat:      Mouth: Mucous membranes are moist.      Pharynx: Oropharynx is clear. Eyes:      Extraocular Movements: Extraocular movements intact. Pupils: Pupils are equal, round, and reactive to light. Cardiovascular:      Rate and Rhythm: Normal rate and regular rhythm. Pulses: Normal pulses. Heart sounds: Normal heart sounds. Pulmonary:      Effort: Pulmonary effort is normal.      Breath sounds: Examination of the right-lower field reveals decreased breath sounds. Decreased breath sounds present. No wheezing, rhonchi or rales. Comments: SpO2 85 on RA on arrival, started on 2 L O2 with increase in SpO2 to 96%  Abdominal:      Palpations: Abdomen is soft. Tenderness: There is no abdominal tenderness. There is no guarding or rebound. Musculoskeletal:         General: Normal range of motion. Cervical back: Normal range of motion and neck supple. Right lower leg: No edema. Left lower leg: No edema. Skin:     Capillary Refill: Capillary refill takes less than 2 seconds. Coloration: Skin is not pale. Findings: No rash. Neurological:      General: No focal deficit present. Mental Status: She is alert and oriented to person, place, and time. Motor: No weakness. DIFFERENTIAL  DIAGNOSIS     PLAN (LABS / IMAGING / EKG):  Orders Placed This Encounter   Procedures    COVID-19 & Influenza Combo    XR CHEST PORTABLE    CT CHEST PULMONARY EMBOLISM W CONTRAST    XR CHEST PORTABLE    CBC with Auto Differential    Basic Metabolic Panel    D-Dimer, Quantitative    Procalcitonin    Basic Metabolic Panel w/ Reflex to MG    Hepatic function panel    Lactic Acid    CBC with Auto Differential    Hemoglobin A1C    Arterial Blood Gases    ADULT DIET;  Regular; 5 carb choices (75 gm/meal)    Telemetry monitoring - continuous duration    Vital signs per unit routine    Admission/Observation order previously placed    Up as tolerated    Daily weights    Intake and output    Patient documentation for Home Insulin Pump    Nurse to document on eMAR for Subcutaneous Home Insulin Pump - see instructions below:    Home insulin pump instructions for MRI/CT/X-Ray - See below:     If patient unable to manage own insulin pump, see instructions below:    HYPOGLYCEMIA TREATMENT: blood glucose LESS THAN 70 mg/dL and patient ALERT and TOLERATING PO    HYPOGLYCEMIA TREATMENT: blood glucose LESS THAN 70 mg/dL and patient NOT ALERT or NPO    Misc nursing order (specify)    Full Code    Inpatient consult to Internal Medicine    Inpatient consult to Diabetes Educator/Management Team    Contact isolation    Initiate Oxygen Therapy Protocol    Initiate Oxygen Therapy Protocol    Adult NIV/Positive Airway Pressure    POCT glucose    POCT Glucose    POC Glucose Fingerstick    POC Glucose Fingerstick    ADMIT TO INPATIENT       MEDICATIONS ORDERED:  Orders Placed This Encounter   Medications    acetaminophen (TYLENOL) tablet 650 mg    iopamidol (ISOVUE-370) 76 % injection 75 mL    sodium chloride flush 0.9 % injection 10 mL    0.9 % sodium chloride bolus    DISCONTD: cefTRIAXone (ROCEPHIN) 1,000 mg in dextrose 5 % 50 mL IVPB mini-bag     Order Specific Question:   Antimicrobial Indications     Answer:   Pneumonia (CAP)    doxycycline (VIBRAMYCIN) 100 mg in dextrose 5 % 100 mL IVPB     Order Specific Question:   Antimicrobial Indications     Answer:   Pneumonia (CAP)    albuterol sulfate HFA (PROVENTIL;VENTOLIN;PROAIR) 108 (90 Base) MCG/ACT inhaler 2 puff     Order Specific Question:   Initiate RT Bronchodilator Protocol     Answer:   Yes - Inpatient Protocol    amitriptyline (ELAVIL) tablet 150 mg    amLODIPine (NORVASC) tablet 5 mg    aspirin chewable tablet 81 mg    atorvastatin (LIPITOR) tablet 20 mg    budesonide-formoterol (SYMBICORT) 160-4.5 MCG/ACT inhaler 2 puff    clonazePAM (KLONOPIN) tablet 0.5 mg    clopidogrel (PLAVIX) tablet 75 mg    DULoxetine (CYMBALTA) extended release capsule 60 mg    gabapentin (NEURONTIN) capsule 800 mg    losartan (COZAAR) tablet 100 mg    metoclopramide (REGLAN) tablet 10 mg    metoprolol tartrate (LOPRESSOR) tablet 50 mg    pantoprazole (PROTONIX) tablet 40 mg    rOPINIRole (REQUIP) tablet 2 mg    DISCONTD: atorvastatin (LIPITOR) tablet 10 mg    tiotropium (SPIRIVA RESPIMAT) 2.5 MCG/ACT inhaler 2 puff    topiramate (TOPAMAX) tablet 25 mg    cefTRIAXone (ROCEPHIN) 1,000 mg in dextrose 5 % 50 mL IVPB mini-bag     Order Specific Question:   Antimicrobial Indications     Answer:   Pneumonia (CAP)     Order Specific Question:   CAP duration of therapy     Answer:   7 days    DISCONTD: doxycycline (VIBRAMYCIN) 100 mg in dextrose 5 % 100 mL IVPB     Order Specific Question:   Antimicrobial Indications     Answer:   Pneumonia (CAP)     Order Specific Question:   CAP duration of therapy     Answer:   7 days    sodium chloride flush 0.9 % injection 5-40 mL    sodium chloride flush 0.9 % injection 5-40 mL    0.9 % sodium chloride infusion    enoxaparin (LOVENOX) injection 40 mg     Order Specific Question:   Indication of Use     Answer:   Prophylaxis-DVT/PE    OR Linked Order Group     ondansetron (ZOFRAN-ODT) disintegrating tablet 4 mg     ondansetron (ZOFRAN) injection 4 mg    polyethylene glycol (GLYCOLAX) packet 17 g    OR Linked Order Group     acetaminophen (TYLENOL) tablet 650 mg     acetaminophen (TYLENOL) suppository 650 mg    DISCONTD: Insulin Pump - Bolus Dose (Patient Supplied)     Order Specific Question:   What type of insulin is the patient using in their insulin pump? Answer: Insulin aspart    Insulin Pump - Basal Dose  (Patient Supplied)     Order Specific Question:   What type of insulin is the patient using in their insulin pump? Answer: Insulin aspart     Order Specific Question:   What is the patient's 24 hour basal dose (Units/hour x 24 hours)? Answer in units.      Answer:   75.12    glucose chewable tablet 16 g    OR Linked Order Group     dextrose bolus 10% 125 mL     dextrose bolus 10% 250 mL    glucagon (rDNA) injection 1 mg    dextrose 10 % infusion    0.9 % sodium chloride infusion DDX: COPD exacerbation, asthma, pneumothorax, anaphylaxis, anxiety, PE , pericardial effusion, CHF, ACS/MI, atelectasis, lower airway obstruction, aspiration    DIAGNOSTIC RESULTS / EMERGENCY DEPARTMENT COURSE / MDM   LAB RESULTS:  Results for orders placed or performed during the hospital encounter of 07/26/22   COVID-19 & Influenza Combo    Specimen: Nasopharyngeal Swab   Result Value Ref Range    Specimen Description . NASOPHARYNGEAL SWAB     Source . NASOPHARYNGEAL SWAB     SARS-CoV-2 RNA, RT PCR Not Detected Not Detected    INFLUENZA A Not Detected Not Detected    INFLUENZA B Not Detected Not Detected   CBC with Auto Differential   Result Value Ref Range    WBC 5.2 3.5 - 11.0 k/uL    RBC 4.01 4.0 - 5.2 m/uL    Hemoglobin 11.9 (L) 12.0 - 16.0 g/dL    Hematocrit 36.2 36 - 46 %    MCV 90.4 80 - 100 fL    MCH 29.7 26 - 34 pg    MCHC 32.9 31 - 37 g/dL    RDW 14.1 11.5 - 14.9 %    Platelets 89 (L) 956 - 450 k/uL    MPV 7.9 6.0 - 12.0 fL    Seg Neutrophils 75 (H) 36 - 66 %    Lymphocytes 8 (L) 24 - 44 %    Monocytes 11 (H) 1 - 7 %    Eosinophils % 2 0 - 4 %    Basophils 0 0 - 2 %    Bands 3 0 - 10 %    Myelocytes 1 (H) 0 %    Segs Absolute 3.90 1.3 - 9.1 k/uL    Absolute Lymph # 0.42 (L) 1.0 - 4.8 k/uL    Absolute Mono # 0.57 0.1 - 1.3 k/uL    Absolute Eos # 0.10 0.0 - 0.4 k/uL    Basophils Absolute 0.00 0.0 - 0.2 k/uL    Absolute Bands # 0.16 0.0 - 1.0 k/uL    Myelocytes Absolute 0.05 (H) 0 k/uL    Morphology Normal    Basic Metabolic Panel   Result Value Ref Range    Glucose 161 (H) 70 - 99 mg/dL    BUN 13 6 - 20 mg/dL    Creatinine 0.92 (H) 0.50 - 0.90 mg/dL    Calcium 8.7 8.6 - 10.4 mg/dL    Sodium 136 135 - 144 mmol/L    Potassium 4.4 3.7 - 5.3 mmol/L    Chloride 104 98 - 107 mmol/L    CO2 20 20 - 31 mmol/L    Anion Gap 12 9 - 17 mmol/L    GFR Non-African American >60 >60 mL/min    GFR African American >60 >60 mL/min    GFR Comment         D-Dimer, Quantitative   Result Value Ref Range    D-Dimer, Quant 1.18 (H) 0.00 - 0.59 mg/L FEU   Basic Metabolic Panel w/ Reflex to MG   Result Value Ref Range    Glucose 163 (H) 70 - 99 mg/dL    BUN 10 6 - 20 mg/dL    Creatinine 0.78 0.50 - 0.90 mg/dL    Calcium 8.9 8.6 - 10.4 mg/dL    Sodium 138 135 - 144 mmol/L    Potassium 3.7 3.7 - 5.3 mmol/L    Chloride 105 98 - 107 mmol/L    CO2 22 20 - 31 mmol/L    Anion Gap 11 9 - 17 mmol/L    GFR Non-African American >60 >60 mL/min    GFR African American >60 >60 mL/min    GFR Comment         Arterial Blood Gases   Result Value Ref Range    pH, Arterial 7.347 (L) 7.350 - 7.450    pCO2, Arterial 42.7 35.0 - 45.0 mmHg    pO2, Arterial 65.5 (L) 80.0 - 100.0 mmHg    HCO3, Arterial 23.4 22.0 - 26.0 mmol/L    Negative Base Excess, Art 2.3 (H) 0.0 - 2.0 mmol/L    O2 Sat, Arterial 90.6 (L) 95 - 98 %    Carboxyhemoglobin 0.4 0 - 5 %    Methemoglobin 1.2 0.0 - 1.9 %    Pt Temp 37.0     O2 Device/Flow/% 3LNC     Respiratory Rate 20     Mario Test PASS     Sample Site Right Radial Artery     Pt. Position SEMI-FOWLERS     FIO2 32    POC Glucose Fingerstick   Result Value Ref Range    POC Glucose 175 (H) 65 - 105 mg/dL   POC Glucose Fingerstick   Result Value Ref Range    POC Glucose 178 (H) 65 - 105 mg/dL       IMPRESSION: Patient presents with rhinorrhea, sinus pressure, and sudden onset shortness of breath yesterday. Patient was evaluated in urgent care yesterday and was given amoxicillin, but has sudden onset shortness of breath began today. Patient on arrival had SPO2 85% on room air, was started on 2 L O2 with improvement in SPO2 to 96%. She is not on any home O2. Lung sounds diminished on the right, left side clear to auscultation. She denies chest pain, abdominal pain, nausea, vomiting. Temp 37.8. Patient does have a history of COPD, states has been using her Breo inhaler more than usual.  We will obtain chest x-ray, CBC, CMP, dimer, and COVID and flu swab. Anticipate admission given new onset O2 requirement.     RADIOLOGY:  XR CHEST PORTABLE    Result Date: 7/26/2022  EXAMINATION: ONE XRAY VIEW OF THE CHEST 7/26/2022 8:22 pm COMPARISON: 12/20/2021 HISTORY: ORDERING SYSTEM PROVIDED HISTORY: SOB, new O2 requirement TECHNOLOGIST PROVIDED HISTORY: SOB, new O2 requirement Reason for Exam: SOB, new O2 requirement Additional signs and symptoms: SOB, new O2 requirement Relevant Medical/Surgical History: SOB, new O2 requirement FINDINGS: Cardiomediastinal silhouette stable. Right upper lobe and left basilar airspace opacities. No pneumothorax. No pleural effusion. Right upper lobe and left basilar opacities could represent multifocal pneumonia        CT PE pending at the time of signout    EMERGENCY DEPARTMENT COURSE:  ED Course as of 07/27/22 1543   Tue Jul 26, 2022   1919 Patient SpO2 85% on RA, started on 2L O2 now at 96%. Not on any home O2. Does have hx of COPD, uses breo and has used it more over the past two days with some mild symptom improvement. Right lung diminished air movement, left lung clear to auscultation bilaterally. [JG]   2019 D dimer pending [JG]   2022 D-Dimer, Quant(!): 1.18 [JG]   2035 CT PE pending [JG]   2056 CXR - Right upper lobe and left basilar opacities could represent multifocal pneumonia [JG]   2320 CT PE negative for pulmonary embolism [JG]      ED Course User Index  [JG] Anna Yeboah MD     CONSULTS:  IP CONSULT TO INTERNAL MEDICINE  IP CONSULT TO DIABETES EDUCATOR    FINAL IMPRESSION      1. Pneumonia of right lower lobe due to infectious organism          DISPOSITION / PLAN     DISPOSITION Admitted 07/27/2022 12:06:26 AM      PATIENT REFERRED TO:  No follow-up provider specified.     DISCHARGE MEDICATIONS:  Current Discharge Medication List          Anna Yeboah MD  Emergency Medicine Resident    (Please note that portions of thisnote were completed with a voice recognition program.  Efforts were made to edit the dictations but occasionally words are mis-transcribed.)       Anna Yeboah MD  Resident  07/26/22 [address] Kateryna Kan MD  Resident  07/27/22 [id number]

## 2022-07-26 NOTE — ED TRIAGE NOTES
Mode of arrival (squad #, walk in, police, etc) : walk in        Chief complaint(s): Concern for COVID        Arrival Note (brief scenario, treatment PTA, etc). : Pt states she has not been feeling good for a few days. Pt denies any known contact with covid + people. C= \"Have you ever felt that you should Cut down on your drinking? \"  No  A= \"Have people Annoyed you by criticizing your drinking? \"  No  G= \"Have you ever felt bad or Guilty about your drinking? \"  No  E= \"Have you ever had a drink as an Eye-opener first thing in the morning to steady your nerves or to help a hangover? \"  No      Deferred []      Reason for deferring: N/A    *If yes to two or more: probable alcohol abuse. *

## 2022-07-27 ENCOUNTER — APPOINTMENT (OUTPATIENT)
Dept: GENERAL RADIOLOGY | Age: 62
DRG: 195 | End: 2022-07-27
Payer: MEDICARE

## 2022-07-27 PROBLEM — J18.9 PNEUMONIA: Status: ACTIVE | Noted: 2022-07-27

## 2022-07-27 LAB
ALLEN TEST: ABNORMAL
ANION GAP SERPL CALCULATED.3IONS-SCNC: 11 MMOL/L (ref 9–17)
BUN BLDV-MCNC: 10 MG/DL (ref 6–20)
CALCIUM SERPL-MCNC: 8.9 MG/DL (ref 8.6–10.4)
CARBOXYHEMOGLOBIN: 0.4 % (ref 0–5)
CHLORIDE BLD-SCNC: 105 MMOL/L (ref 98–107)
CO2: 22 MMOL/L (ref 20–31)
CREAT SERPL-MCNC: 0.78 MG/DL (ref 0.5–0.9)
ESTIMATED AVERAGE GLUCOSE: 197 MG/DL
FIO2: 32
GFR AFRICAN AMERICAN: >60 ML/MIN
GFR NON-AFRICAN AMERICAN: >60 ML/MIN
GFR SERPL CREATININE-BSD FRML MDRD: ABNORMAL ML/MIN/{1.73_M2}
GLUCOSE BLD-MCNC: 163 MG/DL (ref 70–99)
GLUCOSE BLD-MCNC: 175 MG/DL (ref 65–105)
GLUCOSE BLD-MCNC: 178 MG/DL (ref 65–105)
GLUCOSE BLD-MCNC: 190 MG/DL (ref 65–105)
GLUCOSE BLD-MCNC: 207 MG/DL (ref 65–105)
HBA1C MFR BLD: 8.5 % (ref 4–6)
HCO3 ARTERIAL: 23.4 MMOL/L (ref 22–26)
METHEMOGLOBIN: 1.2 % (ref 0–1.9)
NEGATIVE BASE EXCESS, ART: 2.3 MMOL/L (ref 0–2)
O2 DEVICE/FLOW/%: ABNORMAL
O2 SAT, ARTERIAL: 90.6 % (ref 95–98)
PATIENT TEMP: 37
PCO2 ARTERIAL: 42.7 MMHG (ref 35–45)
PH ARTERIAL: 7.35 (ref 7.35–7.45)
PO2 ARTERIAL: 65.5 MMHG (ref 80–100)
POTASSIUM SERPL-SCNC: 3.7 MMOL/L (ref 3.7–5.3)
PT. POSITION: ABNORMAL
RESPIRATORY RATE: 20
SAMPLE SITE: ABNORMAL
SODIUM BLD-SCNC: 138 MMOL/L (ref 135–144)

## 2022-07-27 PROCEDURE — 94640 AIRWAY INHALATION TREATMENT: CPT

## 2022-07-27 PROCEDURE — 82805 BLOOD GASES W/O2 SATURATION: CPT

## 2022-07-27 PROCEDURE — 94664 DEMO&/EVAL PT USE INHALER: CPT

## 2022-07-27 PROCEDURE — 94660 CPAP INITIATION&MGMT: CPT

## 2022-07-27 PROCEDURE — 82947 ASSAY GLUCOSE BLOOD QUANT: CPT

## 2022-07-27 PROCEDURE — 36415 COLL VENOUS BLD VENIPUNCTURE: CPT

## 2022-07-27 PROCEDURE — 2500000003 HC RX 250 WO HCPCS: Performed by: STUDENT IN AN ORGANIZED HEALTH CARE EDUCATION/TRAINING PROGRAM

## 2022-07-27 PROCEDURE — 99223 1ST HOSP IP/OBS HIGH 75: CPT | Performed by: INTERNAL MEDICINE

## 2022-07-27 PROCEDURE — 36600 WITHDRAWAL OF ARTERIAL BLOOD: CPT

## 2022-07-27 PROCEDURE — 2580000003 HC RX 258: Performed by: STUDENT IN AN ORGANIZED HEALTH CARE EDUCATION/TRAINING PROGRAM

## 2022-07-27 PROCEDURE — 6370000000 HC RX 637 (ALT 250 FOR IP): Performed by: NURSE PRACTITIONER

## 2022-07-27 PROCEDURE — 80048 BASIC METABOLIC PNL TOTAL CA: CPT

## 2022-07-27 PROCEDURE — 2060000000 HC ICU INTERMEDIATE R&B

## 2022-07-27 PROCEDURE — 71045 X-RAY EXAM CHEST 1 VIEW: CPT

## 2022-07-27 PROCEDURE — 83036 HEMOGLOBIN GLYCOSYLATED A1C: CPT

## 2022-07-27 PROCEDURE — 6360000002 HC RX W HCPCS: Performed by: NURSE PRACTITIONER

## 2022-07-27 PROCEDURE — 94760 N-INVAS EAR/PLS OXIMETRY 1: CPT

## 2022-07-27 PROCEDURE — 2580000003 HC RX 258: Performed by: NURSE PRACTITIONER

## 2022-07-27 PROCEDURE — 2700000000 HC OXYGEN THERAPY PER DAY

## 2022-07-27 RX ORDER — CLONAZEPAM 1 MG/1
0.5 TABLET ORAL NIGHTLY PRN
Status: DISCONTINUED | OUTPATIENT
Start: 2022-07-27 | End: 2022-07-28 | Stop reason: HOSPADM

## 2022-07-27 RX ORDER — ALBUTEROL SULFATE 90 UG/1
2 AEROSOL, METERED RESPIRATORY (INHALATION) EVERY 4 HOURS PRN
Status: DISCONTINUED | OUTPATIENT
Start: 2022-07-27 | End: 2022-07-28 | Stop reason: HOSPADM

## 2022-07-27 RX ORDER — CLOPIDOGREL BISULFATE 75 MG/1
75 TABLET ORAL DAILY
Status: DISCONTINUED | OUTPATIENT
Start: 2022-07-27 | End: 2022-07-28 | Stop reason: HOSPADM

## 2022-07-27 RX ORDER — GABAPENTIN 400 MG/1
800 CAPSULE ORAL 2 TIMES DAILY
Status: DISCONTINUED | OUTPATIENT
Start: 2022-07-27 | End: 2022-07-28 | Stop reason: HOSPADM

## 2022-07-27 RX ORDER — ASPIRIN 81 MG/1
81 TABLET, CHEWABLE ORAL DAILY
Status: DISCONTINUED | OUTPATIENT
Start: 2022-07-27 | End: 2022-07-28 | Stop reason: HOSPADM

## 2022-07-27 RX ORDER — DEXTROSE MONOHYDRATE 100 MG/ML
INJECTION, SOLUTION INTRAVENOUS CONTINUOUS PRN
Status: DISCONTINUED | OUTPATIENT
Start: 2022-07-27 | End: 2022-07-28 | Stop reason: HOSPADM

## 2022-07-27 RX ORDER — POLYETHYLENE GLYCOL 3350 17 G/17G
17 POWDER, FOR SOLUTION ORAL DAILY PRN
Status: DISCONTINUED | OUTPATIENT
Start: 2022-07-27 | End: 2022-07-28 | Stop reason: HOSPADM

## 2022-07-27 RX ORDER — METOPROLOL TARTRATE 50 MG/1
50 TABLET, FILM COATED ORAL 2 TIMES DAILY
Status: DISCONTINUED | OUTPATIENT
Start: 2022-07-27 | End: 2022-07-28 | Stop reason: HOSPADM

## 2022-07-27 RX ORDER — ALBUTEROL SULFATE 2.5 MG/3ML
2.5 SOLUTION RESPIRATORY (INHALATION) EVERY 4 HOURS PRN
Status: DISCONTINUED | OUTPATIENT
Start: 2022-07-27 | End: 2022-07-28 | Stop reason: HOSPADM

## 2022-07-27 RX ORDER — SODIUM CHLORIDE 9 MG/ML
INJECTION, SOLUTION INTRAVENOUS CONTINUOUS
Status: DISCONTINUED | OUTPATIENT
Start: 2022-07-27 | End: 2022-07-28 | Stop reason: HOSPADM

## 2022-07-27 RX ORDER — DULOXETIN HYDROCHLORIDE 60 MG/1
60 CAPSULE, DELAYED RELEASE ORAL NIGHTLY
Status: DISCONTINUED | OUTPATIENT
Start: 2022-07-27 | End: 2022-07-28 | Stop reason: HOSPADM

## 2022-07-27 RX ORDER — ATORVASTATIN CALCIUM 20 MG/1
20 TABLET, FILM COATED ORAL DAILY
Status: DISCONTINUED | OUTPATIENT
Start: 2022-07-27 | End: 2022-07-28 | Stop reason: HOSPADM

## 2022-07-27 RX ORDER — LOSARTAN POTASSIUM 100 MG/1
100 TABLET ORAL DAILY
Status: DISCONTINUED | OUTPATIENT
Start: 2022-07-27 | End: 2022-07-28 | Stop reason: HOSPADM

## 2022-07-27 RX ORDER — SODIUM CHLORIDE 0.9 % (FLUSH) 0.9 %
5-40 SYRINGE (ML) INJECTION EVERY 12 HOURS SCHEDULED
Status: DISCONTINUED | OUTPATIENT
Start: 2022-07-27 | End: 2022-07-28 | Stop reason: HOSPADM

## 2022-07-27 RX ORDER — ENOXAPARIN SODIUM 100 MG/ML
40 INJECTION SUBCUTANEOUS DAILY
Status: DISCONTINUED | OUTPATIENT
Start: 2022-07-27 | End: 2022-07-28 | Stop reason: HOSPADM

## 2022-07-27 RX ORDER — ATORVASTATIN CALCIUM 10 MG/1
10 TABLET, FILM COATED ORAL DAILY
Status: DISCONTINUED | OUTPATIENT
Start: 2022-07-27 | End: 2022-07-27 | Stop reason: SDUPTHER

## 2022-07-27 RX ORDER — SODIUM CHLORIDE 0.9 % (FLUSH) 0.9 %
5-40 SYRINGE (ML) INJECTION PRN
Status: DISCONTINUED | OUTPATIENT
Start: 2022-07-27 | End: 2022-07-28 | Stop reason: HOSPADM

## 2022-07-27 RX ORDER — ONDANSETRON 4 MG/1
4 TABLET, ORALLY DISINTEGRATING ORAL EVERY 8 HOURS PRN
Status: DISCONTINUED | OUTPATIENT
Start: 2022-07-27 | End: 2022-07-28 | Stop reason: HOSPADM

## 2022-07-27 RX ORDER — TOPIRAMATE 25 MG/1
25 TABLET ORAL 2 TIMES DAILY
Status: DISCONTINUED | OUTPATIENT
Start: 2022-07-27 | End: 2022-07-28 | Stop reason: HOSPADM

## 2022-07-27 RX ORDER — BUDESONIDE AND FORMOTEROL FUMARATE DIHYDRATE 160; 4.5 UG/1; UG/1
2 AEROSOL RESPIRATORY (INHALATION) 2 TIMES DAILY
Status: DISCONTINUED | OUTPATIENT
Start: 2022-07-27 | End: 2022-07-28 | Stop reason: HOSPADM

## 2022-07-27 RX ORDER — ACETAMINOPHEN 325 MG/1
650 TABLET ORAL EVERY 6 HOURS PRN
Status: DISCONTINUED | OUTPATIENT
Start: 2022-07-27 | End: 2022-07-28 | Stop reason: HOSPADM

## 2022-07-27 RX ORDER — ROPINIROLE 1 MG/1
2 TABLET, FILM COATED ORAL NIGHTLY
Status: DISCONTINUED | OUTPATIENT
Start: 2022-07-27 | End: 2022-07-28 | Stop reason: HOSPADM

## 2022-07-27 RX ORDER — ACETAMINOPHEN 650 MG/1
650 SUPPOSITORY RECTAL EVERY 6 HOURS PRN
Status: DISCONTINUED | OUTPATIENT
Start: 2022-07-27 | End: 2022-07-28 | Stop reason: HOSPADM

## 2022-07-27 RX ORDER — AMLODIPINE BESYLATE 5 MG/1
5 TABLET ORAL DAILY
Status: DISCONTINUED | OUTPATIENT
Start: 2022-07-27 | End: 2022-07-28 | Stop reason: HOSPADM

## 2022-07-27 RX ORDER — AMITRIPTYLINE HYDROCHLORIDE 75 MG/1
150 TABLET, FILM COATED ORAL NIGHTLY
Status: DISCONTINUED | OUTPATIENT
Start: 2022-07-27 | End: 2022-07-28 | Stop reason: HOSPADM

## 2022-07-27 RX ORDER — METOCLOPRAMIDE 10 MG/1
10 TABLET ORAL
Status: DISCONTINUED | OUTPATIENT
Start: 2022-07-27 | End: 2022-07-28 | Stop reason: HOSPADM

## 2022-07-27 RX ORDER — PANTOPRAZOLE SODIUM 40 MG/1
40 TABLET, DELAYED RELEASE ORAL 2 TIMES DAILY
Status: DISCONTINUED | OUTPATIENT
Start: 2022-07-27 | End: 2022-07-28 | Stop reason: HOSPADM

## 2022-07-27 RX ORDER — SODIUM CHLORIDE 9 MG/ML
INJECTION, SOLUTION INTRAVENOUS PRN
Status: DISCONTINUED | OUTPATIENT
Start: 2022-07-27 | End: 2022-07-28 | Stop reason: HOSPADM

## 2022-07-27 RX ORDER — ONDANSETRON 2 MG/ML
4 INJECTION INTRAMUSCULAR; INTRAVENOUS EVERY 6 HOURS PRN
Status: DISCONTINUED | OUTPATIENT
Start: 2022-07-27 | End: 2022-07-28 | Stop reason: HOSPADM

## 2022-07-27 RX ADMIN — ENOXAPARIN SODIUM 40 MG: 40 INJECTION SUBCUTANEOUS at 08:14

## 2022-07-27 RX ADMIN — TOPIRAMATE 25 MG: 25 TABLET, FILM COATED ORAL at 01:50

## 2022-07-27 RX ADMIN — PANTOPRAZOLE SODIUM 40 MG: 40 TABLET, DELAYED RELEASE ORAL at 08:14

## 2022-07-27 RX ADMIN — METOPROLOL TARTRATE 50 MG: 50 TABLET, FILM COATED ORAL at 01:51

## 2022-07-27 RX ADMIN — METOPROLOL TARTRATE 50 MG: 50 TABLET, FILM COATED ORAL at 19:29

## 2022-07-27 RX ADMIN — ROPINIROLE HYDROCHLORIDE 2 MG: 1 TABLET, FILM COATED ORAL at 21:24

## 2022-07-27 RX ADMIN — ACETAMINOPHEN 650 MG: 325 TABLET ORAL at 14:11

## 2022-07-27 RX ADMIN — PANTOPRAZOLE SODIUM 40 MG: 40 TABLET, DELAYED RELEASE ORAL at 01:51

## 2022-07-27 RX ADMIN — ALBUTEROL SULFATE 2.5 MG: 2.5 SOLUTION RESPIRATORY (INHALATION) at 19:26

## 2022-07-27 RX ADMIN — BUDESONIDE AND FORMOTEROL FUMARATE DIHYDRATE 2 PUFF: 160; 4.5 AEROSOL RESPIRATORY (INHALATION) at 07:01

## 2022-07-27 RX ADMIN — TOPIRAMATE 25 MG: 25 TABLET, FILM COATED ORAL at 21:25

## 2022-07-27 RX ADMIN — PANTOPRAZOLE SODIUM 40 MG: 40 TABLET, DELAYED RELEASE ORAL at 21:25

## 2022-07-27 RX ADMIN — GABAPENTIN 800 MG: 400 CAPSULE ORAL at 01:50

## 2022-07-27 RX ADMIN — AMITRIPTYLINE HYDROCHLORIDE 150 MG: 75 TABLET, FILM COATED ORAL at 02:18

## 2022-07-27 RX ADMIN — ASPIRIN 81 MG: 81 TABLET, CHEWABLE ORAL at 08:14

## 2022-07-27 RX ADMIN — METOPROLOL TARTRATE 50 MG: 50 TABLET, FILM COATED ORAL at 08:14

## 2022-07-27 RX ADMIN — DOXYCYCLINE 100 MG: 100 INJECTION, POWDER, LYOPHILIZED, FOR SOLUTION INTRAVENOUS at 11:19

## 2022-07-27 RX ADMIN — AMLODIPINE BESYLATE 5 MG: 5 TABLET ORAL at 08:14

## 2022-07-27 RX ADMIN — BUDESONIDE AND FORMOTEROL FUMARATE DIHYDRATE 2 PUFF: 160; 4.5 AEROSOL RESPIRATORY (INHALATION) at 19:31

## 2022-07-27 RX ADMIN — CLONAZEPAM 0.5 MG: 1 TABLET ORAL at 21:25

## 2022-07-27 RX ADMIN — LOSARTAN POTASSIUM 100 MG: 100 TABLET, FILM COATED ORAL at 10:08

## 2022-07-27 RX ADMIN — ROPINIROLE HYDROCHLORIDE 2 MG: 1 TABLET, FILM COATED ORAL at 01:50

## 2022-07-27 RX ADMIN — DOXYCYCLINE 100 MG: 100 INJECTION, POWDER, LYOPHILIZED, FOR SOLUTION INTRAVENOUS at 00:21

## 2022-07-27 RX ADMIN — DOXYCYCLINE 100 MG: 100 INJECTION, POWDER, LYOPHILIZED, FOR SOLUTION INTRAVENOUS at 22:00

## 2022-07-27 RX ADMIN — METOCLOPRAMIDE 10 MG: 10 TABLET ORAL at 06:05

## 2022-07-27 RX ADMIN — METOCLOPRAMIDE 10 MG: 10 TABLET ORAL at 14:11

## 2022-07-27 RX ADMIN — DULOXETINE 60 MG: 60 CAPSULE, DELAYED RELEASE ORAL at 21:25

## 2022-07-27 RX ADMIN — TIOTROPIUM BROMIDE INHALATION SPRAY 2 PUFF: 3.12 SPRAY, METERED RESPIRATORY (INHALATION) at 07:02

## 2022-07-27 RX ADMIN — ACETAMINOPHEN 650 MG: 325 TABLET ORAL at 02:17

## 2022-07-27 RX ADMIN — CLOPIDOGREL BISULFATE 75 MG: 75 TABLET ORAL at 08:14

## 2022-07-27 RX ADMIN — ATORVASTATIN CALCIUM 20 MG: 20 TABLET, FILM COATED ORAL at 08:14

## 2022-07-27 RX ADMIN — AMITRIPTYLINE HYDROCHLORIDE 150 MG: 75 TABLET, FILM COATED ORAL at 21:26

## 2022-07-27 RX ADMIN — DULOXETINE 60 MG: 60 CAPSULE, DELAYED RELEASE ORAL at 01:51

## 2022-07-27 RX ADMIN — SODIUM CHLORIDE: 9 INJECTION, SOLUTION INTRAVENOUS at 02:02

## 2022-07-27 RX ADMIN — TOPIRAMATE 25 MG: 25 TABLET, FILM COATED ORAL at 08:14

## 2022-07-27 ASSESSMENT — PAIN SCALES - GENERAL
PAINLEVEL_OUTOF10: 8
PAINLEVEL_OUTOF10: 8
PAINLEVEL_OUTOF10: 5
PAINLEVEL_OUTOF10: 6

## 2022-07-27 ASSESSMENT — PAIN DESCRIPTION - PAIN TYPE: TYPE: ACUTE PAIN

## 2022-07-27 ASSESSMENT — PAIN DESCRIPTION - LOCATION
LOCATION: SHOULDER
LOCATION: ARM
LOCATION: ARM
LOCATION: HEAD

## 2022-07-27 ASSESSMENT — PAIN DESCRIPTION - DESCRIPTORS: DESCRIPTORS: ACHING

## 2022-07-27 ASSESSMENT — PAIN DESCRIPTION - ORIENTATION
ORIENTATION: RIGHT
ORIENTATION: RIGHT

## 2022-07-27 NOTE — PROGRESS NOTES
Nurse updated Niya NP, patient having mental status changes. Patient lethargic, will wake up for short amount of time to answer questions. Patient also not always following directions. Patient woke up walked in the bathroom with out a gown, oxygen and had her iv tubing pulling. Nurse expressed concerns of changes. Orders in to hold gabapentin and get abgs.

## 2022-07-27 NOTE — PROGRESS NOTES
Patient admit to room 2106. Telemetry applied. Patient oriented to room, educated on how to use call light. Call light in reach. Patient ambulatory a&ox4.

## 2022-07-27 NOTE — PLAN OF CARE
Problem: Discharge Planning  Goal: Discharge to home or other facility with appropriate resources  Outcome: Progressing Towards Goal     Problem: Pain  Goal: Verbalizes/displays adequate comfort level or baseline comfort level  Outcome: Progressing Towards Goal     Problem: Safety - Adult  Goal: Free from fall injury  Outcome: Progressing Towards Goal  Note: Bed alarm on      Problem: ABCDS Injury Assessment  Goal: Absence of physical injury  Outcome: Progressing Towards Goal

## 2022-07-27 NOTE — PROGRESS NOTES
Dr. Agnieszka Rod notified of ABG result: PH:7.347 PCO2:42.7 PO2: 65.5 and patients AMS during night shift. Per Dr. Agnieszka Rod put patient on Bpap and monitor. Dr. Agnieszka Rod notified patientis A&Ox4. Respiratory notified and patient placed on Bpap.

## 2022-07-27 NOTE — PROGRESS NOTES
Patient wanted BPAP taken off and not to be put back on at this time. Susan POON Rounding with Dr. Alexia Duval notified.  No new orders

## 2022-07-27 NOTE — PROGRESS NOTES
Per Dr. Laure Santiago Education Team insulin pump settings should be:    12 am  Basal = 1.2 U/hr  Correction Factor = 60  Carb Ratio = 15  Target blood glucose = 120 mg/dL    4 pm  Basal = 1.5 U/hr  Correction Factor = 60   Carb Ratio = 13  Target blood glucose = 120 mg/dL    Basal insulin is Novolog 100 U and patient is responsible for giving bolus doses before meals. After looking at patients pump, her settings are:  Basal = 3.13 U/hr  Correction factor = 50 mg/dL  Target blood glucose = 110 mg/dL    It is difficult to determine exact times for her pump, but her glucose has been controlled with the current settings.

## 2022-07-27 NOTE — ED PROVIDER NOTES
[address]  eMERGENCY dEPARTMENT eNCOUnter   Attending Attestation     Pt Name: Clayton Gibson  MRN: [id number]  [place] 1960  Date of evaluation: 7/26/22       Clayton Gibson is a 63 y.o. female who presents with Shortness of Breath, Chills, and Fever      History:   Patient is here because she was having low oxygen levels at the urgent care. Patient went there because she has been having a lot of sinus congestion. Patient is a smoker has COPD and also has some lung issues where she has had scarring from major pneumonia. Patient does not normally take oxygen. Patient has had some swelling in the legs. Exam: Vitals:   Vitals:    07/26/22 1849 07/26/22 1900   BP: (!) 167/64 (!) 172/68   Pulse: 93    Resp: 18    Temp: 100 °F (37.8 °C)    SpO2: (!) 85%      Heart regular rate rhythm no murmurs. Lungs clear to auscultation bilaterally. I performed a history and physical examination of the patient and discussed management with the resident. I reviewed the residents note and agree with the documented findings and plan of care. Any areas of disagreement are noted on the chart. I was personally present for the key portions of any procedures. I have documented in the chart those procedures where I was not present during the key portions. I have personally reviewed all images and agree with the resident's interpretation. I have reviewed the emergency nurses triage note. I agree with the chief complaint, past medical history, past surgical history, allergies, medications, social and family history as documented unless otherwise noted below. Documentation of the HPI, Physical Exam and Medical Decision Making performed by medical students or scribes is based on my personal performance of the HPI, PE and MDM. I personally evaluated and examined the patient in conjunction with the APC and agree with the assessment, treatment plan, and disposition of the patient as recorded by the APC. Additional findings are as noted.     Tesha Quispe MD  Attending Emergency  Physician             Tegan Mcneal MD  07/26/22 7488

## 2022-07-27 NOTE — CARE COORDINATION
CASE MANAGEMENT NOTE:    Admission Date:  7/26/2022 Horton Cheadle is a 63 y.o.  female    Admitted for : Pneumonia [J18.9]  Pneumonia of right lower lobe due to infectious organism [J18.9]    Met with:  Patient    PCP:  has a new dr                                  Insurance:  [employer] Medicare      Is patient alert and oriented at time of discussion:  Yes    Current Residence/ Living Arrangements:  independently at home             Current Services PTA:  No    Does patient go to outpatient dialysis: No  If yes, location and chair time:     Is patient agreeable to VNS: No    Freedom of choice provided:  No    List of [address] provided: No    VNS chosen:  No    DME:  other insulin pump    Home Oxygen: No    Nebulizer: No    CPAP/BIPAP: No    Supplier: N/A    Potential Assistance Needed: No    SNF needed: No    Freedom of choice and list provided: NA    Pharmacy:  [facility]       Is patient currently receiving oral anticoagulation therapy? No    Is the Patient an [facility] with Readmission Risk Score greater than 14%? No  If yes, pt needs a follow up appointment made within 7 days. Family Members/Caregivers that pt would like involved in their care:    Yes    If yes, list name here:  Len Brian     Transportation Provider:  Family             Discharge Plan:  7/27/22 [employer] Pt is from home in a one story home with her family DME Pt has a insulin Pump pt has all diabetic supplies  VNS denies Plan is to discharge to home with no needs will continue to follow for needs IMM done 7/27/22 @ 1400 .//tv                 Electronically signed by:  Reinier Truong RN on 7/27/2022 at 2:17 PM

## 2022-07-27 NOTE — FLOWSHEET NOTE
07/27/22 1859   Encounter Summary   Encounter Overview/Reason  Attempted Encounter   Service Provided For: Patient not available  (with PCT)

## 2022-07-27 NOTE — PROGRESS NOTES
Dr Evaristo Lama ok with continuing her insulin pump at this time. Nursing to continue to monitor and call if BS>250.  Electronically signed by Marylee Dark, RN on 7/27/2022 at 12:37 PM

## 2022-07-27 NOTE — H&P
[facility]  Melchor Main MD; Fred Stoddard MD; Jose Carlos Butler MD; MD Karen Pozo MD; MD JODI Gonzalez [facility] Internal Medicine   [facility]    HISTORY AND PHYSICAL EXAMINATION            Date:   7/27/2022  Patient name:  Jovita Comer  Date of admission:  7/26/2022  6:56 PM  MRN:   [id number]  Account:  [de-identified]  YOB: 1960  PCP:    No primary care provider on file. Room:   [address] [place] [address]  Code Status:    Full Code    Chief Complaint:     Chief Complaint   Patient presents with    Shortness of Breath    Chills    Fever       History Obtained From:     patient, Quality of history:  good historian    History of Present Illness:     Jovita Comer is a 63 y.o. Non- / non  female who presents with Shortness of Breath, Chills, and Fever   and is admitted to the hospital for the management of Pneumonia. She has a history COPD. According to patient she was seen at Urgent care yesterday for nasal drainage and pressure, was diagnosed with sinusitis and given amoxicillin prescription. She reports that since leaving urgent care yesterday she has developed sudden increase in SOB and chills. She reports increased cough but denies increased sputum production. She follows with Dr. Lasha Quintanilla for outpatient Pulmonology but states that it has been more than 6 months since she has been seen. COVID test in ED negative. CXR reveals Right upper lobe and left basilar opacities representing multifocal   Pneumonia. D-dimer elevated CT for PE negative. Denies calf pain, no history of clots. She denies CP, headache, changes in vision, N/V, constipation, diarrhea, abdominal pain or increased weakness.          Past Medical History:     Past Medical History:   Diagnosis Date    Anesthesia complication     \"lung collapsed after colon surgery    Anxiety     Arthritis     Back pain     CAD (coronary artery disease)     no stents    Caffeine use     3 coffee / day    Cataract     left    COPD (chronic obstructive pulmonary disease) (HCC)     EMPHYSEMA    Deafness     right ear    Depression     Diabetes mellitus (HCC)     Diarrhea     Diverticulosis     Fibromyalgia     Gastroparalysis     GERD (gastroesophageal reflux disease)     Hearing loss     DEAF IN RIGHT EAR    Hyperlipidemia     Hyperlipidemia     Hypertension     Incontinence     MDRO (multiple drug resistant organisms) resistance 2012    mrsa (nasal), eye, ear    Neurogenic bladder     CATHES HERSELF 7 TIMES A DAY, IS ABLE TO URINATE ON OWN    Neuropathy     feet    Obesity     Osteoarthritis     Peripheral vascular disease, unspecified ([address]     Restless leg syndrome     Rhabdomyolysis     [place] 1 week, May 2014, then [facility] for rehab.     Spinal stenosis, thoracic 05/27/2014    Stroke [facility]) 2012    numbness on the left side of face, Oct 2020 TIA     Thyroid disease     enlarged    TMJ (dislocation of temporomandibular joint)     Trigeminal neuralgia     Type II or unspecified type diabetes mellitus without mention of complication, not stated as uncontrolled         Past Surgical History:     Past Surgical History:   Procedure Laterality Date    ABDOMEN SURGERY      LAPAROSCOPY    CARPAL TUNNEL RELEASE Right     CATARACT REMOVAL      CHOLECYSTECTOMY      CHOLECYSTECTOMY, OPEN      11/2012    COLON SURGERY      benign mass removed    COLONOSCOPY      COLONOSCOPY  07/13/2016    COLONOSCOPY  06/01/2020    incomplete/poor prep    COLONOSCOPY  07/07/2020    COLONOSCOPY N/A 7/7/2020    COLORECTAL CANCER SCREENING, NOT HIGH RISK performed by Xander Means MD at [address]      sebaceous cyst, under arm left side x5    CYST REMOVAL      right ankle    CYSTOSCOPY      EYE SURGERY Right     HOLE IN RETINA REPAIRED    FINGER TRIGGER RELEASE Right     INCISIONAL HERNIA REPAIR  07/07/15    open    [address]  10/17/2017 LEG BIOPSY EXCISION Left 7/6/2021    EXCISION OF LEFT THIGH MASS performed by Lloyd Simpson MD at [facility] Left     ear tube placement    POLYPECTOMY      colon polyp    MD REPAIR INCISIONAL HERNIA,REDUCIBLE N/A 10/17/2017    HERNIA INCISIONAL REPAIR WITH MESH performed by Lloyd Simpson MD at [address] N/A 6/1/2020    SIGMOIDOSCOPY DIAGNOSTIC FLEXIBLE performed by Lloyd Simpson MD at [address]  07/13/2016    UPPER GASTROINTESTINAL ENDOSCOPY  07/23/2018    with biopsy    UPPER GASTROINTESTINAL ENDOSCOPY N/A 7/23/2018    [facility]  06/01/2020    with biopsy    UPPER GASTROINTESTINAL ENDOSCOPY N/A 6/1/2020    EGD BIOPSY performed by Lloyd Simpson MD at [address] [place] [address]        Medications Prior to Admission:     Prior to Admission medications    Medication Sig Start Date End Date Taking? Authorizing Provider   amoxicillin-clavulanate (AUGMENTIN) 875-125 MG per tablet Take 1 tablet by mouth in the morning and 1 tablet before bedtime. Do all this for 10 days. 7/25/22 8/4/22 Yes KIMBERLEE Avitia NP   gabapentin (NEURONTIN) 800 MG tablet Take 1 tablet by mouth 2 times daily for 180 days.  6/21/22 12/18/22 Yes Jesus Alves MD   clopidogrel (PLAVIX) 75 MG tablet Take 1 tablet by mouth daily 6/21/22  Yes Jesus Alves MD   metoclopramide (REGLAN) 10 MG tablet Take 1 tablet by mouth 2 times daily 6/21/22  Yes Jesus Alves MD   simvastatin (ZOCOR) 20 MG tablet TAKE 1 TABLET BY MOUTH NIGHTLY 6/2/22  Yes Historical Provider, MD   topiramate (TOPAMAX) 25 MG tablet Take 1 tablet by mouth 2 times daily 5/18/22  Yes Stella Hay MD   atorvastatin (LIPITOR) 20 MG tablet Take 1 tablet by mouth daily 5/5/22  Yes Jesus Alves MD   DULoxetine (CYMBALTA) 60 MG extended release capsule Take 60 mg by mouth at bedtime 4/14/22  Yes Historical Provider, MD   amLODIPine (NORVASC) 5 MG tablet Take 1 tablet by mouth once daily 1/8/21  Yes Benigno Cueva MD   aspirin 81 MG tablet Take 1 tablet by mouth daily 10/17/20  Yes Jocelyn Baeza MD   metoprolol (LOPRESSOR) 100 MG tablet Take 50 mg by mouth in the morning and 50 mg before bedtime. Yes Historical Provider, MD   insulin aspart (NOVOLOG PENFILL) 100 UNIT/ML injection cartridge Inject into the skin continuous Per insulin pump   Yes Historical Provider, MD   amitriptyline (ELAVIL) 150 MG tablet TAKE 1 TABLET BY MOUTH NIGHTLY 2/21/20  Yes Benigno Cueva MD   pantoprazole (PROTONIX) 40 MG tablet Take 40 mg by mouth 2 times daily   Yes Historical Provider, MD   losartan (COZAAR) 100 MG tablet TAKE 1 TABLET BY MOUTH ONCE DAILY 5/14/19  Yes Benigno Cueva MD   rOPINIRole (REQUIP) 2 MG tablet Take 1 tablet by mouth daily  Patient taking differently: Take 2 mg by mouth nightly 4/16/19  Yes Benigno Cueva MD   BREO ELLIPTA 200-25 MCG/INH AEPB Inhale 1 puff into the lungs daily  10/7/17  Yes Historical Provider, MD   tiotropium (Hannah Pen) 18 MCG inhalation capsule Inhale 1 capsule into the lungs Daily 6/19/17  Yes Randy Jamison MD   ONE TOUCH ULTRA TEST strip  6/7/17  Yes Historical Provider, MD   clonazePAM (KLONOPIN) 0.5 MG tablet Take 1 tablet by mouth nightly as needed for Anxiety for up to 30 days.  6/7/22 7/7/22  Ama Fry MD   diclofenac sodium (VOLTAREN) 1 % GEL APPLY 2 GRAMS TOPICALLY 4 TIMES DAILY 1/24/22   Historical Provider, MD   vitamin B-12 (CYANOCOBALAMIN) 1000 MCG tablet Take 1,000 mcg by mouth daily    Historical Provider, MD   Albuterol Sulfate (PROAIR HFA IN) Inhale into the lungs    Historical Provider, MD        Allergies:     Fioricet [butalbital-apap-caffeine], Betamethasone, Butalbital-apap-caff-cod, Erythromycin, Xarelto [rivaroxaban], Codeine, Droperidol, and Tape Aure Mandan tape]    Social History:     Tobacco:    reports that she has never smoked. She has never used smokeless tobacco.  Alcohol:      reports no history of alcohol use. Drug Use:  reports current drug use. Frequency: 4.00 times per week. Drug: Marijuana Berneta Fabricio). Family History:     Family History   Problem Relation Age of Onset    High Blood Pressure Mother     Migraines Mother     High Blood Pressure Father     Heart Disease Father     Cancer Father         skin    Diabetes Maternal Grandmother     Heart Disease Maternal Grandmother     Cancer Maternal Grandmother     Parkinsonism Maternal Grandmother     Cancer Paternal Grandmother     Other Brother         epilepsy       Review of Systems:     Positive and Negative as described in HPI. CONSTITUTIONAL:  negative for sweats, weight loss. +Chills, fatigue, +decreased appetite  HEENT:  negative for vision, hearing changes, throat pain.  +Runny nose  RESPIRATORY:  negative cough, congestion, wheezing. +Cough, shortness of breath,   CARDIOVASCULAR:  negative for chest pain, palpitations  GASTROINTESTINAL:  negative for nausea, vomiting, diarrhea, constipation, change in bowel habits, abdominal pain   GENITOURINARY:  negative for difficulty of urination, burning with urination, frequency   INTEGUMENT:  negative for rash, skin lesions, easy bruising   HEMATOLOGIC/LYMPHATIC:  negative for swelling/edema   ALLERGIC/IMMUNOLOGIC:  negative for urticaria , itching  ENDOCRINE:  negative increase in drinking, increase in urination, hot or cold intolerance  MUSCULOSKELETAL:  negative joint pains, muscle aches, swelling of joints  NEUROLOGICAL:  negative for headaches, dizziness, lightheadedness, numbness, pain, tingling extremities  BEHAVIOR/PSYCH:  negative for depression, anxiety    Physical Exam:   BP (!) 177/79   Pulse 79   Temp 99.1 °F (37.3 °C) (Oral)   Resp 18   Wt 197 lb 5 oz (89.5 kg)   SpO2 91%   BMI 32.83 kg/m²   Temp (24hrs), Av.1 °F (37.3 °C), Min:98.3 °F (36.8 °C), Max:100 °F (37.8 °C)    Recent Labs     22  0583 POCGLU 175*     No intake or output data in the 24 hours ending 07/27/22 0558    General Appearance: alert, well appearing, and in no acute distress  Mental status: oriented to person, place, and time  Head: normocephalic, atraumatic  Eye: no icterus, redness, pupils equal and reactive, extraocular eye movements intact, conjunctiva clear  Ear: normal external ear, no discharge, hearing intact  Nose: no drainage noted  Mouth: mucous membranes moist  Neck: supple, no carotid bruits, thyroid not palpable  Lungs: Bilateral equal air entry, clear to ausculation, no wheezing, rales or rhonchi, normal effort. +Chronic cough, GOODMAN  Cardiovascular: normal rate, regular rhythm, no murmur, gallop, rub  Abdomen: Soft, nontender, nondistended, normal bowel sounds, no hepatomegaly or splenomegaly  Neurologic: There are no new focal motor or sensory deficits, normal muscle tone and bulk, no abnormal sensation, normal speech, cranial nerves II through XII grossly intact  Skin: No gross lesions, rashes, bruising or bleeding on exposed skin area  Extremities: peripheral pulses palpable, no pedal edema or calf pain with palpation  Psych: normal affect    Investigations:      Laboratory Testing:  Recent Results (from the past 24 hour(s))   COVID-19 & Influenza Combo    Collection Time: 07/26/22  6:55 PM    Specimen: Nasopharyngeal Swab   Result Value Ref Range    Specimen Description . NASOPHARYNGEAL SWAB     Source . NASOPHARYNGEAL SWAB     SARS-CoV-2 RNA, RT PCR Not Detected Not Detected    INFLUENZA A Not Detected Not Detected    INFLUENZA B Not Detected Not Detected   CBC with Auto Differential    Collection Time: 07/26/22  8:03 PM   Result Value Ref Range    WBC 5.2 3.5 - 11.0 k/uL    RBC 4.01 4.0 - 5.2 m/uL    Hemoglobin 11.9 (L) 12.0 - 16.0 g/dL    Hematocrit 36.2 36 - 46 %    MCV 90.4 80 - 100 fL    MCH 29.7 26 - 34 pg    MCHC 32.9 31 - 37 g/dL    RDW 14.1 11.5 - 14.9 %    Platelets 89 (L) 874 - 450 k/uL    MPV 7.9 6.0 - 12.0 fL Seg Neutrophils 75 (H) 36 - 66 %    Lymphocytes 8 (L) 24 - 44 %    Monocytes 11 (H) 1 - 7 %    Eosinophils % 2 0 - 4 %    Basophils 0 0 - 2 %    Bands 3 0 - 10 %    Myelocytes 1 (H) 0 %    Segs Absolute 3.90 1.3 - 9.1 k/uL    Absolute Lymph # 0.42 (L) 1.0 - 4.8 k/uL    Absolute Mono # 0.57 0.1 - 1.3 k/uL    Absolute Eos # 0.10 0.0 - 0.4 k/uL    Basophils Absolute 0.00 0.0 - 0.2 k/uL    Absolute Bands # 0.16 0.0 - 1.0 k/uL    Myelocytes Absolute 0.05 (H) 0 k/uL    Morphology Normal    Basic Metabolic Panel    Collection Time: 07/26/22  8:03 PM   Result Value Ref Range    Glucose 161 (H) 70 - 99 mg/dL    BUN 13 6 - 20 mg/dL    Creatinine 0.92 (H) 0.50 - 0.90 mg/dL    Calcium 8.7 8.6 - 10.4 mg/dL    Sodium 136 135 - 144 mmol/L    Potassium 4.4 3.7 - 5.3 mmol/L    Chloride 104 98 - 107 mmol/L    CO2 20 20 - 31 mmol/L    Anion Gap 12 9 - 17 mmol/L    GFR Non-African American >60 >60 mL/min    GFR African American >60 >60 mL/min    GFR Comment         D-Dimer, Quantitative    Collection Time: 07/26/22  8:03 PM   Result Value Ref Range    D-Dimer, Quant 1.18 (H) 0.00 - 0.59 mg/L FEU   POC Glucose Fingerstick    Collection Time: 07/27/22  5:31 AM   Result Value Ref Range    POC Glucose 175 (H) 65 - 105 mg/dL       Imaging/Diagnostics:  XR CHEST PORTABLE    Result Date: 7/26/2022  Right upper lobe and left basilar opacities could represent multifocal pneumonia     CT CHEST PULMONARY EMBOLISM W CONTRAST    Result Date: 7/26/2022  No evidence of pulmonary embolism or acute pulmonary abnormality. Chronic appearing changes in the right lung and to a much lesser degree at the left base. Old healed lateral right rib fractures. Status post cholecystectomy. Incidental note of thyroid nodules measuring 1 cm less for which no imaging follow-up is recommended. This was not mentioned above.        Assessment :      Hospital Problems             Last Modified POA    * (Principal) Pneumonia 7/27/2022 Yes    Essential hypertension 7/27/2022 Yes    Chronic obstructive pulmonary disease ([facility] [address] 7/27/2022 Yes    Insulin pump in place 7/27/2022 Yes       Plan:     Patient status inpatient in the Progressive Unit/Step down    Pneumonia   -IV antibiotic initiated in ED  -continue on Doxycycline and Ceftriaxone on admission  -Recheck CXR in am   -Resume home COPD medications  -may need O2 eval prior to discharge  -Pneumovax before discharge if applicable     2. HTN  -Continue home BP meds  -Monitor VS     3. Diabetes Mellitus  -Has insulin pump, patient unable to provide insulin pump settings  -Call Dr. Harvey Donovan office in AM to confirm Insulin pump settings/dose  -POCT ac and hs with insulin pump coverage  -Check A1C        Consultations:   IP CONSULT TO INTERNAL MEDICINE  IP CONSULT TO DIABETES EDUCATOR    Patient is admitted as inpatient status because of co-morbidities listed above, severity of signs and symptoms as outlined, requirement for current medical therapies and most importantly because of direct risk to patient if care not provided in a hospital setting. Expected length of stay > 48 hours. KIMBERLEE Stokes NP  7/27/2022  5:58 AM      Copy sent to Dr. Deleon primary care provider on file. Please note that this chart was generated using voice recognition Dragon dictation software. Although every effort was made to ensure the accuracy of this automated transcription, some errors in transcription may have occurred. Attending Physician Statement  I have discussed the care of Jessi Sanchez and I have examined the patient myselft and taken ros and hpi , including pertinent history and exam findings,  with the NP.  I have reviewed the key elements of all parts of the encounter with the NP  I agree with the assessment, plan and orders as documented by the NP  53-year-old female with underlying history of hypertension, hyperlipidemia, diabetes, obesity, admitted with acute respiratory failure secondary to pneumonia,  Diabetes mellitus uncontrolled, on insulin pump, continue to monitor sugars, sugars before meals and at bedtime, A1c,  Essential hypertension, monitor,  Obesity, possible obstructive sleep apnea,  Metabolic syndrome,  Above multiple comorbid conditions will contribute to the poor outcome    Electronically signed by Homar Jenkins MD

## 2022-07-27 NOTE — FLOWSHEET NOTE
07/27/22 1251   Encounter Summary   Encounter Overview/Reason  Volunteer Encounter   Service Provided For: Patient   Referral/Consult From: Ryan   Last Encounter  07/27/22  (V)   Complexity of Encounter Low   Spiritual/Emotional needs   Type Spiritual Support   Rituals, Rites and Sacraments   Type Anabaptist Communion   Assessment/Intervention/Outcome   Intervention Prayer (assurance of)/Conrad

## 2022-07-28 VITALS
BODY MASS INDEX: 32.83 KG/M2 | WEIGHT: 197.31 LBS | DIASTOLIC BLOOD PRESSURE: 77 MMHG | HEART RATE: 65 BPM | SYSTOLIC BLOOD PRESSURE: 151 MMHG | OXYGEN SATURATION: 95 % | RESPIRATION RATE: 18 BRPM | TEMPERATURE: 98.6 F

## 2022-07-28 LAB
ABSOLUTE EOS #: 0.2 K/UL (ref 0–0.4)
ABSOLUTE LYMPH #: 1.7 K/UL (ref 1–4.8)
ABSOLUTE MONO #: 0.6 K/UL (ref 0.1–1.3)
ALBUMIN SERPL-MCNC: 3.5 G/DL (ref 3.5–5.2)
ALP BLD-CCNC: 104 U/L (ref 35–104)
ALT SERPL-CCNC: 6 U/L (ref 5–33)
ANION GAP SERPL CALCULATED.3IONS-SCNC: 12 MMOL/L (ref 9–17)
AST SERPL-CCNC: 15 U/L
BASOPHILS # BLD: 1 % (ref 0–2)
BASOPHILS ABSOLUTE: 0.1 K/UL (ref 0–0.2)
BILIRUB SERPL-MCNC: 0.19 MG/DL (ref 0.3–1.2)
BILIRUBIN DIRECT: <0.08 MG/DL
BILIRUBIN, INDIRECT: ABNORMAL MG/DL (ref 0–1)
BUN BLDV-MCNC: 11 MG/DL (ref 6–20)
CALCIUM SERPL-MCNC: 8.8 MG/DL (ref 8.6–10.4)
CHLORIDE BLD-SCNC: 108 MMOL/L (ref 98–107)
CO2: 20 MMOL/L (ref 20–31)
CREAT SERPL-MCNC: 0.74 MG/DL (ref 0.5–0.9)
EOSINOPHILS RELATIVE PERCENT: 4 % (ref 0–4)
GFR AFRICAN AMERICAN: >60 ML/MIN
GFR NON-AFRICAN AMERICAN: >60 ML/MIN
GFR SERPL CREATININE-BSD FRML MDRD: ABNORMAL ML/MIN/{1.73_M2}
GLUCOSE BLD-MCNC: 116 MG/DL (ref 65–105)
GLUCOSE BLD-MCNC: 158 MG/DL (ref 70–99)
GLUCOSE BLD-MCNC: 174 MG/DL (ref 65–105)
GLUCOSE BLD-MCNC: 197 MG/DL (ref 65–105)
GLUCOSE BLD-MCNC: 364 MG/DL (ref 65–105)
GLUCOSE BLD-MCNC: 60 MG/DL (ref 65–105)
HCT VFR BLD CALC: 38.3 % (ref 36–46)
HEMOGLOBIN: 12.6 G/DL (ref 12–16)
LACTIC ACID: 0.9 MMOL/L (ref 0.5–2.2)
LYMPHOCYTES # BLD: 29 % (ref 24–44)
MCH RBC QN AUTO: 29.8 PG (ref 26–34)
MCHC RBC AUTO-ENTMCNC: 32.8 G/DL (ref 31–37)
MCV RBC AUTO: 90.6 FL (ref 80–100)
MONOCYTES # BLD: 11 % (ref 1–7)
PDW BLD-RTO: 14.5 % (ref 11.5–14.9)
PLATELET # BLD: 212 K/UL (ref 150–450)
PMV BLD AUTO: 7.7 FL (ref 6–12)
POTASSIUM SERPL-SCNC: 3.9 MMOL/L (ref 3.7–5.3)
PROCALCITONIN: 0.09 NG/ML
RBC # BLD: 4.22 M/UL (ref 4–5.2)
SEG NEUTROPHILS: 55 % (ref 36–66)
SEGMENTED NEUTROPHILS ABSOLUTE COUNT: 3.2 K/UL (ref 1.3–9.1)
SODIUM BLD-SCNC: 140 MMOL/L (ref 135–144)
TOTAL PROTEIN: 6.3 G/DL (ref 6.4–8.3)
WBC # BLD: 5.8 K/UL (ref 3.5–11)

## 2022-07-28 PROCEDURE — 2700000000 HC OXYGEN THERAPY PER DAY

## 2022-07-28 PROCEDURE — 2500000003 HC RX 250 WO HCPCS: Performed by: STUDENT IN AN ORGANIZED HEALTH CARE EDUCATION/TRAINING PROGRAM

## 2022-07-28 PROCEDURE — 94660 CPAP INITIATION&MGMT: CPT

## 2022-07-28 PROCEDURE — 6360000002 HC RX W HCPCS: Performed by: NURSE PRACTITIONER

## 2022-07-28 PROCEDURE — 80048 BASIC METABOLIC PNL TOTAL CA: CPT

## 2022-07-28 PROCEDURE — 94761 N-INVAS EAR/PLS OXIMETRY MLT: CPT

## 2022-07-28 PROCEDURE — 2580000003 HC RX 258: Performed by: NURSE PRACTITIONER

## 2022-07-28 PROCEDURE — 94640 AIRWAY INHALATION TREATMENT: CPT

## 2022-07-28 PROCEDURE — 6370000000 HC RX 637 (ALT 250 FOR IP): Performed by: NURSE PRACTITIONER

## 2022-07-28 PROCEDURE — 83605 ASSAY OF LACTIC ACID: CPT

## 2022-07-28 PROCEDURE — 84145 PROCALCITONIN (PCT): CPT

## 2022-07-28 PROCEDURE — 82947 ASSAY GLUCOSE BLOOD QUANT: CPT

## 2022-07-28 PROCEDURE — 36415 COLL VENOUS BLD VENIPUNCTURE: CPT

## 2022-07-28 PROCEDURE — 85025 COMPLETE CBC W/AUTO DIFF WBC: CPT

## 2022-07-28 PROCEDURE — 80076 HEPATIC FUNCTION PANEL: CPT

## 2022-07-28 PROCEDURE — 2580000003 HC RX 258: Performed by: STUDENT IN AN ORGANIZED HEALTH CARE EDUCATION/TRAINING PROGRAM

## 2022-07-28 RX ORDER — AMOXICILLIN AND CLAVULANATE POTASSIUM 875; 125 MG/1; MG/1
1 TABLET, FILM COATED ORAL 2 TIMES DAILY
Qty: 20 TABLET | Refills: 0 | Status: SHIPPED | OUTPATIENT
Start: 2022-07-28 | End: 2022-08-04 | Stop reason: ALTCHOICE

## 2022-07-28 RX ADMIN — ATORVASTATIN CALCIUM 20 MG: 20 TABLET, FILM COATED ORAL at 07:32

## 2022-07-28 RX ADMIN — TIOTROPIUM BROMIDE INHALATION SPRAY 2 PUFF: 3.12 SPRAY, METERED RESPIRATORY (INHALATION) at 07:30

## 2022-07-28 RX ADMIN — CEFTRIAXONE SODIUM 1000 MG: 1 INJECTION, POWDER, FOR SOLUTION INTRAMUSCULAR; INTRAVENOUS at 02:25

## 2022-07-28 RX ADMIN — TOPIRAMATE 25 MG: 25 TABLET, FILM COATED ORAL at 07:32

## 2022-07-28 RX ADMIN — SODIUM CHLORIDE: 9 INJECTION, SOLUTION INTRAVENOUS at 06:50

## 2022-07-28 RX ADMIN — PANTOPRAZOLE SODIUM 40 MG: 40 TABLET, DELAYED RELEASE ORAL at 07:32

## 2022-07-28 RX ADMIN — ACETAMINOPHEN 650 MG: 325 TABLET ORAL at 11:53

## 2022-07-28 RX ADMIN — ENOXAPARIN SODIUM 40 MG: 40 INJECTION SUBCUTANEOUS at 07:32

## 2022-07-28 RX ADMIN — LOSARTAN POTASSIUM 100 MG: 100 TABLET, FILM COATED ORAL at 07:33

## 2022-07-28 RX ADMIN — CLOPIDOGREL BISULFATE 75 MG: 75 TABLET ORAL at 07:32

## 2022-07-28 RX ADMIN — BUDESONIDE AND FORMOTEROL FUMARATE DIHYDRATE 2 PUFF: 160; 4.5 AEROSOL RESPIRATORY (INHALATION) at 07:35

## 2022-07-28 RX ADMIN — ASPIRIN 81 MG: 81 TABLET, CHEWABLE ORAL at 07:32

## 2022-07-28 RX ADMIN — METOCLOPRAMIDE 10 MG: 10 TABLET ORAL at 06:53

## 2022-07-28 RX ADMIN — DOXYCYCLINE 100 MG: 100 INJECTION, POWDER, LYOPHILIZED, FOR SOLUTION INTRAVENOUS at 11:27

## 2022-07-28 RX ADMIN — AMLODIPINE BESYLATE 5 MG: 5 TABLET ORAL at 07:32

## 2022-07-28 RX ADMIN — METOPROLOL TARTRATE 50 MG: 50 TABLET, FILM COATED ORAL at 07:32

## 2022-07-28 ASSESSMENT — PAIN SCALES - GENERAL: PAINLEVEL_OUTOF10: 6

## 2022-07-28 NOTE — PROGRESS NOTES
Dr. Carl Martines notified patients blood sugar is 364. Dr. Carl Martines to come and assess patient. No new orders.

## 2022-07-28 NOTE — PROGRESS NOTES
Dr. Serena Landaverde notified that patients BS dropped to 56 on her pump before discharge. Our glucose check was 60. Patient was given OJ and gramham crackers w/ PB. After this BS went to 116. Per Dr. Serena Landaverde patient can be discharged and follow up with endocrinologist. Patient notified to follow up with Dr. Aisha Patel. Patient verbalized understanding.

## 2022-07-28 NOTE — PROGRESS NOTES
Home Oxygen Evaluation    Home Oxygen Evaluation completed. Patient is on 2 liters per minute via NC. Resting SpO2 = 97%  Resting SpO2 on room air = 94%    SpO2 on room air with exercise = 92%  SpO2 on oxygen as above with exercise = 90%    Pt does not qualify for home O2. Pt states shed like it at home for emergencies when she feel SOB. Nocturnal Oximetry with patient on room air is recommended is SpO2 is between 89% and 95% (requires additional order).     Lindsey Munoz RCP  10:02 AM

## 2022-07-28 NOTE — FLOWSHEET NOTE
07/28/22 1519   Encounter Summary   Encounter Overview/Reason  Volunteer Encounter   Service Provided For: Patient   Referral/Consult From: Ryan   Last Encounter  07/28/22  (V)   Complexity of Encounter Low   Begin Time 1230   End Time  1245   Total Time Calculated 15 min   Rituals, Rites and Sacraments   Type Mu-ism Communion

## 2022-07-28 NOTE — CARE COORDINATION
ONGOING DISCHARGE PLAN:    Patient is alert and oriented x4. Spoke with patient regarding discharge plan and patient confirms that plan is still to discharge to home with no needs  Patient did not qualify for home ox   Possible discharge to home today     Will continue to follow for additional discharge needs.     Electronically signed by Reinier Truong RN on 7/28/2022 at 12:38 PM

## 2022-08-03 ENCOUNTER — OFFICE VISIT (OUTPATIENT)
Dept: ORTHOPEDIC SURGERY | Age: 62
End: 2022-08-03
Payer: MEDICARE

## 2022-08-03 VITALS — WEIGHT: 197 LBS | BODY MASS INDEX: 32.82 KG/M2 | RESPIRATION RATE: 14 BRPM | HEIGHT: 65 IN

## 2022-08-03 DIAGNOSIS — G89.29 CHRONIC PAIN OF LEFT KNEE: ICD-10-CM

## 2022-08-03 DIAGNOSIS — M25.562 CHRONIC PAIN OF LEFT KNEE: ICD-10-CM

## 2022-08-03 DIAGNOSIS — M25.561 CHRONIC PAIN OF RIGHT KNEE: Primary | ICD-10-CM

## 2022-08-03 DIAGNOSIS — G89.29 CHRONIC PAIN OF RIGHT KNEE: Primary | ICD-10-CM

## 2022-08-03 PROCEDURE — 99214 OFFICE O/P EST MOD 30 MIN: CPT | Performed by: PHYSICIAN ASSISTANT

## 2022-08-03 PROCEDURE — 1111F DSCHRG MED/CURRENT MED MERGE: CPT | Performed by: PHYSICIAN ASSISTANT

## 2022-08-03 PROCEDURE — 1036F TOBACCO NON-USER: CPT | Performed by: PHYSICIAN ASSISTANT

## 2022-08-03 PROCEDURE — G8427 DOCREV CUR MEDS BY ELIG CLIN: HCPCS | Performed by: PHYSICIAN ASSISTANT

## 2022-08-03 PROCEDURE — G8417 CALC BMI ABV UP PARAM F/U: HCPCS | Performed by: PHYSICIAN ASSISTANT

## 2022-08-03 PROCEDURE — 3017F COLORECTAL CA SCREEN DOC REV: CPT | Performed by: PHYSICIAN ASSISTANT

## 2022-08-03 NOTE — PROGRESS NOTES
[facility], [address] [facility], [address] [place] [address], [id number] [place] [address]           Dept Phone: [phone]           Dept Fax:  [phone]           Jennifer Saunders          Dept Phone: [phone]           Dept Fax:  [phone]      Chief Compliant:  Chief Complaint   Patient presents with    Knee Pain     Right        History of Present Illness:  Heather Zamudio returns today. This is a 63 y.o. female who presents to the clinic today for follow up of bilateral knee pain. Patient underwent bilateral knee Monovisc injections on 9/91/7133 this was complicated by a fall landing on her right knee just 4 to 5 days after injections occurred. She reports that once that pain from the fall calm down her pain in the knees have improved significantly unfortunate patient did have a fall approximately 2 weeks ago in which she landed on her left knee which she says since aggravated her left knee pain. Patient reports this pain has since calmed down but she continues to have pain over the anterior knee she is amatory without any assistive devices but overall reports she is doing much better since undergoing the gel injections. She is able to tolerate going up and down stairs without significant pain does have pain when kneeling or putting direct pressure on the anterior knee since the fall on the left side. Of note patient was recently admitted for pneumonia in which she reports she was quite inactive and has been dealing with a lot of edema and swelling of bilateral lower extremities which she believes is contributing to some of her pain as well. Review of Systems   Constitutional: Negative for fever, chills, sweats, recent illness, or recent injury. Neurological: Negative for headaches, numbness, or weakness.    Integumentary: Negative for rash, itching, ecchymosis, abrasions, or laceration. Musculoskeletal: Positive for Knee Pain (Right)       Physical Exam:  Constitutional: Patient is oriented to person, place, and time. Patient appears well-developed and well nourished. Musculoskeletal:    knee: Bilateral    Skin: warm and dry, no rash or erythema  Vasculature: 2+ pedal pulses bilaterally  Neuro: Sensation grossly intact to light touch diffusely  Alignment: Normal  Tenderness: Moderate tenderness to the anterior left knee specifically over the inferior pole of the patella and the patellar tendon. Mild tenderness to medial joint line. No tenderness lateral joint line or posterior knee. Right knee: Minimal tenderness. ROM: (Degrees)    Right   A P   Left   A P    Extension  0 0   Extension  0 0  Flexion   120    Flexion   120  125    Crepitation  No    Crepitation  No      Muscle strength:    Right       Left    Flexion   5    Flexion   5  Extension  5    Extension  5  SLR   5    SLR   5    Extensor lag   n    Extensor lag  n      Special testing:    Right          Left    n    Pain with deep knee flexion   y  n    Patellar grind     y  n    Patellar apprehension    n  n    Patellar glide     n    n    Lachman     n  n    Anterior drawer    n  n    Pivot shift     n  n    Posterior drawer    n  n    Dial test     n  n    Posterolateral drawer    n  n    Posterior Sag     n  n    MCL      n  n    LCL      n    n    Medial joint line tenderness   mild  n    Lateral joint line tenderness   n  n    Appley's     n    Neurological: Patient is alert and oriented to person, place, and time. Normal strenght. No sensory deficit. Skin: Skin is warm and dry  Psychiatric: Behavior is normal. Thought content normal.  Nursing note and vitals reviewed. Labs and Imaging:     XR taken today:  No results found.      X-rays taken in clinic and preliminarily reviewed by me 8/3/22:   3 Views of the left knee demonstrate no evidence of acute fracture or other acute osseous abnormality. Minimal degenerative changes present in the medial compartment. Small suprapatellar joint effusion. Assessment and Plan:  1. Chronic pain of right knee    2. Chronic pain of left knee              PLAN:  This is a 63 y.o. female who presents to the clinic today for follow up bilateral knee pain. Patient was initially evaluated on 5/12/2022 and subsequent underwent Monovisc injections of both knees on 5/24/2022. Patient returns today stating that overall her pain has improved significantly since the gel injections and she is satisfied with the result she has had at this point but this was complicated by a secondary fall that she had 2 weeks ago landing on her left knee. She since then her pain has improved mildly but does continue to note some pain over the anterior knee. We did get repeat x-rays due to recent fall which were negative for any acute fracture. Patient is given reassurance that I do not assess any fracture, ligamentous or new meniscal pathology. She likely dealing with a contusion of the left knee after falling landing on this. Patient reports overall she is satisfied with the results she has gotten from the gel injections. We did discuss possibility of corticosteroid injections should patient's pain return soon otherwise would recommend follow-up 6 months from injection date on 5/24/2022 for reevaluation and possible repeat Monovisc injections given the success she has had thus far. Follow-up in November however patient may call return sooner for any questions or concerns        Please note that this chart was generated using voice recognition Dragon dictation software. Although every effort was made to ensure the accuracy of this automated transcription, some errors in transcription may have occurred.

## 2022-08-04 ENCOUNTER — OFFICE VISIT (OUTPATIENT)
Dept: INTERNAL MEDICINE CLINIC | Age: 62
End: 2022-08-04
Payer: MEDICARE

## 2022-08-04 VITALS
RESPIRATION RATE: 15 BRPM | DIASTOLIC BLOOD PRESSURE: 68 MMHG | SYSTOLIC BLOOD PRESSURE: 160 MMHG | WEIGHT: 190.6 LBS | BODY MASS INDEX: 31.75 KG/M2 | OXYGEN SATURATION: 97 % | HEART RATE: 57 BPM | TEMPERATURE: 97.6 F | HEIGHT: 65 IN

## 2022-08-04 DIAGNOSIS — G62.9 NEUROPATHY: ICD-10-CM

## 2022-08-04 DIAGNOSIS — E13.319 RETINOPATHY DUE TO SECONDARY DM (HCC): ICD-10-CM

## 2022-08-04 DIAGNOSIS — Z09 HOSPITAL DISCHARGE FOLLOW-UP: ICD-10-CM

## 2022-08-04 DIAGNOSIS — F51.01 PRIMARY INSOMNIA: ICD-10-CM

## 2022-08-04 DIAGNOSIS — G50.0 TRIGEMINAL NEURALGIA: ICD-10-CM

## 2022-08-04 DIAGNOSIS — I10 ESSENTIAL HYPERTENSION: ICD-10-CM

## 2022-08-04 DIAGNOSIS — E10.319 TYPE 1 DIABETES MELLITUS WITH RETINOPATHY, MACULAR EDEMA PRESENCE UNSPECIFIED, UNSPECIFIED LATERALITY, UNSPECIFIED RETINOPATHY SEVERITY (HCC): ICD-10-CM

## 2022-08-04 DIAGNOSIS — J45.901 ACUTE EXACERBATION OF COPD WITH ASTHMA (HCC): Primary | ICD-10-CM

## 2022-08-04 DIAGNOSIS — F32.A DEPRESSION, UNSPECIFIED DEPRESSION TYPE: ICD-10-CM

## 2022-08-04 DIAGNOSIS — J44.1 ACUTE EXACERBATION OF COPD WITH ASTHMA (HCC): Primary | ICD-10-CM

## 2022-08-04 DIAGNOSIS — F33.42 MAJOR DEPRESSIVE DISORDER, RECURRENT, IN FULL REMISSION (HCC): ICD-10-CM

## 2022-08-04 PROCEDURE — 99215 OFFICE O/P EST HI 40 MIN: CPT | Performed by: INTERNAL MEDICINE

## 2022-08-04 PROCEDURE — 1111F DSCHRG MED/CURRENT MED MERGE: CPT | Performed by: INTERNAL MEDICINE

## 2022-08-04 RX ORDER — AMLODIPINE BESYLATE 5 MG/1
TABLET ORAL
Qty: 90 TABLET | Refills: 1 | Status: SHIPPED | OUTPATIENT
Start: 2022-08-04

## 2022-08-04 RX ORDER — CLOPIDOGREL BISULFATE 75 MG/1
75 TABLET ORAL DAILY
Qty: 90 TABLET | Refills: 0 | Status: SHIPPED | OUTPATIENT
Start: 2022-08-04

## 2022-08-04 RX ORDER — AMITRIPTYLINE HYDROCHLORIDE 100 MG/1
TABLET, FILM COATED ORAL
Qty: 90 TABLET | Refills: 0 | Status: SHIPPED | OUTPATIENT
Start: 2022-08-04 | End: 2022-10-14 | Stop reason: SDUPTHER

## 2022-08-04 ASSESSMENT — PATIENT HEALTH QUESTIONNAIRE - PHQ9
8. MOVING OR SPEAKING SO SLOWLY THAT OTHER PEOPLE COULD HAVE NOTICED. OR THE OPPOSITE, BEING SO FIGETY OR RESTLESS THAT YOU HAVE BEEN MOVING AROUND A LOT MORE THAN USUAL: 1
9. THOUGHTS THAT YOU WOULD BE BETTER OFF DEAD, OR OF HURTING YOURSELF: 1
1. LITTLE INTEREST OR PLEASURE IN DOING THINGS: 1
SUM OF ALL RESPONSES TO PHQ9 QUESTIONS 1 & 2: 2
2. FEELING DOWN, DEPRESSED OR HOPELESS: 1
7. TROUBLE CONCENTRATING ON THINGS, SUCH AS READING THE NEWSPAPER OR WATCHING TELEVISION: 1
SUM OF ALL RESPONSES TO PHQ QUESTIONS 1-9: 9
SUM OF ALL RESPONSES TO PHQ QUESTIONS 1-9: 9
6. FEELING BAD ABOUT YOURSELF - OR THAT YOU ARE A FAILURE OR HAVE LET YOURSELF OR YOUR FAMILY DOWN: 1
10. IF YOU CHECKED OFF ANY PROBLEMS, HOW DIFFICULT HAVE THESE PROBLEMS MADE IT FOR YOU TO DO YOUR WORK, TAKE CARE OF THINGS AT HOME, OR GET ALONG WITH OTHER PEOPLE: 1
5. POOR APPETITE OR OVEREATING: 1
3. TROUBLE FALLING OR STAYING ASLEEP: 1
SUM OF ALL RESPONSES TO PHQ QUESTIONS 1-9: 9
SUM OF ALL RESPONSES TO PHQ QUESTIONS 1-9: 8
4. FEELING TIRED OR HAVING LITTLE ENERGY: 1

## 2022-08-04 ASSESSMENT — COLUMBIA-SUICIDE SEVERITY RATING SCALE - C-SSRS
1. WITHIN THE PAST MONTH, HAVE YOU WISHED YOU WERE DEAD OR WISHED YOU COULD GO TO SLEEP AND NOT WAKE UP?: NO
2. HAVE YOU ACTUALLY HAD ANY THOUGHTS OF KILLING YOURSELF?: NO
6. HAVE YOU EVER DONE ANYTHING, STARTED TO DO ANYTHING, OR PREPARED TO DO ANYTHING TO END YOUR LIFE?: NO

## 2022-08-04 NOTE — PROGRESS NOTES
history/Current status 42-year-old white female who is new patient to me was recently admitted for the and was treated with antibiotics and discharged home and is taking currently Augmentin and having diarrhea secondary to the antibiotics and also patient needs to establish as a new patient and has multiple issues  Patient CT of the chest did not show any pneumonia even the x-ray showed questionable infiltrates and currently patient is asymptomatic  Patient will be seeing another pulmonologist as her previous pulmonologist Dr. Jamie Andrade has moved to [facility] and advised her to see his partners who are in [facility] clinic  Patient states that she has a history of strokes and also congestive heart failure but her echocardiogram was completely normal which was done last year and I did review the CT scan of the head which did not show any infarcts and I did explain to the patient both the issues but patient states that she was started on Plavix by her previous doctors and is continuing and so advised her to stay on the medications as I do not know her previous history  Patient follows with Dr. Yana Kong endocrinology for type 1 diabetes and her last hemoglobin A1 C was not available and patient does not remember and will get copies of the reports from the doctor's office  Patient blood pressure is stable and has not been taking amlodipine so new prescription given  Patient is taking amitriptyline 75 mg before and was also taking Klonopin and patient stopped the Klonopin and taking on the amitriptyline and for her neuropathy she wants to increase the dose and so I did increase to 100 mg daily and patient also take Topamax for her trigeminal neuralgia  Patient takes Requip for her restless leg syndrome  Patient does not smoke or drink alcohol but occasional marijuana patient is single  and living with her mother and have no children    Patient Active Problem List   Diagnosis    Peripheral vascular disease ([facility] [address]    Restless legs syndrome    Spinal stenosis, thoracic    Thoracic radiculopathy    Chronic pain syndrome    Depression    Essential hypertension    Retinopathy due to secondary DM ([address]    Acquired hypothyroidism    Vitamin D deficiency    Mixed hyperlipidemia    Type 1 diabetes mellitus with retinopathy ([address]    Incisional hernia without obstruction or gangrene    Hypoxia    Moderate persistent asthma with status asthmaticus    Seasonal allergic rhinitis due to pollen    Chronic obstructive pulmonary disease (HCC)    Insulin pump in place    Contusion of left chest wall    Chronic retention of urine    Chronic combined systolic and diastolic congestive heart failure (HCC)    Major depressive disorder, recurrent, in full remission ([address]    Urinary incontinence without sensory awareness    Arthritis of knee, right    Acute bilateral thoracic back pain    Acute pyelonephritis    Bilateral impacted cerumen    Left ear pain    Flank pain    TIA (transient ischemic attack)    Moderate malnutrition ([address]    COVID-19    CHF (congestive heart failure) ([facility]    COVID    Diabetic ketoacidosis type 1 diabetes mellitus ([address]    PARUL (acute kidney injury) ([facility]    Pneumonia       Medications listed as ordered at the time of discharge from hospital     Medication List            Accurate as of August 4, 2022  4:04 PM. If you have any questions, ask your nurse or doctor.                 CHANGE how you take these medications      amitriptyline 100 MG tablet  Commonly known as: ELAVIL  TAKE 1 TABLET BY MOUTH NIGHTLY  What changed: medication strength  Changed by: Dimitri Alston MD            CONTINUE taking these medications      amLODIPine 5 MG tablet  Commonly known as: NORVASC  Take 1 tablet by mouth once daily     aspirin 81 MG tablet  Take 1 tablet by mouth daily     Breo Ellipta 200-25 MCG/INH Aepb inhaler  Generic drug: Fluticasone furoate-vilanterol     clopidogrel 75 MG tablet  Commonly known as: PLAVIX  Take 1 tablet by mouth in the morning. DULoxetine 60 MG extended release capsule  Commonly known as: CYMBALTA     gabapentin 800 MG tablet  Commonly known as: Neurontin  Take 1 tablet by mouth 2 times daily for 180 days.      losartan 100 MG tablet  Commonly known as: COZAAR  TAKE 1 TABLET BY MOUTH ONCE DAILY     metoprolol 100 MG tablet  Commonly known as: LOPRESSOR     NovoLOG PenFill 100 UNIT/ML injection cartridge  Generic drug: insulin aspart     ONE TOUCH ULTRA TEST strip  Generic drug: blood glucose test strips     pantoprazole 40 MG tablet  Commonly known as: PROTONIX     rOPINIRole 2 MG tablet  Commonly known as: REQUIP  Take 1 tablet by mouth daily     simvastatin 20 MG tablet  Commonly known as: ZOCOR     tiotropium 18 MCG inhalation capsule  Commonly known as: Spiriva HandiHaler  Inhale 1 capsule into the lungs Daily     topiramate 25 MG tablet  Commonly known as: TOPAMAX  Take 1 tablet by mouth 2 times daily            STOP taking these medications      amoxicillin-clavulanate 875-125 MG per tablet  Commonly known as: AUGMENTIN  Stopped by: Evaristo Stein MD     clonazePAM 0.5 MG tablet  Commonly known as: KlonoPIN  Stopped by: Evaristo Stein MD     diclofenac sodium 1 % Gel  Commonly known as: VOLTAREN  Stopped by: Evaristo Stein MD     PROAIR HFA IN  Stopped by: Evaristo Stein MD     vitamin B-12 1000 MCG tablet  Commonly known as: CYANOCOBALAMIN  Stopped by: Evaristo Stein MD               Where to Get Your Medications        These medications were sent to [facility] [address] [phone]  [address] [address]      Phone: [phone]   amitriptyline 100 MG tablet  amLODIPine 5 MG tablet  clopidogrel 75 MG tablet          Medications marked \"taking\" at this time  Outpatient Medications Marked as Taking for the 8/4/22 encounter (Office Visit) with Awa Grewal MD   Medication Sig Dispense Refill    amLODIPine (NORVASC) 5 MG tablet Take 1 tablet by mouth once daily 90 tablet 1    clopidogrel (PLAVIX) 75 MG tablet Take 1 tablet by mouth in the morning. 90 tablet 0    amitriptyline (ELAVIL) 100 MG tablet TAKE 1 TABLET BY MOUTH NIGHTLY 90 tablet 0    gabapentin (NEURONTIN) 800 MG tablet Take 1 tablet by mouth 2 times daily for 180 days. 180 tablet 1    simvastatin (ZOCOR) 20 MG tablet TAKE 1 TABLET BY MOUTH NIGHTLY      topiramate (TOPAMAX) 25 MG tablet Take 1 tablet by mouth 2 times daily 60 tablet 3    DULoxetine (CYMBALTA) 60 MG extended release capsule Take 60 mg by mouth at bedtime      metoprolol (LOPRESSOR) 100 MG tablet Take 50 mg by mouth in the morning and 50 mg before bedtime. insulin aspart (NOVOLOG PENFILL) 100 UNIT/ML injection cartridge Inject into the skin continuous Per insulin pump      pantoprazole (PROTONIX) 40 MG tablet Take 40 mg by mouth 2 times daily      losartan (COZAAR) 100 MG tablet TAKE 1 TABLET BY MOUTH ONCE DAILY 90 tablet 3    rOPINIRole (REQUIP) 2 MG tablet Take 1 tablet by mouth daily 90 tablet 3    BREO ELLIPTA 200-25 MCG/INH AEPB Inhale 1 puff into the lungs daily   6    tiotropium (SPIRIVA HANDIHALER) 18 MCG inhalation capsule Inhale 1 capsule into the lungs Daily 30 capsule 5    ONE TOUCH ULTRA TEST strip   1        Medications patient taking as of now reconciled against medications ordered at time of hospital discharge: Yes    Review of Systems    Objective:    BP (!) 160/68 (Site: Right Upper Arm, Position: Sitting, Cuff Size: Large Adult)   Pulse 57   Temp 97.6 °F (36.4 °C) (Temporal)   Resp 15   Ht 5' 5\" (1.651 m)   Wt 190 lb 9.6 oz (86.5 kg)   SpO2 97%   BMI 31.72 kg/m²   Physical Exam    An electronic signature was used to authenticate this note.   --Zac Morales MD     Post-Discharge Transitional Care  Follow Up      Nancy Zhang   YOB: 1960    Date of Office Visit:  8/4/2022  Date of Hospital Admission: 7/26/22  Date of Hospital Discharge: 7/28/22  Risk of hospital readmission (high >=14%. Medium >=10%) :Readmission Risk Score: 10.9      Care management risk score Rising risk (score 2-5) and Complex Care (Scores >=6): No Risk Score On File     Non face to face  following discharge, date last encounter closed (first attempt may have been earlier): *No documented post hospital discharge outreach found in the last 14 days    Call initiated 2 business days of discharge: *No response recorded in the last 14 days    ASSESSMENT/PLAN:   Below is the assessment and plan developed based on review of pertinent history, physical exam, labs, studies, and medications. Acute exacerbation of COPD with asthma ([facility] [address]  Essential hypertension  -     amLODIPine (NORVASC) 5 MG tablet; Take 1 tablet by mouth once daily, Disp-90 tablet, R-1Normal  Primary insomnia  -     amitriptyline (ELAVIL) 100 MG tablet; TAKE 1 TABLET BY MOUTH NIGHTLY, Disp-90 tablet, R-0Normal  Neuropathy  Trigeminal neuralgia  Type 1 diabetes mellitus with retinopathy, macular edema presence unspecified, unspecified laterality, unspecified retinopathy severity (HCC)  Major depressive disorder, recurrent, in full remission ([facility] [address]  Retinopathy due to secondary DM ([facility] [address]  Depression, unspecified depression type  Hospital discharge follow-up  -     [place] DISCHARGE MEDS RECONCILED W/ CURRENT OUTPATIENT MED LIST      Medical Decision Making: high complexity  Return in about 6 weeks (around 9/15/2022). On this date 8/4/2022 I have spent 60 minutes reviewing previous notes, test results and face to face with the patient discussing the diagnosis and importance of compliance with the treatment plan as well as documenting on the day of the visit. Subjective:   HPI:  Follow up of Hospital problems/diagnosis(es):  Inpatient course: Discharge summary reviewed- see chart. Interval history/Current status:  All medications reviewed all the history and Hospital reports reviewed and all questions answered and see other parts of the physical for details    Patient Active Problem List   Diagnosis    Peripheral vascular disease ([facility] [address] [address]    Restless legs syndrome    Spinal stenosis, thoracic    Thoracic radiculopathy    Chronic pain syndrome    Depression    Essential hypertension    Retinopathy due to secondary DM ([address]    Acquired hypothyroidism    Vitamin D deficiency    Mixed hyperlipidemia    Type 1 diabetes mellitus with retinopathy ([address]    Incisional hernia without obstruction or gangrene    Hypoxia    Moderate persistent asthma with status asthmaticus    Seasonal allergic rhinitis due to pollen    Chronic obstructive pulmonary disease (HCC)    Insulin pump in place    Contusion of left chest wall    Chronic retention of urine    Chronic combined systolic and diastolic congestive heart failure (HCC)    Major depressive disorder, recurrent, in full remission ([address]    Urinary incontinence without sensory awareness    Arthritis of knee, right    Acute bilateral thoracic back pain    Acute pyelonephritis    Bilateral impacted cerumen    Left ear pain    Flank pain    TIA (transient ischemic attack)    Moderate malnutrition ([address]    COVID-19    CHF (congestive heart failure) ([facility]    COVID    Diabetic ketoacidosis type 1 diabetes mellitus ([facility] [address] [address]    PARUL (acute kidney injury) ([facility] [address]    Pneumonia       Medications listed as ordered at the time of discharge from hospital     Medication List            Accurate as of August 4, 2022  4:04 PM. If you have any questions, ask your nurse or doctor.                 CHANGE how you take these medications      amitriptyline 100 MG tablet  Commonly known as: ELAVIL  TAKE 1 TABLET BY MOUTH NIGHTLY  What changed: medication strength  Changed by: Twila Clay MD            CONTINUE taking these medications      amLODIPine 5 MG tablet  Commonly known as: NORVASC  Take 1 tablet by mouth once daily     aspirin 81 MG tablet  Take 1 tablet by mouth daily     Breo Ellipta 200-25 MCG/INH Aepb inhaler  Generic drug: encounter (Office Visit) with Awa Grewal MD   Medication Sig Dispense Refill    amLODIPine (NORVASC) 5 MG tablet Take 1 tablet by mouth once daily 90 tablet 1    clopidogrel (PLAVIX) 75 MG tablet Take 1 tablet by mouth in the morning. 90 tablet 0    amitriptyline (ELAVIL) 100 MG tablet TAKE 1 TABLET BY MOUTH NIGHTLY 90 tablet 0    gabapentin (NEURONTIN) 800 MG tablet Take 1 tablet by mouth 2 times daily for 180 days. 180 tablet 1    simvastatin (ZOCOR) 20 MG tablet TAKE 1 TABLET BY MOUTH NIGHTLY      topiramate (TOPAMAX) 25 MG tablet Take 1 tablet by mouth 2 times daily 60 tablet 3    DULoxetine (CYMBALTA) 60 MG extended release capsule Take 60 mg by mouth at bedtime      metoprolol (LOPRESSOR) 100 MG tablet Take 50 mg by mouth in the morning and 50 mg before bedtime. insulin aspart (NOVOLOG PENFILL) 100 UNIT/ML injection cartridge Inject into the skin continuous Per insulin pump      pantoprazole (PROTONIX) 40 MG tablet Take 40 mg by mouth 2 times daily      losartan (COZAAR) 100 MG tablet TAKE 1 TABLET BY MOUTH ONCE DAILY 90 tablet 3    rOPINIRole (REQUIP) 2 MG tablet Take 1 tablet by mouth daily 90 tablet 3    BREO ELLIPTA 200-25 MCG/INH AEPB Inhale 1 puff into the lungs daily   6    tiotropium (SPIRIVA HANDIHALER) 18 MCG inhalation capsule Inhale 1 capsule into the lungs Daily 30 capsule 5    ONE TOUCH ULTRA TEST strip   1        Medications patient taking as of now reconciled against medications ordered at time of hospital discharge: Yes    A comprehensive review of systems was negative except for what was noted in the HPI.     Objective:    Patient-Reported Vitals  No data recorded    General Appearance: alert and oriented to person, place and time, well developed and well- nourished, in no acute distress  Skin: warm and dry, no rash or erythema  Head: normocephalic and atraumatic  Eyes: pupils equal, round, and reactive to light, extraocular eye movements intact, conjunctivae normal  ENT: tympanic membrane, external ear and ear canal normal bilaterally, nose without deformity, nasal mucosa and turbinates normal without polyps  Neck: supple and non-tender without mass, no thyromegaly or thyroid nodules, no cervical lymphadenopathy  Pulmonary/Chest: clear to auscultation bilaterally- no wheezes, rales or rhonchi, normal air movement, no respiratory distress  Cardiovascular: normal rate, regular rhythm, normal S1 and S2, no murmurs, rubs, clicks, or gallops, distal pulses intact, no carotid bruits  Abdomen: soft, non-tender, non-distended, normal bowel sounds, no masses or organomegaly  Extremities: no cyanosis, clubbing or edema  Musculoskeletal: normal range of motion, no joint swelling, deformity or tenderness  Neurologic: reflexes normal and symmetric, no cranial nerve deficit, gait, coordination and speech normal    Patient was seen in the office and physically examined. An electronic signature was used to authenticate this note.   --Julian Rojo MD

## 2022-08-08 ENCOUNTER — HOSPITAL ENCOUNTER (EMERGENCY)
Age: 62
Discharge: HOME | End: 2022-08-08
Attending: EMERGENCY MEDICINE
Payer: MEDICARE

## 2022-08-08 ENCOUNTER — TELEPHONE (OUTPATIENT)
Dept: OTHER | Facility: CLINIC | Age: 62
End: 2022-08-08

## 2022-08-08 ENCOUNTER — APPOINTMENT (OUTPATIENT)
Dept: GENERAL RADIOLOGY | Age: 62
End: 2022-08-08
Payer: MEDICARE

## 2022-08-08 VITALS
SYSTOLIC BLOOD PRESSURE: 159 MMHG | OXYGEN SATURATION: 94 % | TEMPERATURE: 98.3 F | WEIGHT: 190 LBS | RESPIRATION RATE: 17 BRPM | HEIGHT: 65 IN | BODY MASS INDEX: 31.65 KG/M2 | DIASTOLIC BLOOD PRESSURE: 79 MMHG | HEART RATE: 58 BPM

## 2022-08-08 DIAGNOSIS — S80.02XA CONTUSION OF LEFT KNEE, INITIAL ENCOUNTER: ICD-10-CM

## 2022-08-08 DIAGNOSIS — S50.01XA CONTUSION OF RIGHT ELBOW, INITIAL ENCOUNTER: ICD-10-CM

## 2022-08-08 DIAGNOSIS — S52.571A OTHER CLOSED INTRA-ARTICULAR FRACTURE OF DISTAL END OF RIGHT RADIUS, INITIAL ENCOUNTER: Primary | ICD-10-CM

## 2022-08-08 PROCEDURE — 29105 APPLICATION LONG ARM SPLINT: CPT

## 2022-08-08 PROCEDURE — 73110 X-RAY EXAM OF WRIST: CPT

## 2022-08-08 PROCEDURE — 73080 X-RAY EXAM OF ELBOW: CPT

## 2022-08-08 PROCEDURE — 99283 EMERGENCY DEPT VISIT LOW MDM: CPT

## 2022-08-08 PROCEDURE — 6370000000 HC RX 637 (ALT 250 FOR IP): Performed by: PHYSICIAN ASSISTANT

## 2022-08-08 PROCEDURE — 73562 X-RAY EXAM OF KNEE 3: CPT

## 2022-08-08 RX ORDER — HYDROCODONE BITARTRATE AND ACETAMINOPHEN 5; 325 MG/1; MG/1
1 TABLET ORAL EVERY 6 HOURS PRN
Qty: 10 TABLET | Refills: 0 | Status: SHIPPED | OUTPATIENT
Start: 2022-08-08 | End: 2022-08-11

## 2022-08-08 RX ORDER — HYDROCODONE BITARTRATE AND ACETAMINOPHEN 5; 325 MG/1; MG/1
1 TABLET ORAL ONCE
Status: COMPLETED | OUTPATIENT
Start: 2022-08-08 | End: 2022-08-08

## 2022-08-08 RX ADMIN — HYDROCODONE BITARTRATE AND ACETAMINOPHEN 1 TABLET: 5; 325 TABLET ORAL at 11:42

## 2022-08-08 ASSESSMENT — ENCOUNTER SYMPTOMS
SHORTNESS OF BREATH: 0
ABDOMINAL PAIN: 0

## 2022-08-08 ASSESSMENT — PAIN SCALES - GENERAL
PAINLEVEL_OUTOF10: 7
PAINLEVEL_OUTOF10: 7

## 2022-08-08 ASSESSMENT — PAIN - FUNCTIONAL ASSESSMENT: PAIN_FUNCTIONAL_ASSESSMENT: 0-10

## 2022-08-08 NOTE — ED PROVIDER NOTES
[address] ED  eMERGENCY dEPARTMENT eNCOUnter   [facility]     Pt Name: Alvino Thomas  MRN: [id number]  [place] 1960  Date of evaluation: 8/8/22   Alvino Thomas is a 63 y.o. female who presents with Fall, Arm Pain, Knee Pain, and Wrist Pain    Vitals:   Vitals:    08/08/22 1107 08/08/22 1145   BP: (!) 159/79    Pulse: 58 58   Resp: 17    Temp: 98.3 °F (36.8 °C)    TempSrc: Oral    SpO2: 94%    Weight: 190 lb (86.2 kg)    Height: 5' 5\" (1.651 m)      Impression:   1. Other closed intra-articular fracture of distal end of right radius, initial encounter    2. Contusion of right elbow, initial encounter    3. Contusion of left knee, initial encounter      I was personally available for consultation in the Emergency Department. I have reviewed the chart and agree with the documentation as recorded by the [facility], including the assessment, treatment plan and disposition.   Tamanna Cody MD  Attending Emergency  Physician                  Tamanna Cody MD  08/08/22 [place] Lazarus Ramos MD  08/09/22 [place] Rah Ramos MD  08/09/22 4539

## 2022-08-08 NOTE — ED PROVIDER NOTES
EMERGENCY DEPARTMENT ENCOUNTER    Pt Name: Marion Chavira  MRN: [id number]  [place] 1960  Date of evaluation: 8/8/22  CHIEF COMPLAINT       Chief Complaint   Patient presents with    Fall    Arm Pain    Knee Pain    Wrist Pain     HISTORY OF PRESENT ILLNESS   The history is provided by the patient. Patient fell from standing level yesterday, tripped while out shopping. Landed on her right outstretched hand. She is here with her mom today. She scraped up the right elbow. States Tdap is UTD. She also has significant tenderness in the right wrist. Limited ROM in the fingers. To a lesser degree, has some tenderness in the left knee. No numbness/tingling. Did not hit her head. No LOC. No back or neck pain. REVIEW OF SYSTEMS     Review of Systems   Constitutional:  Negative for chills and fever. Respiratory:  Negative for shortness of breath. Cardiovascular:  Negative for chest pain. Gastrointestinal:  Negative for abdominal pain. Musculoskeletal:  Positive for joint swelling. Negative for gait problem and neck pain. Skin:  Positive for wound. Negative for rash. Neurological:  Negative for syncope, weakness and numbness. Psychiatric/Behavioral:  Negative for confusion. All other systems reviewed and are negative.   PASTMEDICAL HISTORY     Past Medical History:   Diagnosis Date    Anesthesia complication     \"lung collapsed after colon surgery    Anxiety     Arthritis     Back pain     CAD (coronary artery disease)     no stents    Caffeine use     3 coffee / day    Cataract     left    COPD (chronic obstructive pulmonary disease) (HCC)     EMPHYSEMA    Deafness     right ear    Depression     Diabetes mellitus (HCC)     Diarrhea     Diverticulosis     Fibromyalgia     Gastroparalysis     GERD (gastroesophageal reflux disease)     Hearing loss     DEAF IN RIGHT EAR    Hyperlipidemia     Hyperlipidemia     Hypertension     Incontinence     MDRO (multiple drug resistant organisms) resistance 2012    mrsa (nasal), eye, ear    Neurogenic bladder     CATHES HERSELF 7 TIMES A DAY, IS ABLE TO URINATE ON OWN    Neuropathy     feet    Obesity     Osteoarthritis     Peripheral vascular disease, unspecified ([address]     Restless leg syndrome     Rhabdomyolysis     ROBBIE 1 week, May 2014, then [facility] for rehab. Spinal stenosis, thoracic 05/27/2014    Stroke [facility]) 2012    numbness on the left side of face, Oct 2020 TIA     Thyroid disease     enlarged    TMJ (dislocation of temporomandibular joint)     Trigeminal neuralgia     Type II or unspecified type diabetes mellitus without mention of complication, not stated as uncontrolled      Past Problem List  Patient Active Problem List   Diagnosis Code    Peripheral vascular disease ([facility] I73.9    Restless legs syndrome G25.81    Spinal stenosis, thoracic M48.04    Thoracic radiculopathy M54.14    Chronic pain syndrome G89.4    Depression F32. A    Essential hypertension I10    Retinopathy due to secondary DM ([address] E13.319    Acquired hypothyroidism E03.9    Vitamin D deficiency E55.9    Mixed hyperlipidemia E78.2    Type 1 diabetes mellitus with retinopathy ([address] E10.319    Incisional hernia without obstruction or gangrene K43.2    Hypoxia R09.02    Moderate persistent asthma with status asthmaticus J45.42    Seasonal allergic rhinitis due to pollen J30.1    Chronic obstructive pulmonary disease (HCC) J44.9    Insulin pump in place Z96.41    Contusion of left chest wall S20.212A    Chronic retention of urine R33.9    Chronic combined systolic and diastolic congestive heart failure (HCC) I50.42    Major depressive disorder, recurrent, in full remission ([address] F33.42    Urinary incontinence without sensory awareness N39.42    Arthritis of knee, right M17.11    Acute bilateral thoracic back pain M54.6    Acute pyelonephritis N10    Bilateral impacted cerumen H61.23    Left ear pain H92.02    Flank pain R10.9    TIA (transient ischemic attack) G45.9 Moderate malnutrition (HCC) E44.0    COVID-19 U07.1    CHF (congestive heart failure) (HCC) I50.9    COVID U07.1    Diabetic ketoacidosis type 1 diabetes mellitus (HCC) E10.10    PARUL (acute kidney injury) ([facility] [address] N17.9    Pneumonia J18.9     SURGICAL HISTORY       Past Surgical History:   Procedure Laterality Date    ABDOMEN SURGERY      LAPAROSCOPY    CARPAL TUNNEL RELEASE Right     CATARACT REMOVAL      CHOLECYSTECTOMY      CHOLECYSTECTOMY, OPEN      11/2012    COLON SURGERY      benign mass removed    COLONOSCOPY      COLONOSCOPY  07/13/2016    COLONOSCOPY  06/01/2020    incomplete/poor prep    COLONOSCOPY  07/07/2020    COLONOSCOPY N/A 7/7/2020    COLORECTAL CANCER SCREENING, NOT HIGH RISK performed by Moreno Kline MD at [address] [facility]       sebaceous cyst, under arm left side x5    CYST REMOVAL      right ankle    CYSTOSCOPY      EYE SURGERY Right     HOLE IN RETINA REPAIRED    FINGER TRIGGER RELEASE Right     INCISIONAL HERNIA REPAIR  07/07/15    open    [address]  10/17/2017    LEG BIOPSY EXCISION Left 7/6/2021    EXCISION OF LEFT THIGH MASS performed by Moreno Kline MD at [place] [address] Left     ear tube placement    POLYPECTOMY      colon polyp    UT REPAIR INCISIONAL HERNIA,REDUCIBLE N/A 10/17/2017    HERNIA INCISIONAL REPAIR WITH MESH performed by Moreno Kline MD at [address] [facility] N/A 6/1/2020    [address] [facility] performed by Moreno Kline MD at [address]  07/13/2016    UPPER GASTROINTESTINAL ENDOSCOPY  07/23/2018    with biopsy    UPPER GASTROINTESTINAL ENDOSCOPY N/A 7/23/2018    EGD BIOPSY performed by Moreno Kline MD at [place] [address]  06/01/2020    with biopsy    UPPER GASTROINTESTINAL ENDOSCOPY N/A 6/1/2020    EGD BIOPSY performed by Moreno Kline MD at [facility] current facility-administered medications on file prior to encounter. Current Outpatient Medications on File Prior to Encounter   Medication Sig Dispense Refill    amLODIPine (NORVASC) 5 MG tablet Take 1 tablet by mouth once daily 90 tablet 1    clopidogrel (PLAVIX) 75 MG tablet Take 1 tablet by mouth in the morning. 90 tablet 0    amitriptyline (ELAVIL) 100 MG tablet TAKE 1 TABLET BY MOUTH NIGHTLY 90 tablet 0    gabapentin (NEURONTIN) 800 MG tablet Take 1 tablet by mouth 2 times daily for 180 days. 180 tablet 1    [DISCONTINUED] metoclopramide (REGLAN) 10 MG tablet Take 1 tablet by mouth 2 times daily 120 tablet 0    simvastatin (ZOCOR) 20 MG tablet TAKE 1 TABLET BY MOUTH NIGHTLY      topiramate (TOPAMAX) 25 MG tablet Take 1 tablet by mouth 2 times daily 60 tablet 3    [DISCONTINUED] atorvastatin (LIPITOR) 20 MG tablet Take 1 tablet by mouth daily 30 tablet 3    DULoxetine (CYMBALTA) 60 MG extended release capsule Take 60 mg by mouth at bedtime      aspirin 81 MG tablet Take 1 tablet by mouth daily (Patient not taking: Reported on 8/4/2022) 30 tablet 0    metoprolol (LOPRESSOR) 100 MG tablet Take 50 mg by mouth in the morning and 50 mg before bedtime. insulin aspart (NOVOLOG PENFILL) 100 UNIT/ML injection cartridge Inject into the skin continuous Per insulin pump      pantoprazole (PROTONIX) 40 MG tablet Take 40 mg by mouth 2 times daily      losartan (COZAAR) 100 MG tablet TAKE 1 TABLET BY MOUTH ONCE DAILY 90 tablet 3    rOPINIRole (REQUIP) 2 MG tablet Take 1 tablet by mouth daily 90 tablet 3    BREO ELLIPTA 200-25 MCG/INH AEPB Inhale 1 puff into the lungs daily   6    tiotropium (SPIRIVA HANDIHALER) 18 MCG inhalation capsule Inhale 1 capsule into the lungs Daily 30 capsule 5    ONE TOUCH ULTRA TEST strip   1     ALLERGIES     is allergic to fioricet [butalbital-apap-caffeine], betamethasone, butalbital-apap-caff-cod, erythromycin, xarelto [rivaroxaban], codeine, droperidol, and tape [adhesive tape].   FAMILY HISTORY     She indicated that her mother is alive. She indicated that her father is alive. She indicated that her brother is . She indicated that the status of her maternal grandmother is unknown. She indicated that the status of her paternal grandmother is unknown. SOCIAL HISTORY       Social History     Tobacco Use    Smoking status: Never    Smokeless tobacco: Never   Vaping Use    Vaping Use: Never used   Substance Use Topics    Alcohol use: No    Drug use: Yes     Frequency: 4.0 times per week     Types: Marijuana (Weed)     PHYSICAL EXAM     INITIAL VITALS: BP (!) 159/79   Pulse 58   Temp 98.3 °F (36.8 °C) (Oral)   Resp 17   Ht 5' 5\" (1.651 m)   Wt 190 lb (86.2 kg)   SpO2 94%   BMI 31.62 kg/m²    Physical Exam  Vitals and nursing note reviewed. Constitutional:       General: She is not in acute distress. Appearance: Normal appearance. She is not toxic-appearing or diaphoretic. HENT:      Head: Normocephalic and atraumatic. Mouth/Throat:      Mouth: Mucous membranes are moist.   Cardiovascular:      Rate and Rhythm: Normal rate. Pulmonary:      Effort: Pulmonary effort is normal.   Musculoskeletal:         General: Swelling, tenderness and signs of injury present. Cervical back: Neck supple. Comments: Right upper extremity: swelling and tenderness of the wrist with limited digital ROM due to swelling / pain. Superficial abrasion noted on elbow, mild diffuse tenderness. Mild diffuse tenderness of left knee. Good ROM of knee. Skin:     General: Skin is warm and dry. Neurological:      General: No focal deficit present. Mental Status: She is alert and oriented to person, place, and time. Psychiatric:         Mood and Affect: Mood normal.         Behavior: Behavior normal. Behavior is cooperative. MEDICAL DECISION MAKING:   Patient with acute injury to RUE and mild exacerbation of pain in left knee. X-rays performed of right elbow, right wrist, and left knee.   She has an intraarticular distal radius fracture, slight step-off. No significant dorsal or volar angulation and no neurovascular compromise. Will splint and have her f/u with ortho for further management. Pain meds provided. Discussed with patient anticipatory guidance, discharge instructions, follow up plan. PROCEDURES:  Splint Application    Date/Time: 8/11/2022 10:31 AM  Performed by: SALOMON Mcnamara  Authorized by: Jennifer Bess MD     Consent:     Consent obtained:  Verbal    Consent given by:  Patient    Risks discussed:  Numbness, pain and swelling  Universal protocol:     Imaging studies available: yes      Patient identity confirmed:  Verbally with patient  Pre-procedure details:     Distal neurologic exam:  Normal    Distal perfusion: distal pulses strong and brisk capillary refill    Procedure details:     Location:  Wrist    Wrist location:  R wrist    Splint type:  Long arm    Supplies:  Sling, fiberglass and cotton padding    Attestation: Splint applied and adjusted personally by me    Post-procedure details:     Distal neurologic exam:  Normal    Distal perfusion: brisk capillary refill      Procedure completion:  Tolerated well, no immediate complications  DIAGNOSTIC RESULTS   EKG:All EKG's are interpreted by the Emergency Department Physician who either signs or Co-signs this chart in the absence of a cardiologist.    RADIOLOGY:All plain film, CT, MRI, and formal ultrasound images (except ED bedside ultrasound) are read by the radiologist, see reports below, unless otherwisenoted in MDM or here. XR ELBOW RIGHT (MIN 3 VIEWS)   Final Result   Right wrist:      1. Acute impacted distal radius fracture with slight articular offset. 2. Moderate soft tissue edema and soft tissue swelling about the distal right   forearm and wrist.   3. Mild underlying osteoarthrosis. Right elbow:      1. Mild degenerative changes and enthesopathy as above. 2. No acute fracture or dislocation.    3. Small area of ossification or calcification at the outer margin of the   radial head. Left knee:      1. Mild soft tissue swelling along the anterior knee. 2. No acute fracture or dislocation. XR WRIST RIGHT (MIN 3 VIEWS)   Final Result   Right wrist:      1. Acute impacted distal radius fracture with slight articular offset. 2. Moderate soft tissue edema and soft tissue swelling about the distal right   forearm and wrist.   3. Mild underlying osteoarthrosis. Right elbow:      1. Mild degenerative changes and enthesopathy as above. 2. No acute fracture or dislocation. 3. Small area of ossification or calcification at the outer margin of the   radial head. Left knee:      1. Mild soft tissue swelling along the anterior knee. 2. No acute fracture or dislocation. XR KNEE LEFT (3 VIEWS)   Final Result   Right wrist:      1. Acute impacted distal radius fracture with slight articular offset. 2. Moderate soft tissue edema and soft tissue swelling about the distal right   forearm and wrist.   3. Mild underlying osteoarthrosis. Right elbow:      1. Mild degenerative changes and enthesopathy as above. 2. No acute fracture or dislocation. 3. Small area of ossification or calcification at the outer margin of the   radial head. Left knee:      1. Mild soft tissue swelling along the anterior knee. 2. No acute fracture or dislocation. LABS: All lab results were reviewed by myself.   Labs Reviewed - No data to display    EMERGENCY DEPARTMENTCOURSE:   Vitals:    Vitals:    08/08/22 1107 08/08/22 1145   BP: (!) 159/79    Pulse: 58 58   Resp: 17    Temp: 98.3 °F (36.8 °C)    TempSrc: Oral    SpO2: 94%    Weight: 190 lb (86.2 kg)    Height: 5' 5\" (1.651 m)         The patient was given the following medications while in the emergency department:  Orders Placed This Encounter   Medications    HYDROcodone-acetaminophen (NORCO) 5-325 MG per tablet 1 tablet    HYDROcodone-acetaminophen (NORCO) 5-325 MG per tablet     Sig: Take 1 tablet by mouth every 6 hours as needed for Pain for up to 3 days. Intended supply: 3 days. Take lowest dose possible to manage pain     Dispense:  10 tablet     Refill:  0     CONSULTS:  None    FINAL IMPRESSION      1. Other closed intra-articular fracture of distal end of right radius, initial encounter    2. Contusion of right elbow, initial encounter    3. Contusion of left knee, initial encounter          DISPOSITION/PLAN   DISPOSITION Decision To Discharge 08/08/2022 01:00:54 PM      Evaluation and treatment course in the ED, and plan of care upon discharge was discussed in length with the patient/patient representative. Patient/patient representative had no further questions prior to being discharged and was instructed to return to the ED for new or worsening symptoms. Any changes to existing medications or new prescriptions were reviewed with patient/patient representative and they expressed understanding of how to correctly take their medications and the possible side effects. PATIENT REFERRED TO:  Nina Wade MD  [address] [address]  [phone]    Schedule an appointment as soon as possible for a visit in 1 day    DISCHARGE MEDICATIONS:  Discharge Medication List as of 8/8/2022  1:16 PM        START taking these medications    Details   HYDROcodone-acetaminophen (NORCO) 5-325 MG per tablet Take 1 tablet by mouth every 6 hours as needed for Pain for up to 3 days. Intended supply: 3 days. Take lowest dose possible to manage pain, Disp-10 tablet, R-0Print           The care is provided during an unprecedented national emergency due to the novel coronavirus, COVID 19.   Michelle Barraza PA-C, [facility] Constantino [address] [place] [address]  08/08/22 2049       Michelle Barraza [place]  08/11/22 2044

## 2022-08-10 ENCOUNTER — OFFICE VISIT (OUTPATIENT)
Dept: ORTHOPEDIC SURGERY | Age: 62
End: 2022-08-10
Payer: MEDICARE

## 2022-08-10 VITALS — WEIGHT: 190 LBS | HEIGHT: 65 IN | BODY MASS INDEX: 31.65 KG/M2 | RESPIRATION RATE: 14 BRPM

## 2022-08-10 DIAGNOSIS — S52.571A OTHER CLOSED INTRA-ARTICULAR FRACTURE OF DISTAL END OF RIGHT RADIUS, INITIAL ENCOUNTER: Primary | ICD-10-CM

## 2022-08-10 PROCEDURE — 25600 CLTX DST RDL FX/EPHYS SEP WO: CPT | Performed by: PHYSICIAN ASSISTANT

## 2022-08-10 PROCEDURE — 1111F DSCHRG MED/CURRENT MED MERGE: CPT | Performed by: PHYSICIAN ASSISTANT

## 2022-08-10 PROCEDURE — 3017F COLORECTAL CA SCREEN DOC REV: CPT | Performed by: PHYSICIAN ASSISTANT

## 2022-08-10 PROCEDURE — 1036F TOBACCO NON-USER: CPT | Performed by: PHYSICIAN ASSISTANT

## 2022-08-10 PROCEDURE — G8417 CALC BMI ABV UP PARAM F/U: HCPCS | Performed by: PHYSICIAN ASSISTANT

## 2022-08-10 PROCEDURE — G8427 DOCREV CUR MEDS BY ELIG CLIN: HCPCS | Performed by: PHYSICIAN ASSISTANT

## 2022-08-10 PROCEDURE — 99214 OFFICE O/P EST MOD 30 MIN: CPT | Performed by: PHYSICIAN ASSISTANT

## 2022-08-10 NOTE — PROGRESS NOTES
[address] [facility], [facility], [facility], [id number] [place] [address]           Dept Phone: [phone]           Dept Fax:  [address] [facility] [address] [facility], [place]          Dept Phone: [phone]           Dept Fax:  [phone]    Chief Compliant:  Chief Complaint   Patient presents with    Wrist Pain     Right        Subjective:       Lara Austin is a 63 y.o. right hand-dominant female here for evaluation and treatment of a right wrist injury. The injury occurred on 8/8/2022. Mechanism of injury was: [address] Since that time the patient has been experiencing right wrist  pain and swelling. The pain is currently rated moderate. The patient was originally seen at local emergency room where an x-ray was done, a fracture of the wrist was identified and the patient was placed in a splint. The patient was subsequently referred to Orthopedics for further management. The splint has remained in place and is clean and dry. Outside reports reviewed: ER records, historical medical records, and x-ray reports. Patient's medications, allergies, past medical, surgical, social and family histories were reviewed and updated as appropriate. Review of Systems  Review of Systems   Constitutional: Negative for fever, chills, sweats, recent illness, or recent injury. Neurological: Negative for headaches, numbness, or weakness. Integumentary: Negative for rash, itching, ecchymosis, abrasions, or laceration. Musculoskeletal: Positive for Wrist Pain (Right)        Objective:   Physical Exam:  Constitutional: Patient is oriented to person, place, and time. Patient appears well-developed and well nourished.    Musculoskeletal:       General:   alert, appears stated age, and cooperative   Gait:    Normal   Right Wrist  Circulation:   warm, well perfused, brisk capillary refill distal to the injury   Skin:   ecchymosis   Swelling:  moderate swelling was present in the hand   Deformity:  There is not an obvious deformity of the hand. Finger ROM:   normal   Wrist ROM:  wrist flexion and extension was not assessed today secondary to pain   Sensation:   intact to light touch   Tenderness:    Point tenderness to the distal radius fracture site. No tenderness to the radial styloid, anatomic snuffbox, ulnar styloid or distal ulna. Compartments are soft and compressible    Neurological: Patient is alert and oriented to person, place, and time. Normal strenght. No sensory deficit. Skin: Skin is warm and dry  Psychiatric: Behavior is normal. Thought content normal.  Nursing note and vitals reviewed. Imaging  Labs and Imaging:     XR taken today:  No results found. No new x-rays taken today however those from 8/8/2022  3 views of the right wrist available for review demonstrate intra-articular fracture of the distal radius with slight intra-articular step-off. Minimal displacement. No evidence of significant angulation or other acute osseous abnormality. Assessment:     1. Other closed intra-articular fracture of distal end of right radius, initial encounter               Plan: This is a 63 y.o. female who presents to the clinic today for evaluation of intra-articular right distal radius fracture which occurred on 8/8/2022.    1. I discussed the entity of distal radius fractures with the patient. I fully outlined the anatomy regarding the wrist.  All of her questions were answered fully. 2.  Patient is educated that given the intra-articular nature of this fracture type is something we want to monitor closely. Patient is educated if it stays in current alignment she would likely do well with immobilization nonoperatively in the form of a cast however if she has any increased step-off she likely be a candidate for ORIF right distal radius fracture. Patient verbalizes understanding. At this time she demonstrates significant swelling given that she is only 48 hours out of injury would recommend holding off on circumferential casting which she was agreeable with and she is placed in a new posterior long-arm splint. 3. The patient was encouraged to keep her arm elevated and iced for the next 24-48 hours. 4. Follow up will be in 1 weeks for repeat x-rays of the right wrist and determination of long-term management whether be nonoperative or operative intervention.

## 2022-08-13 ENCOUNTER — HOSPITAL ENCOUNTER (EMERGENCY)
Age: 62
Discharge: HOME | End: 2022-08-14
Attending: EMERGENCY MEDICINE
Payer: MEDICARE

## 2022-08-13 ENCOUNTER — APPOINTMENT (OUTPATIENT)
Dept: GENERAL RADIOLOGY | Age: 62
End: 2022-08-13
Payer: MEDICARE

## 2022-08-13 ENCOUNTER — HOSPITAL ENCOUNTER (EMERGENCY)
Age: 62
Discharge: HOME | End: 2022-08-13
Attending: EMERGENCY MEDICINE
Payer: MEDICARE

## 2022-08-13 VITALS
RESPIRATION RATE: 16 BRPM | HEART RATE: 87 BPM | WEIGHT: 190 LBS | SYSTOLIC BLOOD PRESSURE: 165 MMHG | OXYGEN SATURATION: 93 % | TEMPERATURE: 98.2 F | BODY MASS INDEX: 31.65 KG/M2 | HEIGHT: 65 IN | DIASTOLIC BLOOD PRESSURE: 70 MMHG

## 2022-08-13 VITALS
SYSTOLIC BLOOD PRESSURE: 137 MMHG | TEMPERATURE: 97.9 F | OXYGEN SATURATION: 94 % | HEART RATE: 76 BPM | RESPIRATION RATE: 18 BRPM | DIASTOLIC BLOOD PRESSURE: 61 MMHG

## 2022-08-13 DIAGNOSIS — E87.6 HYPOKALEMIA: ICD-10-CM

## 2022-08-13 DIAGNOSIS — M79.602 LEFT ARM PAIN: Primary | ICD-10-CM

## 2022-08-13 DIAGNOSIS — E83.51 HYPOCALCEMIA: ICD-10-CM

## 2022-08-13 DIAGNOSIS — J18.9 ATYPICAL PNEUMONIA: Primary | ICD-10-CM

## 2022-08-13 LAB
ABSOLUTE EOS #: 0.2 K/UL (ref 0–0.4)
ABSOLUTE LYMPH #: 2.1 K/UL (ref 1–4.8)
ABSOLUTE MONO #: 0.7 K/UL (ref 0.1–1.3)
ANION GAP SERPL CALCULATED.3IONS-SCNC: 9 MMOL/L (ref 9–17)
BACTERIA: ABNORMAL
BASOPHILS # BLD: 1 % (ref 0–2)
BASOPHILS ABSOLUTE: 0.1 K/UL (ref 0–0.2)
BILIRUBIN URINE: NEGATIVE
BUN BLDV-MCNC: 15 MG/DL (ref 6–20)
CALCIUM SERPL-MCNC: 7.4 MG/DL (ref 8.6–10.4)
CASTS UA: ABNORMAL /LPF
CHLORIDE BLD-SCNC: 110 MMOL/L (ref 98–107)
CO2: 22 MMOL/L (ref 20–31)
COLOR: YELLOW
CREAT SERPL-MCNC: 0.69 MG/DL (ref 0.5–0.9)
EOSINOPHILS RELATIVE PERCENT: 3 % (ref 0–4)
EPITHELIAL CELLS UA: ABNORMAL /HPF
GFR AFRICAN AMERICAN: >60 ML/MIN
GFR NON-AFRICAN AMERICAN: >60 ML/MIN
GFR SERPL CREATININE-BSD FRML MDRD: ABNORMAL ML/MIN/{1.73_M2}
GLUCOSE BLD-MCNC: 66 MG/DL (ref 70–99)
GLUCOSE BLD-MCNC: 77 MG/DL
GLUCOSE URINE: NEGATIVE
HCT VFR BLD CALC: 36 % (ref 36–46)
HEMOGLOBIN: 11.9 G/DL (ref 12–16)
KETONES, URINE: NEGATIVE
LEUKOCYTE ESTERASE, URINE: ABNORMAL
LYMPHOCYTES # BLD: 25 % (ref 24–44)
MCH RBC QN AUTO: 29.3 PG (ref 26–34)
MCHC RBC AUTO-ENTMCNC: 33.2 G/DL (ref 31–37)
MCV RBC AUTO: 88.3 FL (ref 80–100)
MONOCYTES # BLD: 8 % (ref 1–7)
NITRITE, URINE: NEGATIVE
PDW BLD-RTO: 14 % (ref 11.5–14.9)
PH UA: 6 (ref 5–8)
PLATELET # BLD: 270 K/UL (ref 150–450)
PMV BLD AUTO: 7.9 FL (ref 6–12)
POTASSIUM SERPL-SCNC: 3.2 MMOL/L (ref 3.7–5.3)
PROTEIN UA: NEGATIVE
RBC # BLD: 4.07 M/UL (ref 4–5.2)
RBC UA: ABNORMAL /HPF
REASON FOR REJECTION: NORMAL
SEG NEUTROPHILS: 63 % (ref 36–66)
SEGMENTED NEUTROPHILS ABSOLUTE COUNT: 5.3 K/UL (ref 1.3–9.1)
SODIUM BLD-SCNC: 141 MMOL/L (ref 135–144)
SPECIFIC GRAVITY UA: 1.01 (ref 1–1.03)
TURBIDITY: CLEAR
URINE HGB: NEGATIVE
UROBILINOGEN, URINE: NORMAL
WBC # BLD: 8.4 K/UL (ref 3.5–11)
WBC UA: ABNORMAL /HPF
YEAST: ABNORMAL
ZZ NTE CLEAN UP: ORDERED TEST: NORMAL
ZZ NTE WITH NAME CLEAN UP: SPECIMEN SOURCE: NORMAL

## 2022-08-13 PROCEDURE — 99282 EMERGENCY DEPT VISIT SF MDM: CPT

## 2022-08-13 PROCEDURE — 6370000000 HC RX 637 (ALT 250 FOR IP)

## 2022-08-13 PROCEDURE — 71045 X-RAY EXAM CHEST 1 VIEW: CPT

## 2022-08-13 PROCEDURE — 85025 COMPLETE CBC W/AUTO DIFF WBC: CPT

## 2022-08-13 PROCEDURE — 81001 URINALYSIS AUTO W/SCOPE: CPT

## 2022-08-13 PROCEDURE — 82947 ASSAY GLUCOSE BLOOD QUANT: CPT

## 2022-08-13 PROCEDURE — 80048 BASIC METABOLIC PNL TOTAL CA: CPT

## 2022-08-13 PROCEDURE — 99284 EMERGENCY DEPT VISIT MOD MDM: CPT

## 2022-08-13 PROCEDURE — 36415 COLL VENOUS BLD VENIPUNCTURE: CPT

## 2022-08-13 RX ORDER — DOXYCYCLINE 100 MG/1
100 CAPSULE ORAL ONCE
Status: COMPLETED | OUTPATIENT
Start: 2022-08-13 | End: 2022-08-13

## 2022-08-13 RX ORDER — DOXYCYCLINE HYCLATE 100 MG
100 TABLET ORAL 2 TIMES DAILY
Qty: 10 TABLET | Refills: 0 | Status: SHIPPED | OUTPATIENT
Start: 2022-08-13 | End: 2022-08-18

## 2022-08-13 RX ORDER — CALCIUM GLUCONATE 94 MG/ML
1000 INJECTION, SOLUTION INTRAVENOUS ONCE
Status: COMPLETED | OUTPATIENT
Start: 2022-08-14 | End: 2022-08-14

## 2022-08-13 RX ORDER — CALCIUM GLUCONATE 45(500) MG
975 TABLET ORAL ONCE
Status: DISCONTINUED | OUTPATIENT
Start: 2022-08-13 | End: 2022-08-13

## 2022-08-13 RX ORDER — POTASSIUM CHLORIDE 20 MEQ/1
40 TABLET, EXTENDED RELEASE ORAL ONCE
Status: COMPLETED | OUTPATIENT
Start: 2022-08-13 | End: 2022-08-13

## 2022-08-13 RX ORDER — AZITHROMYCIN 250 MG/1
250 TABLET, FILM COATED ORAL ONCE
Status: CANCELLED | OUTPATIENT
Start: 2022-08-13 | End: 2022-08-13

## 2022-08-13 RX ADMIN — POTASSIUM CHLORIDE 40 MEQ: 1500 TABLET, EXTENDED RELEASE ORAL at 23:52

## 2022-08-13 RX ADMIN — DOXYCYCLINE 100 MG: 100 CAPSULE ORAL at 23:18

## 2022-08-13 ASSESSMENT — PAIN - FUNCTIONAL ASSESSMENT: PAIN_FUNCTIONAL_ASSESSMENT: 0-10

## 2022-08-13 ASSESSMENT — PAIN DESCRIPTION - ORIENTATION: ORIENTATION: RIGHT

## 2022-08-13 ASSESSMENT — PAIN SCALES - GENERAL: PAINLEVEL_OUTOF10: 8

## 2022-08-13 ASSESSMENT — PAIN DESCRIPTION - LOCATION: LOCATION: ARM

## 2022-08-13 NOTE — ED PROVIDER NOTES
2020 TIA     Thyroid disease     enlarged    TMJ (dislocation of temporomandibular joint)     Trigeminal neuralgia     Type II or unspecified type diabetes mellitus without mention of complication, not stated as uncontrolled        SURGICAL HISTORY       Past Surgical History:   Procedure Laterality Date    ABDOMEN SURGERY      LAPAROSCOPY    CARPAL TUNNEL RELEASE Right     CATARACT REMOVAL      CHOLECYSTECTOMY      CHOLECYSTECTOMY, OPEN      11/2012    COLON SURGERY      benign mass removed    COLONOSCOPY      COLONOSCOPY  07/13/2016    COLONOSCOPY  06/01/2020    incomplete/poor prep    COLONOSCOPY  07/07/2020    COLONOSCOPY N/A 7/7/2020    COLORECTAL CANCER SCREENING, NOT HIGH RISK performed by Kendall Acevedo MD at [address] [facility]       sebaceous cyst, under arm left side x5    CYST REMOVAL      right ankle    CYSTOSCOPY      EYE SURGERY Right     HOLE IN RETINA REPAIRED    FINGER TRIGGER RELEASE Right     INCISIONAL HERNIA REPAIR  07/07/15    open    INCISIONAL HERNIA REPAIR  10/17/2017    LEG BIOPSY EXCISION Left 7/6/2021    EXCISION OF LEFT THIGH MASS performed by Kendall Acevedo MD at [place] [address] Left     ear tube placement    POLYPECTOMY      colon polyp    HI REPAIR INCISIONAL HERNIA,REDUCIBLE N/A 10/17/2017    HERNIA INCISIONAL [address] performed by Kendall Acevedo MD at [address] [facility] N/A 6/1/2020    [address] [facility] performed by Kendall Acevedo MD at [address]  07/13/2016    UPPER GASTROINTESTINAL ENDOSCOPY  07/23/2018    with biopsy    UPPER GASTROINTESTINAL ENDOSCOPY N/A 7/23/2018    EGD BIOPSY performed by Kendall Acevedo MD at [facility] [address]  06/01/2020    with biopsy    UPPER GASTROINTESTINAL ENDOSCOPY N/A 6/1/2020    EGD BIOPSY performed by Kendall Acevedo MD at [address] [facility]       Discharge Medication List as of 8/13/2022 3:03 AM        CONTINUE these medications which have NOT CHANGED    Details   amLODIPine (NORVASC) 5 MG tablet Take 1 tablet by mouth once daily, Disp-90 tablet, R-1Normal      clopidogrel (PLAVIX) 75 MG tablet Take 1 tablet by mouth in the morning., Disp-90 tablet, R-0Normal      amitriptyline (ELAVIL) 100 MG tablet TAKE 1 TABLET BY MOUTH NIGHTLY, Disp-90 tablet, R-0Normal      gabapentin (NEURONTIN) 800 MG tablet Take 1 tablet by mouth 2 times daily for 180 days. , Disp-180 tablet, R-1Normal      simvastatin (ZOCOR) 20 MG tablet TAKE 1 TABLET BY MOUTH NIGHTLYHistorical Med      topiramate (TOPAMAX) 25 MG tablet Take 1 tablet by mouth 2 times daily, Disp-60 tablet, R-3Normal      DULoxetine (CYMBALTA) 60 MG extended release capsule Take 60 mg by mouth at bedtimeHistorical Med      aspirin 81 MG tablet Take 1 tablet by mouth daily, Disp-30 tablet,R-0Normal      metoprolol (LOPRESSOR) 100 MG tablet Take 50 mg by mouth in the morning and 50 mg before bedtime. Historical Med      insulin aspart (NOVOLOG PENFILL) 100 UNIT/ML injection cartridge Inject into the skin continuous Per insulin pumpHistorical Med      pantoprazole (PROTONIX) 40 MG tablet Take 40 mg by mouth 2 times dailyHistorical Med      losartan (COZAAR) 100 MG tablet TAKE 1 TABLET BY MOUTH ONCE DAILY, Disp-90 tablet, R-3Normal      rOPINIRole (REQUIP) 2 MG tablet Take 1 tablet by mouth daily, Disp-90 tablet, R-3Normal      BREO ELLIPTA 200-25 MCG/INH AEPB Inhale 1 puff into the lungs daily , R-6, DAWHistorical Med      tiotropium (SPIRIVA HANDIHALER) 18 MCG inhalation capsule Inhale 1 capsule into the lungs Daily, Disp-30 capsule, R-5Normal      ONE TOUCH ULTRA TEST strip R-1, DAWHistorical Med             ALLERGIES     is allergic to fioricet [butalbital-apap-caffeine], betamethasone, butalbital-apap-caff-cod, erythromycin, xarelto [rivaroxaban], codeine, droperidol, and tape [adhesive tape]. FAMILY HISTORY     She indicated that her mother is alive.  She indicated that her father is alive. She indicated that her brother is . She indicated that the status of her maternal grandmother is unknown. She indicated that the status of her paternal grandmother is unknown. SOCIAL HISTORY      reports that she has never smoked. She has never used smokeless tobacco. She reports current alcohol use. She reports current drug use. Frequency: 4.00 times per week. Drug: Marijuana Charmayne Stai). PHYSICAL EXAM     INITIAL VITALS: BP (!) 165/70   Pulse 87   Temp 98.2 °F (36.8 °C) (Oral)   Resp 16   Ht 5' 5\" (1.651 m)   Wt 190 lb (86.2 kg)   SpO2 93%   BMI 31.62 kg/m²   Gen:NAD  CVS: RRR  PUL:M CTA  MSK: Splint removed. There is edema to the hand and distal radius area, no obvious deformity at the wrist. There is tenderness to touch over the distal radius, but the arm compartments are soft. Extr: Capillary refill to the digits are appropriate. Radial pulse is strong. NEURO: A&OX3, fluent speech, ambulatory with a normal gait. Sensation intact in radial unlar and median dermatomes. Hand  strength reduced secondary to pain. MEDICAL DECISION MAKING:     MDM    63 y.o. female presenting with arm pain after a distal radius fx. XR from 22 reviewed. The splint was removed. Her physical examination is not suggestive of compartment syndrome. She is neurovascularly intact. Splint reapplied by the nurse, pain is relieved. Capillary refill and sesnation in fingers appropriate after reapplication of splint. Recommended close follow up with her orthopedic physician doctor, return to ED if symptoms worsen, and warning precautions provided. DIAGNOSTIC RESULTS   RADIOLOGY:All plain film, CT, MRI, and formal ultrasound images (except ED bedside ultrasound) are read by the radiologist and the images and interpretations are directly viewed by the emergency physician.    EXAMINATION:   3 XRAY VIEWS OF THE RIGHT WRIST; THREE XRAY VIEWS OF THE RIGHT ELBOW; THREE XRAY VIEWS OF THE LEFT KNEE       8/8/2022 10:38 am       COMPARISON:   Left knee radiographs from 08/03/2022, right wrist radiograph from 09/08/2009       HISTORY:   ORDERING SYSTEM PROVIDED HISTORY: pain   TECHNOLOGIST PROVIDED HISTORY:   pain   Reason for Exam: Pt. states pain in rt. wrist, rt, elbow, left knee s/p fall       79-year-old female with right wrist, right elbow and left knee pain after a   fall       FINDINGS:   Right wrist:       Mild degenerative change of the 1st CMC and triscaphe joints. Osseous alignment is normal.  No marginal erosions are identified. Acute impacted fracture through the distal radius with slight articular   offset. No dislocation. Moderate soft tissue edema and soft tissue swelling   about the wrist and distal right forearm. Scaphoid appears grossly intact. Right elbow:       Mild degenerative changes of the ulnohumeral and radiohumeral joints. Mild   enthesopathy at the lateral epicondyle. No sail sign or joint effusion. Radial head and radial neck appear intact. Small area of ossification or   calcification at the outer margin of the radial head. No acute fracture or   dislocation. Left knee:       No sizable joint effusion is identified. Osseous alignment is normal.  Joint spaces are well maintained. No acute   fracture or gross dislocation is seen. No suspicious osteolytic or   osteoblastic lesions are identified. Mild soft tissue swelling along the anterior knee. EMERGENCY DEPARTMENT COURSE:   Vitals:    Vitals:    08/13/22 0033   BP: (!) 165/70   Pulse: 87   Resp: 16   Temp: 98.2 °F (36.8 °C)   TempSrc: Oral   SpO2: 93%   Weight: 190 lb (86.2 kg)   Height: 5' 5\" (1.651 m)       The patient was given the following medications while in the emergency department:  No orders of the defined types were placed in this encounter.     -------------------------  CRITICAL CARE:   CONSULTS: None  PROCEDURES: Procedures     FINAL IMPRESSION      1.  Left arm pain          DISPOSITION/PLAN   DISPOSITION Decision To Discharge 08/13/2022 02:18:51 AM      PATIENT REFERRED TO:  Parag Rees MD  [place] [address], [address] [place] [id number]  [phone]    In 3 days      [facility] ED  [address]  [address] [facility]  [phone]    If symptoms worsen    DISCHARGE MEDICATIONS:  Discharge Medication List as of 8/13/2022  3:03 AM            Radha Gomez MD  Attending Emergency Physician                      Radha Gomez MD  08/14/22 [id number]       Radha Gomez MD  08/18/22 6421

## 2022-08-13 NOTE — ED TRIAGE NOTES
Pt brought in by ems c/o \"feeling numb all over. \" Pt worried about a stroke. Pt very lethargic at bedside. Admits to taking a hit of her bong. States she normally smokes weed but \"this was different. \" Pt A/O x4.

## 2022-08-13 NOTE — ED NOTES
Pt arrived to ED with complaints of splint being too tight and causing her discomfort.           Bette, [address] [facility]  08/13/22 5770

## 2022-08-13 NOTE — ED NOTES
Re splinted pts splint. Pt states \"it feels better\".      [facility]  08/13/22 0087

## 2022-08-13 NOTE — ED PROVIDER NOTES
[address] [facility] ED  Emergency Department Encounter  Emergency Medicine Resident     Pt Itzel Rose  MRN: [id number]  [place] 1960  Date of evaluation: 8/13/22  PCP:  Evaristo Stein MD      [address]       Chief Complaint   Patient presents with    Numbness       HISTORY OF PRESENT ILLNESS  (Location/Symptom, Timing/Onset, Context/Setting, Quality, Duration, Modifying Factors, Severity.)      Salbador Rivera is a 63 y.o. female who presents with complaints of generalized, whole body numbness described as decreased sensation and tingling status post smoking marijuana oil around 1700 that is gradually improving since arrival.  Denies any pain. Does note some cough that has been ongoing over the last several days and has been treated for pneumonia but stopped Augmentin due to diarrhea as instructed by her PCP. Denied any fever, chills, vomiting. Did note some nausea since smoking marijuana. Denied any alcohol use. No chest pain or shortness of breath. Does have history of neurogenic bladder and intermittently self caths. Notes some dysuria over the past couple days as well. No recent trauma, falls. No headache, vision changes, neck pain. Denies any similar reactions from when she has smoked marijuana in the past.  Notes that she smokes marijuana for recreation and pain relief.     PAST MEDICAL / SURGICAL / SOCIAL / FAMILY HISTORY      has a past medical history of Anesthesia complication, Anxiety, Arthritis, Back pain, CAD (coronary artery disease), Caffeine use, Cataract, COPD (chronic obstructive pulmonary disease) ([address] Deafness, Depression, Diabetes mellitus ([address] Diarrhea, Diverticulosis, Fibromyalgia, Gastroparalysis, GERD (gastroesophageal reflux disease), Hearing loss, Hyperlipidemia, Hyperlipidemia, Hypertension, Incontinence, MDRO (multiple drug resistant organisms) resistance, Neurogenic bladder, Neuropathy, Obesity, Osteoarthritis, Peripheral vascular disease, unspecified ([facility] Restless leg syndrome, Rhabdomyolysis, Spinal stenosis, thoracic, Stroke ([facility] Thyroid disease, TMJ (dislocation of temporomandibular joint), Trigeminal neuralgia, and Type II or unspecified type diabetes mellitus without mention of complication, not stated as uncontrolled. Reviewed with patient     has a past surgical history that includes Cholecystectomy; Colon surgery; Tonsillectomy; Tubal ligation; Carpal tunnel release (Right); Middle ear surgery (Left); cyst removal; cyst removal; Cholecystectomy, open; Finger trigger release (Right); Colonoscopy; Abdomen surgery; polypectomy; Cystocopy; Cataract removal; Incisional hernia repair (07/07/15); Upper gastrointestinal endoscopy (07/13/2016); Colonoscopy (07/13/2016); eye surgery (Right); Incisional hernia repair (10/17/2017); pr repair incisional hernia,reducible (N/A, 10/17/2017); Upper gastrointestinal endoscopy (07/23/2018); Upper gastrointestinal endoscopy (N/A, 7/23/2018); Upper gastrointestinal endoscopy (06/01/2020); Colonoscopy (06/01/2020); Upper gastrointestinal endoscopy (N/A, 6/1/2020); sigmoidoscopy (N/A, 6/1/2020); Colonoscopy (07/07/2020); Colonoscopy (N/A, 7/7/2020); and Leg biopsy excision (Left, 7/6/2021).   Reviewed with patient    Social History     Socioeconomic History    Marital status:      Spouse name: Not on file    Number of children: 0    Years of education: 14    Highest education level: Not on file   Occupational History    Occupation: disability     Employer: N/A   Tobacco Use    Smoking status: Never    Smokeless tobacco: Never   Vaping Use    Vaping Use: Never used   Substance and Sexual Activity    Alcohol use: Yes     Comment: once or twice a week    Drug use: Yes     Frequency: 4.0 times per week     Types: Marijuana Drea Postin)    Sexual activity: Yes     Partners: Male     Comment: 1 partner   Other Topics Concern    Not on file   Social History Narrative    Not on file     Social Determinants of Health     Financial Resource Strain: Low Risk     Difficulty of Paying Living Expenses: Not very hard   Food Insecurity: No Food Insecurity    Worried About Running Out of Food in the Last Year: Never true    Ran Out of Food in the Last Year: Never true   Transportation Needs: No Transportation Needs    Lack of Transportation (Medical): No    Lack of Transportation (Non-Medical): No   Physical Activity: Inactive    Days of Exercise per Week: 0 days    Minutes of Exercise per Session: 0 min   Stress: Not on file   Social Connections: Not on file   Intimate Partner Violence: Not on file   Housing Stability: Not on file       Family History   Problem Relation Age of Onset    High Blood Pressure Mother     Migraines Mother     High Blood Pressure Father     Heart Disease Father     Cancer Father         skin    Diabetes Maternal Grandmother     Heart Disease Maternal Grandmother     Cancer Maternal Grandmother     Parkinsonism Maternal Grandmother     Cancer Paternal Grandmother     Other Brother         epilepsy       Allergies:  Fioricet [butalbital-apap-caffeine], Betamethasone, Butalbital-apap-caff-cod, Erythromycin, Xarelto [rivaroxaban], Codeine, Droperidol, and Tape [adhesive tape]    Home Medications:  Prior to Admission medications    Medication Sig Start Date End Date Taking? Authorizing Provider   doxycycline hyclate (VIBRA-TABS) 100 MG tablet Take 1 tablet by mouth in the morning and 1 tablet before bedtime. Do all this for 5 days. 8/13/22 8/18/22 Yes Gabriela Jordan MD   amLODIPine (NORVASC) 5 MG tablet Take 1 tablet by mouth once daily 8/4/22   Elfredia Habermann, MD   clopidogrel (PLAVIX) 75 MG tablet Take 1 tablet by mouth in the morning. 8/4/22   Elfredia Habermann, MD   amitriptyline (ELAVIL) 100 MG tablet TAKE 1 TABLET BY MOUTH NIGHTLY 8/4/22   Elfredia Habermann, MD   gabapentin (NEURONTIN) 800 MG tablet Take 1 tablet by mouth 2 times daily for 180 days.  6/21/22 12/18/22  Jesus Galdamez MD metoclopramide (REGLAN) 10 MG tablet Take 1 tablet by mouth 2 times daily 6/21/22 7/28/22  Georgia Canas MD   simvastatin (ZOCOR) 20 MG tablet TAKE 1 TABLET BY MOUTH NIGHTLY 6/2/22   Historical Provider, MD   topiramate (TOPAMAX) 25 MG tablet Take 1 tablet by mouth 2 times daily 5/18/22   Swathi Pimentel MD   atorvastatin (LIPITOR) 20 MG tablet Take 1 tablet by mouth daily 5/5/22 7/28/22  Georgia Canas MD   DULoxetine (CYMBALTA) 60 MG extended release capsule Take 60 mg by mouth at bedtime 4/14/22   Historical Provider, MD   aspirin 81 MG tablet Take 1 tablet by mouth daily  Patient not taking: Reported on 8/4/2022 10/17/20   Shari Ospina MD   metoprolol (LOPRESSOR) 100 MG tablet Take 50 mg by mouth in the morning and 50 mg before bedtime. Historical Provider, MD   insulin aspart (NOVOLOG PENFILL) 100 UNIT/ML injection cartridge Inject into the skin continuous Per insulin pump    Historical Provider, MD   pantoprazole (PROTONIX) 40 MG tablet Take 40 mg by mouth 2 times daily    Historical Provider, MD   losartan (COZAAR) 100 MG tablet TAKE 1 TABLET BY MOUTH ONCE DAILY 5/14/19   Nahun Jacques MD   rOPINIRole (REQUIP) 2 MG tablet Take 1 tablet by mouth daily 4/16/19   Nahun Jacques MD   BREO ELLIPTA 200-25 MCG/INH AEPB Inhale 1 puff into the lungs daily  10/7/17   Historical Provider, MD   tiotropium (Lurlean Founds) 18 MCG inhalation capsule Inhale 1 capsule into the lungs Daily 6/19/17   Luis Angel Franco MD   ONE TOUCH ULTRA TEST strip  6/7/17   Historical Provider, MD       REVIEW OF SYSTEMS    (2-9 systems for level 4, 10 or more for level 5)      Review of Systems   Constitutional:  Negative for chills and fever. HENT:  Negative for congestion and rhinorrhea. Eyes:  Negative for photophobia and visual disturbance. Respiratory:  Positive for cough. Negative for shortness of breath and wheezing. Cardiovascular:  Negative for chest pain and palpitations.    Gastrointestinal: Positive for nausea. Negative for abdominal pain and vomiting. Genitourinary:  Positive for dysuria. Negative for frequency. Musculoskeletal:  Negative for back pain and neck pain. Skin:  Negative for rash and wound. Neurological:  Positive for numbness. Negative for dizziness, syncope, weakness, light-headedness and headaches. PHYSICAL EXAM   (up to 7 for level 4, 8 or more for level 5)      INITIAL VITALS:   /61   Pulse 76   Temp 97.9 °F (36.6 °C) (Oral)   Resp 18   SpO2 94% Comment: Pt was placed on 2L of O2 and SpO2 went to 94. Physical Exam  Vitals and nursing note reviewed. Constitutional:       General: She is not in acute distress. Comments: Appears intoxicated   HENT:      Head: Atraumatic. Right Ear: External ear normal.      Left Ear: External ear normal.      Nose: Nose normal.      Mouth/Throat:      Mouth: Mucous membranes are moist.      Pharynx: Oropharynx is clear. Eyes:      Extraocular Movements: Extraocular movements intact. Conjunctiva/sclera: Conjunctivae normal.      Pupils: Pupils are equal, round, and reactive to light. Cardiovascular:      Rate and Rhythm: Normal rate and regular rhythm. Pulses: Normal pulses. Pulmonary:      Effort: Pulmonary effort is normal. No respiratory distress. Breath sounds: Normal breath sounds. No wheezing or rales. Abdominal:      Palpations: Abdomen is soft. Tenderness: There is no abdominal tenderness. There is no guarding or rebound. Musculoskeletal:         General: Normal range of motion. Cervical back: Normal range of motion. No tenderness. Right lower leg: No edema. Left lower leg: No edema. Skin:     General: Skin is warm and dry. Capillary Refill: Capillary refill takes less than 2 seconds. Neurological:      General: No focal deficit present. Mental Status: She is alert and oriented to person, place, and time. Cranial Nerves: No cranial nerve deficit. Sensory: No sensory deficit. Motor: No weakness. DIFFERENTIAL  DIAGNOSIS     PLAN (LABS / IMAGING / EKG):  Orders Placed This Encounter   Procedures    XR CHEST PORTABLE    CBC with Auto Differential    Urinalysis with Reflex to Culture    SPECIMEN REJECTION    Basic Metabolic Panel    Microscopic Urinalysis    Straight cath    POCT Glucose    POC Glucose Fingerstick       MEDICATIONS ORDERED:  Orders Placed This Encounter   Medications    doxycycline monohydrate (MONODOX) capsule 100 mg     Order Specific Question:   Antimicrobial Indications     Answer:   Pneumonia (CAP)     Order Specific Question:   CAP duration of therapy     Answer:   5 days    potassium chloride (KLOR-CON M) extended release tablet 40 mEq    DISCONTD: calcium gluconate tablet 1,000 mg    doxycycline hyclate (VIBRA-TABS) 100 MG tablet     Sig: Take 1 tablet by mouth in the morning and 1 tablet before bedtime. Do all this for 5 days.      Dispense:  10 tablet     Refill:  0    calcium gluconate 10 % injection 1,000 mg       DDX: Pneumonia, UTI, electrolyte abnormality, intoxication    DIAGNOSTIC RESULTS / EMERGENCY DEPARTMENT COURSE / MDM   LAB RESULTS:  Results for orders placed or performed during the hospital encounter of 08/13/22   CBC with Auto Differential   Result Value Ref Range    WBC 8.4 3.5 - 11.0 k/uL    RBC 4.07 4.0 - 5.2 m/uL    Hemoglobin 11.9 (L) 12.0 - 16.0 g/dL    Hematocrit 36.0 36 - 46 %    MCV 88.3 80 - 100 fL    MCH 29.3 26 - 34 pg    MCHC 33.2 31 - 37 g/dL    RDW 14.0 11.5 - 14.9 %    Platelets 570 297 - 279 k/uL    MPV 7.9 6.0 - 12.0 fL    Seg Neutrophils 63 36 - 66 %    Lymphocytes 25 24 - 44 %    Monocytes 8 (H) 1 - 7 %    Eosinophils % 3 0 - 4 %    Basophils 1 0 - 2 %    Segs Absolute 5.30 1.3 - 9.1 k/uL    Absolute Lymph # 2.10 1.0 - 4.8 k/uL    Absolute Mono # 0.70 0.1 - 1.3 k/uL    Absolute Eos # 0.20 0.0 - 0.4 k/uL    Basophils Absolute 0.10 0.0 - 0.2 k/uL   Urinalysis with Reflex to Culture Specimen: Urine   Result Value Ref Range    Color, UA Yellow Yellow    Turbidity UA Clear Clear    Glucose, Ur NEGATIVE NEGATIVE    Bilirubin Urine NEGATIVE NEGATIVE    Ketones, Urine NEGATIVE NEGATIVE    Specific Brewer, UA 1.011 1.000 - 1.030    Urine Hgb NEGATIVE NEGATIVE    pH, UA 6.0 5.0 - 8.0    Protein, UA NEGATIVE NEGATIVE    Urobilinogen, Urine Normal Normal    Nitrite, Urine NEGATIVE NEGATIVE    Leukocyte Esterase, Urine TRACE (A) NEGATIVE   SPECIMEN REJECTION   Result Value Ref Range    Specimen Source . BLOOD     Ordered Test BMP     Reason for Rejection Unable to perform testing: Specimen hemolyzed. Basic Metabolic Panel   Result Value Ref Range    Glucose 66 (L) 70 - 99 mg/dL    BUN 15 6 - 20 mg/dL    Creatinine 0.69 0.50 - 0.90 mg/dL    Calcium 7.4 (L) 8.6 - 10.4 mg/dL    Sodium 141 135 - 144 mmol/L    Potassium 3.2 (L) 3.7 - 5.3 mmol/L    Chloride 110 (H) 98 - 107 mmol/L    CO2 22 20 - 31 mmol/L    Anion Gap 9 9 - 17 mmol/L    GFR Non-African American >60 >60 mL/min    GFR African American >60 >60 mL/min    GFR Comment         Microscopic Urinalysis   Result Value Ref Range    WBC, UA 6 TO 9 /HPF    RBC, UA 0 TO 2 /HPF    Casts UA 0 TO 2 /LPF    Epithelial Cells UA 0 TO 2 /HPF    Bacteria, UA None None    Yeast, UA FEW (A) None   POCT Glucose   Result Value Ref Range    Glucose 77 mg/dL   POC Glucose Fingerstick   Result Value Ref Range    POC Glucose 77 65 - 105 mg/dL       IMPRESSION: Atypical pneumonia, hypokalemia, hypocalcemia, intoxication likely from marijuana    RADIOLOGY:  XR CHEST PORTABLE   Final Result   Low lung volumes with stable subtle peripheral ground-glass opacities   concerning for multifocal pneumonia. EMERGENCY DEPARTMENT COURSE:  61-year-old female, history of marijuana use and neurogenic bladder, presented to ED with complaints of whole body generalized numbness and tingling status post smoking marijuana oil at 1700 that is now gradually improving.   Denied any focal numbness, weakness, tingling. No trauma. Did note some dysuria and recently treated for pneumonia for which patient stopped taking Augmentin partway through the course due to side effects from the medication. On exam, afebrile, nontachycardic, pupils equal round reactive to light, lungs clear, abdomen soft and nontender, neuro exam nonfocal.  No evidence of acute stroke based on neuro exam.  Chest x-ray consistent with possible atypical pneumonia. Patient is allergic to erythromycin so plan to treat with doxycycline. Patient also had electrolyte abnormality with hypocalcemia and hypokalemia that was treated in ED. Numbness improved during ED stay. Patient noted she felt back at baseline and was tolerating p.o.  UA did show some trace leukocyte esterase but no bacteria so less likely UTI. Patient instructed to return for any return of numbness, weakness, tingling, abdominal pain, difficulty breathing, chest pain. Instructed follow-up with PCP. ED Course as of 22 0407   Sat Aug 13, 2022   2127 Low lung volumes with stable subtle peripheral ground-glass opacities  concerning for multifocal pneumonia. [AR]    WBC: 8.4 [AR]   2205 Leukocyte Esterase, Urine(!): TRACE [AR]   4337 WBC, UA: 6 TO 9 [AR]   2205 Epithelial Cells, UA: 0 TO 2 [AR]   2205 Bacteria, UA: None [AR]   2313 Numbness resolved. Patient noted history of hives from erythromycin topical in past.  Plan to do doxycycline. [AR]   2317 CALCIUM, SERUM, 807132(!): 7.4 [AR]   2317 Potassium(!): 3.2 [AR]   2355 GLUCOSE - FASTIN [AR]   2355 Patient tolerating p.o. [AR]      ED Course User Index  [AR] Gabriela Jordan MD       No notes of EC Admission Criteria type on file. PROCEDURES:  None    CONSULTS:  None          FINAL IMPRESSION      1. Atypical pneumonia    2. Hypokalemia    3.  Hypocalcemia          DISPOSITION / PLAN     DISPOSITION Decision To Discharge 2022 11:13:00 PM      PATIENT REFERRED TO:  Violet Hart Lien Garza MD  [facility] [address]  [address]  [phone]    In 3 days      [facility] ED  [address]  [address] [address]  [phone]    As needed    DISCHARGE MEDICATIONS:  Discharge Medication List as of 8/13/2022 11:28 PM        START taking these medications    Details   doxycycline hyclate (VIBRA-TABS) 100 MG tablet Take 1 tablet by mouth in the morning and 1 tablet before bedtime. Do all this for 5 days. , Disp-10 tablet, R-0Print             Juan R Smith MD  Emergency Medicine Resident    (Please note that portions of thisnote were completed with a voice recognition program.  Efforts were made to edit the dictations but occasionally words are mis-transcribed.)      Carlos Barahona MD  Resident  08/14/22 Renee Cordero MD  Resident  08/14/22 Renee Cordero MD  Resident  08/14/22 [id number]

## 2022-08-14 LAB — GLUCOSE BLD-MCNC: 77 MG/DL (ref 65–105)

## 2022-08-14 PROCEDURE — 6360000002 HC RX W HCPCS

## 2022-08-14 RX ADMIN — Medication 1000 MG: at 00:20

## 2022-08-14 ASSESSMENT — ENCOUNTER SYMPTOMS
VOMITING: 0
NAUSEA: 1
COUGH: 1
BACK PAIN: 0
PHOTOPHOBIA: 0
WHEEZING: 0
ABDOMINAL PAIN: 0
SHORTNESS OF BREATH: 0
RHINORRHEA: 0

## 2022-08-14 NOTE — ED PROVIDER NOTES
[address]  eMERGENCY dEPARTMENT eNCOUnter   Attending Attestation     Pt Name: Alvino Thomas  MRN: [id number]  [place] 1960  Date of evaluation: 8/13/22    History, EXAM, MDM:    Alvino Thomas is a 63 y.o. female who presents with Numbness  Reports that she was smoking marijuana earlier and then developed whole body numbness. This started around 5 PM and the course has been improving over the last few hours. On physical examination there is no sign of any acute ischemic stroke. She does have a splint on her right upper extremity. She was seen here in the emergency department last night because the splint was too tight. There is no sign of compartment syndrome. She has good capillary refill in her digits. Her electrolytes were checked and there are no significant abnormalities. He was also complaining of some dysuria and so we checked a urinalysis. The patient did have temporary reading of hypoxia. But on repeat assessment this had improved without any specific intervention and she is saturating 94% on room air. The chest x-ray does show sign of a multifocal pneumonia. She does not have any sirs criteria to suspect sepsis. She is breathing comfortably will put her on a course of oral antibiotics. Vitals:   Vitals:    08/13/22 1908 08/13/22 2100 08/13/22 2103   BP: 137/61     Pulse: 76     Resp: 18     Temp: 97.9 °F (36.6 °C)     TempSrc: Oral     SpO2: 97% (!) 86% 94%     I performed a history and physical examination of the patient and discussed management with the resident. I reviewed the residents note and agree with the documented findings and plan of care. Any areas of disagreement are noted on the chart. I was personally present for the key portions of any procedures. I have documented in the chart those procedures where I was not present during the key portions. I have personally reviewed all images and agree with the resident's interpretation.  I have reviewed the emergency nurses triage note. I agree with the chief complaint, past medical history, past surgical history, allergies, medications, social and family history as documented unless otherwise noted below. Documentation of the HPI, Physical Exam and Medical Decision Making performed by medical students or scribes is based on my personal performance of the HPI, PE and MDM. For Phys Assistant/ Nurse Practitioner cases/documentation I have had a face to face evaluation of this patient and have completed at least one if not all key elements of the E/M (history, physical exam, and MDM). Additional findings are as noted.     Mohsen Dooley MD  Attending Emergency  Physician                Mohsen Dooley MD  08/15/22 0058

## 2022-08-14 NOTE — DISCHARGE INSTRUCTIONS
Thank you for visiting [facility] Emergency Department. You need to call Charles Lozada MD to make an appointment as directed for follow up. Return to emergency department for any new or worrisome symptoms including any chest pain, shortness of breath, return of numbness, vomiting, fever. Take all antibiotics even if you start feeling better.

## 2022-08-16 ENCOUNTER — OFFICE VISIT (OUTPATIENT)
Dept: ORTHOPEDIC SURGERY | Age: 62
End: 2022-08-16
Payer: MEDICARE

## 2022-08-16 VITALS — BODY MASS INDEX: 31.65 KG/M2 | HEIGHT: 65 IN | RESPIRATION RATE: 14 BRPM | WEIGHT: 190 LBS

## 2022-08-16 DIAGNOSIS — S52.571P OTHER CLOSED INTRA-ARTICULAR FRACTURE OF DISTAL END OF RIGHT RADIUS WITH MALUNION, SUBSEQUENT ENCOUNTER: Primary | ICD-10-CM

## 2022-08-16 PROCEDURE — 99024 POSTOP FOLLOW-UP VISIT: CPT | Performed by: PHYSICIAN ASSISTANT

## 2022-08-16 RX ORDER — HYDROCODONE BITARTRATE AND ACETAMINOPHEN 5; 325 MG/1; MG/1
1 TABLET ORAL EVERY 6 HOURS PRN
Qty: 10 TABLET | Refills: 0 | Status: SHIPPED | OUTPATIENT
Start: 2022-08-16 | End: 2022-08-19

## 2022-08-16 NOTE — PROGRESS NOTES
[address] [facility], [address] [facility] [address], [address] [facility], [address] [facility]           Dept Phone:            Dept Fax:  [phone] [facility] [address] [facility]           Jennifer Saunders          Dept Phone: [phone]           Dept Fax:  [phone]      Chief Compliant:  Chief Complaint   Patient presents with    Wrist Injury     Rt distal radius fx 8/822        History of Present Illness:  Jv Roe returns today. This is a 63 y.o. female who presents to the clinic today for follow up of intra-articular right distal radius fracture. Date of injury occurred on 8/8/2022. Initially seen by our clinic on 8/10/2022 but patient had significant swelling at that time so a new sugar-tong splint was placed and patient was instructed to return today for reevaluation with repeat x-rays and likely casting versus discussion of ORIF given the intra-articular step-off. Patient returns today stating that she did go to the ED on 8/13/2022 for increased pain in the arm and new splint was placed however since then she has tolerated well. She returns today denying new injury or trauma does continue to note pain but reports her swelling and bruising have improved. No significant numbness or ting on this right side. Review of Systems   Constitutional: Negative for fever, chills, sweats, recent illness, or recent injury. Neurological: Negative for headaches, numbness, or weakness. Integumentary: Negative for rash, itching, ecchymosis, abrasions, or laceration. Musculoskeletal: Positive for Wrist Injury (Rt distal radius fx 8/822)       Physical Exam:  Constitutional: Patient is oriented to person, place, and time. Patient appears well-developed and well nourished.    Musculoskeletal:    General:   alert, appears stated age, and cooperative   Gait:    Normal   Right Wrist  splint Condition:  good, removed today for further evaluation   Circulation:   warm, well perfused, brisk capillary refill distal to the injury   Skin:  Patient with linear abrasion to the proximal forearm near the olecranon process. No abrasions lacerations distally near the wrist fracture site. Swelling:  present - mildly in the hand   Deformity:  There is not an obvious deformity of the wrist   Finger ROM:   normal   Sensation:   intact to light touch   Tenderness:    Point tenderness to the distal radius fracture site. No tenderness about the hands, metacarpal or phalanges. No tenderness to radial styloid, anatomic snuffbox or scaphoid tubercle     Neurological: Patient is alert and oriented to person, place, and time. Normal strenght. No sensory deficit. Skin: Skin is warm and dry  Psychiatric: Behavior is normal. Thought content normal.  Nursing note and vitals reviewed. Labs and Imaging:     XR taken today:  No results found. X-rays taken in clinic and preliminarily reviewed by me 8/16/22:   3 views of the right wrist again demonstrate a intra-articular distal radius fracture with slight intra-articular step-off. Compared with x-rays on 8/8/2022 there does appear to be some slight increase step-off. No evidence of significant volar or dorsal angulation. Assessment and Plan:  1. Other closed intra-articular fracture of distal end of right radius, initial encounter              PLAN:  This is a 63 y.o. female who presents to the clinic today for follow up right distal radius fracture which occurred on 8/8/2022. Initially seen on 8/10/2020 clinic but was too swollen for circumferential casting so a splint was maintained returns today for reevaluation with repeat x-rays. Dr. BRODY [facility] was available for review of these images recommended further diagnostic evaluation with a CT of the wrist due to concern that this may require surgery due to the amount of step-off.   Patient is educated on the nature

## 2022-08-16 NOTE — LETTER
[address]  [address] [facility] [address]  [address] [facility] [place] [address] [address]  Phone: [phone]  Fax: [address] [place], [place]        August 16, 2022     Patient: Edinson Sheriff   YOB: 1960   Date of Visit: 8/16/2022       To Whom It May Concern: It is my medical opinion that Chavo Amirah should remain out of work from 8/8/2022 until 9/12/2022. If you have any questions or concerns, please don't hesitate to call.     Sincerely,          SALOMON Walters

## 2022-08-17 ENCOUNTER — TELEPHONE (OUTPATIENT)
Dept: ORTHOPEDIC SURGERY | Age: 62
End: 2022-08-17

## 2022-08-17 ENCOUNTER — HOSPITAL ENCOUNTER (OUTPATIENT)
Dept: CT IMAGING | Age: 62
Discharge: HOME | End: 2022-08-19
Payer: MEDICARE

## 2022-08-17 ENCOUNTER — TELEPHONE (OUTPATIENT)
Dept: INTERNAL MEDICINE CLINIC | Age: 62
End: 2022-08-17

## 2022-08-17 DIAGNOSIS — S52.571P OTHER CLOSED INTRA-ARTICULAR FRACTURE OF DISTAL END OF RIGHT RADIUS WITH MALUNION, SUBSEQUENT ENCOUNTER: ICD-10-CM

## 2022-08-17 PROCEDURE — 73200 CT UPPER EXTREMITY W/O DYE: CPT

## 2022-08-17 NOTE — TELEPHONE ENCOUNTER
FU appts/Provider:    Future Appointments   Date Time Provider Reji Jay   9/19/2022  3:30 PM MD Petr [facility]   11/2/2022  3:45 PM SALOMON [facility]   11/30/2022  2:00 PM SAL[facility]ON [facility]         VOICEMAIL DOCUMENTATION - ERASE IF NOT USED  Hi, this message is for Dennis. This is Nazario Rushing from [facility] office. Just calling to see how you are doing after your recent visit to the Emergency Room. [facility] wants to make sure you were able to fill any prescriptions and that you understand your discharge instructions. Please return our call if you need to make a follow up appointment with your provider or have any further needs. Our phone number is [phone]. Have a great day.

## 2022-08-17 NOTE — TELEPHONE ENCOUNTER
Patient returned phone call and state she is feeling better. She only needs Metoprolol. She also will like to know with potential surgery putting pin in wrist she will like to know if she is safe to proceed due to her having pneumonia?

## 2022-08-17 NOTE — TELEPHONE ENCOUNTER
Patient is calling to let Curt Chacandice know her STAT CT of her right wrist was done this morning. Thank you.

## 2022-08-17 NOTE — TELEPHONE ENCOUNTER
Ely Ruano is calling to request a refill on the following medication(s):    Medication Request:  Requested Prescriptions     Pending Prescriptions Disp Refills    metoprolol (LOPRESSOR) 100 MG tablet 60 tablet      Sig: Take 0.5 tablets by mouth 2 times daily       Last Visit Date (If Applicable):  6/5/6971    Next Visit Date:    9/19/2022

## 2022-08-19 RX ORDER — METOPROLOL TARTRATE 100 MG/1
50 TABLET ORAL 2 TIMES DAILY
Qty: 60 TABLET | Refills: 0 | Status: SHIPPED | OUTPATIENT
Start: 2022-08-19

## 2022-08-22 ENCOUNTER — TELEPHONE (OUTPATIENT)
Dept: ORTHOPEDIC SURGERY | Age: 62
End: 2022-08-22

## 2022-08-22 DIAGNOSIS — S52.571P OTHER CLOSED INTRA-ARTICULAR FRACTURE OF DISTAL END OF RIGHT RADIUS WITH MALUNION, SUBSEQUENT ENCOUNTER: Primary | ICD-10-CM

## 2022-08-23 DIAGNOSIS — S52.571P OTHER CLOSED INTRA-ARTICULAR FRACTURE OF DISTAL END OF RIGHT RADIUS WITH MALUNION, SUBSEQUENT ENCOUNTER: Primary | ICD-10-CM

## 2022-08-23 NOTE — TELEPHONE ENCOUNTER
Spoke with  for patient CT results order in for  at [address] [place] [address]. Patient given number

## 2022-08-23 NOTE — TELEPHONE ENCOUNTER
Patient called and was inquiring about her CT scan results- she would like a call back at [phone], she is concerned if she would need pins in her right wrist or not, kahlil you

## 2022-08-24 ENCOUNTER — OFFICE VISIT (OUTPATIENT)
Dept: ORTHOPEDIC SURGERY | Age: 62
End: 2022-08-24
Payer: MEDICARE

## 2022-08-24 VITALS — WEIGHT: 190 LBS | BODY MASS INDEX: 31.65 KG/M2 | HEIGHT: 65 IN

## 2022-08-24 DIAGNOSIS — S52.571A OTHER CLOSED INTRA-ARTICULAR FRACTURE OF DISTAL END OF RIGHT RADIUS, INITIAL ENCOUNTER: Primary | ICD-10-CM

## 2022-08-24 PROCEDURE — 25600 CLTX DST RDL FX/EPHYS SEP WO: CPT | Performed by: STUDENT IN AN ORGANIZED HEALTH CARE EDUCATION/TRAINING PROGRAM

## 2022-08-24 PROCEDURE — 1111F DSCHRG MED/CURRENT MED MERGE: CPT | Performed by: STUDENT IN AN ORGANIZED HEALTH CARE EDUCATION/TRAINING PROGRAM

## 2022-08-24 PROCEDURE — G8417 CALC BMI ABV UP PARAM F/U: HCPCS | Performed by: STUDENT IN AN ORGANIZED HEALTH CARE EDUCATION/TRAINING PROGRAM

## 2022-08-24 PROCEDURE — G8427 DOCREV CUR MEDS BY ELIG CLIN: HCPCS | Performed by: STUDENT IN AN ORGANIZED HEALTH CARE EDUCATION/TRAINING PROGRAM

## 2022-08-24 PROCEDURE — 3017F COLORECTAL CA SCREEN DOC REV: CPT | Performed by: STUDENT IN AN ORGANIZED HEALTH CARE EDUCATION/TRAINING PROGRAM

## 2022-08-24 PROCEDURE — 1036F TOBACCO NON-USER: CPT | Performed by: STUDENT IN AN ORGANIZED HEALTH CARE EDUCATION/TRAINING PROGRAM

## 2022-08-24 PROCEDURE — 99213 OFFICE O/P EST LOW 20 MIN: CPT | Performed by: STUDENT IN AN ORGANIZED HEALTH CARE EDUCATION/TRAINING PROGRAM

## 2022-08-24 NOTE — PROGRESS NOTES
[address] [facility]  [facility] [place]note 2[facility] [facility] [address]  Dept: [phone]    Orthopaedic Trauma New Patient      CHIEF COMPLAINT:    Chief Complaint   Patient presents with    New Patient     Right wrist pain       HISTORY OF PRESENT ILLNESS:  The patient is a 63 y.o. female who is being seen as a new patient for right distal radius fracture. The patient reports that she sustained a right distal radius fracture after she fell in the parking lot at a center on August 8. She was initially placed in the sugar-tong splint and then transitioned into a short arm cast about a week ago by David Mata. She is now 16 days out from injury. She has maintained her cast.  She denies any new falls since her cast placement. Denies irritation/issues with the cast.  Denies any numbness or tingling. Works making [employer] at [employer] Past Medical History:    Past Medical History:   Diagnosis Date    Anesthesia complication     \"lung collapsed after colon surgery    Anxiety     Arthritis     Back pain     CAD (coronary artery disease)     no stents    Caffeine use     3 coffee / day    Cataract     left    COPD (chronic obstructive pulmonary disease) (HCC)     EMPHYSEMA    Deafness     right ear    Depression     Diabetes mellitus (HCC)     Diarrhea     Diverticulosis     Fibromyalgia     Gastroparalysis     GERD (gastroesophageal reflux disease)     Hearing loss     DEAF IN RIGHT EAR    Hyperlipidemia     Hyperlipidemia     Hypertension     Incontinence     MDRO (multiple drug resistant organisms) resistance 2012    mrsa (nasal), eye, ear    Neurogenic bladder     CATHES HERSELF 7 TIMES A DAY, IS ABLE TO URINATE ON OWN    Neuropathy     feet    Obesity     Osteoarthritis     Peripheral vascular disease, unspecified ([facility] [address]     Restless leg syndrome     Rhabdomyolysis     [facility] 1 week, May 2014, then [facility] for rehab.     Spinal stenosis, thoracic 05/27/2014    Stroke [facility]) 2012    numbness on the left side of face, Oct 2020 TIA     Thyroid disease     enlarged    TMJ (dislocation of temporomandibular joint)     Trigeminal neuralgia     Type II or unspecified type diabetes mellitus without mention of complication, not stated as uncontrolled        Past Surgical History:    Past Surgical History:   Procedure Laterality Date    ABDOMEN SURGERY      LAPAROSCOPY    CARPAL TUNNEL RELEASE Right     CATARACT REMOVAL      CHOLECYSTECTOMY      CHOLECYSTECTOMY, OPEN      11/2012    COLON SURGERY      benign mass removed    COLONOSCOPY      COLONOSCOPY  07/13/2016    COLONOSCOPY  06/01/2020    incomplete/poor prep    COLONOSCOPY  07/07/2020    COLONOSCOPY N/A 7/7/2020    COLORECTAL CANCER SCREENING, NOT HIGH RISK performed by Lyssa Oropeza MD at [address] [address]      sebaceous cyst, under arm left side x5    CYST REMOVAL      right ankle    CYSTOSCOPY      EYE SURGERY Right     HOLE IN RETINA REPAIRED    FINGER TRIGGER RELEASE Right     INCISIONAL HERNIA REPAIR  07/07/15    open    [address]  10/17/2017    LEG BIOPSY EXCISION Left 7/6/2021    EXCISION OF LEFT THIGH MASS performed by Lyssa Oropeza MD at [facility] Left     ear tube placement    POLYPECTOMY      colon polyp    [facility] REPAIR INCISIONAL HERNIA,REDUCIBLE N/A 10/17/2017    HERNIA INCISIONAL [facility] performed by Lyssa Oropeza MD at [address] [facility] N/A 6/1/2020    [address] [facility] performed by Lyssa Oropeza MD at [address]  07/13/2016    UPPER GASTROINTESTINAL ENDOSCOPY  07/23/2018    with biopsy    UPPER GASTROINTESTINAL ENDOSCOPY N/A 7/23/2018    EGD BIOPSY performed by Lyssa Oropeza MD at [address]  06/01/2020    with biopsy    UPPER GASTROINTESTINAL ENDOSCOPY N/A 6/1/2020    EGD BIOPSY performed by Lyssa Oropeza MD at [facility] OR       Current Medications:   Current Outpatient Medications   Medication Sig Dispense Refill    metoprolol (LOPRESSOR) 100 MG tablet Take 0.5 tablets by mouth 2 times daily 60 tablet 0    amLODIPine (NORVASC) 5 MG tablet Take 1 tablet by mouth once daily 90 tablet 1    clopidogrel (PLAVIX) 75 MG tablet Take 1 tablet by mouth in the morning. 90 tablet 0    amitriptyline (ELAVIL) 100 MG tablet TAKE 1 TABLET BY MOUTH NIGHTLY 90 tablet 0    gabapentin (NEURONTIN) 800 MG tablet Take 1 tablet by mouth 2 times daily for 180 days. 180 tablet 1    simvastatin (ZOCOR) 20 MG tablet TAKE 1 TABLET BY MOUTH NIGHTLY      topiramate (TOPAMAX) 25 MG tablet Take 1 tablet by mouth 2 times daily 60 tablet 3    DULoxetine (CYMBALTA) 60 MG extended release capsule Take 60 mg by mouth at bedtime      aspirin 81 MG tablet Take 1 tablet by mouth daily (Patient not taking: Reported on 8/4/2022) 30 tablet 0    insulin aspart (NOVOLOG PENFILL) 100 UNIT/ML injection cartridge Inject into the skin continuous Per insulin pump      pantoprazole (PROTONIX) 40 MG tablet Take 40 mg by mouth 2 times daily      losartan (COZAAR) 100 MG tablet TAKE 1 TABLET BY MOUTH ONCE DAILY 90 tablet 3    rOPINIRole (REQUIP) 2 MG tablet Take 1 tablet by mouth daily 90 tablet 3    BREO ELLIPTA 200-25 MCG/INH AEPB Inhale 1 puff into the lungs daily   6    tiotropium (SPIRIVA HANDIHALER) 18 MCG inhalation capsule Inhale 1 capsule into the lungs Daily 30 capsule 5    ONE TOUCH ULTRA TEST strip   1     No current facility-administered medications for this visit.        Allergies:    Fioricet [butalbital-apap-caffeine], Betamethasone, Butalbital-apap-caff-cod, Erythromycin, Xarelto [rivaroxaban], Codeine, Droperidol, and Tape [place] tape]    Social History:   Social History     Socioeconomic History    Marital status:      Spouse name: Not on file    Number of children: 0    Years of education: 14    Highest education level: Not on file   Occupational History    Occupation: disability     Employer: N/A   Tobacco Use    Smoking status: Never    Smokeless tobacco: Never   Vaping Use    Vaping Use: Never used   Substance and Sexual Activity    Alcohol use: Yes     Comment: once or twice a week    Drug use: Yes     Frequency: 4.0 times per week     Types: Marijuana Hood Viviane)    Sexual activity: Yes     Partners: Male     Comment: 1 partner   Other Topics Concern    Not on file   Social History Narrative    Not on file     Social Determinants of Health     Financial Resource Strain: Low Risk     Difficulty of Paying Living Expenses: Not very hard   Food Insecurity: No Food Insecurity    Worried About Running Out of Food in the Last Year: Never true    Ran Out of Food in the Last Year: Never true   Transportation Needs: No Transportation Needs    Lack of Transportation (Medical): No    Lack of Transportation (Non-Medical): No   Physical Activity: Inactive    Days of Exercise per Week: 0 days    Minutes of Exercise per Session: 0 min   Stress: Not on file   Social Connections: Not on file   Intimate Partner Violence: Not on file   Housing Stability: Not on file       Family History:  Family History   Problem Relation Age of Onset    High Blood Pressure Mother     Migraines Mother     High Blood Pressure Father     Heart Disease Father     Cancer Father         skin    Diabetes Maternal Grandmother     Heart Disease Maternal Grandmother     Cancer Maternal Grandmother     Parkinsonism Maternal Grandmother     Cancer Paternal Grandmother     Other Brother         epilepsy         REVIEW OF SYSTEMS:  Review of Systems    Gen: no fever, chills, malaise  CV: no chest pain or palpitations  Resp: no cough or shortness of breath  Neuro: no numbness, tingling, or weakness  Msk: Right wrist pain  10 remaining systems reviewed and negative    PHYSICAL EXAM:  Ht 5' 5\" (1.651 m)   Wt 190 lb (86.2 kg)   BMI 31.62 kg/m² Body mass index is 31.62 kg/m².   Physical Exam  Gen: alert and oriented, no acute distress  Psych: Appropriate affect; Appropriate knowledge base; Appropriate mood; No hallucinations  Head: normocephalic atraumatic   Chest: symmetric chest excursion  Ortho Exam  MSK: Short arm cast to right upper extremity is intact. No evidence of cast irritation. Cast is fitting well. Median/Radial/Ulnar/AIN/PIN motor intact. Median/Radial/Ulnar nerve SILT. Fingers Are warm and well-perfused with brisk capillary refill. Radiology:   History: Right distal radius fractures     Comparison: Films 8/16/2022    Findings: Views of the right wrist demonstrate a minimally displaced intra-articular distal radius fracture. Overall joint step-off is minimal.  Tilt and radial inclination is maintained. No displacement of fracture since prior radiographs. No blastic lesions. Impression: Minimally displaced distal radius fracture     ASSESSMENT:  63 y.o. female with right distal radius fracture    PLAN:  Patient with Geryl Binder today regarding her right distal radius fracture. Her fracture has minimal articular step-off and is well within non-operative tolerances. Moving forward with nonoperative treatment. The cast is in good condition therefore we will keep the cast on for another 3 weeks. We discussed that she should not lift anything heavier than a fork or pencil. Discussed removable wrist splint in 3 week. She is to follow-up in clinic in 3 weeks with repeat films out of cast.  At that time we will transition her into a removable wrist splint if alignment maintained. All questions answered. Patient is amenable this plan. Return in about 3 weeks (around 9/14/2022).       Electronically signed by Sheryle Kell, DO on8/24/2022 at 4:42 PM

## 2022-09-01 RX ORDER — METOCLOPRAMIDE 10 MG/1
10 TABLET ORAL 2 TIMES DAILY
Qty: 120 TABLET | Refills: 0 | OUTPATIENT
Start: 2022-09-01

## 2022-09-01 NOTE — LETTER
[address] [facility]  [address] [place] [address]  Dept: [phone]  Dept Fax: [phone]   Toll Free [phone]    [facility] [place] [address]          9/9/2022    Dear Ms. Salguero:        We were unable to reach you by phone. Please call our office at your earliest convenience. This message is regarding: other  Please call between the hours of 8:30am and 4:00pm Monday through Friday if possible. Our number is  [phone]  Thank you.        Office staff

## 2022-09-01 NOTE — TELEPHONE ENCOUNTER
Who prescribed her this medication was discontinued when she was in the hospital and who is her gastroenterologist

## 2022-09-01 NOTE — TELEPHONE ENCOUNTER
----- Message from Maria L Herrera sent at 9/1/2022 11:43 AM EDT -----  Subject: Refill Request    QUESTIONS  Name of Medication? Other - metoclopramide   Patient-reported dosage and instructions? 10 mg, twice daily   How many days do you have left? 0  [facility] [address]  Pharmacy phone number (if available)? [phone]  ---------------------------------------------------------------------------  --------------  CALL BACK INFO  What is the best way for the office to contact you? OK to leave message on   voicemail  Preferred Call Back Phone Number? [phone]  ---------------------------------------------------------------------------  --------------  SCRIPT ANSWERS  Relationship to Patient?  Self

## 2022-09-13 DIAGNOSIS — S52.571P OTHER CLOSED INTRA-ARTICULAR FRACTURE OF DISTAL END OF RIGHT RADIUS WITH MALUNION, SUBSEQUENT ENCOUNTER: Primary | ICD-10-CM

## 2022-09-14 ENCOUNTER — OFFICE VISIT (OUTPATIENT)
Dept: ORTHOPEDIC SURGERY | Age: 62
End: 2022-09-14

## 2022-09-14 VITALS — WEIGHT: 190 LBS | BODY MASS INDEX: 31.65 KG/M2 | HEIGHT: 65 IN

## 2022-09-14 DIAGNOSIS — S52.571D OTHER CLOSED INTRA-ARTICULAR FRACTURE OF DISTAL END OF RIGHT RADIUS WITH ROUTINE HEALING, SUBSEQUENT ENCOUNTER: Primary | ICD-10-CM

## 2022-09-14 PROCEDURE — 99024 POSTOP FOLLOW-UP VISIT: CPT | Performed by: STUDENT IN AN ORGANIZED HEALTH CARE EDUCATION/TRAINING PROGRAM

## 2022-09-14 NOTE — LETTER
[facility]  [address] [facility] [address] [facility]  Phone: [phone]  Fax: [phone]    Jr Guajardo DO        September 14, 2022     Patient: Corrinne Augusta   YOB: 1960   Date of Visit: 9/14/2022       To Whom It May Concern: It is my medical opinion that  Pill may return to light duty immediately with the following restrictions: lifting/carrying not to exceed 5 lbs. , pushing/pulling not to exceed 5 lbs. .    If you have any questions or concerns, please don't hesitate to call.     Sincerely,        Gonzalo Pal DO

## 2022-09-14 NOTE — PROGRESS NOTES
[address] [facility]  [address] [place] [address] Juan Reeder  Dept: [phone]  Dept Fax: [phone]        Orthopaedic Trauma Clinic Follow Up      Subjective:   Date of Injury: 8/8/22    Donald Squires is a 63 y.o. right hand dominant female who presents to the clinic today for routine followup regarding her right distal radius fracture which has been managed nonoperatively. Patient is now 5 weeks out from injury and is being seen today out of the cast. She reports some stiffness and soreness in the wrist secondary to cast placement. Denies any numbness/tingling in the extremity. She does acknowledge that she has not specifically adhered to instructions of minimal weight bearing and estimates that she has been lifting approximately 5 lbs with her injured arm. She has no new complaints today. She states that she works at [employer] so[employer] and her job is asking when she will be able to return to work. Review of Systems  Gen: no fever, chills, malaise  CV: no chest pain or palpitations  Resp: no cough or shortness of breath  GI: no nausea, vomiting, diarrhea, or constipation  Neuro: no numbness, tingling, or weakness  Msk: Minimal pain in right wrist   10 remaining systems reviewed and negative    Objective : There were no vitals filed for this visit. Body mass index is 31.62 kg/m². General: No acute distress, resting comfortably in the clinic  Neuro: alert. oriented  Eyes: Extra-ocular muscles intact  Pulm: Respirations unlabored and regular. Skin: warm, well perfused  Psych:   Patient has good fund of knowledge and displays understanging of exam, diagnosis, and plan. MSK:  Right upper extremity: Cast removed today. No gross deformity appreciated. Non tender to palpation along the dorsum of the right wrist. Is moving all digits freely with Median/Radial/Ulnar/AIN/PIN gross motor intact.  Wrist moving in flexion and extension in approximately 30 degrees in both direction.s Median/Radial/Ulnar nerve SILT. Radial pulse 2+. Radiology:  History: Right distal radius fracture     Comparison: 8/24/22    Findings: AP, oblique, and lateral views of the right wrist showing evidence of a healing right distal radius fracture. There is evidence of callus formation present. Overall alignment of distal radius appears comparable to prior films taken on 8/24/22. No new fractures or osseous abnormalities appreciated. Impression: Healing right distal radius fracture     Assessment:   63y.o. year old female with a healing right distal radius fracture   Plan:      Patient seen and examined today. Discussed with the patient that her overall fracture alignment appears well maintained. At this point, patient can transition to a wrist brace. In two weeks, the patient can begin to take brace off to work on gentle range of motion of the wrist. She may remove the brace to shower. We will see the patient back in 1 month for follow up with repeat x-rays. Patient may return to work with weight restrictions not to exceed 5 lbs. Patient verbalized understanding and was amenable to plan. Follow up:Return in about 4 weeks (around 10/12/2022) for Reassessment, with X-Ray.       Electronically signed by Kelly Patton DO on 9/14/2022 at 3:10 PM

## 2022-09-15 ENCOUNTER — TELEPHONE (OUTPATIENT)
Dept: INTERNAL MEDICINE CLINIC | Age: 62
End: 2022-09-15

## 2022-09-15 ENCOUNTER — OFFICE VISIT (OUTPATIENT)
Dept: ORTHOPEDIC SURGERY | Age: 62
End: 2022-09-15
Payer: MEDICARE

## 2022-09-15 VITALS — HEIGHT: 65 IN | WEIGHT: 190 LBS | BODY MASS INDEX: 31.65 KG/M2 | RESPIRATION RATE: 16 BRPM

## 2022-09-15 DIAGNOSIS — M25.561 CHRONIC PAIN OF RIGHT KNEE: Primary | ICD-10-CM

## 2022-09-15 DIAGNOSIS — G89.29 CHRONIC PAIN OF RIGHT KNEE: Primary | ICD-10-CM

## 2022-09-15 PROCEDURE — 20610 DRAIN/INJ JOINT/BURSA W/O US: CPT | Performed by: PHYSICIAN ASSISTANT

## 2022-09-15 RX ORDER — LIDOCAINE HYDROCHLORIDE 10 MG/ML
4 INJECTION, SOLUTION INFILTRATION; PERINEURAL ONCE
Status: COMPLETED | OUTPATIENT
Start: 2022-09-15 | End: 2022-09-15

## 2022-09-15 RX ORDER — BETAMETHASONE SODIUM PHOSPHATE AND BETAMETHASONE ACETATE 3; 3 MG/ML; MG/ML
12 INJECTION, SUSPENSION INTRA-ARTICULAR; INTRALESIONAL; INTRAMUSCULAR; SOFT TISSUE ONCE
Status: COMPLETED | OUTPATIENT
Start: 2022-09-15 | End: 2022-09-15

## 2022-09-15 RX ADMIN — BETAMETHASONE SODIUM PHOSPHATE AND BETAMETHASONE ACETATE 12 MG: 3; 3 INJECTION, SUSPENSION INTRA-ARTICULAR; INTRALESIONAL; INTRAMUSCULAR; SOFT TISSUE at 11:12

## 2022-09-15 RX ADMIN — LIDOCAINE HYDROCHLORIDE 4 ML: 10 INJECTION, SOLUTION INFILTRATION; PERINEURAL at 11:12

## 2022-09-15 NOTE — TELEPHONE ENCOUNTER
Patient called back stating that she use to see Dr. Joe Russell in the past, he started her on Reglan then.     Says she has gastroparesis has been on that medication for 20 years    She now she Dr. Breanna Hernandez, he does all of her colonoscopy's and takes care of her gastro issues

## 2022-09-15 NOTE — PROGRESS NOTES
[address] [facility], [address] [facility] Karla Macdonald, [address] [facility], [address] [facility]           Dept Phone: [phone]           Dept Fax:  [address]           Jennifer Saunders          Dept Phone: [phone]           Dept Fax:  [phone]      Chief Compliant:  Chief Complaint   Patient presents with    Knee Pain     Right knee        History of Present Illness:  Marv Low returns today. This is a 63 y.o. female who presents to the clinic today for follow up of right knee pain. Patient with history of osteoarthritis bilateral knees and has undergone both corticosteroid and Visco injections in the past.  She last underwent Monovisc injection on 5/24/2022 which she states provided significant relief of pain. Unfortunate patient had a fall again approximately 1 to 2 weeks ago and since then landing on her right knee pain has been significantly worse. Patient is able to ambulate into the office on assist devices but states this does seem to aggravate her pain. Pain is most severe to the anterior aspect of the right knee. Patient does note some intermittent swelling since the fall but denies any joint warmth, redness, fever or chills. Patient does report occasional \"locking up\" with walking but denies any feelings of instability      Review of Systems   Constitutional: Negative for fever, chills, sweats, recent illness, or recent injury. Neurological: Negative for headaches, numbness, or weakness. Integumentary: Negative for rash, itching, ecchymosis, abrasions, or laceration. Musculoskeletal: Positive for Knee Pain (Right knee)       Physical Exam:  Constitutional: Patient is oriented to person, place, and time. Patient appears well-developed and well nourished.    Musculoskeletal:    Right Knee:     Skin: warm and dry, no rash or erythema  Vasculature: 2+ pedal pulses bilaterally  Neuro: Sensation grossly intact to light touch diffusely  Alignment: Normal  Tenderness: Moderate tenderness inferior pole the patella and the patellar tendon itself. No tenderness to either joint line. No tenderness to quad/patellar tendon, pes anserine bursa or posterior knee. Effusion: None    ROM: (Degrees)       A P       Extension  0 0       Flexion   120 125       Crepitation  Yes       Muscle strength:         Flexion   5      Extension  5      SLR   5        Extensor lag   y          Special testing:  y    Pain with deep knee flexion     y    Patellar grind       n    Patellar apprehension      n    Patellar glide         n    Lachman       n    Anterior drawer      n    Pivot shift       n    Posterior drawer      n    Dial test       n    Posterolateral drawer      n    Posterior Sag       n    MCL        n    LCL          n    Medial joint line tenderness     n    Lateral joint line tenderness     n    Appley's         Neurological: Patient is alert and oriented to person, place, and time. Normal strenght. No sensory deficit. Skin: Skin is warm and dry  Psychiatric: Behavior is normal. Thought content normal.  Nursing note and vitals reviewed. Labs and Imaging:        X-rays taken in clinic and preliminarily reviewed by me 9/15/22:   AP bilateral knees standing lateral view of the right knee demonstrates normal anatomic alignment. Patient with early degenerative changes to the patellofemoral medial compartment. No evidence of acute fracture or other acute osseous abnormality. Assessment and Plan:  1. Chronic pain of right knee              PLAN:r  This is a [address] y.o. female who presents to the clinic today for follow up right knee pain after a fall 1 to 2 weeks ago. Patient has had chronic knee pain and bilateral knees and has undergone both corticosteroid and Visco injections.   She was doing very well after Monovisc injection on 5/24/2022 until this recent fall 2 weeks ago. 1.  On examination pain is most consistent with a contusion to the anterior knee likely where the impact was with a fall there is no evidence of extensor mechanism disruption, collateral or cruciate ligament injury, meniscal pathology or acute fracture radiographically. 2.  At this time will be patient will benefit from a corticosteroid injection given his success in the past and this is done as outlined below. 3.  Patient tolerated Celestone injection well educated postinjection care    Procedure Note: Right knee Celestone Injection   An informed verbal consent for the procedure was obtained and risks including, but not limited to: allergy to medications, injection, bleeding, stiffness of joint, recurrence of symptoms, loss of function, swelling, drainage, irrigation, need for surgery and pseudo-septic inflammation, were explained to the patient. Also, discussed was the potential for further injections, irrigation and debridement and surgery. Alternate means of treatment have also been discussed with the patient. Following an appropriate discussion with the patient regarding the risks and benefits of the procedure she consented to proceed. her right knee was prepped using betadine solution and alcohol swab. Using aseptic technique and through a anterolateral approach, her right knee was injected superficially with 4 cc mixture of 2 cc Lidocaine without epi and 2 cc of Marcaine and subsequently with 2 cc of 6 mg/mL Celestone into the right knee. A band aid was applied to the injection site. she tolerated the injection with no immediate adverse reactions. Please note that this chart was generated using voice recognition Dragon dictation software. Although every effort was made to ensure the accuracy of this automated transcription, some errors in transcription may have occurred.

## 2022-09-19 ENCOUNTER — OFFICE VISIT (OUTPATIENT)
Dept: INTERNAL MEDICINE CLINIC | Age: 62
End: 2022-09-19
Payer: MEDICARE

## 2022-09-19 VITALS
HEART RATE: 64 BPM | WEIGHT: 190 LBS | DIASTOLIC BLOOD PRESSURE: 78 MMHG | HEIGHT: 65 IN | SYSTOLIC BLOOD PRESSURE: 160 MMHG | BODY MASS INDEX: 31.65 KG/M2 | OXYGEN SATURATION: 97 %

## 2022-09-19 DIAGNOSIS — E11.43 DIABETIC GASTROPARESIS (HCC): ICD-10-CM

## 2022-09-19 DIAGNOSIS — S52.591A OTHER CLOSED FRACTURE OF DISTAL END OF RIGHT RADIUS, INITIAL ENCOUNTER: ICD-10-CM

## 2022-09-19 DIAGNOSIS — K31.84 DIABETIC GASTROPARESIS (HCC): ICD-10-CM

## 2022-09-19 DIAGNOSIS — I10 ESSENTIAL HYPERTENSION: Primary | ICD-10-CM

## 2022-09-19 DIAGNOSIS — E10.319 TYPE 1 DIABETES MELLITUS WITH RETINOPATHY, MACULAR EDEMA PRESENCE UNSPECIFIED, UNSPECIFIED LATERALITY, UNSPECIFIED RETINOPATHY SEVERITY (HCC): ICD-10-CM

## 2022-09-19 PROCEDURE — 3052F HG A1C>EQUAL 8.0%<EQUAL 9.0%: CPT | Performed by: INTERNAL MEDICINE

## 2022-09-19 PROCEDURE — 2022F DILAT RTA XM EVC RTNOPTHY: CPT | Performed by: INTERNAL MEDICINE

## 2022-09-19 PROCEDURE — 99214 OFFICE O/P EST MOD 30 MIN: CPT | Performed by: INTERNAL MEDICINE

## 2022-09-19 PROCEDURE — 1036F TOBACCO NON-USER: CPT | Performed by: INTERNAL MEDICINE

## 2022-09-19 PROCEDURE — G8417 CALC BMI ABV UP PARAM F/U: HCPCS | Performed by: INTERNAL MEDICINE

## 2022-09-19 PROCEDURE — G0444 DEPRESSION SCREEN ANNUAL: HCPCS | Performed by: INTERNAL MEDICINE

## 2022-09-19 PROCEDURE — G8427 DOCREV CUR MEDS BY ELIG CLIN: HCPCS | Performed by: INTERNAL MEDICINE

## 2022-09-19 PROCEDURE — 3017F COLORECTAL CA SCREEN DOC REV: CPT | Performed by: INTERNAL MEDICINE

## 2022-09-19 NOTE — PROGRESS NOTES
Donald Squires is a 63 y.o. female who presents for   Chief Complaint   Patient presents with    Medication Check    and follow up of chronic medical problems. Patient Active Problem List   Diagnosis    Peripheral vascular disease (HCC)    Restless legs syndrome    Spinal stenosis, thoracic    Thoracic radiculopathy    Chronic pain syndrome    Depression    Essential hypertension    Retinopathy due to secondary DM ([address]    Acquired hypothyroidism    Vitamin D deficiency    Mixed hyperlipidemia    Type 1 diabetes mellitus with retinopathy (HCC)    Incisional hernia without obstruction or gangrene    Hypoxia    Moderate persistent asthma with status asthmaticus    Seasonal allergic rhinitis due to pollen    Chronic obstructive pulmonary disease (HCC)    Insulin pump in place    Contusion of left chest wall    Chronic retention of urine    Chronic combined systolic and diastolic congestive heart failure (HCC)    Major depressive disorder, recurrent, in full remission ([address]    Urinary incontinence without sensory awareness    Arthritis of knee, right    Acute bilateral thoracic back pain    Acute pyelonephritis    Bilateral impacted cerumen    Left ear pain    Flank pain    TIA (transient ischemic attack)    Moderate malnutrition ([address]    COVID-19    CHF (congestive heart failure) ([address]    COVID    Diabetic ketoacidosis type 1 diabetes mellitus ([address]    PARUL (acute kidney injury) ([address]    Pneumonia     HPI  Here for follow-up on diabetes and wants to discuss about refills on metoclopramide    Current Outpatient Medications   Medication Sig Dispense Refill    metoprolol (LOPRESSOR) 100 MG tablet Take 0.5 tablets by mouth 2 times daily 60 tablet 0    amLODIPine (NORVASC) 5 MG tablet Take 1 tablet by mouth once daily 90 tablet 1    clopidogrel (PLAVIX) 75 MG tablet Take 1 tablet by mouth in the morning.  90 tablet 0    amitriptyline (ELAVIL) 100 MG tablet TAKE 1 TABLET BY MOUTH NIGHTLY 90 tablet 0    gabapentin (NEURONTIN) 800 MG tablet Take 1 tablet by mouth 2 times daily for 180 days. 180 tablet 1    simvastatin (ZOCOR) 20 MG tablet TAKE 1 TABLET BY MOUTH NIGHTLY      topiramate (TOPAMAX) 25 MG tablet Take 1 tablet by mouth 2 times daily 60 tablet 3    DULoxetine (CYMBALTA) 60 MG extended release capsule Take 60 mg by mouth at bedtime      insulin aspart (NOVOLOG PENFILL) 100 UNIT/ML injection cartridge Inject into the skin continuous Per insulin pump      pantoprazole (PROTONIX) 40 MG tablet Take 40 mg by mouth 2 times daily      losartan (COZAAR) 100 MG tablet TAKE 1 TABLET BY MOUTH ONCE DAILY 90 tablet 3    rOPINIRole (REQUIP) 2 MG tablet Take 1 tablet by mouth daily 90 tablet 3    BREO ELLIPTA 200-25 MCG/INH AEPB Inhale 1 puff into the lungs daily   6    tiotropium (SPIRIVA HANDIHALER) 18 MCG inhalation capsule Inhale 1 capsule into the lungs Daily 30 capsule 5    ONE TOUCH ULTRA TEST strip   1    aspirin 81 MG tablet Take 1 tablet by mouth daily (Patient not taking: No sig reported) 30 tablet 0     No current facility-administered medications for this visit.        Allergies   Allergen Reactions    Fioricet [Butalbital-Apap-Caffeine] Shortness Of Breath    Betamethasone      Unsure reaction      Butalbital-Apap-Caff-Cod Other (See Comments)    Erythromycin      Other reaction(s): Unknown  Eye ointment caused swelling    Xarelto [Rivaroxaban]      Dizziness & \"felt like I was going to pass out\"    Codeine Nausea And Vomiting and Nausea Only    Droperidol Anxiety    Tape [facility] Tape] Rash       Past Medical History:   Diagnosis Date    Anesthesia complication     \"lung collapsed after colon surgery    Anxiety     Arthritis     Back pain     CAD (coronary artery disease)     no stents    Caffeine use     3 coffee / day    Cataract     left    COPD (chronic obstructive pulmonary disease) (HCC)     EMPHYSEMA    Deafness     right ear    Depression     Diabetes mellitus (HCC)     Diarrhea     Diverticulosis performed by Edinson Shelby MD at [facility] N/A 6/1/2020    SIGMOIDOSCOPY DIAGNOSTIC FLEXIBLE performed by Edinson Shelby MD at [address] [facility] [address]  07/13/2016    UPPER GASTROINTESTINAL ENDOSCOPY  07/23/2018    with biopsy    UPPER GASTROINTESTINAL ENDOSCOPY N/A 7/23/2018    EGD BIOPSY performed by Edinson Shelby MD at [facility] [address]  06/01/2020    with biopsy    UPPER GASTROINTESTINAL ENDOSCOPY N/A 6/1/2020    EGD BIOPSY performed by Edinson Shelby MD at [facility] History   Problem Relation Age of Onset    High Blood Pressure Mother     Migraines Mother     High Blood Pressure Father     Heart Disease Father     Cancer Father         skin    Diabetes Maternal Grandmother     Heart Disease Maternal Grandmother     Cancer Maternal Grandmother     Parkinsonism Maternal Grandmother     Cancer Paternal Grandmother     Other Brother         epilepsy     ROS  Constitutional:  Negative for fatigue, loss of appetite and unexpected weight change  HEENT            : Negative for neck stiffness and pain, no congestion or sinus pressure  Eyes                : No visual disturbance or pain  Cardiovascular: No chest pain or palpitations or leg swelling  Respiratory      : Negative for cough, shortness of breath or wheezing  Gastrointestinal: Negative for abdominal pain, constipation or diarrhea and bloating No nausea or vomiting  Genitourinary:     No urgency or frequency, no burning or hematuria  Musculoskeletal: No arthralgias, back pain or myalgias  Skin                  : Negative for rash or erythema  Neurological    : Negative for dizziness, weakness, tremors ,light headedness or syncope  Psychiatric       : Negative for dysphoric mood, sleep disturbances, nervous or anxious, or decreased concentration  All other review of systems was negative    Objective  Physical Examination:    Nursing note reviewed    BP (!) 160/78 (Site: Right Upper Arm, Position: Sitting, Cuff Size: Medium Adult)   Pulse 64   Ht 5' 5\" (1.651 m)   Wt 190 lb (86.2 kg)   SpO2 97%   BMI 31.62 kg/m²   BP Readings from Last 3 Encounters:   09/19/22 (!) 160/78   08/13/22 137/61   08/13/22 (!) 165/70         Constitutional:  Suhas Goncalves is oriented to place, person and time ,appears well-developed and well-nourished  HEENT:  Atraumatic and normocephalic, external ears normal bilaterally, nose normal no oropharyngeal exudate and is clear and moist  Eyes:  EOCM normal; conjunctivae normal; PERRLA bilaterally  Neck:  Normal range of motion, neck supple, no JVD and no thyromegaly  Cardiovascular:  RRR, normal heart sounds and intact distal pulses  Pulmonary:  effort normal and breath sounds normal bilaterally,no wheezes or rales, no respiratory distress  Abdominal:  Soft, non-tender; normal bowel sounds, no masses  Musculoskeletal:  Normal range of motion and no edema or tenderness bilaterally  No lymphadenopathy  Neurological:  alert, oriented, and normal reflexes bilaterally  Skin: warm and dry  Psychiatric:  normal mood and effect; behavior normal.    Labs:   Lab Results   Component Value Date    LABA1C 8.5 (H) 07/27/2022     Lab Results   Component Value Date    CHOL 206 (H) 10/17/2020     Lab Results   Component Value Date    HDL 55 10/17/2020     No results found for: [address]  Lab Results   Component Value Date    TRIG 74 10/17/2020     No results found for: [place], [place]  Lab Results   Component Value Date    WBC 8.4 08/13/2022    HGB 11.9 (L) 08/13/2022    HCT 36.0 08/13/2022    MCV 88.3 08/13/2022     08/13/2022     No results found for: INR, PROTIME  Lab Results   Component Value Date    GLUCOSE 77 08/13/2022    CREATININE 0.69 08/13/2022    BUN 15 08/13/2022     08/13/2022    K 3.2 (L) 08/13/2022     (H) 08/13/2022    CO2 22 08/13/2022     Lab Results   Component Value Date    ALT 6 07/28/2022    AST 15 07/28/2022 ALKPHOS 104 07/28/2022    BILITOT 0.19 (L) 07/28/2022     Lab Results   Component Value Date    LABPROT 6.8 02/27/2013    LABALBU 3.5 07/28/2022     Lab Results   Component Value Date    TSH 4.68 12/11/2019     Assessment:  1. Essential hypertension    2. Type 1 diabetes mellitus with retinopathy, macular edema presence unspecified, unspecified laterality, unspecified retinopathy severity ([address]    3. Diabetic gastroparesis ([address]    4. Other closed fracture of distal end of right radius, initial encounter        Plan:  Patient blood pressure is stable on current medications  Patient following with Dr. Herbert Mcgee endocrinology for evaluation of her diabetes and has not seen him recently and will be seeing them next month and will get a copy of the last report  Patient taking metoclopramide for the last 20 years for her gastroparesis and taking twice a day and still has refills and if needed will call me back  Patient had a fracture of the right distal radius recently and only just got out of the cast and using brace now and getting a colonoscopy next month  Review in 4 months           1.  Mildred Denise received counseling on the following healthy behaviors: nutrition and exercise    2. Prior labs and health maintenance reviewed. 3.  Discussed use, benefit, and side effects of prescribed medications. Barriers to medication compliance addressed. All her questions were answered. Pt voiced understanding. Mildred Denise will continue current medications, diet and exercise. No orders of the defined types were placed in this encounter. Completed Refills               Requested Prescriptions      No prescriptions requested or ordered in this encounter     4. Patient given educational materials - see patient instructions    5. Was a self-tracking handout given in paper form or via [employer] NO    No orders of the defined types were placed in this encounter. Return in about 4 months (around 1/19/2023).   Patient voiced understanding and agreed to treatment plan. Electronically signed by Shy Sandoval MD on 9/19/2022 at 4:22 PM    This note is created with a voice recognition program and while intend to generate a document that accurately reflects the content of the visit, no guarantee can be provided that every mistake has been identified and corrected by editing.

## 2022-09-20 ENCOUNTER — TELEPHONE (OUTPATIENT)
Dept: ORTHOPEDIC SURGERY | Age: 62
End: 2022-09-20

## 2022-09-20 DIAGNOSIS — S52.571P OTHER CLOSED INTRA-ARTICULAR FRACTURE OF DISTAL END OF RIGHT RADIUS WITH MALUNION, SUBSEQUENT ENCOUNTER: Primary | ICD-10-CM

## 2022-09-20 NOTE — LETTER
[facility]  [address] [facility]  Phone: [phone]  Fax: [phone]    Lizette Tijerina DO        September 21, 2022     Patient: Link Guess   YOB: 1960   Date of Visit: 9/20/2022       To Whom It May Concern: It is my medical opinion that Bryan Aparicio should remain out of work until 10/06/22. If you have any questions or concerns, please don't hesitate to call.     Sincerely,        Lizette Tijerina DO

## 2022-09-20 NOTE — TELEPHONE ENCOUNTER
Pt called in stating that she went to work today and it was very painful for her.  Pt is wanting to know if she should return to work tomorrow      Other closed intra-articular fracture of distal end of right radius with malunion, subsequent encounter

## 2022-09-21 NOTE — TELEPHONE ENCOUNTER
Called patient to inform her that she could start therapy and extend being off work for 2 weeks, unable to leave message due to voicemail being full.

## 2022-09-29 ENCOUNTER — HOSPITAL ENCOUNTER (OUTPATIENT)
Dept: PHYSICAL THERAPY | Age: 62
Setting detail: THERAPIES SERIES
Discharge: HOME | End: 2022-09-29
Payer: MEDICARE

## 2022-09-29 PROCEDURE — 97161 PT EVAL LOW COMPLEX 20 MIN: CPT

## 2022-09-29 NOTE — CONSULTS
[] [address] [facility]  [address]   [address]  P: [phone]  F: [phone] [x] [address] [facility]  [address]   [address]  P: [phone]  F: [phone]       Physical Therapy Upper Extremity Extremity Evaluation    Date:  2022  Patient: Yovana Martin  : 1960  MRN: [id number]  Physician: Dr. Lacey Ramos DO     Insurance: [employer] Medicare ([facility]) / [place] Medicaid  Medical Diagnosis: Q13.226W - Other closed intra-articular fracture of distal end of right radius with malunion  Rehab Codes: M25.531  Onset date: 2022  Next 's appt.: 10/13/2022    Subjective:   CC: R wrist pain  HPI: Pt reports that she was walking in a parking lot and fell on 2022. She is unsure if she stepped wrong or if her diabetic neuropathy while wearing flip flops may have caused her to fall. Pt believes that she fell with her R arm outstretched. Pt reports that she had to have help to get up and then she drove herself home and then went to the ER the following day. Pt reports that she was placed in a hard cast prior to being placed in a wrist brace approximately 2 weeks ago. Pt reports increased pain and soreness when she goes without wearing her brace. Pt reports that she has been instructed to wear the brace for a month. 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 Choco Nicholas. Pt was given a PT referral and written off work until 10/7/2022. Pt reports impaired gripping and carrying ability with R hand. Pt reports history of falls, occur once every 2-3 weeks. Pt reports that her most recent fall was in the bathroom, reporting trouble in an tight space. Pt reports history of knee pain with cortisone and silicone injections.  Pt reports improvements in instances of her knees locking up. Additionally, pt reports diabetic neuropathy, with consistent numbness in her feet. Pt reports that she now lives with her mom for safety purposes. PMHx: [x] Other:  has a past medical history of Anesthesia complication, Anxiety, Arthritis, Back pain, CAD (coronary artery disease), Caffeine use, Cataract, COPD (chronic obstructive pulmonary disease) ([facility] Deafness, Depression, Diabetes mellitus ([facility] Diarrhea, Diverticulosis, Fibromyalgia, Gastroparalysis, GERD (gastroesophageal reflux disease), Hearing loss, Hyperlipidemia, Hyperlipidemia, Hypertension, Incontinence, MDRO (multiple drug resistant organisms) resistance (2012), Neurogenic bladder, Neuropathy, Obesity, Osteoarthritis, Peripheral vascular disease, unspecified ([facility] Restless leg syndrome, Rhabdomyolysis, Spinal stenosis, thoracic (05/27/2014), Stroke ([facility] (2012), Thyroid disease, TMJ (dislocation of temporomandibular joint), Trigeminal neuralgia, and Type II or unspecified type diabetes mellitus without mention of complication, not stated as uncontrolled.                [x] Refer to full medical chart  In EPIC       Comorbidities:   [x] Obesity [] Dialysis  [] N/A   [x] Asthma/COPD [] Dementia [] Other:   [x] Stroke [] Sleep apnea [] Other:   [] Vascular disease [] Rheumatic disease [] Other:     Tests: [x] X-Ray: [] MRI:  [] Other:    Medications: [x] Refer to full medical record  Allergies:      [x] Refer to full medical record     Function:  Hand Dominance  [x] Right  [] Left  Patient lives with Mom   Home type One story   Stairs from outside --   Stairs inside Steps down to basement but does not go downstairs   Bathroom set up Walk in shower with grab bars   Employement [employer]'s    Job status    Work Activities/duties  Prep food being served, [employer], cleaning   Recreational Activities Playing with dog, pool, hanging out with friends, walking     ADL/IADL Previous level of function Current level of function Who currently assists the patient with task   Bathing  [] Independent  [] Assist [] Independent  [] Assist    Dress/grooming [x] Independent  [] Assist [] Independent  [x] Assist Mom assisting with hair   Transfer/mobility [] Independent  [] Assist [] Independent  [] Assist    Feeding [] Independent  [] Assist [] Independent  [] Assist    Toileting [] Independent  [] Assist [] Independent  [] Assist    Driving [x] Independent  [] Assist [x] Independent  [] Assist    Housekeeping [x] Independent  [] Assist [] Independent  [x] Assist Mom doing laundry, dishes, heavier housework   Grocery shop/meal prep [x] Independent  [] Assist [] Independent  [x] Assist Mom helping carrying groceries     Gait Prior level of function Current level of function    [x] Independent  [] Assist [x] Independent  [] Assist   Device: [x] Independent [x] Independent   Pt will intermittently using walker or SPC due to falls    Pain present? Yes   Location R wrist   Pain Rating currently 4-5/10   Pain altered treatment N/A   Pain at worse 7/10   Pain at best 2/10   Description of pain Sharp, aching   Altered Sensation N/A   What makes it worse Carrying, motion, gripping objects, pushing up to get out of chair   What makes it better Not using wrist, wearing brace   Symptom progression Improving    Sleep Not disturbed           Objective:       ROM  °A/P END FEEL STRENGTH TESTS (+/-) Left Right Not Tested    Left Right  Left Right Drop Arm   []   Sit Shld Flex    4+/5 5/5 Sulcus Sign   []   Sit Shld Abd    4/5 5/5 Apprehension   []   Sit Shld IR      Yergasons   []   Shoulder Flex      Speeds   []   Ext      Neer   []   ABD      Peres    []   ER @ 0 45 90      Painful Arc   []   IR      Tinel   []   Elbow Flex. 5/5 4/5       Elbow Ext.    5/5 4/5       Pronation 80 80  5/5 3+/5       Supination 65 56  5/5 4/5       Wrist Flex. 52 36  5/5 4-/5       Wrist Ext. 60 40  5/5 4/5       Rad.  Dev. 32 25  5/5 4+/5       Ulnar Dev. 26 32  5/5 4/5           30# 6# order to progress to carrying heavier weights. Pt will demonstrate improved tolerance to gripping as evident by an increase in R  strength to 20 pounds. ? Function: Pt will demonstrate improved functional activity tolerance as evident by an improved score on the UEFI to <30% functional impairment. Patient to be independent with home exercise program as demonstrated by performance with correct form without cues. Demonstrate Knowledge of fall prevention                     Patient goals: \"less pain\"    Rehab Potential:  [x] Good  [] Fair  [] Poor   Suggested Professional Referral:  [x] No  [] Yes:  Barriers to Goal Achievement:  [x] No  [] Yes:  Domestic Concerns:  [x] No  [] Yes:    Pt. Education:  [x] Plans/Goals, Risks/Benefits discussed  [x] Home exercise program    Method of Education: [x] Verbal  [] Demo  [x] Written  Access Code: [email or web address]  URL: [email or web address]  Date: 09/29/2022  Prepared by: Sophie Clark    Exercises  Seated Wrist Flexion Stretch - 1 x daily - 7 x weekly - 10 reps - 1 sets - 10 hold  Seated Wrist Extension Stretch - 1 x daily - 7 x weekly - 10 reps - 1 sets - 10 hold  Seated Wrist Supination Stretch - 1 x daily - 7 x weekly - 10 reps - 1 sets - 10 hold  Wrist Extension/Flexion - Tenodesis - 1 x daily - 7 x weekly - 10 reps  Wrist Radial Ulnar Deviation AROM - 1 x daily - 7 x weekly - 10 reps  Forearm Rotation (Supination/Pronation) - 1 x daily - 7 x weekly - 10 reps  Seated Gripping Towel - 1 x daily - 7 x weekly - 1 sets - 10 reps     Comprehension of Education:  [x] Verbalizes understanding. [] Demonstrates understanding. [x] Needs Review. [] Demonstrates/verbalizes understanding of HEP/Ed previously given.     Treatment Plan:  [x] Therapeutic Exercise   60001  [] Iontophoresis: 4 mg/mL Dexamethasone Sodium Phosphate  mAmin  58097   [] Therapeutic Activity  38216 [] Vasopneumatic cold with compression  60969    [] Gait Training   19846 [] Ultrasound   69659   [] Neuromuscular Re-education  [id number] [] Electrical Stimulation Unattended  52625   [x] Manual Therapy  64876 [] Electrical Stimulation Attended  [id number]   [x] Instruction in HEP  [] Lumbar/Cervical Traction  [id number]   [] Aquatic Therapy   [id number] [] Cold/hotpack    [] Massage   [id number]      [] Dry Needling, 1 or 2 muscles  16899   [] Biofeedback, first 15 minutes   70300  [] Biofeedback, additional 15 minutes   41751 [] Dry Needling, 3 or more muscles  64566     []  Medication allergies reviewed for use of    Dexamethasone Sodium Phosphate 4mg/ml     with iontophoresis treatments. Pt is not allergic. Frequency:  1-2 x/week for 4 visits        Todays Treatment:  Modalities:   Precautions: 5# weight lifting limit  Exercises:  Exercise Reps/ Time Weight/ Level Comments                                 Other:    Specific Instructions for next treatment: wrist ROM and strengthening, gripping, give falls prevention handout      Evaluation Complexity:  History (Personal factors, comorbidities) [] 0 [] 1-2 [x] 3+   Exam (limitations, restrictions) [x] 1-2 [] 3 [] 4+   Clinical presentation (progression) [x] Stable [] Evolving  [] Unstable   Decision Making [x] Low [] Moderate [] High    [x] Low Complexity [] Moderate Complexity [] High Complexity       Treatment Charges: Mins Units   [x] Evaluation       []  Low       []  Moderate       []  High 40 1   []  Modalities     [x]  Ther Exercise 3 --   []  Manual Therapy     []  Ther Activities     []  Aquatics     []  Vasocompression     []  Other       TOTAL TREATMENT TIME: 43    Time in: 12:00 pm   Time Out: 12:50 pm    Electronically signed by: Vanessa Church PT        Physician Signature:________________________________Date:__________________  By signing above or cosigning this note, I have reviewed this plan of care and certify a need for medically necessary rehabilitation services.      *PLEASE SIGN ABOVE AND FAX BACK ALL PAGES*

## 2022-09-29 NOTE — FLOWSHEET NOTE
[facility] Fall Risk Assessment    Patient Name:  Claudetta Rainbow  : 1960    Risk Factor Scale  Score   History of Falls [x] Yes  [] No 25  0 25   Secondary Diagnosis [] Yes  [x] No 15  0    Ambulatory Aid [] Furniture  [] Crutches/cane/walker  [x] None/bedrest/wheelchair/nurse 30  15  0    IV/Heparin Lock [] Yes  [x] No 20  0    Gait/Transferring [] Impaired  [x] Weak  [] Normal/bedrest/immobile 20  10  0 10   Mental Status [] Forgets limitations  [x] Oriented to own ability 15  0       Total: 35     Based on the Assessment score: check the appropriate box.     []  No intervention needed   Low =   Score of 0-24    [x]  Use standard prevention interventions Moderate =  Score of 24-44   [x] Give patient handout and discuss fall prevention strategies   [x] Establish goal of education for patient/family RE: fall prevention strategies    []  Use high risk prevention interventions High = Score of 45 and higher   [] Give patient handout and discuss fall prevention strategies   [] Establish goal of education for patient/family Re: fall prevention strategies   [] Discuss lifeline / other resources    Electronically signed by:   Shelby Meek PT  Date: 2022

## 2022-09-29 NOTE — PLAN OF CARE
numbness in her feet. Pt reports that she now lives with her mom for safety purposes. Assessment:  Ketan Canela is a R hand dominant female presenting with R wrist pain following a fall onto an outstretched arm on 8/7/2022. Pt presented in the ER the following day with radiograph demonstrating R radius fracture. Pt was placed in a cast for a period of time and now has been wearing a brace for approximately 2 weeks. Pt reports increased pain with movement and carrying activities. Physical therapy signs and symptoms consistent following R radius fracture. Patient would benefit from skilled physical therapy services in order to: improve R wrist strength and ROM in order to improve tolerance to increased activity and lifting and carrying tasks required for ADLs and work. Problems:    [x] ? Pain: R wrist pain, 4-5/10  [x] ? ROM: impaired R wrist ROM  [x] ? Strength: impaired R wrist strength  [x] ? Function: UEFI = 51.25% functional impairment  [] Other:              STG: (to be met in 4 treatments)  ? Pain: Pt will report decreased R wrist pain to <4/10 max with repetitive exercises in order to improve tolerance with returning to work. ? ROM: Pt will increase R wrist AROM to WNL when compared to L side in order to improve tolerance to lifting and carrying tasks required for ADLs. ? Strength:   Pt will increase R wrist strength to 5/5 globally in order to progress to carrying heavier weights. Pt will demonstrate improved tolerance to gripping as evident by an increase in R  strength to 20 pounds. ? Function: Pt will demonstrate improved functional activity tolerance as evident by an improved score on the UEFI to <30% functional impairment. Patient to be independent with home exercise program as demonstrated by performance with correct form without cues.   Demonstrate Knowledge of fall prevention                       Patient goals: \"less pain\"    Treatment Plan:  [x] Therapeutic Exercise   76854  [] Iontophoresis: 4 mg/mL Dexamethasone Sodium Phosphate  mAmin  76244   [] Therapeutic Activity  82543 [] Vasopneumatic cold with compression  50519    [] Gait Training   49197 [] Ultrasound   71964   [] Neuromuscular Re-education  44280 [] Electrical Stimulation Unattended  44150   [x] Manual Therapy  68123 [] Electrical Stimulation Attended  10787   [x] Instruction in HEP  [] Lumbar/Cervical Traction  56385   [] Aquatic Therapy   63367 [x] Cold/hotpack    [] Massage   30678      [] Dry Needling, 1 or 2 muscles  42291   [] Biofeedback, first 15 minutes   08635  [] Biofeedback, additional 15 minutes   63533 [] Dry Needling, 3 or more muscles  76191     []  Medication allergies reviewed for use of    Dexamethasone Sodium Phosphate 4mg/ml     with iontophoresis treatments. Pt is not allergic. Frequency:  1-2 x/week for 4 visits    Electronically signed by: Danyel Nash PT        Physician Signature:________________________________Date:__________________  By signing above or cosigning this note, I have reviewed this plan of care and certify a need for medically necessary rehabilitation services.      *PLEASE SIGN ABOVE AND FAX BACK ALL PAGES*

## 2022-10-11 DIAGNOSIS — S52.571P OTHER CLOSED INTRA-ARTICULAR FRACTURE OF DISTAL END OF RIGHT RADIUS WITH MALUNION, SUBSEQUENT ENCOUNTER: Primary | ICD-10-CM

## 2022-10-12 ENCOUNTER — OFFICE VISIT (OUTPATIENT)
Dept: ORTHOPEDIC SURGERY | Age: 62
End: 2022-10-12

## 2022-10-12 VITALS — BODY MASS INDEX: 31.65 KG/M2 | WEIGHT: 190 LBS | HEIGHT: 65 IN

## 2022-10-12 DIAGNOSIS — S52.571D OTHER CLOSED INTRA-ARTICULAR FRACTURE OF DISTAL END OF RIGHT RADIUS WITH ROUTINE HEALING, SUBSEQUENT ENCOUNTER: Primary | ICD-10-CM

## 2022-10-12 PROCEDURE — 99024 POSTOP FOLLOW-UP VISIT: CPT | Performed by: STUDENT IN AN ORGANIZED HEALTH CARE EDUCATION/TRAINING PROGRAM

## 2022-10-12 NOTE — LETTER
[facility]  [address] [facility]  Phone: [phone]  Fax: [phone]    Blanco Villalobos DO        October 12, 2022     Patient: Marie Garcia   YOB: 1960   Date of Visit: 10/12/2022       To Whom it May Concern:    Singh Barnett was seen in my clinic on 10/12/2022. She may return to work on 10/13/2022. If you have any questions or concerns, please don't hesitate to call.     Sincerely,         Blanco Villalobos DO [place]

## 2022-10-12 NOTE — PROGRESS NOTES
[facility]  [address] [facility]  Dept Phone: [phone]  Dept Fax: [phone]      [facility] Follow Up      Subjective:   Date of Injury: 08/08/22 (2mn, 4days)    Nereida Castillo is a 63y.o. year old female who presents to the clinic today for routine follow up right distal radius fracture. Patient was last seen in my office on 09/14/2022. At that time patient requested to return to work, and a note for return to work was provided. However, patient attempted to go back, but had an increase in pain so we did write her off for additional time from work. Patient presents today for a complete return to work. Patient does complain about some stiffness in her right wrist as well as some mild right ring finger pain that is prompted when she wears her removable wrist brace. Patient admits to multiple trigger finger releases on her right hand in the past but no new injuries or falls. Overall patient is doing well, denies any numbness or tingling. Patient would like full return to work. Patient states her insurance coverage for physical therapy visits and she attended all of them and states they helped and that they provided her with home exercises to perform. Review of Systems  Gen: no fever, chills, malaise  CV: no chest pain or palpitations  Resp: no cough or shortness of breath  GI: no nausea, vomiting, diarrhea, or constipation  Neuro: no seizures, vertigo, or headache  Msk: right wrist pain  10 remaining systems reviewed and negative    Objective : There were no vitals filed for this visit. Body mass index is 31.62 kg/m². General: No acute distress, resting comfortably in the clinic  Neuro: alert. oriented  Eyes: Extra-ocular muscles intact  Pulm: Respirations unlabored and regular. Skin: warm, well perfused  Psych:   Patient has good fund of knowledge and displays understanding of exam, diagnosis, and plan.   MSK: Right upper While intending to generate a document that actually reflects the content of the visit, the document can still have some errors including those of syntax and sound a like substitutions which may escape proof reading.   In such instances, actual meaning can be extrapolated by contextual diversion

## 2022-10-13 ENCOUNTER — HOSPITAL ENCOUNTER (OUTPATIENT)
Dept: PHYSICAL THERAPY | Age: 62
Setting detail: THERAPIES SERIES
Discharge: HOME | End: 2022-10-13
Payer: MEDICARE

## 2022-10-13 PROCEDURE — 97110 THERAPEUTIC EXERCISES: CPT

## 2022-10-13 NOTE — FLOWSHEET NOTE
[facility]              [address]              Phone: [phone]              Fax: [phone]      Physical Therapy Daily Treatment Note    Date:  10/13/2022  Patient Name:  Nasir Casillas    :  1960  MRN: [id number]  Physician: Dr. Mai Parada DO                                      Insurance: [facility] [employer] [facility] Medicare (4 Vs + eval, -22) / [place] Medicaid  Medical Diagnosis: Y45.512Q - Other closed intra-articular fracture of distal end of right radius with malunion                Rehab Codes: M25.531  Onset date: 2022             Next Dr's appt.: 10/13/2022  Visit# / total visits: 2/5     Cancels/No Shows: 0/1    Subjective:    Pain:  [x] Yes  [] No Location: R wrist Pain Rating: (0-10 scale) no numerical/10  Pain altered Tx:  [x] No  [] Yes  Action:  Comments: Pt reports increased pain of 8/10 yesterday due to being out of her brace for the first time. She reports that it feels better today. Pt reports that she did not consistently perform HEP because she lost her copy of the exercises. Objective:  Modalities:   Precautions:  Exercises:  Exercise Reps/ Time Weight/ Level Comments   Seated:      Wrist extension stretch 10x10\"     Wrist flexion stretch 10x10\"     Forearm supination stretch 10x10\"     Wrist AROM 20x ea  Flex, ext, RD, UD   Forearm pro/sup 20x ea     Towel  20x     Red gripper 20x     Finger web 20x ea orange    Clothes pin 10x ea red    3 way bicep curl 15x ea 1#    Putty 3 rows                                                     Other:    Next treatment session: progress to functional tasks as appropriate      Treatment Charges: Mins Units   []  Modalities     [x]  Ther Exercise 24 2   []  Manual Therapy     []  Ther Activities     []  Aquatics     []  Vasocompression     []  Other     Total Treatment time 24 2       Assessment: [x] Progressing toward goals.  Reviewed exercises given as part of HEP with pt requiring moderate amount of cues for proper demonstration. Pt reporting fatigue and most difficulty with exercises involving her fingers and gripping. Will assess response to today's treatment and progress accordingly. [] No change. [] Other:  [x] Patient would continue to benefit from skilled physical therapy services in order to: improve R wrist strength and ROM in order to improve tolerance to increased activity and lifting and carrying tasks required for ADLs and work. STG: (to be met in 5 treatments)  ? Pain: Pt will report decreased R wrist pain to <4/10 max with repetitive exercises in order to improve tolerance with returning to work. ? ROM: Pt will increase R wrist AROM to WNL when compared to L side in order to improve tolerance to lifting and carrying tasks required for ADLs. ? Strength:   Pt will increase R wrist strength to 5/5 globally in order to progress to carrying heavier weights. Pt will demonstrate improved tolerance to gripping as evident by an increase in R  strength to 20 pounds. ? Function: Pt will demonstrate improved functional activity tolerance as evident by an improved score on the UEFI to <30% functional impairment. Patient to be independent with home exercise program as demonstrated by performance with correct form without cues. Demonstrate Knowledge of fall prevention                       Patient goals: \"less pain\"    Pt. Education:  [x] Yes  [] No  [x] Reviewed Prior HEP/Ed  Method of Education: [x] Verbal  [x] Demo  [x] Written  Access Code: Je Saunders  URL: [email or web address]  Date: 09/29/2022  Prepared by: Seema Mojica     Exercises  Seated Wrist Flexion Stretch - 1 x daily - 7 x weekly - 10 reps - 1 sets - 10 hold  Seated Wrist Extension Stretch - 1 x daily - 7 x weekly - 10 reps - 1 sets - 10 hold  Seated Wrist Supination Stretch - 1 x daily - 7 x weekly - 10 reps - 1 sets - 10 hold  Wrist Extension/Flexion - Tenodesis - 1 x daily - 7 x weekly - 10 reps  Wrist Radial Ulnar Deviation AROM - 1 x daily - 7 x weekly - 10 reps  Forearm Rotation (Supination/Pronation) - 1 x daily - 7 x weekly - 10 reps  Seated Gripping Towel - 1 x daily - 7 x weekly - 1 sets - 10 reps  Comprehension of Education:  [] Verbalizes understanding. [] Demonstrates understanding. [x] Needs review. [] Demonstrates/verbalizes HEP/Ed previously given. Plan: [x] Continue current frequency toward long and short term goals.     [x] Specific Instructions for subsequent treatments: see above      Time In: 5:03 pm            Time Out: 5:30 pm    Electronically signed by:  Marilyn Starkey, PT

## 2022-10-14 DIAGNOSIS — F51.01 PRIMARY INSOMNIA: ICD-10-CM

## 2022-10-14 RX ORDER — LOSARTAN POTASSIUM 100 MG/1
TABLET ORAL
Qty: 90 TABLET | Refills: 1 | Status: SHIPPED | OUTPATIENT
Start: 2022-10-14

## 2022-10-14 RX ORDER — AMITRIPTYLINE HYDROCHLORIDE 100 MG/1
TABLET, FILM COATED ORAL
Qty: 90 TABLET | Refills: 0 | Status: SHIPPED | OUTPATIENT
Start: 2022-10-14

## 2022-10-21 NOTE — TELEPHONE ENCOUNTER
Ian Munguia is calling to request a refill on the following medication(s):    Last Visit Date (If Applicable):  4/39/8411    Next Visit Date:    1/19/2023    Medication Request:  Requested Prescriptions     Pending Prescriptions Disp Refills    topiramate (TOPAMAX) 25 MG tablet 60 tablet 3     Sig: Take 1 tablet by mouth 2 times daily    DULoxetine (CYMBALTA) 60 MG extended release capsule 30 capsule 0     Sig: Take 1 capsule by mouth at bedtime

## 2022-10-24 RX ORDER — DULOXETIN HYDROCHLORIDE 60 MG/1
60 CAPSULE, DELAYED RELEASE ORAL NIGHTLY
Qty: 30 CAPSULE | Refills: 2 | Status: SHIPPED | OUTPATIENT
Start: 2022-10-24

## 2022-10-24 RX ORDER — TOPIRAMATE 25 MG/1
25 TABLET ORAL 2 TIMES DAILY
Qty: 60 TABLET | Refills: 2 | Status: SHIPPED | OUTPATIENT
Start: 2022-10-24

## 2022-10-26 ENCOUNTER — HOSPITAL ENCOUNTER (OUTPATIENT)
Dept: PHYSICAL THERAPY | Age: 62
Setting detail: THERAPIES SERIES
Discharge: HOME | End: 2022-10-26
Payer: MEDICARE

## 2022-10-26 NOTE — FLOWSHEET NOTE
[x] [facility]) - [facility] & Therapy  [address]  [place]: [phone]   F: [phone]     Physical Therapy Cancel/No Show note    Date: 10/26/2022  Patient: Nasir Casillas  : 1960  MRN: [id number]    Visit Count: 2/5  Cancels/No Shows to date: 0/3    For today's appointment patient:    []  Cancelled    [] Rescheduled appointment    [x] No-show     Reason given by patient:    []  Patient ill    []  Conflicting appointment    [] No transportation      [] Conflict with work    [] No reason given    [] Weather related    [] COVID-19    [x] Other:      Comments:  Contacted patient but was unable to leave a voicemail due to her box being full. Patient does not have anymore visit scheduled.       [] Next appointment was confirmed    Electronically signed by: Natalie Edmonds PTA

## 2022-11-07 ENCOUNTER — TELEPHONE (OUTPATIENT)
Dept: INTERNAL MEDICINE CLINIC | Age: 62
End: 2022-11-07

## 2022-11-15 RX ORDER — CLOPIDOGREL BISULFATE 75 MG/1
75 TABLET ORAL DAILY
Qty: 90 TABLET | Refills: 0 | Status: SHIPPED | OUTPATIENT
Start: 2022-11-15

## 2022-11-21 ENCOUNTER — HOSPITAL ENCOUNTER (OUTPATIENT)
Age: 62
Setting detail: OUTPATIENT SURGERY
Discharge: HOME | End: 2022-11-21
Attending: SURGERY | Admitting: SURGERY
Payer: MEDICARE

## 2022-11-21 ENCOUNTER — ANESTHESIA (OUTPATIENT)
Dept: OPERATING ROOM | Age: 62
End: 2022-11-21
Payer: MEDICARE

## 2022-11-21 ENCOUNTER — ANESTHESIA EVENT (OUTPATIENT)
Dept: OPERATING ROOM | Age: 62
End: 2022-11-21
Payer: MEDICARE

## 2022-11-21 VITALS
WEIGHT: 190 LBS | RESPIRATION RATE: 14 BRPM | BODY MASS INDEX: 31.65 KG/M2 | SYSTOLIC BLOOD PRESSURE: 170 MMHG | HEIGHT: 65 IN | HEART RATE: 68 BPM | OXYGEN SATURATION: 91 % | TEMPERATURE: 97.4 F | DIASTOLIC BLOOD PRESSURE: 78 MMHG

## 2022-11-21 LAB
GLUCOSE BLD-MCNC: 145 MG/DL (ref 65–105)
GLUCOSE BLD-MCNC: 224 MG/DL (ref 65–105)

## 2022-11-21 PROCEDURE — 82947 ASSAY GLUCOSE BLOOD QUANT: CPT

## 2022-11-21 PROCEDURE — 6360000002 HC RX W HCPCS: Performed by: STUDENT IN AN ORGANIZED HEALTH CARE EDUCATION/TRAINING PROGRAM

## 2022-11-21 PROCEDURE — 2580000003 HC RX 258: Performed by: ANESTHESIOLOGY

## 2022-11-21 PROCEDURE — 7100000010 HC PHASE II RECOVERY - FIRST 15 MIN: Performed by: SURGERY

## 2022-11-21 PROCEDURE — 3700000001 HC ADD 15 MINUTES (ANESTHESIA): Performed by: SURGERY

## 2022-11-21 PROCEDURE — 3609027000 HC COLONOSCOPY: Performed by: SURGERY

## 2022-11-21 PROCEDURE — 7100000011 HC PHASE II RECOVERY - ADDTL 15 MIN: Performed by: SURGERY

## 2022-11-21 PROCEDURE — 2709999900 HC NON-CHARGEABLE SUPPLY: Performed by: SURGERY

## 2022-11-21 PROCEDURE — 3700000000 HC ANESTHESIA ATTENDED CARE: Performed by: SURGERY

## 2022-11-21 PROCEDURE — 2500000003 HC RX 250 WO HCPCS: Performed by: NURSE ANESTHETIST, CERTIFIED REGISTERED

## 2022-11-21 PROCEDURE — 6360000002 HC RX W HCPCS: Performed by: NURSE ANESTHETIST, CERTIFIED REGISTERED

## 2022-11-21 RX ORDER — SODIUM CHLORIDE 0.9 % (FLUSH) 0.9 %
5-40 SYRINGE (ML) INJECTION PRN
Status: DISCONTINUED | OUTPATIENT
Start: 2022-11-21 | End: 2022-11-21 | Stop reason: HOSPADM

## 2022-11-21 RX ORDER — LIDOCAINE HYDROCHLORIDE 20 MG/ML
INJECTION, SOLUTION EPIDURAL; INFILTRATION; INTRACAUDAL; PERINEURAL PRN
Status: DISCONTINUED | OUTPATIENT
Start: 2022-11-21 | End: 2022-11-21 | Stop reason: SDUPTHER

## 2022-11-21 RX ORDER — PROPOFOL 10 MG/ML
INJECTION, EMULSION INTRAVENOUS PRN
Status: DISCONTINUED | OUTPATIENT
Start: 2022-11-21 | End: 2022-11-21 | Stop reason: SDUPTHER

## 2022-11-21 RX ORDER — SODIUM CHLORIDE, SODIUM LACTATE, POTASSIUM CHLORIDE, CALCIUM CHLORIDE 600; 310; 30; 20 MG/100ML; MG/100ML; MG/100ML; MG/100ML
INJECTION, SOLUTION INTRAVENOUS CONTINUOUS
Status: DISCONTINUED | OUTPATIENT
Start: 2022-11-22 | End: 2022-11-21 | Stop reason: HOSPADM

## 2022-11-21 RX ORDER — SODIUM CHLORIDE 0.9 % (FLUSH) 0.9 %
5-40 SYRINGE (ML) INJECTION EVERY 12 HOURS SCHEDULED
Status: DISCONTINUED | OUTPATIENT
Start: 2022-11-21 | End: 2022-11-21 | Stop reason: HOSPADM

## 2022-11-21 RX ORDER — HYDRALAZINE HYDROCHLORIDE 20 MG/ML
10 INJECTION INTRAMUSCULAR; INTRAVENOUS ONCE
Status: COMPLETED | OUTPATIENT
Start: 2022-11-21 | End: 2022-11-21

## 2022-11-21 RX ORDER — SODIUM CHLORIDE 9 MG/ML
INJECTION, SOLUTION INTRAVENOUS PRN
Status: DISCONTINUED | OUTPATIENT
Start: 2022-11-21 | End: 2022-11-21 | Stop reason: HOSPADM

## 2022-11-21 RX ORDER — LIDOCAINE HYDROCHLORIDE 10 MG/ML
1 INJECTION, SOLUTION EPIDURAL; INFILTRATION; INTRACAUDAL; PERINEURAL
Status: DISCONTINUED | OUTPATIENT
Start: 2022-11-22 | End: 2022-11-21 | Stop reason: HOSPADM

## 2022-11-21 RX ADMIN — PROPOFOL 50 MG: 10 INJECTION, EMULSION INTRAVENOUS at 07:40

## 2022-11-21 RX ADMIN — LIDOCAINE HYDROCHLORIDE 50 MG: 20 INJECTION, SOLUTION EPIDURAL; INFILTRATION; INTRACAUDAL; PERINEURAL at 07:28

## 2022-11-21 RX ADMIN — PROPOFOL 50 MG: 10 INJECTION, EMULSION INTRAVENOUS at 07:32

## 2022-11-21 RX ADMIN — HYDRALAZINE HYDROCHLORIDE 10 MG: 20 INJECTION INTRAMUSCULAR; INTRAVENOUS at 08:37

## 2022-11-21 RX ADMIN — PROPOFOL 50 MG: 10 INJECTION, EMULSION INTRAVENOUS at 07:36

## 2022-11-21 RX ADMIN — PROPOFOL 75 MG: 10 INJECTION, EMULSION INTRAVENOUS at 07:28

## 2022-11-21 RX ADMIN — SODIUM CHLORIDE, POTASSIUM CHLORIDE, SODIUM LACTATE AND CALCIUM CHLORIDE: 600; 310; 30; 20 INJECTION, SOLUTION INTRAVENOUS at 06:44

## 2022-11-21 ASSESSMENT — PAIN - FUNCTIONAL ASSESSMENT: PAIN_FUNCTIONAL_ASSESSMENT: 0-10

## 2022-11-21 ASSESSMENT — ENCOUNTER SYMPTOMS: SHORTNESS OF BREATH: 0

## 2022-11-21 NOTE — OP NOTE
Operative Note      Patient: Cornelio Zamarripa  YOB: 1960  MRN: [id number]    Date of Procedure: 11/21/2022    Pre-Op Diagnosis: Abdominal pain, unspecified abdominal location [R10.9]    Post-Op Diagnosis:  Moderate sigmoid diverticulosis       Procedure(s):  COLONOSCOPY DIAGNOSTIC    Surgeon(s):  Vita Zuniga MD    Assistant:   Resident: Sam Senior DO    Anesthesia: Monitor Anesthesia Care    Estimated Blood Loss (mL): None    Complications: None    Specimens:   * No specimens in log *    Implants:  * No implants in log *      Drains: * No LDAs found *    Findings: Moderate sigmoid diverticulosis    Indications for procedure: This is a very pleasant 61-year-old female with a history of previous right hemicolectomy. She has had multiple other previous abdominal surgeries. She had nonspecific abdominal pain with issues secondary to constipation. The risks and benefits of colonoscopy were discussed with the patient and verbal and written consent was obtained. Detailed Description of Procedure:   After verbal and written consent, the patient was brought to the endoscopy suite. A timeout was performed with the staff in the room confirming both the patient and the procedure to be performed. The procedure was begun with a rectal exam demonstrating normal tone, no gross blood, and no masses. She had mild external hemorrhoids. A colonoscope was then inserted into the patient's anus and under direct visualization was advanced to the ileocolic anastomosis. The endoscope was slowly withdrawn carefully inspecting the colonic mucosa with manual pullback. The prior ileocolic anastomosis was widely patent. The patient was noted to have evidence of moderate sigmoid diverticulosis. No large polyps or masses were appreciated. A retroflexed view of the rectum was performed demonstrating no distal rectal lesions. The endoscope was straightened and removed.   The patient tolerated the procedure well and was transported to the recovery stable condition. There were no immediate complications. There were no specimens.   Recommend repeat colonoscopy in 5 years given her prior history of a colon mass    Electronically signed by Jake Bran MD on 11/21/2022 at 7:36 AM

## 2022-11-21 NOTE — H&P
History and Physical Service   [place] [address]    HISTORY AND PHYSICAL EXAMINATION            Date of Evaluation: 2022  Patient name:  Nasir Casillas  MRN:   [id number]  YOB: 1960  PCP:    Mellissa Orozco MD    History Obtained From:     Patient, medical records    History of Present Illness: This is Nasir Casillas a 63 y.o. female  with multiple known comorbidities some of which include COPD, CAD, GERD, HTN, HLD, DM and CHF managed by her PCP, Dr Loida Villafana , Endocrinologist, Dr Lian Wilkins and other specialists who arrived FOR A COLONOSCOPY DIAGNOSTIC by Amilcar Eckert MD FOR ABDOMINAL PAIN, UNSPECIFIED ABDOMINAL LOCATION [R10.9]. Patient has known chronic GI dysmotility with gastroparesis and several abdominal surgeries. She has seen Dr Lilian Martin in past for her colonoscopies with last 2020  She followed the bowel prep until watery yellow clear  Father  2021 with late stage colon cancer at 80. Denies bowel changes but is constipated more and has intermittent abdominal plain. Denies fever, chills, shortness of breath, cough, congestion, wheezing, chest pain, open sores or wounds. +DM POC  Has insulin pump and Dexcom 6 on left arm   Last Plavix and ASA 81mg 22      Past Medical History:     Past Medical History:   Diagnosis Date    Anesthesia complication     \"lung collapsed after colon surgery    Anxiety     Arthritis     Back pain     CAD (coronary artery disease)     no stents    Caffeine use     3 coffee / day    Cataract     left    COPD (chronic obstructive pulmonary disease) (HCC)     EMPHYSEMA    Deafness     right ear    Depression     Diabetes mellitus (HCC)     Diarrhea     Diverticulosis     Fibromyalgia     Gastroparalysis     GERD (gastroesophageal reflux disease)     Hearing loss     DEAF IN RIGHT EAR    Hyperlipidemia     Hyperlipidemia     Hypertension     Incontinence     MDRO (multiple drug resistant organisms) resistance  mrsa (nasal), eye, ear    Neurogenic bladder     CATHES HERSELF 7 TIMES A DAY, IS ABLE TO URINATE ON OWN    Neuropathy     feet    Obesity     Osteoarthritis     Peripheral vascular disease, unspecified ([address]     Restless leg syndrome     Rhabdomyolysis     ROBBIE 1 week, May 2014, then [place] for rehab.     Spinal stenosis, thoracic 05/27/2014    Stroke [facility]) 2012    numbness on the left side of face, Oct 2020 TIA     Thyroid disease     enlarged    TMJ (dislocation of temporomandibular joint)     Trigeminal neuralgia     Type II or unspecified type diabetes mellitus without mention of complication, not stated as uncontrolled         Past Surgical History:     Past Surgical History:   Procedure Laterality Date    ABDOMEN SURGERY      LAPAROSCOPY    CARPAL TUNNEL RELEASE Right     CATARACT REMOVAL      CHOLECYSTECTOMY      CHOLECYSTECTOMY, OPEN      11/2012    COLON SURGERY      benign mass removed    COLONOSCOPY      COLONOSCOPY  07/13/2016    COLONOSCOPY  06/01/2020    incomplete/poor prep    COLONOSCOPY  07/07/2020    COLONOSCOPY N/A 7/7/2020    COLORECTAL CANCER SCREENING, NOT HIGH RISK performed by Felicita Lorenz MD at [facility]      sebaceous cyst, under arm left side x5    CYST REMOVAL      right ankle    CYSTOSCOPY      EYE SURGERY Right     HOLE IN RETINA REPAIRED    FINGER TRIGGER RELEASE Right     INCISIONAL HERNIA REPAIR  07/07/15    open    INCISIONAL HERNIA REPAIR  10/17/2017    LEG BIOPSY EXCISION Left 7/6/2021    EXCISION OF LEFT THIGH MASS performed by Felicita Lorenz MD at [facility] Left     ear tube placement    POLYPECTOMY      colon polyp    TX REPAIR INCISIONAL HERNIA,REDUCIBLE N/A 10/17/2017    HERNIA INCISIONAL REPAIR WITH MESH performed by Felicita Lorenz MD at [address] 6/1/2020    SIGMOIDOSCOPY DIAGNOSTIC FLEXIBLE performed by Felicita Lorenz MD at [facility] tablet Take 40 mg by mouth 2 times daily    Historical Provider, MD   rOPINIRole (REQUIP) 2 MG tablet Take 1 tablet by mouth daily 19   Irina Elise MD   BREO ELLIPTA 200-25 MCG/INH AEPB Inhale 1 puff into the lungs daily  10/7/17   Historical Provider, MD   tiotropium (Zahida Asp) 18 MCG inhalation capsule Inhale 1 capsule into the lungs Daily 17   Teresa Gonzalez MD   ONE TOUCH ULTRA TEST strip  17   Historical Provider, MD        Allergies:     Fioricet [butalbital-apap-caffeine], Betamethasone, Butalbital-apap-caff-cod, Erythromycin, Xarelto [rivaroxaban], Codeine, Droperidol, and Tape [adhesive tape]    Social History:     Tobacco:    reports that she has never smoked. She has never used smokeless tobacco.  Alcohol:      reports current alcohol use. Drug Use:  reports current drug use. Frequency: 4.00 times per week. Drug: Marijuana [place]). Family History:     Family History   Problem Relation Age of Onset    High Blood Pressure Mother     Migraines Mother     High Blood Pressure Father     Heart Disease Father     Cancer Father         skin    Diabetes Maternal Grandmother     Heart Disease Maternal Grandmother     Cancer Maternal Grandmother     Parkinsonism Maternal Grandmother     Cancer Paternal Grandmother     Other Brother         epilepsy       Review of Systems:   Pertinent findings in the HPI above. A comprehensive review of systems was essentially negative with exception of COPD, CAD, GERD, HTN, HLD, +DM POC  Has insulin pump and Dexcom 6 on left arm , past TIA's stopped ASA and Plavix 22  and CHF managed by her PCP, Dr Jana Yoon , Endocrinologist, Dr Jonna Chowdary , Neurology and other specialists  Today  denies  exertional chest pain, dyspnea, gastrointestinal symptoms, musculoskeletal symptoms, and neurologic symptoms or behavioral/psych issues. No LMP recorded.  Patient is postmenopausal.       Physical Exam:   BP (!) 163/64   Pulse 65   Temp 97.7 °F (36.5 °C) (Temporal)   Resp 16   Ht 5' 5\" (1.651 m)   Wt 190 lb (86.2 kg)   SpO2 94%   BMI 31.62 kg/m²   No LMP recorded. Patient is postmenopausal.    Recent Labs     22  0646   POCGLU 145*       General Appearance:  obese female, alert, well appearing, and in no acute distress  Mental status: oriented to person, place, and time with normal affect  Head:  normocephalic, atraumatic. Eye: no icterus, redness, pupils equal and reactive, extraocular eye movements intact, conjunctiva clear  Ear: normal external ear, no discharge, hearing intact  Nose:  no drainage noted  Mouth: mucous membranes moist  Neck: supple, no carotid bruits, thyroid not palpable  Lungs: Bilateral equal air entry, clear to ausculation, no wheezing, rales or rhonchi, normal effort  Cardiovascular: HR 65  normal rate, regular rhythm, no murmur, gallop, rub. Abdomen: insulin pump left mid abdomen  Soft, round, nontender, nondistended, normal bowel sounds  Neurologic: There are no new focal motor or sensory deficits, normal muscle tone and bulk, no abnormal sensation, normal speech, cranial nerves II through XII grossly intact  Skin: No gross lesions, rashes, bruising or bleeding on exposed skin area  Extremities: Dexcom^ on left outer arm  peripheral pulses palpable, no pedal edema or calf pain with palpation  Psych: normal affect     Investigations:      Laboratory Testing:  Recent Results (from the past 24 hour(s))   POC Glucose Fingerstick    Collection Time: 22  6:46 AM   Result Value Ref Range    POC Glucose 145 (H) 65 - 105 mg/dL       No results for input(s): HGB, HCT, WBC, MCV, PLATELET, NA, K, CL, CO2, BUN, CREATININE, GLUCOSE, INR, PROTIME, APTT, AST, ALT, LABALBU, HCG in the last 720 hours. No results for input(s): COVID19 in the last 720 hours. Imaging/Diagnostics:    No results found.     Diagnosis:      Abdominal pain, unspecified abdominal location [R10.9]    Plans:     1. COLONOSCOPY DIAGNOSTIC      [facility], APRN - CNP  11/21/2022  7:00 AM

## 2022-11-21 NOTE — ANESTHESIA PRE PROCEDURE
Department of Anesthesiology  Preprocedure Note       Name:  Puneet Duque   Age:  63 y.o.  :  1960                                          MRN:  [id number]         Date:  2022      Surgeon: Shy Lopez):  Marva Knowles MD    Procedure: Procedure(s):  COLONOSCOPY DIAGNOSTIC    Medications prior to admission:   Prior to Admission medications    Medication Sig Start Date End Date Taking? Authorizing Provider   clopidogrel (PLAVIX) 75 MG tablet TAKE 1 TABLET BY MOUTH IN THE MORNING 11/15/22   Raul Victor MD   topiramate (TOPAMAX) 25 MG tablet Take 1 tablet by mouth 2 times daily 10/24/22   Raul Victor MD   DULoxetine (CYMBALTA) 60 MG extended release capsule Take 1 capsule by mouth at bedtime 10/24/22   Raul Victor MD   losartan (COZAAR) 100 MG tablet TAKE 1 TABLET BY MOUTH ONCE DAILY 10/14/22   Raul Victor MD   amitriptyline (ELAVIL) 100 MG tablet TAKE 1 TABLET BY MOUTH NIGHTLY 10/14/22   Raul Victor MD   metoprolol (LOPRESSOR) 100 MG tablet Take 0.5 tablets by mouth 2 times daily 22   Raul Victor MD   amLODIPine (NORVASC) 5 MG tablet Take 1 tablet by mouth once daily 22   Raul Victor MD   gabapentin (NEURONTIN) 800 MG tablet Take 1 tablet by mouth 2 times daily for 180 days.  22  Selwyn Medrano MD   metoclopramide (REGLAN) 10 MG tablet Take 1 tablet by mouth 2 times daily 22  Selwyn Medrano MD   simvastatin (ZOCOR) 20 MG tablet TAKE 1 TABLET BY MOUTH NIGHTLY 22   Historical Provider, MD   atorvastatin (LIPITOR) 20 MG tablet Take 1 tablet by mouth daily 22  Selwyn Medrano MD   aspirin 81 MG tablet Take 1 tablet by mouth daily 10/17/20   Alessandro Kiran MD   insulin aspart (NOVOLOG PENFILL) 100 UNIT/ML injection cartridge Inject into the skin continuous Per insulin pump    Historical Provider, MD   pantoprazole (PROTONIX) 40 MG tablet Take 40 mg by mouth 2 times daily    Historical Provider, MD rOPINIRole (REQUIP) 2 MG tablet Take 1 tablet by mouth daily 4/16/19   Nayeli Gutierrez MD   BREO ELLIPTA 200-25 MCG/INH AEPB Inhale 1 puff into the lungs daily  10/7/17   Historical Provider, MD   tiotropium (SPIRIVA HANDIHALER) 18 MCG inhalation capsule Inhale 1 capsule into the lungs Daily 6/19/17   Mercy Abdalla MD   ONE TOUCH ULTRA TEST strip  6/7/17   Historical Provider, MD       Current medications:    Current Facility-Administered Medications   Medication Dose Route Frequency Provider Last Rate Last Admin    [START ON 11/22/2022] lidocaine PF 1 % injection 1 mL  1 mL IntraDERmal Once PRN Allsion Mon MD       Ty Gifty Luquedella ON 11/22/2022] lactated ringers infusion   IntraVENous Continuous Allison Mon MD 50 mL/hr at 11/21/22 0717 NoRateChange at 11/21/22 0717    sodium chloride flush 0.9 % injection 5-40 mL  5-40 mL IntraVENous 2 times per day Allison Mon MD        sodium chloride flush 0.9 % injection 5-40 mL  5-40 mL IntraVENous PRN Allison Mon MD        0.9 % sodium chloride infusion   IntraVENous PRN Allison Mon MD           Allergies: Allergies   Allergen Reactions    Fioricet [Butalbital-Apap-Caffeine] Shortness Of Breath    Betamethasone      Unsure reaction      Butalbital-Apap-Caff-Cod Other (See Comments)    Erythromycin      Other reaction(s): Unknown  Eye ointment caused swelling    Xarelto [Rivaroxaban]      Dizziness & \"felt like I was going to pass out\"    Codeine Nausea And Vomiting and Nausea Only    Droperidol Anxiety    Tape Mavis Ina Tape] Rash       Problem List:    Patient Active Problem List   Diagnosis Code    Peripheral vascular disease (HCC) I73.9    Restless legs syndrome G25.81    Spinal stenosis, thoracic M48.04    Thoracic radiculopathy M54.14    Chronic pain syndrome G89.4    Depression F32. A    Essential hypertension I10    Retinopathy due to secondary DM ([facility] [address] E13.319    Acquired hypothyroidism E03.9    Vitamin D deficiency E55.9    Mixed hyperlipidemia E78.2    Type 1 diabetes mellitus with retinopathy ([facility] [address] E10.319    Incisional hernia without obstruction or gangrene K43.2    Hypoxia R09.02    Moderate persistent asthma with status asthmaticus J45.42    Seasonal allergic rhinitis due to pollen J30.1    Chronic obstructive pulmonary disease ([facility]) J44.9    Insulin pump in place Z96.41    Contusion of left chest wall S20.212A    Chronic retention of urine R33.9    Chronic combined systolic and diastolic congestive heart failure ([facility]) I50.42    Major depressive disorder, recurrent, in full remission ([facility] [address] F33.42    Urinary incontinence without sensory awareness N39.42    Arthritis of knee, right M17.11    Acute bilateral thoracic back pain M54.6    Acute pyelonephritis N10    Bilateral impacted cerumen H61.23    Left ear pain H92.02    Flank pain R10.9    TIA (transient ischemic attack) G45.9    Moderate malnutrition ([facility]) E44.0    COVID-19 U07.1    CHF (congestive heart failure) ([facility]) I50.9    COVID U07.1    Diabetic ketoacidosis type 1 diabetes mellitus ([facility]) E10.10    PARUL (acute kidney injury) ([facility] [address] N17.9    Pneumonia J18.9       Past Medical History:        Diagnosis Date    Anesthesia complication     \"lung collapsed after colon surgery    Anxiety     Arthritis     Back pain     CAD (coronary artery disease)     no stents    Caffeine use     3 coffee / day    Cataract     left    COPD (chronic obstructive pulmonary disease) ([facility])     EMPHYSEMA    Deafness     right ear    Depression     Diabetes mellitus ([facility])     Diarrhea     Diverticulosis     Fibromyalgia     Gastroparalysis     GERD (gastroesophageal reflux disease)     Hearing loss     DEAF IN RIGHT EAR    Hyperlipidemia     Hyperlipidemia     Hypertension     Incontinence     MDRO (multiple drug resistant organisms) resistance 2012    mrsa (nasal), eye, ear    Neurogenic bladder     CATHES HERSELF 7 TIMES A DAY, IS ABLE TO URINATE ON OWN    Neuropathy     feet    Obesity     Osteoarthritis     Peripheral vascular disease, unspecified ([facility] [address]     Restless leg syndrome     Rhabdomyolysis     [place] 1 week, May 2014, then [facility] for rehab.     Spinal stenosis, thoracic 05/27/2014    Stroke [facility]) 2012    numbness on the left side of face, Oct 2020 TIA     Thyroid disease     enlarged    TMJ (dislocation of temporomandibular joint)     Trigeminal neuralgia     Type II or unspecified type diabetes mellitus without mention of complication, not stated as uncontrolled        Past Surgical History:        Procedure Laterality Date    ABDOMEN SURGERY      LAPAROSCOPY    CARPAL TUNNEL RELEASE Right     CATARACT REMOVAL      CHOLECYSTECTOMY      CHOLECYSTECTOMY, OPEN      11/2012    COLON SURGERY      benign mass removed    COLONOSCOPY      COLONOSCOPY  07/13/2016    COLONOSCOPY  06/01/2020    incomplete/poor prep    COLONOSCOPY  07/07/2020    COLONOSCOPY N/A 7/7/2020    COLORECTAL CANCER SCREENING, NOT HIGH RISK performed by Robert Willett MD at [address] [facility]      sebaceous cyst, under arm left side x5    CYST REMOVAL      right ankle    CYSTOSCOPY      EYE SURGERY Right     HOLE IN RETINA REPAIRED    FINGER TRIGGER RELEASE Right     INCISIONAL HERNIA REPAIR  07/07/15    open   [address]  10/17/2017    LEG BIOPSY EXCISION Left 7/6/2021    EXCISION OF LEFT THIGH MASS performed by Robert Willett MD at [facility] Left     ear tube placement    POLYPECTOMY      colon polyp    [place] REPAIR INCISIONAL HERNIA,REDUCIBLE N/A 10/17/2017    HERNIA INCISIONAL REPAIR WITH MESH performed by Robert Willett MD at [address] [place] [address] Road 6/1/2020    SIGMOIDOSCOPY DIAGNOSTIC FLEXIBLE performed by Robert Willett MD at [address] [facility] ENDOSCOPY  07/13/2016    UPPER GASTROINTESTINAL ENDOSCOPY  07/23/2018    with biopsy    UPPER GLUCOSE 77 08/13/2022 11:51 PM    GLUCOSE 342 05/14/2012 11:58 AM    PROT 6.3 07/28/2022 06:18 AM    PROT 6.8 02/27/2013 03:20 PM    CALCIUM 7.4 08/13/2022 10:50 PM    BILITOT 0.19 07/28/2022 06:18 AM    ALKPHOS 104 07/28/2022 06:18 AM    AST 15 07/28/2022 06:18 AM    ALT 6 07/28/2022 06:18 AM       POC Tests:   Recent Labs     11/21/22  0646   POCGLU 145*       Coags: No results found for: PROTIME, INR, APTT    HCG (If Applicable): No results found for: PREGTESTUR, PREGSERUM, HCG, HCGQUANT     ABGs:   Lab Results   Component Value Date/Time    PHART 7.347 07/27/2022 06:46 AM    PO2ART 65.5 07/27/2022 06:46 AM    ZAB1XBX 42.7 07/27/2022 06:46 AM    TEP5ATG 23.4 07/27/2022 06:46 AM    C4WCSMGN 90.6 07/27/2022 06:46 AM        Type & Screen (If Applicable):  No results found for: LABABO, LABRH    Drug/Infectious Status (If Applicable):  No results found for: HIV, HEPCAB    COVID-19 Screening (If Applicable):   Lab Results   Component Value Date/Time    COVID19 Not Detected 07/26/2022 06:55 PM    COVID19 Not Detected 07/03/2020 08:30 AM           Anesthesia Evaluation    Airway: Mallampati: I  TM distance: >3 FB   Neck ROM: full  Mouth opening: > = 3 FB   Dental:          Pulmonary:   (+) COPD:      (-) shortness of breath                           Cardiovascular:    (+) CAD:, CHF:,     (-)  angina                Neuro/Psych:   (+) CVA: no interval change,             GI/Hepatic/Renal:             Endo/Other:    (+) Diabetes, . Abdominal:             Vascular:           Other Findings:           Anesthesia Plan      MAC     ASA 4                                   Debo Kiran MD   11/21/2022

## 2022-11-21 NOTE — DISCHARGE SUMMARY
[facility] Outpatient Physical Therapy              [address]              Phone: [phone]              Fax: [phone]      Physical Therapy Discharge Note    Date: 2022      Patient: Giovani Mcnamara  : 1960  MRN: [id number]    Physician: Dr. Jenna Sanchez DO                                      Insurance: [facility] Medicare (4 Vs + eval, -22) / [place] Medicaid  Medical Diagnosis: O14.201L - Other closed intra-articular fracture of distal end of right radius with malunion                Rehab Codes: M25.531  Onset date: 2022             Next Dr's appt.: 10/13/2022  Visit# / total visits: 2/5                                    Cancels/No Shows: 0/3  Date of initial visit: 2022                Date of final visit: 10/13/2022       Discharge Status:     Pt failed to make additional appointments for therapy. Pt. Is now discharged per attendance policy. Electronically signed by: Lashell Dias PT    If you have any questions or concerns, please don't hesitate to call.   Thank you for your referral.

## 2022-11-21 NOTE — ANESTHESIA POSTPROCEDURE EVALUATION
Department of Anesthesiology  Postprocedure Note    Patient: Candi Billings  MRN: [id number]  YOB: 1960  Date of evaluation: 11/21/2022      Procedure Summary     Date: 11/21/22 Room / Location: [place] [address] / [facility] - INPATIENT    Anesthesia Start: [place] Anesthesia Stop: 4741    Procedure: COLONOSCOPY DIAGNOSTIC Diagnosis:       Abdominal pain, unspecified abdominal location      (Abdominal pain, unspecified abdominal location [R10.9])    Surgeons: Robert Willett MD Responsible Provider: Irma De Leon MD    Anesthesia Type: MAC ASA Status: 4          Anesthesia Type: No value filed. Melina Phase I: Melina Score: 9    Melina Phase II: Melina Score: 8      Anesthesia Post Evaluation    Patient location during evaluation: PACU  Patient participation: complete - patient participated  Level of consciousness: awake  Airway patency: patent  Nausea & Vomiting: no nausea and no vomiting  Complications: no  Cardiovascular status: hemodynamically stable  Respiratory status: acceptable  Hydration status: stable  There was medical reason for not using a multimodal analgesia pain management approach.

## 2022-11-21 NOTE — DISCHARGE INSTRUCTIONS
Patient Discharge Instructions  Discharge Date:  11/21/2022    Discharged To:   Home    Home with Home Health Care: No    RESUME ACTIVITY:      BATHING: May shower/bathe    DRIVING: No driving today    WALKING:  Yes    STAIRS:  Yes    LIFTING: No excessive strenuous activity today    DIET: common adult    SPECIAL INSTRUCTIONS:     May use ice pack for pain/swelling    May take [address] as needed for constipation      Call [phone] for follow up appointment with Dr. Erick Mclain in:  7-10 days

## 2022-11-28 ENCOUNTER — TELEPHONE (OUTPATIENT)
Dept: ORTHOPEDIC SURGERY | Age: 62
End: 2022-11-28

## 2022-11-29 RX ORDER — METOCLOPRAMIDE 10 MG/1
10 TABLET ORAL 2 TIMES DAILY
Qty: 120 TABLET | Refills: 0 | Status: SHIPPED | OUTPATIENT
Start: 2022-11-29

## 2022-11-29 NOTE — TELEPHONE ENCOUNTER
Pt called in to double check that her injections that he is scheduled for richie are covered and she will have have to pay the full cost of the bill. Called [facility] and spoke to Franchesca to make sure that Durolane is a covered benefit and he stated that is was, pt may have to pay a co-pay. When entering the medication, he stated that he was confused since it was coming back as a 3 series injection so he could not see the exact co-pay amount but said it should be about $143. Medication amount (60mg/3mL) and J Code () were both verified that he was looking at the correct medication. I again verified that this was a covered benefit and they pt would not be stuck with a high bill and he again stated that they would have to pay the co-pay or the medicaid part of their plan would pick that up and they may not have to pay that. LVM with pt to return call so I can give her an update.

## 2022-11-29 NOTE — TELEPHONE ENCOUNTER
Patient called back in and I gave her the information about the Durolane. She stated she will keep tomorrow's appointment.

## 2022-11-29 NOTE — TELEPHONE ENCOUNTER
Shyanne Hirsch is calling to request a refill on the following medication(s):    Medication Request:  Requested Prescriptions     Pending Prescriptions Disp Refills    metoclopramide (REGLAN) 10 MG tablet 120 tablet 0     Sig: Take 1 tablet by mouth 2 times daily       Last Visit Date (If Applicable):  7/11/5353    Next Visit Date:    1/19/2023

## 2022-11-30 ENCOUNTER — OFFICE VISIT (OUTPATIENT)
Dept: ORTHOPEDIC SURGERY | Age: 62
End: 2022-11-30
Payer: MEDICARE

## 2022-11-30 VITALS — BODY MASS INDEX: 31.65 KG/M2 | HEIGHT: 65 IN | WEIGHT: 190 LBS

## 2022-11-30 DIAGNOSIS — M17.0 OSTEOARTHRITIS OF BOTH KNEES, UNSPECIFIED OSTEOARTHRITIS TYPE: Primary | ICD-10-CM

## 2022-11-30 DIAGNOSIS — G89.29 CHRONIC PAIN OF RIGHT KNEE: ICD-10-CM

## 2022-11-30 DIAGNOSIS — G89.29 CHRONIC PAIN OF LEFT KNEE: ICD-10-CM

## 2022-11-30 DIAGNOSIS — M25.562 CHRONIC PAIN OF LEFT KNEE: ICD-10-CM

## 2022-11-30 DIAGNOSIS — M25.561 CHRONIC PAIN OF RIGHT KNEE: ICD-10-CM

## 2022-11-30 PROCEDURE — 20610 DRAIN/INJ JOINT/BURSA W/O US: CPT | Performed by: PHYSICIAN ASSISTANT

## 2022-11-30 RX ORDER — LIDOCAINE HYDROCHLORIDE 10 MG/ML
2 INJECTION, SOLUTION INFILTRATION; PERINEURAL ONCE
Status: COMPLETED | OUTPATIENT
Start: 2022-11-30 | End: 2022-11-30

## 2022-11-30 RX ORDER — BUPIVACAINE HYDROCHLORIDE 5 MG/ML
2 INJECTION, SOLUTION PERINEURAL ONCE
Status: COMPLETED | OUTPATIENT
Start: 2022-11-30 | End: 2022-11-30

## 2022-11-30 RX ADMIN — LIDOCAINE HYDROCHLORIDE 2 ML: 10 INJECTION, SOLUTION INFILTRATION; PERINEURAL at 14:11

## 2022-11-30 RX ADMIN — BUPIVACAINE HYDROCHLORIDE 10 MG: 5 INJECTION, SOLUTION PERINEURAL at 14:09

## 2022-11-30 RX ADMIN — BUPIVACAINE HYDROCHLORIDE 10 MG: 5 INJECTION, SOLUTION PERINEURAL at 14:10

## 2022-11-30 NOTE — PROGRESS NOTES
[address] [facility], [address] [place] [address], [address] [place] [address] Rd, [address] [facility]           Dept Phone: [phone]           Dept Fax:  [phone] [facility]           Jennifer Saunders          Dept Phone: [phone]           Dept Fax:  [phone]      Chief Compliant:  Chief Complaint   Patient presents with    Knee Pain     bilateral        History of Present Illness: This is a 63 y.o. female who presents to the clinic today for bilateral Durolane injection. Previous treatment includes Monovisc and corticosteroid. Last injection was performed on 9/14/2022 in the form of corticosteroid injection on the right knee. .  Patient denies any fever, chills, knee joint warmth, redness, numbness or tingling. Past History:    Current Outpatient Medications:     metoclopramide (REGLAN) 10 MG tablet, Take 1 tablet by mouth 2 times daily, Disp: 120 tablet, Rfl: 0    clopidogrel (PLAVIX) 75 MG tablet, TAKE 1 TABLET BY MOUTH IN THE MORNING, Disp: 90 tablet, Rfl: 0    topiramate (TOPAMAX) 25 MG tablet, Take 1 tablet by mouth 2 times daily, Disp: 60 tablet, Rfl: 2    DULoxetine (CYMBALTA) 60 MG extended release capsule, Take 1 capsule by mouth at bedtime, Disp: 30 capsule, Rfl: 2    losartan (COZAAR) 100 MG tablet, TAKE 1 TABLET BY MOUTH ONCE DAILY, Disp: 90 tablet, Rfl: 1    amitriptyline (ELAVIL) 100 MG tablet, TAKE 1 TABLET BY MOUTH NIGHTLY, Disp: 90 tablet, Rfl: 0    metoprolol (LOPRESSOR) 100 MG tablet, Take 0.5 tablets by mouth 2 times daily, Disp: 60 tablet, Rfl: 0    amLODIPine (NORVASC) 5 MG tablet, Take 1 tablet by mouth once daily, Disp: 90 tablet, Rfl: 1    gabapentin (NEURONTIN) 800 MG tablet, Take 1 tablet by mouth 2 times daily for 180 days. , Disp: 180 tablet, Rfl: 1    simvastatin (ZOCOR) 20 MG tablet, TAKE 1 TABLET BY MOUTH NIGHTLY, Disp: , Rfl:     aspirin 81 MG tablet, Take 1 tablet by mouth daily, Disp: 30 tablet, Rfl: 0    insulin aspart (NOVOLOG PENFILL) 100 UNIT/ML injection cartridge, Inject into the skin continuous Per insulin pump, Disp: , Rfl:     pantoprazole (PROTONIX) 40 MG tablet, Take 40 mg by mouth 2 times daily, Disp: , Rfl:     rOPINIRole (REQUIP) 2 MG tablet, Take 1 tablet by mouth daily, Disp: 90 tablet, Rfl: 3    BREO ELLIPTA 200-25 MCG/INH AEPB, Inhale 1 puff into the lungs daily , Disp: , Rfl: 6    tiotropium (SPIRIVA HANDIHALER) 18 MCG inhalation capsule, Inhale 1 capsule into the lungs Daily, Disp: 30 capsule, Rfl: 5    ONE TOUCH ULTRA TEST strip, , Disp: , Rfl: 1  Allergies   Allergen Reactions    Fioricet [Butalbital-Apap-Caffeine] Shortness Of Breath    Betamethasone      Unsure reaction      Butalbital-Apap-Caff-Cod Other (See Comments)    Erythromycin      Other reaction(s): Unknown  Eye ointment caused swelling    Xarelto [Rivaroxaban]      Dizziness & \"felt like I was going to pass out\"    Codeine Nausea And Vomiting and Nausea Only    Droperidol Anxiety    Tape Estrella An Tape] Rash     Social History     Socioeconomic History    Marital status:      Spouse name: Not on file    Number of children: 0    Years of education: 14    Highest education level: Not on file   Occupational History    Occupation: disability     Employer: N/A   Tobacco Use    Smoking status: Never    Smokeless tobacco: Never   Vaping Use    Vaping Use: Never used   Substance and Sexual Activity    Alcohol use: Yes     Comment: once or twice a week    Drug use: Yes     Frequency: 4.0 times per week     Types: Marijuana Veljennifer Keyes)    Sexual activity: Yes     Partners: Male     Comment: 1 partner   Other Topics Concern    Not on file   Social History Narrative    Not on file     Social Determinants of Health     Financial Resource Strain: Low Risk     Difficulty of Paying Living Expenses: Not very hard   Food Insecurity: No Food Insecurity    Worried About Running Out of Food in the Last Year: Never true    [address] in the Last Year: Never true   Transportation Needs: No Transportation Needs    Lack of Transportation (Medical): No    Lack of Transportation (Non-Medical): No   Physical Activity: Inactive    Days of Exercise per Week: 0 days    Minutes of Exercise per Session: 0 min   Stress: Not on file   Social Connections: Not on file   Intimate Partner Violence: Not on file   Housing Stability: Not on file     Past Medical History:   Diagnosis Date    Anesthesia complication     \"lung collapsed after colon surgery    Anxiety     Arthritis     Back pain     CAD (coronary artery disease)     no stents    Caffeine use     3 coffee / day    Cataract     left    COPD (chronic obstructive pulmonary disease) (HCC)     EMPHYSEMA    Deafness     right ear    Depression     Diabetes mellitus (HCC)     Diarrhea     Diverticulosis     Fibromyalgia     Gastroparalysis     GERD (gastroesophageal reflux disease)     Hearing loss     DEAF IN RIGHT EAR    Hyperlipidemia     Hyperlipidemia     Hypertension     Incontinence     MDRO (multiple drug resistant organisms) resistance 2012    mrsa (nasal), eye, ear    Neurogenic bladder     CATHES HERSELF 7 TIMES A DAY, IS ABLE TO URINATE ON OWN    Neuropathy     feet    Obesity     Osteoarthritis     Peripheral vascular disease, unspecified ([facility])     Restless leg syndrome     Rhabdomyolysis     [place] 1 week, May 2014, then [facility].     Spinal stenosis, thoracic 05/27/2014    Stroke [facility]) 2012    numbness on the left side of face, Oct 2020 TIA     Thyroid disease     enlarged    TMJ (dislocation of temporomandibular joint)     Trigeminal neuralgia     Type II or unspecified type diabetes mellitus without mention of complication, not stated as uncontrolled      Past Surgical History:   Procedure Laterality Date    ABDOMEN SURGERY      LAPAROSCOPY    CARPAL TUNNEL RELEASE Right     CATARACT REMOVAL      CHOLECYSTECTOMY CHOLECYSTECTOMY, OPEN      11/2012    COLON SURGERY      benign mass removed    COLONOSCOPY      COLONOSCOPY  07/13/2016    COLONOSCOPY  06/01/2020    incomplete/poor prep    COLONOSCOPY  07/07/2020    COLONOSCOPY N/A 7/7/2020    COLORECTAL CANCER SCREENING, NOT HIGH RISK performed by Liat Rea MD at [address] [facility] N/A 11/21/2022    COLONOSCOPY DIAGNOSTIC performed by Liat Rea MD at [address] [facility] [address]      sebaceous cyst, under arm left side x5    CYST REMOVAL      right ankle    CYSTOSCOPY      EYE SURGERY Right     HOLE IN RETINA REPAIRED    FINGER TRIGGER RELEASE Right     INCISIONAL HERNIA REPAIR  07/07/15    open    [address] [place], [place] [address] Road  10/17/2017    LEG BIOPSY EXCISION Left 7/6/2021    EXCISION OF LEFT THIGH MASS performed by Liat Rea MD at [place] [address] Left     ear tube placement    POLYPECTOMY      colon polyp    GA REPAIR INCISIONAL HERNIA,REDUCIBLE N/A 10/17/2017    HERNIA INCISIONAL REPAIR WITH MESH performed by Liat Rea MD at [address] [facility] N/A 6/1/2020    SIGMOIDOSCOPY DIAGNOSTIC FLEXIBLE performed by Liat Rea MD at [address] [facility]  07/13/2016    UPPER GASTROINTESTINAL ENDOSCOPY  07/23/2018    with biopsy    UPPER GASTROINTESTINAL ENDOSCOPY N/A 7/23/2018    EGD BIOPSY performed by Liat Rea MD at [address]  06/01/2020    with biopsy    UPPER GASTROINTESTINAL ENDOSCOPY N/A 6/1/2020    EGD BIOPSY performed by Liat Rea MD at [address] [facility] History   Problem Relation Age of Onset    High Blood Pressure Mother     Migraines Mother     High Blood Pressure Father     Heart Disease Father     Cancer Father         skin    Diabetes Maternal Grandmother     Heart Disease Maternal Grandmother     Cancer Maternal Grandmother     Parkinsonism Maternal Grandmother     Cancer Paternal Grandmother     Other Brother         epilepsy Review of Systems   Constitutional: Negative for fever, chills, sweats. Neurological: Negative for headache, numbness, or weakness. Integumentary: Negative for rash, itching, laceration, or abrasion. Musculoskeletal: Positive for Knee Pain (bilateral)       Physical Exam:  Constitutional: Patient is oriented to person, place, and time. Patient appears well-developed and well nourished. HENT: Negative otherwise noted  Head: Normocephalic and Atraumatic  Nose: Normal  Eyes: Conjunctivae and EOM are normal  Neck: Normal range of motion Neck supple. Respiratory/Cardio: Effort normal. No respiratory distress. Musculoskeletal:    bilateral Knee    Swelling: None   Effusion: Trace   Tenderness: Mild tenderness to medial joint line to both knees       Range of Motion:      Extension: 0     Flexion: 120       McMurrays: Negative   Anterior Lachmann: Negative   Posterior Lachmann: Negative   Anterior Drawer: Negative   Posterior Drawer: Negative   Varus Stress Test: Negative   Valgus Stress Test: Negative   Pivot Shift: Negative   Patellar Apprehension: No     Neurological: Patient is alert and oriented to person, place, and time. Normal strenght. No sensory deficit. Skin: Skin is warm and dry  Psychiatric: Behavior is normal. Thought content normal.  Nursing note and vitals reviewed. Labs and Imaging:     XR taken today:  No results found. No orders of the defined types were placed in this encounter. Assessment and Plan:  1. Chronic pain of right knee    2. Chronic pain of left knee            PLAN:   This is a 63 y.o. femalewith history of osteoarthritis of bilateral knee (s) who presents for Bilateral Durolane injection. Durolane Injections given as outlined below  Patient is instructed to keep activity tonight to a minimum after being injected with viscosupplementation today. Instructed to ice and elevated area tonight and return to normal activity as tolerated tomorrow.  Patient is educated on appropriate length of time to allow for visco to start to take affect. RTC in 6 months patient may call return sooner for any questions or concerns    Procedure Note: Durolane Injection of bilateral knee (s)  An informed verbal consent for the procedure was obtained and risks including, but not limited to: allergy to medications, injection, bleeding, stiffness of joint, recurrence of symptoms, loss of function, swelling, drainage, irrigation, need for surgery and pseudo-septic inflammation, were explained to the patient. Also, discussed was the potential for further injections, irrigation and debridement and surgery. Alternate means of treatment have also been discussed with the patient. Following an appropriate discussion with the patient regarding the risks and benefits of the procedure she consented to proceed. her right knee was prepped using betadine solution and alcohol swab. Using aseptic technique and through a lateral joint line approach, her right knee (s) was injected superficially with 4 cc of 1% lidocaine without epinephrine and subsequently the Durolane 60mg/mL solution was injected intra-articularly. A band aid was applied to the injection site. she tolerated the injection with no immediate adverse reactions. Please note that this chart was generated using voice recognition Dragon dictation software. Although every effort was made to ensure the accuracy of this automated transcription, some errors in transcription may have occurred.

## 2022-12-02 ENCOUNTER — TELEPHONE (OUTPATIENT)
Dept: ORTHOPEDIC SURGERY | Age: 62
End: 2022-12-02

## 2022-12-02 NOTE — TELEPHONE ENCOUNTER
Pt called in stating she received a  Monovisc injection on 11/30/22 and her knee is know swollen and painful. Pt is wanting to know if adeel thinks she should be walking on it or attending work today.  Please contact pt at [phone]

## 2022-12-02 NOTE — TELEPHONE ENCOUNTER
LVM with patient requesting that she call office back to go over Back's response and recommendations and to determine if she will need a letter excusing her from work today or not.

## 2022-12-02 NOTE — TELEPHONE ENCOUNTER
I contacted the patient today and she states that she has not been icing or elevating the knee. I advised her to do until she hears back from the office. She wants to know if she should go to work. Please advise.

## 2022-12-02 NOTE — TELEPHONE ENCOUNTER
Swelling and tightness can be normal following these injections. Patient is okay to walk regularly however if it is significantly painful would recommend taking today off work to allow the swelling and pain to improve.

## 2022-12-14 ENCOUNTER — OFFICE VISIT (OUTPATIENT)
Dept: ORTHOPEDIC SURGERY | Age: 62
End: 2022-12-14

## 2022-12-14 VITALS — BODY MASS INDEX: 31.65 KG/M2 | HEIGHT: 65 IN | WEIGHT: 190 LBS

## 2022-12-14 DIAGNOSIS — S52.571D OTHER CLOSED INTRA-ARTICULAR FRACTURE OF DISTAL END OF RIGHT RADIUS WITH ROUTINE HEALING, SUBSEQUENT ENCOUNTER: Primary | ICD-10-CM

## 2022-12-14 NOTE — LETTER
[facility]  [address] [facility]  Phone: [phone]  Fax: [phone]    Danielle Alvarado DO        December 14, 2022     Patient: Sharon Rojas   YOB: 1960   Date of Visit: 12/14/2022       To Whom It May Concern: It is my medical opinion that Neel Burns {Work release (duty restriction):29700}. If you have any questions or concerns, please don't hesitate to call.     Sincerely,        Danielle Alvarado DO

## 2022-12-14 NOTE — PROGRESS NOTES
[address] [facility]  [address] [place] [id number]  Dept: [phone]  Dept Fax: [phone]        [facility] Follow Up      Subjective:   Date of Injury: 8/8/22 right distal radius fracture    Lacey Rojas is a 63y.o. year old female who presents to the clinic today for routine 4 months and 6 days followup regarding her right distal radius fracture managed conservatively. Patient reports decreased soreness and improve range of motion when compared to previous evaluation 2 months ago. Patient works making [employer]. Patient denies any new falls or recent injuries. Patient denies any numbness, tingling, or weakness. Patient admits to trigger finger of the right ring finger that was previously resolved though currently has intermittent triggering at this time, not appreciable during evaluation. Patient has no other orthopedic with this time. Patient requesting work note for lifting restrictions of right wrist.      Review of Systems  Gen: no fever, chills, malaise  CV: no chest pain or palpitations  Resp: no cough or shortness of breath  GI: no nausea, vomiting, diarrhea, or constipation  Neuro: no numbness, tingling, or weakness  Msk: Right wrist soreness  10 remaining systems reviewed and negative    Objective : There were no vitals filed for this visit. Body mass index is 31.62 kg/m². General: No acute distress, resting comfortably in the clinic  Neuro: alert. oriented  Eyes: Extra-ocular muscles intact  Pulm: Respirations unlabored and regular. Skin: warm, well perfused  Psych:   Patient has good fund of knowledge and displays understanging of exam, diagnosis, and plan. MSK:    RUE: Skin intact. Nontender to palpation about the wrist.  Range of motion flexion 30 degrees, extension 80 degrees with some pain throughout. Hand warm and well-perfused. AIN/PIN/radial/ulnar/median nerve motor intact.   Sensation intact light touch to entirety of hand. Able to flex and extend all digits without any significant pain or discomfort,  strength equivalent to contralateral side. Radiology:  No new films, previous films reviewed. Assessment:   63y.o. year old female with right distal radius fracture  Plan:      Patient seen and evaluated in clinic. Patient is encouraged to work on wrist ROM, strengthening, and incrementally increasing activity to wrist without restrictions. Patient referred to PT for functional competency evaluation regarding any weight restrictions for work duties. Patient instructed to utilize NSAIDs PRN. Patient may follow up on an as needed basis moving forward. Patient understood and agreed with the plan with all questions being answered to the patient's satisfaction at the time of visit. Follow up:Return if symptoms worsen or fail to improve. No orders of the defined types were placed in this encounter.          Orders Placed This Encounter   Procedures    [facility]     Referral Priority:   Routine     Referral Type:   Eval and Treat     Referral Reason:   Specialty Services Required     Requested Specialty:   Physical Therapist     Number of Visits Requested:   1       Electronically signed by Chiquita Conteh DO on 12/14/2022 at 4:52 PM

## 2022-12-28 ENCOUNTER — TELEPHONE (OUTPATIENT)
Dept: INTERNAL MEDICINE CLINIC | Age: 62
End: 2022-12-28

## 2022-12-28 NOTE — TELEPHONE ENCOUNTER
Patient has restrictions on drivers license her previous pcp was the one signing her the forms-  Request for Statement of Physician    Patient states she does not know why she needs form filled out

## 2022-12-30 ENCOUNTER — TELEPHONE (OUTPATIENT)
Dept: INTERNAL MEDICINE CLINIC | Age: 62
End: 2022-12-30

## 2022-12-30 NOTE — TELEPHONE ENCOUNTER
Patient informed ov notes needed from [facility] [phone] requested  Dr will need to see patient in office after notes are received

## 2023-01-01 ENCOUNTER — HOSPITAL ENCOUNTER (INPATIENT)
Age: 63
LOS: 5 days | End: 2023-10-27
Attending: EMERGENCY MEDICINE | Admitting: INTERNAL MEDICINE
Payer: MEDICARE

## 2023-01-01 ENCOUNTER — APPOINTMENT (OUTPATIENT)
Dept: CT IMAGING | Age: 63
End: 2023-01-01
Payer: MEDICARE

## 2023-01-01 ENCOUNTER — APPOINTMENT (OUTPATIENT)
Dept: GENERAL RADIOLOGY | Age: 63
End: 2023-01-01
Payer: MEDICARE

## 2023-01-01 VITALS
TEMPERATURE: 96.4 F | HEIGHT: 64 IN | HEIGHT: 64 IN | WEIGHT: 214.95 LBS | OXYGEN SATURATION: 32 % | SYSTOLIC BLOOD PRESSURE: 96 MMHG | TEMPERATURE: 96.4 F | SYSTOLIC BLOOD PRESSURE: 96 MMHG | DIASTOLIC BLOOD PRESSURE: 56 MMHG | BODY MASS INDEX: 36.7 KG/M2 | BODY MASS INDEX: 36.7 KG/M2 | OXYGEN SATURATION: 32 % | DIASTOLIC BLOOD PRESSURE: 56 MMHG | WEIGHT: 214.95 LBS

## 2023-01-01 DIAGNOSIS — I46.9 CARDIOPULMONARY ARREST (HCC): Primary | ICD-10-CM

## 2023-01-01 LAB
ABO + RH BLD: NORMAL
ABO + RH BLD: NORMAL
ALBUMIN SERPL-MCNC: 1.7 G/DL (ref 3.5–5.2)
ALBUMIN SERPL-MCNC: 1.7 G/DL (ref 3.5–5.2)
ALBUMIN SERPL-MCNC: 2.2 G/DL (ref 3.5–5.2)
ALBUMIN SERPL-MCNC: 2.2 G/DL (ref 3.5–5.2)
ALBUMIN SERPL-MCNC: 2.4 G/DL (ref 3.5–5.2)
ALBUMIN SERPL-MCNC: 2.4 G/DL (ref 3.5–5.2)
ALBUMIN SERPL-MCNC: 2.7 G/DL (ref 3.5–5.2)
ALBUMIN SERPL-MCNC: 2.9 G/DL (ref 3.5–5.2)
ALBUMIN SERPL-MCNC: 2.9 G/DL (ref 3.5–5.2)
ALBUMIN SERPL-MCNC: 3.2 G/DL (ref 3.5–5.2)
ALBUMIN SERPL-MCNC: 3.3 G/DL (ref 3.5–5.2)
ALBUMIN SERPL-MCNC: 3.3 G/DL (ref 3.5–5.2)
ALBUMIN/GLOB SERPL: 0.5 {RATIO} (ref 1–2.5)
ALBUMIN/GLOB SERPL: 0.5 {RATIO} (ref 1–2.5)
ALBUMIN/GLOB SERPL: 0.8 {RATIO} (ref 1–2.5)
ALBUMIN/GLOB SERPL: 0.8 {RATIO} (ref 1–2.5)
ALBUMIN/GLOB SERPL: 1 {RATIO} (ref 1–2.5)
ALBUMIN/GLOB SERPL: 1 {RATIO} (ref 1–2.5)
ALBUMIN/GLOB SERPL: 1.2 {RATIO} (ref 1–2.5)
ALBUMIN/GLOB SERPL: 1.3 {RATIO} (ref 1–2.5)
ALBUMIN/GLOB SERPL: 1.3 {RATIO} (ref 1–2.5)
ALBUMIN/GLOB SERPL: 1.5 {RATIO} (ref 1–2.5)
ALBUMIN/GLOB SERPL: 1.6 {RATIO} (ref 1–2.5)
ALBUMIN/GLOB SERPL: 1.6 {RATIO} (ref 1–2.5)
ALLEN TEST: ABNORMAL
ALLEN TEST: POSITIVE
ALP SERPL-CCNC: 104 U/L (ref 35–104)
ALP SERPL-CCNC: 104 U/L (ref 35–104)
ALP SERPL-CCNC: 105 U/L (ref 35–104)
ALP SERPL-CCNC: 105 U/L (ref 35–104)
ALP SERPL-CCNC: 106 U/L (ref 35–104)
ALP SERPL-CCNC: 106 U/L (ref 35–104)
ALP SERPL-CCNC: 114 U/L (ref 35–104)
ALP SERPL-CCNC: 114 U/L (ref 35–104)
ALP SERPL-CCNC: 123 U/L (ref 35–104)
ALP SERPL-CCNC: 123 U/L (ref 35–104)
ALP SERPL-CCNC: 126 U/L (ref 35–104)
ALP SERPL-CCNC: 126 U/L (ref 35–104)
ALP SERPL-CCNC: 132 U/L (ref 35–104)
ALP SERPL-CCNC: 132 U/L (ref 35–104)
ALP SERPL-CCNC: 136 U/L (ref 35–104)
ALP SERPL-CCNC: 136 U/L (ref 35–104)
ALP SERPL-CCNC: 204 U/L (ref 35–104)
ALP SERPL-CCNC: 204 U/L (ref 35–104)
ALP SERPL-CCNC: 230 U/L (ref 35–104)
ALP SERPL-CCNC: 230 U/L (ref 35–104)
ALT SERPL-CCNC: 125 U/L (ref 5–33)
ALT SERPL-CCNC: 125 U/L (ref 5–33)
ALT SERPL-CCNC: 142 U/L (ref 5–33)
ALT SERPL-CCNC: 142 U/L (ref 5–33)
ALT SERPL-CCNC: 159 U/L (ref 5–33)
ALT SERPL-CCNC: 159 U/L (ref 5–33)
ALT SERPL-CCNC: 200 U/L (ref 5–33)
ALT SERPL-CCNC: 200 U/L (ref 5–33)
ALT SERPL-CCNC: 219 U/L (ref 5–33)
ALT SERPL-CCNC: 219 U/L (ref 5–33)
ALT SERPL-CCNC: 224 U/L (ref 5–33)
ALT SERPL-CCNC: 224 U/L (ref 5–33)
ALT SERPL-CCNC: 261 U/L (ref 5–33)
ALT SERPL-CCNC: 261 U/L (ref 5–33)
ALT SERPL-CCNC: 54 U/L (ref 5–33)
ALT SERPL-CCNC: 54 U/L (ref 5–33)
ALT SERPL-CCNC: 58 U/L (ref 5–33)
ALT SERPL-CCNC: 58 U/L (ref 5–33)
ALT SERPL-CCNC: 80 U/L (ref 5–33)
ALT SERPL-CCNC: 80 U/L (ref 5–33)
AMMONIA PLAS-SCNC: 28 UMOL/L (ref 11–51)
AMMONIA PLAS-SCNC: 28 UMOL/L (ref 11–51)
AMPHET UR QL SCN: NEGATIVE
AMPHET UR QL SCN: NEGATIVE
AMYLASE SERPL-CCNC: 42 U/L (ref 28–100)
AMYLASE SERPL-CCNC: 42 U/L (ref 28–100)
ANION GAP SERPL CALCULATED.3IONS-SCNC: 14 MMOL/L (ref 9–17)
ANION GAP SERPL CALCULATED.3IONS-SCNC: 15 MMOL/L (ref 9–17)
ANION GAP SERPL CALCULATED.3IONS-SCNC: 16 MMOL/L (ref 9–17)
ANION GAP SERPL CALCULATED.3IONS-SCNC: 16 MMOL/L (ref 9–17)
ANION GAP SERPL CALCULATED.3IONS-SCNC: 17 MMOL/L (ref 9–17)
ANION GAP SERPL CALCULATED.3IONS-SCNC: 19 MMOL/L (ref 9–17)
ANION GAP SERPL CALCULATED.3IONS-SCNC: 21 MMOL/L (ref 9–17)
ANION GAP SERPL CALCULATED.3IONS-SCNC: 35 MMOL/L (ref 9–17)
ANION GAP SERPL CALCULATED.3IONS-SCNC: 35 MMOL/L (ref 9–17)
ANTI-XA UNFRAC HEPARIN: 0.25 IU/L
ANTI-XA UNFRAC HEPARIN: 0.25 IU/L
ANTI-XA UNFRAC HEPARIN: 0.29 IU/L
ANTI-XA UNFRAC HEPARIN: 0.29 IU/L
ANTI-XA UNFRAC HEPARIN: 0.3 IU/L
ANTI-XA UNFRAC HEPARIN: 0.3 IU/L
ANTI-XA UNFRAC HEPARIN: 0.36 IU/L
ANTI-XA UNFRAC HEPARIN: 0.36 IU/L
ANTI-XA UNFRAC HEPARIN: 0.37 IU/L
ANTI-XA UNFRAC HEPARIN: 0.37 IU/L
ANTI-XA UNFRAC HEPARIN: 0.39 IU/L
ANTI-XA UNFRAC HEPARIN: 0.53 IU/L
ANTI-XA UNFRAC HEPARIN: 0.53 IU/L
ANTI-XA UNFRAC HEPARIN: 0.54 IU/L
ANTI-XA UNFRAC HEPARIN: 0.54 IU/L
ANTI-XA UNFRAC HEPARIN: 0.62 IU/L
ANTI-XA UNFRAC HEPARIN: 0.62 IU/L
ANTI-XA UNFRAC HEPARIN: 0.94 IU/L
ANTI-XA UNFRAC HEPARIN: 0.94 IU/L
ANTI-XA UNFRAC HEPARIN: <0.1 IU/L
ARM BAND NUMBER: NORMAL
ARM BAND NUMBER: NORMAL
AST SERPL-CCNC: 1110 U/L
AST SERPL-CCNC: 1110 U/L
AST SERPL-CCNC: 186 U/L
AST SERPL-CCNC: 186 U/L
AST SERPL-CCNC: 235 U/L
AST SERPL-CCNC: 235 U/L
AST SERPL-CCNC: 291 U/L
AST SERPL-CCNC: 291 U/L
AST SERPL-CCNC: 407 U/L
AST SERPL-CCNC: 407 U/L
AST SERPL-CCNC: 580 U/L
AST SERPL-CCNC: 580 U/L
AST SERPL-CCNC: 62 U/L
AST SERPL-CCNC: 62 U/L
AST SERPL-CCNC: 66 U/L
AST SERPL-CCNC: 66 U/L
AST SERPL-CCNC: 732 U/L
AST SERPL-CCNC: 732 U/L
AST SERPL-CCNC: 86 U/L
AST SERPL-CCNC: 86 U/L
ATYPICAL LYMPHOCYTE ABSOLUTE COUNT: 0.22 K/UL
ATYPICAL LYMPHOCYTE ABSOLUTE COUNT: 0.22 K/UL
ATYPICAL LYMPHOCYTES: 1 %
ATYPICAL LYMPHOCYTES: 1 %
B-OH-BUTYR SERPL-MCNC: 10.12 MMOL/L (ref 0.02–0.27)
B-OH-BUTYR SERPL-MCNC: 10.12 MMOL/L (ref 0.02–0.27)
BACTERIA URNS QL MICRO: NORMAL
BACTERIA URNS QL MICRO: NORMAL
BARBITURATES UR QL SCN: NEGATIVE
BARBITURATES UR QL SCN: NEGATIVE
BASOPHILS # BLD: 0 K/UL (ref 0–0.2)
BASOPHILS # BLD: 0.13 K/UL (ref 0–0.2)
BASOPHILS # BLD: 0.13 K/UL (ref 0–0.2)
BASOPHILS NFR BLD: 0 % (ref 0–2)
BASOPHILS NFR BLD: 1 % (ref 0–2)
BASOPHILS NFR BLD: 1 % (ref 0–2)
BENZODIAZ UR QL: POSITIVE
BENZODIAZ UR QL: POSITIVE
BILIRUB DIRECT SERPL-MCNC: 0.2 MG/DL
BILIRUB SERPL-MCNC: 0.2 MG/DL (ref 0.3–1.2)
BILIRUB SERPL-MCNC: 0.3 MG/DL (ref 0.3–1.2)
BILIRUB UR QL STRIP: NEGATIVE
BLOOD BANK SAMPLE EXPIRATION: NORMAL
BLOOD BANK SAMPLE EXPIRATION: NORMAL
BLOOD GROUP ANTIBODIES SERPL: NEGATIVE
BLOOD GROUP ANTIBODIES SERPL: NEGATIVE
BODY TEMPERATURE: 37
BODY TEMPERATURE: 37
BUN BLD-MCNC: 61 MG/DL (ref 6–20)
BUN BLD-MCNC: 61 MG/DL (ref 6–20)
BUN BLD-MCNC: 69 MG/DL (ref 6–20)
BUN BLD-MCNC: 69 MG/DL (ref 6–20)
BUN BLD-MCNC: 77 MG/DL (ref 6–20)
BUN BLD-MCNC: 77 MG/DL (ref 6–20)
BUN SERPL-MCNC: 57 MG/DL (ref 8–23)
BUN SERPL-MCNC: 57 MG/DL (ref 8–23)
BUN SERPL-MCNC: 59 MG/DL (ref 8–23)
BUN SERPL-MCNC: 60 MG/DL (ref 8–23)
BUN SERPL-MCNC: 60 MG/DL (ref 8–23)
BUN SERPL-MCNC: 62 MG/DL (ref 8–23)
BUN SERPL-MCNC: 62 MG/DL (ref 8–23)
BUN SERPL-MCNC: 64 MG/DL (ref 8–23)
BUN SERPL-MCNC: 64 MG/DL (ref 8–23)
BUN SERPL-MCNC: 66 MG/DL (ref 8–23)
BUN SERPL-MCNC: 66 MG/DL (ref 8–23)
BUN SERPL-MCNC: 76 MG/DL (ref 8–23)
BUN SERPL-MCNC: 76 MG/DL (ref 8–23)
BUN SERPL-MCNC: 80 MG/DL (ref 8–23)
BUN SERPL-MCNC: 80 MG/DL (ref 8–23)
BUN SERPL-MCNC: 81 MG/DL (ref 8–23)
BUN SERPL-MCNC: 81 MG/DL (ref 8–23)
BUN SERPL-MCNC: 83 MG/DL (ref 8–23)
BUN SERPL-MCNC: 83 MG/DL (ref 8–23)
CA-I BLD-SCNC: 0.96 MMOL/L (ref 1.13–1.33)
CA-I BLD-SCNC: 0.96 MMOL/L (ref 1.13–1.33)
CA-I BLD-SCNC: 1.02 MMOL/L (ref 1.15–1.33)
CA-I BLD-SCNC: 1.02 MMOL/L (ref 1.15–1.33)
CA-I BLD-SCNC: 1.18 MMOL/L (ref 1.15–1.33)
CA-I BLD-SCNC: 1.18 MMOL/L (ref 1.15–1.33)
CA-I BLD-SCNC: 1.21 MMOL/L (ref 1.15–1.33)
CA-I BLD-SCNC: 1.21 MMOL/L (ref 1.15–1.33)
CA-I BLD-SCNC: 1.31 MMOL/L (ref 1.13–1.33)
CA-I BLD-SCNC: 1.31 MMOL/L (ref 1.13–1.33)
CALCIUM SERPL-MCNC: 7.1 MG/DL (ref 8.6–10.4)
CALCIUM SERPL-MCNC: 7.1 MG/DL (ref 8.6–10.4)
CALCIUM SERPL-MCNC: 7.2 MG/DL (ref 8.6–10.4)
CALCIUM SERPL-MCNC: 7.3 MG/DL (ref 8.6–10.4)
CALCIUM SERPL-MCNC: 7.3 MG/DL (ref 8.6–10.4)
CALCIUM SERPL-MCNC: 7.4 MG/DL (ref 8.6–10.4)
CALCIUM SERPL-MCNC: 7.8 MG/DL (ref 8.6–10.4)
CALCIUM SERPL-MCNC: 8 MG/DL (ref 8.6–10.4)
CALCIUM SERPL-MCNC: 8 MG/DL (ref 8.6–10.4)
CALCIUM SERPL-MCNC: 8.8 MG/DL (ref 8.6–10.4)
CALCIUM SERPL-MCNC: 8.8 MG/DL (ref 8.6–10.4)
CANNABINOIDS UR QL SCN: POSITIVE
CANNABINOIDS UR QL SCN: POSITIVE
CASTS #/AREA URNS LPF: NORMAL /LPF (ref 0–8)
CHLORIDE BLD-SCNC: 94 MMOL/L (ref 98–110)
CHLORIDE BLD-SCNC: 94 MMOL/L (ref 98–110)
CHLORIDE BLD-SCNC: 97 MMOL/L (ref 98–110)
CHLORIDE BLD-SCNC: 97 MMOL/L (ref 98–110)
CHLORIDE BLD-SCNC: 98 MMOL/L (ref 98–110)
CHLORIDE BLD-SCNC: 98 MMOL/L (ref 98–110)
CHLORIDE SERPL-SCNC: 80 MMOL/L (ref 98–107)
CHLORIDE SERPL-SCNC: 80 MMOL/L (ref 98–107)
CHLORIDE SERPL-SCNC: 91 MMOL/L (ref 98–107)
CHLORIDE SERPL-SCNC: 91 MMOL/L (ref 98–107)
CHLORIDE SERPL-SCNC: 92 MMOL/L (ref 98–107)
CHLORIDE SERPL-SCNC: 92 MMOL/L (ref 98–107)
CHLORIDE SERPL-SCNC: 94 MMOL/L (ref 98–107)
CHLORIDE SERPL-SCNC: 95 MMOL/L (ref 98–107)
CHLORIDE SERPL-SCNC: 97 MMOL/L (ref 98–107)
CK SERPL-CCNC: 115 U/L (ref 26–192)
CK SERPL-CCNC: 115 U/L (ref 26–192)
CK SERPL-CCNC: 314 U/L (ref 26–192)
CK SERPL-CCNC: 314 U/L (ref 26–192)
CK SERPL-CCNC: 769 U/L (ref 26–192)
CK SERPL-CCNC: 769 U/L (ref 26–192)
CLARITY UR: ABNORMAL
CO2 BLD CALC-SCNC: 5 MMOL/L (ref 20–31)
CO2 BLD CALC-SCNC: 5 MMOL/L (ref 20–31)
CO2 BLD CALC-SCNC: 8 MMOL/L (ref 20–31)
CO2 SERPL-SCNC: 13 MMOL/L (ref 20–31)
CO2 SERPL-SCNC: 13 MMOL/L (ref 20–31)
CO2 SERPL-SCNC: 20 MMOL/L (ref 20–31)
CO2 SERPL-SCNC: 21 MMOL/L (ref 20–31)
CO2 SERPL-SCNC: 21 MMOL/L (ref 20–31)
CO2 SERPL-SCNC: 22 MMOL/L (ref 20–31)
CO2 SERPL-SCNC: 22 MMOL/L (ref 20–31)
CO2 SERPL-SCNC: 23 MMOL/L (ref 20–31)
CO2 SERPL-SCNC: 23 MMOL/L (ref 20–31)
CO2 SERPL-SCNC: 25 MMOL/L (ref 20–31)
CO2 SERPL-SCNC: 26 MMOL/L (ref 20–31)
CO2 SERPL-SCNC: 27 MMOL/L (ref 20–31)
CO2 SERPL-SCNC: 27 MMOL/L (ref 20–31)
CO2 SERPL-SCNC: 28 MMOL/L (ref 20–31)
CO2 SERPL-SCNC: 28 MMOL/L (ref 20–31)
CO2 SERPL-SCNC: 29 MMOL/L (ref 20–31)
CO2 SERPL-SCNC: 29 MMOL/L (ref 20–31)
CO2 SERPL-SCNC: 34 MMOL/L (ref 20–31)
CO2 SERPL-SCNC: 34 MMOL/L (ref 20–31)
CO2 SERPL-SCNC: 6 MMOL/L (ref 20–31)
CO2 SERPL-SCNC: 6 MMOL/L (ref 20–31)
COCAINE UR QL SCN: NEGATIVE
COCAINE UR QL SCN: NEGATIVE
COHGB MFR BLD: 1.3 % (ref 0–5)
COHGB MFR BLD: 1.3 % (ref 0–5)
COLOR UR: YELLOW
CREAT SERPL-MCNC: 2.5 MG/DL (ref 0.5–0.9)
CREAT SERPL-MCNC: 2.5 MG/DL (ref 0.5–0.9)
CREAT SERPL-MCNC: 2.8 MG/DL (ref 0.5–0.9)
CREAT SERPL-MCNC: 2.8 MG/DL (ref 0.5–0.9)
CREAT SERPL-MCNC: 3.1 MG/DL (ref 0.5–0.9)
CREAT SERPL-MCNC: 3.1 MG/DL (ref 0.5–0.9)
CREAT SERPL-MCNC: 3.2 MG/DL (ref 0.5–0.9)
CREAT SERPL-MCNC: 3.2 MG/DL (ref 0.5–0.9)
CREAT SERPL-MCNC: 3.3 MG/DL (ref 0.5–0.9)
CREAT SERPL-MCNC: 3.3 MG/DL (ref 0.5–0.9)
CREAT SERPL-MCNC: 3.5 MG/DL (ref 0.5–0.9)
CREAT SERPL-MCNC: 3.5 MG/DL (ref 0.5–0.9)
CREAT SERPL-MCNC: 3.6 MG/DL (ref 0.5–0.9)
CREAT SERPL-MCNC: 3.6 MG/DL (ref 0.5–0.9)
CREAT SERPL-MCNC: 4 MG/DL (ref 0.5–0.9)
CREAT SERPL-MCNC: 4 MG/DL (ref 0.5–0.9)
CREAT SERPL-MCNC: 4.4 MG/DL (ref 0.5–0.9)
CREAT SERPL-MCNC: 4.4 MG/DL (ref 0.5–0.9)
CREAT SERPL-MCNC: 4.5 MG/DL (ref 0.5–0.9)
CRITICAL ACTION: NORMAL
CRITICAL NOTIFICATION DATE/TIME: NORMAL
CRITICAL NOTIFICATION: NORMAL
CRITICAL VALUE READ BACK: NO
CRITICAL VALUE READ BACK: NO
CRITICAL VALUE READ BACK: YES
CRITICAL VALUE READ BACK: YES
EGFR, POC: 22 ML/MIN/1.73M2
EGFR, POC: 22 ML/MIN/1.73M2
EGFR, POC: 25 ML/MIN/1.73M2
EGFR, POC: 25 ML/MIN/1.73M2
EGFR, POC: 32 ML/MIN/1.73M2
EGFR, POC: 32 ML/MIN/1.73M2
EKG ATRIAL RATE: 122 BPM
EKG ATRIAL RATE: 122 BPM
EKG ATRIAL RATE: 91 BPM
EKG ATRIAL RATE: 91 BPM
EKG ATRIAL RATE: 96 BPM
EKG ATRIAL RATE: 96 BPM
EKG ATRIAL RATE: 97 BPM
EKG ATRIAL RATE: 97 BPM
EKG P AXIS: 57 DEGREES
EKG P AXIS: 57 DEGREES
EKG P AXIS: 69 DEGREES
EKG P AXIS: 77 DEGREES
EKG P AXIS: 77 DEGREES
EKG P-R INTERVAL: 124 MS
EKG P-R INTERVAL: 140 MS
EKG P-R INTERVAL: 140 MS
EKG P-R INTERVAL: 218 MS
EKG P-R INTERVAL: 218 MS
EKG Q-T INTERVAL: 326 MS
EKG Q-T INTERVAL: 326 MS
EKG Q-T INTERVAL: 344 MS
EKG Q-T INTERVAL: 344 MS
EKG Q-T INTERVAL: 346 MS
EKG Q-T INTERVAL: 346 MS
EKG Q-T INTERVAL: 378 MS
EKG Q-T INTERVAL: 378 MS
EKG QRS DURATION: 114 MS
EKG QRS DURATION: 114 MS
EKG QRS DURATION: 70 MS
EKG QRS DURATION: 70 MS
EKG QRS DURATION: 76 MS
EKG QTC CALCULATION (BAZETT): 425 MS
EKG QTC CALCULATION (BAZETT): 425 MS
EKG QTC CALCULATION (BAZETT): 436 MS
EKG QTC CALCULATION (BAZETT): 436 MS
EKG QTC CALCULATION (BAZETT): 464 MS
EKG QTC CALCULATION (BAZETT): 464 MS
EKG QTC CALCULATION (BAZETT): 477 MS
EKG QTC CALCULATION (BAZETT): 477 MS
EKG R AXIS: 43 DEGREES
EKG R AXIS: 43 DEGREES
EKG R AXIS: 77 DEGREES
EKG R AXIS: 83 DEGREES
EKG R AXIS: 83 DEGREES
EKG T AXIS: 119 DEGREES
EKG T AXIS: 119 DEGREES
EKG T AXIS: 129 DEGREES
EKG T AXIS: 129 DEGREES
EKG T AXIS: 147 DEGREES
EKG T AXIS: 147 DEGREES
EKG T AXIS: 99 DEGREES
EKG T AXIS: 99 DEGREES
EKG VENTRICULAR RATE: 122 BPM
EKG VENTRICULAR RATE: 122 BPM
EKG VENTRICULAR RATE: 91 BPM
EKG VENTRICULAR RATE: 91 BPM
EKG VENTRICULAR RATE: 96 BPM
EKG VENTRICULAR RATE: 96 BPM
EKG VENTRICULAR RATE: 97 BPM
EKG VENTRICULAR RATE: 97 BPM
EOSINOPHIL # BLD: 0 K/UL (ref 0–0.4)
EOSINOPHIL # BLD: 0 K/UL (ref 0–0.44)
EOSINOPHILS RELATIVE PERCENT: 0 % (ref 1–4)
EPI CELLS #/AREA URNS HPF: ABNORMAL /HPF (ref 0–5)
EPI CELLS #/AREA URNS HPF: ABNORMAL /HPF (ref 0–5)
EPI CELLS #/AREA URNS HPF: NORMAL /HPF (ref 0–5)
EPI CELLS #/AREA URNS HPF: NORMAL /HPF (ref 0–5)
ERYTHROCYTE [DISTWIDTH] IN BLOOD BY AUTOMATED COUNT: 13.5 % (ref 11.8–14.4)
ERYTHROCYTE [DISTWIDTH] IN BLOOD BY AUTOMATED COUNT: 13.7 % (ref 11.8–14.4)
ERYTHROCYTE [DISTWIDTH] IN BLOOD BY AUTOMATED COUNT: 13.7 % (ref 11.8–14.4)
ERYTHROCYTE [DISTWIDTH] IN BLOOD BY AUTOMATED COUNT: 14.6 % (ref 11.8–14.4)
ERYTHROCYTE [DISTWIDTH] IN BLOOD BY AUTOMATED COUNT: 14.6 % (ref 11.8–14.4)
ERYTHROCYTE [DISTWIDTH] IN BLOOD BY AUTOMATED COUNT: 15.1 % (ref 11.8–14.4)
ERYTHROCYTE [DISTWIDTH] IN BLOOD BY AUTOMATED COUNT: 15.1 % (ref 11.8–14.4)
ERYTHROCYTE [DISTWIDTH] IN BLOOD BY AUTOMATED COUNT: 15.2 % (ref 11.8–14.4)
ERYTHROCYTE [DISTWIDTH] IN BLOOD BY AUTOMATED COUNT: 15.2 % (ref 11.8–14.4)
ERYTHROCYTE [DISTWIDTH] IN BLOOD BY AUTOMATED COUNT: 15.4 % (ref 11.8–14.4)
EST. AVERAGE GLUCOSE BLD GHB EST-MCNC: 183 MG/DL
EST. AVERAGE GLUCOSE BLD GHB EST-MCNC: 183 MG/DL
FENTANYL UR QL: NEGATIVE
FENTANYL UR QL: NEGATIVE
FIO2 ON VENT: ABNORMAL %
FIO2 ON VENT: ABNORMAL %
FIO2: 100
FIO2: 80
FIO2: 90
FIO2: 90
GFR SERPL CREATININE-BSD FRML MDRD: 11 ML/MIN/1.73M2
GFR SERPL CREATININE-BSD FRML MDRD: 12 ML/MIN/1.73M2
GFR SERPL CREATININE-BSD FRML MDRD: 12 ML/MIN/1.73M2
GFR SERPL CREATININE-BSD FRML MDRD: 14 ML/MIN/1.73M2
GFR SERPL CREATININE-BSD FRML MDRD: 15 ML/MIN/1.73M2
GFR SERPL CREATININE-BSD FRML MDRD: 15 ML/MIN/1.73M2
GFR SERPL CREATININE-BSD FRML MDRD: 16 ML/MIN/1.73M2
GFR SERPL CREATININE-BSD FRML MDRD: 19 ML/MIN/1.73M2
GFR SERPL CREATININE-BSD FRML MDRD: 19 ML/MIN/1.73M2
GFR SERPL CREATININE-BSD FRML MDRD: 21 ML/MIN/1.73M2
GFR SERPL CREATININE-BSD FRML MDRD: 21 ML/MIN/1.73M2
GGT SERPL-CCNC: 61 U/L (ref 5–36)
GGT SERPL-CCNC: 61 U/L (ref 5–36)
GLUCOSE BLD-MCNC: 102 MG/DL (ref 65–105)
GLUCOSE BLD-MCNC: 102 MG/DL (ref 65–105)
GLUCOSE BLD-MCNC: 108 MG/DL (ref 65–105)
GLUCOSE BLD-MCNC: 108 MG/DL (ref 65–105)
GLUCOSE BLD-MCNC: 121 MG/DL (ref 65–105)
GLUCOSE BLD-MCNC: 121 MG/DL (ref 65–105)
GLUCOSE BLD-MCNC: 124 MG/DL (ref 65–105)
GLUCOSE BLD-MCNC: 124 MG/DL (ref 65–105)
GLUCOSE BLD-MCNC: 127 MG/DL (ref 65–105)
GLUCOSE BLD-MCNC: 127 MG/DL (ref 65–105)
GLUCOSE BLD-MCNC: 128 MG/DL (ref 65–105)
GLUCOSE BLD-MCNC: 134 MG/DL (ref 65–105)
GLUCOSE BLD-MCNC: 134 MG/DL (ref 65–105)
GLUCOSE BLD-MCNC: 135 MG/DL (ref 65–105)
GLUCOSE BLD-MCNC: 135 MG/DL (ref 65–105)
GLUCOSE BLD-MCNC: 136 MG/DL (ref 65–105)
GLUCOSE BLD-MCNC: 136 MG/DL (ref 65–105)
GLUCOSE BLD-MCNC: 138 MG/DL (ref 65–105)
GLUCOSE BLD-MCNC: 138 MG/DL (ref 65–105)
GLUCOSE BLD-MCNC: 141 MG/DL (ref 65–105)
GLUCOSE BLD-MCNC: 142 MG/DL (ref 65–105)
GLUCOSE BLD-MCNC: 142 MG/DL (ref 74–100)
GLUCOSE BLD-MCNC: 142 MG/DL (ref 74–100)
GLUCOSE BLD-MCNC: 145 MG/DL (ref 65–105)
GLUCOSE BLD-MCNC: 145 MG/DL (ref 74–100)
GLUCOSE BLD-MCNC: 145 MG/DL (ref 74–100)
GLUCOSE BLD-MCNC: 147 MG/DL (ref 65–105)
GLUCOSE BLD-MCNC: 147 MG/DL (ref 74–100)
GLUCOSE BLD-MCNC: 147 MG/DL (ref 74–100)
GLUCOSE BLD-MCNC: 148 MG/DL (ref 65–105)
GLUCOSE BLD-MCNC: 148 MG/DL (ref 65–105)
GLUCOSE BLD-MCNC: 149 MG/DL (ref 65–105)
GLUCOSE BLD-MCNC: 150 MG/DL (ref 65–105)
GLUCOSE BLD-MCNC: 150 MG/DL (ref 65–105)
GLUCOSE BLD-MCNC: 151 MG/DL (ref 65–105)
GLUCOSE BLD-MCNC: 152 MG/DL (ref 65–105)
GLUCOSE BLD-MCNC: 152 MG/DL (ref 65–105)
GLUCOSE BLD-MCNC: 153 MG/DL (ref 65–105)
GLUCOSE BLD-MCNC: 154 MG/DL (ref 65–105)
GLUCOSE BLD-MCNC: 154 MG/DL (ref 65–105)
GLUCOSE BLD-MCNC: 155 MG/DL (ref 65–105)
GLUCOSE BLD-MCNC: 155 MG/DL (ref 65–105)
GLUCOSE BLD-MCNC: 156 MG/DL (ref 65–105)
GLUCOSE BLD-MCNC: 156 MG/DL (ref 65–105)
GLUCOSE BLD-MCNC: 157 MG/DL (ref 65–105)
GLUCOSE BLD-MCNC: 158 MG/DL (ref 65–105)
GLUCOSE BLD-MCNC: 160 MG/DL (ref 74–100)
GLUCOSE BLD-MCNC: 160 MG/DL (ref 74–100)
GLUCOSE BLD-MCNC: 161 MG/DL (ref 65–105)
GLUCOSE BLD-MCNC: 162 MG/DL (ref 65–105)
GLUCOSE BLD-MCNC: 163 MG/DL (ref 65–105)
GLUCOSE BLD-MCNC: 164 MG/DL (ref 65–105)
GLUCOSE BLD-MCNC: 164 MG/DL (ref 74–100)
GLUCOSE BLD-MCNC: 164 MG/DL (ref 74–100)
GLUCOSE BLD-MCNC: 167 MG/DL (ref 65–105)
GLUCOSE BLD-MCNC: 167 MG/DL (ref 65–105)
GLUCOSE BLD-MCNC: 168 MG/DL (ref 65–105)
GLUCOSE BLD-MCNC: 168 MG/DL (ref 65–105)
GLUCOSE BLD-MCNC: 170 MG/DL (ref 74–100)
GLUCOSE BLD-MCNC: 170 MG/DL (ref 74–100)
GLUCOSE BLD-MCNC: 171 MG/DL (ref 65–105)
GLUCOSE BLD-MCNC: 172 MG/DL (ref 65–105)
GLUCOSE BLD-MCNC: 172 MG/DL (ref 65–105)
GLUCOSE BLD-MCNC: 173 MG/DL (ref 65–105)
GLUCOSE BLD-MCNC: 174 MG/DL (ref 65–105)
GLUCOSE BLD-MCNC: 174 MG/DL (ref 65–105)
GLUCOSE BLD-MCNC: 175 MG/DL (ref 65–105)
GLUCOSE BLD-MCNC: 175 MG/DL (ref 65–105)
GLUCOSE BLD-MCNC: 176 MG/DL (ref 65–105)
GLUCOSE BLD-MCNC: 179 MG/DL (ref 65–105)
GLUCOSE BLD-MCNC: 182 MG/DL (ref 65–105)
GLUCOSE BLD-MCNC: 182 MG/DL (ref 65–105)
GLUCOSE BLD-MCNC: 183 MG/DL (ref 65–105)
GLUCOSE BLD-MCNC: 183 MG/DL (ref 65–105)
GLUCOSE BLD-MCNC: 183 MG/DL (ref 74–100)
GLUCOSE BLD-MCNC: 183 MG/DL (ref 74–100)
GLUCOSE BLD-MCNC: 185 MG/DL (ref 65–105)
GLUCOSE BLD-MCNC: 199 MG/DL (ref 65–105)
GLUCOSE BLD-MCNC: 199 MG/DL (ref 65–105)
GLUCOSE BLD-MCNC: 200 MG/DL (ref 65–105)
GLUCOSE BLD-MCNC: 200 MG/DL (ref 65–105)
GLUCOSE BLD-MCNC: 218 MG/DL (ref 65–105)
GLUCOSE BLD-MCNC: 218 MG/DL (ref 65–105)
GLUCOSE BLD-MCNC: 226 MG/DL (ref 65–105)
GLUCOSE BLD-MCNC: 226 MG/DL (ref 65–105)
GLUCOSE BLD-MCNC: 248 MG/DL (ref 65–105)
GLUCOSE BLD-MCNC: 248 MG/DL (ref 65–105)
GLUCOSE BLD-MCNC: 260 MG/DL (ref 65–105)
GLUCOSE BLD-MCNC: 260 MG/DL (ref 65–105)
GLUCOSE BLD-MCNC: 270 MG/DL (ref 65–105)
GLUCOSE BLD-MCNC: 270 MG/DL (ref 65–105)
GLUCOSE BLD-MCNC: 290 MG/DL (ref 74–100)
GLUCOSE BLD-MCNC: 290 MG/DL (ref 74–100)
GLUCOSE BLD-MCNC: 293 MG/DL (ref 65–105)
GLUCOSE BLD-MCNC: 293 MG/DL (ref 65–105)
GLUCOSE BLD-MCNC: 312 MG/DL (ref 65–105)
GLUCOSE BLD-MCNC: 312 MG/DL (ref 65–105)
GLUCOSE BLD-MCNC: 337 MG/DL (ref 65–105)
GLUCOSE BLD-MCNC: 337 MG/DL (ref 65–105)
GLUCOSE BLD-MCNC: 351 MG/DL (ref 65–105)
GLUCOSE BLD-MCNC: 351 MG/DL (ref 65–105)
GLUCOSE BLD-MCNC: 389 MG/DL (ref 65–105)
GLUCOSE BLD-MCNC: 389 MG/DL (ref 65–105)
GLUCOSE BLD-MCNC: 391 MG/DL (ref 65–105)
GLUCOSE BLD-MCNC: 391 MG/DL (ref 65–105)
GLUCOSE BLD-MCNC: 418 MG/DL (ref 74–100)
GLUCOSE BLD-MCNC: 418 MG/DL (ref 74–100)
GLUCOSE BLD-MCNC: 443 MG/DL (ref 65–105)
GLUCOSE BLD-MCNC: 443 MG/DL (ref 65–105)
GLUCOSE BLD-MCNC: 451 MG/DL (ref 65–105)
GLUCOSE BLD-MCNC: 451 MG/DL (ref 65–105)
GLUCOSE BLD-MCNC: 453 MG/DL (ref 74–100)
GLUCOSE BLD-MCNC: 453 MG/DL (ref 74–100)
GLUCOSE BLD-MCNC: 454 MG/DL (ref 65–105)
GLUCOSE BLD-MCNC: 454 MG/DL (ref 65–105)
GLUCOSE BLD-MCNC: 502 MG/DL (ref 65–105)
GLUCOSE BLD-MCNC: 502 MG/DL (ref 65–105)
GLUCOSE BLD-MCNC: 581 MG/DL (ref 65–105)
GLUCOSE BLD-MCNC: 581 MG/DL (ref 65–105)
GLUCOSE BLD-MCNC: >600 MG/DL (ref 65–105)
GLUCOSE BLD-MCNC: >700 MG/DL (ref 74–100)
GLUCOSE SERPL-MCNC: 1139 MG/DL (ref 70–99)
GLUCOSE SERPL-MCNC: 1139 MG/DL (ref 70–99)
GLUCOSE SERPL-MCNC: 1244 MG/DL (ref 70–99)
GLUCOSE SERPL-MCNC: 1244 MG/DL (ref 70–99)
GLUCOSE SERPL-MCNC: 129 MG/DL (ref 70–99)
GLUCOSE SERPL-MCNC: 129 MG/DL (ref 70–99)
GLUCOSE SERPL-MCNC: 142 MG/DL (ref 70–99)
GLUCOSE SERPL-MCNC: 142 MG/DL (ref 70–99)
GLUCOSE SERPL-MCNC: 1429 MG/DL (ref 70–99)
GLUCOSE SERPL-MCNC: 1429 MG/DL (ref 70–99)
GLUCOSE SERPL-MCNC: 150 MG/DL (ref 70–99)
GLUCOSE SERPL-MCNC: 150 MG/DL (ref 70–99)
GLUCOSE SERPL-MCNC: 166 MG/DL (ref 70–99)
GLUCOSE SERPL-MCNC: 166 MG/DL (ref 70–99)
GLUCOSE SERPL-MCNC: 173 MG/DL (ref 70–99)
GLUCOSE SERPL-MCNC: 173 MG/DL (ref 70–99)
GLUCOSE SERPL-MCNC: 186 MG/DL (ref 70–99)
GLUCOSE SERPL-MCNC: 186 MG/DL (ref 70–99)
GLUCOSE SERPL-MCNC: 233 MG/DL (ref 70–99)
GLUCOSE SERPL-MCNC: 233 MG/DL (ref 70–99)
GLUCOSE SERPL-MCNC: 447 MG/DL (ref 70–99)
GLUCOSE SERPL-MCNC: 447 MG/DL (ref 70–99)
GLUCOSE SERPL-MCNC: 595 MG/DL (ref 70–99)
GLUCOSE SERPL-MCNC: 595 MG/DL (ref 70–99)
GLUCOSE SERPL-MCNC: 739 MG/DL (ref 70–99)
GLUCOSE SERPL-MCNC: 739 MG/DL (ref 70–99)
GLUCOSE SERPL-MCNC: 864 MG/DL (ref 70–99)
GLUCOSE SERPL-MCNC: 864 MG/DL (ref 70–99)
GLUCOSE SERPL-MCNC: 996 MG/DL (ref 70–99)
GLUCOSE SERPL-MCNC: 996 MG/DL (ref 70–99)
GLUCOSE UR STRIP-MCNC: ABNORMAL MG/DL
HBA1C MFR BLD: 8 % (ref 4–6)
HBA1C MFR BLD: 8 % (ref 4–6)
HCO3 VENOUS: 4.4 MMOL/L (ref 22–29)
HCO3 VENOUS: 4.4 MMOL/L (ref 22–29)
HCO3 VENOUS: 6.4 MMOL/L (ref 24–30)
HCO3 VENOUS: 6.4 MMOL/L (ref 24–30)
HCO3 VENOUS: 6.6 MMOL/L (ref 22–29)
HCO3 VENOUS: 6.6 MMOL/L (ref 22–29)
HCT VFR BLD AUTO: 27.3 % (ref 36.3–47.1)
HCT VFR BLD AUTO: 27.3 % (ref 36.3–47.1)
HCT VFR BLD AUTO: 28 % (ref 36.3–47.1)
HCT VFR BLD AUTO: 28 % (ref 36.3–47.1)
HCT VFR BLD AUTO: 28.8 % (ref 36.3–47.1)
HCT VFR BLD AUTO: 28.8 % (ref 36.3–47.1)
HCT VFR BLD AUTO: 30.6 % (ref 36.3–47.1)
HCT VFR BLD AUTO: 30.6 % (ref 36.3–47.1)
HCT VFR BLD AUTO: 30.9 % (ref 36.3–47.1)
HCT VFR BLD AUTO: 30.9 % (ref 36.3–47.1)
HCT VFR BLD AUTO: 31.1 % (ref 36.3–47.1)
HCT VFR BLD AUTO: 31.1 % (ref 36.3–47.1)
HCT VFR BLD AUTO: 31.8 % (ref 36.3–47.1)
HCT VFR BLD AUTO: 31.8 % (ref 36.3–47.1)
HCT VFR BLD AUTO: 31.9 % (ref 36.3–47.1)
HCT VFR BLD AUTO: 31.9 % (ref 36.3–47.1)
HCT VFR BLD AUTO: 35.3 % (ref 36.3–47.1)
HCT VFR BLD AUTO: 35.3 % (ref 36.3–47.1)
HCT VFR BLD AUTO: 36.3 % (ref 36.3–47.1)
HCT VFR BLD AUTO: 36.3 % (ref 36.3–47.1)
HCT VFR BLD AUTO: 36.4 % (ref 36.3–47.1)
HCT VFR BLD AUTO: 36.4 % (ref 36.3–47.1)
HCT VFR BLD AUTO: 37 % (ref 36–46)
HCT VFR BLD AUTO: 37 % (ref 36–46)
HCT VFR BLD AUTO: 38.3 % (ref 36.3–47.1)
HCT VFR BLD AUTO: 38.3 % (ref 36.3–47.1)
HCT VFR BLD AUTO: 38.5 % (ref 36.3–47.1)
HCT VFR BLD AUTO: 38.5 % (ref 36.3–47.1)
HCT VFR BLD AUTO: 39 % (ref 36–46)
HCT VFR BLD AUTO: 39 % (ref 36–46)
HCT VFR BLD AUTO: 40.7 % (ref 36.3–47.1)
HCT VFR BLD AUTO: 40.7 % (ref 36.3–47.1)
HCT VFR BLD AUTO: 41 % (ref 36–46)
HCT VFR BLD AUTO: 41 % (ref 36–46)
HGB BLD-MCNC: 10.5 G/DL (ref 11.9–15.1)
HGB BLD-MCNC: 10.9 G/DL (ref 11.9–15.1)
HGB BLD-MCNC: 10.9 G/DL (ref 11.9–15.1)
HGB BLD-MCNC: 11.1 G/DL (ref 11.9–15.1)
HGB BLD-MCNC: 11.3 G/DL (ref 11.9–15.1)
HGB BLD-MCNC: 11.3 G/DL (ref 11.9–15.1)
HGB BLD-MCNC: 12.2 G/DL (ref 11.9–15.1)
HGB BLD-MCNC: 12.2 G/DL (ref 11.9–15.1)
HGB BLD-MCNC: 12.3 G/DL (ref 11.9–15.1)
HGB BLD-MCNC: 12.3 G/DL (ref 11.9–15.1)
HGB BLD-MCNC: 12.4 G/DL (ref 11.9–15.1)
HGB BLD-MCNC: 12.4 G/DL (ref 11.9–15.1)
HGB BLD-MCNC: 12.5 G/DL (ref 11.9–15.1)
HGB BLD-MCNC: 12.5 G/DL (ref 11.9–15.1)
HGB BLD-MCNC: 8.9 G/DL (ref 11.9–15.1)
HGB BLD-MCNC: 8.9 G/DL (ref 11.9–15.1)
HGB BLD-MCNC: 9 G/DL (ref 11.9–15.1)
HGB BLD-MCNC: 9 G/DL (ref 11.9–15.1)
HGB BLD-MCNC: 9.2 G/DL (ref 11.9–15.1)
HGB BLD-MCNC: 9.2 G/DL (ref 11.9–15.1)
HGB BLD-MCNC: 9.9 G/DL (ref 11.9–15.1)
HGB BLD-MCNC: 9.9 G/DL (ref 11.9–15.1)
HGB UR QL STRIP.AUTO: ABNORMAL
IMM GRANULOCYTES # BLD AUTO: 0 K/UL (ref 0–0.3)
IMM GRANULOCYTES # BLD AUTO: 0.15 K/UL (ref 0–0.3)
IMM GRANULOCYTES # BLD AUTO: 0.45 K/UL (ref 0–0.3)
IMM GRANULOCYTES # BLD AUTO: 0.45 K/UL (ref 0–0.3)
IMM GRANULOCYTES # BLD AUTO: 0.89 K/UL (ref 0–0.3)
IMM GRANULOCYTES # BLD AUTO: 0.89 K/UL (ref 0–0.3)
IMM GRANULOCYTES # BLD AUTO: 1.89 K/UL (ref 0–0.3)
IMM GRANULOCYTES # BLD AUTO: 1.89 K/UL (ref 0–0.3)
IMM GRANULOCYTES NFR BLD: 0 %
IMM GRANULOCYTES NFR BLD: 1 %
IMM GRANULOCYTES NFR BLD: 3 %
IMM GRANULOCYTES NFR BLD: 3 %
IMM GRANULOCYTES NFR BLD: 6 %
IMM GRANULOCYTES NFR BLD: 6 %
IMM GRANULOCYTES NFR BLD: 9 %
IMM GRANULOCYTES NFR BLD: 9 %
INR PPP: 1.3
INR PPP: 1.3
INR PPP: 1.5
INR PPP: 1.5
INR PPP: 1.6
INR PPP: 1.6
INR PPP: 1.7
INR PPP: 1.7
INR PPP: 1.8
INR PPP: 1.8
KETONES UR STRIP-MCNC: ABNORMAL MG/DL
KETONES UR STRIP-MCNC: ABNORMAL MG/DL
KETONES UR STRIP-MCNC: NEGATIVE MG/DL
KETONES UR STRIP-MCNC: NEGATIVE MG/DL
LACTIC ACID, SEPSIS WHOLE BLOOD: 6 MMOL/L (ref 0.5–1.9)
LACTIC ACID, SEPSIS WHOLE BLOOD: 6 MMOL/L (ref 0.5–1.9)
LACTIC ACID, SEPSIS WHOLE BLOOD: 7.2 MMOL/L (ref 0.5–1.9)
LACTIC ACID, SEPSIS WHOLE BLOOD: 7.2 MMOL/L (ref 0.5–1.9)
LACTIC ACID, WHOLE BLOOD: 2.2 MMOL/L (ref 0.7–2.1)
LACTIC ACID, WHOLE BLOOD: 2.2 MMOL/L (ref 0.7–2.1)
LDH SERPL-CCNC: 1360 U/L (ref 135–214)
LDH SERPL-CCNC: 1360 U/L (ref 135–214)
LEUKOCYTE ESTERASE UR QL STRIP: ABNORMAL
LEUKOCYTE ESTERASE UR QL STRIP: ABNORMAL
LEUKOCYTE ESTERASE UR QL STRIP: NEGATIVE
LEUKOCYTE ESTERASE UR QL STRIP: NEGATIVE
LIPASE SERPL-CCNC: 29 U/L (ref 13–60)
LIPASE SERPL-CCNC: 29 U/L (ref 13–60)
LYMPHOCYTES NFR BLD: 0.45 K/UL (ref 1–4.8)
LYMPHOCYTES NFR BLD: 0.45 K/UL (ref 1–4.8)
LYMPHOCYTES NFR BLD: 0.46 K/UL (ref 1–4.8)
LYMPHOCYTES NFR BLD: 0.46 K/UL (ref 1–4.8)
LYMPHOCYTES NFR BLD: 0.66 K/UL (ref 1.1–3.7)
LYMPHOCYTES NFR BLD: 0.66 K/UL (ref 1.1–3.7)
LYMPHOCYTES NFR BLD: 0.75 K/UL (ref 1.1–3.7)
LYMPHOCYTES NFR BLD: 0.75 K/UL (ref 1.1–3.7)
LYMPHOCYTES NFR BLD: 1.33 K/UL (ref 1–4.8)
LYMPHOCYTES NFR BLD: 1.33 K/UL (ref 1–4.8)
LYMPHOCYTES NFR BLD: 1.53 K/UL (ref 1–4.8)
LYMPHOCYTES NFR BLD: 1.53 K/UL (ref 1–4.8)
LYMPHOCYTES NFR BLD: 3.15 K/UL (ref 1–4.8)
LYMPHOCYTES NFR BLD: 3.15 K/UL (ref 1–4.8)
LYMPHOCYTES RELATIVE PERCENT: 15 % (ref 24–44)
LYMPHOCYTES RELATIVE PERCENT: 15 % (ref 24–44)
LYMPHOCYTES RELATIVE PERCENT: 3 % (ref 24–44)
LYMPHOCYTES RELATIVE PERCENT: 5 % (ref 24–43)
LYMPHOCYTES RELATIVE PERCENT: 7 % (ref 24–44)
LYMPHOCYTES RELATIVE PERCENT: 7 % (ref 24–44)
LYMPHOCYTES RELATIVE PERCENT: 9 % (ref 24–44)
LYMPHOCYTES RELATIVE PERCENT: 9 % (ref 24–44)
MAGNESIUM SERPL-MCNC: 2.1 MG/DL (ref 1.6–2.6)
MAGNESIUM SERPL-MCNC: 2.2 MG/DL (ref 1.6–2.6)
MAGNESIUM SERPL-MCNC: 2.3 MG/DL (ref 1.6–2.6)
MAGNESIUM SERPL-MCNC: 2.3 MG/DL (ref 1.6–2.6)
MAGNESIUM SERPL-MCNC: 2.4 MG/DL (ref 1.6–2.6)
MAGNESIUM SERPL-MCNC: 2.4 MG/DL (ref 1.6–2.6)
MAGNESIUM SERPL-MCNC: 2.6 MG/DL (ref 1.6–2.6)
MAGNESIUM SERPL-MCNC: 2.6 MG/DL (ref 1.6–2.6)
MAGNESIUM SERPL-MCNC: 2.8 MG/DL (ref 1.6–2.6)
MAGNESIUM SERPL-MCNC: 2.8 MG/DL (ref 1.6–2.6)
MCH RBC QN AUTO: 30.3 PG (ref 25.2–33.5)
MCH RBC QN AUTO: 30.4 PG (ref 25.2–33.5)
MCH RBC QN AUTO: 30.4 PG (ref 25.2–33.5)
MCH RBC QN AUTO: 30.5 PG (ref 25.2–33.5)
MCH RBC QN AUTO: 30.5 PG (ref 25.2–33.5)
MCH RBC QN AUTO: 30.8 PG (ref 25.2–33.5)
MCH RBC QN AUTO: 30.8 PG (ref 25.2–33.5)
MCH RBC QN AUTO: 30.9 PG (ref 25.2–33.5)
MCH RBC QN AUTO: 30.9 PG (ref 25.2–33.5)
MCH RBC QN AUTO: 31 PG (ref 25.2–33.5)
MCH RBC QN AUTO: 31 PG (ref 25.2–33.5)
MCHC RBC AUTO-ENTMCNC: 27.8 G/DL (ref 28.4–34.8)
MCHC RBC AUTO-ENTMCNC: 27.8 G/DL (ref 28.4–34.8)
MCHC RBC AUTO-ENTMCNC: 28.5 G/DL (ref 28.4–34.8)
MCHC RBC AUTO-ENTMCNC: 28.5 G/DL (ref 28.4–34.8)
MCHC RBC AUTO-ENTMCNC: 31.9 G/DL (ref 28.4–34.8)
MCHC RBC AUTO-ENTMCNC: 31.9 G/DL (ref 28.4–34.8)
MCHC RBC AUTO-ENTMCNC: 32 G/DL (ref 28.4–34.8)
MCHC RBC AUTO-ENTMCNC: 32 G/DL (ref 28.4–34.8)
MCHC RBC AUTO-ENTMCNC: 32.1 G/DL (ref 28.4–34.8)
MCHC RBC AUTO-ENTMCNC: 32.1 G/DL (ref 28.4–34.8)
MCHC RBC AUTO-ENTMCNC: 32.6 G/DL (ref 28.4–34.8)
MCHC RBC AUTO-ENTMCNC: 32.6 G/DL (ref 28.4–34.8)
MCHC RBC AUTO-ENTMCNC: 33.8 G/DL (ref 28.4–34.8)
MCHC RBC AUTO-ENTMCNC: 33.8 G/DL (ref 28.4–34.8)
MCHC RBC AUTO-ENTMCNC: 34.3 G/DL (ref 28.4–34.8)
MCHC RBC AUTO-ENTMCNC: 34.3 G/DL (ref 28.4–34.8)
MCV RBC AUTO: 108.5 FL (ref 82.6–102.9)
MCV RBC AUTO: 108.5 FL (ref 82.6–102.9)
MCV RBC AUTO: 110.9 FL (ref 82.6–102.9)
MCV RBC AUTO: 110.9 FL (ref 82.6–102.9)
MCV RBC AUTO: 88.8 FL (ref 82.6–102.9)
MCV RBC AUTO: 88.8 FL (ref 82.6–102.9)
MCV RBC AUTO: 89.6 FL (ref 82.6–102.9)
MCV RBC AUTO: 89.6 FL (ref 82.6–102.9)
MCV RBC AUTO: 94.5 FL (ref 82.6–102.9)
MCV RBC AUTO: 94.5 FL (ref 82.6–102.9)
MCV RBC AUTO: 94.6 FL (ref 82.6–102.9)
MCV RBC AUTO: 94.6 FL (ref 82.6–102.9)
MCV RBC AUTO: 94.7 FL (ref 82.6–102.9)
MCV RBC AUTO: 94.7 FL (ref 82.6–102.9)
MCV RBC AUTO: 95.1 FL (ref 82.6–102.9)
MCV RBC AUTO: 95.1 FL (ref 82.6–102.9)
METHADONE UR QL: NEGATIVE
METHADONE UR QL: NEGATIVE
MICROORGANISM SPEC CULT: ABNORMAL
MICROORGANISM SPEC CULT: NO GROWTH
MICROORGANISM SPEC CULT: NORMAL
MICROORGANISM/AGENT SPEC: ABNORMAL
MODE: ABNORMAL
MONOCYTES NFR BLD: 0 % (ref 1–7)
MONOCYTES NFR BLD: 0 % (ref 1–7)
MONOCYTES NFR BLD: 0 K/UL (ref 0.1–0.8)
MONOCYTES NFR BLD: 0 K/UL (ref 0.1–0.8)
MONOCYTES NFR BLD: 0.59 K/UL (ref 0.1–0.8)
MONOCYTES NFR BLD: 0.59 K/UL (ref 0.1–0.8)
MONOCYTES NFR BLD: 0.66 K/UL (ref 0.1–1.2)
MONOCYTES NFR BLD: 0.66 K/UL (ref 0.1–1.2)
MONOCYTES NFR BLD: 0.91 K/UL (ref 0.1–0.8)
MONOCYTES NFR BLD: 0.91 K/UL (ref 0.1–0.8)
MONOCYTES NFR BLD: 0.92 K/UL (ref 0.1–0.8)
MONOCYTES NFR BLD: 0.92 K/UL (ref 0.1–0.8)
MONOCYTES NFR BLD: 1.2 K/UL (ref 0.1–1.2)
MONOCYTES NFR BLD: 1.2 K/UL (ref 0.1–1.2)
MONOCYTES NFR BLD: 1.53 K/UL (ref 0.1–0.8)
MONOCYTES NFR BLD: 1.53 K/UL (ref 0.1–0.8)
MONOCYTES NFR BLD: 4 % (ref 1–7)
MONOCYTES NFR BLD: 4 % (ref 1–7)
MONOCYTES NFR BLD: 5 % (ref 3–12)
MONOCYTES NFR BLD: 5 % (ref 3–12)
MONOCYTES NFR BLD: 6 % (ref 1–7)
MONOCYTES NFR BLD: 7 % (ref 1–7)
MONOCYTES NFR BLD: 7 % (ref 1–7)
MONOCYTES NFR BLD: 8 % (ref 3–12)
MONOCYTES NFR BLD: 8 % (ref 3–12)
MORPHOLOGY: ABNORMAL
MORPHOLOGY: NORMAL
MORPHOLOGY: NORMAL
MRSA, DNA, NASAL: NEGATIVE
MRSA, DNA, NASAL: NEGATIVE
MYOGLOBIN SERPL-MCNC: 409 NG/ML (ref 25–58)
MYOGLOBIN SERPL-MCNC: 409 NG/ML (ref 25–58)
MYOGLOBIN SERPL-MCNC: 951 NG/ML (ref 25–58)
MYOGLOBIN SERPL-MCNC: 951 NG/ML (ref 25–58)
NEGATIVE BASE EXCESS, ART: 0.1 MMOL/L (ref 0–2)
NEGATIVE BASE EXCESS, ART: 0.1 MMOL/L (ref 0–2)
NEGATIVE BASE EXCESS, ART: 0.5 MMOL/L (ref 0–2)
NEGATIVE BASE EXCESS, ART: 0.5 MMOL/L (ref 0–2)
NEGATIVE BASE EXCESS, ART: 1.8 MMOL/L (ref 0–2)
NEGATIVE BASE EXCESS, ART: 1.8 MMOL/L (ref 0–2)
NEGATIVE BASE EXCESS, ART: 2.1 MMOL/L (ref 0–2)
NEGATIVE BASE EXCESS, ART: 2.1 MMOL/L (ref 0–2)
NEGATIVE BASE EXCESS, ART: 23.3 MMOL/L (ref 0–2)
NEGATIVE BASE EXCESS, ART: 23.3 MMOL/L (ref 0–2)
NEGATIVE BASE EXCESS, ART: 25.9 MMOL/L (ref 0–2)
NEGATIVE BASE EXCESS, ART: 25.9 MMOL/L (ref 0–2)
NEGATIVE BASE EXCESS, ART: 6 MMOL/L (ref 0–2)
NEGATIVE BASE EXCESS, ART: 6 MMOL/L (ref 0–2)
NEGATIVE BASE EXCESS, VEN: 28.5 MMOL/L (ref 0–2)
NEGATIVE BASE EXCESS, VEN: 28.5 MMOL/L (ref 0–2)
NEGATIVE BASE EXCESS, VEN: 29.9 MMOL/L (ref 0–2)
NEGATIVE BASE EXCESS, VEN: 29.9 MMOL/L (ref 0–2)
NEGATIVE BASE EXCESS, VEN: >30 MMOL/L (ref 0–2)
NEGATIVE BASE EXCESS, VEN: >30 MMOL/L (ref 0–2)
NEURON SPEC ENOLASE: 118.7 NG/ML
NEURON SPEC ENOLASE: 118.7 NG/ML
NEURON SPEC ENOLASE: 273.6 NG/ML
NEURON SPEC ENOLASE: 273.6 NG/ML
NEURON SPEC ENOLASE: 462.1 NG/ML
NEURON SPEC ENOLASE: 462.1 NG/ML
NEUTROPHILS NFR BLD: 76 % (ref 36–66)
NEUTROPHILS NFR BLD: 76 % (ref 36–66)
NEUTROPHILS NFR BLD: 81 % (ref 36–66)
NEUTROPHILS NFR BLD: 81 % (ref 36–66)
NEUTROPHILS NFR BLD: 85 % (ref 36–66)
NEUTROPHILS NFR BLD: 85 % (ref 36–66)
NEUTROPHILS NFR BLD: 86 % (ref 36–65)
NEUTROPHILS NFR BLD: 86 % (ref 36–65)
NEUTROPHILS NFR BLD: 88 % (ref 36–66)
NEUTROPHILS NFR BLD: 88 % (ref 36–66)
NEUTROPHILS NFR BLD: 89 % (ref 36–65)
NEUTROPHILS NFR BLD: 89 % (ref 36–65)
NEUTROPHILS NFR BLD: 90 % (ref 36–66)
NEUTROPHILS NFR BLD: 90 % (ref 36–66)
NEUTS SEG NFR BLD: 11.75 K/UL (ref 1.5–8.1)
NEUTS SEG NFR BLD: 11.75 K/UL (ref 1.5–8.1)
NEUTS SEG NFR BLD: 11.99 K/UL (ref 1.8–7.7)
NEUTS SEG NFR BLD: 11.99 K/UL (ref 1.8–7.7)
NEUTS SEG NFR BLD: 12.9 K/UL (ref 1.5–8.1)
NEUTS SEG NFR BLD: 12.9 K/UL (ref 1.5–8.1)
NEUTS SEG NFR BLD: 13.29 K/UL (ref 1.8–7.7)
NEUTS SEG NFR BLD: 13.29 K/UL (ref 1.8–7.7)
NEUTS SEG NFR BLD: 13.87 K/UL (ref 1.8–7.7)
NEUTS SEG NFR BLD: 13.87 K/UL (ref 1.8–7.7)
NEUTS SEG NFR BLD: 15.96 K/UL (ref 1.8–7.7)
NEUTS SEG NFR BLD: 15.96 K/UL (ref 1.8–7.7)
NEUTS SEG NFR BLD: 18.52 K/UL (ref 1.8–7.7)
NEUTS SEG NFR BLD: 18.52 K/UL (ref 1.8–7.7)
NITRITE UR QL STRIP: NEGATIVE
NRBC BLD-RTO: 0 PER 100 WBC
NRBC BLD-RTO: 0.1 PER 100 WBC
NUCLEATED RED BLOOD CELLS: 1 PER 100 WBC
NUCLEATED RED BLOOD CELLS: 1 PER 100 WBC
O2 DELIVERY DEVICE: ABNORMAL
O2 DELIVERY DEVICE: NORMAL
O2 DELIVERY DEVICE: NORMAL
O2 SAT, VEN: 40.9 % (ref 60–85)
O2 SAT, VEN: 40.9 % (ref 60–85)
O2 SAT, VEN: 73.1 % (ref 60–85)
O2 SAT, VEN: 73.1 % (ref 60–85)
O2 SAT, VEN: 93.7 % (ref 60–85)
O2 SAT, VEN: 93.7 % (ref 60–85)
OPIATES UR QL SCN: NEGATIVE
OPIATES UR QL SCN: NEGATIVE
OXYCODONE UR QL SCN: NEGATIVE
OXYCODONE UR QL SCN: NEGATIVE
PARTIAL THROMBOPLASTIN TIME: 25.2 SEC (ref 23–36.5)
PARTIAL THROMBOPLASTIN TIME: 25.2 SEC (ref 23–36.5)
PARTIAL THROMBOPLASTIN TIME: 36 SEC (ref 23–36.5)
PARTIAL THROMBOPLASTIN TIME: 36 SEC (ref 23–36.5)
PARTIAL THROMBOPLASTIN TIME: 46.6 SEC (ref 23–36.5)
PARTIAL THROMBOPLASTIN TIME: 46.6 SEC (ref 23–36.5)
PARTIAL THROMBOPLASTIN TIME: 60.1 SEC (ref 23–36.5)
PARTIAL THROMBOPLASTIN TIME: 60.1 SEC (ref 23–36.5)
PARTIAL THROMBOPLASTIN TIME: 96.6 SEC (ref 23–36.5)
PARTIAL THROMBOPLASTIN TIME: 96.6 SEC (ref 23–36.5)
PATIENT TEMP: 34.3
PATIENT TEMP: 34.3
PATIENT TEMP: 36.4
PATIENT TEMP: 36.4
PATIENT TEMP: 36.7
PATIENT TEMP: 36.7
PCO2, VEN: 44 MM HG (ref 41–51)
PCO2, VEN: 44 MM HG (ref 41–51)
PCO2, VEN: 46.6 MM HG (ref 39–55)
PCO2, VEN: 46.6 MM HG (ref 39–55)
PCO2, VEN: 53.4 MM HG (ref 41–51)
PCO2, VEN: 53.4 MM HG (ref 41–51)
PCP UR QL SCN: NEGATIVE
PCP UR QL SCN: NEGATIVE
PH UR STRIP: 5 [PH] (ref 5–8)
PH UR STRIP: 5 [PH] (ref 5–8)
PH UR STRIP: 5.5 [PH] (ref 5–8)
PH UR STRIP: 5.5 [PH] (ref 5–8)
PH VENOUS: 6.6 (ref 7.32–7.43)
PH VENOUS: 6.6 (ref 7.32–7.43)
PH VENOUS: 6.7 (ref 7.32–7.43)
PH VENOUS: 6.7 (ref 7.32–7.43)
PH VENOUS: 6.77 (ref 7.32–7.42)
PH VENOUS: 6.77 (ref 7.32–7.42)
PHOSPHATE SERPL-MCNC: 11.6 MG/DL (ref 2.6–4.5)
PHOSPHATE SERPL-MCNC: 11.6 MG/DL (ref 2.6–4.5)
PHOSPHATE SERPL-MCNC: 4.5 MG/DL (ref 2.6–4.5)
PLATELET # BLD AUTO: 114 K/UL (ref 138–453)
PLATELET # BLD AUTO: 114 K/UL (ref 138–453)
PLATELET # BLD AUTO: 119 K/UL (ref 138–453)
PLATELET # BLD AUTO: 119 K/UL (ref 138–453)
PLATELET # BLD AUTO: 121 K/UL (ref 138–453)
PLATELET # BLD AUTO: 121 K/UL (ref 138–453)
PLATELET # BLD AUTO: 125 K/UL (ref 138–453)
PLATELET # BLD AUTO: 125 K/UL (ref 138–453)
PLATELET # BLD AUTO: 173 K/UL (ref 138–453)
PLATELET # BLD AUTO: 173 K/UL (ref 138–453)
PLATELET # BLD AUTO: 191 K/UL (ref 138–453)
PLATELET # BLD AUTO: 191 K/UL (ref 138–453)
PLATELET # BLD AUTO: 278 K/UL (ref 138–453)
PLATELET # BLD AUTO: 278 K/UL (ref 138–453)
PLATELET # BLD AUTO: 323 K/UL (ref 138–453)
PLATELET # BLD AUTO: 323 K/UL (ref 138–453)
PMV BLD AUTO: 10 FL (ref 8.1–13.5)
PMV BLD AUTO: 10 FL (ref 8.1–13.5)
PMV BLD AUTO: 10.4 FL (ref 8.1–13.5)
PMV BLD AUTO: 10.4 FL (ref 8.1–13.5)
PMV BLD AUTO: 10.5 FL (ref 8.1–13.5)
PMV BLD AUTO: 10.5 FL (ref 8.1–13.5)
PMV BLD AUTO: 10.7 FL (ref 8.1–13.5)
PMV BLD AUTO: 10.7 FL (ref 8.1–13.5)
PMV BLD AUTO: 11 FL (ref 8.1–13.5)
PMV BLD AUTO: 11 FL (ref 8.1–13.5)
PMV BLD AUTO: 11.1 FL (ref 8.1–13.5)
PMV BLD AUTO: 11.1 FL (ref 8.1–13.5)
PMV BLD AUTO: 11.4 FL (ref 8.1–13.5)
PMV BLD AUTO: 11.4 FL (ref 8.1–13.5)
PMV BLD AUTO: 11.7 FL (ref 8.1–13.5)
PMV BLD AUTO: 11.7 FL (ref 8.1–13.5)
PO2, VEN: 116 MM HG (ref 30–50)
PO2, VEN: 116 MM HG (ref 30–50)
PO2, VEN: 52.8 MM HG (ref 30–50)
PO2, VEN: 52.8 MM HG (ref 30–50)
PO2, VEN: 79.1 MM HG (ref 30–50)
PO2, VEN: 79.1 MM HG (ref 30–50)
POC ANION GAP: 14 MMOL/L (ref 8–16)
POC CREATININE: 1.8 MG/DL (ref 0.51–1.19)
POC CREATININE: 1.8 MG/DL (ref 0.51–1.19)
POC CREATININE: 2.2 MG/DL (ref 0.51–1.19)
POC CREATININE: 2.2 MG/DL (ref 0.51–1.19)
POC CREATININE: 2.4 MG/DL (ref 0.51–1.19)
POC CREATININE: 2.4 MG/DL (ref 0.51–1.19)
POC HCO3: 21.7 MMOL/L (ref 21–28)
POC HCO3: 21.7 MMOL/L (ref 21–28)
POC HCO3: 22.5 MMOL/L (ref 21–28)
POC HCO3: 22.5 MMOL/L (ref 21–28)
POC HCO3: 23.7 MMOL/L (ref 21–28)
POC HCO3: 23.7 MMOL/L (ref 21–28)
POC HCO3: 23.8 MMOL/L (ref 21–28)
POC HCO3: 23.8 MMOL/L (ref 21–28)
POC HCO3: 24.3 MMOL/L (ref 21–28)
POC HCO3: 24.3 MMOL/L (ref 21–28)
POC HCO3: 24.8 MMOL/L (ref 21–28)
POC HCO3: 24.8 MMOL/L (ref 21–28)
POC HCO3: 25.2 MMOL/L (ref 21–28)
POC HCO3: 25.2 MMOL/L (ref 21–28)
POC HCO3: 27.6 MMOL/L (ref 21–28)
POC HCO3: 27.6 MMOL/L (ref 21–28)
POC HCO3: 29 MMOL/L (ref 21–28)
POC HCO3: 29 MMOL/L (ref 21–28)
POC HCO3: 29.2 MMOL/L (ref 21–28)
POC HCO3: 29.2 MMOL/L (ref 21–28)
POC HCO3: 30.6 MMOL/L (ref 21–28)
POC HCO3: 30.6 MMOL/L (ref 21–28)
POC HCO3: 32.4 MMOL/L (ref 21–28)
POC HCO3: 32.4 MMOL/L (ref 21–28)
POC HCO3: 32.6 MMOL/L (ref 21–28)
POC HCO3: 32.6 MMOL/L (ref 21–28)
POC HCO3: 7.5 MMOL/L (ref 21–28)
POC HCO3: 7.5 MMOL/L (ref 21–28)
POC HCO3: 9.6 MMOL/L (ref 21–28)
POC HCO3: 9.6 MMOL/L (ref 21–28)
POC HEMOGLOBIN (CALC): 12.4 G/DL (ref 12–16)
POC HEMOGLOBIN (CALC): 12.4 G/DL (ref 12–16)
POC HEMOGLOBIN (CALC): 13.2 G/DL (ref 12–16)
POC HEMOGLOBIN (CALC): 13.2 G/DL (ref 12–16)
POC HEMOGLOBIN (CALC): 14 G/DL (ref 12–16)
POC HEMOGLOBIN (CALC): 14 G/DL (ref 12–16)
POC LACTIC ACID: 5.3 MMOL/L (ref 0.56–1.39)
POC LACTIC ACID: 5.3 MMOL/L (ref 0.56–1.39)
POC LACTIC ACID: 5.7 MMOL/L (ref 0.56–1.39)
POC LACTIC ACID: 5.7 MMOL/L (ref 0.56–1.39)
POC LACTIC ACID: 7.5 MMOL/L (ref 0.56–1.39)
POC LACTIC ACID: 7.5 MMOL/L (ref 0.56–1.39)
POC O2 SATURATION: 81.7 % (ref 94–98)
POC O2 SATURATION: 81.7 % (ref 94–98)
POC O2 SATURATION: 86.6 % (ref 94–98)
POC O2 SATURATION: 86.6 % (ref 94–98)
POC O2 SATURATION: 89 % (ref 94–98)
POC O2 SATURATION: 89 % (ref 94–98)
POC O2 SATURATION: 91 % (ref 94–98)
POC O2 SATURATION: 91 % (ref 94–98)
POC O2 SATURATION: 91.3 % (ref 94–98)
POC O2 SATURATION: 91.3 % (ref 94–98)
POC O2 SATURATION: 91.4 % (ref 94–98)
POC O2 SATURATION: 91.4 % (ref 94–98)
POC O2 SATURATION: 92.7 % (ref 94–98)
POC O2 SATURATION: 92.7 % (ref 94–98)
POC O2 SATURATION: 93.1 % (ref 94–98)
POC O2 SATURATION: 93.1 % (ref 94–98)
POC O2 SATURATION: 93.2 % (ref 94–98)
POC O2 SATURATION: 93.2 % (ref 94–98)
POC O2 SATURATION: 93.3 % (ref 94–98)
POC O2 SATURATION: 93.3 % (ref 94–98)
POC O2 SATURATION: 94.2 % (ref 94–98)
POC O2 SATURATION: 94.2 % (ref 94–98)
POC O2 SATURATION: 94.3 % (ref 94–98)
POC O2 SATURATION: 94.3 % (ref 94–98)
POC O2 SATURATION: 95.3 % (ref 94–98)
POC O2 SATURATION: 95.3 % (ref 94–98)
POC O2 SATURATION: 96 % (ref 94–98)
POC O2 SATURATION: 96 % (ref 94–98)
POC O2 SATURATION: 96.6 % (ref 94–98)
POC O2 SATURATION: 96.6 % (ref 94–98)
POC PCO2 TEMP: 43.7 MM HG
POC PCO2 TEMP: 43.7 MM HG
POC PCO2: 36.7 MM HG (ref 35–48)
POC PCO2: 37.8 MM HG (ref 35–48)
POC PCO2: 37.8 MM HG (ref 35–48)
POC PCO2: 38.6 MM HG (ref 35–48)
POC PCO2: 38.6 MM HG (ref 35–48)
POC PCO2: 39.4 MM HG (ref 35–48)
POC PCO2: 39.4 MM HG (ref 35–48)
POC PCO2: 42.2 MM HG (ref 35–48)
POC PCO2: 42.2 MM HG (ref 35–48)
POC PCO2: 43 MM HG (ref 35–48)
POC PCO2: 43 MM HG (ref 35–48)
POC PCO2: 43.2 MM HG (ref 35–48)
POC PCO2: 43.2 MM HG (ref 35–48)
POC PCO2: 43.4 MM HG (ref 35–48)
POC PCO2: 43.4 MM HG (ref 35–48)
POC PCO2: 45.3 MM HG (ref 35–48)
POC PCO2: 45.3 MM HG (ref 35–48)
POC PCO2: 47.4 MM HG (ref 35–48)
POC PCO2: 47.4 MM HG (ref 35–48)
POC PCO2: 49.2 MM HG (ref 35–48)
POC PCO2: 49.2 MM HG (ref 35–48)
POC PCO2: 49.5 MM HG (ref 35–48)
POC PCO2: 49.5 MM HG (ref 35–48)
POC PCO2: 50.8 MM HG (ref 35–48)
POC PCO2: 50.8 MM HG (ref 35–48)
POC PCO2: 53.8 MM HG (ref 35–48)
POC PCO2: 53.8 MM HG (ref 35–48)
POC PH TEMP: 6.93
POC PH TEMP: 6.93
POC PH: 6.85 (ref 7.35–7.45)
POC PH: 6.85 (ref 7.35–7.45)
POC PH: 6.9 (ref 7.35–7.45)
POC PH: 6.9 (ref 7.35–7.45)
POC PH: 7.24 (ref 7.35–7.45)
POC PH: 7.24 (ref 7.35–7.45)
POC PH: 7.36 (ref 7.35–7.45)
POC PH: 7.36 (ref 7.35–7.45)
POC PH: 7.39 (ref 7.35–7.45)
POC PH: 7.41 (ref 7.35–7.45)
POC PH: 7.42 (ref 7.35–7.45)
POC PH: 7.43 (ref 7.35–7.45)
POC PO2 TEMP: 63.9 MM HG
POC PO2 TEMP: 63.9 MM HG
POC PO2: 60.7 MM HG (ref 83–108)
POC PO2: 60.7 MM HG (ref 83–108)
POC PO2: 61.6 MM HG (ref 83–108)
POC PO2: 61.6 MM HG (ref 83–108)
POC PO2: 63.8 MM HG (ref 83–108)
POC PO2: 63.8 MM HG (ref 83–108)
POC PO2: 64.3 MM HG (ref 83–108)
POC PO2: 64.3 MM HG (ref 83–108)
POC PO2: 66.9 MM HG (ref 83–108)
POC PO2: 66.9 MM HG (ref 83–108)
POC PO2: 67 MM HG (ref 83–108)
POC PO2: 67 MM HG (ref 83–108)
POC PO2: 67.9 MM HG (ref 83–108)
POC PO2: 67.9 MM HG (ref 83–108)
POC PO2: 70.1 MM HG (ref 83–108)
POC PO2: 70.1 MM HG (ref 83–108)
POC PO2: 71 MM HG (ref 83–108)
POC PO2: 71 MM HG (ref 83–108)
POC PO2: 71.4 MM HG (ref 83–108)
POC PO2: 71.4 MM HG (ref 83–108)
POC PO2: 76.1 MM HG (ref 83–108)
POC PO2: 76.1 MM HG (ref 83–108)
POC PO2: 76.5 MM HG (ref 83–108)
POC PO2: 76.5 MM HG (ref 83–108)
POC PO2: 82 MM HG (ref 83–108)
POC PO2: 82 MM HG (ref 83–108)
POC PO2: 84.5 MM HG (ref 83–108)
POC PO2: 84.5 MM HG (ref 83–108)
POC PO2: 91.2 MM HG (ref 83–108)
POC PO2: 91.2 MM HG (ref 83–108)
POSITIVE BASE EXCESS, ART: 0.3 MMOL/L (ref 0–3)
POSITIVE BASE EXCESS, ART: 0.3 MMOL/L (ref 0–3)
POSITIVE BASE EXCESS, ART: 0.6 MMOL/L (ref 0–3)
POSITIVE BASE EXCESS, ART: 0.6 MMOL/L (ref 0–3)
POSITIVE BASE EXCESS, ART: 2.7 MMOL/L (ref 0–3)
POSITIVE BASE EXCESS, ART: 2.7 MMOL/L (ref 0–3)
POSITIVE BASE EXCESS, ART: 4.1 MMOL/L (ref 0–3)
POSITIVE BASE EXCESS, ART: 4.1 MMOL/L (ref 0–3)
POSITIVE BASE EXCESS, ART: 4.2 MMOL/L (ref 0–3)
POSITIVE BASE EXCESS, ART: 4.2 MMOL/L (ref 0–3)
POSITIVE BASE EXCESS, ART: 5.3 MMOL/L (ref 0–3)
POSITIVE BASE EXCESS, ART: 5.3 MMOL/L (ref 0–3)
POSITIVE BASE EXCESS, ART: 6.3 MMOL/L (ref 0–3)
POSITIVE BASE EXCESS, ART: 6.3 MMOL/L (ref 0–3)
POSITIVE BASE EXCESS, ART: 7.3 MMOL/L (ref 0–3)
POSITIVE BASE EXCESS, ART: 7.3 MMOL/L (ref 0–3)
POTASSIUM BLD-SCNC: 5.4 MMOL/L (ref 3.5–5.1)
POTASSIUM BLD-SCNC: 5.4 MMOL/L (ref 3.5–5.1)
POTASSIUM BLD-SCNC: 6.3 MMOL/L (ref 3.5–5.1)
POTASSIUM BLD-SCNC: 6.3 MMOL/L (ref 3.5–5.1)
POTASSIUM BLD-SCNC: 8.7 MMOL/L (ref 3.5–5.1)
POTASSIUM BLD-SCNC: 8.7 MMOL/L (ref 3.5–5.1)
POTASSIUM SERPL-SCNC: 3.1 MMOL/L (ref 3.7–5.3)
POTASSIUM SERPL-SCNC: 3.1 MMOL/L (ref 3.7–5.3)
POTASSIUM SERPL-SCNC: 3.4 MMOL/L (ref 3.7–5.3)
POTASSIUM SERPL-SCNC: 3.4 MMOL/L (ref 3.7–5.3)
POTASSIUM SERPL-SCNC: 3.5 MMOL/L (ref 3.7–5.3)
POTASSIUM SERPL-SCNC: 3.7 MMOL/L (ref 3.7–5.3)
POTASSIUM SERPL-SCNC: 3.7 MMOL/L (ref 3.7–5.3)
POTASSIUM SERPL-SCNC: 3.8 MMOL/L (ref 3.7–5.3)
POTASSIUM SERPL-SCNC: 4.1 MMOL/L (ref 3.7–5.3)
POTASSIUM SERPL-SCNC: 4.2 MMOL/L (ref 3.7–5.3)
POTASSIUM SERPL-SCNC: 4.3 MMOL/L (ref 3.7–5.3)
POTASSIUM SERPL-SCNC: 4.3 MMOL/L (ref 3.7–5.3)
POTASSIUM SERPL-SCNC: 4.6 MMOL/L (ref 3.7–5.3)
POTASSIUM SERPL-SCNC: 4.6 MMOL/L (ref 3.7–5.3)
POTASSIUM SERPL-SCNC: 6.5 MMOL/L (ref 3.7–5.3)
POTASSIUM SERPL-SCNC: 6.5 MMOL/L (ref 3.7–5.3)
PROT SERPL-MCNC: 4.8 G/DL (ref 6.4–8.3)
PROT SERPL-MCNC: 4.9 G/DL (ref 6.4–8.3)
PROT SERPL-MCNC: 5 G/DL (ref 6.4–8.3)
PROT SERPL-MCNC: 5 G/DL (ref 6.4–8.3)
PROT SERPL-MCNC: 5.3 G/DL (ref 6.4–8.3)
PROT SERPL-MCNC: 5.4 G/DL (ref 6.4–8.3)
PROT SERPL-MCNC: 5.4 G/DL (ref 6.4–8.3)
PROT UR STRIP-MCNC: ABNORMAL MG/DL
PROTHROMBIN TIME: 16.1 SEC (ref 11.7–14.9)
PROTHROMBIN TIME: 16.1 SEC (ref 11.7–14.9)
PROTHROMBIN TIME: 18.1 SEC (ref 11.7–14.9)
PROTHROMBIN TIME: 18.1 SEC (ref 11.7–14.9)
PROTHROMBIN TIME: 18.4 SEC (ref 11.7–14.9)
PROTHROMBIN TIME: 18.4 SEC (ref 11.7–14.9)
PROTHROMBIN TIME: 19.9 SEC (ref 11.7–14.9)
PROTHROMBIN TIME: 19.9 SEC (ref 11.7–14.9)
PROTHROMBIN TIME: 20.5 SEC (ref 11.7–14.9)
PROTHROMBIN TIME: 20.5 SEC (ref 11.7–14.9)
RBC # BLD AUTO: 2.87 M/UL (ref 3.95–5.11)
RBC # BLD AUTO: 2.87 M/UL (ref 3.95–5.11)
RBC # BLD AUTO: 2.96 M/UL (ref 3.95–5.11)
RBC # BLD AUTO: 2.96 M/UL (ref 3.95–5.11)
RBC # BLD AUTO: 3.04 M/UL (ref 3.95–5.11)
RBC # BLD AUTO: 3.04 M/UL (ref 3.95–5.11)
RBC # BLD AUTO: 3.27 M/UL (ref 3.95–5.11)
RBC # BLD AUTO: 3.27 M/UL (ref 3.95–5.11)
RBC # BLD AUTO: 3.47 M/UL (ref 3.95–5.11)
RBC # BLD AUTO: 3.47 M/UL (ref 3.95–5.11)
RBC # BLD AUTO: 3.53 M/UL (ref 3.95–5.11)
RBC # BLD AUTO: 3.53 M/UL (ref 3.95–5.11)
RBC # BLD AUTO: 3.67 M/UL (ref 3.95–5.11)
RBC # BLD AUTO: 3.67 M/UL (ref 3.95–5.11)
RBC # BLD AUTO: 4.1 M/UL (ref 3.95–5.11)
RBC # BLD AUTO: 4.1 M/UL (ref 3.95–5.11)
RBC #/AREA URNS HPF: ABNORMAL /HPF (ref 0–4)
RBC #/AREA URNS HPF: ABNORMAL /HPF (ref 0–4)
RBC #/AREA URNS HPF: NORMAL /HPF (ref 0–4)
RBC #/AREA URNS HPF: NORMAL /HPF (ref 0–4)
SAMPLE SITE: ABNORMAL
SAMPLE SITE: NORMAL
SAMPLE SITE: NORMAL
SARS-COV-2 RDRP RESP QL NAA+PROBE: NOT DETECTED
SARS-COV-2 RDRP RESP QL NAA+PROBE: NOT DETECTED
SERVICE CMNT-IMP: NORMAL
SODIUM BLD-SCNC: 112 MMOL/L (ref 136–145)
SODIUM BLD-SCNC: 112 MMOL/L (ref 136–145)
SODIUM BLD-SCNC: 118 MMOL/L (ref 136–145)
SODIUM BLD-SCNC: 118 MMOL/L (ref 136–145)
SODIUM BLD-SCNC: 119 MMOL/L (ref 136–145)
SODIUM BLD-SCNC: 119 MMOL/L (ref 136–145)
SODIUM SERPL-SCNC: 121 MMOL/L (ref 135–144)
SODIUM SERPL-SCNC: 121 MMOL/L (ref 135–144)
SODIUM SERPL-SCNC: 125 MMOL/L (ref 135–144)
SODIUM SERPL-SCNC: 125 MMOL/L (ref 135–144)
SODIUM SERPL-SCNC: 130 MMOL/L (ref 135–144)
SODIUM SERPL-SCNC: 130 MMOL/L (ref 135–144)
SODIUM SERPL-SCNC: 134 MMOL/L (ref 135–144)
SODIUM SERPL-SCNC: 134 MMOL/L (ref 135–144)
SODIUM SERPL-SCNC: 135 MMOL/L (ref 135–144)
SODIUM SERPL-SCNC: 137 MMOL/L (ref 135–144)
SODIUM SERPL-SCNC: 138 MMOL/L (ref 135–144)
SODIUM SERPL-SCNC: 139 MMOL/L (ref 135–144)
SODIUM SERPL-SCNC: 142 MMOL/L (ref 135–144)
SODIUM SERPL-SCNC: 142 MMOL/L (ref 135–144)
SP GR UR STRIP: 1.01 (ref 1–1.03)
SP GR UR STRIP: 1.01 (ref 1–1.03)
SP GR UR STRIP: 1.02 (ref 1–1.03)
SP GR UR STRIP: 1.02 (ref 1–1.03)
SPECIMEN DESCRIPTION: ABNORMAL
SPECIMEN DESCRIPTION: ABNORMAL
SPECIMEN DESCRIPTION: NORMAL
TEST INFORMATION: ABNORMAL
TEST INFORMATION: ABNORMAL
TROPONIN I SERPL HS-MCNC: 1050 NG/L (ref 0–14)
TROPONIN I SERPL HS-MCNC: 1050 NG/L (ref 0–14)
TROPONIN I SERPL HS-MCNC: 1958 NG/L (ref 0–14)
TROPONIN I SERPL HS-MCNC: 2145 NG/L (ref 0–14)
TROPONIN I SERPL HS-MCNC: 2145 NG/L (ref 0–14)
TROPONIN I SERPL HS-MCNC: 2235 NG/L (ref 0–14)
TROPONIN I SERPL HS-MCNC: 2235 NG/L (ref 0–14)
TROPONIN I SERPL HS-MCNC: 2343 NG/L (ref 0–14)
TROPONIN I SERPL HS-MCNC: 2343 NG/L (ref 0–14)
TROPONIN I SERPL HS-MCNC: 2385 NG/L (ref 0–14)
TROPONIN I SERPL HS-MCNC: 2385 NG/L (ref 0–14)
TROPONIN I SERPL HS-MCNC: 2457 NG/L (ref 0–14)
TROPONIN I SERPL HS-MCNC: 2457 NG/L (ref 0–14)
TROPONIN I SERPL HS-MCNC: 2567 NG/L (ref 0–14)
TROPONIN I SERPL HS-MCNC: 2567 NG/L (ref 0–14)
TROPONIN I SERPL HS-MCNC: 2932 NG/L (ref 0–14)
TROPONIN I SERPL HS-MCNC: 2932 NG/L (ref 0–14)
TROPONIN I SERPL HS-MCNC: 3430 NG/L (ref 0–14)
TROPONIN I SERPL HS-MCNC: 3430 NG/L (ref 0–14)
TROPONIN I SERPL HS-MCNC: 3580 NG/L (ref 0–14)
TROPONIN I SERPL HS-MCNC: 3580 NG/L (ref 0–14)
TROPONIN I SERPL HS-MCNC: 383 NG/L (ref 0–14)
TROPONIN I SERPL HS-MCNC: 383 NG/L (ref 0–14)
TROPONIN I SERPL HS-MCNC: 3980 NG/L (ref 0–14)
TROPONIN I SERPL HS-MCNC: 3980 NG/L (ref 0–14)
TROPONIN I SERPL HS-MCNC: 4253 NG/L (ref 0–14)
TROPONIN I SERPL HS-MCNC: 4253 NG/L (ref 0–14)
TROPONIN I SERPL HS-MCNC: 4968 NG/L (ref 0–14)
TROPONIN I SERPL HS-MCNC: 4968 NG/L (ref 0–14)
TROPONIN I SERPL HS-MCNC: 5076 NG/L (ref 0–14)
TROPONIN I SERPL HS-MCNC: 5076 NG/L (ref 0–14)
TROPONIN I SERPL HS-MCNC: 792 NG/L (ref 0–14)
TROPONIN I SERPL HS-MCNC: 792 NG/L (ref 0–14)
UROBILINOGEN UR STRIP-ACNC: NORMAL EU/DL (ref 0–1)
WBC #/AREA URNS HPF: ABNORMAL /HPF (ref 0–5)
WBC #/AREA URNS HPF: ABNORMAL /HPF (ref 0–5)
WBC #/AREA URNS HPF: NORMAL /HPF (ref 0–5)
WBC #/AREA URNS HPF: NORMAL /HPF (ref 0–5)
WBC OTHER # BLD: 13.2 K/UL (ref 3.5–11.3)
WBC OTHER # BLD: 13.2 K/UL (ref 3.5–11.3)
WBC OTHER # BLD: 14.8 K/UL (ref 3.5–11.3)
WBC OTHER # BLD: 14.8 K/UL (ref 3.5–11.3)
WBC OTHER # BLD: 15 K/UL (ref 3.5–11.3)
WBC OTHER # BLD: 15 K/UL (ref 3.5–11.3)
WBC OTHER # BLD: 15.1 K/UL (ref 3.5–11.3)
WBC OTHER # BLD: 15.1 K/UL (ref 3.5–11.3)
WBC OTHER # BLD: 15.4 K/UL (ref 3.5–11.3)
WBC OTHER # BLD: 15.4 K/UL (ref 3.5–11.3)
WBC OTHER # BLD: 21 K/UL (ref 3.5–11.3)
WBC OTHER # BLD: 21 K/UL (ref 3.5–11.3)
WBC OTHER # BLD: 21.8 K/UL (ref 3.5–11.3)
WBC OTHER # BLD: 21.8 K/UL (ref 3.5–11.3)
WBC OTHER # BLD: 32.6 K/UL (ref 3.5–11.3)
WBC OTHER # BLD: 32.6 K/UL (ref 3.5–11.3)

## 2023-01-01 PROCEDURE — 95714 VEEG EA 12-26 HR UNMNTR: CPT

## 2023-01-01 PROCEDURE — 6370000000 HC RX 637 (ALT 250 FOR IP): Performed by: EMERGENCY MEDICINE

## 2023-01-01 PROCEDURE — 6370000000 HC RX 637 (ALT 250 FOR IP): Performed by: STUDENT IN AN ORGANIZED HEALTH CARE EDUCATION/TRAINING PROGRAM

## 2023-01-01 PROCEDURE — C9113 INJ PANTOPRAZOLE SODIUM, VIA: HCPCS

## 2023-01-01 PROCEDURE — 94761 N-INVAS EAR/PLS OXIMETRY MLT: CPT

## 2023-01-01 PROCEDURE — 80051 ELECTROLYTE PANEL: CPT

## 2023-01-01 PROCEDURE — 82150 ASSAY OF AMYLASE: CPT

## 2023-01-01 PROCEDURE — 94003 VENT MGMT INPAT SUBQ DAY: CPT

## 2023-01-01 PROCEDURE — 82803 BLOOD GASES ANY COMBINATION: CPT

## 2023-01-01 PROCEDURE — 2700000000 HC OXYGEN THERAPY PER DAY

## 2023-01-01 PROCEDURE — 82947 ASSAY GLUCOSE BLOOD QUANT: CPT

## 2023-01-01 PROCEDURE — 03HY32Z INSERTION OF MONITORING DEVICE INTO UPPER ARTERY, PERCUTANEOUS APPROACH: ICD-10-PCS | Performed by: EMERGENCY MEDICINE

## 2023-01-01 PROCEDURE — 2500000003 HC RX 250 WO HCPCS: Performed by: STUDENT IN AN ORGANIZED HEALTH CARE EDUCATION/TRAINING PROGRAM

## 2023-01-01 PROCEDURE — 93010 ELECTROCARDIOGRAM REPORT: CPT | Performed by: INTERNAL MEDICINE

## 2023-01-01 PROCEDURE — 6360000002 HC RX W HCPCS

## 2023-01-01 PROCEDURE — 96376 TX/PRO/DX INJ SAME DRUG ADON: CPT

## 2023-01-01 PROCEDURE — 83735 ASSAY OF MAGNESIUM: CPT

## 2023-01-01 PROCEDURE — 2500000003 HC RX 250 WO HCPCS

## 2023-01-01 PROCEDURE — 6360000002 HC RX W HCPCS: Performed by: STUDENT IN AN ORGANIZED HEALTH CARE EDUCATION/TRAINING PROGRAM

## 2023-01-01 PROCEDURE — 2000000000 HC ICU R&B

## 2023-01-01 PROCEDURE — A4216 STERILE WATER/SALINE, 10 ML: HCPCS

## 2023-01-01 PROCEDURE — 2580000003 HC RX 258

## 2023-01-01 PROCEDURE — 6360000002 HC RX W HCPCS: Performed by: INTERNAL MEDICINE

## 2023-01-01 PROCEDURE — 85025 COMPLETE CBC W/AUTO DIFF WBC: CPT

## 2023-01-01 PROCEDURE — 85520 HEPARIN ASSAY: CPT

## 2023-01-01 PROCEDURE — 87040 BLOOD CULTURE FOR BACTERIA: CPT

## 2023-01-01 PROCEDURE — 6370000000 HC RX 637 (ALT 250 FOR IP)

## 2023-01-01 PROCEDURE — 99291 CRITICAL CARE FIRST HOUR: CPT | Performed by: INTERNAL MEDICINE

## 2023-01-01 PROCEDURE — 83615 LACTATE (LD) (LDH) ENZYME: CPT

## 2023-01-01 PROCEDURE — 36415 COLL VENOUS BLD VENIPUNCTURE: CPT

## 2023-01-01 PROCEDURE — 95720 EEG PHY/QHP EA INCR W/VEEG: CPT | Performed by: PSYCHIATRY & NEUROLOGY

## 2023-01-01 PROCEDURE — 31500 INSERT EMERGENCY AIRWAY: CPT

## 2023-01-01 PROCEDURE — 86901 BLOOD TYPING SEROLOGIC RH(D): CPT

## 2023-01-01 PROCEDURE — 2580000003 HC RX 258: Performed by: STUDENT IN AN ORGANIZED HEALTH CARE EDUCATION/TRAINING PROGRAM

## 2023-01-01 PROCEDURE — 99231 SBSQ HOSP IP/OBS SF/LOW 25: CPT | Performed by: INTERNAL MEDICINE

## 2023-01-01 PROCEDURE — 94640 AIRWAY INHALATION TREATMENT: CPT

## 2023-01-01 PROCEDURE — 80053 COMPREHEN METABOLIC PANEL: CPT

## 2023-01-01 PROCEDURE — 83605 ASSAY OF LACTIC ACID: CPT

## 2023-01-01 PROCEDURE — 99291 CRITICAL CARE FIRST HOUR: CPT | Performed by: STUDENT IN AN ORGANIZED HEALTH CARE EDUCATION/TRAINING PROGRAM

## 2023-01-01 PROCEDURE — 95700 EEG CONT REC W/VID EEG TECH: CPT

## 2023-01-01 PROCEDURE — 37799 UNLISTED PX VASCULAR SURGERY: CPT

## 2023-01-01 PROCEDURE — 5A12012 PERFORMANCE OF CARDIAC OUTPUT, SINGLE, MANUAL: ICD-10-PCS | Performed by: EMERGENCY MEDICINE

## 2023-01-01 PROCEDURE — 86850 RBC ANTIBODY SCREEN: CPT

## 2023-01-01 PROCEDURE — 85014 HEMATOCRIT: CPT

## 2023-01-01 PROCEDURE — 87635 SARS-COV-2 COVID-19 AMP PRB: CPT

## 2023-01-01 PROCEDURE — 84484 ASSAY OF TROPONIN QUANT: CPT

## 2023-01-01 PROCEDURE — 85610 PROTHROMBIN TIME: CPT

## 2023-01-01 PROCEDURE — 86900 BLOOD TYPING SEROLOGIC ABO: CPT

## 2023-01-01 PROCEDURE — 84100 ASSAY OF PHOSPHORUS: CPT

## 2023-01-01 PROCEDURE — 87086 URINE CULTURE/COLONY COUNT: CPT

## 2023-01-01 PROCEDURE — 93005 ELECTROCARDIOGRAM TRACING: CPT

## 2023-01-01 PROCEDURE — 82330 ASSAY OF CALCIUM: CPT

## 2023-01-01 PROCEDURE — 70450 CT HEAD/BRAIN W/O DYE: CPT

## 2023-01-01 PROCEDURE — 51702 INSERT TEMP BLADDER CATH: CPT

## 2023-01-01 PROCEDURE — 87070 CULTURE OTHR SPECIMN AEROBIC: CPT

## 2023-01-01 PROCEDURE — 94002 VENT MGMT INPAT INIT DAY: CPT

## 2023-01-01 PROCEDURE — 99223 1ST HOSP IP/OBS HIGH 75: CPT | Performed by: INTERNAL MEDICINE

## 2023-01-01 PROCEDURE — 83874 ASSAY OF MYOGLOBIN: CPT

## 2023-01-01 PROCEDURE — 71045 X-RAY EXAM CHEST 1 VIEW: CPT

## 2023-01-01 PROCEDURE — 85018 HEMOGLOBIN: CPT

## 2023-01-01 PROCEDURE — 82550 ASSAY OF CK (CPK): CPT

## 2023-01-01 PROCEDURE — 83690 ASSAY OF LIPASE: CPT

## 2023-01-01 PROCEDURE — 87205 SMEAR GRAM STAIN: CPT

## 2023-01-01 PROCEDURE — 92950 HEART/LUNG RESUSCITATION CPR: CPT

## 2023-01-01 PROCEDURE — 86316 IMMUNOASSAY TUMOR OTHER: CPT

## 2023-01-01 PROCEDURE — 82805 BLOOD GASES W/O2 SATURATION: CPT

## 2023-01-01 PROCEDURE — 96374 THER/PROPH/DIAG INJ IV PUSH: CPT

## 2023-01-01 PROCEDURE — 36620 INSERTION CATHETER ARTERY: CPT

## 2023-01-01 PROCEDURE — 36600 WITHDRAWAL OF ARTERIAL BLOOD: CPT

## 2023-01-01 PROCEDURE — 82248 BILIRUBIN DIRECT: CPT

## 2023-01-01 PROCEDURE — 06HY33Z INSERTION OF INFUSION DEVICE INTO LOWER VEIN, PERCUTANEOUS APPROACH: ICD-10-PCS | Performed by: EMERGENCY MEDICINE

## 2023-01-01 PROCEDURE — 99231 SBSQ HOSP IP/OBS SF/LOW 25: CPT

## 2023-01-01 PROCEDURE — 82565 ASSAY OF CREATININE: CPT

## 2023-01-01 PROCEDURE — 83036 HEMOGLOBIN GLYCOSYLATED A1C: CPT

## 2023-01-01 PROCEDURE — 85730 THROMBOPLASTIN TIME PARTIAL: CPT

## 2023-01-01 PROCEDURE — 82010 KETONE BODYS QUAN: CPT

## 2023-01-01 PROCEDURE — 81001 URINALYSIS AUTO W/SCOPE: CPT

## 2023-01-01 PROCEDURE — 93005 ELECTROCARDIOGRAM TRACING: CPT | Performed by: INTERNAL MEDICINE

## 2023-01-01 PROCEDURE — 74018 RADEX ABDOMEN 1 VIEW: CPT

## 2023-01-01 PROCEDURE — 93005 ELECTROCARDIOGRAM TRACING: CPT | Performed by: STUDENT IN AN ORGANIZED HEALTH CARE EDUCATION/TRAINING PROGRAM

## 2023-01-01 PROCEDURE — 87641 MR-STAPH DNA AMP PROBE: CPT

## 2023-01-01 PROCEDURE — 5A1221J PERFORMANCE OF CARDIAC OUTPUT, CONTINUOUS, AUTOMATED: ICD-10-PCS | Performed by: EMERGENCY MEDICINE

## 2023-01-01 PROCEDURE — 6360000004 HC RX CONTRAST MEDICATION: Performed by: STUDENT IN AN ORGANIZED HEALTH CARE EDUCATION/TRAINING PROGRAM

## 2023-01-01 PROCEDURE — 82977 ASSAY OF GGT: CPT

## 2023-01-01 PROCEDURE — 80048 BASIC METABOLIC PNL TOTAL CA: CPT

## 2023-01-01 PROCEDURE — 82140 ASSAY OF AMMONIA: CPT

## 2023-01-01 PROCEDURE — 89220 SPUTUM SPECIMEN COLLECTION: CPT

## 2023-01-01 PROCEDURE — 0BH17EZ INSERTION OF ENDOTRACHEAL AIRWAY INTO TRACHEA, VIA NATURAL OR ARTIFICIAL OPENING: ICD-10-PCS | Performed by: EMERGENCY MEDICINE

## 2023-01-01 PROCEDURE — 99222 1ST HOSP IP/OBS MODERATE 55: CPT | Performed by: INTERNAL MEDICINE

## 2023-01-01 PROCEDURE — 80307 DRUG TEST PRSMV CHEM ANLYZR: CPT

## 2023-01-01 PROCEDURE — 71260 CT THORAX DX C+: CPT

## 2023-01-01 PROCEDURE — 84520 ASSAY OF UREA NITROGEN: CPT

## 2023-01-01 PROCEDURE — 96375 TX/PRO/DX INJ NEW DRUG ADDON: CPT

## 2023-01-01 PROCEDURE — 5A1955Z RESPIRATORY VENTILATION, GREATER THAN 96 CONSECUTIVE HOURS: ICD-10-PCS | Performed by: EMERGENCY MEDICINE

## 2023-01-01 PROCEDURE — 74177 CT ABD & PELVIS W/CONTRAST: CPT

## 2023-01-01 PROCEDURE — 99285 EMERGENCY DEPT VISIT HI MDM: CPT

## 2023-01-01 PROCEDURE — 85027 COMPLETE CBC AUTOMATED: CPT

## 2023-01-01 PROCEDURE — 99221 1ST HOSP IP/OBS SF/LOW 40: CPT | Performed by: PSYCHIATRY & NEUROLOGY

## 2023-01-01 RX ORDER — CLOPIDOGREL BISULFATE 75 MG/1
75 TABLET ORAL DAILY
COMMUNITY

## 2023-01-01 RX ORDER — 0.9 % SODIUM CHLORIDE 0.9 %
500 INTRAVENOUS SOLUTION INTRAVENOUS ONCE
Status: COMPLETED | OUTPATIENT
Start: 2023-01-01 | End: 2023-01-01

## 2023-01-01 RX ORDER — ONDANSETRON 2 MG/ML
4 INJECTION INTRAMUSCULAR; INTRAVENOUS EVERY 6 HOURS PRN
Status: DISCONTINUED | OUTPATIENT
Start: 2023-01-01 | End: 2023-01-01

## 2023-01-01 RX ORDER — VECURONIUM BROMIDE 1 MG/ML
INJECTION, POWDER, LYOPHILIZED, FOR SOLUTION INTRAVENOUS
Status: COMPLETED
Start: 2023-01-01 | End: 2023-01-01

## 2023-01-01 RX ORDER — POTASSIUM CHLORIDE 7.45 MG/ML
10 INJECTION INTRAVENOUS PRN
Status: DISCONTINUED | OUTPATIENT
Start: 2023-01-01 | End: 2023-01-01

## 2023-01-01 RX ORDER — HEPARIN SODIUM 1000 [USP'U]/ML
4000 INJECTION, SOLUTION INTRAVENOUS; SUBCUTANEOUS PRN
Status: DISCONTINUED | OUTPATIENT
Start: 2023-01-01 | End: 2023-01-01

## 2023-01-01 RX ORDER — DEXTROSE MONOHYDRATE 100 MG/ML
INJECTION, SOLUTION INTRAVENOUS CONTINUOUS PRN
Status: DISCONTINUED | OUTPATIENT
Start: 2023-01-01 | End: 2023-01-01

## 2023-01-01 RX ORDER — NOREPINEPHRINE BITARTRATE 0.06 MG/ML
INJECTION, SOLUTION INTRAVENOUS
Status: COMPLETED
Start: 2023-01-01 | End: 2023-01-01

## 2023-01-01 RX ORDER — POLYETHYLENE GLYCOL 3350 17 G/17G
17 POWDER, FOR SOLUTION ORAL DAILY PRN
Status: DISCONTINUED | OUTPATIENT
Start: 2023-01-01 | End: 2023-01-01

## 2023-01-01 RX ORDER — AMLODIPINE BESYLATE 5 MG/1
5 TABLET ORAL DAILY
COMMUNITY

## 2023-01-01 RX ORDER — SPIRONOLACTONE 25 MG/1
25 TABLET ORAL DAILY
COMMUNITY

## 2023-01-01 RX ORDER — GLYCOPYRROLATE 0.2 MG/ML
0.1 INJECTION INTRAMUSCULAR; INTRAVENOUS EVERY 6 HOURS PRN
Status: DISCONTINUED | OUTPATIENT
Start: 2023-01-01 | End: 2023-01-01 | Stop reason: HOSPADM

## 2023-01-01 RX ORDER — GABAPENTIN 600 MG/1
800 TABLET ORAL 2 TIMES DAILY
COMMUNITY

## 2023-01-01 RX ORDER — VECURONIUM BROMIDE 1 MG/ML
10 INJECTION, POWDER, LYOPHILIZED, FOR SOLUTION INTRAVENOUS ONCE
Status: COMPLETED | OUTPATIENT
Start: 2023-01-01 | End: 2023-01-01

## 2023-01-01 RX ORDER — ACETAMINOPHEN 325 MG/1
650 TABLET ORAL EVERY 6 HOURS PRN
Status: DISCONTINUED | OUTPATIENT
Start: 2023-01-01 | End: 2023-01-01 | Stop reason: HOSPADM

## 2023-01-01 RX ORDER — IPRATROPIUM BROMIDE AND ALBUTEROL SULFATE 2.5; .5 MG/3ML; MG/3ML
1 SOLUTION RESPIRATORY (INHALATION)
Status: DISCONTINUED | OUTPATIENT
Start: 2023-01-01 | End: 2023-01-01

## 2023-01-01 RX ORDER — GLYCOPYRROLATE 0.2 MG/ML
0.2 INJECTION INTRAMUSCULAR; INTRAVENOUS EVERY 4 HOURS PRN
Status: DISCONTINUED | OUTPATIENT
Start: 2023-01-01 | End: 2023-01-01

## 2023-01-01 RX ORDER — HEPARIN SODIUM 10000 [USP'U]/100ML
5-30 INJECTION, SOLUTION INTRAVENOUS CONTINUOUS
Status: DISCONTINUED | OUTPATIENT
Start: 2023-01-01 | End: 2023-01-01

## 2023-01-01 RX ORDER — ONDANSETRON 4 MG/1
4 TABLET, ORALLY DISINTEGRATING ORAL EVERY 8 HOURS PRN
Status: DISCONTINUED | OUTPATIENT
Start: 2023-01-01 | End: 2023-01-01

## 2023-01-01 RX ORDER — HEPARIN SODIUM 1000 [USP'U]/ML
2000 INJECTION, SOLUTION INTRAVENOUS; SUBCUTANEOUS PRN
Status: DISCONTINUED | OUTPATIENT
Start: 2023-01-01 | End: 2023-01-01

## 2023-01-01 RX ORDER — CALCIUM GLUCONATE 20 MG/ML
2000 INJECTION, SOLUTION INTRAVENOUS ONCE
Status: COMPLETED | OUTPATIENT
Start: 2023-01-01 | End: 2023-01-01

## 2023-01-01 RX ORDER — MINERAL OIL AND WHITE PETROLATUM 150; 830 MG/G; MG/G
OINTMENT OPHTHALMIC PRN
Status: DISCONTINUED | OUTPATIENT
Start: 2023-01-01 | End: 2023-01-01

## 2023-01-01 RX ORDER — ACETAMINOPHEN 650 MG/1
650 SUPPOSITORY RECTAL EVERY 6 HOURS PRN
Status: DISCONTINUED | OUTPATIENT
Start: 2023-01-01 | End: 2023-01-01 | Stop reason: HOSPADM

## 2023-01-01 RX ORDER — INSULIN LISPRO 100 [IU]/ML
0-16 INJECTION, SOLUTION INTRAVENOUS; SUBCUTANEOUS
Status: DISCONTINUED | OUTPATIENT
Start: 2023-01-01 | End: 2023-01-01

## 2023-01-01 RX ORDER — INSULIN LISPRO 100 [IU]/ML
0-16 INJECTION, SOLUTION INTRAVENOUS; SUBCUTANEOUS EVERY 4 HOURS
Status: DISCONTINUED | OUTPATIENT
Start: 2023-01-01 | End: 2023-01-01

## 2023-01-01 RX ORDER — TOPIRAMATE 25 MG/1
25 TABLET ORAL 2 TIMES DAILY
COMMUNITY

## 2023-01-01 RX ORDER — PHENYLEPHRINE HCL IN 0.9% NACL 50MG/250ML
10-300 PLASTIC BAG, INJECTION (ML) INTRAVENOUS CONTINUOUS
Status: DISCONTINUED | OUTPATIENT
Start: 2023-01-01 | End: 2023-01-01

## 2023-01-01 RX ORDER — POTASSIUM CHLORIDE 29.8 MG/ML
20 INJECTION INTRAVENOUS PRN
Status: DISCONTINUED | OUTPATIENT
Start: 2023-01-01 | End: 2023-01-01

## 2023-01-01 RX ORDER — LORAZEPAM 2 MG/ML
1 INJECTION INTRAMUSCULAR
Status: DISCONTINUED | OUTPATIENT
Start: 2023-01-01 | End: 2023-01-01

## 2023-01-01 RX ORDER — MIDAZOLAM HYDROCHLORIDE 1 MG/ML
1-10 INJECTION, SOLUTION INTRAVENOUS CONTINUOUS
Status: DISCONTINUED | OUTPATIENT
Start: 2023-01-01 | End: 2023-01-01

## 2023-01-01 RX ORDER — MAGNESIUM SULFATE IN WATER 40 MG/ML
INJECTION, SOLUTION INTRAVENOUS
Status: COMPLETED
Start: 2023-01-01 | End: 2023-01-01

## 2023-01-01 RX ORDER — LORAZEPAM 2 MG/ML
1 INJECTION INTRAMUSCULAR EVERY 4 HOURS PRN
Status: DISCONTINUED | OUTPATIENT
Start: 2023-01-01 | End: 2023-01-01 | Stop reason: HOSPADM

## 2023-01-01 RX ORDER — PANTOPRAZOLE SODIUM 40 MG/1
40 TABLET, DELAYED RELEASE ORAL DAILY
COMMUNITY

## 2023-01-01 RX ORDER — LOSARTAN POTASSIUM 100 MG/1
100 TABLET ORAL DAILY
COMMUNITY

## 2023-01-01 RX ORDER — INSULIN LISPRO 100 [IU]/ML
0-4 INJECTION, SOLUTION INTRAVENOUS; SUBCUTANEOUS NIGHTLY
Status: DISCONTINUED | OUTPATIENT
Start: 2023-01-01 | End: 2023-01-01

## 2023-01-01 RX ORDER — SODIUM CHLORIDE 9 MG/ML
INJECTION, SOLUTION INTRAVENOUS PRN
Status: DISCONTINUED | OUTPATIENT
Start: 2023-01-01 | End: 2023-01-01

## 2023-01-01 RX ORDER — POTASSIUM CHLORIDE 29.8 MG/ML
20 INJECTION INTRAVENOUS
Status: COMPLETED | OUTPATIENT
Start: 2023-01-01 | End: 2023-01-01

## 2023-01-01 RX ORDER — LORAZEPAM 2 MG/ML
INJECTION INTRAMUSCULAR
Status: COMPLETED
Start: 2023-01-01 | End: 2023-01-01

## 2023-01-01 RX ORDER — NOREPINEPHRINE BITARTRATE 0.06 MG/ML
1-100 INJECTION, SOLUTION INTRAVENOUS CONTINUOUS
Status: DISCONTINUED | OUTPATIENT
Start: 2023-01-01 | End: 2023-01-01

## 2023-01-01 RX ORDER — INSULIN LISPRO 100 [IU]/ML
0-8 INJECTION, SOLUTION INTRAVENOUS; SUBCUTANEOUS EVERY 6 HOURS
Status: DISCONTINUED | OUTPATIENT
Start: 2023-01-01 | End: 2023-01-01

## 2023-01-01 RX ORDER — MORPHINE SULFATE 2 MG/ML
2 INJECTION, SOLUTION INTRAMUSCULAR; INTRAVENOUS
Status: DISCONTINUED | OUTPATIENT
Start: 2023-01-01 | End: 2023-01-01

## 2023-01-01 RX ORDER — HEPARIN SODIUM 1000 [USP'U]/ML
4000 INJECTION, SOLUTION INTRAVENOUS; SUBCUTANEOUS ONCE
Status: COMPLETED | OUTPATIENT
Start: 2023-01-01 | End: 2023-01-01

## 2023-01-01 RX ORDER — INSULIN GLARGINE 100 [IU]/ML
10 INJECTION, SOLUTION SUBCUTANEOUS ONCE
Status: COMPLETED | OUTPATIENT
Start: 2023-01-01 | End: 2023-01-01

## 2023-01-01 RX ORDER — MORPHINE SULFATE 4 MG/ML
4 INJECTION, SOLUTION INTRAMUSCULAR; INTRAVENOUS
Status: DISCONTINUED | OUTPATIENT
Start: 2023-01-01 | End: 2023-01-01

## 2023-01-01 RX ORDER — 0.9 % SODIUM CHLORIDE 0.9 %
1000 INTRAVENOUS SOLUTION INTRAVENOUS ONCE
Status: DISCONTINUED | OUTPATIENT
Start: 2023-01-01 | End: 2023-01-01

## 2023-01-01 RX ORDER — ROPINIROLE 2 MG/1
2 TABLET, FILM COATED ORAL DAILY
COMMUNITY

## 2023-01-01 RX ORDER — SODIUM CHLORIDE 0.9 % (FLUSH) 0.9 %
5-40 SYRINGE (ML) INJECTION PRN
Status: DISCONTINUED | OUTPATIENT
Start: 2023-01-01 | End: 2023-01-01

## 2023-01-01 RX ORDER — ALBUTEROL SULFATE 2.5 MG/3ML
5 SOLUTION RESPIRATORY (INHALATION)
Status: DISCONTINUED | OUTPATIENT
Start: 2023-01-01 | End: 2023-01-01

## 2023-01-01 RX ORDER — DULOXETIN HYDROCHLORIDE 60 MG/1
60 CAPSULE, DELAYED RELEASE ORAL DAILY
COMMUNITY

## 2023-01-01 RX ORDER — ENOXAPARIN SODIUM 100 MG/ML
30 INJECTION SUBCUTANEOUS 2 TIMES DAILY
Status: DISCONTINUED | OUTPATIENT
Start: 2023-01-01 | End: 2023-01-01

## 2023-01-01 RX ORDER — SODIUM CHLORIDE, SODIUM LACTATE, POTASSIUM CHLORIDE, AND CALCIUM CHLORIDE .6; .31; .03; .02 G/100ML; G/100ML; G/100ML; G/100ML
1000 INJECTION, SOLUTION INTRAVENOUS ONCE
Status: COMPLETED | OUTPATIENT
Start: 2023-01-01 | End: 2023-01-01

## 2023-01-01 RX ORDER — TORSEMIDE 20 MG/1
20 TABLET ORAL DAILY
COMMUNITY

## 2023-01-01 RX ORDER — SODIUM CHLORIDE 0.9 % (FLUSH) 0.9 %
5-40 SYRINGE (ML) INJECTION EVERY 12 HOURS SCHEDULED
Status: DISCONTINUED | OUTPATIENT
Start: 2023-01-01 | End: 2023-01-01 | Stop reason: HOSPADM

## 2023-01-01 RX ORDER — INSULIN LISPRO 100 [IU]/ML
0-16 INJECTION, SOLUTION INTRAVENOUS; SUBCUTANEOUS EVERY 6 HOURS
Status: DISCONTINUED | OUTPATIENT
Start: 2023-01-01 | End: 2023-01-01

## 2023-01-01 RX ADMIN — ALBUTEROL SULFATE 5 MG: 2.5 SOLUTION RESPIRATORY (INHALATION) at 11:45

## 2023-01-01 RX ADMIN — IPRATROPIUM BROMIDE AND ALBUTEROL SULFATE 1 DOSE: 2.5; .5 SOLUTION RESPIRATORY (INHALATION) at 11:30

## 2023-01-01 RX ADMIN — HYDROCORTISONE SODIUM SUCCINATE 50 MG: 100 INJECTION, POWDER, FOR SOLUTION INTRAMUSCULAR; INTRAVENOUS at 04:06

## 2023-01-01 RX ADMIN — IPRATROPIUM BROMIDE AND ALBUTEROL SULFATE 1 DOSE: 2.5; .5 SOLUTION RESPIRATORY (INHALATION) at 19:31

## 2023-01-01 RX ADMIN — FUROSEMIDE 20 MG/HR: 10 INJECTION, SOLUTION INTRAMUSCULAR; INTRAVENOUS at 20:07

## 2023-01-01 RX ADMIN — IPRATROPIUM BROMIDE AND ALBUTEROL SULFATE 1 DOSE: 2.5; .5 SOLUTION RESPIRATORY (INHALATION) at 07:32

## 2023-01-01 RX ADMIN — SODIUM CHLORIDE: 9 INJECTION, SOLUTION INTRAVENOUS at 18:59

## 2023-01-01 RX ADMIN — MINERAL OIL, PETROLATUM: 425; 573 OINTMENT OPHTHALMIC at 05:31

## 2023-01-01 RX ADMIN — SODIUM CHLORIDE, PRESERVATIVE FREE 10 ML: 5 INJECTION INTRAVENOUS at 08:24

## 2023-01-01 RX ADMIN — HYDROCORTISONE SODIUM SUCCINATE 50 MG: 100 INJECTION, POWDER, FOR SOLUTION INTRAMUSCULAR; INTRAVENOUS at 04:21

## 2023-01-01 RX ADMIN — HEPARIN SODIUM 10 UNITS/KG/HR: 10000 INJECTION, SOLUTION INTRAVENOUS at 05:20

## 2023-01-01 RX ADMIN — IPRATROPIUM BROMIDE AND ALBUTEROL SULFATE 1 DOSE: 2.5; .5 SOLUTION RESPIRATORY (INHALATION) at 16:45

## 2023-01-01 RX ADMIN — HYDROCORTISONE SODIUM SUCCINATE 100 MG: 100 INJECTION, POWDER, FOR SOLUTION INTRAMUSCULAR; INTRAVENOUS at 03:05

## 2023-01-01 RX ADMIN — SODIUM CHLORIDE 10.82 UNITS/HR: 9 INJECTION, SOLUTION INTRAVENOUS at 12:43

## 2023-01-01 RX ADMIN — HEPARIN SODIUM 10 UNITS/KG/HR: 10000 INJECTION, SOLUTION INTRAVENOUS at 00:02

## 2023-01-01 RX ADMIN — SODIUM CHLORIDE 3000 MG: 900 INJECTION INTRAVENOUS at 02:37

## 2023-01-01 RX ADMIN — POTASSIUM CHLORIDE 20 MEQ: 29.8 INJECTION, SOLUTION INTRAVENOUS at 12:52

## 2023-01-01 RX ADMIN — SODIUM BICARBONATE: 84 INJECTION, SOLUTION INTRAVENOUS at 15:21

## 2023-01-01 RX ADMIN — HEPARIN SODIUM 8 UNITS/KG/HR: 10000 INJECTION, SOLUTION INTRAVENOUS at 22:21

## 2023-01-01 RX ADMIN — INSULIN GLARGINE 10 UNITS: 100 INJECTION, SOLUTION SUBCUTANEOUS at 11:49

## 2023-01-01 RX ADMIN — HEPARIN SODIUM 8 UNITS/KG/HR: 10000 INJECTION, SOLUTION INTRAVENOUS at 11:59

## 2023-01-01 RX ADMIN — FUROSEMIDE 20 MG/HR: 10 INJECTION, SOLUTION INTRAMUSCULAR; INTRAVENOUS at 08:24

## 2023-01-01 RX ADMIN — SODIUM CHLORIDE, PRESERVATIVE FREE 10 ML: 5 INJECTION INTRAVENOUS at 20:43

## 2023-01-01 RX ADMIN — VASOPRESSIN 0.03 UNITS/MIN: 20 INJECTION, SOLUTION INTRAVENOUS at 16:29

## 2023-01-01 RX ADMIN — HEPARIN SODIUM 12 UNITS/KG/HR: 10000 INJECTION, SOLUTION INTRAVENOUS at 12:23

## 2023-01-01 RX ADMIN — INSULIN LISPRO 12 UNITS: 100 INJECTION, SOLUTION INTRAVENOUS; SUBCUTANEOUS at 17:04

## 2023-01-01 RX ADMIN — SODIUM CHLORIDE 3000 MG: 900 INJECTION INTRAVENOUS at 02:41

## 2023-01-01 RX ADMIN — FUROSEMIDE 20 MG/HR: 10 INJECTION, SOLUTION INTRAMUSCULAR; INTRAVENOUS at 18:11

## 2023-01-01 RX ADMIN — VASOPRESSIN 0.03 UNITS/MIN: 20 INJECTION INTRAVENOUS at 12:07

## 2023-01-01 RX ADMIN — SODIUM CHLORIDE 43.28 UNITS/HR: 9 INJECTION, SOLUTION INTRAVENOUS at 00:47

## 2023-01-01 RX ADMIN — IPRATROPIUM BROMIDE AND ALBUTEROL SULFATE 1 DOSE: 2.5; .5 SOLUTION RESPIRATORY (INHALATION) at 12:15

## 2023-01-01 RX ADMIN — SODIUM CHLORIDE, PRESERVATIVE FREE 10 ML: 5 INJECTION INTRAVENOUS at 20:36

## 2023-01-01 RX ADMIN — PANTOPRAZOLE SODIUM 8 MG/HR: 40 INJECTION, POWDER, FOR SOLUTION INTRAVENOUS at 22:11

## 2023-01-01 RX ADMIN — VASOPRESSIN 0.03 UNITS/MIN: 20 INJECTION INTRAVENOUS at 15:10

## 2023-01-01 RX ADMIN — Medication 1 MG: at 14:47

## 2023-01-01 RX ADMIN — FUROSEMIDE 20 MG/HR: 10 INJECTION, SOLUTION INTRAMUSCULAR; INTRAVENOUS at 03:29

## 2023-01-01 RX ADMIN — SODIUM CHLORIDE, POTASSIUM CHLORIDE, SODIUM LACTATE AND CALCIUM CHLORIDE 1000 ML: 600; 310; 30; 20 INJECTION, SOLUTION INTRAVENOUS at 14:17

## 2023-01-01 RX ADMIN — SODIUM CHLORIDE 32.9 UNITS/HR: 9 INJECTION, SOLUTION INTRAVENOUS at 05:32

## 2023-01-01 RX ADMIN — LORAZEPAM 1 MG: 2 INJECTION INTRAMUSCULAR at 14:47

## 2023-01-01 RX ADMIN — PANTOPRAZOLE SODIUM 8 MG/HR: 40 INJECTION, POWDER, FOR SOLUTION INTRAVENOUS at 06:43

## 2023-01-01 RX ADMIN — SODIUM CHLORIDE 3000 MG: 900 INJECTION INTRAVENOUS at 08:03

## 2023-01-01 RX ADMIN — SODIUM CHLORIDE 7.88 UNITS/HR: 9 INJECTION, SOLUTION INTRAVENOUS at 04:11

## 2023-01-01 RX ADMIN — SODIUM CHLORIDE 3000 MG: 900 INJECTION INTRAVENOUS at 08:23

## 2023-01-01 RX ADMIN — FUROSEMIDE 20 MG/HR: 10 INJECTION, SOLUTION INTRAMUSCULAR; INTRAVENOUS at 00:10

## 2023-01-01 RX ADMIN — IPRATROPIUM BROMIDE AND ALBUTEROL SULFATE 1 DOSE: 2.5; .5 SOLUTION RESPIRATORY (INHALATION) at 08:20

## 2023-01-01 RX ADMIN — Medication 2 MCG/MIN: at 05:08

## 2023-01-01 RX ADMIN — PANTOPRAZOLE SODIUM 40 MG: 40 INJECTION, POWDER, FOR SOLUTION INTRAVENOUS at 08:24

## 2023-01-01 RX ADMIN — SODIUM CHLORIDE 3000 MG: 900 INJECTION INTRAVENOUS at 14:31

## 2023-01-01 RX ADMIN — HYDROCORTISONE SODIUM SUCCINATE 50 MG: 100 INJECTION, POWDER, FOR SOLUTION INTRAMUSCULAR; INTRAVENOUS at 11:49

## 2023-01-01 RX ADMIN — IPRATROPIUM BROMIDE AND ALBUTEROL SULFATE 1 DOSE: 2.5; .5 SOLUTION RESPIRATORY (INHALATION) at 20:20

## 2023-01-01 RX ADMIN — INSULIN LISPRO 16 UNITS: 100 INJECTION, SOLUTION INTRAVENOUS; SUBCUTANEOUS at 22:53

## 2023-01-01 RX ADMIN — HEPARIN SODIUM 2000 UNITS: 1000 INJECTION INTRAVENOUS; SUBCUTANEOUS at 20:34

## 2023-01-01 RX ADMIN — VASOPRESSIN 0.03 UNITS/MIN: 20 INJECTION, SOLUTION INTRAVENOUS at 16:41

## 2023-01-01 RX ADMIN — PANTOPRAZOLE SODIUM 40 MG: 40 INJECTION, POWDER, FOR SOLUTION INTRAVENOUS at 20:35

## 2023-01-01 RX ADMIN — SODIUM CHLORIDE: 9 INJECTION, SOLUTION INTRAVENOUS at 20:12

## 2023-01-01 RX ADMIN — SODIUM CHLORIDE 46.89 UNITS/HR: 9 INJECTION, SOLUTION INTRAVENOUS at 02:38

## 2023-01-01 RX ADMIN — SODIUM CHLORIDE 3000 MG: 900 INJECTION INTRAVENOUS at 08:30

## 2023-01-01 RX ADMIN — VASOPRESSIN 0.03 UNITS/MIN: 20 INJECTION INTRAVENOUS at 23:32

## 2023-01-01 RX ADMIN — SODIUM CHLORIDE, PRESERVATIVE FREE 10 ML: 5 INJECTION INTRAVENOUS at 19:33

## 2023-01-01 RX ADMIN — SODIUM CHLORIDE 2.87 UNITS/HR: 9 INJECTION, SOLUTION INTRAVENOUS at 15:23

## 2023-01-01 RX ADMIN — Medication 2 MG/HR: at 10:54

## 2023-01-01 RX ADMIN — IPRATROPIUM BROMIDE AND ALBUTEROL SULFATE 1 DOSE: 2.5; .5 SOLUTION RESPIRATORY (INHALATION) at 14:53

## 2023-01-01 RX ADMIN — Medication 5 MCG/MIN: at 13:24

## 2023-01-01 RX ADMIN — FUROSEMIDE 20 MG/HR: 10 INJECTION, SOLUTION INTRAMUSCULAR; INTRAVENOUS at 01:26

## 2023-01-01 RX ADMIN — Medication 50 MCG/MIN: at 17:44

## 2023-01-01 RX ADMIN — SODIUM BICARBONATE: 84 INJECTION, SOLUTION INTRAVENOUS at 03:28

## 2023-01-01 RX ADMIN — HYDROCORTISONE SODIUM SUCCINATE 100 MG: 100 INJECTION, POWDER, FOR SOLUTION INTRAMUSCULAR; INTRAVENOUS at 20:41

## 2023-01-01 RX ADMIN — VASOPRESSIN 0.03 UNITS/MIN: 20 INJECTION, SOLUTION INTRAVENOUS at 00:32

## 2023-01-01 RX ADMIN — HEPARIN SODIUM 6 UNITS/KG/HR: 10000 INJECTION, SOLUTION INTRAVENOUS at 03:09

## 2023-01-01 RX ADMIN — PANTOPRAZOLE SODIUM 40 MG: 40 INJECTION, POWDER, FOR SOLUTION INTRAVENOUS at 08:51

## 2023-01-01 RX ADMIN — POTASSIUM CHLORIDE 20 MEQ: 29.8 INJECTION, SOLUTION INTRAVENOUS at 00:04

## 2023-01-01 RX ADMIN — ALBUTEROL SULFATE 5 MG: 2.5 SOLUTION RESPIRATORY (INHALATION) at 08:29

## 2023-01-01 RX ADMIN — IPRATROPIUM BROMIDE AND ALBUTEROL SULFATE 1 DOSE: 2.5; .5 SOLUTION RESPIRATORY (INHALATION) at 08:47

## 2023-01-01 RX ADMIN — POTASSIUM CHLORIDE 20 MEQ: 29.8 INJECTION, SOLUTION INTRAVENOUS at 14:14

## 2023-01-01 RX ADMIN — SODIUM CHLORIDE 3000 MG: 900 INJECTION INTRAVENOUS at 15:06

## 2023-01-01 RX ADMIN — PANTOPRAZOLE SODIUM 40 MG: 40 INJECTION, POWDER, FOR SOLUTION INTRAVENOUS at 07:59

## 2023-01-01 RX ADMIN — SODIUM CHLORIDE: 9 INJECTION, SOLUTION INTRAVENOUS at 18:37

## 2023-01-01 RX ADMIN — SODIUM CHLORIDE 3000 MG: 900 INJECTION INTRAVENOUS at 09:58

## 2023-01-01 RX ADMIN — HYDROCORTISONE SODIUM SUCCINATE 100 MG: 100 INJECTION, POWDER, FOR SOLUTION INTRAMUSCULAR; INTRAVENOUS at 18:38

## 2023-01-01 RX ADMIN — SODIUM BICARBONATE: 84 INJECTION, SOLUTION INTRAVENOUS at 11:57

## 2023-01-01 RX ADMIN — IPRATROPIUM BROMIDE AND ALBUTEROL SULFATE 1 DOSE: 2.5; .5 SOLUTION RESPIRATORY (INHALATION) at 15:47

## 2023-01-01 RX ADMIN — Medication 5 MCG/MIN: at 18:36

## 2023-01-01 RX ADMIN — FUROSEMIDE 20 MG/HR: 10 INJECTION, SOLUTION INTRAMUSCULAR; INTRAVENOUS at 13:19

## 2023-01-01 RX ADMIN — HYDROCORTISONE SODIUM SUCCINATE 100 MG: 100 INJECTION, POWDER, FOR SOLUTION INTRAMUSCULAR; INTRAVENOUS at 02:58

## 2023-01-01 RX ADMIN — PANTOPRAZOLE SODIUM 40 MG: 40 INJECTION, POWDER, FOR SOLUTION INTRAVENOUS at 19:33

## 2023-01-01 RX ADMIN — SODIUM CHLORIDE 32.46 UNITS/HR: 9 INJECTION, SOLUTION INTRAVENOUS at 21:51

## 2023-01-01 RX ADMIN — IPRATROPIUM BROMIDE AND ALBUTEROL SULFATE 1 DOSE: 2.5; .5 SOLUTION RESPIRATORY (INHALATION) at 15:13

## 2023-01-01 RX ADMIN — IPRATROPIUM BROMIDE AND ALBUTEROL SULFATE 1 DOSE: 2.5; .5 SOLUTION RESPIRATORY (INHALATION) at 07:57

## 2023-01-01 RX ADMIN — IPRATROPIUM BROMIDE AND ALBUTEROL SULFATE 1 DOSE: 2.5; .5 SOLUTION RESPIRATORY (INHALATION) at 11:58

## 2023-01-01 RX ADMIN — SODIUM CHLORIDE 3000 MG: 900 INJECTION INTRAVENOUS at 07:49

## 2023-01-01 RX ADMIN — MAGNESIUM SULFATE HEPTAHYDRATE 2000 MG: 40 INJECTION, SOLUTION INTRAVENOUS at 12:50

## 2023-01-01 RX ADMIN — SODIUM BICARBONATE: 84 INJECTION, SOLUTION INTRAVENOUS at 13:17

## 2023-01-01 RX ADMIN — SODIUM CHLORIDE 500 ML: 9 INJECTION, SOLUTION INTRAVENOUS at 12:47

## 2023-01-01 RX ADMIN — VASOPRESSIN 0.03 UNITS/MIN: 20 INJECTION, SOLUTION INTRAVENOUS at 13:04

## 2023-01-01 RX ADMIN — LORAZEPAM 1 MG: 2 INJECTION INTRAMUSCULAR; INTRAVENOUS at 14:47

## 2023-01-01 RX ADMIN — PANTOPRAZOLE SODIUM 40 MG: 40 INJECTION, POWDER, FOR SOLUTION INTRAVENOUS at 08:08

## 2023-01-01 RX ADMIN — HYDROCORTISONE SODIUM SUCCINATE 50 MG: 100 INJECTION, POWDER, FOR SOLUTION INTRAMUSCULAR; INTRAVENOUS at 12:23

## 2023-01-01 RX ADMIN — HYDROCORTISONE SODIUM SUCCINATE 100 MG: 100 INJECTION, POWDER, FOR SOLUTION INTRAMUSCULAR; INTRAVENOUS at 12:09

## 2023-01-01 RX ADMIN — FUROSEMIDE 20 MG/HR: 10 INJECTION, SOLUTION INTRAMUSCULAR; INTRAVENOUS at 14:27

## 2023-01-01 RX ADMIN — SODIUM CHLORIDE 5 UNITS/HR: 9 INJECTION, SOLUTION INTRAVENOUS at 12:34

## 2023-01-01 RX ADMIN — FUROSEMIDE 20 MG/HR: 10 INJECTION, SOLUTION INTRAMUSCULAR; INTRAVENOUS at 13:20

## 2023-01-01 RX ADMIN — HEPARIN SODIUM 2000 UNITS: 1000 INJECTION INTRAVENOUS; SUBCUTANEOUS at 07:22

## 2023-01-01 RX ADMIN — SODIUM BICARBONATE: 84 INJECTION, SOLUTION INTRAVENOUS at 05:23

## 2023-01-01 RX ADMIN — PANTOPRAZOLE SODIUM 40 MG: 40 INJECTION, POWDER, FOR SOLUTION INTRAVENOUS at 19:52

## 2023-01-01 RX ADMIN — IPRATROPIUM BROMIDE AND ALBUTEROL SULFATE 1 DOSE: 2.5; .5 SOLUTION RESPIRATORY (INHALATION) at 20:02

## 2023-01-01 RX ADMIN — IPRATROPIUM BROMIDE AND ALBUTEROL SULFATE 1 DOSE: 2.5; .5 SOLUTION RESPIRATORY (INHALATION) at 07:50

## 2023-01-01 RX ADMIN — IPRATROPIUM BROMIDE AND ALBUTEROL SULFATE 1 DOSE: 2.5; .5 SOLUTION RESPIRATORY (INHALATION) at 11:34

## 2023-01-01 RX ADMIN — HEPARIN SODIUM 4000 UNITS: 1000 INJECTION INTRAVENOUS; SUBCUTANEOUS at 22:21

## 2023-01-01 RX ADMIN — SODIUM CHLORIDE, PRESERVATIVE FREE 10 ML: 5 INJECTION INTRAVENOUS at 08:25

## 2023-01-01 RX ADMIN — SODIUM CHLORIDE, PRESERVATIVE FREE 10 ML: 5 INJECTION INTRAVENOUS at 20:48

## 2023-01-01 RX ADMIN — HYDROCORTISONE SODIUM SUCCINATE 50 MG: 100 INJECTION, POWDER, FOR SOLUTION INTRAMUSCULAR; INTRAVENOUS at 04:13

## 2023-01-01 RX ADMIN — POTASSIUM CHLORIDE 20 MEQ: 29.8 INJECTION, SOLUTION INTRAVENOUS at 22:33

## 2023-01-01 RX ADMIN — HEPARIN SODIUM 4000 UNITS: 1000 INJECTION INTRAVENOUS; SUBCUTANEOUS at 12:20

## 2023-01-01 RX ADMIN — HYDROMORPHONE HYDROCHLORIDE 1 MG: 1 INJECTION, SOLUTION INTRAMUSCULAR; INTRAVENOUS; SUBCUTANEOUS at 14:47

## 2023-01-01 RX ADMIN — SODIUM CHLORIDE 3000 MG: 900 INJECTION INTRAVENOUS at 20:30

## 2023-01-01 RX ADMIN — HYDROCORTISONE SODIUM SUCCINATE 50 MG: 100 INJECTION, POWDER, FOR SOLUTION INTRAMUSCULAR; INTRAVENOUS at 18:16

## 2023-01-01 RX ADMIN — PANTOPRAZOLE SODIUM 40 MG: 40 INJECTION, POWDER, FOR SOLUTION INTRAVENOUS at 14:24

## 2023-01-01 RX ADMIN — SODIUM CHLORIDE 3.53 UNITS/HR: 9 INJECTION, SOLUTION INTRAVENOUS at 18:15

## 2023-01-01 RX ADMIN — ALBUTEROL SULFATE 5 MG: 2.5 SOLUTION RESPIRATORY (INHALATION) at 12:29

## 2023-01-01 RX ADMIN — POTASSIUM CHLORIDE 20 MEQ: 29.8 INJECTION, SOLUTION INTRAVENOUS at 17:11

## 2023-01-01 RX ADMIN — ACETAMINOPHEN 650 MG: 650 SUPPOSITORY RECTAL at 20:43

## 2023-01-01 RX ADMIN — SODIUM CHLORIDE 3000 MG: 900 INJECTION INTRAVENOUS at 19:47

## 2023-01-01 RX ADMIN — VASOPRESSIN 0.03 UNITS/MIN: 20 INJECTION, SOLUTION INTRAVENOUS at 02:54

## 2023-01-01 RX ADMIN — SODIUM CHLORIDE, PRESERVATIVE FREE 10 ML: 5 INJECTION INTRAVENOUS at 19:52

## 2023-01-01 RX ADMIN — SODIUM CHLORIDE 3000 MG: 900 INJECTION INTRAVENOUS at 20:53

## 2023-01-01 RX ADMIN — FUROSEMIDE 20 MG/HR: 10 INJECTION, SOLUTION INTRAMUSCULAR; INTRAVENOUS at 09:24

## 2023-01-01 RX ADMIN — Medication 30 MCG/MIN: at 02:39

## 2023-01-01 RX ADMIN — Medication 25 MCG/MIN: at 05:26

## 2023-01-01 RX ADMIN — Medication 45 MCG/MIN: at 20:40

## 2023-01-01 RX ADMIN — SODIUM CHLORIDE 21.64 UNITS/HR: 9 INJECTION, SOLUTION INTRAVENOUS at 17:53

## 2023-01-01 RX ADMIN — EPINEPHRINE 25 MCG/MIN: 1 INJECTION INTRAMUSCULAR; INTRAVENOUS; SUBCUTANEOUS at 14:20

## 2023-01-01 RX ADMIN — SODIUM BICARBONATE: 84 INJECTION, SOLUTION INTRAVENOUS at 02:35

## 2023-01-01 RX ADMIN — VECURONIUM BROMIDE 10 MG: 1 INJECTION, POWDER, LYOPHILIZED, FOR SOLUTION INTRAVENOUS at 11:40

## 2023-01-01 RX ADMIN — CALCIUM GLUCONATE 2000 MG: 20 INJECTION, SOLUTION INTRAVENOUS at 05:33

## 2023-01-01 RX ADMIN — PANTOPRAZOLE SODIUM 40 MG: 40 INJECTION, POWDER, FOR SOLUTION INTRAVENOUS at 20:48

## 2023-01-01 RX ADMIN — SODIUM BICARBONATE: 84 INJECTION, SOLUTION INTRAVENOUS at 15:22

## 2023-01-01 RX ADMIN — POTASSIUM CHLORIDE 20 MEQ: 29.8 INJECTION, SOLUTION INTRAVENOUS at 17:14

## 2023-01-01 RX ADMIN — HYDROCORTISONE SODIUM SUCCINATE 50 MG: 100 INJECTION, POWDER, FOR SOLUTION INTRAMUSCULAR; INTRAVENOUS at 18:32

## 2023-01-01 RX ADMIN — IPRATROPIUM BROMIDE AND ALBUTEROL SULFATE 1 DOSE: 2.5; .5 SOLUTION RESPIRATORY (INHALATION) at 19:39

## 2023-01-01 RX ADMIN — HYDROCORTISONE SODIUM SUCCINATE 50 MG: 100 INJECTION, POWDER, FOR SOLUTION INTRAMUSCULAR; INTRAVENOUS at 19:40

## 2023-01-01 RX ADMIN — POTASSIUM CHLORIDE 20 MEQ: 29.8 INJECTION, SOLUTION INTRAVENOUS at 16:09

## 2023-01-01 RX ADMIN — IOPAMIDOL 75 ML: 755 INJECTION, SOLUTION INTRAVENOUS at 12:01

## 2023-01-01 ASSESSMENT — PULMONARY FUNCTION TESTS
PIF_VALUE: 36
PIF_VALUE: 28
PIF_VALUE: 39
PIF_VALUE: 28
PIF_VALUE: 36
PIF_VALUE: 26
PIF_VALUE: 35
PIF_VALUE: 35
PIF_VALUE: 26
PIF_VALUE: 39
PIF_VALUE: 36
PIF_VALUE: 25
PIF_VALUE: 26
PIF_VALUE: 38
PIF_VALUE: 35
PIF_VALUE: 36
PIF_VALUE: 35
PIF_VALUE: 33
PIF_VALUE: 36
PIF_VALUE: 26
PIF_VALUE: 36
PIF_VALUE: 36
PIF_VALUE: 35
PIF_VALUE: 38
PIF_VALUE: 37
PIF_VALUE: 35
PIF_VALUE: 35
PIF_VALUE: 29
PIF_VALUE: 34
PIF_VALUE: 39
PIF_VALUE: 36
PIF_VALUE: 26
PIF_VALUE: 33
PIF_VALUE: 34
PIF_VALUE: 30
PIF_VALUE: 37
PIF_VALUE: 26
PIF_VALUE: 37
PIF_VALUE: 34
PIF_VALUE: 36
PIF_VALUE: 35
PIF_VALUE: 26
PIF_VALUE: 35
PIF_VALUE: 36
PIF_VALUE: 37
PIF_VALUE: 35
PIF_VALUE: 35
PIF_VALUE: 38
PIF_VALUE: 36
PIF_VALUE: 31
PIF_VALUE: 38
PIF_VALUE: 37
PIF_VALUE: 26
PIF_VALUE: 39
PIF_VALUE: 35
PIF_VALUE: 38
PIF_VALUE: 34
PIF_VALUE: 37
PIF_VALUE: 35
PIF_VALUE: 36
PIF_VALUE: 36
PIF_VALUE: 32
PIF_VALUE: 26
PIF_VALUE: 37
PIF_VALUE: 35
PIF_VALUE: 35
PIF_VALUE: 25
PIF_VALUE: 36
PIF_VALUE: 26
PIF_VALUE: 39
PIF_VALUE: 26
PIF_VALUE: 40
PIF_VALUE: 25
PIF_VALUE: 26
PIF_VALUE: 38
PIF_VALUE: 36
PIF_VALUE: 35
PIF_VALUE: 25
PIF_VALUE: 16
PIF_VALUE: 36
PIF_VALUE: 36
PIF_VALUE: 26
PIF_VALUE: 39
PIF_VALUE: 36
PIF_VALUE: 28
PIF_VALUE: 35
PIF_VALUE: 37

## 2023-01-01 ASSESSMENT — PAIN - FUNCTIONAL ASSESSMENT
PAIN_FUNCTIONAL_ASSESSMENT: CRITICAL CARE PAIN OBSERVATION TOOL (CPOT)
PAIN_FUNCTIONAL_ASSESSMENT: WONG-BAKER FACES

## 2023-01-10 ENCOUNTER — OFFICE VISIT (OUTPATIENT)
Dept: INTERNAL MEDICINE CLINIC | Age: 63
End: 2023-01-10
Payer: MEDICARE

## 2023-01-10 VITALS
HEART RATE: 77 BPM | RESPIRATION RATE: 14 BRPM | OXYGEN SATURATION: 93 % | WEIGHT: 190 LBS | SYSTOLIC BLOOD PRESSURE: 126 MMHG | DIASTOLIC BLOOD PRESSURE: 80 MMHG | HEIGHT: 65 IN | BODY MASS INDEX: 31.65 KG/M2 | TEMPERATURE: 98.1 F

## 2023-01-10 DIAGNOSIS — F33.42 MAJOR DEPRESSIVE DISORDER, RECURRENT, IN FULL REMISSION (HCC): ICD-10-CM

## 2023-01-10 DIAGNOSIS — I10 ESSENTIAL HYPERTENSION: ICD-10-CM

## 2023-01-10 DIAGNOSIS — E10.3553 STABLE PROLIFERATIVE DIABETIC RETINOPATHY OF BOTH EYES ASSOCIATED WITH TYPE 1 DIABETES MELLITUS (HCC): Primary | ICD-10-CM

## 2023-01-10 PROCEDURE — G8427 DOCREV CUR MEDS BY ELIG CLIN: HCPCS | Performed by: INTERNAL MEDICINE

## 2023-01-10 PROCEDURE — 3017F COLORECTAL CA SCREEN DOC REV: CPT | Performed by: INTERNAL MEDICINE

## 2023-01-10 PROCEDURE — G8417 CALC BMI ABV UP PARAM F/U: HCPCS | Performed by: INTERNAL MEDICINE

## 2023-01-10 PROCEDURE — 3046F HEMOGLOBIN A1C LEVEL >9.0%: CPT | Performed by: INTERNAL MEDICINE

## 2023-01-10 PROCEDURE — 3078F DIAST BP <80 MM HG: CPT | Performed by: INTERNAL MEDICINE

## 2023-01-10 PROCEDURE — G0444 DEPRESSION SCREEN ANNUAL: HCPCS | Performed by: INTERNAL MEDICINE

## 2023-01-10 PROCEDURE — 3074F SYST BP LT 130 MM HG: CPT | Performed by: INTERNAL MEDICINE

## 2023-01-10 PROCEDURE — 2022F DILAT RTA XM EVC RTNOPTHY: CPT | Performed by: INTERNAL MEDICINE

## 2023-01-10 PROCEDURE — G8484 FLU IMMUNIZE NO ADMIN: HCPCS | Performed by: INTERNAL MEDICINE

## 2023-01-10 PROCEDURE — 99214 OFFICE O/P EST MOD 30 MIN: CPT | Performed by: INTERNAL MEDICINE

## 2023-01-10 PROCEDURE — 1036F TOBACCO NON-USER: CPT | Performed by: INTERNAL MEDICINE

## 2023-01-10 RX ORDER — BUPROPION HYDROBROMIDE 348 MG/1
1 TABLET, EXTENDED RELEASE ORAL DAILY
Qty: 14 TABLET | Refills: 0 | COMMUNITY
Start: 2023-01-10

## 2023-01-10 SDOH — ECONOMIC STABILITY: FOOD INSECURITY: WITHIN THE PAST 12 MONTHS, THE FOOD YOU BOUGHT JUST DIDN'T LAST AND YOU DIDN'T HAVE MONEY TO GET MORE.: NEVER TRUE

## 2023-01-10 SDOH — ECONOMIC STABILITY: FOOD INSECURITY: WITHIN THE PAST 12 MONTHS, YOU WORRIED THAT YOUR FOOD WOULD RUN OUT BEFORE YOU GOT MONEY TO BUY MORE.: NEVER TRUE

## 2023-01-10 ASSESSMENT — PATIENT HEALTH QUESTIONNAIRE - PHQ9
3. TROUBLE FALLING OR STAYING ASLEEP: 1
SUM OF ALL RESPONSES TO PHQ QUESTIONS 1-9: 9
SUM OF ALL RESPONSES TO PHQ QUESTIONS 1-9: 8
8. MOVING OR SPEAKING SO SLOWLY THAT OTHER PEOPLE COULD HAVE NOTICED. OR THE OPPOSITE, BEING SO FIGETY OR RESTLESS THAT YOU HAVE BEEN MOVING AROUND A LOT MORE THAN USUAL: 1
SUM OF ALL RESPONSES TO PHQ QUESTIONS 1-9: 9
10. IF YOU CHECKED OFF ANY PROBLEMS, HOW DIFFICULT HAVE THESE PROBLEMS MADE IT FOR YOU TO DO YOUR WORK, TAKE CARE OF THINGS AT HOME, OR GET ALONG WITH OTHER PEOPLE: 3
7. TROUBLE CONCENTRATING ON THINGS, SUCH AS READING THE NEWSPAPER OR WATCHING TELEVISION: 1
2. FEELING DOWN, DEPRESSED OR HOPELESS: 1
9. THOUGHTS THAT YOU WOULD BE BETTER OFF DEAD, OR OF HURTING YOURSELF: 1
6. FEELING BAD ABOUT YOURSELF - OR THAT YOU ARE A FAILURE OR HAVE LET YOURSELF OR YOUR FAMILY DOWN: 1
4. FEELING TIRED OR HAVING LITTLE ENERGY: 1
SUM OF ALL RESPONSES TO PHQ9 QUESTIONS 1 & 2: 2
1. LITTLE INTEREST OR PLEASURE IN DOING THINGS: 1
SUM OF ALL RESPONSES TO PHQ QUESTIONS 1-9: 9
5. POOR APPETITE OR OVEREATING: 1

## 2023-01-10 ASSESSMENT — SOCIAL DETERMINANTS OF HEALTH (SDOH): HOW HARD IS IT FOR YOU TO PAY FOR THE VERY BASICS LIKE FOOD, HOUSING, MEDICAL CARE, AND HEATING?: NOT HARD AT ALL

## 2023-01-10 NOTE — PROGRESS NOTES
Kelly Mcfadden is a 63 y.o. female who presents for   Chief Complaint   Patient presents with    Hypertension    COPD    Diabetes    Cough     Dry     Depression     Wants to discuss Cymbalta dose &  License    Health Maintenance     HIV, HEP C, Shingles, PAP, DM Eye Exam, Pneumo, Covid, DM Foot exam,     Anxiety    and follow up of chronic medical problems.   Patient Active Problem List   Diagnosis    Peripheral vascular disease ([address]    Restless legs syndrome    Spinal stenosis, thoracic    Thoracic radiculopathy    Chronic pain syndrome    Depression    Essential hypertension    Retinopathy due to secondary DM ([address]    Acquired hypothyroidism    Vitamin D deficiency    Mixed hyperlipidemia    Type 1 diabetes mellitus with retinopathy (HCC)    Incisional hernia without obstruction or gangrene    Hypoxia    Moderate persistent asthma with status asthmaticus    Seasonal allergic rhinitis due to pollen    Chronic obstructive pulmonary disease (HCC)    Insulin pump in place    Contusion of left chest wall    Chronic retention of urine    Chronic combined systolic and diastolic congestive heart failure (HCC)    Major depressive disorder, recurrent, in full remission ([address]    Urinary incontinence without sensory awareness    Arthritis of knee, right    Acute bilateral thoracic back pain    Acute pyelonephritis    Bilateral impacted cerumen    Left ear pain    Flank pain    TIA (transient ischemic attack)    Moderate malnutrition ([address]    COVID-19    CHF (congestive heart failure) ([address]    COVID    Diabetic ketoacidosis type 1 diabetes mellitus ([address]    PARUL (acute kidney injury) ([address]    Pneumonia     HPI  Here for follow-up and wants to discuss about driving and license paperwork and also discussed about Cymbalta and depression    Current Outpatient Medications   Medication Sig Dispense Refill    metoclopramide (REGLAN) 10 MG tablet Take 1 tablet by mouth 2 times daily 120 tablet 0    clopidogrel (PLAVIX) 75 MG tablet TAKE 1 TABLET BY MOUTH IN THE MORNING 90 tablet 0    topiramate (TOPAMAX) 25 MG tablet Take 1 tablet by mouth 2 times daily 60 tablet 2    DULoxetine (CYMBALTA) 60 MG extended release capsule Take 1 capsule by mouth at bedtime 30 capsule 2    losartan (COZAAR) 100 MG tablet TAKE 1 TABLET BY MOUTH ONCE DAILY 90 tablet 1    amitriptyline (ELAVIL) 100 MG tablet TAKE 1 TABLET BY MOUTH NIGHTLY 90 tablet 0    metoprolol (LOPRESSOR) 100 MG tablet Take 0.5 tablets by mouth 2 times daily 60 tablet 0    amLODIPine (NORVASC) 5 MG tablet Take 1 tablet by mouth once daily 90 tablet 1    simvastatin (ZOCOR) 20 MG tablet TAKE 1 TABLET BY MOUTH NIGHTLY      aspirin 81 MG tablet Take 1 tablet by mouth daily 30 tablet 0    insulin aspart (NOVOLOG PENFILL) 100 UNIT/ML injection cartridge Inject into the skin continuous Per insulin pump      pantoprazole (PROTONIX) 40 MG tablet Take 40 mg by mouth 2 times daily      rOPINIRole (REQUIP) 2 MG tablet Take 1 tablet by mouth daily 90 tablet 3    BREO ELLIPTA 200-25 MCG/INH AEPB Inhale 1 puff into the lungs daily   6    tiotropium (SPIRIVA HANDIHALER) 18 MCG inhalation capsule Inhale 1 capsule into the lungs Daily 30 capsule 5    ONE TOUCH ULTRA TEST strip   1    gabapentin (NEURONTIN) 800 MG tablet Take 1 tablet by mouth 2 times daily for 180 days. 180 tablet 1     No current facility-administered medications for this visit.        Allergies   Allergen Reactions    Fioricet [Butalbital-Apap-Caffeine] Shortness Of Breath    Betamethasone      Unsure reaction      Butalbital-Apap-Caff-Cod Other (See Comments)    Erythromycin      Other reaction(s): Unknown  Eye ointment caused swelling    Xarelto [Rivaroxaban]      Dizziness & \"felt like I was going to pass out\"    Codeine Nausea And Vomiting and Nausea Only    Droperidol Anxiety    Tape David Bougie Tape] Rash       Past Medical History:   Diagnosis Date    Anesthesia complication     \"lung collapsed after colon surgery    Anxiety Arthritis     Back pain     CAD (coronary artery disease)     no stents    Caffeine use     3 coffee / day    Cataract     left    COPD (chronic obstructive pulmonary disease) (HCC)     EMPHYSEMA    Deafness     right ear    Depression     Diabetes mellitus (HCC)     Diarrhea     Diverticulosis     Fibromyalgia     Gastroparalysis     GERD (gastroesophageal reflux disease)     Hearing loss     DEAF IN RIGHT EAR    Hyperlipidemia     Hyperlipidemia     Hypertension     Incontinence     MDRO (multiple drug resistant organisms) resistance 2012    mrsa (nasal), eye, ear    Neurogenic bladder     CATHES HERSELF 7 TIMES A DAY, IS ABLE TO URINATE ON OWN    Neuropathy     feet    Obesity     Osteoarthritis     Peripheral vascular disease, unspecified ([address]     Restless leg syndrome     Rhabdomyolysis     [facility] 1 week, May 2014, then [facility] for rehab.     Spinal stenosis, thoracic 05/27/2014    Stroke [facility]) 2012    numbness on the left side of face, Oct 2020 TIA     Thyroid disease     enlarged    TMJ (dislocation of temporomandibular joint)     Trigeminal neuralgia     Type II or unspecified type diabetes mellitus without mention of complication, not stated as uncontrolled        Past Surgical History:   Procedure Laterality Date    ABDOMEN SURGERY      LAPAROSCOPY    CARPAL TUNNEL RELEASE Right     CATARACT REMOVAL      CHOLECYSTECTOMY      CHOLECYSTECTOMY, OPEN      11/2012    COLON SURGERY      benign mass removed    COLONOSCOPY      COLONOSCOPY  07/13/2016    COLONOSCOPY  06/01/2020    incomplete/poor prep    COLONOSCOPY  07/07/2020    COLONOSCOPY N/A 7/7/2020    COLORECTAL CANCER SCREENING, NOT HIGH RISK performed by Erick Otero MD at [place] [address] N/A 11/21/2022    COLONOSCOPY DIAGNOSTIC performed by Erick Otero MD at [address] [place] [address] Dr licona cyst, under arm left side x5    CYST REMOVAL      right ankle    CYSTOSCOPY      EYE SURGERY Right     HOLE IN RETINA REPAIRED FINGER TRIGGER RELEASE Right     INCISIONAL HERNIA REPAIR  07/07/15    open    INCISIONAL HERNIA REPAIR  10/17/2017    LEG BIOPSY EXCISION Left 7/6/2021    EXCISION OF LEFT THIGH MASS performed by Marva Knowles MD at [address] Left     ear tube placement    POLYPECTOMY      colon polyp    PA REPAIR FIRST ABDOMINAL WALL HERNIA N/A 10/17/2017    HERNIA INCISIONAL REPAIR WITH MESH performed by Marva Knowles MD at [facility] N/A 6/1/2020    SIGMOIDOSCOPY DIAGNOSTIC FLEXIBLE performed by Marva Knowles MD at [address]  07/13/2016    UPPER GASTROINTESTINAL ENDOSCOPY  07/23/2018    with biopsy    UPPER GASTROINTESTINAL ENDOSCOPY N/A 7/23/2018    EGD BIOPSY performed by Marva Knowles MD at [address]  06/01/2020    with biopsy    UPPER GASTROINTESTINAL ENDOSCOPY N/A 6/1/2020    EGD BIOPSY performed by Marva Knowles MD at [address] History   Problem Relation Age of Onset    High Blood Pressure Mother     Migraines Mother     High Blood Pressure Father     Heart Disease Father     Cancer Father         skin    Diabetes Maternal Grandmother     Heart Disease Maternal Grandmother     Cancer Maternal Grandmother     Parkinsonism Maternal Grandmother     Cancer Paternal Grandmother     Other Brother         epilepsy     ROS  Constitutional:  Negative for fatigue, loss of appetite and unexpected weight change  HEENT            : Negative for neck stiffness and pain, no congestion or sinus pressure  Eyes                : No visual disturbance or pain  Cardiovascular: No chest pain or palpitations or leg swelling  Respiratory      : Negative for cough, shortness of breath or wheezing  Gastrointestinal: Negative for abdominal pain, constipation or diarrhea and bloating No nausea or vomiting  Genitourinary:     No urgency or frequency, no burning or hematuria  Musculoskeletal: No arthralgias, back pain or myalgias  Skin                  : Negative for rash or erythema  Neurological    : Negative for dizziness, weakness, tremors ,light headedness or syncope  Psychiatric       : Negative for dysphoric mood, sleep disturbances, nervous or anxious, or decreased concentration  All other review of systems was negative    Objective  Physical Examination:    Nursing note reviewed    /80 (Site: Left Upper Arm, Position: Sitting, Cuff Size: Medium Adult)   Pulse 77   Temp 98.1 °F (36.7 °C) (Temporal)   Resp 14   Ht 5' 5\" (1.651 m)   Wt 190 lb (86.2 kg)   SpO2 93%   BMI 31.62 kg/m²   BP Readings from Last 3 Encounters:   01/10/23 126/80   11/21/22 (!) 170/78   09/19/22 (!) 160/78         Constitutional:  Patrice Castañeda is oriented to place, person and time ,appears well-developed and well-nourished  HEENT:  Atraumatic and normocephalic, external ears normal bilaterally, nose normal no oropharyngeal exudate and is clear and moist  Eyes:  EOCM normal; conjunctivae normal; PERRLA bilaterally  Neck:  Normal range of motion, neck supple, no JVD and no thyromegaly  Cardiovascular:  RRR, normal heart sounds and intact distal pulses  Pulmonary:  effort normal and breath sounds normal bilaterally,no wheezes or rales, no respiratory distress  Abdominal:  Soft, non-tender; normal bowel sounds, no masses  Musculoskeletal:  Normal range of motion and no edema or tenderness bilaterally  No lymphadenopathy  Neurological:  alert, oriented, and normal reflexes bilaterally  Skin: warm and dry  Psychiatric:  normal mood and effect; behavior normal.    Labs:   Lab Results   Component Value Date    LABA1C 8.5 (H) 07/27/2022     Lab Results   Component Value Date    CHOL 206 (H) 10/17/2020     Lab Results   Component Value Date    HDL 55 10/17/2020     No results found for: [address]  Lab Results   Component Value Date    TRIG 74 10/17/2020     No results found for: [place], [place]  Lab Results   Component Value Date    WBC 8.4 08/13/2022    HGB 11.9 (L) 08/13/2022    HCT 36.0 08/13/2022    MCV 88.3 08/13/2022     08/13/2022     No results found for: INR, PROTIME  Lab Results   Component Value Date    GLUCOSE 77 08/13/2022    CREATININE 0.69 08/13/2022    BUN 15 08/13/2022     08/13/2022    K 3.2 (L) 08/13/2022     (H) 08/13/2022    CO2 22 08/13/2022     Lab Results   Component Value Date    ALT 6 07/28/2022    AST 15 07/28/2022    ALKPHOS 104 07/28/2022    BILITOT 0.19 (L) 07/28/2022     Lab Results   Component Value Date    LABPROT 6.8 02/27/2013    LABALBU 3.5 07/28/2022     Lab Results   Component Value Date    TSH 4.68 12/11/2019     Assessment:  1. Stable proliferative diabetic retinopathy of both eyes associated with type 1 diabetes mellitus ([facility]    2. Major depressive disorder, recurrent, in full remission ([facility]    3. Essential hypertension        Plan:  Patient did not insulin pump and blood sugars are ranging from 100-250 and following with the endocrinologist and also seeing a dietitian and has an appointment to see them today  Patient had evidence of diabetic retinopathy no macular edema according to the eye doctor report and patient able to drive without any difficulty with the glasses and paperwork from [facility] [employer] reviewed and completed  Patient's depression is still bothering her and is on Cymbalta 60 mg and added Wellbutrin (Aplenzin) 374 mg once a day in office samples given for 2 weeks and advised patient to call me back and side effects explained to both the patient and mother  Review as scheduled           1.  Heena Wolf received counseling on the following healthy behaviors: nutrition and exercise    2. Prior labs and health maintenance reviewed. 3.  Discussed use, benefit, and side effects of prescribed medications. Barriers to medication compliance addressed. All her questions were answered. Pt voiced understanding.    Heena Wolf will continue current medications, diet and Negative exercise. No orders of the defined types were placed in this encounter. Completed Refills               Requested Prescriptions      No prescriptions requested or ordered in this encounter     4. Patient given educational materials - see patient instructions    5. Was a self-tracking handout given in paper form or via [employer]? NO    No orders of the defined types were placed in this encounter. No follow-ups on file. Patient voiced understanding and agreed to treatment plan. Electronically signed by Mela Floyd MD on 1/10/2023 at 10:46 AM    This note is created with a voice recognition program and while intend to generate a document that accurately reflects the content of the visit, no guarantee can be provided that every mistake has been identified and corrected by editing.

## 2023-01-10 NOTE — PROGRESS NOTES
Chronic Disease Visit Information    BP Readings from Last 3 Encounters:   01/10/23 126/80   11/21/22 (!) 170/78   09/19/22 (!) 160/78          Hemoglobin A1C (%)   Date Value   07/27/2022 8.5 (H)   12/20/2021 8.2 (H)   06/25/2021 7.9 (H)     Microalb/Crt. Ratio (mcg/mg creat)   Date Value   12/11/2019 124 (H)     LDL Cholesterol (mg/dL)   Date Value   10/17/2020 136 (H)     HDL (mg/dL)   Date Value   10/17/2020 55     BUN (mg/dL)   Date Value   08/13/2022 15     Creatinine (mg/dL)   Date Value   08/13/2022 0.69     Glucose (mg/dL)   Date Value   08/13/2022 77   05/14/2012 342 (H)            Have you changed or started any medications since your last visit including any over-the-counter medicines, vitamins, or herbal medicines? no   Are you having any side effects from any of your medications? -  no  Have you stopped taking any of your medications? Is so, why? -  no    Have you seen any other physician or provider since your last visit? Yes - Records Obtained  Have you had any other diagnostic tests since your last visit? Yes - Records Obtained  Have you been seen in the emergency room and/or had an admission to a hospital since we last saw you? No  Have you had your annual diabetic retinal (eye) exam? Yes - Records Obtained  Have you had your routine dental cleaning in the past 6 months? no    Have you activated your [employer] account? If not, what are your barriers?  No     Patient Care Team:  Aureliano Crum MD as PCP - General (Internal Medicine)  Aureliaon Crum MD as PCP - [facility]  Elizabeth Hadley MD as Consulting Physician (Urology)  Lisa Carter MD as Consulting Physician (Infectious Diseases)         Medical History Review  Past Medical, Family, and Social History reviewed and does contribute to the patient presenting condition    Health Maintenance   Topic Date Due    HIV screen  Never done    Hepatitis C screen  Never done    Shingles vaccine (1 of 2) Never done    Cervical cancer screen  04/30/2018    Diabetic retinal exam  07/09/2019    Pneumococcal 0-64 years Vaccine (2 - PCV) 11/29/2019    Diabetic foot exam  12/11/2020    Diabetic Alb to Cr ratio (uACR) test  12/11/2020    COVID-19 Vaccine (3 - Booster for Moderna series) 07/10/2021    Lipids  10/17/2021    Annual Wellness Visit (AWV)  06/08/2023    A1C test (Diabetic or Prediabetic)  07/27/2023    Depression Monitoring  08/04/2023    GFR test (Diabetes, CKD 3-4, OR last GFR 15-59)  08/13/2023    Breast cancer screen  01/26/2024    DTaP/Tdap/Td vaccine (3 - Td or Tdap) 08/30/2030    Colorectal Cancer Screen  11/21/2032    Flu vaccine  Completed    Hepatitis A vaccine  Aged Out    Hib vaccine  Aged Out    Meningococcal (ACWY) vaccine  Aged Out

## 2023-01-18 ENCOUNTER — TELEPHONE (OUTPATIENT)
Dept: INTERNAL MEDICINE CLINIC | Age: 63
End: 2023-01-18

## 2023-01-18 RX ORDER — BUPROPION HYDROBROMIDE 348 MG/1
1 TABLET, EXTENDED RELEASE ORAL
Qty: 30 TABLET | Refills: 5 | Status: SHIPPED | OUTPATIENT
Start: 2023-01-18

## 2023-01-18 NOTE — TELEPHONE ENCOUNTER
Patient is calling to let you know that the samples of aplenzin is working well for her aniexty. Is asking for a RX to be called in?     Please advise

## 2023-01-25 ENCOUNTER — TELEPHONE (OUTPATIENT)
Dept: FAMILY MEDICINE CLINIC | Age: 63
End: 2023-01-25

## 2023-01-31 ENCOUNTER — TELEPHONE (OUTPATIENT)
Dept: FAMILY MEDICINE CLINIC | Age: 63
End: 2023-01-31

## 2023-01-31 ENCOUNTER — TELEPHONE (OUTPATIENT)
Dept: INTERNAL MEDICINE CLINIC | Age: 63
End: 2023-01-31

## 2023-01-31 DIAGNOSIS — F51.01 PRIMARY INSOMNIA: ICD-10-CM

## 2023-01-31 DIAGNOSIS — G25.81 RESTLESS LEGS SYNDROME: ICD-10-CM

## 2023-01-31 RX ORDER — AMITRIPTYLINE HYDROCHLORIDE 100 MG/1
TABLET, FILM COATED ORAL
Qty: 90 TABLET | Refills: 1 | Status: SHIPPED | OUTPATIENT
Start: 2023-01-31

## 2023-01-31 RX ORDER — ROPINIROLE 2 MG/1
2 TABLET, FILM COATED ORAL DAILY
Qty: 90 TABLET | Refills: 1 | Status: SHIPPED | OUTPATIENT
Start: 2023-01-31

## 2023-01-31 RX ORDER — BUPROPION HYDROCHLORIDE 300 MG/1
300 TABLET ORAL EVERY MORNING
Qty: 30 TABLET | Refills: 2 | Status: SHIPPED | OUTPATIENT
Start: 2023-01-31

## 2023-01-31 NOTE — TELEPHONE ENCOUNTER
PA for Aplenzin 348 mg ER denied because not on formulary. They would like Patient to try Bupropion 300 mg 24 hr tab ER. IF not able to take I need medical reasons why would not work or have bad side effects to appeal this decision.     [id number]

## 2023-01-31 NOTE — TELEPHONE ENCOUNTER
----- Message from [address] sent at 1/31/2023  2:48 PM EST -----  Subject: Refill Request    QUESTIONS  Name of Medication? amitriptyline (ELAVIL) 100 MG tablet  Patient-reported dosage and instructions? 1/day  How many days do you have left? 5  [facility] [address] [facility]  Pharmacy phone number (if available)? [phone]  ---------------------------------------------------------------------------  --------------,  Name of Medication? rOPINIRole (REQUIP) 2 MG tablet  Patient-reported dosage and instructions? 1/day  How many days do you have left? 5  [facility] [address] [facility]  Pharmacy phone number (if available)? [phone]  ---------------------------------------------------------------------------  --------------  CALL BACK INFO  What is the best way for the office to contact you? OK to leave message on   voicemail  Preferred Call Back Phone Number? [phone]  ---------------------------------------------------------------------------  --------------  SCRIPT ANSWERS  Relationship to Patient?  Self

## 2023-01-31 NOTE — TELEPHONE ENCOUNTER
----- Message from [address] sent at 1/31/2023  2:56 PM EST -----  Subject: Message to Provider    QUESTIONS  Information for Provider? Patient has pimples or cysts on her head that   are very sore. Would like to know what she should do or if it is possibly   a side effect of one of her meds. ---------------------------------------------------------------------------  --------------  [facility]  [phone]; OK to leave message on voicemail  ---------------------------------------------------------------------------  --------------  SCRIPT ANSWERS  Relationship to Patient?  Self

## 2023-01-31 NOTE — TELEPHONE ENCOUNTER
Marion Chavira is calling to request a refill on the following medication(s):    Medication Request:  Requested Prescriptions     Pending Prescriptions Disp Refills    rOPINIRole (REQUIP) 2 MG tablet 90 tablet 1     Sig: Take 1 tablet by mouth daily    amitriptyline (ELAVIL) 100 MG tablet 90 tablet 1     Sig: TAKE 1 TABLET BY MOUTH NIGHTLY       Last Visit Date (If Applicable):  7/76/0034    Next Visit Date:    Visit date not found

## 2023-02-10 ENCOUNTER — OFFICE VISIT (OUTPATIENT)
Dept: INTERNAL MEDICINE CLINIC | Age: 63
End: 2023-02-10
Payer: MEDICARE

## 2023-02-10 VITALS
WEIGHT: 198 LBS | DIASTOLIC BLOOD PRESSURE: 82 MMHG | TEMPERATURE: 97.7 F | HEIGHT: 65 IN | OXYGEN SATURATION: 97 % | RESPIRATION RATE: 15 BRPM | SYSTOLIC BLOOD PRESSURE: 150 MMHG | BODY MASS INDEX: 32.99 KG/M2 | HEART RATE: 64 BPM

## 2023-02-10 DIAGNOSIS — E10.319 TYPE 1 DIABETES MELLITUS WITH RETINOPATHY, MACULAR EDEMA PRESENCE UNSPECIFIED, UNSPECIFIED LATERALITY, UNSPECIFIED RETINOPATHY SEVERITY (HCC): ICD-10-CM

## 2023-02-10 DIAGNOSIS — M54.50 CHRONIC RIGHT-SIDED LOW BACK PAIN, UNSPECIFIED WHETHER SCIATICA PRESENT: ICD-10-CM

## 2023-02-10 DIAGNOSIS — L21.9 SEBORRHEIC DERMATITIS: ICD-10-CM

## 2023-02-10 DIAGNOSIS — M48.00 SPINAL STENOSIS, UNSPECIFIED SPINAL REGION: ICD-10-CM

## 2023-02-10 DIAGNOSIS — R22.43 LOCALIZED SWELLING OF BOTH LOWER LEGS: ICD-10-CM

## 2023-02-10 DIAGNOSIS — I10 ESSENTIAL HYPERTENSION: Primary | ICD-10-CM

## 2023-02-10 DIAGNOSIS — G89.29 CHRONIC RIGHT-SIDED LOW BACK PAIN, UNSPECIFIED WHETHER SCIATICA PRESENT: ICD-10-CM

## 2023-02-10 DIAGNOSIS — G62.9 NEUROPATHY: ICD-10-CM

## 2023-02-10 DIAGNOSIS — L98.9 LESION OF SKIN OF NOSE: ICD-10-CM

## 2023-02-10 DIAGNOSIS — R20.0 NUMBNESS AND TINGLING: ICD-10-CM

## 2023-02-10 DIAGNOSIS — R20.2 NUMBNESS AND TINGLING: ICD-10-CM

## 2023-02-10 PROCEDURE — G8417 CALC BMI ABV UP PARAM F/U: HCPCS | Performed by: INTERNAL MEDICINE

## 2023-02-10 PROCEDURE — 3046F HEMOGLOBIN A1C LEVEL >9.0%: CPT | Performed by: INTERNAL MEDICINE

## 2023-02-10 PROCEDURE — 2022F DILAT RTA XM EVC RTNOPTHY: CPT | Performed by: INTERNAL MEDICINE

## 2023-02-10 PROCEDURE — G8427 DOCREV CUR MEDS BY ELIG CLIN: HCPCS | Performed by: INTERNAL MEDICINE

## 2023-02-10 PROCEDURE — 1036F TOBACCO NON-USER: CPT | Performed by: INTERNAL MEDICINE

## 2023-02-10 PROCEDURE — G8484 FLU IMMUNIZE NO ADMIN: HCPCS | Performed by: INTERNAL MEDICINE

## 2023-02-10 PROCEDURE — G0444 DEPRESSION SCREEN ANNUAL: HCPCS | Performed by: INTERNAL MEDICINE

## 2023-02-10 PROCEDURE — 3017F COLORECTAL CA SCREEN DOC REV: CPT | Performed by: INTERNAL MEDICINE

## 2023-02-10 PROCEDURE — 3077F SYST BP >= 140 MM HG: CPT | Performed by: INTERNAL MEDICINE

## 2023-02-10 PROCEDURE — 99215 OFFICE O/P EST HI 40 MIN: CPT | Performed by: INTERNAL MEDICINE

## 2023-02-10 PROCEDURE — 3079F DIAST BP 80-89 MM HG: CPT | Performed by: INTERNAL MEDICINE

## 2023-02-10 RX ORDER — WATER / MINERAL OIL / WHITE PETROLATUM 16 OZ
CREAM TOPICAL
Qty: 1 EACH | Refills: 0 | Status: SHIPPED | OUTPATIENT
Start: 2023-02-10

## 2023-02-10 RX ORDER — TORSEMIDE 10 MG/1
10 TABLET ORAL EVERY OTHER DAY
Qty: 30 TABLET | Refills: 0 | Status: SHIPPED | OUTPATIENT
Start: 2023-02-10

## 2023-02-10 SDOH — ECONOMIC STABILITY: FOOD INSECURITY: WITHIN THE PAST 12 MONTHS, THE FOOD YOU BOUGHT JUST DIDN'T LAST AND YOU DIDN'T HAVE MONEY TO GET MORE.: NEVER TRUE

## 2023-02-10 SDOH — ECONOMIC STABILITY: HOUSING INSECURITY
IN THE LAST 12 MONTHS, WAS THERE A TIME WHEN YOU DID NOT HAVE A STEADY PLACE TO SLEEP OR SLEPT IN A SHELTER (INCLUDING NOW)?: NO

## 2023-02-10 SDOH — ECONOMIC STABILITY: INCOME INSECURITY: HOW HARD IS IT FOR YOU TO PAY FOR THE VERY BASICS LIKE FOOD, HOUSING, MEDICAL CARE, AND HEATING?: NOT HARD AT ALL

## 2023-02-10 SDOH — ECONOMIC STABILITY: FOOD INSECURITY: WITHIN THE PAST 12 MONTHS, YOU WORRIED THAT YOUR FOOD WOULD RUN OUT BEFORE YOU GOT MONEY TO BUY MORE.: NEVER TRUE

## 2023-02-10 ASSESSMENT — PATIENT HEALTH QUESTIONNAIRE - PHQ9
4. FEELING TIRED OR HAVING LITTLE ENERGY: 0
5. POOR APPETITE OR OVEREATING: 0
SUM OF ALL RESPONSES TO PHQ QUESTIONS 1-9: 0
3. TROUBLE FALLING OR STAYING ASLEEP: 0
6. FEELING BAD ABOUT YOURSELF - OR THAT YOU ARE A FAILURE OR HAVE LET YOURSELF OR YOUR FAMILY DOWN: 0
1. LITTLE INTEREST OR PLEASURE IN DOING THINGS: 0
9. THOUGHTS THAT YOU WOULD BE BETTER OFF DEAD, OR OF HURTING YOURSELF: 0
8. MOVING OR SPEAKING SO SLOWLY THAT OTHER PEOPLE COULD HAVE NOTICED. OR THE OPPOSITE, BEING SO FIGETY OR RESTLESS THAT YOU HAVE BEEN MOVING AROUND A LOT MORE THAN USUAL: 0
SUM OF ALL RESPONSES TO PHQ9 QUESTIONS 1 & 2: 0
10. IF YOU CHECKED OFF ANY PROBLEMS, HOW DIFFICULT HAVE THESE PROBLEMS MADE IT FOR YOU TO DO YOUR WORK, TAKE CARE OF THINGS AT HOME, OR GET ALONG WITH OTHER PEOPLE: 0
SUM OF ALL RESPONSES TO PHQ QUESTIONS 1-9: 0
2. FEELING DOWN, DEPRESSED OR HOPELESS: 0
7. TROUBLE CONCENTRATING ON THINGS, SUCH AS READING THE NEWSPAPER OR WATCHING TELEVISION: 0

## 2023-02-10 NOTE — PROGRESS NOTES
Tyrell Calvin is a 63 y.o. female who presents for   Chief Complaint   Patient presents with    Hypertension     Follow up    Hair/Scalp Problem     Patient states she has sores on scalp    Back Pain     Patient states she is having some back; going on for a month; patient states she had to quit her job    Leg Swelling     Patient states she is having some bilateral lower leg swelling; going on for a few days    Skin Problem     Patient states she has spot on the end of her nose; would like to get it checked; sister and father had skin cancer    and follow up of chronic medical problems.   Patient Active Problem List   Diagnosis    Peripheral vascular disease (HCC)    Restless legs syndrome    Spinal stenosis, thoracic    Thoracic radiculopathy    Chronic pain syndrome    Depression    Essential hypertension    Retinopathy due to secondary DM ([address]    Acquired hypothyroidism    Vitamin D deficiency    Mixed hyperlipidemia    Type 1 diabetes mellitus with retinopathy (HCC)    Incisional hernia without obstruction or gangrene    Hypoxia    Moderate persistent asthma with status asthmaticus    Seasonal allergic rhinitis due to pollen    Chronic obstructive pulmonary disease (HCC)    Insulin pump in place    Contusion of left chest wall    Chronic retention of urine    Chronic combined systolic and diastolic congestive heart failure (HCC)    Major depressive disorder, recurrent, in full remission ([address]    Urinary incontinence without sensory awareness    Arthritis of knee, right    Acute bilateral thoracic back pain    Acute pyelonephritis    Bilateral impacted cerumen    Left ear pain    Flank pain    TIA (transient ischemic attack)    Moderate malnutrition ([address]    COVID-19    CHF (congestive heart failure) ([address]    COVID    Diabetic ketoacidosis type 1 diabetes mellitus ([address]    PARUL (acute kidney injury) ([address]    Pneumonia     HPI  Here for follow-up on diabetes blood pressure and patient has few issues to discuss  1. Her chronic low back pain still bothering and having difficulty bending down  2. Rash and scalp lesions which are healing now after using the shampoo  3. Wants to check the lesion on the nose has been there for more than a year comes and goes and getting bigger now  4. Leg swelling and patient used to take torsemide and not anymore wants to discuss    Current Outpatient Medications   Medication Sig Dispense Refill    torsemide (DEMADEX) 10 MG tablet Take 1 tablet by mouth every other day 30 tablet 0    Skin Protectants, Misc. (EUCERIN) cream Apply topically as needed. 1 each 0    buPROPion (WELLBUTRIN XL) 300 MG extended release tablet Take 1 tablet by mouth every morning 30 tablet 2    rOPINIRole (REQUIP) 2 MG tablet Take 1 tablet by mouth daily 90 tablet 1    amitriptyline (ELAVIL) 100 MG tablet TAKE 1 TABLET BY MOUTH NIGHTLY 90 tablet 1    metoclopramide (REGLAN) 10 MG tablet Take 1 tablet by mouth 2 times daily 120 tablet 0    clopidogrel (PLAVIX) 75 MG tablet TAKE 1 TABLET BY MOUTH IN THE MORNING 90 tablet 0    topiramate (TOPAMAX) 25 MG tablet Take 1 tablet by mouth 2 times daily 60 tablet 2    DULoxetine (CYMBALTA) 60 MG extended release capsule Take 1 capsule by mouth at bedtime 30 capsule 2    losartan (COZAAR) 100 MG tablet TAKE 1 TABLET BY MOUTH ONCE DAILY 90 tablet 1    metoprolol (LOPRESSOR) 100 MG tablet Take 0.5 tablets by mouth 2 times daily 60 tablet 0    gabapentin (NEURONTIN) 800 MG tablet Take 1 tablet by mouth 2 times daily for 180 days.  180 tablet 1    simvastatin (ZOCOR) 20 MG tablet TAKE 1 TABLET BY MOUTH NIGHTLY      aspirin 81 MG tablet Take 1 tablet by mouth daily 30 tablet 0    insulin aspart (NOVOLOG PENFILL) 100 UNIT/ML injection cartridge Inject into the skin continuous Per insulin pump      pantoprazole (PROTONIX) 40 MG tablet Take 40 mg by mouth 2 times daily      ONE TOUCH ULTRA TEST strip   1    BREO ELLIPTA 200-25 MCG/INH AEPB Inhale 1 puff into the lungs daily (Patient not taking: Reported on 2/10/2023)  6    tiotropium (SPIRIVA HANDIHALER) 18 MCG inhalation capsule Inhale 1 capsule into the lungs Daily (Patient not taking: Reported on 2/10/2023) 30 capsule 5     No current facility-administered medications for this visit. Allergies   Allergen Reactions    Fioricet [Butalbital-Apap-Caffeine] Shortness Of Breath    Betamethasone      Unsure reaction      Butalbital-Apap-Caff-Cod Other (See Comments)    Erythromycin      Other reaction(s): Unknown  Eye ointment caused swelling    Xarelto [Rivaroxaban]      Dizziness & \"felt like I was going to pass out\"    Codeine Nausea And Vomiting and Nausea Only    Droperidol Anxiety    Tape Lulu Hawking Tape] Rash       Past Medical History:   Diagnosis Date    Anesthesia complication     \"lung collapsed after colon surgery    Anxiety     Arthritis     Back pain     CAD (coronary artery disease)     no stents    Caffeine use     3 coffee / day    Cataract     left    COPD (chronic obstructive pulmonary disease) ([facility])     EMPHYSEMA    Deafness     right ear    Depression     Diabetes mellitus ([facility])     Diarrhea     Diverticulosis     Fibromyalgia     Gastroparalysis     GERD (gastroesophageal reflux disease)     Hearing loss     DEAF IN RIGHT EAR    Hyperlipidemia     Hyperlipidemia     Hypertension     Incontinence     MDRO (multiple drug resistant organisms) resistance 2012    mrsa (nasal), eye, ear    Neurogenic bladder     CATHES HERSELF 7 TIMES A DAY, IS ABLE TO URINATE ON OWN    Neuropathy     feet    Obesity     Osteoarthritis     Peripheral vascular disease, unspecified ([facility])     Restless leg syndrome     Rhabdomyolysis     [address] 87 1 week, May 2014, then [facility] for rehab.     Spinal stenosis, thoracic 05/27/2014    Stroke [facility]) 2012    numbness on the left side of face, Oct 2020 TIA     Thyroid disease     enlarged    TMJ (dislocation of temporomandibular joint)     Trigeminal neuralgia     Type II or unspecified type diabetes mellitus without mention of complication, not stated as uncontrolled        Past Surgical History:   Procedure Laterality Date    ABDOMEN SURGERY      LAPAROSCOPY    CARPAL TUNNEL RELEASE Right     CATARACT REMOVAL      CHOLECYSTECTOMY      CHOLECYSTECTOMY, OPEN      11/2012    COLON SURGERY      benign mass removed    COLONOSCOPY      COLONOSCOPY  07/13/2016    COLONOSCOPY  06/01/2020    incomplete/poor prep    COLONOSCOPY  07/07/2020    COLONOSCOPY N/A 7/7/2020    COLORECTAL CANCER SCREENING, NOT HIGH RISK performed by Pascual Zaman MD at [place] [id number] N/A 11/21/2022    COLONOSCOPY DIAGNOSTIC performed by Pascual Zaman MD at [address] licona cyst, under arm left side x5    CYST REMOVAL      right ankle    CYSTOSCOPY      EYE SURGERY Right     HOLE IN RETINA REPAIRED    FINGER TRIGGER RELEASE Right     INCISIONAL HERNIA REPAIR  07/07/15    open    [address]  10/17/2017    LEG BIOPSY EXCISION Left 7/6/2021    EXCISION OF LEFT THIGH MASS performed by Pascual Zaman MD at [place] [address] Left     ear tube placement    POLYPECTOMY      colon polyp    FL REPAIR FIRST ABDOMINAL WALL HERNIA N/A 10/17/2017    HERNIA INCISIONAL [address] [facility] performed by Pascual Zaman MD at [address] [facility] [address] [address] N/A 6/1/2020    SIGMOIDOSCOPY DIAGNOSTIC FLEXIBLE performed by Pascual Zaman MD at [address] [facility] [address]  07/13/2016    UPPER GASTROINTESTINAL ENDOSCOPY  07/23/2018    with biopsy    UPPER GASTROINTESTINAL ENDOSCOPY N/A 7/23/2018    EGD BIOPSY performed by Pascual Zaman MD at [address] [facility]  06/01/2020    with biopsy    UPPER GASTROINTESTINAL ENDOSCOPY N/A 6/1/2020    EGD BIOPSY performed by Pascual Zaman MD at [address] [facility] History   Problem Relation Age of Onset    High Blood Pressure Mother     Migraines Mother     High Blood Pressure Father Heart Disease Father     Cancer Father         skin    Diabetes Maternal Grandmother     Heart Disease Maternal Grandmother     Cancer Maternal Grandmother     Parkinsonism Maternal Grandmother     Cancer Paternal Grandmother     Other Brother         epilepsy     ROS  Constitutional:  Negative for fatigue, loss of appetite and unexpected weight change  HEENT            : Negative for neck stiffness and pain, no congestion or sinus pressure  Eyes                : No visual disturbance or pain  Cardiovascular: No chest pain or palpitations positive for leg swelling  Respiratory      : Negative for cough, shortness of breath or wheezing  Gastrointestinal: Negative for abdominal pain, constipation or diarrhea and bloating No nausea or vomiting  Genitourinary:     No urgency or frequency, no burning or hematuria  Musculoskeletal: Positive for arthralgias, back pain or myalgias  Skin                  : Negative for rash or erythema  Neurological    : Negative for dizziness, weakness, tremors ,light headedness or syncope  Psychiatric       : Negative for dysphoric mood, sleep disturbances, nervous or anxious, or decreased concentration  All other review of systems was negative    Objective  Physical Examination:    Nursing note reviewed    BP (!) 150/82 (Site: Right Upper Arm, Position: Sitting, Cuff Size: Medium Adult)   Pulse 64   Temp 97.7 °F (36.5 °C) (Temporal)   Resp 15   Ht 5' 5\" (1.651 m)   Wt 198 lb (89.8 kg)   SpO2 97%   BMI 32.95 kg/m²   BP Readings from Last 3 Encounters:   02/10/23 (!) 150/82   01/10/23 126/80   11/21/22 (!) 170/78         Constitutional:  Flash Lux is oriented to place, person and time ,appears well-developed and well-nourished  HEENT:  Atraumatic and normocephalic, external ears normal bilaterally, nose normal no oropharyngeal exudate and is clear and moist  Eyes:  EOCM normal; conjunctivae normal; PERRLA bilaterally  Neck:  Normal range of motion, neck supple, no JVD and no thyromegaly  Cardiovascular:  RRR, normal heart sounds and intact distal pulses  Pulmonary:  effort normal and breath sounds normal bilaterally,no wheezes or rales, no respiratory distress  Abdominal:  Soft, non-tender; normal bowel sounds, no masses  Musculoskeletal: Limited range of motion and trace edema or tenderness bilaterally  No lymphadenopathy  Neurological:  alert, oriented, and normal reflexes bilaterally  Skin: warm and dry  Psychiatric:  normal mood and effect; behavior normal.    Labs:   Lab Results   Component Value Date    LABA1C 8.5 (H) 07/27/2022     Lab Results   Component Value Date    CHOL 206 (H) 10/17/2020     Lab Results   Component Value Date    HDL 55 10/17/2020     No results found for: [address]  Lab Results   Component Value Date    TRIG 74 10/17/2020     No results found for: [place], [place]  Lab Results   Component Value Date    WBC 8.4 08/13/2022    HGB 11.9 (L) 08/13/2022    HCT 36.0 08/13/2022    MCV 88.3 08/13/2022     08/13/2022     No results found for: INR, PROTIME  Lab Results   Component Value Date    GLUCOSE 77 08/13/2022    CREATININE 0.69 08/13/2022    BUN 15 08/13/2022     08/13/2022    K 3.2 (L) 08/13/2022     (H) 08/13/2022    CO2 22 08/13/2022     Lab Results   Component Value Date    ALT 6 07/28/2022    AST 15 07/28/2022    ALKPHOS 104 07/28/2022    BILITOT 0.19 (L) 07/28/2022     Lab Results   Component Value Date    LABPROT 6.8 02/27/2013    LABALBU 3.5 07/28/2022     Lab Results   Component Value Date    TSH 4.68 12/11/2019     Assessment:  1. Essential hypertension    2. Type 1 diabetes mellitus with retinopathy, macular edema presence unspecified, unspecified laterality, unspecified retinopathy severity ([address]    3. Localized swelling of both lower legs    4. Spinal stenosis, unspecified spinal region    5. Chronic right-sided low back pain, unspecified whether sciatica present    6. Seborrheic dermatitis    7. Lesion of skin of nose    8. Neuropathy    9. Numbness and tingling        Plan:  Patient's diabetes is stable and following with Dr. Smith Score and new labs ordered to check for diabetes hemoglobin A1c  Patient had no recent cholesterol profile done and new labs ordered to check for cholesterol  Patient has a lesion on the tip of the nose and patient advised to see the dermatologist for evaluation and removal and referral made to see Dr. Elle Monroe  Patient complaining of chronic low back pain and she had an MRI done in 2016 which showed spinal stenosis and arthritis and patient does not want any interventions and prefers to do physical therapy and referral done  Patient is on gabapentin for tingling and numbness and neuropathy  Patient's abortive dermatitis improved after using the shampoo and advised her to continue same and call me back if any issues  Patient has no significant swelling but dry skin present on the lower extremities and advised patient to take torsemide which she was taking 10 mg every other day and a new prescription was given and also advised patient to use Eucerin cream and stop the amlodipine at this time and call me back with the results if any improvement in the swelling  Total time in examination discussion review of reports coordination of care more than 45 minutes  She had a diabetic foot exam today  Review of scheduled             1.  Lavonne Isaacs received counseling on the following healthy behaviors: nutrition and exercise    2. Prior labs and health maintenance reviewed. 3.  Discussed use, benefit, and side effects of prescribed medications. Barriers to medication compliance addressed. All her questions were answered. Pt voiced understanding. Lavonne Isaacs will continue current medications, diet and exercise. Orders Placed This Encounter   Medications    torsemide (DEMADEX) 10 MG tablet     Sig: Take 1 tablet by mouth every other day     Dispense:  30 tablet     Refill:  0    Skin Protectants, Misc.  (EUCERIN) cream     Sig: Apply topically as needed. Dispense:  1 each     Refill:  0          Completed Refills               Requested Prescriptions     Signed Prescriptions Disp Refills    torsemide (DEMADEX) 10 MG tablet 30 tablet 0     Sig: Take 1 tablet by mouth every other day    Skin Protectants, Misc. (EUCERIN) cream 1 each 0     Sig: Apply topically as needed. 4. Patient given educational materials - see patient instructions    5. Was a self-tracking handout given in paper form or via [employer]? NO    Orders Placed This Encounter   Procedures    Comprehensive Metabolic Panel     Standing Status:   Future     Standing Expiration Date:   2/10/2024    Hemoglobin A1C     Standing Status:   Future     Standing Expiration Date:   2/10/2024    Microalbumin / Creatinine Urine Ratio     Standing Status:   Future     Standing Expiration Date:   2/10/2024    CBC     Standing Status:   Future     Standing Expiration Date:   2/10/2024    Magnesium     Standing Status:   Future     Standing Expiration Date:   2/10/2024    Vitamin B12     Standing Status:   Future     Standing Expiration Date:   2/10/2024    Lipid Panel     Standing Status:   Future     Standing Expiration Date:   2/10/2024     Order Specific Question:   Is Patient Fasting?/# of Hours     Answer:   12    TSH     Standing Status:   Future     Standing Expiration Date:   2/10/2024    [facility]     Referral Priority:   Routine     Referral Type:   Eval and Treat     Referral Reason:   Specialty Services Required     Requested Specialty:   Physical Therapist     Number of Visits Requested:   1    [facility] - Noah Puga MD, Dermatology, [place]     Referral Priority:   Routine     Referral Type:   Eval and Treat     Referral Reason:   Specialty Services Required     Referred to Provider:   Néstor Fung MD     Requested Specialty:   Dermatology     Number of Visits Requested:   1     No follow-ups on file.   Patient voiced understanding and agreed to treatment plan. Electronically signed by Kierra Auguste MD on 2/10/2023 at 11:19 AM    This note is created with a voice recognition program and while intend to generate a document that accurately reflects the content of the visit, no guarantee can be provided that every mistake has been identified and corrected by editing.

## 2023-02-16 RX ORDER — DULOXETIN HYDROCHLORIDE 60 MG/1
60 CAPSULE, DELAYED RELEASE ORAL NIGHTLY
Qty: 30 CAPSULE | Refills: 2 | Status: SHIPPED | OUTPATIENT
Start: 2023-02-16

## 2023-02-16 NOTE — TELEPHONE ENCOUNTER
Issac Jiménez is calling to request a refill on the following medication(s):    Medication Request:  Requested Prescriptions     Pending Prescriptions Disp Refills    DULoxetine (CYMBALTA) 60 MG extended release capsule 30 capsule 2     Sig: Take 1 capsule by mouth at bedtime       Last Visit Date (If Applicable):  0/82/0408    Next Visit Date:    6/9/2023

## 2023-02-22 ENCOUNTER — TELEPHONE (OUTPATIENT)
Dept: INTERNAL MEDICINE CLINIC | Age: 63
End: 2023-02-22

## 2023-02-22 RX ORDER — METOCLOPRAMIDE 10 MG/1
10 TABLET ORAL 2 TIMES DAILY
Qty: 120 TABLET | Refills: 1 | Status: SHIPPED | OUTPATIENT
Start: 2023-02-22

## 2023-02-22 RX ORDER — CLOPIDOGREL BISULFATE 75 MG/1
75 TABLET ORAL DAILY
Qty: 90 TABLET | Refills: 1 | Status: SHIPPED | OUTPATIENT
Start: 2023-02-22

## 2023-02-22 NOTE — TELEPHONE ENCOUNTER
Mio Powell is calling to request a refill on the following medication(s):    Medication Request:  Requested Prescriptions     Pending Prescriptions Disp Refills    clopidogrel (PLAVIX) 75 MG tablet [Pharmacy Med Name: Clopidogrel Bisulfate 75 MG Oral Tablet] 90 tablet 3     Sig: TAKE 1 TABLET BY MOUTH IN THE MORNING       Last Visit Date (If Applicable):  8/76/6503    Next Visit Date:    6/9/2023

## 2023-02-22 NOTE — TELEPHONE ENCOUNTER
Yes she is still off the amlodipine, but she hasnt been checking her BP.  She states she will take some and call us tomorrow

## 2023-02-27 ENCOUNTER — TELEPHONE (OUTPATIENT)
Dept: INTERNAL MEDICINE CLINIC | Age: 63
End: 2023-02-27

## 2023-02-27 NOTE — TELEPHONE ENCOUNTER
Patient calling states her BP is still elevated while taking the Torsemide EOD   BP has been running   140/80  143/82  181/90

## 2023-03-03 ENCOUNTER — TELEPHONE (OUTPATIENT)
Dept: INTERNAL MEDICINE CLINIC | Age: 63
End: 2023-03-03

## 2023-03-03 ENCOUNTER — HOSPITAL ENCOUNTER (OUTPATIENT)
Dept: PHYSICAL THERAPY | Age: 63
Setting detail: THERAPIES SERIES
Discharge: HOME | End: 2023-03-03
Payer: MEDICARE

## 2023-03-03 PROCEDURE — 97162 PT EVAL MOD COMPLEX 30 MIN: CPT

## 2023-03-03 NOTE — THERAPY EVALUATION
[x] [facility]) - [facility] & Therapy  [address]  [place]: [phone]   F: [phone]     Physical Therapy Lumbar/Thoracic Evaluation    Date:  3/3/2023  Patient: Juan J Jones  : 1960  MRN: [id number]  Physician: Joni Diaz MD   Insurance: [facility] -- Jermaine Castellon through [facility]  Medical Diagnosis:   M48.00 (ICD-10-CM) - Spinal stenosis, unspecified spinal region   M54.50, G89.29 (ICD-10-CM) - Chronic right-sided low back pain, unspecified whether sciatica present      Rehab Codes:R25 pain , M25.60 stiffness, R53.1 weakness, R29.3 abnormal posture,M54.6 thoracic spine pain   Onset Date: 5 weeks ago (2023)   Next 's appt: TBD    Precautions: hx of fibromyalgia     Subjective:   Pt arrives ambulatory to PT session. Pt reports she has been having upper back pain and shoulder pain. She notes a hx of fibromyalgia that flares up at different point & spinal stenosis. She feels it is related to the way she sleeps and weather. She feels it is difficult to do her daily activities, especially lifting. Feel as that flare up started about 5 -6 weeks ago. Bothers her most when trying to go to bed -- sleeps on her side. Also feels limited in sitting (30-60 minutes)  and standing (20 minutes) too long. Also bothered with driving especially turning the wheel.      Pt note she fell in 2022 when she was wearing flips and got foot on caught on curb     Also having R knee pain     PMHx: [] Unremarkable [x] Diabetes [] HTN  [] Pacemaker   [] MI/Heart Problems [] Cancer [] Arthritis [x] Other: fibromyalgia               Past Medical History:   Diagnosis Date    Anesthesia complication     \"lung collapsed after colon surgery    Anxiety     Arthritis     Back pain     CAD (coronary artery disease)     no stents    Caffeine use     3 coffee / day    Cataract     left    COPD (chronic obstructive pulmonary disease) (HCC)     EMPHYSEMA    Deafness     right ear Depression     Diabetes mellitus ([facility] [address]     Diarrhea     Diverticulosis     Fibromyalgia     Gastroparalysis     GERD (gastroesophageal reflux disease)     Hearing loss     DEAF IN RIGHT EAR    Hyperlipidemia     Hyperlipidemia     Hypertension     Incontinence     MDRO (multiple drug resistant organisms) resistance 2012    mrsa (nasal), eye, ear    Neurogenic bladder     CATHES HERSELF 7 TIMES A DAY, IS ABLE TO URINATE ON OWN    Neuropathy     feet    Obesity     Osteoarthritis     Peripheral vascular disease, unspecified ([address]     Restless leg syndrome     Rhabdomyolysis     [place] [address] 1 week, May 2014, [facility]     Spinal stenosis, thoracic 05/27/2014    Stroke [facility]) 2012    numbness on the left side of face, Oct 2020 TIA     Thyroid disease     enlarged    TMJ (dislocation of temporomandibular joint)     Trigeminal neuralgia     Type II or unspecified type diabetes mellitus without mention of complication, not stated as uncontrolled          Past Surgical History:   Procedure Laterality Date    ABDOMEN SURGERY      LAPAROSCOPY    CARPAL TUNNEL RELEASE Right     CATARACT REMOVAL      CHOLECYSTECTOMY      CHOLECYSTECTOMY, OPEN      11/2012    COLON SURGERY      benign mass removed    COLONOSCOPY      COLONOSCOPY  07/13/2016    COLONOSCOPY  06/01/2020    incomplete/poor prep    COLONOSCOPY  07/07/2020    COLONOSCOPY N/A 7/7/2020    COLORECTAL CANCER SCREENING, NOT HIGH RISK performed by Pavel Perez MD at [place] [address] N/A 11/21/2022    COLONOSCOPY DIAGNOSTIC performed by Pavel Perez MD at [address] [place] [address] Dr      sebaceous cyst, under arm left side x5    CYST REMOVAL      right ankle    CYSTOSCOPY      EYE SURGERY Right     HOLE IN RETINA REPAIRED    FINGER TRIGGER RELEASE Right     INCISIONAL HERNIA REPAIR  07/07/15    open    [address]  10/17/2017    LEG BIOPSY EXCISION Left 7/6/2021    EXCISION OF LEFT THIGH MASS performed by Pavel Perez MD at [address] [place] [address]    MIDDLE EAR SURGERY Left     ear tube placement    POLYPECTOMY      colon polyp    OK REPAIR FIRST ABDOMINAL WALL HERNIA N/A 10/17/2017    HERNIA INCISIONAL REPAIR WITH MESH performed by Enrique Cochran MD at [facility] OR    SIGMOIDOSCOPY N/A 6/1/2020    SIGMOIDOSCOPY DIAGNOSTIC FLEXIBLE performed by Enrique Cochran MD at [facility] OR    TONSILLECTOMY      TUBAL LIGATION      UPPER GASTROINTESTINAL ENDOSCOPY  07/13/2016    UPPER GASTROINTESTINAL ENDOSCOPY  07/23/2018    with biopsy    UPPER GASTROINTESTINAL ENDOSCOPY N/A 7/23/2018    EGD BIOPSY performed by Enrique Cochran MD at [facility] OR    UPPER GASTROINTESTINAL ENDOSCOPY  06/01/2020    with biopsy    UPPER GASTROINTESTINAL ENDOSCOPY N/A 6/1/2020    EGD BIOPSY performed by Enrique Cochran MD at [facility] OR        Comorbidities:   [x] Obesity [] Dialysis  [] Other:   [x] Asthma/COPD [] Dementia [] Other:   [x] Stroke x2 (TIA )  [] Sleep apnea [] Other:   [] Vascular disease [] Rheumatic disease [] Other:     Preferred Language:   [x] English           [] Other:    Prior Imaging: none recently     Previous Treatment:   None recently     Home Environment: Lives with her mother to assist with her care -- 1 story home home     Medications: [x] Refer to full medical record [] None [] Other:  Allergies:      [x] Refer to full medical record [] None [] Other:    Work Status: no -- on disability     Pain present? Y    Location Back of the L shoulder    Pain Rating currently 7.5-8/10   Description of pain Throbbing aching pain    Altered Sensation Has baseline neuropathy    What makes it worse Sleeping wrong, worse at night, lifting, standing too long    What makes it better Warm days    Symptom progression Worsening    Sleep Sleep is okay -- pain with getting to sleep          Functional Status Previous level of function Current level of function Comments   Sitting [x] Independent  [] Deficit [x] Independent  [] Deficit Pain limited    Standing/walking [x] Independent  [] Deficit [x]  Independent  [] Deficit Pain limited    Driving  [x]Independent  [] Deficit [x] Independent  [] Deficit Pain limited    Housekeeping/Meal Preparation [x] Independent  [] Deficit [x] Independent  [] Deficit Pain affects, lifting and carrying    Lifting/Carrying [] Independent  [x] Deficit [] Independent  [x] Deficit Limited by pain    Bending/Reaching [x] Independent  [] Deficit [x] Independent  [] Deficit    Stair climbing [x] Independent  [] Deficit [x] Independent  [] Deficit Pain with this    Pivoting [x] Independent  [] Deficit [x] Independent  [] Deficit    Squatting [] Independent  [x] Deficit [] Independent  [x] Deficit Pain limited    Other [] Independent  [] Deficit [] Independent  [] Deficit      Patient Goals/Rehab Expectations: relief of pain and getting stronger       Objective:    OBSERVATION No Deficit Deficit Comments   Posture          Forward head []  [x]  Significant    Rounded shoulders []  [x]  Significant    Slumped sitting  []  [x]     kyphosis []  [x]  Moderate to significant    Lordosis []  [x]  Increased    Lateral Shift [x]  []     Scoliosis [x]  []     Palpation []  []  Tender to palpation in posterior R shoulder    Joint Mobility  []  [x]  Guarded in thoracic spine    Sensation [x]  []      Edema []  []            Neurological [x]  []     Reflexes      Compression/distraction      Gait [x]  []      FALL RISK?  x  Did have 1 fall but was isolated incident due to catching her foot. Has not happened since.      COMMENTS:        Range of Motion  Left Range of Motion  Right Strength  Left Strength  Right   Cervical flexion 100%      Cervical extension 50%      Cervical rotation 70 deg 70 deg     Cervical side bending 50% - UT tight 50% - UT tight     Thoracic mobility  Tightness with extension, tight with rotation ~50% & lateral bending 25%       Scapular Elevation           Shoulder Flexion 150 150 5 5   Shoulder Abduction  150  150 5 5   Shoulder ER  WNL for all below WNL for all below   5 5 Shoulder IR     5 5   Elbow Flexion         Elbow Extension         Pronation           Supination           Wrist Flexion         Wrist Extension                        MUSCLE LENGTH TESTING Normal Left Tight Right Tight   Upper traps []  [x]  [x]    pectoralis []  [x]  [x]          Assessment:  Pt presents with upper thoracic spine pain that radiates into the shoulders. Pt has known fibromyalgia and spinal stenosis which are likely causes of pain. Pt also sits and stands with a very forwarded rounded posture with and upper crossed type presentation. Most deficits are axial with no significant UE involvement. Has limited thoracis mobility, decreased postural strength/awareness,  & tightness of upper traps and pecs causing dysfunction. This pain limits sleep, lifting, carrying things, sitting or standing too long. Pt does have a pool at home that gives her relief in summer and keeps her moving. Feel given her fibromyalgia this will best help with pain while achieving improvements of ROM and strength. Recommending aquatic therapy at this time as I anticipate pt will benefit from the warm temperature and buoyancy of the water to control pain levels, decrease compressive joint forces and improve overall activity tolerance to therapeutic interventions. Overall believe this Patient would continue to benefit from skilled physical therapy services in order to: modulate pain, improve posture, and increase thoracic mobility to allow her to carry out her ADLs with less pain. Problems:    [x] ? Pain:  [x] ? ROM:  [x] ? Strength:  [x] ?  Function:  [] Other:    STG: (to be met in 6-7treatments)  Pt will self report worst pain no greater than 4/10 in upper thoracic region on avg in order to better tolerate ADLs/work activities with minimal dysfunction  Pt will improve AROM in thoracic side bending and rotation to >75% in order to demonstrate ability to move/reach in all planes unrestricted at PLOF  Pt will be able to maintain her head/neck alignment in more neutral posture to reduce strain on the thoracic spine. Pt will report a standing tolerance of > 20 minutes to better be able to complete ADLs. LTG: (to be met in 13 treatments)  Pt will demonstrate improved postural awareness with only minimal cues to correct posture throughout session to prevent undue stress to spine. Pt will be usha to lift 10# without increase of upper back pain demonstrating better abilities with ADLs. Pt will have no increase in pain with positioning herself at night before bed. Pt will decrease functional limitation per mod AARON to <30% in order to demonstrate improved functional tolerances at PLOF with minimal restriction/dysfunction  Pt will demonstrate independence with a long term HEP for continued progress/maintenance after completion of PT      Functional Assessment Used: Mod. AARON  Current Status Score: 23/50 = 46% functional limitation   Goal Status Score: <30%     Evaluation Complexity:  History (Personal factors, comorbidities) [] 0 [] 1-2 [x] 3+   Exam (limitations, restrictions) [] 1-2 [x] 3 [] 4+   Clinical presentation (progression) [] Stable [x] Evolving  [] Unstable   Decision Making [] Low [x] Moderate [] High    [] Low Complexity [x] Moderate Complexity [] High Complexity     Rehab Potential:  [] Good  [x] Fair  [] Poor   Suggested Professional Referral:  [x] No  [] Yes:  Barriers to Goal Achievement[de-identified]  [] No  [x] Yes: previous non-compliance with PT, multiple medical conditions   Domestic Concerns:  [x] No  [] Yes:    Pt. Education:  [x] Plans/Goals, Risks/Benefits discussed  [] Home exercise program  Method of Education: [x] Verbal  [x] Demo  [] Written  Comprehension of Education:  [x] Verbalizes understanding. [x] Demonstrates understanding. [] Needs Review. [] Demonstrates/verbalizes understanding of HEP/Ed previously given.     Treatment Plan:  [x] Therapeutic Exercise   23809  [] Iontophoresis: 4 mg/mL Dexamethasone Sodium Phosphate  mAmin  22557   [x] Therapeutic Activity  16703 [] Vasopneumatic cold with compression  88110    [] Gait Training   19967 [] Ultrasound   49914   [x] Neuromuscular Re-education  06749 [] Electrical Stimulation Unattended  65987   [x] Manual Therapy  00185 [] Electrical Stimulation Attended  49199   [x] Instruction in HEP  [] Lumbar/Cervical Traction  04808   [x] Aquatic Therapy   69091 [] Cold/hotpack    [] Massage   02894      [] Dry Needling, 1 or 2 muscles  37589   [] Biofeedback, first 15 minutes   02441  [] Biofeedback, additional 15 minutes   90355 [] Dry Needling, 3 or more muscles  40404     []  Medication allergies reviewed for use of    Dexamethasone Sodium Phosphate 4mg/ml     with iontophoresis treatments. Pt is not allergic. Frequency: 2 x/week for 12 visits (eval  +12 = 13 total)     Todays Treatment:  INTERVENTIONS  Reps/ Time Weight/ Level Completed  Today Comments          MODALITIES                      MANUAL                      EXERCISES                             3/3: educated on posture and need to move out from forward rounded posture. - educated on the benefits of aquatic therapy. Provided with handout for pool procedures and reviewed with patient.       Specific Instructions for next treatment: begin aquatic program to lessen pain    Treatment Charges: Mins Units   [x] Evaluation       []  Low       [x]  Moderate       []  High 38 1   []  Modalities     []  Ther Exercise     []  Manual Therapy     []  Ther Activities     []  Aquatics     []  Neuromuscular     []  Gait Training     []  Dry Needling           1-2 muscles     []  Dry Needling           3 or more muscles     [] Vasocompression     []  Other       Time in: 5129    Time Out: 9034   TOTAL  TIME: 38     Total billable time: 0     Electronically signed by: Jeremiah Brewer, PT

## 2023-03-08 ENCOUNTER — HOSPITAL ENCOUNTER (OUTPATIENT)
Dept: PHYSICAL THERAPY | Age: 63
Setting detail: THERAPIES SERIES
Discharge: HOME | End: 2023-03-08
Payer: MEDICARE

## 2023-03-08 NOTE — FLOWSHEET NOTE
[] [address]  [place]: [phone]   F: [phone]     Physical Therapy Cancel/No Show note    Date: 3/8/2023  Patient: Marie Smith  : 1960  MRN: [id number]    Visit Count:   Cancels/No Shows to date:     For today's appointment patient:    [x]  Cancelled    [] Rescheduled appointment    [] No-show     Reason given by patient:    [x]  Patient ill- flu per front dsek    []  Conflicting appointment    [] No transportation      [] Conflict with work    [] No reason given    [] Weather related    [] COVID-19    [] Other:      Comments:        [x] Next appointment was confirmed    Electronically signed by: Blima Crigler, PTA

## 2023-03-13 ENCOUNTER — HOSPITAL ENCOUNTER (OUTPATIENT)
Dept: PHYSICAL THERAPY | Age: 63
Setting detail: THERAPIES SERIES
Discharge: HOME | End: 2023-03-13
Payer: MEDICARE

## 2023-03-13 PROCEDURE — 97113 AQUATIC THERAPY/EXERCISES: CPT

## 2023-03-13 NOTE — FLOWSHEET NOTE
[x] [facility] - [facility] & Therapy  [address]  [place]: [phone]   F: [phone]    Physical Therapy Daily Treatment Note      Date:  3/13/2023  Patient Name:  Nasir Casillas    :  1960  MRN: [id number]  Physician: Madonna Castorena MD                               Insurance: [facility] -- Brent Arora through [employer]  Medical Diagnosis:   M48.00 (ICD-10-CM) - Spinal stenosis, unspecified spinal region   M54.50, G89.29 (ICD-10-CM) - Chronic right-sided low back pain, unspecified whether sciatica present                 Rehab Codes:R25 pain , M25.60 stiffness, R53.1 weakness, R29.3 abnormal posture,M54.6 thoracic spine pain   Onset Date: 5 weeks ago (2023)                      Next 's appt: TBD    Visit Count:      Cancels/No Shows to date:      Precautions: hx of fibromyalgia       Subjective:  Patient reporting that she is in more pain than when she came in last week. Pain:  [] Yes  [] No Location: Mid back Pain Rating: (0-10 scale) 5/10  Pain altered Tx:  [] No  [] Yes  Action:  Comments:    Objective:  Modalities:   Precautions:      [address] [facility] Exercise Log  Aquatic, Hip & DLS Program- Phase 1    Date of Eval:                                Primary PT: Tiny Hernández, DPT  Diagnosis: Things to Focus On (goals):   Surgical Precautions:  Medical Precautions:  [] C-9 dates  [] Occ Med   [] Medicare       Date 3/13/23       Visit # 2       Walk F/L/R 2 Laps       Marching 10x       Squats 10x       Step-Ups F/L        Step Down F/L        Heel-toe raises 10x       SLR F/L/R 10x       Hip/Knee Flex/Ext        F/L Lunges                Kickboard Ex. small       Iso Abd.  10x 5\"       Push-pull        Paddling                UE Format:        Horiz Abd/Add        IR/ER (wipers)        Alt Flex/Ext        Alt Press Down        Abd/Add                Deep Water: David 5'       Cycling        MARTTILA X-Country                Balance        SLS                Stretches        Achllies 2x20\"       Hamstring 2x20\"               Cool Down 1Lap       Pain Rating sore              Assessment: [] Progressing toward goals. [] No change. [x] Other: 3/13 initial session post eval-- Educated patient on the importance of core engagement during exercises and daily tasks to improve core and back support. Patient was also educated on working within her pain free range to prevent onset of pain. Patient reporting mild soreness at the end of her session. [x] Patient would continue to benefit from skilled physical therapy services in order to: modulate pain, improve posture, and increase thoracic mobility to allow her to carry out her ADLs with less pain. STG: (to be met in 6-7treatments)  Pt will self report worst pain no greater than 4/10 in upper thoracic region on avg in order to better tolerate ADLs/work activities with minimal dysfunction  Pt will improve AROM in thoracic side bending and rotation to >75% in order to demonstrate ability to move/reach in all planes unrestricted at PLOF  Pt will be able to maintain her head/neck alignment in more neutral posture to reduce strain on the thoracic spine. Pt will report a standing tolerance of > 20 minutes to better be able to complete ADLs. LTG: (to be met in 13 treatments)  Pt will demonstrate improved postural awareness with only minimal cues to correct posture throughout session to prevent undue stress to spine. Pt will be usha to lift 10# without increase of upper back pain demonstrating better abilities with ADLs. Pt will have no increase in pain with positioning herself at night before bed.   Pt will decrease functional limitation per mod AARON to <30% in order to demonstrate improved functional tolerances at PLOF with minimal restriction/dysfunction  Pt will demonstrate independence with a long term HEP for continued progress/maintenance after completion of PT    Pt. Education:  [x] Yes  [] No  [] Reviewed Prior HEP/Ed  Method of Education: [] Verbal  [] Demo  [] Written  Comprehension of Education:  [x] Verbalizes understanding. [] Demonstrates understanding. [x] Needs review. [] Demonstrates/verbalizes HEP/Ed previously given. Plan: [x] Continue per plan of care.    [] Other:      Treatment Charges: Mins Units   []  Modalities     []  Ther Exercise     []  Manual Therapy     []  Ther Activities     [x]  Aquatics 35 2   []  Neuromuscular     [] Vasocompression     [] Gait Training     [] Dry needling        [] 1 or 2 muscles        [] 3 or more muscles     []  Other     Total Treatment time 35 2     Time HX:6194            Time Out: 0386    Electronically signed by:  Johana Morgan PTA

## 2023-03-14 ENCOUNTER — OFFICE VISIT (OUTPATIENT)
Dept: ORTHOPEDIC SURGERY | Age: 63
End: 2023-03-14
Payer: MEDICARE

## 2023-03-14 VITALS — BODY MASS INDEX: 31.99 KG/M2 | HEIGHT: 65 IN | WEIGHT: 192 LBS | RESPIRATION RATE: 14 BRPM

## 2023-03-14 DIAGNOSIS — M25.461 EFFUSION OF BURSA OF RIGHT KNEE: ICD-10-CM

## 2023-03-14 DIAGNOSIS — G89.29 CHRONIC PAIN OF RIGHT KNEE: Primary | ICD-10-CM

## 2023-03-14 DIAGNOSIS — M25.561 CHRONIC PAIN OF RIGHT KNEE: Primary | ICD-10-CM

## 2023-03-14 PROCEDURE — 1036F TOBACCO NON-USER: CPT | Performed by: PHYSICIAN ASSISTANT

## 2023-03-14 PROCEDURE — G8484 FLU IMMUNIZE NO ADMIN: HCPCS | Performed by: PHYSICIAN ASSISTANT

## 2023-03-14 PROCEDURE — G8427 DOCREV CUR MEDS BY ELIG CLIN: HCPCS | Performed by: PHYSICIAN ASSISTANT

## 2023-03-14 PROCEDURE — 3017F COLORECTAL CA SCREEN DOC REV: CPT | Performed by: PHYSICIAN ASSISTANT

## 2023-03-14 PROCEDURE — 99213 OFFICE O/P EST LOW 20 MIN: CPT | Performed by: PHYSICIAN ASSISTANT

## 2023-03-14 PROCEDURE — G8417 CALC BMI ABV UP PARAM F/U: HCPCS | Performed by: PHYSICIAN ASSISTANT

## 2023-03-14 PROCEDURE — 20610 DRAIN/INJ JOINT/BURSA W/O US: CPT | Performed by: PHYSICIAN ASSISTANT

## 2023-03-14 RX ORDER — BETAMETHASONE SODIUM PHOSPHATE AND BETAMETHASONE ACETATE 3; 3 MG/ML; MG/ML
12 INJECTION, SUSPENSION INTRA-ARTICULAR; INTRALESIONAL; INTRAMUSCULAR; SOFT TISSUE ONCE
Status: COMPLETED | OUTPATIENT
Start: 2023-03-14 | End: 2023-03-14

## 2023-03-14 RX ORDER — LIDOCAINE HYDROCHLORIDE 10 MG/ML
4 INJECTION, SOLUTION INFILTRATION; PERINEURAL ONCE
Status: COMPLETED | OUTPATIENT
Start: 2023-03-14 | End: 2023-03-14

## 2023-03-14 RX ADMIN — LIDOCAINE HYDROCHLORIDE 4 ML: 10 INJECTION, SOLUTION INFILTRATION; PERINEURAL at 11:51

## 2023-03-14 RX ADMIN — BETAMETHASONE SODIUM PHOSPHATE AND BETAMETHASONE ACETATE 12 MG: 3; 3 INJECTION, SUSPENSION INTRA-ARTICULAR; INTRALESIONAL; INTRAMUSCULAR; SOFT TISSUE at 11:51

## 2023-03-15 ENCOUNTER — HOSPITAL ENCOUNTER (OUTPATIENT)
Dept: PHYSICAL THERAPY | Age: 63
Setting detail: THERAPIES SERIES
Discharge: HOME | End: 2023-03-15
Payer: MEDICARE

## 2023-03-15 PROCEDURE — 97113 AQUATIC THERAPY/EXERCISES: CPT

## 2023-03-15 NOTE — PROGRESS NOTES
[address] [facility], [address] [place] [address] Road [address] [place] [address], [address] [place] [address], [id number] [place] [address]           Dept Phone: [phone]           Dept Fax:  [phone]           Jennifer Saunders          Dept Phone: [phone]           Dept Fax:  [phone]      Chief Compliant:  Chief Complaint   Patient presents with    Knee Pain     F/U: Rt knee         History of Present Illness: This is a 63 y.o. female who presents to the clinic today for evaluation of had concerns including Knee Pain (F/U: Rt knee ). Osmani Cheney is a 63 old female who presents for reevaluation of chronic right knee pain. Patient with evidence of early osteoarthritis right knee has undergone corticosteroid injections, Synvisc injection as well as a Durolane injection. Most recent injection occurred on 11/30/2022 in the form of Durolane. She states that she did get some mild pain relief but continued to have pain over the last several months especially over the last month or so. Pain is most severe to the anterior medial aspect of the knee continues to be aggravated by any prolonged period of standing or walking. She also has great deal of difficulty with stairs. Patient reports worsening swelling over the last 2 weeks without preceding injury or trauma no knee joint warmth, redness, fever or chills       Past History:    Current Outpatient Medications:     clopidogrel (PLAVIX) 75 MG tablet, TAKE 1 TABLET BY MOUTH IN THE MORNING, Disp: 90 tablet, Rfl: 1    metoclopramide (REGLAN) 10 MG tablet, Take 1 tablet by mouth 2 times daily, Disp: 120 tablet, Rfl: 1    DULoxetine (CYMBALTA) 60 MG extended release capsule, Take 1 capsule by mouth at bedtime, Disp: 30 capsule, Rfl: 2    torsemide (DEMADEX) 10 MG tablet, Take 1 tablet by mouth every other day, Disp: 30 tablet, Rfl: 0    Skin Protectants, Misc. (EUCERIN) cream, Apply topically as needed. , Disp: 1 each, Rfl: 0    buPROPion (WELLBUTRIN XL) 300 MG extended release tablet, Take 1 tablet by mouth every morning, Disp: 30 tablet, Rfl: 2    rOPINIRole (REQUIP) 2 MG tablet, Take 1 tablet by mouth daily, Disp: 90 tablet, Rfl: 1    amitriptyline (ELAVIL) 100 MG tablet, TAKE 1 TABLET BY MOUTH NIGHTLY, Disp: 90 tablet, Rfl: 1    topiramate (TOPAMAX) 25 MG tablet, Take 1 tablet by mouth 2 times daily, Disp: 60 tablet, Rfl: 2    losartan (COZAAR) 100 MG tablet, TAKE 1 TABLET BY MOUTH ONCE DAILY, Disp: 90 tablet, Rfl: 1    metoprolol (LOPRESSOR) 100 MG tablet, Take 0.5 tablets by mouth 2 times daily, Disp: 60 tablet, Rfl: 0    gabapentin (NEURONTIN) 800 MG tablet, Take 1 tablet by mouth 2 times daily for 180 days. , Disp: 180 tablet, Rfl: 1    simvastatin (ZOCOR) 20 MG tablet, TAKE 1 TABLET BY MOUTH NIGHTLY, Disp: , Rfl:     aspirin 81 MG tablet, Take 1 tablet by mouth daily, Disp: 30 tablet, Rfl: 0    insulin aspart (NOVOLOG PENFILL) 100 UNIT/ML injection cartridge, Inject into the skin continuous Per insulin pump, Disp: , Rfl:     pantoprazole (PROTONIX) 40 MG tablet, Take 40 mg by mouth 2 times daily, Disp: , Rfl:     BREO ELLIPTA 200-25 MCG/INH AEPB, Inhale 1 puff into the lungs daily  (Patient not taking: Reported on 2/10/2023), Disp: , Rfl: 6    tiotropium (SPIRIVA HANDIHALER) 18 MCG inhalation capsule, Inhale 1 capsule into the lungs Daily (Patient not taking: Reported on 2/10/2023), Disp: 30 capsule, Rfl: 5    ONE TOUCH ULTRA TEST strip, , Disp: , Rfl: 1  Allergies   Allergen Reactions    Fioricet [Butalbital-Apap-Caffeine] Shortness Of Breath    Betamethasone      Unsure reaction      Butalbital-Apap-Caff-Cod Other (See Comments)    Erythromycin      Other reaction(s): Unknown  Eye ointment caused swelling    Xarelto [Rivaroxaban]      Dizziness & \"felt like I was going to pass out\"    Codeine Nausea And Vomiting and Nausea Only    Droperidol Anxiety    Tape Tera Freeman Tape] Rash     Social History     Socioeconomic History    Marital status:      Spouse name: Not on file    Number of children: 0    Years of education: 14    Highest education level: Not on file   Occupational History    Occupation: disability     Employer: N/A   Tobacco Use    Smoking status: Never    Smokeless tobacco: Never   Vaping Use    Vaping Use: Never used   Substance and Sexual Activity    Alcohol use: Yes     Comment: once or twice a week    Drug use: Yes     Frequency: 4.0 times per week     Types: Marijuana Katheren Ling)    Sexual activity: Yes     Partners: Male     Comment: 1 partner   Other Topics Concern    Not on file   Social History Narrative    Not on file     Social Determinants of Health     Financial Resource Strain: Low Risk     Difficulty of Paying Living Expenses: Not hard at all   Food Insecurity: No Food Insecurity    Worried About Running Out of Food in the Last Year: Never true    Ran Out of Food in the Last Year: Never true   Transportation Needs: No Transportation Needs    Lack of Transportation (Medical): No    Lack of Transportation (Non-Medical):  No   Physical Activity: Inactive    Days of Exercise per Week: 0 days    Minutes of Exercise per Session: 0 min   Stress: Not on file   Social Connections: Not on file   Intimate Partner Violence: Not on file   Housing Stability: Unknown    Unable to Pay for Housing in the Last Year: Not on file    Number of [place] in the Last Year: Not on file    Unstable Housing in the Last Year: No     Past Medical History:   Diagnosis Date    Anesthesia complication     \"lung collapsed after colon surgery    Anxiety     Arthritis     Back pain     CAD (coronary artery disease)     no stents    Caffeine use     3 coffee / day    Cataract     left    COPD (chronic obstructive pulmonary disease) ([facility]     EMPHYSEMA    Deafness     right ear    Depression     Diabetes mellitus (HCC)     Diarrhea     Diverticulosis     Fibromyalgia Gastroparalysis     GERD (gastroesophageal reflux disease)     Hearing loss     DEAF IN RIGHT EAR    Hyperlipidemia     Hyperlipidemia     Hypertension     Incontinence     MDRO (multiple drug resistant organisms) resistance 2012    mrsa (nasal), eye, ear    Neurogenic bladder     CATHES HERSELF 7 TIMES A DAY, IS ABLE TO URINATE ON OWN    Neuropathy     feet    Obesity     Osteoarthritis     Peripheral vascular disease, unspecified ([facility] [address]     Restless leg syndrome     Rhabdomyolysis     [place] 1 week, May 2014, then [facility] for rehab.     Spinal stenosis, thoracic 05/27/2014    Stroke [facility]) 2012    numbness on the left side of face, Oct 2020 TIA     Thyroid disease     enlarged    TMJ (dislocation of temporomandibular joint)     Trigeminal neuralgia     Type II or unspecified type diabetes mellitus without mention of complication, not stated as uncontrolled      Past Surgical History:   Procedure Laterality Date    ABDOMEN SURGERY      LAPAROSCOPY    CARPAL TUNNEL RELEASE Right     CATARACT REMOVAL      CHOLECYSTECTOMY      CHOLECYSTECTOMY, OPEN      11/2012    COLON SURGERY      benign mass removed    COLONOSCOPY      COLONOSCOPY  07/13/2016    COLONOSCOPY  06/01/2020    incomplete/poor prep    COLONOSCOPY  07/07/2020    COLONOSCOPY N/A 7/7/2020    COLORECTAL CANCER SCREENING, NOT HIGH RISK performed by Karlene Moreno MD at [place] [address] N/A 11/21/2022    COLONOSCOPY DIAGNOSTIC performed by Karlene Moreno MD at [address] [place] [address] Dr      sebaceous cyst, under arm left side x5    CYST REMOVAL      right ankle    CYSTOSCOPY      EYE SURGERY Right     HOLE IN RETINA REPAIRED    FINGER TRIGGER RELEASE Right     INCISIONAL HERNIA REPAIR  07/07/15    open    [address]  10/17/2017    LEG BIOPSY EXCISION Left 7/6/2021    EXCISION OF LEFT THIGH MASS performed by Karlene Moreno MD at [place] [address] Left     ear tube placement    POLYPECTOMY      colon polyp    NM REPAIR FIRST ABDOMINAL WALL HERNIA N/A 10/17/2017    HERNIA INCISIONAL REPAIR WITH MESH performed by Amelia Ngo MD at [facility] N/A 6/1/2020    SIGMOIDOSCOPY DIAGNOSTIC FLEXIBLE performed by Amelia Ngo MD at [address] ENDOSCOPY  07/13/2016    UPPER GASTROINTESTINAL ENDOSCOPY  07/23/2018    with biopsy    UPPER GASTROINTESTINAL ENDOSCOPY N/A 7/23/2018    EGD BIOPSY performed by Amelia Ngo MD at [facility] [address]  06/01/2020    with biopsy    UPPER GASTROINTESTINAL ENDOSCOPY N/A 6/1/2020    EGD BIOPSY performed by Amelia Ngo MD at [facility] History   Problem Relation Age of Onset    High Blood Pressure Mother     Migraines Mother     High Blood Pressure Father     Heart Disease Father     Cancer Father         skin    Diabetes Maternal Grandmother     Heart Disease Maternal Grandmother     Cancer Maternal Grandmother     Parkinsonism Maternal Grandmother     Cancer Paternal Grandmother     Other Brother         epilepsy        Review of Systems   Constitutional: Negative for fever, chills, sweats. Eyes: Negative for changes in vision, or pain. HENT: Negative for ear ache, epistaxis, or sore throat. Respiratory/Cardio: Negative for Chest pain, palpitations, SOB, or cough. Gastrointestinal: Negative for abdominal pain, N/V/D. Genitourinary: Negative for dysuria, frequency, urgency, or hematuria. Neurological: Negative for headache, numbness, or weakness. Integumentary: Negative for rash, itching, laceration, or abrasion. Musculoskeletal: Positive for Knee Pain (F/U: Rt knee )       Physical Exam:  Constitutional: Patient is oriented to person, place, and time. Patient appears well-developed and well nourished. HENT: Negative otherwise noted  Head: Normocephalic and Atraumatic  Nose: Normal  Eyes: Conjunctivae and EOM are normal  Neck: Normal range of motion Neck supple. Respiratory/Cardio: Effort normal. No respiratory distress. Musculoskeletal:    Right Knee:     Skin: warm and dry, no rash or erythema  Vasculature: 2+ pedal pulses bilaterally  Neuro: Sensation grossly intact to light touch diffusely  Alignment: Normal  Tenderness: Mild tenderness to medial and lateral joint line. No tenderness to quad/patellar tendon, pes anserine bursa or posterior knee. Effusion: Moderate    ROM: (Degrees)       A P       Extension  0 0       Flexion   120 125       Crepitation  Yes       Muscle strength:         Flexion   5      Extension  5      SLR   5        Extensor lag   y          Special testing:  y    Pain with deep knee flexion     y    Patellar grind       n    Patellar apprehension      n    Patellar glide         n    Lachman       n    Anterior drawer      n    Pivot shift       n    Posterior drawer      n    Dial test       n    Posterolateral drawer      n    Posterior Sag       n    MCL        n    LCL          mild    Medial joint line tenderness     mild    Lateral joint line tenderness     n    McMurrey's         Neurological: Patient is alert and oriented to person, place, and time. Normal strenght. No sensory deficit. Skin: Skin is warm and dry  Psychiatric: Behavior is normal. Thought content normal.  Nursing note and vitals reviewed. Labs and Imaging:       No new x-rays taken today however those from 9/15/2022 available for independent review  AP bilateral knees standing lateral view of the right knee demonstrates early degenerative changes to the medial and patellofemoral compartment with minimal osteophyte formation. No evidence of acute fracture or other acute osseous abnormality. Orders Placed This Encounter   Procedures    20610 - DRAIN/INJECT LARGE JOINT BURSA       Assessment and Plan:  1. Chronic pain of right knee    2.  Effusion of bursa of right knee          PLAN:  Huey Contreras is a 63 y.o. old female who presents for reevaluation of chronic right knee pain. Patient with history of early osteoarthritis. Treatment attempted includes over-the-counter NSAIDs, corticosteroid and Visco injections. 1.  Reevaluation again consistent with osteoarthritis with current effusion no evidence of acute meniscal or ligamentous tearing. 2.  After discussion of treatment options patient elected to proceed with aspiration and corticosteroid injection. 3.  Patient tolerated procedure well educated on postinjection care  4 follow-up in 4 months however patient may call or consider for any questions or concerns    Procedure Note: right Knee aspiration Celestone Injection   An informed verbal consent for the procedure was obtained and risks including, but not limited to: allergy to medications, injection, bleeding, stiffness of joint, recurrence of symptoms, loss of function, swelling, drainage, irrigation, need for surgery and pseudo-septic inflammation, were explained to the patient. Also, discussed was the potential for further injections, irrigation and debridement and surgery. Alternate means of treatment have also been discussed with the patient. Following an appropriate discussion with the patient regarding the risks and benefits of the procedure she consented to proceed. her right knee was prepped using betadine solution and alcohol swab. Using aseptic technique and through a lateral joint line approach, which was then injected superficially with 4 cc of 1% lidocaine without epinephrine, utilizing 18-gauge needle 20 cc of clear, straw-colored synovial fluid is aspirated from the knee joint and subsequently with 2 cc of 6 mg/mL Celestone into the right knee. A band aid was applied to the injection site. she tolerated the injection with no immediate adverse reactions. Electronically signed by SALOMON Cummins on 3/15/23 at 8:12 AM EDT        Please note that this chart was generated using voice recognition Dragon dictation software.   Although every effort was made to ensure the accuracy of this automated transcription, some errors in transcription may have occurred.

## 2023-03-15 NOTE — FLOWSHEET NOTE
[x] 800  [facility]  [address]  [place]: [phone]   F: [phone]    Physical Therapy Daily Treatment Note      Date:  3/15/2023  Patient Name:  Marie Garcia    :  1960  MRN: [id number]  Physician: Arlyn Coker MD                               Insurance: [employer] through [employer]  **AUTH 12 visits 3/3/23-23 **  Medical Diagnosis:   M48.00 (ICD-10-CM) - Spinal stenosis, unspecified spinal region   M54.50, G89.29 (ICD-10-CM) - Chronic right-sided low back pain, unspecified whether sciatica present                 Rehab Codes:R25 pain , M25.60 stiffness, R53.1 weakness, R29.3 abnormal posture,M54.6 thoracic spine pain   Onset Date: 5 weeks ago (2023)                      Next 's appt: TBD    Visit Count: 3/13     Cancels/No Shows to date:      Precautions: hx of fibromyalgia; Hx Diabetes- insulin pump (may bring snacks to deck)       Subjective: Increased pain after last visit throughout her entire spine. States her pain was 7/10- had to take tylenol and went to be early; woke up the next day and felt back to her normal baseline States she is very inactive so it does not take much to make her sore. States she has had ongoing balance issues due to her diabetic neuropathy in feet/legs. Arrives with BS pump and snacks on deck in case her sugar drops- Sugar 97 upon entering . Denies pain upon arrival this date. Reports she had her R knee injected yesterday but it is feeling pretty good- states doctor told her aquatics is good for it. Ancel Riding for summer weather as she has a pool at home and it helps her to stay active and comfortable.  Reports she wants to wait to schedule until Friday due to have a skin check tomorrow and possible biopsy on her nose- told her to clarify to doc she would not be submerging face etc.    Pain:  [] Yes  [x] No Location: Mid back Pain Rating: (0-10 scale) 0/10  Pain altered Tx:  [x] No  [] Yes  Action:    Comments: Reviewed importance of postural awareness and activation of core muscles to assist with balance and posture. Patient educated on the benefits of aquatic therapy and encouraged to avoid increasing pain. Objective:  Exercise:    [address] Exercise Log  Aquatic, Hip & DLS Program- Phase 1    Date of Eval:  3/3/23                              Primary PT: Ema Brunner, PO    Things to Focus On (goals): lessen pain    **AUTH 12 visits 3/3/23-5/30/23 **      Date 3/13/23 3/15/23      Visit # 2/13 3/13      Walk F/L/R 2 Laps 2 Laps      Marching 10x 10x      Squats 10x Mini 10x3\"      Step-Ups F/L        Step Down F/L        Heel-toe raises 10x 10x      SLR F/L/R 10x 10x      Hip/Knee Flex/Ext        F/L Lunges                Kickboard Ex. small Small      Iso Abd. 10x 5\" 10x5\"      Push-pull  10x      Paddling                UE Format:  Chest Deep      Horiz Abd/Add  10x      IR/ER (wipers)   Add     Alt Flex/Ext  10x      Alt Press Down   Add     Abd/Add  10x              Deep Water: 1 Noodle 1 Noodle      Hang 5' 5'      Cycling   Add     Jacks        X-Country                Balance        SLS                Stretches        Achllies 2x20\" NT Resume     Hamstring 2x20\" 2x20\"              Cool Down 1Lap 1 Lap Breast Stroke      Pain Rating sore 0         Specific Instructions for next treatment: Progress with deep water aerobics and short lever UE format to challenge trunk stability    Assessment: [x] Progressing toward goals. Emphasis throughout program on controlled ranges and proper technique with exercise in 30-40% WB aquatic environment. Completed program to tolerance. Added long lever UE format activity and breast stroke lap to challenge core stability without issue. Continued education for core activation and upright posture. Finished with deep water unloading to assist with pain. [] No change.      [] Other:     [x] Patient would continue to benefit from skilled physical therapy services in order to: modulate pain, improve posture, and increase thoracic mobility to allow her to carry out her ADLs with less pain. STG: (to be met in 6-7treatments)  Pt will self report worst pain no greater than 4/10 in upper thoracic region on avg in order to better tolerate ADLs/work activities with minimal dysfunction  Pt will improve AROM in thoracic side bending and rotation to >75% in order to demonstrate ability to move/reach in all planes unrestricted at PLOF  Pt will be able to maintain her head/neck alignment in more neutral posture to reduce strain on the thoracic spine. Pt will report a standing tolerance of > 20 minutes to better be able to complete ADLs. LTG: (to be met in 13 treatments)  Pt will demonstrate improved postural awareness with only minimal cues to correct posture throughout session to prevent undue stress to spine. Pt will be usha to lift 10# without increase of upper back pain demonstrating better abilities with ADLs. Pt will have no increase in pain with positioning herself at night before bed. Pt will decrease functional limitation per mod AARON to <30% in order to demonstrate improved functional tolerances at PLOF with minimal restriction/dysfunction  Pt will demonstrate independence with a long term HEP for continued progress/maintenance after completion of PT    Pt. Education:  [] Yes  [] No  [x] Reviewed Prior HEP/Ed- benefits of aquatics; postural awareness  Method of Education: [x] Verbal  [x] Demo  [] Written  Comprehension of Education:  [x] Verbalizes understanding. [x] Demonstrates understanding. [] Needs review. [] Demonstrates/verbalizes HEP/Ed previously given. Plan: [x] Continue per plan of care.    [] Other:      Treatment Charges: Mins Units   []  Modalities     []  Ther Exercise     []  Manual Therapy     []  Ther Activities     [x]  Aquatics 31 2   []  Neuromuscular     [] Vasocompression     [] Gait Training     [] Dry needling        [] 1 or 2 muscles        [] 3 or more muscles     []  Other     Total Treatment time 31 2     Time In:148 PM           Time Out:  866 PM    Electronically signed by:  Jose Alberto Minaya PTA

## 2023-03-21 ENCOUNTER — HOSPITAL ENCOUNTER (OUTPATIENT)
Dept: PHYSICAL THERAPY | Age: 63
Setting detail: THERAPIES SERIES
Discharge: HOME | End: 2023-03-21
Payer: MEDICARE

## 2023-03-21 NOTE — FLOWSHEET NOTE
[] [address]  [place]: [phone]   F: [phone]     Physical Therapy Cancel/No Show note    Date: 3/21/2023  Patient: Andie Lopez  : 1960  MRN: [id number]    Visit Count: 3/13  Cancels/No Shows to date:     For today's appointment patient:    [x]  Cancelled    [] Rescheduled appointment    [] No-show     Reason given by patient:    [x]  Patient ill- has diarrhea- cancelled all week.  Will call back to schedule when she is feeling better    []  Conflicting appointment    [] No transportation      [] Conflict with work    [] No reason given    [] Weather related    [] COVID-19    [] Other:      Comments:        [] Next appointment was confirmed    Electronically signed by: Amena Zamarripa PTA

## 2023-03-22 DIAGNOSIS — I10 ESSENTIAL HYPERTENSION: ICD-10-CM

## 2023-03-22 RX ORDER — AMLODIPINE BESYLATE 5 MG/1
TABLET ORAL
Qty: 90 TABLET | Refills: 1 | Status: SHIPPED | OUTPATIENT
Start: 2023-03-22

## 2023-03-22 NOTE — TELEPHONE ENCOUNTER
Branden Hodge is calling to request a refill on the following medication(s):    Medication Request:  Requested Prescriptions     Pending Prescriptions Disp Refills    amLODIPine (NORVASC) 5 MG tablet [Pharmacy Med Name: amLODIPine Besylate 5 MG Oral Tablet] 90 tablet 1     Sig: Take 1 tablet by mouth once daily       Last Visit Date (If Applicable):  1/08/5781    Next Visit Date:    6/9/2023

## 2023-03-23 ENCOUNTER — APPOINTMENT (OUTPATIENT)
Dept: PHYSICAL THERAPY | Age: 63
End: 2023-03-23
Payer: MEDICARE

## 2023-03-29 RX ORDER — METOPROLOL TARTRATE 50 MG/1
50 TABLET, FILM COATED ORAL 2 TIMES DAILY
Qty: 180 TABLET | Refills: 0 | Status: SHIPPED | OUTPATIENT
Start: 2023-03-29

## 2023-03-29 NOTE — TELEPHONE ENCOUNTER
Edna Lozada is calling to request a refill on the following medication(s):    Last Visit Date (If Applicable):  4/84/8016    Next Visit Date:    6/9/2023    Medication Request:  Requested Prescriptions     Pending Prescriptions Disp Refills    metoprolol tartrate (LOPRESSOR) 50 MG tablet 180 tablet 0     Sig: Take 1 tablet by mouth 2 times daily

## 2023-04-14 ENCOUNTER — OFFICE VISIT (OUTPATIENT)
Dept: ORTHOPEDIC SURGERY | Age: 63
End: 2023-04-14
Payer: MEDICARE

## 2023-04-14 VITALS — HEIGHT: 65 IN | RESPIRATION RATE: 14 BRPM | BODY MASS INDEX: 31.99 KG/M2 | WEIGHT: 192 LBS

## 2023-04-14 DIAGNOSIS — G89.29 CHRONIC PAIN OF RIGHT KNEE: Primary | ICD-10-CM

## 2023-04-14 DIAGNOSIS — M25.561 CHRONIC PAIN OF RIGHT KNEE: Primary | ICD-10-CM

## 2023-04-14 DIAGNOSIS — M25.461 EFFUSION OF BURSA OF RIGHT KNEE: ICD-10-CM

## 2023-04-14 PROCEDURE — 3017F COLORECTAL CA SCREEN DOC REV: CPT | Performed by: PHYSICIAN ASSISTANT

## 2023-04-14 PROCEDURE — 1036F TOBACCO NON-USER: CPT | Performed by: PHYSICIAN ASSISTANT

## 2023-04-14 PROCEDURE — 99213 OFFICE O/P EST LOW 20 MIN: CPT | Performed by: PHYSICIAN ASSISTANT

## 2023-04-14 PROCEDURE — G8417 CALC BMI ABV UP PARAM F/U: HCPCS | Performed by: PHYSICIAN ASSISTANT

## 2023-04-14 PROCEDURE — 20610 DRAIN/INJ JOINT/BURSA W/O US: CPT | Performed by: PHYSICIAN ASSISTANT

## 2023-04-14 PROCEDURE — G8427 DOCREV CUR MEDS BY ELIG CLIN: HCPCS | Performed by: PHYSICIAN ASSISTANT

## 2023-04-15 ENCOUNTER — TELEPHONE (OUTPATIENT)
Dept: OTHER | Age: 63
End: 2023-04-15

## 2023-04-17 RX ORDER — METHYLPREDNISOLONE 4 MG/1
TABLET ORAL
Qty: 1 KIT | Refills: 0 | Status: SHIPPED | OUTPATIENT
Start: 2023-04-17

## 2023-04-17 NOTE — PROGRESS NOTES
(coronary artery disease)     no stents    Caffeine use     3 coffee / day    Cataract     left    COPD (chronic obstructive pulmonary disease) (HCC)     EMPHYSEMA    Deafness     right ear    Depression     Diabetes mellitus ([address]     Diarrhea     Diverticulosis     Fibromyalgia     Gastroparalysis     GERD (gastroesophageal reflux disease)     Hearing loss     DEAF IN RIGHT EAR    Hyperlipidemia     Hyperlipidemia     Hypertension     Incontinence     MDRO (multiple drug resistant organisms) resistance 2012    mrsa (nasal), eye, ear    Neurogenic bladder     CATHES HERSELF 7 TIMES A DAY, IS ABLE TO URINATE ON OWN    Neuropathy     feet    Obesity     Osteoarthritis     Peripheral vascular disease, unspecified ([address]     Restless leg syndrome     Rhabdomyolysis     [facility] 1 week, May 2014, then [facility] for rehab.     Spinal stenosis, thoracic 05/27/2014    Stroke [facility]) 2012    numbness on the left side of face, Oct 2020 TIA     Thyroid disease     enlarged    TMJ (dislocation of temporomandibular joint)     Trigeminal neuralgia     Type II or unspecified type diabetes mellitus without mention of complication, not stated as uncontrolled      Past Surgical History:   Procedure Laterality Date    ABDOMEN SURGERY      LAPAROSCOPY    CARPAL TUNNEL RELEASE Right     CATARACT REMOVAL      CHOLECYSTECTOMY      CHOLECYSTECTOMY, OPEN      11/2012    COLON SURGERY      benign mass removed    COLONOSCOPY      COLONOSCOPY  07/13/2016    COLONOSCOPY  06/01/2020    incomplete/poor prep    COLONOSCOPY  07/07/2020    COLONOSCOPY N/A 7/7/2020    COLORECTAL CANCER SCREENING, NOT HIGH RISK performed by Tj Welch MD at [facility] N/A 11/21/2022    COLONOSCOPY DIAGNOSTIC performed by Tj Welch MD at [address] [place] [address] Dr      sebaceous cyst, under arm left side x5    CYST REMOVAL      right ankle    CYSTOSCOPY      EYE SURGERY Right     HOLE IN RETINA REPAIRED    FINGER TRIGGER RELEASE Right

## 2023-04-18 ENCOUNTER — TELEPHONE (OUTPATIENT)
Dept: INTERNAL MEDICINE CLINIC | Age: 63
End: 2023-04-18

## 2023-04-18 DIAGNOSIS — R35.0 URINARY FREQUENCY: Primary | ICD-10-CM

## 2023-04-22 LAB
APPEARANCE: CLEAR
BILIRUBIN: NEGATIVE
COLOR: YELLOW
GLUCOSE BLD-MCNC: NEGATIVE MG/DL
KETONES, URINE: NEGATIVE
LEUKOCYTE ESTERASE, URINE: NEGATIVE
NITRITE, URINE: NEGATIVE
OCCULT BLOOD,URINE: NEGATIVE
PH: 6 (ref 5–9)
PROTEIN, URINE: NEGATIVE
SP GRAVITY MISCELLANEOUS: 1.01 (ref 1–1.03)
UROBILINOGEN, URINE: 0.2

## 2023-04-24 ENCOUNTER — TELEPHONE (OUTPATIENT)
Dept: INTERNAL MEDICINE CLINIC | Age: 63
End: 2023-04-24

## 2023-04-24 LAB — URINE CULTURE, ROUTINE: NORMAL

## 2023-04-26 RX ORDER — TOPIRAMATE 25 MG/1
TABLET ORAL
Qty: 60 TABLET | Refills: 0 | OUTPATIENT
Start: 2023-04-26

## 2023-05-01 NOTE — THERAPY EVALUATION
instability and apprehension. Ambulates steps with a step-to pattern (leads with L LE on ascent, R LE on descent after cueing). Able to step up with her R LE but only with heavy B UE reliance. Functional Testing:   Five Time Sit to Stand (FTSTS): 32 seconds with mod UE support and strain reported at R anterior knee  Timed Up and Go (TUG): 18 seconds with no AD. Mild turning instability and R medial knee pain. BALANCE Left Right   Tandem Stance (Eyes Open) 2 seconds 2 seconds        Romberg (Eyes Open) >30 seconds  Increased sway   Romberg (Eyes Closed) 21 seconds   Other:     Comments: CGA    Tinetti Balance & Gait Assessment     0 1 2   Sitting balance Leans/slides in chair  [] Steady; safe  [x]    Rises from chair Unable without help  [] Uses arms  [x] Able without arms  []   Attempts to rise Unable without help  [] >1 attempt  [] 1 attempt  [x]   Immediate standing balance Unsteady  [] Steady w/AD  [] Steady;  Independent  [x]   Standing balance Unsteady  [] Wide LAURA  [x] Steady; neutral LAURA  []   Nudged Begins to fall (not tested, but answered based on unstable EO Romberg)  [x] Staggers/Grabs/Catches  [] Steady  []   Standing (eyes closed) Unsteady  [x] Steady  []    360 deg Turn (steps) Discontinuous steps  [x] Continuous steps  []    360 deg Turn (steadiness) Unsteady  [x] Steady  []    Sitting Down Unsafe  [] Uses arms; not smooth  [x] Safe and smooth  []         Gait initiation Hesitancy  [x] No hesitancy  []    Step Length (R) R swing foot does not pass stance foot  [] R swing foot passes stance foot  [x]    Step Length (L) L swing foot does not pass stance foot  [] L swing foot passes stance foot  [x]    Step Height (R) R foot does not clear completely  [] R foot clears completely  [x]    Step Height (L) L foot does not clear completely  [] L foot clears completely  [x]    Step Symmetry Not equal  [] Equal  [x]    Step Continuity Discontinuous  [] Continuous  [x]    Walking Path Marked deviation  []

## 2023-05-02 ENCOUNTER — APPOINTMENT (OUTPATIENT)
Dept: PHYSICAL THERAPY | Age: 63
End: 2023-05-02
Payer: MEDICARE

## 2023-05-02 ENCOUNTER — HOSPITAL ENCOUNTER (OUTPATIENT)
Dept: PHYSICAL THERAPY | Age: 63
Setting detail: THERAPIES SERIES
Discharge: HOME | End: 2023-05-02
Payer: MEDICARE

## 2023-05-02 PROCEDURE — 97162 PT EVAL MOD COMPLEX 30 MIN: CPT

## 2023-05-04 RX ORDER — TOPIRAMATE 25 MG/1
TABLET ORAL
Qty: 60 TABLET | Refills: 0 | Status: SHIPPED | OUTPATIENT
Start: 2023-05-04 | End: 2023-05-05 | Stop reason: SDUPTHER

## 2023-05-05 RX ORDER — DULOXETIN HYDROCHLORIDE 60 MG/1
60 CAPSULE, DELAYED RELEASE ORAL NIGHTLY
Qty: 30 CAPSULE | Refills: 2 | Status: SHIPPED | OUTPATIENT
Start: 2023-05-05

## 2023-05-05 RX ORDER — TOPIRAMATE 25 MG/1
25 TABLET ORAL 2 TIMES DAILY
Qty: 60 TABLET | Refills: 0 | Status: SHIPPED | OUTPATIENT
Start: 2023-05-05

## 2023-05-05 NOTE — TELEPHONE ENCOUNTER
Jamie Brown is calling to request a refill on the following medication(s):    Medication Request:  Requested Prescriptions     Pending Prescriptions Disp Refills    topiramate (TOPAMAX) 25 MG tablet 60 tablet 0     Sig: Take 1 tablet by mouth 2 times daily    DULoxetine (CYMBALTA) 60 MG extended release capsule 30 capsule 2     Sig: Take 1 capsule by mouth at bedtime       Last Visit Date (If Applicable):  8/68/1944    Next Visit Date:    6/9/2023

## 2023-05-31 ENCOUNTER — TELEPHONE (OUTPATIENT)
Dept: INTERNAL MEDICINE CLINIC | Age: 63
End: 2023-05-31

## 2023-05-31 DIAGNOSIS — K29.70 GASTRITIS WITHOUT BLEEDING, UNSPECIFIED CHRONICITY, UNSPECIFIED GASTRITIS TYPE: Primary | ICD-10-CM

## 2023-05-31 NOTE — TELEPHONE ENCOUNTER
Pt states that her stomach is swollen and she feels like she is full of urine. She went to her urologist and they did use a catheter but nothing came out. They stated that she was empty even though she still feels full. She states the urologist told her she should go see her general surgeon for her gastritis. She does not want to see Dr. Roger Lindsey anymore and would like us to make a new referral for another general surgeon.

## 2023-06-22 ENCOUNTER — TELEPHONE (OUTPATIENT)
Dept: INTERNAL MEDICINE CLINIC | Age: 63
End: 2023-06-22

## 2023-06-22 NOTE — TELEPHONE ENCOUNTER
Maria R Marquez is calling to request a refill on the following medication(s):    Medication Request:  Requested Prescriptions      No prescriptions requested or ordered in this encounter       Last Visit Date (If Applicable):  7/27/8590    Next Visit Date:    6/27/2023      Last refill 6/21/22 by a different doctor.  Patient told to keep appointment on 6/27/23 to discuss medication refill with Dr Janes Quiñones

## 2023-06-22 NOTE — TELEPHONE ENCOUNTER
L/m to keep appointment with Dr Gilberto Sims to discuss Him taking over prescribing her gabapentin.

## 2023-06-27 ENCOUNTER — OFFICE VISIT (OUTPATIENT)
Dept: INTERNAL MEDICINE CLINIC | Age: 63
End: 2023-06-27
Payer: MEDICARE

## 2023-06-27 VITALS
HEART RATE: 78 BPM | BODY MASS INDEX: 30.19 KG/M2 | RESPIRATION RATE: 20 BRPM | HEIGHT: 65 IN | DIASTOLIC BLOOD PRESSURE: 80 MMHG | TEMPERATURE: 97.3 F | SYSTOLIC BLOOD PRESSURE: 160 MMHG | OXYGEN SATURATION: 98 % | WEIGHT: 181.2 LBS

## 2023-06-27 DIAGNOSIS — E44.0 MODERATE MALNUTRITION (HCC): ICD-10-CM

## 2023-06-27 DIAGNOSIS — G62.9 POLYNEUROPATHY: Primary | ICD-10-CM

## 2023-06-27 DIAGNOSIS — J45.901 ACUTE EXACERBATION OF COPD WITH ASTHMA (HCC): ICD-10-CM

## 2023-06-27 DIAGNOSIS — I50.42 CHRONIC COMBINED SYSTOLIC AND DIASTOLIC CONGESTIVE HEART FAILURE (HCC): ICD-10-CM

## 2023-06-27 DIAGNOSIS — I10 ESSENTIAL HYPERTENSION: ICD-10-CM

## 2023-06-27 DIAGNOSIS — I73.9 PERIPHERAL VASCULAR DISEASE (HCC): ICD-10-CM

## 2023-06-27 DIAGNOSIS — J44.1 ACUTE EXACERBATION OF COPD WITH ASTHMA (HCC): ICD-10-CM

## 2023-06-27 PROCEDURE — 3078F DIAST BP <80 MM HG: CPT | Performed by: INTERNAL MEDICINE

## 2023-06-27 PROCEDURE — 99214 OFFICE O/P EST MOD 30 MIN: CPT | Performed by: INTERNAL MEDICINE

## 2023-06-27 PROCEDURE — G8417 CALC BMI ABV UP PARAM F/U: HCPCS | Performed by: INTERNAL MEDICINE

## 2023-06-27 PROCEDURE — 3017F COLORECTAL CA SCREEN DOC REV: CPT | Performed by: INTERNAL MEDICINE

## 2023-06-27 PROCEDURE — 3074F SYST BP LT 130 MM HG: CPT | Performed by: INTERNAL MEDICINE

## 2023-06-27 PROCEDURE — 3023F SPIROM DOC REV: CPT | Performed by: INTERNAL MEDICINE

## 2023-06-27 PROCEDURE — 1036F TOBACCO NON-USER: CPT | Performed by: INTERNAL MEDICINE

## 2023-06-27 PROCEDURE — G8427 DOCREV CUR MEDS BY ELIG CLIN: HCPCS | Performed by: INTERNAL MEDICINE

## 2023-06-27 RX ORDER — BUDESONIDE, GLYCOPYRROLATE, AND FORMOTEROL FUMARATE 160; 9; 4.8 UG/1; UG/1; UG/1
AEROSOL, METERED RESPIRATORY (INHALATION)
COMMUNITY
Start: 2023-06-09

## 2023-06-27 RX ORDER — GABAPENTIN 800 MG/1
800 TABLET ORAL 2 TIMES DAILY
Qty: 180 TABLET | Refills: 0 | Status: SHIPPED | OUTPATIENT
Start: 2023-06-27 | End: 2023-09-25

## 2023-06-27 RX ORDER — ESCITALOPRAM OXALATE 10 MG/1
10 TABLET ORAL DAILY
Qty: 30 TABLET | Refills: 0 | Status: SHIPPED | OUTPATIENT
Start: 2023-06-27

## 2023-07-05 ENCOUNTER — CLINICAL DOCUMENTATION (OUTPATIENT)
Dept: PHYSICAL THERAPY | Age: 63
End: 2023-07-05

## 2023-07-05 NOTE — DISCHARGE SUMMARY
[x] [facility] @ [facility]  [address] [facility], [facility] [address]  Phone [phone]  Fax [phone]    Physical Therapy Discharge Note    Date: 2023      Patient: Maria Elena Chavez  : 1960  MRN: [id number]    Physician: Venkata Minaya PA-C     Diagnosis: M25.561, G89.29 (ICD-10-CM) - Chronic pain of right knee - most recent fall. Back pain referral from February which pt would also like to continue to address  M48.00 (ICD-10-CM) - Spinal stenosis, unspecified spinal region   M54.50, G89.29 (ICD-10-CM) - Chronic right-sided low back pain, unspecified whether sciatica present   Onset Date: Chronic/ongoing with recent exacerbation 23    Rehab Diagnosis: Chronic back and R knee pain, weakness, stiffness  ICD-10 Codes: M25.561, M54.50, R53.1, M25.60  Total visits attended:   Cancels/No shows: 0/0  Date of initial visit: 23                   [] Patient recovered from conditions. Treatment goals were met. [] Patient received maximum benefit. No further therapy indicated at this time. [] Patient demonstrated improvement from condition with  ** Of  ** Short term goals met. []Patient demonstrated improvement from condition with **   Of **  Long term goals met. [] Patient to continue exercise/home instructions independently. [] Therapy interrupted due to:    [] Patient has 2 or more no shows/cancels, is discontinued per our policy. [] Patient has completed prescribed number of treatment sessions. [x] Other: Pt attended her initial PT evaluation on 23 and required insurance authorization prior to her next scheduled visit. Insurance Cathy English was obtained but pt failed to schedule after multiple attempts were made to reach pt regarding scheduling.  Will d/c from PT at this time as pt's insurance [employer] is expiring in two days time and no further communication has been received from the patient      Pain level at evaluation was      7 /10 and

## 2023-07-20 ENCOUNTER — APPOINTMENT (OUTPATIENT)
Dept: GENERAL RADIOLOGY | Age: 63
End: 2023-07-20
Payer: MEDICARE

## 2023-07-20 ENCOUNTER — HOSPITAL ENCOUNTER (EMERGENCY)
Age: 63
Discharge: HOME | End: 2023-07-20
Attending: EMERGENCY MEDICINE
Payer: MEDICARE

## 2023-07-20 VITALS
OXYGEN SATURATION: 96 % | SYSTOLIC BLOOD PRESSURE: 165 MMHG | TEMPERATURE: 98.4 F | HEIGHT: 65 IN | WEIGHT: 185 LBS | HEART RATE: 72 BPM | RESPIRATION RATE: 16 BRPM | BODY MASS INDEX: 30.82 KG/M2 | DIASTOLIC BLOOD PRESSURE: 70 MMHG
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
## 2023-07-20 PROCEDURE — 6360000002 HC RX W HCPCS: Performed by: EMERGENCY MEDICINE

## 2023-07-20 PROCEDURE — 73030 X-RAY EXAM OF SHOULDER: CPT

## 2023-07-20 PROCEDURE — 99284 EMERGENCY DEPT VISIT MOD MDM: CPT

## 2023-07-20 PROCEDURE — 96372 THER/PROPH/DIAG INJ SC/IM: CPT

## 2023-07-20 PROCEDURE — 6370000000 HC RX 637 (ALT 250 FOR IP): Performed by: EMERGENCY MEDICINE

## 2023-07-20 PROCEDURE — 71046 X-RAY EXAM CHEST 2 VIEWS: CPT

## 2023-07-20 RX ORDER — KETOROLAC TROMETHAMINE 30 MG/ML
30 INJECTION, SOLUTION INTRAMUSCULAR; INTRAVENOUS ONCE
Status: COMPLETED | OUTPATIENT
Start: 2023-07-20 | End: 2023-07-20

## 2023-07-20 RX ORDER — ACETAMINOPHEN 500 MG
1000 TABLET ORAL ONCE
Status: COMPLETED | OUTPATIENT
Start: 2023-07-20 | End: 2023-07-20

## 2023-07-20 RX ADMIN — KETOROLAC TROMETHAMINE 30 MG: 30 INJECTION, SOLUTION INTRAMUSCULAR; INTRAVENOUS at 16:25

## 2023-07-20 RX ADMIN — ACETAMINOPHEN 1000 MG: 500 TABLET ORAL at 16:24

## 2023-07-20 ASSESSMENT — PAIN SCALES - GENERAL
PAINLEVEL_OUTOF10: 9
PAINLEVEL_OUTOF10: 4
PAINLEVEL_OUTOF10: 9
PAINLEVEL_OUTOF10: 4

## 2023-07-20 ASSESSMENT — ENCOUNTER SYMPTOMS
BACK PAIN: 0
VOMITING: 0
NAUSEA: 0
ABDOMINAL PAIN: 0
DIARRHEA: 0
SHORTNESS OF BREATH: 1

## 2023-07-20 ASSESSMENT — PAIN - FUNCTIONAL ASSESSMENT
PAIN_FUNCTIONAL_ASSESSMENT: 0-10
PAIN_FUNCTIONAL_ASSESSMENT: 0-10

## 2023-07-20 ASSESSMENT — LIFESTYLE VARIABLES
HOW MANY STANDARD DRINKS CONTAINING ALCOHOL DO YOU HAVE ON A TYPICAL DAY: PATIENT DOES NOT DRINK
HOW OFTEN DO YOU HAVE A DRINK CONTAINING ALCOHOL: NEVER

## 2023-07-20 NOTE — ED PROVIDER NOTES
[address] [facility] ED  Emergency Department Encounter  Emergency Medicine Resident     Pt Mauricio Whittaker  MRN: [id number]  [address] [address] 1960  Date of evaluation: 7/20/23  PCP:  Maame Lo MD  Note Started: 3:47 PM EDT      CHIEF COMPLAINT       Chief Complaint   Patient presents with    Fall    Rib Injury       HISTORY OF PRESENT ILLNESS  (Location/Symptom, Timing/Onset, Context/Setting, Quality, Duration, Modifying Factors, Severity.)      Kenny Casillas is a 63 y.o. female who presents with fall while she was putting up a garage sale signs. She denies chest pain, lightheadedness, chest pain, shortness of breath prior to her fall. She states that she thinks she tripped and fell. She landed on her right side and is complaining of right-sided rib pain. She states that it hurts to breathe and feels short of breath secondary to pain. Also complaining of right shoulder pain. She denies hitting her head, no loss of consciousness. Patient is on Plavix. She states she was told previously that she needs to use a cane when ambulating however she is \"stubborn\" and does not want to.     PAST MEDICAL / SURGICAL / SOCIAL / FAMILY HISTORY      has a past medical history of Anesthesia complication, Anxiety, Arthritis, Back pain, CAD (coronary artery disease), Caffeine use, Cataract, COPD (chronic obstructive pulmonary disease) ([address]), Deafness, Depression, Diabetes mellitus ([address]), Diarrhea, Diverticulosis, Fibromyalgia, Gastroparalysis, GERD (gastroesophageal reflux disease), Hearing loss, Hyperlipidemia, Hyperlipidemia, Hypertension, Incontinence, MDRO (multiple drug resistant organisms) resistance, Neurogenic bladder, Neuropathy, Obesity, Osteoarthritis, Peripheral vascular disease, unspecified (HCC), Restless leg syndrome, Rhabdomyolysis, Spinal stenosis, thoracic, Stroke ([address]), Thyroid disease, TMJ (dislocation of temporomandibular joint), Trigeminal neuralgia, and Type II or unspecified type

## 2023-07-20 NOTE — ED TRIAGE NOTES
Mode of arrival (squad #, walk in, police, etc) : walk in        Chief complaint(s): fall, rib pain, back pain        Arrival Note (brief scenario, treatment PTA, etc). : pt states she fell on her right side today. Pt has neuropathy and cannot feel in her feet. Pt fell in the street and injured her right side of her chest. Pt reports pain on inspiration and back pain. Pt is on blood thinners, denies LOC or hitting her head        C= \"Have you ever felt that you should Cut down on your drinking? \"  No  A= \"Have people Annoyed you by criticizing your drinking? \"  No  G= \"Have you ever felt bad or Guilty about your drinking? \"  No  E= \"Have you ever had a drink as an Eye-opener first thing in the morning to steady your nerves or to help a hangover? \"  No      Deferred []      Reason for deferring: N/A    *If yes to two or more: probable alcohol abuse. *  Walk in

## 2023-07-20 NOTE — ED NOTES
Pt maintaining 95% and higher O2 on RA; okay to d/c per resident     David Graham, [address]  07/20/23 6830

## 2023-07-20 NOTE — ED NOTES
Upon attempting to d/c pt, resident discovered she was wearing O2; pt denied home use of O2; resident stated she cannot d/c pt if pt is requiring O2; NC was removed for trial run on RARina Johnson RN  07/20/23 [id number]

## 2023-07-20 NOTE — DISCHARGE INSTRUCTIONS
You were seen today for right-sided rib pain after a fall. We did x-rays of your ribs and shoulder and there are no fractures or dislocations. There is no evidence of injury to your lungs. You can use Tylenol and Motrin for pain as needed. You can follow-up with your primary care if your pain continues. I recommend that you use a cane for stabilization when you are out walking around. If you notice any concerning symptoms please return to the ER immediately. These can include but are not limited to: fevers, chills, shortness of breath, vomiting, weakness of the extremities, changes in your mental status, numbness, pale extremities, or chest pain. Wound care: please keep clean and dry. Wash with soap and water. Please use bacitracin or neosporin to area. You can use bandages as needed. Activity: resume activity as tolerated. Medications: Continue taking your home medications as previously directed. For pain You may take tylenol 1,000mg by mouth every 6 hours as needed for pain. Do not exceed 4,000mg per day. If you have liver disease don't take tylenol. You may also take ibuprofen 600mg every 6-8 hours as needed for pain. Do not exceed 2,400 mg per day. If you experience stomach pain or you have a history of kidney disease stop taking ibuprofen. You may alternate application of ice and heat 20 minutes at a time as desired. Follow up: Please follow up with your primary care doctor within one week or as needed.

## 2023-07-21 ENCOUNTER — CARE COORDINATION (OUTPATIENT)
Dept: CARE COORDINATION | Age: 63
End: 2023-07-21

## 2023-07-21 NOTE — CARE COORDINATION
Attempted to contact patient for follow up ED visit and CC enrollment, no answer, lvm to return call to this nurse at [phone]. If no return call, ACM will attempt to contact patient again week.        Sign Hx: Type 2 Diabetic w/ insulin pump, COPD, Essential HTN, polyneuropathy, Chronic systolic & diastolic CHF, PVD        Future Appointments   Date Time Provider [facility] [address]   10/25/2023  3:00 PM Orville Centeno MD [facility] Anamaria Holloway

## 2023-07-22 DIAGNOSIS — F51.01 PRIMARY INSOMNIA: ICD-10-CM

## 2023-07-22 DIAGNOSIS — G25.81 RESTLESS LEGS SYNDROME: ICD-10-CM

## 2023-07-24 RX ORDER — METOPROLOL TARTRATE 50 MG/1
TABLET, FILM COATED ORAL
Qty: 180 TABLET | Refills: 1 | Status: SHIPPED | OUTPATIENT
Start: 2023-07-24

## 2023-07-24 RX ORDER — AMITRIPTYLINE HYDROCHLORIDE 100 MG/1
TABLET ORAL
Qty: 90 TABLET | Refills: 0 | Status: SHIPPED | OUTPATIENT
Start: 2023-07-24

## 2023-07-24 RX ORDER — ROPINIROLE 2 MG/1
TABLET, FILM COATED ORAL
Qty: 90 TABLET | Refills: 1 | Status: SHIPPED | OUTPATIENT
Start: 2023-07-24

## 2023-07-24 RX ORDER — ESCITALOPRAM OXALATE 10 MG/1
TABLET ORAL
Qty: 90 TABLET | Refills: 1 | Status: SHIPPED | OUTPATIENT
Start: 2023-07-24

## 2023-07-24 RX ORDER — LOSARTAN POTASSIUM 100 MG/1
TABLET ORAL
Qty: 90 TABLET | Refills: 1 | Status: SHIPPED | OUTPATIENT
Start: 2023-07-24

## 2023-07-24 RX ORDER — TOPIRAMATE 25 MG/1
TABLET ORAL
Qty: 90 TABLET | Refills: 1 | Status: SHIPPED | OUTPATIENT
Start: 2023-07-24

## 2023-07-24 NOTE — TELEPHONE ENCOUNTER
Francisco Mo is calling to request a refill on the following medication(s):    Medication Request:  Requested Prescriptions     Pending Prescriptions Disp Refills    rOPINIRole (REQUIP) 2 MG tablet [Pharmacy Med Name: rOPINIRole HCl 2 MG Oral Tablet] 90 tablet 0     Sig: Take 1 tablet by mouth once daily    amitriptyline (ELAVIL) 100 MG tablet [Pharmacy Med Name: Amitriptyline HCl 100 MG Oral Tablet] 90 tablet 0     Sig: Take 1 tablet by mouth nightly    topiramate (TOPAMAX) 25 MG tablet [Pharmacy Med Name: Topiramate 25 MG Oral Tablet] 60 tablet 0     Sig: Take 1 tablet by mouth twice daily    escitalopram (LEXAPRO) 10 MG tablet [Pharmacy Med Name: Escitalopram Oxalate 10 MG Oral Tablet] 30 tablet 0     Sig: Take 1 tablet by mouth once daily    metoprolol tartrate (LOPRESSOR) 50 MG tablet [Pharmacy Med Name: Metoprolol Tartrate 50 MG Oral Tablet] 180 tablet 0     Sig: Take 1 tablet by mouth twice daily       Last Visit Date (If Applicable):  1/73/8799    Next Visit Date:    7/25/2023    Last refills 1/31/23, 3/29/23, 5/5/23 and 6/27/23. Prescriptions pending.

## 2023-07-24 NOTE — TELEPHONE ENCOUNTER
Adeola Daniel is calling to request a refill on the following medication(s):    Medication Request:  Requested Prescriptions     Pending Prescriptions Disp Refills    losartan (COZAAR) 100 MG tablet [Pharmacy Med Name: Losartan Potassium 100 MG Oral Tablet] 90 tablet 0     Sig: Take 1 tablet by mouth once daily       Last Visit Date (If Applicable):  3/86/5110    Next Visit Date:    7/25/2023      Last refill 10/14/22. Prescription pending.

## 2023-07-25 ENCOUNTER — CARE COORDINATION (OUTPATIENT)
Dept: CARE COORDINATION | Age: 63
End: 2023-07-25

## 2023-07-25 ENCOUNTER — OFFICE VISIT (OUTPATIENT)
Dept: INTERNAL MEDICINE CLINIC | Age: 63
End: 2023-07-25
Payer: MEDICARE

## 2023-07-25 VITALS
SYSTOLIC BLOOD PRESSURE: 134 MMHG | RESPIRATION RATE: 16 BRPM | HEIGHT: 65 IN | WEIGHT: 187.8 LBS | TEMPERATURE: 97.9 F | BODY MASS INDEX: 31.29 KG/M2 | HEART RATE: 73 BPM | DIASTOLIC BLOOD PRESSURE: 62 MMHG | OXYGEN SATURATION: 90 %

## 2023-07-25 DIAGNOSIS — R10.9 RIGHT SIDED ABDOMINAL PAIN: ICD-10-CM
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
## 2023-07-25 DIAGNOSIS — M25.562 CHRONIC PAIN OF LEFT KNEE: ICD-10-CM

## 2023-07-25 DIAGNOSIS — G89.29 CHRONIC PAIN OF LEFT KNEE: ICD-10-CM

## 2023-07-25 DIAGNOSIS — R07.81 RIB PAIN ON RIGHT SIDE: ICD-10-CM

## 2023-07-25 PROCEDURE — 3074F SYST BP LT 130 MM HG: CPT | Performed by: INTERNAL MEDICINE

## 2023-07-25 PROCEDURE — 1036F TOBACCO NON-USER: CPT | Performed by: INTERNAL MEDICINE

## 2023-07-25 PROCEDURE — 3017F COLORECTAL CA SCREEN DOC REV: CPT | Performed by: INTERNAL MEDICINE

## 2023-07-25 PROCEDURE — 99214 OFFICE O/P EST MOD 30 MIN: CPT | Performed by: INTERNAL MEDICINE

## 2023-07-25 PROCEDURE — G8417 CALC BMI ABV UP PARAM F/U: HCPCS | Performed by: INTERNAL MEDICINE

## 2023-07-25 PROCEDURE — 3078F DIAST BP <80 MM HG: CPT | Performed by: INTERNAL MEDICINE

## 2023-07-25 PROCEDURE — G8427 DOCREV CUR MEDS BY ELIG CLIN: HCPCS | Performed by: INTERNAL MEDICINE

## 2023-07-25 NOTE — PROGRESS NOTES
Rashmi Baker is a 63 y.o. female who presents for   Chief Complaint   Patient presents with    Follow-up     ED follow up for fall. Patient fell last Wednesday and has had pain since to back, ribs and abdomen. Abdominal Pain    Back Pain    Chest Pain     Rib pain    Health Maintenance     Pt is due for HIV, Hep C and cervical cancer screening     Diabetes     Pt is due for dm eye and foot exam    Immunizations     Pt is due for shingles, pneumococcal and covid vaccine. Pt notified. and follow up of chronic medical problems.   Patient Active Problem List   Diagnosis    Peripheral vascular disease (HCC)    Restless legs syndrome    Spinal stenosis, thoracic    Thoracic radiculopathy    Chronic pain syndrome    Depression    Essential hypertension    Retinopathy due to secondary DM ([address])    Acquired hypothyroidism    Vitamin D deficiency    Mixed hyperlipidemia    Type 1 diabetes mellitus with retinopathy (HCC)    Incisional hernia without obstruction or gangrene    Hypoxia    Moderate persistent asthma with status asthmaticus    Seasonal allergic rhinitis due to pollen    Chronic obstructive pulmonary disease (HCC)    Insulin pump in place    Contusion of left chest wall    Chronic retention of urine    Chronic combined systolic and diastolic congestive heart failure (HCC)    Major depressive disorder, recurrent, in full remission ([address])    Urinary incontinence without sensory awareness    Arthritis of knee, right    Acute bilateral thoracic back pain    Acute pyelonephritis    Bilateral impacted cerumen    Left ear pain    Flank pain    TIA (transient ischemic attack)    Moderate malnutrition ([address])    COVID-19    CHF (congestive heart failure) ([address])    COVID    Diabetic ketoacidosis type 1 diabetes mellitus ([address])    PARUL (acute kidney injury) ([address])    Pneumonia     HPI  Here for follow-up from the ER visit after a fall and right-sided chest wall and abdominal pain and still feeling pain on the right

## 2023-07-26 ENCOUNTER — HOSPITAL ENCOUNTER (OUTPATIENT)
Dept: CT IMAGING | Age: 63
Discharge: HOME | End: 2023-07-28
Payer: MEDICARE
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
## 2023-07-26 DIAGNOSIS — R10.9 RIGHT SIDED ABDOMINAL PAIN: ICD-10-CM

## 2023-07-26 PROCEDURE — 74176 CT ABD & PELVIS W/O CONTRAST: CPT

## 2023-07-27 ENCOUNTER — OFFICE VISIT (OUTPATIENT)
Dept: ORTHOPEDIC SURGERY | Age: 63
End: 2023-07-27
Payer: MEDICARE

## 2023-07-27 VITALS — HEIGHT: 65 IN | RESPIRATION RATE: 14 BRPM | WEIGHT: 187 LBS | BODY MASS INDEX: 31.16 KG/M2

## 2023-07-27 DIAGNOSIS — M25.561 ACUTE PAIN OF RIGHT KNEE: Primary | ICD-10-CM

## 2023-07-27 DIAGNOSIS — M23.91 INTERNAL DERANGEMENT OF RIGHT KNEE: ICD-10-CM

## 2023-07-27 PROCEDURE — 1036F TOBACCO NON-USER: CPT | Performed by: PHYSICIAN ASSISTANT

## 2023-07-27 PROCEDURE — G8427 DOCREV CUR MEDS BY ELIG CLIN: HCPCS | Performed by: PHYSICIAN ASSISTANT

## 2023-07-27 PROCEDURE — G8417 CALC BMI ABV UP PARAM F/U: HCPCS | Performed by: PHYSICIAN ASSISTANT

## 2023-07-27 PROCEDURE — 3017F COLORECTAL CA SCREEN DOC REV: CPT | Performed by: PHYSICIAN ASSISTANT

## 2023-07-27 PROCEDURE — 20610 DRAIN/INJ JOINT/BURSA W/O US: CPT | Performed by: PHYSICIAN ASSISTANT

## 2023-07-27 PROCEDURE — 99214 OFFICE O/P EST MOD 30 MIN: CPT | Performed by: PHYSICIAN ASSISTANT

## 2023-07-27 RX ORDER — BUPIVACAINE HYDROCHLORIDE 2.5 MG/ML
2 INJECTION, SOLUTION INFILTRATION; PERINEURAL ONCE
Status: COMPLETED | OUTPATIENT
Start: 2023-07-27 | End: 2023-07-27

## 2023-07-27 RX ORDER — BETAMETHASONE SODIUM PHOSPHATE AND BETAMETHASONE ACETATE 3; 3 MG/ML; MG/ML
12 INJECTION, SUSPENSION INTRA-ARTICULAR; INTRALESIONAL; INTRAMUSCULAR; SOFT TISSUE ONCE
Status: COMPLETED | OUTPATIENT
Start: 2023-07-27 | End: 2023-07-27

## 2023-07-27 RX ORDER — LIDOCAINE HYDROCHLORIDE 10 MG/ML
2 INJECTION, SOLUTION INFILTRATION; PERINEURAL ONCE
Status: COMPLETED | OUTPATIENT
Start: 2023-07-27 | End: 2023-07-27

## 2023-07-27 RX ADMIN — BETAMETHASONE SODIUM PHOSPHATE AND BETAMETHASONE ACETATE 12 MG: 3; 3 INJECTION, SUSPENSION INTRA-ARTICULAR; INTRALESIONAL; INTRAMUSCULAR; SOFT TISSUE at 10:25

## 2023-07-27 RX ADMIN — LIDOCAINE HYDROCHLORIDE 2 ML: 10 INJECTION, SOLUTION INFILTRATION; PERINEURAL at 10:27

## 2023-07-27 RX ADMIN — BUPIVACAINE HYDROCHLORIDE 5 MG: 2.5 INJECTION, SOLUTION INFILTRATION; PERINEURAL at 10:26

## 2023-07-27 NOTE — PROGRESS NOTES
the patient. Following an appropriate discussion with the patient regarding the risks and benefits of the procedure she consented to proceed. her right knee was prepped using betadine solution and alcohol swab. Using aseptic technique and through a lateral joint line approach, which was then injected superficially with 4 cc of 1% lidocaine without epinephrine and subsequently with 2 cc of 6 mg/mL Celestone into the right knee. A band aid was applied to the injection site. she tolerated the injection with no immediate adverse reactions. Electronically signed by SALOMON Ness on 7/27/23 at 9:57 AM EDT        Please note that this chart was generated using voice recognition Dragon dictation software. Although every effort was made to ensure the accuracy of this automated transcription, some errors in transcription may have occurred.

## 2023-07-27 NOTE — CARE COORDINATION
Attempted to contact patient for Care Management update, no answer, lvm to return call to this nurse at [phone]. If no return call, ACM will attempt to contact patient again next week.        Sign Hx: Type 2 Diabetic w/ insulin pump, COPD, Essential HTN, polyneuropathy, Chronic systolic & diastolic CHF, PVD    Future Appointments   Date Time Provider [address]   10/25/2023  3:00 PM Leila Vigil MD [facility] AMELIA Boggs
Belkis Dillard MD   amLODIPine (NORVASC) 5 MG tablet Take 1 tablet by mouth once daily 3/22/23  Yes Clay Zaragoza MD   clopidogrel (PLAVIX) 75 MG tablet TAKE 1 TABLET BY MOUTH IN THE MORNING 2/22/23  Yes Clay Zaragoza MD   metoclopramide (REGLAN) 10 MG tablet Take 1 tablet by mouth 2 times daily 2/22/23  Yes Clay Zaragoza MD   torsemide (DEMADEX) 10 MG tablet Take 1 tablet by mouth every other day 2/10/23  Yes Clay Zaragoza MD   Skin Protectants, Misc. (EUCERIN) cream Apply topically as needed. 2/10/23  Yes Clay Zaragoza MD   simvastatin (ZOCOR) 20 MG tablet TAKE 1 TABLET BY MOUTH NIGHTLY 6/2/22  Yes Historical Provider, MD   aspirin 81 MG tablet Take 1 tablet by mouth daily 10/17/20  Yes Shay Lane MD   insulin aspart (NOVOLOG PENFILL) 100 UNIT/ML injection cartridge Inject into the skin continuous Per insulin pump   Yes Historical Provider, MD   pantoprazole (PROTONIX) 40 MG tablet Take 1 tablet by mouth 2 times daily   Yes Historical Provider, MD   ONE TOUCH ULTRA TEST strip  6/7/17  Yes Historical Provider, MD   methylPREDNISolone (MEDROL DOSEPACK) 4 MG tablet Take by mouth.  4/17/23   Laneta Opitz, PA   atorvastatin (LIPITOR) 20 MG tablet Take 1 tablet by mouth daily 5/5/22 7/28/22  Geovany Stanley MD       Future Appointments   Date Time Provider [address] [place] [address]   10/25/2023  3:00 PM Clay Zaragoza MD [address]     Patient Care Team:  Clay Zaragoza MD as PCP - General (Internal Medicine)  Clay Zaragoza MD as PCP - Empaneled Provider  Clay Owusu MD as Consulting Physician (Urology)  Garfield Byers MD as Consulting Physician (Infectious Diseases)  Naomie King, CLEVE as Ambulatory Care Manager

## 2023-08-03 ENCOUNTER — CARE COORDINATION (OUTPATIENT)
Dept: CARE COORDINATION | Age: 63
End: 2023-08-03

## 2023-08-03 NOTE — CARE COORDINATION
Attempted to contact patient for Care Management update, no answer and mailbox full. ACM will attempt to contact patient again tomorrow.      Future Appointments   Date Time Provider [address]   10/25/2023  3:00 PM Ashford Addison, MD [facility]

## 2023-08-04 NOTE — CARE COORDINATION
Ambulatory Care Coordination Note  2023    Patient Current Location:  Home: [address] [place] [id number]     ACM contacted the patient by telephone. Verified name and  with patient as identifiers. Provided introduction to self, and explanation of the ACM role. Challenges to be reviewed by the provider   Additional needs identified to be addressed with provider: No  none               Method of communication with provider: none. ACM: Reyes Showers, RN    Outreach call and spoke to patient who states she is doing little better. She is currently on vacation and will be back on 2023. States she needed a break. She denies having any symptoms or concerns. Encouraged patient to call nurse or the office with any questions or concerns or worsening of symptoms. CM Plan of Care:    - follow up with patient after she return home to assess symptoms or needs. Diabetes Assessment      Meal Planning: Avoidance of concentrated sweets   How often do you test your blood sugar?: Meals, Bedtime   Do you have barriers with adherence to non-pharmacologic self-management interventions?  (Nutrition/Exercise/Self-Monitoring): No   Have you ever had to go to the ED for symptoms of low blood sugar?: No       Do you have hyperglycemia symptoms?: No   Do you have hypoglycemia symptoms?: No   Blood Sugar Trends: No Change        ,   Congestive Heart Failure Assessment    Do you understand a low sodium diet?: Yes  Do you understand how to read food labels?: Yes  Do you salt your food before tasting it?: No     No patient-reported symptoms      Symptoms:  None: Yes      Symptom course: stable  Weight trend: fluctuating minimally  Salt intake watch compared to last visit: stable     , and   COPD Assessment    Does the patient understand envrionmental exposure?: Yes  Is the patient able to verbalize Rescue vs. Long Acting medications?: Yes  Does the patient have a nebulizer?: Yes     No patient-reported

## 2023-08-08 ENCOUNTER — TELEPHONE (OUTPATIENT)
Dept: INTERNAL MEDICINE CLINIC | Age: 63
End: 2023-08-08

## 2023-08-08 NOTE — TELEPHONE ENCOUNTER
Patient is staying in [place] with her friend for awhile. States she has UTI x's 2 days foul smell, burning, frequency    Is asking for something to be called into a pharmacy in [place]?     Please advise

## 2023-08-22 ENCOUNTER — APPOINTMENT (OUTPATIENT)
Dept: GENERAL RADIOLOGY | Age: 63
DRG: 190 | End: 2023-08-22
Payer: MEDICARE

## 2023-08-22 ENCOUNTER — HOSPITAL ENCOUNTER (INPATIENT)
Age: 63
LOS: 2 days | Discharge: HOME | DRG: 190 | End: 2023-08-24
Attending: STUDENT IN AN ORGANIZED HEALTH CARE EDUCATION/TRAINING PROGRAM | Admitting: INTERNAL MEDICINE
Payer: MEDICARE

## 2023-08-22 ENCOUNTER — OFFICE VISIT (OUTPATIENT)
Dept: FAMILY MEDICINE CLINIC | Age: 63
End: 2023-08-22
Payer: MEDICARE

## 2023-08-22 VITALS
SYSTOLIC BLOOD PRESSURE: 173 MMHG | OXYGEN SATURATION: 87 % | HEART RATE: 84 BPM | TEMPERATURE: 98.1 F | DIASTOLIC BLOOD PRESSURE: 72 MMHG

## 2023-08-22 DIAGNOSIS — R06.02 SHORTNESS OF BREATH: Primary | ICD-10-CM

## 2023-08-22 DIAGNOSIS — R05.1 ACUTE COUGH: ICD-10-CM

## 2023-08-22 DIAGNOSIS — R09.02 HYPOXIA: ICD-10-CM

## 2023-08-22 DIAGNOSIS — R06.2 WHEEZING: ICD-10-CM

## 2023-08-22 DIAGNOSIS — R39.9 UTI SYMPTOMS: ICD-10-CM

## 2023-08-22 DIAGNOSIS — J06.9 VIRAL URI WITH COUGH: ICD-10-CM

## 2023-08-22 DIAGNOSIS — J44.1 COPD EXACERBATION (HCC): Primary | ICD-10-CM

## 2023-08-22 LAB
ANION GAP SERPL CALCULATED.3IONS-SCNC: 12 MMOL/L (ref 9–17)
BACTERIA URNS QL MICRO: ABNORMAL
BASOPHILS # BLD: 0 K/UL (ref 0–0.2)
BASOPHILS NFR BLD: 1 % (ref 0–2)
BILIRUB UR QL STRIP: NEGATIVE
BUN SERPL-MCNC: 12 MG/DL (ref 8–23)
CALCIUM SERPL-MCNC: 9.1 MG/DL (ref 8.6–10.4)
CASTS #/AREA URNS LPF: ABNORMAL /LPF
CHLORIDE SERPL-SCNC: 104 MMOL/L (ref 98–107)
CLARITY UR: CLEAR
CO2 SERPL-SCNC: 23 MMOL/L (ref 20–31)
COLOR UR: YELLOW
CREAT SERPL-MCNC: 0.9 MG/DL (ref 0.5–0.9)
EOSINOPHIL # BLD: 0.2 K/UL (ref 0–0.4)
EOSINOPHILS RELATIVE PERCENT: 4 % (ref 0–4)
EPI CELLS #/AREA URNS HPF: ABNORMAL /HPF
ERYTHROCYTE [DISTWIDTH] IN BLOOD BY AUTOMATED COUNT: 14.2 % (ref 11.5–14.9)
FLUAV RNA RESP QL NAA+PROBE: NOT DETECTED
FLUBV RNA RESP QL NAA+PROBE: NOT DETECTED
GFR SERPL CREATININE-BSD FRML MDRD: >60 ML/MIN/1.73M2
GLUCOSE BLD-MCNC: 253 MG/DL (ref 65–105)
GLUCOSE SERPL-MCNC: 137 MG/DL (ref 70–99)
GLUCOSE UR STRIP-MCNC: NEGATIVE MG/DL
HCT VFR BLD AUTO: 39 % (ref 36–46)
HGB BLD-MCNC: 12.9 G/DL (ref 12–16)
HGB UR QL STRIP.AUTO: NEGATIVE
KETONES UR STRIP-MCNC: ABNORMAL MG/DL
LEUKOCYTE ESTERASE UR QL STRIP: NEGATIVE
LYMPHOCYTES NFR BLD: 0.7 K/UL (ref 1–4.8)
LYMPHOCYTES RELATIVE PERCENT: 13 % (ref 24–44)
MCH RBC QN AUTO: 29.9 PG (ref 26–34)
MCHC RBC AUTO-ENTMCNC: 33.2 G/DL (ref 31–37)
MCV RBC AUTO: 90.1 FL (ref 80–100)
MONOCYTES NFR BLD: 0.6 K/UL (ref 0.1–1.3)
MONOCYTES NFR BLD: 10 % (ref 1–7)
NEUTROPHILS NFR BLD: 72 % (ref 36–66)
NEUTS SEG NFR BLD: 4.3 K/UL (ref 1.3–9.1)
NITRITE UR QL STRIP: NEGATIVE
PH UR STRIP: 7 [PH] (ref 5–8)
PLATELET # BLD AUTO: 180 K/UL (ref 150–450)
PMV BLD AUTO: 7.5 FL (ref 6–12)
POTASSIUM SERPL-SCNC: 4.1 MMOL/L (ref 3.7–5.3)
PROT UR STRIP-MCNC: ABNORMAL MG/DL
RBC # BLD AUTO: 4.33 M/UL (ref 4–5.2)
RBC #/AREA URNS HPF: ABNORMAL /HPF
SARS-COV-2 RNA RESP QL NAA+PROBE: NOT DETECTED
SODIUM SERPL-SCNC: 139 MMOL/L (ref 135–144)
SOURCE: NORMAL
SP GR UR STRIP: 1.02 (ref 1–1.03)
SPECIMEN DESCRIPTION: NORMAL
TROPONIN I SERPL HS-MCNC: 9 NG/L (ref 0–14)
UROBILINOGEN UR STRIP-ACNC: NORMAL EU/DL (ref 0–1)
WBC #/AREA URNS HPF: ABNORMAL /HPF
WBC OTHER # BLD: 5.9 K/UL (ref 3.5–11)

## 2023-08-22 PROCEDURE — 2580000003 HC RX 258

## 2023-08-22 PROCEDURE — 2580000003 HC RX 258: Performed by: NURSE PRACTITIONER

## 2023-08-22 PROCEDURE — 2060000000 HC ICU INTERMEDIATE R&B

## 2023-08-22 PROCEDURE — 71045 X-RAY EXAM CHEST 1 VIEW: CPT

## 2023-08-22 PROCEDURE — 80048 BASIC METABOLIC PNL TOTAL CA: CPT

## 2023-08-22 PROCEDURE — 3017F COLORECTAL CA SCREEN DOC REV: CPT | Performed by: NURSE PRACTITIONER

## 2023-08-22 PROCEDURE — 6370000000 HC RX 637 (ALT 250 FOR IP)

## 2023-08-22 PROCEDURE — 94640 AIRWAY INHALATION TREATMENT: CPT

## 2023-08-22 PROCEDURE — 3077F SYST BP >= 140 MM HG: CPT | Performed by: NURSE PRACTITIONER

## 2023-08-22 PROCEDURE — 6360000002 HC RX W HCPCS

## 2023-08-22 PROCEDURE — 84484 ASSAY OF TROPONIN QUANT: CPT

## 2023-08-22 PROCEDURE — 6360000002 HC RX W HCPCS: Performed by: NURSE PRACTITIONER

## 2023-08-22 PROCEDURE — 1036F TOBACCO NON-USER: CPT | Performed by: NURSE PRACTITIONER

## 2023-08-22 PROCEDURE — 82947 ASSAY GLUCOSE BLOOD QUANT: CPT

## 2023-08-22 PROCEDURE — 3078F DIAST BP <80 MM HG: CPT | Performed by: NURSE PRACTITIONER

## 2023-08-22 PROCEDURE — 96374 THER/PROPH/DIAG INJ IV PUSH: CPT

## 2023-08-22 PROCEDURE — G8427 DOCREV CUR MEDS BY ELIG CLIN: HCPCS | Performed by: NURSE PRACTITIONER

## 2023-08-22 PROCEDURE — 85025 COMPLETE CBC W/AUTO DIFF WBC: CPT

## 2023-08-22 PROCEDURE — 99215 OFFICE O/P EST HI 40 MIN: CPT | Performed by: NURSE PRACTITIONER

## 2023-08-22 PROCEDURE — 94761 N-INVAS EAR/PLS OXIMETRY MLT: CPT

## 2023-08-22 PROCEDURE — 36415 COLL VENOUS BLD VENIPUNCTURE: CPT

## 2023-08-22 PROCEDURE — 99285 EMERGENCY DEPT VISIT HI MDM: CPT

## 2023-08-22 PROCEDURE — 87636 SARSCOV2 & INF A&B AMP PRB: CPT

## 2023-08-22 PROCEDURE — 93005 ELECTROCARDIOGRAM TRACING: CPT

## 2023-08-22 PROCEDURE — G8417 CALC BMI ABV UP PARAM F/U: HCPCS | Performed by: NURSE PRACTITIONER

## 2023-08-22 PROCEDURE — 6370000000 HC RX 637 (ALT 250 FOR IP): Performed by: NURSE PRACTITIONER

## 2023-08-22 PROCEDURE — 81001 URINALYSIS AUTO W/SCOPE: CPT

## 2023-08-22 RX ORDER — TOPIRAMATE 25 MG/1
25 TABLET ORAL 2 TIMES DAILY
Status: DISCONTINUED | OUTPATIENT
Start: 2023-08-22 | End: 2023-08-24 | Stop reason: HOSPADM

## 2023-08-22 RX ORDER — SODIUM CHLORIDE 0.9 % (FLUSH) 0.9 %
5-40 SYRINGE (ML) INJECTION EVERY 12 HOURS SCHEDULED
Status: DISCONTINUED | OUTPATIENT
Start: 2023-08-22 | End: 2023-08-24 | Stop reason: HOSPADM

## 2023-08-22 RX ORDER — ONDANSETRON 4 MG/1
4 TABLET, ORALLY DISINTEGRATING ORAL EVERY 8 HOURS PRN
Status: DISCONTINUED | OUTPATIENT
Start: 2023-08-22 | End: 2023-08-24 | Stop reason: HOSPADM

## 2023-08-22 RX ORDER — SODIUM CHLORIDE 0.9 % (FLUSH) 0.9 %
5-40 SYRINGE (ML) INJECTION PRN
Status: DISCONTINUED | OUTPATIENT
Start: 2023-08-22 | End: 2023-08-24 | Stop reason: HOSPADM

## 2023-08-22 RX ORDER — TORSEMIDE 20 MG/1
10 TABLET ORAL EVERY OTHER DAY
Status: DISCONTINUED | OUTPATIENT
Start: 2023-08-24 | End: 2023-08-24 | Stop reason: HOSPADM

## 2023-08-22 RX ORDER — METOCLOPRAMIDE 10 MG/1
10 TABLET ORAL
Status: DISCONTINUED | OUTPATIENT
Start: 2023-08-23 | End: 2023-08-24 | Stop reason: HOSPADM

## 2023-08-22 RX ORDER — ESCITALOPRAM OXALATE 10 MG/1
10 TABLET ORAL NIGHTLY
Status: DISCONTINUED | OUTPATIENT
Start: 2023-08-22 | End: 2023-08-24 | Stop reason: HOSPADM

## 2023-08-22 RX ORDER — ENOXAPARIN SODIUM 100 MG/ML
40 INJECTION SUBCUTANEOUS DAILY
Status: DISCONTINUED | OUTPATIENT
Start: 2023-08-23 | End: 2023-08-24 | Stop reason: HOSPADM

## 2023-08-22 RX ORDER — ACETAMINOPHEN 650 MG/1
650 SUPPOSITORY RECTAL EVERY 6 HOURS PRN
Status: DISCONTINUED | OUTPATIENT
Start: 2023-08-22 | End: 2023-08-24 | Stop reason: HOSPADM

## 2023-08-22 RX ORDER — PREDNISONE 20 MG/1
40 TABLET ORAL DAILY
Status: DISCONTINUED | OUTPATIENT
Start: 2023-08-25 | End: 2023-08-24

## 2023-08-22 RX ORDER — ROPINIROLE 1 MG/1
2 TABLET, FILM COATED ORAL NIGHTLY
Status: DISCONTINUED | OUTPATIENT
Start: 2023-08-22 | End: 2023-08-24 | Stop reason: HOSPADM

## 2023-08-22 RX ORDER — GUAIFENESIN 600 MG/1
600 TABLET, EXTENDED RELEASE ORAL 2 TIMES DAILY PRN
Status: DISCONTINUED | OUTPATIENT
Start: 2023-08-22 | End: 2023-08-24 | Stop reason: HOSPADM

## 2023-08-22 RX ORDER — IPRATROPIUM BROMIDE AND ALBUTEROL SULFATE 2.5; .5 MG/3ML; MG/3ML
1 SOLUTION RESPIRATORY (INHALATION) EVERY 4 HOURS PRN
Status: DISCONTINUED | OUTPATIENT
Start: 2023-08-22 | End: 2023-08-24 | Stop reason: HOSPADM

## 2023-08-22 RX ORDER — ALBUTEROL SULFATE 2.5 MG/3ML
15 SOLUTION RESPIRATORY (INHALATION)
Status: DISCONTINUED | OUTPATIENT
Start: 2023-08-22 | End: 2023-08-22

## 2023-08-22 RX ORDER — ACETAMINOPHEN 500 MG
1000 TABLET ORAL ONCE
Status: COMPLETED | OUTPATIENT
Start: 2023-08-22 | End: 2023-08-22

## 2023-08-22 RX ORDER — AMITRIPTYLINE HYDROCHLORIDE 50 MG/1
100 TABLET, FILM COATED ORAL NIGHTLY
Status: DISCONTINUED | OUTPATIENT
Start: 2023-08-22 | End: 2023-08-24 | Stop reason: HOSPADM

## 2023-08-22 RX ORDER — IPRATROPIUM BROMIDE AND ALBUTEROL SULFATE 2.5; .5 MG/3ML; MG/3ML
1 SOLUTION RESPIRATORY (INHALATION)
Status: COMPLETED | OUTPATIENT
Start: 2023-08-22 | End: 2023-08-22

## 2023-08-22 RX ORDER — AMLODIPINE BESYLATE 5 MG/1
5 TABLET ORAL DAILY
Status: DISCONTINUED | OUTPATIENT
Start: 2023-08-23 | End: 2023-08-24

## 2023-08-22 RX ORDER — CLOPIDOGREL BISULFATE 75 MG/1
75 TABLET ORAL NIGHTLY
Status: DISCONTINUED | OUTPATIENT
Start: 2023-08-22 | End: 2023-08-24 | Stop reason: HOSPADM

## 2023-08-22 RX ORDER — LOSARTAN POTASSIUM 50 MG/1
100 TABLET ORAL DAILY
Status: DISCONTINUED | OUTPATIENT
Start: 2023-08-23 | End: 2023-08-24 | Stop reason: HOSPADM

## 2023-08-22 RX ORDER — DULOXETIN HYDROCHLORIDE 60 MG/1
60 CAPSULE, DELAYED RELEASE ORAL NIGHTLY
Status: DISCONTINUED | OUTPATIENT
Start: 2023-08-22 | End: 2023-08-24 | Stop reason: HOSPADM

## 2023-08-22 RX ORDER — IBUPROFEN 600 MG/1
600 TABLET ORAL ONCE
Status: COMPLETED | OUTPATIENT
Start: 2023-08-22 | End: 2023-08-22

## 2023-08-22 RX ORDER — ACETAMINOPHEN 325 MG/1
650 TABLET ORAL EVERY 6 HOURS PRN
Status: DISCONTINUED | OUTPATIENT
Start: 2023-08-22 | End: 2023-08-24 | Stop reason: HOSPADM

## 2023-08-22 RX ORDER — ALBUTEROL SULFATE 2.5 MG/3ML
2.5 SOLUTION RESPIRATORY (INHALATION)
Status: DISCONTINUED | OUTPATIENT
Start: 2023-08-22 | End: 2023-08-24 | Stop reason: HOSPADM

## 2023-08-22 RX ORDER — GABAPENTIN 400 MG/1
800 CAPSULE ORAL 2 TIMES DAILY
Status: DISCONTINUED | OUTPATIENT
Start: 2023-08-22 | End: 2023-08-24 | Stop reason: HOSPADM

## 2023-08-22 RX ORDER — ASPIRIN 81 MG/1
81 TABLET, CHEWABLE ORAL DAILY
Status: DISCONTINUED | OUTPATIENT
Start: 2023-08-23 | End: 2023-08-24 | Stop reason: HOSPADM

## 2023-08-22 RX ORDER — ALBUTEROL SULFATE 2.5 MG/3ML
2.5 SOLUTION RESPIRATORY (INHALATION)
Status: DISCONTINUED | OUTPATIENT
Start: 2023-08-22 | End: 2023-08-22

## 2023-08-22 RX ORDER — METOPROLOL TARTRATE 50 MG/1
50 TABLET, FILM COATED ORAL 2 TIMES DAILY
Status: DISCONTINUED | OUTPATIENT
Start: 2023-08-22 | End: 2023-08-24 | Stop reason: HOSPADM

## 2023-08-22 RX ORDER — POLYETHYLENE GLYCOL 3350 17 G/17G
17 POWDER, FOR SOLUTION ORAL DAILY PRN
Status: DISCONTINUED | OUTPATIENT
Start: 2023-08-22 | End: 2023-08-24 | Stop reason: HOSPADM

## 2023-08-22 RX ORDER — PANTOPRAZOLE SODIUM 40 MG/1
40 TABLET, DELAYED RELEASE ORAL 2 TIMES DAILY
Status: DISCONTINUED | OUTPATIENT
Start: 2023-08-22 | End: 2023-08-24 | Stop reason: HOSPADM

## 2023-08-22 RX ORDER — BUPROPION HYDROCHLORIDE 300 MG/1
300 TABLET ORAL EVERY MORNING
Status: DISCONTINUED | OUTPATIENT
Start: 2023-08-23 | End: 2023-08-24 | Stop reason: HOSPADM

## 2023-08-22 RX ORDER — ONDANSETRON 2 MG/ML
4 INJECTION INTRAMUSCULAR; INTRAVENOUS EVERY 6 HOURS PRN
Status: DISCONTINUED | OUTPATIENT
Start: 2023-08-22 | End: 2023-08-24 | Stop reason: HOSPADM

## 2023-08-22 RX ORDER — SODIUM CHLORIDE 9 MG/ML
INJECTION, SOLUTION INTRAVENOUS PRN
Status: DISCONTINUED | OUTPATIENT
Start: 2023-08-22 | End: 2023-08-24 | Stop reason: HOSPADM

## 2023-08-22 RX ORDER — IBUPROFEN 800 MG/1
800 TABLET ORAL ONCE
Status: DISCONTINUED | OUTPATIENT
Start: 2023-08-22 | End: 2023-08-22

## 2023-08-22 RX ORDER — ALBUTEROL SULFATE 2.5 MG/3ML
SOLUTION RESPIRATORY (INHALATION)
Status: COMPLETED
Start: 2023-08-22 | End: 2023-08-23

## 2023-08-22 RX ORDER — IPRATROPIUM BROMIDE AND ALBUTEROL SULFATE 2.5; .5 MG/3ML; MG/3ML
SOLUTION RESPIRATORY (INHALATION)
Status: COMPLETED
Start: 2023-08-22 | End: 2023-08-22

## 2023-08-22 RX ADMIN — IPRATROPIUM BROMIDE AND ALBUTEROL SULFATE 1 DOSE: .5; 2.5 SOLUTION RESPIRATORY (INHALATION) at 21:27

## 2023-08-22 RX ADMIN — ESCITALOPRAM OXALATE 10 MG: 10 TABLET ORAL at 23:27

## 2023-08-22 RX ADMIN — CLOPIDOGREL BISULFATE 75 MG: 75 TABLET ORAL at 23:27

## 2023-08-22 RX ADMIN — PANTOPRAZOLE SODIUM 40 MG: 40 TABLET, DELAYED RELEASE ORAL at 23:27

## 2023-08-22 RX ADMIN — IBUPROFEN 600 MG: 800 TABLET, FILM COATED ORAL at 17:40

## 2023-08-22 RX ADMIN — SODIUM CHLORIDE, PRESERVATIVE FREE 10 ML: 5 INJECTION INTRAVENOUS at 23:28

## 2023-08-22 RX ADMIN — TOPIRAMATE 25 MG: 25 TABLET, FILM COATED ORAL at 23:27

## 2023-08-22 RX ADMIN — CEFTRIAXONE SODIUM 1000 MG: 1 INJECTION, POWDER, FOR SOLUTION INTRAMUSCULAR; INTRAVENOUS at 22:17

## 2023-08-22 RX ADMIN — DULOXETINE HYDROCHLORIDE 60 MG: 60 CAPSULE, DELAYED RELEASE ORAL at 23:27

## 2023-08-22 RX ADMIN — POLYETHYLENE GLYCOL 3350 17 G: 17 POWDER, FOR SOLUTION ORAL at 23:27

## 2023-08-22 RX ADMIN — GABAPENTIN 800 MG: 400 CAPSULE ORAL at 23:27

## 2023-08-22 RX ADMIN — ACETAMINOPHEN 1000 MG: 500 TABLET ORAL at 17:40

## 2023-08-22 RX ADMIN — IPRATROPIUM BROMIDE AND ALBUTEROL SULFATE 1 DOSE: .5; 2.5 SOLUTION RESPIRATORY (INHALATION) at 18:05

## 2023-08-22 RX ADMIN — WATER 125 MG: 1 INJECTION INTRAMUSCULAR; INTRAVENOUS; SUBCUTANEOUS at 17:49

## 2023-08-22 RX ADMIN — IPRATROPIUM BROMIDE AND ALBUTEROL SULFATE 3 ML: .5; 2.5 SOLUTION RESPIRATORY (INHALATION) at 21:22

## 2023-08-22 RX ADMIN — ROPINIROLE HYDROCHLORIDE 2 MG: 1 TABLET, FILM COATED ORAL at 23:27

## 2023-08-22 RX ADMIN — IPRATROPIUM BROMIDE AND ALBUTEROL SULFATE 1 DOSE: .5; 2.5 SOLUTION RESPIRATORY (INHALATION) at 18:15

## 2023-08-22 RX ADMIN — IPRATROPIUM BROMIDE AND ALBUTEROL SULFATE 1 DOSE: .5; 2.5 SOLUTION RESPIRATORY (INHALATION) at 20:11

## 2023-08-22 RX ADMIN — METOPROLOL TARTRATE 50 MG: 50 TABLET ORAL at 23:27

## 2023-08-22 ASSESSMENT — PAIN - FUNCTIONAL ASSESSMENT
PAIN_FUNCTIONAL_ASSESSMENT: NONE - DENIES PAIN
PAIN_FUNCTIONAL_ASSESSMENT: NONE - DENIES PAIN

## 2023-08-22 ASSESSMENT — ENCOUNTER SYMPTOMS
HEMOPTYSIS: 0
RHINORRHEA: 1
SHORTNESS OF BREATH: 1
SORE THROAT: 0
COUGH: 1
WHEEZING: 0
NAUSEA: 0
HEARTBURN: 0
VOMITING: 0
CHEST TIGHTNESS: 1

## 2023-08-22 ASSESSMENT — LIFESTYLE VARIABLES
HOW OFTEN DO YOU HAVE A DRINK CONTAINING ALCOHOL: MONTHLY OR LESS
HOW MANY STANDARD DRINKS CONTAINING ALCOHOL DO YOU HAVE ON A TYPICAL DAY: 1 OR 2

## 2023-08-22 NOTE — ED NOTES
Pt placed on 3L nasal canula to support SPO2 of 79% on room air.  Resident notified      Britton Villa RN  08/22/23 [id number]

## 2023-08-22 NOTE — ED TRIAGE NOTES
Mode of arrival (squad #, walk in, police, etc) : Walk in        Chief complaint(s): Cough, urinary frequency. Urinary incontinence        Arrival Note (brief scenario, treatment PTA, etc). : Pt arrives to ED c/o cough ongoing for the last few days. Patient states that she went to urgent care but was sent to the ED for a low pulse oximetry- reported in the 80's. Patient also reports urinary frequency stating that she has been wetting the bed more than normal as she states she has a history of a \"paralyzed bladder. \"

## 2023-08-22 NOTE — PROGRESS NOTES
like I was going to pass out\"    Codeine Nausea And Vomiting and Nausea Only    Droperidol Anxiety    Tape Redell Molt Tape] Rash       Subjective:      Review of Systems   Constitutional:  Negative for chills, fever and weight loss. HENT:  Positive for rhinorrhea. Negative for ear pain, postnasal drip and sore throat. Respiratory:  Positive for cough, chest tightness and shortness of breath. Negative for hemoptysis and wheezing. Cardiovascular:  Negative for chest pain. Gastrointestinal:  Negative for heartburn, nausea and vomiting. Genitourinary:  Positive for frequency, hesitancy and urgency. Negative for flank pain and hematuria. Musculoskeletal:  Negative for myalgias. Skin:  Negative for rash. Neurological:  Negative for dizziness, light-headedness and headaches. All other systems reviewed and are negative. 14 systems reviewed and negative except as listed in HPI. Objective:     Physical Exam  Vitals and nursing note reviewed. Constitutional:       General: She is not in acute distress. Appearance: Normal appearance. She is well-developed. She is not ill-appearing, toxic-appearing or diaphoretic. Comments: nontoxic   HENT:      Head: Normocephalic and atraumatic. Right Ear: Tympanic membrane, ear canal and external ear normal.      Left Ear: Tympanic membrane, ear canal and external ear normal.      Nose: Rhinorrhea present. Mouth/Throat:      Mouth: Mucous membranes are moist.      Pharynx: Oropharynx is clear. No oropharyngeal exudate or posterior oropharyngeal erythema. Eyes:      General: No scleral icterus. Right eye: No discharge. Left eye: No discharge. Conjunctiva/sclera: Conjunctivae normal.      Pupils: Pupils are equal, round, and reactive to light. Cardiovascular:      Rate and Rhythm: Normal rate and regular rhythm. Pulses: Normal pulses. Heart sounds: Normal heart sounds.    Pulmonary:      Effort: Pulmonary effort is

## 2023-08-23 ENCOUNTER — CARE COORDINATION (OUTPATIENT)
Dept: CARE COORDINATION | Age: 63
End: 2023-08-23

## 2023-08-23 LAB
ANION GAP SERPL CALCULATED.3IONS-SCNC: 11 MMOL/L (ref 9–17)
BASOPHILS # BLD: 0 K/UL (ref 0–0.2)
BASOPHILS NFR BLD: 0 % (ref 0–2)
BUN SERPL-MCNC: 15 MG/DL (ref 8–23)
CALCIUM SERPL-MCNC: 8.5 MG/DL (ref 8.6–10.4)
CHLORIDE SERPL-SCNC: 106 MMOL/L (ref 98–107)
CO2 SERPL-SCNC: 22 MMOL/L (ref 20–31)
CREAT SERPL-MCNC: 0.8 MG/DL (ref 0.5–0.9)
EKG ATRIAL RATE: 84 BPM
EKG P AXIS: 44 DEGREES
EKG P-R INTERVAL: 162 MS
EKG Q-T INTERVAL: 370 MS
EKG QRS DURATION: 68 MS
EKG QTC CALCULATION (BAZETT): 437 MS
EKG R AXIS: 40 DEGREES
EKG T AXIS: 64 DEGREES
EKG VENTRICULAR RATE: 84 BPM
EOSINOPHIL # BLD: 0 K/UL (ref 0–0.4)
EOSINOPHILS RELATIVE PERCENT: 0 % (ref 0–4)
ERYTHROCYTE [DISTWIDTH] IN BLOOD BY AUTOMATED COUNT: 14.5 % (ref 11.5–14.9)
EST. AVERAGE GLUCOSE BLD GHB EST-MCNC: 186 MG/DL
GFR SERPL CREATININE-BSD FRML MDRD: >60 ML/MIN/1.73M2
GLUCOSE BLD-MCNC: 232 MG/DL (ref 65–105)
GLUCOSE BLD-MCNC: 263 MG/DL (ref 65–105)
GLUCOSE BLD-MCNC: 326 MG/DL (ref 65–105)
GLUCOSE BLD-MCNC: 345 MG/DL (ref 65–105)
GLUCOSE BLD-MCNC: 392 MG/DL (ref 65–105)
GLUCOSE BLD-MCNC: 85 MG/DL (ref 65–105)
GLUCOSE SERPL-MCNC: 263 MG/DL (ref 70–99)
HBA1C MFR BLD: 8.1 % (ref 4–6)
HCT VFR BLD AUTO: 38 % (ref 36–46)
HGB BLD-MCNC: 12.5 G/DL (ref 12–16)
LYMPHOCYTES NFR BLD: 0.6 K/UL (ref 1–4.8)
LYMPHOCYTES RELATIVE PERCENT: 11 % (ref 24–44)
MCH RBC QN AUTO: 29.7 PG (ref 26–34)
MCHC RBC AUTO-ENTMCNC: 32.8 G/DL (ref 31–37)
MCV RBC AUTO: 90.5 FL (ref 80–100)
MONOCYTES NFR BLD: 0.1 K/UL (ref 0.1–1.3)
MONOCYTES NFR BLD: 2 % (ref 1–7)
NEUTROPHILS NFR BLD: 87 % (ref 36–66)
NEUTS SEG NFR BLD: 4.6 K/UL (ref 1.3–9.1)
PLATELET # BLD AUTO: 186 K/UL (ref 150–450)
PMV BLD AUTO: 8 FL (ref 6–12)
POTASSIUM SERPL-SCNC: 4.1 MMOL/L (ref 3.7–5.3)
RBC # BLD AUTO: 4.2 M/UL (ref 4–5.2)
SODIUM SERPL-SCNC: 139 MMOL/L (ref 135–144)
WBC OTHER # BLD: 5.3 K/UL (ref 3.5–11)

## 2023-08-23 PROCEDURE — 6370000000 HC RX 637 (ALT 250 FOR IP): Performed by: NURSE PRACTITIONER

## 2023-08-23 PROCEDURE — 93010 ELECTROCARDIOGRAM REPORT: CPT | Performed by: INTERNAL MEDICINE

## 2023-08-23 PROCEDURE — 94640 AIRWAY INHALATION TREATMENT: CPT

## 2023-08-23 PROCEDURE — 97166 OT EVAL MOD COMPLEX 45 MIN: CPT

## 2023-08-23 PROCEDURE — 85025 COMPLETE CBC W/AUTO DIFF WBC: CPT

## 2023-08-23 PROCEDURE — 2060000000 HC ICU INTERMEDIATE R&B

## 2023-08-23 PROCEDURE — 94761 N-INVAS EAR/PLS OXIMETRY MLT: CPT

## 2023-08-23 PROCEDURE — 2700000000 HC OXYGEN THERAPY PER DAY

## 2023-08-23 PROCEDURE — 6360000002 HC RX W HCPCS: Performed by: NURSE PRACTITIONER

## 2023-08-23 PROCEDURE — 36415 COLL VENOUS BLD VENIPUNCTURE: CPT

## 2023-08-23 PROCEDURE — 80048 BASIC METABOLIC PNL TOTAL CA: CPT

## 2023-08-23 PROCEDURE — 82947 ASSAY GLUCOSE BLOOD QUANT: CPT

## 2023-08-23 PROCEDURE — 83036 HEMOGLOBIN GLYCOSYLATED A1C: CPT

## 2023-08-23 PROCEDURE — 2580000003 HC RX 258: Performed by: NURSE PRACTITIONER

## 2023-08-23 PROCEDURE — 99223 1ST HOSP IP/OBS HIGH 75: CPT | Performed by: INTERNAL MEDICINE

## 2023-08-23 PROCEDURE — 6360000002 HC RX W HCPCS

## 2023-08-23 RX ORDER — DEXTROSE MONOHYDRATE 100 MG/ML
INJECTION, SOLUTION INTRAVENOUS CONTINUOUS PRN
Status: DISCONTINUED | OUTPATIENT
Start: 2023-08-23 | End: 2023-08-24 | Stop reason: HOSPADM

## 2023-08-23 RX ADMIN — SODIUM CHLORIDE, PRESERVATIVE FREE 10 ML: 5 INJECTION INTRAVENOUS at 09:42

## 2023-08-23 RX ADMIN — PANTOPRAZOLE SODIUM 40 MG: 40 TABLET, DELAYED RELEASE ORAL at 09:37

## 2023-08-23 RX ADMIN — AMITRIPTYLINE HYDROCHLORIDE 100 MG: 50 TABLET, FILM COATED ORAL at 20:41

## 2023-08-23 RX ADMIN — SODIUM CHLORIDE, PRESERVATIVE FREE 10 ML: 5 INJECTION INTRAVENOUS at 20:41

## 2023-08-23 RX ADMIN — AMLODIPINE BESYLATE 5 MG: 5 TABLET ORAL at 09:36

## 2023-08-23 RX ADMIN — METOCLOPRAMIDE 10 MG: 10 TABLET ORAL at 05:52

## 2023-08-23 RX ADMIN — ESCITALOPRAM OXALATE 10 MG: 10 TABLET ORAL at 20:36

## 2023-08-23 RX ADMIN — WATER 40 MG: 1 INJECTION INTRAMUSCULAR; INTRAVENOUS; SUBCUTANEOUS at 00:25

## 2023-08-23 RX ADMIN — WATER 40 MG: 1 INJECTION INTRAMUSCULAR; INTRAVENOUS; SUBCUTANEOUS at 14:34

## 2023-08-23 RX ADMIN — METOCLOPRAMIDE 10 MG: 10 TABLET ORAL at 17:15

## 2023-08-23 RX ADMIN — TOPIRAMATE 25 MG: 25 TABLET, FILM COATED ORAL at 20:36

## 2023-08-23 RX ADMIN — DULOXETINE HYDROCHLORIDE 60 MG: 60 CAPSULE, DELAYED RELEASE ORAL at 20:36

## 2023-08-23 RX ADMIN — TOPIRAMATE 25 MG: 25 TABLET, FILM COATED ORAL at 09:38

## 2023-08-23 RX ADMIN — ACETAMINOPHEN 650 MG: 325 TABLET ORAL at 00:25

## 2023-08-23 RX ADMIN — WATER 40 MG: 1 INJECTION INTRAMUSCULAR; INTRAVENOUS; SUBCUTANEOUS at 05:52

## 2023-08-23 RX ADMIN — ACETAMINOPHEN 650 MG: 325 TABLET ORAL at 22:36

## 2023-08-23 RX ADMIN — LOSARTAN POTASSIUM 100 MG: 50 TABLET, FILM COATED ORAL at 09:36

## 2023-08-23 RX ADMIN — CEFTRIAXONE SODIUM 1000 MG: 1 INJECTION, POWDER, FOR SOLUTION INTRAMUSCULAR; INTRAVENOUS at 22:29

## 2023-08-23 RX ADMIN — CLOPIDOGREL BISULFATE 75 MG: 75 TABLET ORAL at 20:36

## 2023-08-23 RX ADMIN — METOPROLOL TARTRATE 50 MG: 50 TABLET ORAL at 09:37

## 2023-08-23 RX ADMIN — BUPROPION HYDROCHLORIDE 300 MG: 300 TABLET, FILM COATED, EXTENDED RELEASE ORAL at 09:36

## 2023-08-23 RX ADMIN — AMITRIPTYLINE HYDROCHLORIDE 100 MG: 50 TABLET, FILM COATED ORAL at 00:25

## 2023-08-23 RX ADMIN — ALBUTEROL SULFATE: 2.5 SOLUTION RESPIRATORY (INHALATION) at 01:30

## 2023-08-23 RX ADMIN — WATER 40 MG: 1 INJECTION INTRAMUSCULAR; INTRAVENOUS; SUBCUTANEOUS at 20:23

## 2023-08-23 RX ADMIN — IPRATROPIUM BROMIDE AND ALBUTEROL SULFATE 1 DOSE: .5; 2.5 SOLUTION RESPIRATORY (INHALATION) at 17:53

## 2023-08-23 RX ADMIN — ROPINIROLE HYDROCHLORIDE 2 MG: 1 TABLET, FILM COATED ORAL at 20:36

## 2023-08-23 RX ADMIN — GABAPENTIN 800 MG: 400 CAPSULE ORAL at 20:36

## 2023-08-23 RX ADMIN — PANTOPRAZOLE SODIUM 40 MG: 40 TABLET, DELAYED RELEASE ORAL at 20:36

## 2023-08-23 RX ADMIN — ENOXAPARIN SODIUM 40 MG: 100 INJECTION SUBCUTANEOUS at 09:38

## 2023-08-23 RX ADMIN — GABAPENTIN 800 MG: 400 CAPSULE ORAL at 09:35

## 2023-08-23 RX ADMIN — METOPROLOL TARTRATE 50 MG: 50 TABLET ORAL at 20:36

## 2023-08-23 RX ADMIN — ASPIRIN 81 MG 81 MG: 81 TABLET ORAL at 09:38

## 2023-08-23 ASSESSMENT — ENCOUNTER SYMPTOMS
NAUSEA: 0
ABDOMINAL PAIN: 0
WHEEZING: 1
CHEST TIGHTNESS: 1
VOMITING: 0
COUGH: 1
SHORTNESS OF BREATH: 1

## 2023-08-23 ASSESSMENT — PAIN DESCRIPTION - LOCATION
LOCATION: GENERALIZED
LOCATION: THROAT

## 2023-08-23 ASSESSMENT — PAIN SCALES - GENERAL
PAINLEVEL_OUTOF10: 7
PAINLEVEL_OUTOF10: 0
PAINLEVEL_OUTOF10: 2

## 2023-08-23 ASSESSMENT — PAIN - FUNCTIONAL ASSESSMENT: PAIN_FUNCTIONAL_ASSESSMENT: ACTIVITIES ARE NOT PREVENTED

## 2023-08-23 ASSESSMENT — PAIN DESCRIPTION - DESCRIPTORS
DESCRIPTORS: DULL;ACHING
DESCRIPTORS: SORE

## 2023-08-23 ASSESSMENT — PAIN DESCRIPTION - FREQUENCY: FREQUENCY: INTERMITTENT

## 2023-08-23 ASSESSMENT — PAIN DESCRIPTION - PAIN TYPE: TYPE: ACUTE PAIN

## 2023-08-23 ASSESSMENT — PAIN DESCRIPTION - ONSET: ONSET: GRADUAL

## 2023-08-23 NOTE — CARE COORDINATION
Patient is currently inpt at [facility] 8/22/2023 with Exac COPD, will follow for d/c POC.      Future Appointments   Date Time Provider [address] [place] [address]   10/25/2023  3:00 PM Ammy Cuba MD [facility] Geovany Whipple

## 2023-08-23 NOTE — H&P
uncontrolled. Plans:     63-year-old female admitted for COPD exacerbation and UTI  History of COPD not on home oxygen, CVA, trigeminal neuralgia, HTN, HLD, DM type II, diverticulosis, fibromyalgia, neurogenic bladder, diabetic gastroparesis, diabetic neuropathy walks with a walker    COPD exacerbation continue scheduled bronchodilators, IV steroids  Acute hypoxic respiratory failure secondary to COPD exacerbation patient was saturating 80% on room air, requiring 3 L nasal cannula now. Consulting pulmonology she sees Dr. Phuong Rojas  Urinary frequency suspected UTI started on IV Rocephin however UA does not show leuk esterase or nitrites we will continue Rocephin for now due to high risk of infection patient has diabetes with elevated blood sugars, neurogenic bladder and history of recurrent UTIs.   Neurogenic bladder continue self cath  Type 2 diabetes continue patient's home insulin pump        DVT pplx-Lovenox      Aileen Sanchez MD  8/23/2023  11:17 AM

## 2023-08-23 NOTE — FLOWSHEET NOTE
08/23/23 0323   Treatment Team Notification   Reason for Communication Evaluate  (Pt requested BG check at 0300 as she is concerned ordered IV steroids will cause her BG to be elevated. Pt's  per POC.)   Name of Team Member Notified Red Fournier   Treatment Team Role Advanced Practice Nurse   Method of Communication Secure Message  (APRN notified of increase in BG - recheck the pt prior to breakfast or does pt require orders for long-acting insulin while on steriods?)   Response No new orders; Other (Comment)  (recheck BG prior to breakfast per orders)     Pt's  per POC glucose check. Pt gave herself 2.02 units of insulin per personal insulin pump. Red Fournier, APRN-CNP notified of BG and 2.02 units of insulin pt self-administered per her personal insulin pump.

## 2023-08-23 NOTE — PLAN OF CARE
Problem: Discharge Planning  Goal: Discharge to home or other facility with appropriate resources  Outcome: Progressing  Flowsheets (Taken 8/23/2023 0109)  Discharge to home or other facility with appropriate resources: Identify barriers to discharge with patient and caregiver     Problem: Skin/Tissue Integrity  Goal: Absence of new skin breakdown  Description: 1. Monitor for areas of redness and/or skin breakdown  2. Assess vascular access sites hourly  3. Every 4-6 hours minimum:  Change oxygen saturation probe site  4. Every 4-6 hours:  If on nasal continuous positive airway pressure, respiratory therapy assess nares and determine need for appliance change or resting period.   Outcome: Progressing     Problem: Safety - Adult  Goal: Free from fall injury  Outcome: Progressing     Problem: Pain  Goal: Verbalizes/displays adequate comfort level or baseline comfort level  Outcome: Progressing  Flowsheets (Taken 8/23/2023 0109)  Verbalizes/displays adequate comfort level or baseline comfort level:   Encourage patient to monitor pain and request assistance   Assess pain using appropriate pain scale   Administer analgesics based on type and severity of pain and evaluate response   Implement non-pharmacological measures as appropriate and evaluate response   Consider cultural and social influences on pain and pain management   Notify Licensed Independent Practitioner if interventions unsuccessful or patient reports new pain

## 2023-08-23 NOTE — PLAN OF CARE
Problem: Discharge Planning  Goal: Discharge to home or other facility with appropriate resources  Outcome: Progressing  Flowsheets (Taken 8/23/2023 8104)  Discharge to home or other facility with appropriate resources:   Identify barriers to discharge with patient and caregiver   Arrange for needed discharge resources and transportation as appropriate   Identify discharge learning needs (meds, wound care, etc)     Problem: Skin/Tissue Integrity  Goal: Absence of new skin breakdown  Description: 1.   Monitor for areas of redness and/or skin breakdown  Outcome: Progressing     Problem: Safety - Adult  Goal: Free from fall injury  Outcome: Progressing     Problem: Pain  Goal: Verbalizes/displays adequate comfort level or baseline comfort level  Outcome: Progressing     Problem: Chronic Conditions and Co-morbidities  Goal: Patient's chronic conditions and co-morbidity symptoms are monitored and maintained or improved  Outcome: Progressing  Flowsheets (Taken 8/23/2023 0996)  Care Plan - Patient's Chronic Conditions and Co-Morbidity Symptoms are Monitored and Maintained or Improved:   Monitor and assess patient's chronic conditions and comorbid symptoms for stability, deterioration, or improvement   Collaborate with multidisciplinary team to address chronic and comorbid conditions and prevent exacerbation or deterioration   Update acute care plan with appropriate goals if chronic or comorbid symptoms are exacerbated and prevent overall improvement and discharge

## 2023-08-23 NOTE — ED NOTES
TRANSFER - OUT REPORT:    Verbal report given to [address] on Gema Olivares  being transferred to 2097 for routine progression of patient care       Report consisted of patient's Situation, Background, Assessment and   Recommendations(SBAR). Information from the following report(s) Nurse Handoff Report and ED Encounter Summary was reviewed with the receiving nurse. [place] Fall Assessment:    Presents to emergency department  because of falls (Syncope, seizure, or loss of consciousness): No  Age > 70: No  Altered Mental Status, Intoxication with alcohol or substance confusion (Disorientation, impaired judgment, poor safety awaremess, or inability to follow instructions): No  Impaired Mobility: Ambulates or transfers with assistive devices or assistance; Unable to ambulate or transer.: No  Nursing Judgement: No          Lines:   Peripheral IV 08/22/23 Right Antecubital (Active)   Site Assessment Clean, dry & intact 08/22/23 2156   Line Status Blood return noted 08/22/23 2156   Phlebitis Assessment No symptoms 08/22/23 2156   Infiltration Assessment 0 08/22/23 2156   Dressing Status New dressing applied 08/22/23 2156   Dressing Type Transparent 08/22/23 2156   Dressing Intervention New 08/22/23 2156        Opportunity for questions and clarification was provided.       Patient transported with:  O2 @ 3lpm          Jessie Flor RN  72/99/84 0070

## 2023-08-23 NOTE — CARE COORDINATION
Case Management Assessment  Initial Evaluation    Date/Time of Evaluation: 8/23/2023 3:37 PM  Assessment Completed by: Anthony Oconnell RN    If patient is discharged prior to next notation, then this note serves as note for discharge by case management. Patient Name: Archie Jackson                   YOB: 1960  Diagnosis: COPD exacerbation ([address]) [J44.1]  COPD with acute exacerbation ([address]) [J44.1]  Viral URI with cough [J06.9]                   Date / Time: 8/22/2023  5:02 PM    Patient Admission Status: Inpatient   Readmission Risk (Low < 19, Mod (19-27), High > 27): Readmission Risk Score: 13.8    Current PCP: Mckenzie Paris MD  PCP verified by ? No    Chart Reviewed: Yes      History Provided by: Patient  Patient Orientation: Alert and Oriented    Patient Cognition: Alert    Hospitalization in the last 30 days (Readmission):  No    If yes, Readmission Assessment in  Navigator will be completed. Advance Directives:      Code Status: Full Code   Patient's Primary Decision Maker is: Legal Next of Kin      Discharge Planning:    Patient lives with: Parent Type of Home: House  Primary Care Giver: Self  Patient Support Systems include: Parent   Current Financial resources: Medicare  Current community resources: None  Current services prior to admission: None            Current DME:              Type of Home Care services:  None    ADLS  Prior functional level: Independent in ADLs/IADLs  Current functional level: Independent in ADLs/IADLs    PT AM-PAC:   /24  OT AM-PAC:   /24    Family can provide assistance at DC: Yes  Would you like Case Management to discuss the discharge plan with any other family members/significant others, and if so, who?  Yes  Plans to Return to Present Housing: Yes  Other Identified Issues/Barriers to RETURNING to current housing: no  Potential Assistance needed at discharge: N/A            Potential DME:    Patient expects to discharge to: [address] [place] [address]

## 2023-08-24 VITALS
BODY MASS INDEX: 30.82 KG/M2 | OXYGEN SATURATION: 91 % | SYSTOLIC BLOOD PRESSURE: 154 MMHG | WEIGHT: 184.97 LBS | TEMPERATURE: 98.4 F | HEART RATE: 75 BPM | HEIGHT: 65 IN | DIASTOLIC BLOOD PRESSURE: 64 MMHG | RESPIRATION RATE: 18 BRPM

## 2023-08-24 LAB
ANION GAP SERPL CALCULATED.3IONS-SCNC: 8 MMOL/L (ref 9–17)
BASOPHILS # BLD: 0 K/UL (ref 0–0.2)
BASOPHILS NFR BLD: 0 % (ref 0–2)
BUN SERPL-MCNC: 19 MG/DL (ref 8–23)
CALCIUM SERPL-MCNC: 9.1 MG/DL (ref 8.6–10.4)
CHLORIDE SERPL-SCNC: 105 MMOL/L (ref 98–107)
CO2 SERPL-SCNC: 24 MMOL/L (ref 20–31)
CREAT SERPL-MCNC: 0.8 MG/DL (ref 0.5–0.9)
EOSINOPHIL # BLD: 0 K/UL (ref 0–0.4)
EOSINOPHILS RELATIVE PERCENT: 0 % (ref 0–4)
ERYTHROCYTE [DISTWIDTH] IN BLOOD BY AUTOMATED COUNT: 14.9 % (ref 11.5–14.9)
GFR SERPL CREATININE-BSD FRML MDRD: >60 ML/MIN/1.73M2
GLUCOSE BLD-MCNC: 135 MG/DL (ref 65–105)
GLUCOSE SERPL-MCNC: 133 MG/DL (ref 70–99)
HCT VFR BLD AUTO: 38.8 % (ref 36–46)
HGB BLD-MCNC: 12.7 G/DL (ref 12–16)
LYMPHOCYTES NFR BLD: 1.1 K/UL (ref 1–4.8)
LYMPHOCYTES RELATIVE PERCENT: 9 % (ref 24–44)
MCH RBC QN AUTO: 29.9 PG (ref 26–34)
MCHC RBC AUTO-ENTMCNC: 32.9 G/DL (ref 31–37)
MCV RBC AUTO: 91 FL (ref 80–100)
MONOCYTES NFR BLD: 0.5 K/UL (ref 0.1–1.3)
MONOCYTES NFR BLD: 5 % (ref 1–7)
NEUTROPHILS NFR BLD: 86 % (ref 36–66)
NEUTS SEG NFR BLD: 10.3 K/UL (ref 1.3–9.1)
PLATELET # BLD AUTO: 230 K/UL (ref 150–450)
PMV BLD AUTO: 8.4 FL (ref 6–12)
POTASSIUM SERPL-SCNC: 4.4 MMOL/L (ref 3.7–5.3)
RBC # BLD AUTO: 4.26 M/UL (ref 4–5.2)
SODIUM SERPL-SCNC: 137 MMOL/L (ref 135–144)
WBC OTHER # BLD: 12 K/UL (ref 3.5–11)

## 2023-08-24 PROCEDURE — 6370000000 HC RX 637 (ALT 250 FOR IP): Performed by: INTERNAL MEDICINE

## 2023-08-24 PROCEDURE — 6360000002 HC RX W HCPCS: Performed by: INTERNAL MEDICINE

## 2023-08-24 PROCEDURE — 97162 PT EVAL MOD COMPLEX 30 MIN: CPT

## 2023-08-24 PROCEDURE — 97530 THERAPEUTIC ACTIVITIES: CPT

## 2023-08-24 PROCEDURE — 2580000003 HC RX 258: Performed by: NURSE PRACTITIONER

## 2023-08-24 PROCEDURE — 80048 BASIC METABOLIC PNL TOTAL CA: CPT

## 2023-08-24 PROCEDURE — 6370000000 HC RX 637 (ALT 250 FOR IP): Performed by: NURSE PRACTITIONER

## 2023-08-24 PROCEDURE — 2580000003 HC RX 258: Performed by: INTERNAL MEDICINE

## 2023-08-24 PROCEDURE — 99233 SBSQ HOSP IP/OBS HIGH 50: CPT | Performed by: INTERNAL MEDICINE

## 2023-08-24 PROCEDURE — 36415 COLL VENOUS BLD VENIPUNCTURE: CPT

## 2023-08-24 PROCEDURE — 97116 GAIT TRAINING THERAPY: CPT

## 2023-08-24 PROCEDURE — 6360000002 HC RX W HCPCS: Performed by: NURSE PRACTITIONER

## 2023-08-24 PROCEDURE — 97535 SELF CARE MNGMENT TRAINING: CPT

## 2023-08-24 PROCEDURE — 85025 COMPLETE CBC W/AUTO DIFF WBC: CPT

## 2023-08-24 PROCEDURE — 82947 ASSAY GLUCOSE BLOOD QUANT: CPT

## 2023-08-24 RX ORDER — SPIRONOLACTONE 25 MG/1
25 TABLET ORAL DAILY
Qty: 30 TABLET | Refills: 3 | Status: SHIPPED | OUTPATIENT
Start: 2023-08-25

## 2023-08-24 RX ORDER — AMLODIPINE BESYLATE 10 MG/1
10 TABLET ORAL DAILY
Qty: 30 TABLET | Refills: 3 | Status: SHIPPED | OUTPATIENT
Start: 2023-08-25

## 2023-08-24 RX ORDER — AMLODIPINE BESYLATE 10 MG/1
10 TABLET ORAL DAILY
Status: DISCONTINUED | OUTPATIENT
Start: 2023-08-25 | End: 2023-08-24 | Stop reason: HOSPADM

## 2023-08-24 RX ORDER — PREDNISONE 20 MG/1
40 TABLET ORAL DAILY
Qty: 20 TABLET | Refills: 0 | Status: SHIPPED | OUTPATIENT
Start: 2023-08-24 | End: 2023-09-03

## 2023-08-24 RX ORDER — SPIRONOLACTONE 25 MG/1
25 TABLET ORAL DAILY
Status: DISCONTINUED | OUTPATIENT
Start: 2023-08-24 | End: 2023-08-24 | Stop reason: HOSPADM

## 2023-08-24 RX ADMIN — BUPROPION HYDROCHLORIDE 300 MG: 300 TABLET, FILM COATED, EXTENDED RELEASE ORAL at 10:00

## 2023-08-24 RX ADMIN — ASPIRIN 81 MG 81 MG: 81 TABLET ORAL at 10:00

## 2023-08-24 RX ADMIN — ENOXAPARIN SODIUM 40 MG: 100 INJECTION SUBCUTANEOUS at 10:02

## 2023-08-24 RX ADMIN — TORSEMIDE 10 MG: 20 TABLET ORAL at 12:33

## 2023-08-24 RX ADMIN — LOSARTAN POTASSIUM 100 MG: 50 TABLET, FILM COATED ORAL at 10:00

## 2023-08-24 RX ADMIN — METOPROLOL TARTRATE 50 MG: 50 TABLET ORAL at 10:00

## 2023-08-24 RX ADMIN — WATER 40 MG: 1 INJECTION INTRAMUSCULAR; INTRAVENOUS; SUBCUTANEOUS at 00:55

## 2023-08-24 RX ADMIN — SODIUM CHLORIDE, PRESERVATIVE FREE 10 ML: 5 INJECTION INTRAVENOUS at 09:00

## 2023-08-24 RX ADMIN — AMLODIPINE BESYLATE 5 MG: 5 TABLET ORAL at 10:00

## 2023-08-24 RX ADMIN — WATER 40 MG: 1 INJECTION INTRAMUSCULAR; INTRAVENOUS; SUBCUTANEOUS at 12:32

## 2023-08-24 RX ADMIN — METOCLOPRAMIDE 10 MG: 10 TABLET ORAL at 17:24

## 2023-08-24 RX ADMIN — SPIRONOLACTONE 25 MG: 25 TABLET ORAL at 12:33

## 2023-08-24 RX ADMIN — TOPIRAMATE 25 MG: 25 TABLET, FILM COATED ORAL at 10:00

## 2023-08-24 RX ADMIN — GABAPENTIN 800 MG: 400 CAPSULE ORAL at 10:01

## 2023-08-24 RX ADMIN — PANTOPRAZOLE SODIUM 40 MG: 40 TABLET, DELAYED RELEASE ORAL at 10:00

## 2023-08-24 RX ADMIN — WATER 40 MG: 1 INJECTION INTRAMUSCULAR; INTRAVENOUS; SUBCUTANEOUS at 06:00

## 2023-08-24 NOTE — DISCHARGE INSTR - COC
Taran Holliday MD at [facility] ENDOSCOPY  07/13/2016    UPPER GASTROINTESTINAL ENDOSCOPY  07/23/2018    with biopsy    UPPER GASTROINTESTINAL ENDOSCOPY N/A 7/23/2018    EGD BIOPSY performed by Steven Del Rosario MD at [address] [place] [address]  06/01/2020    with biopsy    UPPER GASTROINTESTINAL ENDOSCOPY N/A 6/1/2020    EGD BIOPSY performed by Steven Del Rosario MD at [facility] History:   Immunization History   Administered Date(s) Administered    COVID-19, MODERNA BLUE border, Primary or Immunocompromised, (age 12y+), IM, 100 mcg/0.5mL 03/25/2021, 05/15/2021    Influenza Vaccine, unspecified formulation 11/30/2016    Influenza Virus Vaccine 10/15/2015, 11/30/2016, 11/29/2018    Influenza, FLUARIX, FLULAVAL, FLUZONE (age 10 mo+) AND AFLURIA, (age 1 y+), PF, 0.5mL 08/15/2017, 11/15/2018, 10/17/2020    Influenza, FLUBLOK, (age 25 y+), PF, 0.5mL 10/10/2019    Pneumococcal Conjugate 7-valent (Prevnar7) 11/29/2018    Pneumococcal, PPSV23, PNEUMOVAX 21, (age 2y+), SC/IM, 0.5mL 03/28/2016, 11/15/2018    TDaP, ADACEL (age 6y-58y), BOOSTRIX (age 10y+), IM, 0.5mL 04/20/2015, 08/30/2020       Active Problems:  Patient Active Problem List   Diagnosis Code    Peripheral vascular disease ([address] ) I73.9    Restless legs syndrome G25.81    Spinal stenosis, thoracic M48.04    Thoracic radiculopathy M54.14    Chronic pain syndrome G89.4    Depression F32. A    Essential hypertension I10    Retinopathy due to secondary DM ([address]) E13.319    Acquired hypothyroidism E03.9    Vitamin D deficiency E55.9    Mixed hyperlipidemia E78.2    Type 1 diabetes mellitus with retinopathy ([address]) E10.319    Incisional hernia without obstruction or gangrene K43.2    Hypoxia R09.02    Moderate persistent asthma with status asthmaticus J45.42    Seasonal allergic rhinitis due to pollen J30.1    Chronic obstructive pulmonary disease (HCC) J44.9    Insulin pump in place Z96.41

## 2023-08-24 NOTE — CARE COORDINATION
HOME OXYGEN:    Portable 02 tank delivered per [facility]. 02 tank turned on and noted to be full. Patient understands to call [phone], [phone]. immediately upon arrival home to have 02 concentrator delivered.     Electronically signed by Harish Borges RN on 8/24/2023 at 5:07 PM

## 2023-08-24 NOTE — CARE COORDINATION
Writer was notified by Nursing, that pt. Qualified for Oxygen at [facility]. Nursing will need to Fax: Face Sheet, F2F documentation for the Oxygen, Home O2 Eval, DME orders for the Oxygen to [facility] at : [phone]. Writer delivered portable tank to the room. Writer notified, Damien Ernst, from [facility] & she will notify office/ for delivery.

## 2023-08-24 NOTE — PROGRESS NOTES
08/23/23 1230   Encounter Summary   Encounter Overview/Reason  Volunteer Encounter   Service Provided For: Patient   Referral/Consult From: Ryan   Spiritual/Emotional needs   Type Spiritual Support   Assessment/Intervention/Outcome   Assessment Other (Comment)  (patient sleeping)   Intervention Prayer (assurance of)/[place]
[facility]   OCCUPATIONAL THERAPY MISSED TREATMENT NOTE   INPATIENT   Date: 23  Patient Name: Ever Li       Room:   MRN: [id number]   Account #: [de-identified]    : 1960  (63 y.o.)  Gender: female                 REASON FOR MISSED TREATMENT:  Patient with another ancillary department   -    pt getting into shower with help of PCT. OT will check back later in AM or PM as time permits.       [id number]         Electronically signed by RONY Ramos on 23 at 8:39 AM EDT
[address] [facility]   INPATIENT OCCUPATIONAL THERAPY  PROGRESS NOTE  Date: 2023  Patient Name: Adeola Daniel       Room: -  MRN: [id number]    : 1960  (63 y.o.)  Gender: female   Referring Practitioner: KIMBERLEE Robles NP  Diagnosis: COPD with acute exacerbation      Discharge Recommendations:  Further Occupational Therapy is recommended upon facility discharge. OT Equipment Recommendations  Other: TBD    Restrictions/Precautions  Restrictions/Precautions  Restrictions/Precautions: Fall Risk;Up as Tolerated;Isolation (insulin pump, O2 at 3 L, perpheral IV right antecubital, MRSA)  Required Braces or Orthoses?: No  Implants present? :  (insulin pump)  Position Activity Restriction  Other position/activity restrictions: Up with assistance    Vitals  O2 Device: Nasal cannula  Comment: 3L    Subjective  Subjective  Subjective: \"it just feels good to take a shower. \"  Comments: Okay for OT eval per RN María    Objective  Orientation  Overall Orientation Status: Within Functional Limits  Orientation Level: Oriented to place;Oriented to time;Oriented to situation;Oriented to person;Oriented X4  Cognition  Overall Cognitive Status: WFL    Activities of Daily Living  ADL  Feeding: Independent  Grooming: Stand by assistance  Grooming Skilled Clinical Factors: standing at sink  UE Bathing: Setup  UE Bathing Skilled Clinical Factors: seated shower chair PICC water proofed  LE Bathing: Stand by assistance  LE Bathing Skilled Clinical Factors: seated to wash BLEs standing for osorio/buttock hygiene with 1 LOB however able to self correct SBA using shower GB  UE Dressing: Setup  UE Dressing Skilled Clinical Factors: seated EOB  LE Dressing: Stand by assistance  LE Dressing Skilled Clinical Factors: seated to thread/unthread brief  Toileting: Stand by assistance  Additional Comments: ADL scores are based on skilled observation and clinical reasoning unless otherwise noted.  Pt is
Discharge instructions explained to patient. All questions answered, escorted to main entrance by [facility], with oxygen tank. Left in the care of herself.
Home Oxygen Evaluation    Room air SpO2 at Rest = 88%    Room air with exercise/exertion = 84%    SpO2 on prescribed O2 level at 3 LPM  at rest =  96%   with exercise/exertion = 93%    Patient is educated on ways to control breathing and improve oxygen saturation if she becomes short of breath. Patient is also instructed on oxygen weaning process, and that she needs to wait until she's instructed by a provider before turning down her oxygen liter level. Pt is agreeable and understanding. Nocturnal Oximetry with patient on room air recommended if the resting SpO2 is 89% to 95%.  (Requires additional order)
Patient was evaluated today for the diagnosis of COPD. I entered a DME order for home oxygen at 3 lpm because the diagnosis and testing require the patient to have supplemental oxygen. Condition will improve or be benefited by oxygen use. The patient is  able to perform good mobility in a home setting and therefore does require the use of a portable oxygen system. The need for this equipment was discussed with the patient and she understands and is in agreement.      Milka Solo MD
Physical Therapy  Facility/Department: [address] [place] [address]  Physical Therapy Initial Assessment    Name: Alexander Aranda  : 1960  MRN: [id number]  Date of Service: 2023    Discharge Recommendations:  Continue to assess pending progress   PT Equipment Recommendations  Equipment Needed:  (? wheeled walker)      Patient Diagnosis(es): The primary encounter diagnosis was COPD exacerbation ([address]). A diagnosis of Viral URI with cough was also pertinent to this visit. Past Medical History:  has a past medical history of Anesthesia complication, Anxiety, Arthritis, Back pain, CAD (coronary artery disease), Caffeine use, Cataract, COPD (chronic obstructive pulmonary disease) ([address]), Deafness, Depression, Diabetes mellitus ([address]), Diarrhea, Diverticulosis, Fibromyalgia, Gastroparalysis, GERD (gastroesophageal reflux disease), Hearing loss, Hyperlipidemia, Hyperlipidemia, Hypertension, Incontinence, MDRO (multiple drug resistant organisms) resistance, Neurogenic bladder, Neuropathy, Obesity, Osteoarthritis, Peripheral vascular disease, unspecified (HCC), Restless leg syndrome, Rhabdomyolysis, Spinal stenosis, thoracic, Stroke ([address]), Thyroid disease, TMJ (dislocation of temporomandibular joint), Trigeminal neuralgia, and Type II or unspecified type diabetes mellitus without mention of complication, not stated as uncontrolled. Past Surgical History:  has a past surgical history that includes Cholecystectomy; Colon surgery; Tonsillectomy; Tubal ligation; Carpal tunnel release (Right); Middle ear surgery (Left); cyst removal; cyst removal; Cholecystectomy, open; Finger trigger release (Right); Colonoscopy; Abdomen surgery; polypectomy; Cystocopy; Cataract removal; Incisional hernia repair (07/07/15); Upper gastrointestinal endoscopy (2016); Colonoscopy (2016); eye surgery (Right); Incisional hernia repair (10/17/2017); pr repair first abdominal wall hernia (N/A, 10/17/2017);  Upper gastrointestinal
Pt arrived to room 2097 from ED. Pt assisted into bed and oriented to room. Vital signs taken. Telemetry applied. Signed and held orders released and reviewed. Admission database and assessment released and reviewed. Per POCT glucose orders, \"coverage per insulin pump\". Pt confirms that she will provide her coverage per her personal insulin pump. This RN reviewed inpatient insulin pump policy and \"Inpatient insulin pump - patient responsibilities form\" with pt. Pt verbalizes understanding and signed \"Inpatient insulin pump - patient responsibilities form. \" Signed \"Inpatient insulin pump - patient responsibilities form\" placed on pt's chart.
Pt notified this RN that pt dosed herself with 5.3 units of insulin for the consumption of 57 grams of carbohydrates.
Pt offered hospital's sterile straight cath kits to use for self-cathing. Pt declines to use the hospital supply at this time, verbalizing her preference to use her own self-cath supplies that she brought from home.
Pt's  per POC glucose check. Pt gave herself 1.2 units insulin per personal insulin pump.
Writer spoke with Dr. Marciano Rojo, who does see the patient in his office but has not seen her this admission. Writer gave background, interventions and current status, Dr. Marciano Rojo will follow up with patient in his office in two weeks, Primary care notified.
Ammy Cuba MD   escitalopram (LEXAPRO) 10 MG tablet Take 1 tablet by mouth once daily  Patient taking differently: Take 1 tablet by mouth at bedtime 7/24/23  Yes Ammy Cuba MD   metoprolol tartrate (LOPRESSOR) 50 MG tablet Take 1 tablet by mouth twice daily 7/24/23  Yes Ammy Cuba MD   losartan (COZAAR) 100 MG tablet Take 1 tablet by mouth once daily 7/24/23  Yes Ammy Cuba MD   gabapentin (NEURONTIN) 800 MG tablet Take 1 tablet by mouth 2 times daily for 90 days. 6/27/23 9/25/23 Yes Ammy Cuba MD   BREZTRI AEROSPHERE 160-9-4.8 MCG/ACT AERO Inhale 1 inhalation into the lungs in the morning and at bedtime 6/9/23  Yes Historical Provider, MD   buPROPion (WELLBUTRIN XL) 300 MG extended release tablet TAKE 1 TABLET BY MOUTH ONCE DAILY IN THE MORNING 6/13/23  Yes Ammy Cuba MD   DULoxetine (CYMBALTA) 60 MG extended release capsule Take 1 capsule by mouth at bedtime 5/5/23  Yes Ammy Cuba MD   amLODIPine (NORVASC) 5 MG tablet Take 1 tablet by mouth once daily 3/22/23  Yes Ammy Cuba MD   clopidogrel (PLAVIX) 75 MG tablet TAKE 1 TABLET BY MOUTH IN THE MORNING  Patient taking differently: Take 1 tablet by mouth at bedtime 2/22/23  Yes Ammy Cuba MD   metoclopramide (REGLAN) 10 MG tablet Take 1 tablet by mouth 2 times daily 2/22/23  Yes Ammy Cuba MD   torsemide (DEMADEX) 10 MG tablet Take 1 tablet by mouth every other day 2/10/23  Yes Ammy Cuba MD   aspirin 81 MG tablet Take 1 tablet by mouth daily 10/17/20  Yes Sparkle Izquierdo MD   pantoprazole (PROTONIX) 40 MG tablet Take 1 tablet by mouth 2 times daily   Yes Historical Provider, MD   diclofenac sodium (VOLTAREN) 1 % GEL Apply 4 g topically 4 times daily 7/25/23   Ammy Cuba MD   Skin Protectants, Misc. (EUCERIN) cream Apply topically as needed.  2/10/23   Ammy Cuba MD   atorvastatin (LIPITOR) 20 MG tablet Take 1 tablet by mouth daily 5/5/22 7/28/22  Angie Pratt

## 2023-08-24 NOTE — PLAN OF CARE
Problem: Discharge Planning  Goal: Discharge to home or other facility with appropriate resources  8/24/2023 0441 by Haris Orozco RN  Outcome: Progressing     Problem: Skin/Tissue Integrity  Goal: Absence of new skin breakdown  Description: 1. Monitor for areas of redness and/or skin breakdown  2. Assess vascular access sites hourly  3. Every 4-6 hours minimum:  Change oxygen saturation probe site  4. Every 4-6 hours:  If on nasal continuous positive airway pressure, respiratory therapy assess nares and determine need for appliance change or resting period.   8/24/2023 0441 by Haris Orozco RN  Outcome: Progressing     Problem: Safety - Adult  Goal: Free from fall injury  8/24/2023 0441 by Haris Orozco RN  Outcome: Progressing     Problem: Pain  Goal: Verbalizes/displays adequate comfort level or baseline comfort level  8/24/2023 0441 by Haris Orozco RN  Outcome: Progressing

## 2023-08-24 NOTE — CARE COORDINATION
ONGOING DISCHARGE PLAN:    Patient is alert and oriented x4. Spoke with patient regarding discharge plan and patient confirms that plan is still to DC to home w/ Mom. Denies VNS. PT/OT on board. Pt. Remains on IV Rocephin & IV Steroids, 40MG, Q8.     Currently sating 94% on 3LNC. Pt. Does NOT wear at home. Will follow if needed. ?DC later today. Will continue to follow for additional discharge needs. If patient is discharged prior to next notation, then this note serves as note for discharge by case management.     Electronically signed by Van Calvin RN on 8/24/2023 at 1:05 PM

## 2023-08-24 NOTE — PLAN OF CARE
Problem: Discharge Planning  Goal: Discharge to home or other facility with appropriate resources  8/24/2023 0441 by Leda Quiñones RN  Outcome: Progressing     Problem: Skin/Tissue Integrity  Goal: Absence of new skin breakdown  Description: 1. Monitor for areas of redness and/or skin breakdown  2. Assess vascular access sites hourly  3. Every 4-6 hours minimum:  Change oxygen saturation probe site  4. Every 4-6 hours:  If on nasal continuous positive airway pressure, respiratory therapy assess nares and determine need for appliance change or resting period.   8/24/2023 0441 by Leda Quiñones RN  Outcome: Progressing     Problem: Safety - Adult  Goal: Free from fall injury  8/24/2023 0441 by Leda Quiñones RN  Outcome: Progressing     Problem: Pain  Goal: Verbalizes/displays adequate comfort level or baseline comfort level  8/24/2023 0443 by Leda Quiñones RN  Outcome: Progressing

## 2023-08-25 ENCOUNTER — TELEPHONE (OUTPATIENT)
Dept: INTERNAL MEDICINE CLINIC | Age: 63
End: 2023-08-25

## 2023-08-25 ENCOUNTER — CARE COORDINATION (OUTPATIENT)
Dept: CASE MANAGEMENT | Age: 63
End: 2023-08-25

## 2023-08-25 DIAGNOSIS — N39.3 STRESS INCONTINENCE OF URINE: ICD-10-CM

## 2023-08-25 DIAGNOSIS — R05.9 COUGH, UNSPECIFIED TYPE: Primary | ICD-10-CM

## 2023-08-25 DIAGNOSIS — R35.0 URINARY FREQUENCY: ICD-10-CM

## 2023-08-25 PROCEDURE — 1111F DSCHRG MED/CURRENT MED MERGE: CPT | Performed by: INTERNAL MEDICINE

## 2023-08-25 NOTE — TELEPHONE ENCOUNTER
Patient was d/c from [facility] yesterday, copd with acute exacerbation. Patient is now back on oxygen. States she is concerned with it getting worse and can't get a hold of pulmonary today they are closed. Asking if she can be seen asap.  Please advise where to put

## 2023-08-25 NOTE — TELEPHONE ENCOUNTER
Pt notified. Patient stated she will go to ER if condition worsens. She stated she will call Pulmonologist on Monday but if they refuse to see her does Dr. Candelaria Quezada want to see her? I asked her why would they refuse her?  She stated she missed her last appt and the doctor did not come to see her in the hospital. I told patient if that happens to call our office and we will address this with Dr. Candelaria Quezada

## 2023-08-25 NOTE — CARE COORDINATION
[facility] Care Transitions Initial Follow Up Call    Call within 2 business days of discharge: Yes    Patient Current Location:  Home: [address] [place] [address] Dr Jia Hernandez [id number]    LPN Care Coordinator contacted the patient by telephone to perform post hospital discharge assessment. Verified name and  with patient as identifiers. Provided introduction to self, and explanation of the LPN Care Coordinator role. Patient: Mian Gaming Patient : 1960   MRN: [id number]  Reason for Admission: cough urinary frequency incontinence   Discharge Date: 23 RARS: Readmission Risk Score: 13.4      Last Discharge [address] [facility] [facility]       Date Complaint Diagnosis Description Type Department Provider    23 Cough; Urinary Frequency; Incontinence COPD exacerbation ([address]) . .. ED to Hosp-Admission (Discharged) (ADMITTED) Leeanna Hinson MD; Celeste Aquino Pay. .. Was this an external facility discharge? No Discharge Facility: [facility] to be reviewed by the provider   Additional needs identified to be addressed with provider: Yes  Patient has nebulizer would like medication for aerosol machine d/c yesterday will need HFU. Method of communication with provider: chart routing. Transitions of Care Initial Call:  Explained the role of Care Transition Nurse and the Transition program, patient is agreeable to follow up calls Post discharge from the [facility] spoke to Amor Tvaeras today. She reports that she has no SOB CP palpitations nausea vomiting diarrhea . She does have a productive cough she is bring up phlegm. She is on 2L of oxygen per N/C . She would like medication for aerosol tx. Writer will send message to PCP. Appetite good . She wears briefs and does self cath. She is on a continuous glucose monitor. BS today 239. She is taking prednisone.  Atorvastatin  was stopped per AVS.  She called pcp office for appointment states her pulmonologist did not see me  in the hospital. I

## 2023-08-29 ENCOUNTER — CARE COORDINATION (OUTPATIENT)
Dept: OTHER | Facility: CLINIC | Age: 63
End: 2023-08-29

## 2023-08-31 ENCOUNTER — CARE COORDINATION (OUTPATIENT)
Dept: CARE COORDINATION | Age: 63
End: 2023-08-31

## 2023-08-31 ASSESSMENT — ENCOUNTER SYMPTOMS: DYSPNEA ASSOCIATED WITH: MINIMAL EXERTION

## 2023-08-31 NOTE — CARE COORDINATION
Ambulatory Care Coordination Note  2023    Patient Current Location:  Home: [id number] [place] [address]      ACM contacted the patient by telephone. Verified name and  with patient as identifiers. Provided introduction to self, and explanation of the ACM role. Challenges to be reviewed by the provider   Additional needs identified to be addressed with provider: No  none               Method of communication with provider: chart routing. ACM: Philippe Emerson, RN    spoke with patient who states she is doing better. She has Pulmonary appointment today and is hoping he will take her off home O2, states pulse ox is running in the high 90\"s with O2 off. She is using inhalers and will ask today  to order aerosols for her to have at home, she already has a machine. Patient had call from CTN and chart was updated. States her amlodipine was increased because BP was elevated, noted med list is updated. She has continuous BS monitor and insulin pump states BS little higher with taking steroids. Currently denies having any other symptoms or concerns. Encouraged patient to call nurse or the office with any questions or concerns or worsening of symptoms. CM Plan of Care:    - follow up with patient next week to assess symptoms and if got aerosol meds ordered by Pulm. - Update PCP with present condition's and upcoming appointments.        Offered patient enrollment in the Remote Patient Monitoring (RPM) program for in-home monitoring: NA.      COPD Assessment    Does the patient understand envrionmental exposure?: Yes  Is the patient able to verbalize Rescue vs. Long Acting medications?: Yes  Does the patient have a nebulizer?: Yes     No patient-reported symptoms         Symptoms:     Symptom course: stable  Breathlessness: minimal exertion  Increase use of rapid acting/rescue inhaled medications?: No  Change in chronic cough?: No/At Baseline  Change in sputum?: No/At Baseline  Self Monitoring Hx CVA and dementia. Home med ASA, plavix. Non verbal bedbound at baseline. Ongoing goals of care conversations with sister health care proxy.  - home meds: asa 81mg & plavix (restart w/ NGT)  - palliative consult  - ethics consult  - neuro consult: swallow function may recover

## 2023-09-06 ENCOUNTER — CARE COORDINATION (OUTPATIENT)
Dept: CASE MANAGEMENT | Age: 63
End: 2023-09-06

## 2023-09-06 NOTE — CARE COORDINATION
Request for follow up call from [address], RN.,    Attempted to reach patient was unsuccessful,  did leave this writers name and number for call back. Patient recently [facility], has Pulm apt. Today and is to request script for aerosols, she already has nebulizer. Just want to make sure she gets the solution to at least have on hand to use to help prevent inpt admit/ED visit.        Braeden Cardoza, [facility] Coordination Transition

## 2023-09-13 RX ORDER — CLOPIDOGREL BISULFATE 75 MG/1
75 TABLET ORAL DAILY
Qty: 90 TABLET | Refills: 0 | Status: SHIPPED | OUTPATIENT
Start: 2023-09-13

## 2023-09-13 NOTE — TELEPHONE ENCOUNTER
Reinaldo Tellez is calling to request a refill on the following medication(s):    Medication Request:  Requested Prescriptions     Pending Prescriptions Disp Refills    clopidogrel (PLAVIX) 75 MG tablet [Pharmacy Med Name: Clopidogrel Bisulfate 75 MG Oral Tablet] 90 tablet 0     Sig: TAKE 1 TABLET BY MOUTH IN THE MORNING       Last Visit Date (If Applicable):  5/90/7580    Next Visit Date:    10/25/2023      Last refill 2/22/23. Prescription pending.

## 2023-09-19 ENCOUNTER — CARE COORDINATION (OUTPATIENT)
Dept: CASE MANAGEMENT | Age: 63
End: 2023-09-19

## 2023-09-19 NOTE — CARE COORDINATION
Request from [address], RN.,'      Attempted to reach patient was unsuccessful,  did leave this writers name and number for call back.         Debbie Dillard, [facility] [place] Coordination Transition

## 2023-10-03 ENCOUNTER — TELEPHONE (OUTPATIENT)
Dept: INTERNAL MEDICINE CLINIC | Age: 63
End: 2023-10-03

## 2023-10-03 DIAGNOSIS — R30.0 BURNING WITH URINATION: ICD-10-CM

## 2023-10-03 DIAGNOSIS — R35.0 FREQUENT URINATION: Primary | ICD-10-CM

## 2023-10-03 DIAGNOSIS — L75.0 URINARY BODY ODOR: ICD-10-CM

## 2023-10-03 RX ORDER — CLOPIDOGREL BISULFATE 75 MG/1
75 TABLET ORAL DAILY
Qty: 90 TABLET | Refills: 0 | OUTPATIENT
Start: 2023-10-03

## 2023-10-03 RX ORDER — BUPROPION HYDROCHLORIDE 300 MG/1
TABLET ORAL
Qty: 60 TABLET | Refills: 0 | Status: SHIPPED | OUTPATIENT
Start: 2023-10-03

## 2023-10-03 NOTE — TELEPHONE ENCOUNTER
Avani Cruz is calling to request a refill on the following medication(s):    Medication Request:  Requested Prescriptions     Pending Prescriptions Disp Refills    clopidogrel (PLAVIX) 75 MG tablet [Pharmacy Med Name: Clopidogrel Bisulfate 75 MG Oral Tablet] 90 tablet 0     Sig: TAKE 1 TABLET BY MOUTH IN THE MORNING    buPROPion (WELLBUTRIN XL) 300 MG extended release tablet [Pharmacy Med Name: buPROPion HCl ER (XL) 300 MG Oral Tablet Extended Release 24 Hour] 60 tablet 0     Sig: TAKE 1 TABLET BY MOUTH ONCE DAILY IN THE MORNING       Last Visit Date (If Applicable):  6/72/2568    Next Visit Date:    10/25/2023      Last refill 6/13/23. Prescription pending.

## 2023-10-03 NOTE — TELEPHONE ENCOUNTER
Patient is asking for a order for a urinalysis?     Has been having burning, frequency, foul smell x's awhile    Will go to pathology labs on [address]     Please advise

## 2023-10-05 ENCOUNTER — CARE COORDINATION (OUTPATIENT)
Dept: CARE COORDINATION | Age: 63
End: 2023-10-05

## 2023-10-05 NOTE — CARE COORDINATION
Attempted to contact patient for Care Management update, no answer, lvm to return call to this nurse at [phone]. If no return call, ACM will attempt to contact patient again in a few days.     Future Appointments   Date Time Provider [address]   10/25/2023  3:00 PM [place] [facility] [place]

## 2023-10-09 ENCOUNTER — TELEPHONE (OUTPATIENT)
Dept: INTERNAL MEDICINE CLINIC | Age: 63
End: 2023-10-09

## 2023-10-09 DIAGNOSIS — L75.0 URINARY BODY ODOR: ICD-10-CM

## 2023-10-09 DIAGNOSIS — R30.0 BURNING WITH URINATION: Primary | ICD-10-CM

## 2023-10-09 DIAGNOSIS — R35.0 FREQUENT URINATION: ICD-10-CM

## 2023-10-09 NOTE — TELEPHONE ENCOUNTER
Patient states she know that her urine testing came back normal, but is stating that she is just miserable    She has constant pain, constant burning, is always wetting her bed, cant go out in public because she cant hold her urine. She is also having problems with the urologist office.  States that she loves Dr. Radha Sparks but his staff is very rude to her and wont listen to her    She is at the point she doesn't know what to do and is asking for your help    Please advise

## 2023-10-10 ENCOUNTER — CARE COORDINATION (OUTPATIENT)
Dept: CARE COORDINATION | Age: 63
End: 2023-10-10

## 2023-10-10 NOTE — CARE COORDINATION
Attempted to contact patient for Care Management update, no answer, lvm to return call to this nurse at [phone]. If no return call, ACM will attempt to contact patient again next week. The office is working with patient's urinary complaints and to get her into Urologist & Yonas Shaffer.      Future Appointments   Date Time Provider [address]   10/25/2023  3:00 PM Odalis Miller MD [facility] AMELIA Allen

## 2023-10-10 NOTE — TELEPHONE ENCOUNTER
Spoke with Soledad Friend at Urology was transferred to the nurse   [facility] regarding sooner appt will follow up tomorrow    Referral sent for GYN patient soonest appt is 11/3/23 at 2p  Patient instructed to call throughout the week for cancellation to be seen sooner    lmtcb

## 2023-10-10 NOTE — TELEPHONE ENCOUNTER
Patient states she doesn't has a GYN  Urology on lunch will try back to get patient a sooner apt    Patient saw general surgery last week Dr states patient does have hernia but does not want to operate due to patient complications

## 2023-10-12 RX ORDER — GABAPENTIN 800 MG/1
800 TABLET ORAL 2 TIMES DAILY
Qty: 180 TABLET | Refills: 0 | OUTPATIENT
Start: 2023-10-12

## 2023-10-16 RX ORDER — GABAPENTIN 800 MG/1
800 TABLET ORAL 2 TIMES DAILY
Qty: 180 TABLET | Refills: 0 | Status: SHIPPED | OUTPATIENT
Start: 2023-10-16 | End: 2024-01-14

## 2023-10-16 NOTE — TELEPHONE ENCOUNTER
Flor Patel is calling to request a refill on the following medication(s):    Medication Request:  Requested Prescriptions     Pending Prescriptions Disp Refills    gabapentin (NEURONTIN) 800 MG tablet [Pharmacy Med Name: Gabapentin 800 MG Oral Tablet] 180 tablet 0     Sig: Take 1 tablet by mouth 2 times daily.        Last Visit Date (If Applicable):  6/78/3159    Next Visit Date:    10/25/2023

## 2023-10-17 ENCOUNTER — CARE COORDINATION (OUTPATIENT)
Dept: CARE COORDINATION | Age: 63
End: 2023-10-17

## 2023-10-17 NOTE — CARE COORDINATION
Attempted to contact patient for Care Management update, no answer, lvm to return call to this nurse at [phone]. If no return call, ACM will attempt to contact patient again in a few days.     Future Appointments   Date Time Provider [address] [place] [address]   10/25/2023  3:00 PM MD Petr Byrd [facility]   11/3/2023  2:00 PM Geremias Keenan APRN - CNP [facility]

## 2023-10-22 PROBLEM — I46.9 CARDIAC ARREST (HCC): Status: ACTIVE | Noted: 2023-10-22

## 2023-10-22 PROBLEM — I46.9 CARDIAC ARREST (HCC): Status: ACTIVE | Noted: 2023-01-01

## 2023-10-22 NOTE — ED NOTES
Dr Usama Sorto is pulling ET back to 24 cm at lip ,post chest xray        Babar Fonseca, RN  10/22/23 1647

## 2023-10-22 NOTE — PROGRESS NOTES
Charting intubations and reintubations      ETT Size (e.g. 7.5) 7.5 INITIALLY SECURED @26.5 AT LIPS,  WITHDREW TO 24CM AT LIPS POST XRAY  ETT Placement (e.g. 23 cm at lips) 24  ETT secured with (commercial device name) EDDY    Tube placement verified by:  Auscultation (yes/no) Y  Positive color change on end tidal CO2 detector (yes/no) Y  CXR (yes/no) Y    Reason for intubation (jot a quick note/phrase e.g. Impending respiratory failure) CPR IN PROGRESS    If difficult airway, was red tape placed (yes/no) N  If code situation, was rescue pod used? (yes/no) N    -- Notify charge therapist regarding all intubations, extubations, reintubations and self extubations    TOD PEREA RCP  11:29 AM

## 2023-10-22 NOTE — ED NOTES
Epi gtt to 15 mcg.min  1 liter NS hung  Versed up to 4 mg   1 GM calcium gluconate was given IVP per Abena Robertson RN  10/22/23 9580

## 2023-10-22 NOTE — ED PROVIDER NOTES
within normal limits   POC GLUCOSE FINGERSTICK - Abnormal; Notable for the following components:    POC Glucose 124 (*)     All other components within normal limits   POC GLUCOSE FINGERSTICK - Abnormal; Notable for the following components:    POC Glucose 108 (*)     All other components within normal limits   POC GLUCOSE FINGERSTICK - Abnormal; Notable for the following components:    POC Glucose 121 (*)     All other components within normal limits   POC GLUCOSE FINGERSTICK - Abnormal; Notable for the following components:    POC Glucose 136 (*)     All other components within normal limits   POC GLUCOSE FINGERSTICK - Abnormal; Notable for the following components:    POC Glucose 142 (*)     All other components within normal limits   POC GLUCOSE FINGERSTICK - Abnormal; Notable for the following components:    POC Glucose 161 (*)     All other components within normal limits   POC GLUCOSE FINGERSTICK - Abnormal; Notable for the following components:    POC Glucose 164 (*)     All other components within normal limits   POC GLUCOSE FINGERSTICK - Abnormal; Notable for the following components:    POC Glucose 173 (*)     All other components within normal limits   POC GLUCOSE FINGERSTICK - Abnormal; Notable for the following components:    POC Glucose 200 (*)     All other components within normal limits   POC GLUCOSE FINGERSTICK - Abnormal; Notable for the following components:    POC Glucose 185 (*)     All other components within normal limits   ARTERIAL BLOOD GAS, POC - Abnormal; Notable for the following components:    POC PO2 82.0 (*)     Negative Base Excess, Art 2.1 (*)     All other components within normal limits   POCT GLUCOSE - Abnormal; Notable for the following components:    POC Glucose 170 (*)     All other components within normal limits   POC GLUCOSE FINGERSTICK - Abnormal; Notable for the following components:    POC Glucose 182 (*)     All other components within normal limits   POC GLUCOSE CULTURE, BLOOD 1   COVID-19, RAPID   CULTURE, RESPIRATORY   CULTURE, URINE   CALCIUM, IONIZED   HGB/HCT   CALCIUM, IONIC (POC)   APTT   ANTI-XA, UNFRACTIONATED HEPARIN   HGB/HCT   CALCIUM, IONIC (POC)   BLOOD GAS, ARTERIAL   BLOOD GAS, ARTERIAL   BLOOD GAS, ARTERIAL   HEMOGLOBIN AND HEMATOCRIT   HEMOGLOBIN AND HEMATOCRIT   MAGNESIUM   MAGNESIUM   BLOOD GAS, ARTERIAL   MAGNESIUM   APTT   ANTI-XA, UNFRACTIONATED HEPARIN   ANTI-XA, UNFRACTIONATED HEPARIN   BLOOD GAS, ARTERIAL   MAGNESIUM   ANTI-XA, UNFRACTIONATED HEPARIN   BLOOD GAS, ARTERIAL   MAGNESIUM   HGB/HCT   ANTI-XA, UNFRACTIONATED HEPARIN   BLOOD GAS, ARTERIAL   MAGNESIUM   ANTI-XA, UNFRACTIONATED HEPARIN   BLOOD GAS, ARTERIAL   BLOOD GAS, ARTERIAL   AMMONIA   BLOOD GAS, ARTERIAL   ANTI-XA, UNFRACTIONATED HEPARIN   ANTI-XA, UNFRACTIONATED HEPARIN   BLOOD GAS, ARTERIAL   ANTI-XA, UNFRACTIONATED HEPARIN   PHOSPHORUS   PHOSPHORUS   MAGNESIUM   MAGNESIUM   BILIRUBIN, DIRECT   BILIRUBIN, DIRECT   AMYLASE   LIPASE   CK   BLOOD GAS, ARTERIAL   BLOOD GAS, ARTERIAL   BLOOD GAS, ARTERIAL   ANTI-XA, UNFRACTIONATED HEPARIN   ANTI-XA, UNFRACTIONATED HEPARIN   MICROSCOPIC URINALYSIS   NEURON SPECIFIC ENOLASE (NSE)   NEURON SPECIFIC ENOLASE (NSE)   ANTI-XA, UNFRACTIONATED HEPARIN   COMPREHENSIVE METABOLIC PANEL W/ REFLEX TO MG FOR LOW K   APTT   PROTIME-INR   PHOSPHORUS   MAGNESIUM   BILIRUBIN, DIRECT   ANTI-XA, UNFRACTIONATED HEPARIN   CBC WITH AUTO DIFFERENTIAL   COMPREHENSIVE METABOLIC PANEL W/ REFLEX TO MG FOR LOW K   APTT   PROTIME-INR   PHOSPHORUS   MAGNESIUM   BILIRUBIN, DIRECT   BLOOD GAS, ARTERIAL   NOTIFICATION PANEL, POC   POC GLUCOSE FINGERSTICK   ARTERIAL BLOOD GAS, POC   NOTIFICATION PANEL, POC   POCT GLUCOSE   POCT GLUCOSE   POCT GLUCOSE   POCT GLUCOSE   POCT GLUCOSE   POCT GLUCOSE   POCT GLUCOSE   POCT GLUCOSE   POCT GLUCOSE   POCT GLUCOSE   POCT GLUCOSE   POCT GLUCOSE   POCT GLUCOSE   POCT GLUCOSE   POCT GLUCOSE   POCT GLUCOSE   POCT GLUCOSE   POCT GLUCOSE

## 2023-10-22 NOTE — PROGRESS NOTES
PROCEDURE NOTE - CENTRAL VENOUS LINE PLACEMENT    PATIENT NAME: Kwame Chaparro  MEDICAL RECORD NO. [id number]  DATE: 10/22/2023  ATTENDING PHYSICIAN: Dr Aquilino Clemente DIAGNOSIS:  vascular access  POSTOPERATIVE DIAGNOSIS:  Same  PROCEDURE PERFORMED:  Right Femoral Vein Central Line Insertion  PERFORMING PHYSICIAN: Oracio Unger DO  ANESTHESIA:  Local utilizing 1% lidocaine  ESTIMATED BLOOD LOSS:  Less than 25 ml  COMPLICATIONS:  None immediately appreciated. DISCUSSION:  Juvenal Vasquez is a 17 y.o.-year-old adult who requires central IV access vascular access. The history and physical examination were reviewed and confirmed. CONSENT: Unable to be obtained due to the emergent nature of this procedure. PROCEDURE:  A timeout was initiated by the bedside nurse and was confirmed by those present. The patient was placed in a supine position. The skin overlying the Right Femoral Vein was prepped with chlorhexadine and draped in sterile fashion. The skin was infiltrated with local anesthetic. The vessel and surrounding anatomy was visualized using ultrasound. Through the anesthetized region, the introducer needle was inserted into the femoral vein returning dark red non pulsatile blood. A guidewire was placed through the center of the needle with no resistance. Ultrasound confirmed presence of wire in the vein. A small incision made in the skin with a #11 scalpel blade. The dilator was inserted into the skin and vein over guidewire using Seldinger technique. The dilator was then removed and the 7F 20cm catheter was placed in the vein over the guidewire using Seldinger technique. The guidewire was then removed and all ports aspirated and flushed appropriately. The catheter then secured using silk suture and a temporary sterile dressing was applied. No immediate complication was evident. All sponge, instrument and needle counts were correct at the completion of the procedure.     The

## 2023-10-22 NOTE — ED NOTES
The following labs were labeled with appropriate pt sticker and tubed to lab:     [x] Blue     [x] Lavender   [] on ice  [x] Green/yellow  [] Green/black [] on ice  [] Colt Muslim  [] on ice  [] Yellow  [] Red  [] Pink  [] Type/ Screen  [] ABG  [] VBG    [] COVID-19 swab    [] Rapid  [] PCR  [] Flu swab  [] Peds Viral Panel     [] Urine Sample  [] Fecal Sample  [] Pelvic Cultures  [] Blood Cultures  [] X 2  [] STREP Cultures       Batch, Tiffany Nj, RN  10/22/23 4811

## 2023-10-22 NOTE — PROGRESS NOTES
PROCEDURE NOTE - EMERGENCY INTUBATION    PATIENT NAME: Kwame Merino  MEDICAL RECORD NO. [id number]  DATE: 10/22/2023  ATTENDING PHYSICIAN: Dr Cecilia Cheng DIAGNOSIS:  Acute Respiratory Failure  POSTOPERATIVE DIAGNOSIS:  Same  PROCEDURE PERFORMED:  Emergency endotracheal intubation  PERFORMING PHYSICIAN: Norm Joiner DO    MEDICATIONS: Unable to premedicate due to the emergent nature of the procedure    DISCUSSION:  Maria Isabel Fernandez is a 17 y.o.-year-old adult who requires intubation and ventilatory support due to impending airway compromise and comatose state. The history and physical examination were reviewed and confirmed. CONSENT: Unable to be obtained due to the emergent nature of this procedure. PROCEDURE:  A timeout was initiated by the bedside nurse and was confirmed by those present. The patient was placed in the appropriate position. Cricoid pressure was utilized. Intubation was performed by video laryngoscopy using a glide-laryngoscope and a 7.5 cuffed endotracheal tube. The cuff was then inflated and the tube was secured appropriately at a distance of 26 cm to the dental ridge. Initial confirmation of placement included bilateral breath sounds, an end tidal CO2 detector, and adequate pulse oximetry reading. A chest x-ray to verify correct placement of the tube, tube found to be deep, will be pulled back to 24cm. The patient tolerated the procedure well.      COMPLICATIONS:  None     Norm Joiner DO  1:15 PM, 10/22/23

## 2023-10-22 NOTE — PLAN OF CARE
Problem: Respiratory - Adult  Goal: Achieves optimal ventilation and oxygenation  Outcome: Progressing  Flowsheets (Taken 10/22/2023 1128)  Achieves optimal ventilation and oxygenation:   Assess for changes in respiratory status   Assess for changes in mentation and behavior   Position to facilitate oxygenation and minimize respiratory effort   Oxygen supplementation based on oxygen saturation or arterial blood gases   Encourage broncho-pulmonary hygiene including cough, deep breathe, incentive spirometry   Assess the need for suctioning and aspirate as needed   Assess and instruct to report shortness of breath or any respiratory difficulty   Respiratory therapy support as indicated

## 2023-10-22 NOTE — PROGRESS NOTES
[place] [address]     Emergency/Trauma Note    PATIENT NAME: Antony Bassett    Shift date: 10/22/2023  Shift day: Sunday   Shift # 1    Room # 13/13     Name: Antony Bassett            Age: 82 y.o. Gender: adult          Advent: Pentecostalism   Place of Restorationist:     Trauma/Incident type: Full Arrest  Admit Date & Time: 10/22/2023 10:18 AM  TRAUMA NAME: Kwame Lowe     ADVANCE DIRECTIVES IN CHART? No    NAME OF DECISION MAKER:     RELATIONSHIP OF DECISION MAKER TO PATIENT:     PATIENT/EVENT DESCRIPTION:  Kwame Lowe is a 82 y.o. adult who arrived via ground ambulance as full arrest. Patient coded and was resuscitated. Patient to be admitted to 13/13. SPIRITUAL ASSESSMENT-INTERVENTION-OUTCOME:  No spiritual assessment was carried out because patient was intubated and unresponsive. Per report, patient was raised Pentecostalism. Patient received sacrament of anointing of the sick. Per EMS, family was on their way.  maintained supportive presence and prayed at patient's bedside. PATIENT BELONGINGS:  This  did not handle patient's belongings. ANY BELONGINGS OF SIGNIFICANT VALUE NOTED:  Unknown    REGISTRATION STAFF NOTIFIED? WHAT IS YOUR SPIRITUAL CARE PLAN FOR THIS PATIENT?:  Follow up visits recommended for ongoing assessment of patient's condition and for more prayers and support. Electronically signed by Fr. Babak Francis on 10/22/2023 at 11:06 AM.  [address] [place] [address]  [phone]

## 2023-10-22 NOTE — ED NOTES
Verbal oder to increas Epi gtt per Dr Abelardo Barnard [id number] [facility], [place], RN  10/22/23 3635

## 2023-10-22 NOTE — PLAN OF CARE
Problem: Respiratory - Adult  Goal: Achieves optimal ventilation and oxygenation  10/22/2023 1953 by Adamaris Padilla RCP  Outcome: Progressing

## 2023-10-22 NOTE — PROGRESS NOTES
[place] [id number] [place] [address]  PROGRESS NOTE    Shift date: 10/22/2023   Shift day: Sunday   Shift # 1    Room # 13/13   Name: Mary Suarez               Presybeterian: Quaker   Place of Latter-day:     Referral:  Referral from other , family arriving    [address] Date & Time: 10/22/2023 10:18 AM    Assessment:  Maria Isabel Fernandez is a 82 y.o. adult in the hospital because of a \"post-arrest\". Pt's mother, Clotshae Cindy, and Vena Knows arrived. They appeared to be unaware of the severity of the patient's condition. Intervention:  Writer introduced self and title as .  facilitated a meeting with family and Dr. Leyda Jaimes who explained patient's condition.  escorted family to pt's room for a brief visit and provided hospitality to them in the consultation room. Doctor and  notified them that pt had received last rights. Outcome:  Family appeared shocked but was coping. They appear to be a good support to each other. Plan:  Chaplains will remain available to offer spiritual and emotional support as needed. Electronically signed by Dmitriy Agosto on 10/22/2023 at 11:44 [address] [employer]  [phone]     10/22/23 1142   Encounter Summary   Encounter Overview/Reason  Crisis   Service Provided For: Family   Referral/Consult From: Other    Support System Parent; Family members   Complexity of Encounter High   Begin Time 1100   End Time  1145   Total Time Calculated 45 min   Crisis   Type Family Care   Assessment/Intervention/Outcome   Assessment Coping; Shock   Intervention Active listening;Explored/Affirmed feelings, thoughts, concerns; Facilitated/Participated in [facility] [place] in conversation;Expressed feelings, needs, and concerns;Expressed Gratitude;Receptive

## 2023-10-22 NOTE — ED PROVIDER NOTES
[address] [place] [address] ED  Emergency Department Encounter  Emergency Medicine Resident     Hang Noriega  MRN: [id number]  [facility] 1960  Date of evaluation: 10/22/23  PCP:  No primary care provider on file. Note Started: 12:49 PM EDT      CHIEF COMPLAINT       Chief Complaint   Patient presents with    Cardiac Arrest       HISTORY OF PRESENT ILLNESS  (Location/Symptom, Timing/Onset, Context/Setting, Quality, Duration, Modifying Factors, Severity.)      Teagan Ivy is a 66 y.o. adult who presents with cardiac arrest.  History is limited and provided by EMS. Apparently EMS was called for patient being found down by family in the basement of her house. Last known well was sometime last night. Patient was transferred to [facility] and was brought via EMS to the hospital.  In the ambulance bay patient lost pulses. CPR was initiated. Patient had V-fib in the monitor and was shocked once. Patient was brought into the emergency department for further care. PAST MEDICAL / SURGICAL / SOCIAL / FAMILY HISTORY      has no past medical history on file. has no past surgical history on file.       Social History     Socioeconomic History    Marital status:      Spouse name: Not on file    Number of children: Not on file    Years of education: Not on file    Highest education level: Not on file   Occupational History    Not on file   Tobacco Use    Smoking status: Not on file    Smokeless tobacco: Not on file   Substance and Sexual Activity    Alcohol use: Not on file    Drug use: Not on file    Sexual activity: Not on file   Other Topics Concern    Not on file   Social History Narrative    Not on file     Social Determinants of Health     Financial Resource Strain: Not on file   Food Insecurity: Not on file   Transportation Needs: Not on file   Physical Activity: Not on file   Stress: Not on file   Social Connections: Not on file   Intimate Partner Violence: Not on file   Housing

## 2023-10-22 NOTE — PROCEDURES
[address]  [address] [address] [address]  [phone]        History and Physical     NAME: Gary Ryder   :  1956   MRN:  [id number]         HPI:  Gary Ryder is a 63 y.o. female here for colonoscopy after recent procedure with multiple partially removed adenomatous polyps. Also has family h/o colon cancer. Never ended up getting referral for genetic counselor. Past Surgical History:   Procedure Laterality Date    COLONOSCOPY N/A 2021    COLONOSCOPY performed by Luis Botello MD at [facility] ENDOSCOPY     [facility]       Past Medical History:   Diagnosis Date    Asthma     Depression     Heart murmur     Hypothyroid     PTSD (post-traumatic stress disorder)     Schizoaffective disorder (HCC)     not currently on meds     Social History     Tobacco Use    Smoking status: Never Smoker    Smokeless tobacco: Never Used   Substance Use Topics    Alcohol use: Yes     Comment: rare    Drug use: Yes     Types: Marijuana     No current facility-administered medications on file prior to encounter. Current Outpatient Medications on File Prior to Encounter   Medication Sig Dispense Refill    levothyroxine (Synthroid) 88 mcg tablet Take  by mouth Daily (before breakfast).  rosuvastatin calcium (ROSUVASTATIN PO) Take  by mouth.        Allergies   Allergen Reactions    Aspirin Nausea and Vomiting     Family History   Problem Relation Age of Onset    Colon Cancer Mother      Current Facility-Administered Medications   Medication Dose Route Frequency    0.9% sodium chloride infusion  75 mL/hr IntraVENous CONTINUOUS    sodium chloride (NS) flush 5-40 mL  5-40 mL IntraVENous Q8H    sodium chloride (NS) flush 5-40 mL  5-40 mL IntraVENous PRN    midazolam (VERSED) injection 0.25-5 mg  0.25-5 mg IntraVENous Multiple    fentaNYL citrate (PF) injection 12.5-200 mcg  12.5-200 mcg IntraVENous Multiple    naloxone (NARCAN) injection 0.4 mg  0.4 mg Insert Arterial Line  Date/Time:  10/22/23, 3:08 PM  Performed by: Alexis Pantoja RCP    Patient identity confirmed: arm band and provided demographic data   Time out: Immediately prior to procedure a \"time out\" was called to verify the correct patient, procedure, equipment, support staff. Preparation: Patient was prepped and draped in the usual sterile fashion.     Location:left radial    Mario's test normal: yes  Needle gauge: 20     Number of attempts: 2  Post-procedure: transparent dressing applied and line secured    Patient tolerance: well IntraVENous Multiple    flumazeniL (ROMAZICON) 0.1 mg/mL injection 0.2 mg  0.2 mg IntraVENous Multiple    simethicone (MYLICON) 76ML/7.6GI oral drops 80 mg  1.2 mL Oral Multiple    atropine injection 0.5 mg  0.5 mg IntraVENous ONCE PRN    EPINEPHrine (ADRENALIN) 0.1 mg/mL syringe 1 mg  1 mg Endoscopically ONCE PRN     Facility-Administered Medications Ordered in Other Encounters   Medication Dose Route Frequency    0.9% sodium chloride infusion   IntraVENous CONTINUOUS       PHYSICAL EXAM:    BP (!) 135/91   Pulse 72   Temp 98.4 °F (36.9 °C)   Resp 16   Ht 5' 4\" (1.626 m)   Wt 68 kg (150 lb)   SpO2 99%   Breastfeeding No   BMI 25.75 kg/m²      General: WD, WN. Alert, cooperative, no acute distress    HEENT: NC, Atraumatic. PERRLA, EOMI. Anicteric sclerae. Lungs:  CTA Bilaterally. No Wheezing/Rhonchi/Rales. Heart:  Regular  rhythm,  No murmur, No Rubs, No Gallops  Abdomen: Soft, Non distended, Non tender. +Bowel sounds, no HSM  Extremities: No c/c/e  Neurologic:  CN 2-12 gi, Alert and oriented X 3. No acute neurological distress   Psych:   Good insight. Not anxious nor agitated.        Assessment:   · Personal h/o multiple colon polyps - last colonoscopy 07/2021    Plan:   · Endoscopic procedure: Colonoscopy  · Anesthesia plan: Monitored Anesthesia Care

## 2023-10-22 NOTE — PLAN OF CARE
Problem: Respiratory - Adult  Goal: Achieves optimal ventilation and oxygenation  10/22/2023 1618 by Ritu Norman RN  Outcome: Progressing     Problem: Discharge Planning  Goal: Discharge to home or other facility with appropriate resources  10/22/2023 1618 by Ritu Norman RN  Outcome: Progressing     Problem: Pain  Goal: Verbalizes/displays adequate comfort level or baseline comfort level  10/22/2023 1618 by Ritu Norman RN  Outcome: Progressing     Problem: Safety - Adult  Goal: Free from fall injury  10/22/2023 1618 by Ritu Norman RN  Outcome: Progressing     Problem: Nutrition Deficit:  Goal: Optimize nutritional status  10/22/2023 1618 by Ritu Norman RN  Outcome: Progressing     Problem:  Thermoregulation - /Pediatrics  Goal: Maintains normal body temperature  Outcome: Progressing     Problem: Chronic Conditions and Co-morbidities  Goal: Patient's chronic conditions and co-morbidity symptoms are monitored and maintained or improved  Outcome: Progressing

## 2023-10-22 NOTE — H&P
Critical Care - History and Physical Examination    Patient's name:  Tmi Fox  Medical Record Number: [id number]  Patient's account/billing number: [de-identified]  Patient's YOB: 1960  Age: 66 y.o. Date of Admission: 10/22/2023 10:18 AM  Reason of ICU admission:   Date of History and Physical Examination: 10/22/2023      Primary Care Physician: No primary care provider on file. Attending Physician:    Code Status: Full Code    Chief complaint: Unresponsive    Reason for ICU admission:  Cardiac arrest withVfib      History Of Present Illness:   History was obtained from chart review. Tim Fox is a 66 y.o. F with history of of hypertension, CHF, CVA, IDDM who presents to the emergency department in cardiac arrest.  As per EMS report, patient's last known well was last evening. She was found by family in the basement, unresponsive. Was unresponsive for EMS. On route to the emergency department, patient did experience V-fib arrest.  Compressions were started. Patient was shocked once. On arrival to the emergency room, patient did have CPR performed for 20 minutes. Epi was given, amiodarone was given, patient was defibrillated multiple times, bicarb was given, calcium chloride was given. Point-of-care showed elevated potassium and significantly elevated glucose. Initial post-arrest labs in the ED show:  WBC 32.6, Hgb 10.9,   Glucose 1429, K 6.5, Na 121 [142 corrected], Cr 2.5  Mg 2.8, Phos 11.6  ,   PT 1.7, PTT 46.6    Postarrest ECG shows ST depression in lateral leads, trop 383. Cardiology was consulted at the time, recommending to trend troponins and heparin gtt, not a Cath Lab candidate. ED team did discuss with cardiology, no recommendations regarding cooling. Patient did arrive with T 34.4 Celsius. Patient ready cool. Did have triple-lumen CVC placed in right Fem in the emergency department.   Was started on epi gtt., given 500 mL by ED bladder wall thickening. Correlate for underlying cystitis. CT HEAD WO CONTRAST   Final Result   1. No acute intracranial abnormality. XR CHEST PORTABLE   Preliminary Result   Two chest x-rays demonstrate successful adjustment of the endotracheal tube   to approximately 3.4 cm above the jamar. Nasogastric tube seen however the   tip cannot be identified. No active infiltrates or pulmonary edema. No   active pleural disease. XR ABDOMEN (KUB) (SINGLE AP VIEW)   Final Result   Enteric tube side port which is at or just distal to the gastroesophageal   junction and should be advanced slightly. XR CHEST PORTABLE   Preliminary Result   Two chest x-rays demonstrate successful adjustment of the endotracheal tube   to approximately 3.4 cm above the jamar. Nasogastric tube seen however the   tip cannot be identified. No active infiltrates or pulmonary edema. No   active pleural disease. Electrocardiogram:     Last Echocardiogram findings:          Assessment and Plan     Patient Active Problem List   Diagnosis    Cardiac arrest [facility])       Plan:  Neuro:    Intubated  Neuro checks per protocol  versed  Was paralyzed with vecuronium for CT  Pain control PRN  No acute abnormality found on CT head      Resp:  Maintain oxygen sats >92%  Intubated with vent settings AC/PRVC/AT/8/20/440.   ABG gases  CT chest shows bilateral pulmonary infiltrates, likely aspiration     CV:  Hemodynamically stable  MAP goal >65  Elevated troponins (383 -> 792), continue to trend troponins  Cardiology on board and appreciate their recommendations  Heparin gtt is held for now because of 1 episode of Coffee-ground emesis  Vasopressin and Levophed has been started    GI/Nutrition:  Coffee-ground emesis, one episode  Will consult GI if it coffee-ground emesis continuous  Ulcer Prophylaxis: protonix 40mg BID  Diet:Diet NPO    /Fluids/Electrolytes:  IV Fluids: Received 1 L LR bolus, 500mL by ED  I/O:

## 2023-10-22 NOTE — ED NOTES
The following labs were labeled with appropriate pt sticker and tubed to lab:     [] Blue     [] Lavender   [] on ice  [] Green/yellow  [] Green/black [] on ice  [] Katia Hotter  [] on ice  [] Yellow  [] Red  [] Pink  [] Type/ Screen  [] ABG  [] VBG    [] COVID-19 swab    [] Rapid  [] PCR  [] Flu swab  [] Peds Viral Panel     [] Urine Sample  [] Fecal Sample  [] Pelvic Cultures  [x] Blood Cultures  [x] X 2 per Dr Sid Hurst , from cental line right groin white and blue port  [] STREP Cultures       Batch, Tre Tyson, RN  10/22/23 6997

## 2023-10-22 NOTE — CONSULTS
Neuro Critical Care Consult Note    Reason for Consult:  neuro-prognostication  Requesting Physician:  Dr. Morocho Agent  Attending Physician: Dr. Loly Andrea    History Obtained From:  [facility] Staff record    CHIEF COMPLAINT:       Cardiac arrest    HISTORY OF PRESENT ILLNESS:       The patient is a 66 y.o. adult who presents to the ED via EMS s/p cardiac arrest with ROSC and DKA. Patient is intubated and sedated in MICU and is unable to provide any history. Per records and report, the patient's last known well was last night. She was found unresponsive by her family in the basement this morning. EMS was called, and patient had a pulse at home on EMS arrival. Once en route to [facility] ED, patient suffered a V-fib arrest. CPR was started and she underwent defibrillation x1 en route. Once in the ED, patient continued to arrest where she received CPR for roughly 20 min. She received Epi x4-5, bicarb x2, calcium x1, insulin 10 units, and defibrillated multiple times while in ED. She was intubated with vecuronium 10 mg with 7.5 cuffed ET tube at 24 cm at the lip (s/p CXR). ROSC obtained at 10:39 AM. Insulin gtt and epi gtt started in the ED. Imaging in the ED include;   - Head CT which was unremarkable.    - CT abdomen and pelvis which showed bilateral lower lobe consolidations, changes suggestive of gastritis, nonspecific periportal edema.   - CT PE study showed no PE, bilateral pulmonary infiltrates, multiple rib fractures, and an inferior sternal fx  - CXR confirmed ET tube and NG tube in appropriate positions     Labs in ED include;  - blood glucose 1429, anion gap 35, bicarb 6, beta-hydroxybutyrate 10.12, potassium 6.5, sodium 121, Cr 2.5, BUN 80, lactic acid 6.0, troponin 792, alk phos 114, , , WBC 32.6, hgb 10.9, platelets 117, INR 1.8  - VB.77/46.6/116.0/6.4     Neuro critical care consulted for neuro-prognostication    [employer]: n/a  Modified pelvic ascites. 5. Partially distended urinary bladder. Diffuse bladder wall thickening. Correlate for underlying cystitis. CT HEAD WO CONTRAST   Final Result   1. No acute intracranial abnormality. XR CHEST PORTABLE   Preliminary Result   Two chest x-rays demonstrate successful adjustment of the endotracheal tube   to approximately 3.4 cm above the jamar. Nasogastric tube seen however the   tip cannot be identified. No active infiltrates or pulmonary edema. No   active pleural disease. XR ABDOMEN (KUB) (SINGLE AP VIEW)   Final Result   Enteric tube side port which is at or just distal to the gastroesophageal   junction and should be advanced slightly. XR CHEST PORTABLE   Preliminary Result   Two chest x-rays demonstrate successful adjustment of the endotracheal tube   to approximately 3.4 cm above the jamar. Nasogastric tube seen however the   tip cannot be identified. No active infiltrates or pulmonary edema. No   active pleural disease. Labs and Images reviewed with:    [] Ngozi Benavides MD    [x] Lino Montnao MD  [] Naresh Sanchez MD  --[] there are no new interval images to review. ASSESSMENT AND PLAN:         ASSESSMENT:   This is a 66 y.o. adult who presents to the ED via EMS s/p cardiac arrest with ROSC and DKA. Patient is sedated on Versed and was paralyzed during intubation and imaging, so unreliable neuro exam at this time. However, unequal and unresponsive pupils, no corneal reflexes, or cough/gag on my exam while weaning Versed gtt  Neuro critical care consulted for neuro-prognostication. Patient care will be discussed with attending, will reevaluate patient along with attending. PLAN/MEDICAL DECISION MAKING:    NEUROLOGIC:  Acute encephalopathy secondary to cardiac arrest with prolonged downtime  - s/p epi x4-5, bicarb x2, calcium x1, insulin 10 units, and defibrillated multiple times while in ED.  She was intubated with vecuronium

## 2023-10-22 NOTE — CONSULTS
Comprehensive Nutrition Assessment    Type and Reason for Visit:  Initial, Consult (Tube Feeding Recommendations)    Nutrition Recommendations/Plan:   Continue NPO. Monitor plans for nutrition and plan of care. If nutrition support requested, suggest Peptide Based High Protein goal 50 mL/hr = 1200 kcals, 105 gm protein per day. Malnutrition Assessment:  Malnutrition Status:  Insufficient data (10/22/23 0190)    Context:  Acute Illness     Findings of the 6 clinical characteristics of malnutrition:  Energy Intake:  Mild decrease in energy intake  Weight Loss:  Unable to assess   Body Fat Loss:  Unable to assess   Muscle Mass Loss:  Unable to assess  Fluid Accumulation:  Unable to assess   Strength:  Not Performed    Nutrition Assessment:    Consulted for Tube Feeding Recommendations. Admitted s/p cardiac arrest.  Pt intubated and sedated. Will provide recommendations for nutrition support and monitor plan of care. Nutrition Related Findings:    Labs/Meds reviewed. Wound Type: None       Current Nutrition Intake & Therapies:    Average Meal Intake: NPO  Average Supplements Intake: NPO  Diet NPO  Additional Calorie Sources:  None    Anthropometric Measures:  Height: 162.6 cm (5' 4.02\")  Ideal Body Weight (IBW):   ( )    Admission Body Weight: 125 kg (275 lb 9.2 oz)  Current Body Weight: 124.7 kg (274 lb 14.6 oz),   IBW.  Weight Source: Bed Scale  Current BMI (kg/m2): 47.2                          BMI Categories: Obese Class 3 (BMI 40.0 or greater)    Estimated Daily Nutrient Needs:  Energy Requirements Based On: Kcal/kg  Weight Used for Energy Requirements: Ideal  Energy (kcal/day): 1390-6054 kcals/day  Weight Used for Protein Requirements: Ideal  Protein (g/day):  gm pro/day  Method Used for Fluid Requirements: Other (Comment)  Fluid (ml/day): per MD    Nutrition Diagnosis:   Inadequate oral intake related to impaired respiratory function, acute injury/trauma as evidenced by NPO or clear liquid

## 2023-10-22 NOTE — PROGRESS NOTES
Patients mother [place] at bedside. RN sent pt's home medication, clothes, and cell phone with mom.  Personal belongings are no longer at bedside

## 2023-10-23 NOTE — CARE COORDINATION
Attempted to complete initial assessment. Patient intubated. FiO2 100 PEEP 10. Continuous Drips: Heparin; Insulin regular; Levophed; Protonix; Sodium Bicarbonate and Vasopressin. No family at bedside. Will attempt later as time permits.

## 2023-10-23 NOTE — PROGRESS NOTES
Pharmacy Note     Renal Dose Adjustment    Hannah Cerna is a 66 y.o. adult. Pharmacist assessment of renally cleared medications.     Recent Labs     10/22/23  2021 10/23/23  0204   BUN 59* 60*       Recent Labs     10/22/23  2021 10/23/23  0204   CREATININE 2.8* 3.1*       Estimated Creatinine Clearance (based on SCr of 3.1 mg/dL (H))  Female: 21 mL/min (A)  Male: 26 mL/min (A)  Estimated CrCl using Ideal Body Weight: 16 mL/min (based on IBW 54.7 kg)    Height:   Ht Readings from Last 1 Encounters:   10/22/23 1.626 m (5' 4.02\")     Weight:  Wt Readings from Last 1 Encounters:   10/23/23 91.7 kg (202 lb 2.6 oz)       The following medication dose has been adjusted based upon renal function per P&T Guidelines:             Unasyn 3000mg IV Q6H to Q12H    Ana Cristina Bach PharmD BCPS [facility]  10/23/2023 7:49 AM

## 2023-10-23 NOTE — CARE COORDINATION
Case Management Assessment  Initial Evaluation    Date/Time of Evaluation: 10/23/2023 11:29 AM  Assessment Completed by: Cornell Sebastian    If patient is discharged prior to next notation, then this note serves as note for discharge by case management. Patient Name: Dulce Maria Gallardo                   YOB: 1960  Diagnosis: Cardiac arrest [facility]) [I46.9]                   Date / Time: 10/22/2023 10:18 AM    Patient Admission Status: Inpatient   Readmission Risk (Low < 19, Mod (19-27), High > 27): No data recorded  Current PCP: No primary care provider on file. PCP verified by CM? (P) No (Has PCP however Mother does not know the MD's Name.)    Chart Reviewed: Yes      History Provided by: (P) Other (see comment) (Mother and Sister, Ellen)  Patient Orientation: (P) Unable to Assess, Other (see comment) (Intubated)    Patient Cognition: (P) Other (see comment) (Intubated)    Hospitalization in the last 30 days (Readmission):  No    If yes, Readmission Assessment in  Navigator will be completed.     Advance Directives:      Code Status: Full Code   Patient's Primary Decision Maker is: (P) Legal Next of Kin      Discharge Planning:    Patient lives with: (P) Family Members (Lives with her mother.) Type of Home:    Primary Care Giver: (P) Self  Patient Support Systems include: (P) Parent, Family Members   Current Financial resources: (P) Medicaid, Other (Comment) ([employer] Medicare)  Current community resources:    Current services prior to admission: (P) Durable Medical Equipment, Oxygen Therapy            Current DME: (P) Carlos Sally, Wheelchair, Cane            Type of Home Care services:       ADLS  Prior functional level: (P) Independent in ADLs/IADLs  Current functional level: (P) Independent in ADLs/IADLs    PT AM-PAC:   /24  OT AM-PAC:   /24    Family can provide assistance at DC: (P) Other (comment) (TBD)  Would you like Case Management to discuss the discharge plan with any other family members/significant

## 2023-10-23 NOTE — CONSULTS
[facility]    Consult / H&P               Today's Date: 10/23/2023  Patient Name: Isha Dixon  Date of admission: 10/22/2023 10:18 AM  Patient's age: 66 y.o., 1960  Admission Dx: Cardiac arrest [facility]) [I46.9]    Reason for Consult:  Cardiac evaluation    Requesting Physician: Collette Pata, MD    CHIEF COMPLAINT: V-fib cardiac arrest    History Obtained From:  electronic medical record    HISTORY OF PRESENT ILLNESS:      The patient is a 66 y.o.  adult who is admitted to the hospital post V-fib cardiac arrest.  She has PMH significant of HTN, ??CHF, CVA, type 2 diabetes mellitus, was brought in by EMS postcardiac arrest.  She was found by family in the basement unresponsive. She had pulse on evaluation by EMS. In route to [facility], she had V-fib cardiac arrest, CPR was started and she was defibrillated once. In the ER she continued to arrest where she received CPR for about 20 minutes. Received for 5 doses of epinephrine and 2 of bicarb 1 of calcium, amiodarone, 10 units of insulin and was also defibrillated multiple times in ER. She was intubated and was insulin drip was started for DKA. She was hypothermic, temperature of 34.4 Celsius. Cardiology consulted for post cardiac arrest elevated troponins. Postcardiac arrest, EKG showed ST depression in lateral leads. Troponin 383->792-> 1050->3430->3580->4253    Relevant labs  Blood glucose 1429, anion gap 35, beta hydroxy butyrate 10.12, potassium 6.5, sodium 121, creat 2.5, BUN 80, lactic acid 6  elevated transaminases  WBC 32.6 hemoglobin 10.9    Relevant imaging  CT head unremarkable  CT A/P bilateral lower lobe consolidations, gastritis, previous right hemicolectomy, possible cystitis  CT PE ruled out PE but showed bilateral pulmonary infiltrates, multiple rib fractures and inferior sternal fracture. Currently intubated and mechanically ventilated. Afebrile. Off epinephrine.   Currently on Thuan-Synephrine no contraindication. Will plan for ischemic work-up with cardiac cath with good neurological recovery. Will consult EP for assessment of need of AICD once the acute issues resolve. Keep potassium more than 4 and magnesium more than 2. Continue pressor support as appropriate. Continue management of DKA, PARUL and other issues as per the primary team.  Will discuss with rounding attending Dr. Dara Joseph for final recommendations. Javad Jordan MD  Cardiology Service  Internal Medicine Resident, PGY-3  [facility]        Attending Physician Statement:    I have discussed the care of  [facility] , including pertinent history and exam findings, with the Cardiology fellow/resident. I have seen and examined the patient and the key elements of all parts of the encounter have been performed by me. I agree with the assessment, plan and orders as documented by the fellow/resident, after I modified exam findings and plan of treatments, and the final version is my approved version of the assessment. Additional Comments: Patient remains off sedation. No gag or cough reflex. LTME - flat wave. Neuro on board. VF arrest. Shock liver. On 2pressors. Poor prognosis. I do not think taking her to the cath lab is going to change the outcome. Discussed with RN. Call cardiology with questions.     Danyel Azevedo MD

## 2023-10-23 NOTE — PROGRESS NOTES
INTENSIVE CARE UNIT  Resident Physician Progress Note    Patient - Gauri Clancy  Date of Admission -  10/22/2023 10:18 AM  Date of Evaluation -  10/23/2023  Room and Bed Number -  3582/3539-15   Hospital Day - 1  Chief Complaint   Patient presents with    Cardiac Arrest      HPI:     Gauri Clancy is a 66 y.o. F with history of of hypertension, CHF, CVA, IDDM who presents to the emergency department in cardiac arrest.  As per EMS report, patient's last known well was last evening. She was found by family in the basement, unresponsive. Was unresponsive for EMS. On route to the emergency department, patient did experience V-fib arrest.  Compressions were started. Patient was shocked once. On arrival to the emergency room, patient did have CPR performed for 20 minutes. Epi was given, amiodarone was given, patient was defibrillated multiple times, bicarb was given, calcium chloride was given. Point-of-care showed elevated potassium and significantly elevated glucose. Initial post-arrest labs in the ED show:  WBC 32.6, Hgb 10.9,   Glucose 1429, K 6.5, Na 121 [142 corrected], Cr 2.5  Mg 2.8, Phos 11.6  ,   PT 1.7, PTT 46.6     Postarrest ECG shows ST depression in lateral leads, trop 383. Cardiology was consulted at the time, recommending to trend troponins and heparin gtt, not a Cath Lab candidate. ED team did discuss with cardiology, no recommendations regarding cooling. Patient did arrive with T 34.4 Celsius. Patient ready cool. Did have triple-lumen CVC placed in right Fem in the emergency department. Was started on epi gtt., given 500 mL by ED team.  Was started on insulin gtt. SUBJECTIVE:     OVERNIGHT EVENTS:      On arrival to the floor, patient's pupils were originally unreactive. Then did become reactive. Around 4 AM, patient's pupils were unreactive. CT scan was ordered, patient however did desat on attempted transport to CT scanner.     Troponins continue 2.8*  --   --  3.1*   GLUCOSE 1,429*   < > 996* 864* 739* 595*   CALCIUM 8.8  --  8.0*  --   --  7.8*   PROT 4.9*  --  5.4*  --   --  5.3*   LABALBU 2.9*  --  3.3*  --   --  3.2*   BILITOT 0.2*  --  0.3  --   --  0.2*   ALKPHOS 114  --  132*  --   --  126   *  --  1,110*  --   --  580*   *  --  261*  --   --  224*    < > = values in this interval not displayed. Magnesium:   Lab Results   Component Value Date/Time    MG 2.4 10/23/2023 02:04 AM    MG 2.6 10/22/2023 08:21 PM    MG 2.8 10/22/2023 10:46 AM     Phosphorus:   Lab Results   Component Value Date/Time    PHOS 11.6 10/22/2023 10:46 AM     Ionized Calcium:   Lab Results   Component Value Date/Time    CAION 1.31 10/22/2023 10:46 AM        Urinalysis:   Lab Results   Component Value Date/Time    NITRU NEGATIVE 10/22/2023 04:08 PM    COLORU Yellow 10/22/2023 04:08 PM    PHUR 5.0 10/22/2023 04:08 PM    WBCUA 2 TO 5 10/22/2023 04:08 PM    RBCUA 50  10/22/2023 04:08 PM    SPECGRAV 1.025 10/22/2023 04:08 PM    LEUKOCYTESUR NEGATIVE 10/22/2023 04:08 PM    UROBILINOGEN Normal 10/22/2023 04:08 PM    BILIRUBINUR NEGATIVE 10/22/2023 04:08 PM    GLUCOSEU 3+ 10/22/2023 04:08 PM    KETUA TRACE 10/22/2023 04:08 PM       HgBA1c:    Lab Results   Component Value Date/Time    LABA1C 8.0 10/22/2023 12:17 PM     TSH:  No results found for: \"TSH\"  Lactic Acid: No results found for: \"LACTA\"   Troponin: Invalid input(s): \"TROPONIN\"    Microbiology:  Blood culture: Negativex2  Ucx: Negative  MRSA: Negative    Radiology/Imaging:  XR CHEST PORTABLE    Result Date: 10/22/2023  Two chest x-rays demonstrate successful adjustment of the endotracheal tube to approximately 3.4 cm above the jamar. Nasogastric tube seen however the tip cannot be identified. No active infiltrates or pulmonary edema. No active pleural disease.      XR CHEST PORTABLE    Result Date: 10/22/2023  Two chest x-rays demonstrate successful adjustment of the endotracheal tube to approximately

## 2023-10-23 NOTE — PLAN OF CARE
Problem: Nutrition Deficit:  Goal: Optimize nutritional status  Outcome: Not Progressing      Problem: Respiratory - Adult  Goal: Achieves optimal ventilation and oxygenation  10/23/2023 1453 by Da Damon RN  Outcome: Progressing     Problem: Discharge Planning  Goal: Discharge to home or other facility with appropriate resources  Outcome: Progressing  Flowsheets (Taken 10/23/2023 0800)  Discharge to home or other facility with appropriate resources: Identify barriers to discharge with patient and caregiver     Problem: Pain  Goal: Verbalizes/displays adequate comfort level or baseline comfort level  Outcome: Progressing  Flowsheets  Taken 10/23/2023 1200  Verbalizes/displays adequate comfort level or baseline comfort level: Assess pain using appropriate pain scale  Taken 10/23/2023 0800  Verbalizes/displays adequate comfort level or baseline comfort level: Assess pain using appropriate pain scale     Problem: Safety - Adult  Goal: Free from fall injury  Outcome: Progressing  Flowsheets (Taken 10/23/2023 0800)  Free From Fall Injury: Instruct family/caregiver on patient safety     Problem: ABCDS Injury Assessment  Goal: Absence of physical injury  Outcome: Progressing  Flowsheets (Taken 10/23/2023 0800)  Absence of Physical Injury: Implement safety measures based on patient assessment

## 2023-10-24 ENCOUNTER — CARE COORDINATION (OUTPATIENT)
Dept: CARE COORDINATION | Age: 63
End: 2023-10-24

## 2023-10-24 PROBLEM — E87.5 HYPERKALEMIA: Status: ACTIVE | Noted: 2023-01-01

## 2023-10-24 PROBLEM — Z51.5 ENCOUNTER FOR PALLIATIVE CARE: Status: ACTIVE | Noted: 2023-10-24

## 2023-10-24 PROBLEM — E83.39 HYPERPHOSPHATEMIA: Status: ACTIVE | Noted: 2023-10-24

## 2023-10-24 PROBLEM — E87.5 HYPERKALEMIA: Status: ACTIVE | Noted: 2023-10-24

## 2023-10-24 PROBLEM — E83.39 HYPERPHOSPHATEMIA: Status: ACTIVE | Noted: 2023-01-01

## 2023-10-24 PROBLEM — E11.65 TYPE 2 DIABETES MELLITUS WITH HYPERGLYCEMIA (HCC): Status: ACTIVE | Noted: 2023-10-24

## 2023-10-24 PROBLEM — R57.9 SHOCK (HCC): Status: ACTIVE | Noted: 2023-10-24

## 2023-10-24 PROBLEM — E11.65 TYPE 2 DIABETES MELLITUS WITH HYPERGLYCEMIA (HCC): Status: ACTIVE | Noted: 2023-01-01

## 2023-10-24 PROBLEM — R57.9 SHOCK (HCC): Status: ACTIVE | Noted: 2023-01-01

## 2023-10-24 PROBLEM — Z51.5 ENCOUNTER FOR PALLIATIVE CARE: Status: ACTIVE | Noted: 2023-01-01

## 2023-10-24 NOTE — PLAN OF CARE
Problem: Discharge Planning  Goal: Discharge to home or other facility with appropriate resources  Outcome: Progressing  Flowsheets (Taken 10/24/2023 0800)  Discharge to home or other facility with appropriate resources: Identify barriers to discharge with patient and caregiver     Problem: Pain  Goal: Verbalizes/displays adequate comfort level or baseline comfort level  Outcome: Progressing  Flowsheets  Taken 10/24/2023 1200  Verbalizes/displays adequate comfort level or baseline comfort level: Assess pain using appropriate pain scale  Taken 10/24/2023 0800  Verbalizes/displays adequate comfort level or baseline comfort level: Assess pain using appropriate pain scale     Problem: Safety - Adult  Goal: Free from fall injury  Outcome: Progressing  Flowsheets (Taken 10/24/2023 0800)  Free From Fall Injury: Instruct family/caregiver on patient safety     Problem: Respiratory - Adult  Goal: Achieves optimal ventilation and oxygenation  10/24/2023 1555 by Kim Jones RN  Outcome: Not Progressing  10/24/2023 1555 by Kim Jones RN  Outcome: Progressing  10/24/2023 0755 by Sharlene Yanez RCP  Outcome: Progressing

## 2023-10-24 NOTE — PROGRESS NOTES
INTENSIVE CARE UNIT  Resident Physician Progress Note    Patient - Teagan Ivy  Date of Admission -  10/22/2023 10:18 AM  Date of Evaluation -  10/24/2023  Room and Bed Number -  9256/0096-45   Hospital Day - 2    HPI:     Teagan Ivy is a 66 y.o. F with history of of hypertension, CHF, CVA, IDDM who presents to the emergency department in cardiac arrest.  As per EMS report, patient's last known well was last evening. She was found by family in the basement, unresponsive. Was unresponsive for EMS. On route to the emergency department, patient did experience V-fib arrest.  Compressions were started. Patient was shocked once. On arrival to the emergency room, patient did have CPR performed for 20 minutes. Epi was given, amiodarone was given, patient was defibrillated multiple times, bicarb was given, calcium chloride was given. Point-of-care showed elevated potassium and significantly elevated glucose. Initial post-arrest labs in the ED show:  WBC 32.6, Hgb 10.9,   Glucose 1429, K 6.5, Na 121 [142 corrected], Cr 2.5  Mg 2.8, Phos 11.6  ,   PT 1.7, PTT 46.6     Postarrest ECG shows ST depression in lateral leads, trop 383. Cardiology was consulted at the time, recommending to trend troponins and heparin gtt, not a Cath Lab candidate. ED team did discuss with cardiology, no recommendations regarding cooling. Patient did arrive with T 34.4 Celsius. Patient ready cool. Did have triple-lumen CVC placed in right Fem in the emergency department. Was started on epi gtt., given 500 mL by ED team.  Was started on insulin gtt. 10/23:  Heparin GTT restarted. Concern for uptrending trops. Trops have started downtrending. Hemoglobin has been stable, no bloody output from OG tube noted. SUBJECTIVE:     OVERNIGHT EVENTS:    No significant events, no change in neurostatus. Continues to have low PaO2 on high vent settings (100/10).   Patient's brother came by for short

## 2023-10-24 NOTE — PROCEDURES
LONG-TERM EEG-VIDEO MONITORING   CLINICAL NEUROPHYSIOLOGY LABORATORY  DEPARTMENT OF NEUROLOGY  [facility]) [facility]    Patient: Oneyda Hernandez  Age: 66 y.o. MRN: [id number]    Referring Physician: No ref. provider found  History: The patient is a 66 y.o. female who presented breakthrough seizure/encephalopathy. This long-term video-EEG monitoring study was performed to determine the nature of the patient's clinical events. The patient is on neuroactive medications.    Oneyda Hernandez   Current Facility-Administered Medications   Medication Dose Route Frequency Provider Last Rate Last Admin    pantoprazole (PROTONIX) 40 mg in sodium chloride (PF) 0.9 % 10 mL injection  40 mg IntraVENous BID Carisa Singh MD   40 mg at 10/24/23 8367    lubrifresh P.M. (artificial tears) ophthalmic ointment   Both Eyes PRN Gillian Reyes MD   Given at 10/24/23 0531    midazolam (VERSED) 100mg/100mL in NS infusion  1-10 mg/hr IntraVENous Continuous Tanmay Rowland, DO   Stopped at 10/22/23 1502    ipratropium 0.5 mg-albuterol 2.5 mg (DUONEB) nebulizer solution 1 Dose  1 Dose Inhalation 4x Daily RT Mellissa Bullock MD   1 Dose at 10/24/23 0750    glucose chewable tablet 16 g  4 tablet Oral PRN Brittany Band D, DO        dextrose bolus 10% 125 mL  125 mL IntraVENous PRN Tanmay Rowland, DO        Or    dextrose bolus 10% 250 mL  250 mL IntraVENous PRN Tanmay Rowland, DO        glucagon (rDNA) injection 1 mg  1 mg SubCUTAneous PRN Tanmay Rowland, DO        insulin regular (HUMULIN R;NOVOLIN R) 100 Units in sodium chloride 0.9 % 100 mL infusion  0.1-50 Units/hr IntraVENous Continuous Brittany Band D, DO 4.8 mL/hr at 10/24/23 0820 4.79 Units/hr at 10/24/23 0820    [Held by provider] sodium zirconium cyclosilicate (LOKELMA) oral suspension 5 g  5 g Oral TID Brittany Band D, DO        albuterol (PROVENTIL) (2.5 MG/3ML) 0.083% nebulizer solution 5 mg  5 mg Nebulization As Directed RT PRN Tanmay Rowland DO

## 2023-10-24 NOTE — PLAN OF CARE
Problem: Respiratory - Adult  Goal: Achieves optimal ventilation and oxygenation  10/24/2023 0755 by Kareem Oneill RCP  Outcome: Progressing

## 2023-10-24 NOTE — PROGRESS NOTES
follow commands. HEAD:  normocephalic, atraumatic   EYES:  Right pupil 4mm, Left pupil 3.5mm; non-reactive bilaterally. Restricted oculocephalics. ENT:  moist mucous membranes   NECK:  supple, symmetric   LUNGS:  Equal air entry bilaterally, coarse    CARDIOVASCULAR:  RRR   ABDOMEN:  Soft, no rigidity   NEUROLOGIC:  Mental Status:  Intubated, off sedation. Comatose. Cranial Nerves:    II: Visual fields:  abnormal - absent blink to threat  III: Pupils:  abnormal -  Right pupil 4mm, Left pupil 3.5mm; non-reactive bilaterally  III,IV,VI: Extra Ocular Movements: abnormal -  Restricted oculocephalics. V: Corneal reflex:  completed. abnormal - absent bilaterally  VII: Facial strength: intact  Absent cough and gag reflex     Motor Exam:       No movement to pain in the bilateral upper extremities. Triple flexion in the left lower extremity. No movement to pain in the right lower extremity. SKIN:  no rash      DRAINS:  [x] There are no drains for Neuro Critical Care to monitor at this time. ASSESSMENT AND PLAN:       This is a 66 y.o. female with a history of HTN, CHF, CVA and DM 2 who presented to [facility] ED on 10/22/23 s/p cardiac arrest.  Found down by family who called EMS. Initially had pulse on EMS arrival.  Suffered vfib arrest en route to hospital with prolonged resuscitation. ROSC achieved after 20 minutes of CPR, multiple defibrillations. Intubated en route. Found to be in DKA. Neuro Critical Care consulted for neuro prognostication. Initial CT Head 10/22 with no acute abnormality. Neurological exam has been poor since admission.         Acute encephalopathy likely secondary to anoxic brain injury in setting of cardiac arrest with prolonged downtime  - CT Head 10/22 negative for acute changes  - LTME severely abnormal due to burst suppression pattern suggesting diffuse cortical dysfunction  - Off Versed infusion since 10/22 at 1500  - Poor clinical exam as above; pupils

## 2023-10-24 NOTE — PROGRESS NOTES
Code Status Note on Arthuro Coast (YOB: 1960)    I had a long discussion with the patient's family [sister, mother, and aunt] in person, in presence of the RN taking care of the patient, ICU attending and palliative care team. Patient's current clinical condition, laboratory and radiographic findings as well as recommendations of physicians consulted on the case were discussed with the patient's family in detail. All questions and concerns of family were addressed, and appropriate emotional support was provided. After understanding patient's current medical condition, family decided that did not want any lab draws or treatments aimed at prolonging the life of patient, at the cost of patient's overall comfort. They expressed their wishes to make the patient comfortable, stop all lab draws and not to do any resuscitative procedures on the patient. They requested patient's code status to be changed to [facility], and signed the [facility] order form in my presence, which was witnessed by Dr. Rayrya Espinoza, Dr. Rissa Bahena (palliative), as well as [place], [place]. Will honor family's wishes, and will change patient's code status to [facility].       Miguel Angel Trevizo MD  Department of Emergency Medicine,  [address] [place] [address])             10/24/2023, 7:16 PM

## 2023-10-24 NOTE — PLAN OF CARE
Problem: Respiratory - Adult  Goal: Achieves optimal ventilation and oxygenation  10/23/2023 2022 by Cameron Reyes RN  Outcome: Progressing     Problem: Discharge Planning  Goal: Discharge to home or other facility with appropriate resources  10/23/2023 2022 by Cameron Reyes RN  Outcome: Progressing     Problem: Pain  Goal: Verbalizes/displays adequate comfort level or baseline comfort level  10/23/2023 2022 by Cameron Reyes RN  Outcome: Progressing       Problem: Skin/Tissue Integrity  Goal: Absence of new skin breakdown  Description: 1. Monitor for areas of redness and/or skin breakdown  2. Assess vascular access sites hourly  3. Every 4-6 hours minimum:  Change oxygen saturation probe site  4. Every 4-6 hours:  If on nasal continuous positive airway pressure, respiratory therapy assess nares and determine need for appliance change or resting period. 10/23/2023 2022 by Cameron Reyes RN  Outcome: Progressing     Problem: Safety - Adult  Goal: Free from fall injury  10/23/2023 2022 by Cameron Reyes RN  Outcome: Progressing     Problem: ABCDS Injury Assessment  Goal: Absence of physical injury  10/23/2023 2022 by Cameron Reyes RN  Outcome: Progressing     Problem: Nutrition Deficit:  Goal: Optimize nutritional status  10/23/2023 2022 by Cameron Reyes RN  Outcome: Progressing     Problem:  Thermoregulation - /Pediatrics  Goal: Maintains normal body temperature  10/23/2023 2022 by Cameron Reyes RN  Outcome: Progressing     Problem: Chronic Conditions and Co-morbidities  Goal: Patient's chronic conditions and co-morbidity symptoms are monitored and maintained or improved  10/23/2023 2022 by Cameron Reyes RN  Outcome: Progressing     Problem: Nutrition Deficit:  Goal: Optimize nutritional status  10/23/2023 2022 by Cameron Reyes RN  Outcome: Progressing    Electronically signed by Cameron Reyes RN on 10/23/2023 at 8:25 PM

## 2023-10-24 NOTE — ACP (ADVANCE CARE PLANNING)
Advance Care Planning     Advance Care Planning (ACP) Physician/NP/PA (Provider) Conversation      Date of ACP Conversation: 10/24/23    Conversation Conducted with:    Healthcare Decision Maker: Next of Kin by law (only applies in absence of above) (name) patient's mother    Health Care Decision Maker:    Current [address]:  Patient's mother - Kirby Munson Preferences:    Hospitalization: \"If your health worsens and it becomes clear that your chance of recovery is unlikely, what would your preference be regarding hospitalization? \"  Currently hospitalized    Ventilation: \"If you were in your present state of health and suddenly became very ill and were unable to breathe on your own, what would your preference be about the use of a ventilator (breathing machine) if it was available to you? \"    Currently intubated, now [facility]    \"If your health worsens and it becomes clear that your chance of recovery is unlikely, what would your preference be about the use of a ventilator (breathing machine) if it was available to you? \"   Currently intubated, now DNR-CCA    Resuscitation:  \"CPR works best to restart the heart when there is a sudden event, like a heart attack, in someone who is otherwise healthy. Unfortunately, CPR does not typically restart the heart for people who have serious health conditions or who are very sick. \"    \"In the event your heart stopped as a result of an underlying serious health condition, would you want attempts to be made to restart your heart (answer \"yes\" for attempt to resuscitate) or would you prefer a natural death (answer \"no\" for do not attempt to resuscitate)? \"   DNR-CCA    Conversation Outcomes / Follow-Up Plan:   Patient's mother is her HCPOA. The patient has two other siblings as well. Family agreeable to [facility] at this time. Possibly withdrawing care later this week.       Length of Voluntary ACP Conversation in minutes:  <16 minutes (Non-Billable)    Sidney Giles,   Hospice and Palliative Care Medicine

## 2023-10-24 NOTE — CARE COORDINATION
Patient is currently admitted inpatient to [facility] as of 10/22/2023 with cardiopulmonary arrest.   ACM will resolve episode and remove from panel.      Future Appointments   Date Time Provider [address]   10/25/2023  3:00 PM MD Petr Ackerman [facility]   11/3/2023  2:00 PM [facility] Ke[facility]

## 2023-10-24 NOTE — CONSULTS
unresponsive    PHYSICAL ASSESSMENT:  General Appearance: intubated, off of sedation, unresponsive,  female supine in bed  Mental status: unable to assess  Head: normocephalic, atraumatic  Eye: Pupils are fixed and dilated. Nonreactive. Sclera anti-icteric. Conjunctiva clear  Ear: normal external ear, no discharge  Nose: no drainage noted  Mouth: mucous membranes moist, ET and OG tubes  Neck: supple  Lungs: Bilateral equal air entry, clear to ausculation, no wheezing, rales or rhonchi, normal effort  Cardiovascular: normal rate, regular rhythm, no murmur, gallop, rub  Abdomen: Soft, nontender, nondistended, normal bowel sounds  Neurologic: No gag. No corneal reflex. Weakly withdraws to noxious stimuli in the left lower extremity. Does not breathe over mechanical ventilation. Skin: No gross lesions, rashes, bruising or bleeding on exposed skin area  Extremities: peripheral pulses palpable, no pedal edema  Psych: Unable to assess    Palliative Performance Scale:  ___100% Full ambulation; normal activity/No disease; full self-care; normal intake; LOC full  ___90% full ambulation; normal activity/some disease; full self-care; normal intake; LOC full  ___80% ambulation full; normal activity with effort/some disease; full self-care; normal/reduced intake; LOC full  ___70% ambulation reduced; cannot do normal work/some disease; full self-care; normal/reduce intake; LOC full  ___60%  Ambulation reduced; Significant disease; Can't do hobbies/housework; intake normal or reduced; occasional assist; LOC full/confusion  ___50%  Mainly sit/lie; Extensive disease; Can't do any work; Considerable assist; intake normal or reduced; LOC full/confusion  ___40%  Mainly in bed; Extensive disease; Mainly assist; intake normal or reduced; LOC full/confusion   ___30%  Bed Bound; Extensive disease; Total care; intake reduced; LOC full/confusion  _x_20%  Bed Bound; Extensive disease;  Total care; intake minimal; Drowsy/coma  ___10%  Bed Bound; Extensive disease; Total care; Mouth care only; Drowsy/coma  ___0       Death    Principle Problem/Diagnosis:  Principal Problem:    Cardiac arrest with successful resuscitation [facility])  Active Problems:    Type 2 diabetes mellitus with hyperglycemia ([address])    Hyperkalemia    Hyperphosphatemia    Shock ([address])    Encounter for palliative care  Resolved Problems:    * No resolved hospital problems. *      Please call with any palliative questions or concerns. Palliative Care Team is available via perfect serve or via phone. This note has been dictated by dragon, typing errors may be a possibility.   The total time I spent in seeing the patient, discussing goals of care, advanced directives, code status, greater than 50% time in counseling and other major issues was more than 65 minutes      Electronically signed by      Hue Santiago, [facility], [facility]  10/24/2023 12:43 PM  Palliative care office: [phone]

## 2023-10-24 NOTE — PLAN OF CARE
Problem: Respiratory - Adult  Goal: Achieves optimal ventilation and oxygenation  10/23/2023 2109 by Lizeth Lr, THUAN  Flowsheets (Taken 10/23/2023 2109)  Achieves optimal ventilation and oxygenation:   Assess for changes in respiratory status   Respiratory therapy support as indicated   Assess the need for suctioning and aspirate as needed   Oxygen supplementation based on oxygen saturation or arterial blood gases   Assess for changes in mentation and behavior

## 2023-10-25 PROBLEM — I46.9 CARDIOPULMONARY ARREST (HCC): Status: ACTIVE | Noted: 2023-01-01

## 2023-10-25 PROBLEM — Z71.89 GOALS OF CARE, COUNSELING/DISCUSSION: Status: ACTIVE | Noted: 2023-10-25

## 2023-10-25 PROBLEM — I46.9 CARDIOPULMONARY ARREST (HCC): Status: ACTIVE | Noted: 2023-10-25

## 2023-10-25 PROBLEM — Z71.89 GOALS OF CARE, COUNSELING/DISCUSSION: Status: ACTIVE | Noted: 2023-01-01

## 2023-10-25 NOTE — PLAN OF CARE
Problem: Respiratory - Adult  Goal: Achieves optimal ventilation and oxygenation  10/25/2023 1940 by Dayana Mcgraw RN  Outcome: Progressing  10/25/2023 1018 by Rita Cowan RN  Outcome: Not Progressing     Problem: Discharge Planning  Goal: Discharge to home or other facility with appropriate resources  10/25/2023 1940 by Dayana Mcgraw RN  Outcome: Progressing  10/25/2023 1018 by Rita Cowan RN  Outcome: Not Progressing     Problem: Pain  Goal: Verbalizes/displays adequate comfort level or baseline comfort level  10/25/2023 1940 by Dayana Mcgraw RN  Outcome: Progressing  10/25/2023 1018 by Yeny Carrillo RN  Outcome: Not Progressing     Problem: Skin/Tissue Integrity  Goal: Absence of new skin breakdown  Description: 1. Monitor for areas of redness and/or skin breakdown  2. Assess vascular access sites hourly  3. Every 4-6 hours minimum:  Change oxygen saturation probe site  4. Every 4-6 hours:  If on nasal continuous positive airway pressure, respiratory therapy assess nares and determine need for appliance change or resting period.   10/25/2023 1940 by Dayana Mcgraw RN  Outcome: Progressing  10/25/2023 1018 by Rita Cowan RN  Outcome: Not Progressing     Problem: Safety - Adult  Goal: Free from fall injury  10/25/2023 1940 by Dayana Mcgraw RN  Outcome: Progressing  10/25/2023 1018 by Rita Cowan RN  Outcome: Not Progressing     Problem: ABCDS Injury Assessment  Goal: Absence of physical injury  10/25/2023 1940 by Dayana Mcgraw RN  Outcome: Progressing  10/25/2023 1018 by Rita Cowan RN  Outcome: Not Progressing     Problem: Nutrition Deficit:  Goal: Optimize nutritional status  10/25/2023 1940 by Dayana Mcgraw RN  Outcome: Progressing  Flowsheets (Taken 10/25/2023 1453 by Kassie Chaney, RD, LD)  Nutrient intake appropriate for improving, restoring, or maintaining nutritional needs: Assess nutritional status and recommend course of action  10/25/2023 1018 by Jose Adrian ABDON RN  Outcome: Not Progressing     Problem:  Thermoregulation - /Pediatrics  Goal: Maintains normal body temperature  10/25/2023 1940 by Jose Ya RN  Outcome: Progressing  10/25/2023 1018 by Gloria Ramos RN  Outcome: Not Progressing     Problem: Chronic Conditions and Co-morbidities  Goal: Patient's chronic conditions and co-morbidity symptoms are monitored and maintained or improved  10/25/2023 1940 by Jose Ya RN  Outcome: Progressing  10/25/2023 1018 by Gloria Ramos RN  Outcome: Not Progressing

## 2023-10-25 NOTE — PROGRESS NOTES
Comprehensive Nutrition Assessment    Type and Reason for Visit:  Reassess    Nutrition Recommendations/Plan:   Continue NPO. Monitor plans for nutrition and plan of care. If nutrition support requested, suggest Peptide Based High Protein goal 50 mL/hr = 1200 kcals, 105 gm protein per day. Malnutrition Assessment:  Malnutrition Status:  Insufficient data (10/22/23 2466)    Context:  Acute Illness     Findings of the 6 clinical characteristics of malnutrition:  Energy Intake:  Mild decrease in energy intake  Weight Loss:  Unable to assess     Body Fat Loss:  Unable to assess   Muscle Mass Loss:  Unable to assess  Fluid Accumulation:  Unable to assess   Strength:  Not Performed    Nutrition Assessment:    Pt remains intubated/sedated. Noted family deciding on plan of care. Palliative Care following. Will monitor plans for nutrition and provide recommendations for nutrition support. Weight fluctuations noted - ? accuracy of weights. Labs reviewed: Glucose 138-226 mg/dL. Meds include: Lasix; Insulin Drip; Solu-Cortef. Nutrition Related Findings:    Labs/Meds reviewed. Hypoactive bowel sounds. Wound Type: None       Current Nutrition Intake & Therapies:    Average Meal Intake: NPO  Average Supplements Intake: NPO  Diet NPO  Additional Calorie Sources:  None    Anthropometric Measures:  Height: 162.6 cm (5' 4.02\")  Ideal Body Weight (IBW): 120 lbs (55 kg)    Admission Body Weight: 125 kg (275 lb 9.2 oz)  Current Body Weight: 94.2 kg (207 lb 10.8 oz), 173.1 % IBW.  Weight Source: Bed Scale  Current BMI (kg/m2): 35.6                          BMI Categories: Obese Class 2 (BMI 35.0 -39.9)    Estimated Daily Nutrient Needs:  Energy Requirements Based On: Kcal/kg  Weight Used for Energy Requirements: Ideal  Energy (kcal/day): 2107-8441 kcals/day  Weight Used for Protein Requirements: Ideal  Protein (g/day):  gm pro/day  Fluid (ml/day): per MD    Nutrition Diagnosis:   Inadequate oral intake related to

## 2023-10-25 NOTE — PROGRESS NOTES
Pharmacy Note     Renal Dose Adjustment    Willie Barcenas is a 66 y.o. female. Pharmacist assessment of renally cleared medications. Recent Labs     10/24/23  0254 10/25/23  0557   BUN 66* 76*       Recent Labs     10/24/23  0254 10/25/23  0557   CREATININE 3.6* 4.4*       Estimated Creatinine Clearance: 15 mL/min (A) (based on SCr of 4.4 mg/dL (H)).   Estimated CrCl using Ideal Body Weight: 12 mL/min (based on IBW 54.7 kg)    Height:   Ht Readings from Last 1 Encounters:   10/22/23 1.626 m (5' 4.02\")     Weight:  Wt Readings from Last 1 Encounters:   10/24/23 94.2 kg (207 lb 10.8 oz)       The following medication dose has been adjusted based upon renal function per P&T Guidelines:             Unasyn 3000mg IV Q12H to Q24H    Mook Mayfield PharmD [facility]  10/25/2023 10:42 AM

## 2023-10-25 NOTE — PLAN OF CARE
Problem: Respiratory - Adult  Goal: Achieves optimal ventilation and oxygenation  10/25/2023 0124 by Sean Magana RN  Outcome: Progressing  10/24/2023 1555 by Tamie Garcia RN  Outcome: Not Progressing  10/24/2023 1555 by Tamie Garcia RN  Outcome: Progressing     Problem: Discharge Planning  Goal: Discharge to home or other facility with appropriate resources  10/25/2023 0124 by Sean Magana RN  Outcome: Progressing  10/24/2023 1555 by Tamie Garcia RN  Outcome: Progressing  Flowsheets (Taken 10/24/2023 0800)  Discharge to home or other facility with appropriate resources: Identify barriers to discharge with patient and caregiver     Problem: Pain  Goal: Verbalizes/displays adequate comfort level or baseline comfort level  10/25/2023 0124 by Sean Magana RN  Outcome: Progressing  Flowsheets (Taken 10/24/2023 1600 by Tamie Garcia RN)  Verbalizes/displays adequate comfort level or baseline comfort level: Assess pain using appropriate pain scale  10/24/2023 1555 by Tamie Garcia RN  Outcome: Progressing  Flowsheets  Taken 10/24/2023 1200  Verbalizes/displays adequate comfort level or baseline comfort level: Assess pain using appropriate pain scale  Taken 10/24/2023 0800  Verbalizes/displays adequate comfort level or baseline comfort level: Assess pain using appropriate pain scale     Problem: Skin/Tissue Integrity  Goal: Absence of new skin breakdown  Description: 1. Monitor for areas of redness and/or skin breakdown  2. Assess vascular access sites hourly  3. Every 4-6 hours minimum:  Change oxygen saturation probe site  4. Every 4-6 hours:  If on nasal continuous positive airway pressure, respiratory therapy assess nares and determine need for appliance change or resting period.   10/25/2023 0124 by Sean Magana RN  Outcome: Progressing  10/24/2023 1555 by Tamie Garcia RN  Outcome: Progressing     Problem: Safety - Adult  Goal: Free from fall injury  10/25/2023 0124 by

## 2023-10-25 NOTE — CODE DOCUMENTATION
I had a long discussion with the patient's family in person, in presence of the RN taking care of the patient. Patient's current clinical condition, laboratory and radiographic findings as well as recommendations of physicians consulted on the case were discussed with the patient's family in detail. All questions and concerns of family were addressed, and appropriate emotional support was provided. After understanding patient's current medical condition, family decided that did not want any lab draws or treatments aimed at prolonging the life of patient, at the cost of patient's overall comfort. They expressed their wishes to make the patient comfortable, stop all lab draws and not to do any resuscitative procedures on the patient. They requested patient's code status to be changed to [facility], and signed the [facility] order form in my presence, which was witnessed by RN, Malik Segundo. Will honor family's wishes, and will change patient's code status to [facility].         Andres Nagy MD, PGY-3  Department of Internal Medicine,  [address] [place] [address])             10/25/2023, 7:08 PM

## 2023-10-25 NOTE — PROGRESS NOTES
INTENSIVE CARE UNIT  Resident Physician Progress Note    Patient - Analia Marin  Date of Admission -  10/22/2023 10:18 AM  Date of Evaluation -  10/25/2023  Room and Bed Number -  4126/7100-10   Hospital Day - 3  Chief Complaint   Patient presents with    Cardiac Arrest      HPI:     Analia Marin is a 66 y.o. F with history of of hypertension, CHF, CVA, IDDM who presents to the emergency department in cardiac arrest.  As per EMS report, patient's last known well was last evening. She was found by family in the basement, unresponsive. Was unresponsive for EMS. On route to the emergency department, patient did experience V-fib arrest.  Compressions were started. Patient was shocked once. On arrival to the emergency room, patient did have CPR performed for 20 minutes. Epi was given, amiodarone was given, patient was defibrillated multiple times, bicarb was given, calcium chloride was given. Point-of-care showed elevated potassium and significantly elevated glucose. Initial post-arrest labs in the ED show:  WBC 32.6, Hgb 10.9,   Glucose 1429, K 6.5, Na 121 [142 corrected], Cr 2.5  Mg 2.8, Phos 11.6  ,   PT 1.7, PTT 46.6     Postarrest ECG shows ST depression in lateral leads, trop 383. Cardiology was consulted at the time, recommending to trend troponins and heparin gtt, not a Cath Lab candidate. ED team did discuss with cardiology, no recommendations regarding cooling. Patient did arrive with T 34.4 Celsius. Patient ready cool. Did have triple-lumen CVC placed in right Fem in the emergency department. Was started on epi gtt., given 500 mL by ED team.  Was started on insulin gtt. 10/23:  Heparin GTT restarted. Concern for uptrending trops. Trops have started downtrending. Hemoglobin has been stable, no bloody output from OG tube noted. 10/24:  Vent setting increased [100/10] continues to sat in the low 90s.  No longer breathes over the vent, does have flexion

## 2023-10-25 NOTE — PROGRESS NOTES
Palliative Care Progress Note    NAME:  Jeremy Ocasio  MEDICAL RECORD NUMBER:  [id number]  AGE: 66 y.o. GENDER: female  : 1960  TODAY'S DATE:  10/25/2023    Reason for Consult:  goals of care      Plan        Palliative Interaction:     Met with patients family at bedside. Patients mother and sister present. Family inquiring about timeframe for withdrawing care. Explain that there is no timeframe and determination of withdrawing care is dependent on family. However, I do explain that when there are clear goals established for patient and the outcome is known we by no means want to prolong any suffering. Family understanding of this and in agreement. They share they would like time for additional family members to come and visit patient prior to proceeding. Sister making mention of possible plan for Friday. They would still like to hear from neuro critical care regarding prognosis however they are understanding of the inability to obtain additional imaging due to patient condition. Education/support to staff  Education/support to family  Education/support to patient  Discharge planning/helping to coordinate care  Communications with primary service  Code status clarified: [facility]      Principle Problem/Diagnosis:  Cardiac arrest [facility])    Additional Assessments:    1- Symptom management/ pain control     Pain Assessment:  The patient is not having any pain. Anxiety:  none                          Dyspnea:   Remains on vent                             We feel the patient symptoms are being controlled. her current regimen is reviewed by myself and discussed with the staff.      2- Goals of care evaluation   The patient goals of care are provide comfort care/support/palliation/relieve suffering   Goals of care discussed with:    [] Patient independently    [] Patient and Family    [x] Family or Healthcare DPOA independently    [] Unable to discuss with patient, family/DPOA not Chest pain    [] Other:     Cardiovascular:   []  Chest pain  []  Dyspnea    []  Exertional chest pressure/discomfort     [] Fatigue      []  Palpitations    []  Syncope   [] Other:     Gastrointestinal:   []  Abdominal pain   []  Constipation    []  Diarrhea    []   Dysphagia   []  Reflux             []  Vomiting   [] Other:     Genitourinary:  []  Dysuria     []  Frequency   []  Hematuria   [] Nocturia   []  Urinary incontinence   [] Other:     Musculoskeletal:   [] Back pain    []  Muscle weakness   []  Myalgias    []  Neck pain   []  Stiff joints   []  Other:     Behavioral/Psych:   [] Anxiety    []  Depression     []  Mood swings   [] Other:     PHYSICAL ASSESSMENT:     General: []  Oriented x3      [] well appearing      [x] Intubated      [x] ill appearing      [] Other:    Mental Status: [] normal mental status exam      [] drowsy      [] Confused      [x] Other: Unresponsive    Cardiovascular: []  Regular rate/rhythm      [] Arrhythmia      [x] Other: Tachy    Chest: [] Effort normal      [] lungs clear      [] respiratory distress      [] Tachypnea      [x]  Other: Remains on full ventilator support     Abdomen: [x] Soft/non-tender      []  Normal appearance      [] Distended      [] Ascites      [] Other:    Neurological: [] Normal Speech      [] Normal Sensation      []  Deficits present:      Extremity:  [x] normal skin color/temp      [] clubbing/cyanosis      []  No edema      [] Other:     Palliative Performance Scale:  ___60%  Ambulation reduced; Significant disease; Can't do hobbies/housework; intake normal or reduced; occasional assist; LOC full/confusion  ___50%  Mainly sit/lie; Extensive disease; Can't do any work; Considerable assist; intake normal or reduced; LOC full/confusion  ___40%  Mainly in bed; Extensive disease; Mainly assist; intake normal or reduced; LOC full/confusion   ___30%  Bed Bound; Extensive disease; Total care; intake reduced; LOCfull/confusion  ___20%  Bed Bound;  Extensive

## 2023-10-25 NOTE — PLAN OF CARE
Progressing  10/25/2023 0124 by Mary Pardo RN  Outcome: Progressing     Problem: Pain  Goal: Verbalizes/displays adequate comfort level or baseline comfort level  10/25/2023 1018 by Luana Nichols RN  Outcome: Not Progressing  10/25/2023 0124 by Mary Pardo RN  Outcome: Progressing  Flowsheets (Taken 10/24/2023 1600 by Cecy Hendrix RN)  Verbalizes/displays adequate comfort level or baseline comfort level: Assess pain using appropriate pain scale     Problem: Skin/Tissue Integrity  Goal: Absence of new skin breakdown  Description: 1. Monitor for areas of redness and/or skin breakdown  2. Assess vascular access sites hourly  3. Every 4-6 hours minimum:  Change oxygen saturation probe site  4. Every 4-6 hours:  If on nasal continuous positive airway pressure, respiratory therapy assess nares and determine need for appliance change or resting period. 10/25/2023 1018 by Luana Nichols RN  Outcome: Not Progressing  10/25/2023 0124 by Mary Pardo RN  Outcome: Progressing     Problem: Safety - Adult  Goal: Free from fall injury  10/25/2023 1018 by Luana Nichols RN  Outcome: Not Progressing  10/25/2023 0124 by Mary Pardo RN  Outcome: Progressing     Problem: ABCDS Injury Assessment  Goal: Absence of physical injury  10/25/2023 1018 by Luana Nichols RN  Outcome: Not Progressing  10/25/2023 0124 by Mary Pardo RN  Outcome: Progressing     Problem: Nutrition Deficit:  Goal: Optimize nutritional status  10/25/2023 1018 by Luana Nichols RN  Outcome: Not Progressing  10/25/2023 0124 by Mary Pardo RN  Outcome: Progressing     Problem:  Thermoregulation - /Pediatrics  Goal: Maintains normal body temperature  10/25/2023 1018 by Luana Nichols RN  Outcome: Not Progressing  10/25/2023 0124 by Mary Pardo RN  Outcome: Progressing     Problem: Chronic Conditions and Co-morbidities  Goal: Patient's chronic conditions and co-morbidity symptoms are monitored and maintained or improved  10/25/2023 1018 by Cristina Vogel RN  Outcome: Not Progressing  10/25/2023 0124 by Stephanie Diamond RN  Outcome: Progressing

## 2023-10-25 NOTE — PROGRESS NOTES
Daily Progress Note  Neuro Critical Care    Patient Name: Sebas Hernandez  Patient : 1960  Room/Bed: 3002/3002-01  Code Status: DNR-CCA  Allergies: No Known Allergies    CHIEF COMPLAINT:      Cardiac arrest     INTERVAL HISTORY    Initial Presentation (Admitted 10/22/2023): The patient is a 66 y.o. adult who presents to the ED via EMS s/p cardiac arrest with ROSC and DKA. Patient is intubated and sedated in MICU and is unable to provide any history. Per records and report, the patient's last known well was last night. She was found unresponsive by her family in the basement this morning. EMS was called, and patient had a pulse at home on EMS arrival. [facility], patient suffered a V-fib arrest. CPR was started and she underwent defibrillation x1 en route. Once in the ED, patient continued to arrest where she received CPR for roughly 20 min. She received Epi x4-5, bicarb x2, calcium x1, insulin 10 units, and defibrillated multiple times while in ED. She was intubated with vecuronium 10 mg with 7.5 cuffed ET tube at 24 cm at the lip (s/p CXR). ROSC obtained at 10:39 AM. Insulin gtt and epi gtt started in the ED. Imaging in the ED include:   - Head CT which was unremarkable.    - CT abdomen and pelvis which showed bilateral lower lobe consolidations, changes suggestive of gastritis, nonspecific periportal edema.   - CT PE study showed no PE, bilateral pulmonary infiltrates, multiple rib fractures, and an inferior sternal fx  - CXR confirmed ET tube and NG tube in appropriate positions     Labs in ED include;  - blood glucose 1429, anion gap 35, bicarb 6, beta-hydroxybutyrate 10.12, potassium 6.5, sodium 121, Cr 2.5, BUN 80, lactic acid 6.0, troponin 792, alk phos 114, , , WBC 32.6, hgb 10.9, platelets 710, INR 1.8  - VB.77/46.6/116.0/6.4     Neuro critical care consulted for neuro-prognostication     Hospital Course:   Severe DKA on presentation improving with encephalopathy likely secondary to anoxic brain injury in setting of cardiac arrest with prolonged downtime  - CT Head 10/22 negative for acute changes  - LTME severely abnormal due to burst suppression pattern suggesting diffuse cortical dysfunction  - Off Versed infusion since 10/22 at 1500.  - Poor clinical exam as above; pupils nonreactive, absent cough/gag/corneal reflexes   - NSE at 24, 48, and 72 hrs pending  - Patient unlikely to be stable enough to travel to MRI 10/25  - Follow clinical exam off sedation  - Guarded prognosis given EEG findings, clinical exam  - Palliative consulted   - DNR-CCA       V-fib arrest with prolonged resuscitation  NSTEMI; on Heparin infusion  Cardiogenic shock; on low dose Levophed   Acute hypoxic respiratory failure requiring mechanical ventilation   Persistent hypoxia, on 100% FiO2 at 14 PEEP  Aspiration pneumonia  DKA  - Management per Critical Care team    DISPOSITION per primary team.    We will continue to follow along. For any changes in exam or patient status please contact Neuro Critical Care.         Andreas Lyon MD  Neuro Critical Care  Pager [phone]  10/25/2023     11:05 AM

## 2023-10-26 NOTE — PROGRESS NOTES
[address] NOTE    Room # 3002/3002-01   Name: Demetri Romero              Reason for visit: Routine    I visited the patient. Admit Date & Time: 10/22/2023 10:18 AM    Assessment:  Demetri Romero is a 66 y.o. female.  was called to PT: room for extubation. RN states family has decided for tomorrow.  Was with family  did not converse with family RN states family is well,no need for  to stick around. Intervention:   provided a ministry presence. Plan:  Chaplains will remain available to offer spiritual and emotional support as needed. Electronically signed by Chaplain Shavonne, on 10/26/2023 at 3:09 PM.  [facility]      10/26/23 8557   Encounter Summary   Encounter Overview/Reason  Follow-up   Service Provided For: Patient not available   Referral/Consult From: Nurse   Complexity of Encounter Low   Begin Time 0245   End Time  0255   Total Time Calculated 10 min   Grief, Loss, and Adjustments   Type End of Life   Assessment/Intervention/Outcome   Assessment Unable to assess   Intervention Sustaining Presence/Ministry of presence

## 2023-10-26 NOTE — PROGRESS NOTES
INTENSIVE CARE UNIT  Resident Physician Progress Note    Patient - Antony Bassett  Date of Admission -  10/22/2023 10:18 AM  Date of Evaluation -  10/26/2023  Room and Bed Number -  7580/1028-64   Hospital Day - 4  Chief Complaint   Patient presents with    Cardiac Arrest      HPI:     Antony Bassett is a 66 y.o. F with history of of hypertension, CHF, CVA, IDDM who presents to the emergency department in cardiac arrest.  As per EMS report, patient's last known well was last evening. She was found by family in the basement, unresponsive. Was unresponsive for EMS. On route to the emergency department, patient did experience V-fib arrest.  Compressions were started. Patient was shocked once. On arrival to the emergency room, patient did have CPR performed for 20 minutes. Epi was given, amiodarone was given, patient was defibrillated multiple times, bicarb was given, calcium chloride was given. Point-of-care showed elevated potassium and significantly elevated glucose. Initial post-arrest labs in the ED show:  WBC 32.6, Hgb 10.9,   Glucose 1429, K 6.5, Na 121 [142 corrected], Cr 2.5  Mg 2.8, Phos 11.6  ,   PT 1.7, PTT 46.6     Postarrest ECG shows ST depression in lateral leads, trop 383. Cardiology was consulted at the time, recommending to trend troponins and heparin gtt, not a Cath Lab candidate. ED team did discuss with cardiology, no recommendations regarding cooling. Patient did arrive with T 34.4 Celsius. Patient ready cool. Did have triple-lumen CVC placed in right Fem in the emergency department. Was started on epi gtt., given 500 mL by ED team.  Was started on insulin gtt. 10/23:  Heparin GTT restarted. Concern for uptrending trops. Trops have started downtrending. Hemoglobin has been stable, no bloody output from OG tube noted. 10/24:  Vent setting increased [100/10] continues to sat in the low 90s.  No longer breathes over the vent, does have flexion

## 2023-10-26 NOTE — PROGRESS NOTES
97.5 kg (214 lb 15.2 oz)        Lab Results   Component Value Date    WBC 14.8 (H) 10/26/2023    HGB 9.0 (L) 10/26/2023    HCT 28.0 (L) 10/26/2023    MCV 94.6 10/26/2023     (L) 10/26/2023    ,   Lab Results   Component Value Date/Time     10/26/2023 11:06 AM    K 3.5 10/26/2023 11:06 AM    CL 92 10/26/2023 11:06 AM    CO2 28 10/26/2023 11:06 AM    BUN 80 10/26/2023 11:06 AM    CREATININE 4.5 10/26/2023 11:06 AM    GLUCOSE 233 10/26/2023 11:06 AM    CALCIUM 7.2 10/26/2023 11:06 AM    ,   Lab Results   Component Value Date    INR 1.5 10/26/2023    INR 1.6 10/26/2023    INR 1.3 10/22/2023    PROTIME 18.1 (H) 10/26/2023    PROTIME 18.4 (H) 10/26/2023    PROTIME 16.1 (H) 10/22/2023   , .   Lab Results   Component Value Date    ALT 54 (H) 10/26/2023    AST 62 (H) 10/26/2023    ALKPHOS 204 (H) 10/26/2023    BILITOT 0.3 10/26/2023   ,   Lab Results   Component Value Date    CKTOTAL 115 10/25/2023   ,   Lab Results   Component Value Date/Time    COLORU Yellow 10/26/2023 05:37 AM    NITRU NEGATIVE 10/26/2023 05:37 AM    GLUCOSEU TRACE 10/26/2023 05:37 AM    KETUA NEGATIVE 10/26/2023 05:37 AM    UROBILINOGEN Normal 10/26/2023 05:37 AM    BILIRUBINUR NEGATIVE 10/26/2023 05:37 AM   ,   Lab Results   Component Value Date/Time    AMYLASE 42 10/25/2023 11:34 PM   ,   Lab Results   Component Value Date    LIPASE 29 10/25/2023   , No results found for: \"DDIMER\", No results found for: \"BNP\", No results found for: \"CEA\", No results found for: \"\", No results found for: \"AFPAMN\", No results found for: \"LACTA\",     CT Result (most recent):  CT HEAD WO CONTRAST 10/22/2023    Narrative  EXAMINATION:  CT OF THE HEAD WITHOUT CONTRAST  10/22/2023 12:00 pm    TECHNIQUE:  CT of the head was performed without the administration of intravenous  contrast. Automated exposure control, iterative reconstruction, and/or weight  based adjustment of the mA/kV was utilized to reduce the radiation dose to as  low as reasonably Normal Speech      [] Normal Sensation      []  Deficits present:      Extremity:  [x] normal skin color/temp      [] clubbing/cyanosis      []  No edema      [] Other:     Palliative Performance Scale:  ___60%  Ambulation reduced; Significant disease; Can't do hobbies/housework; intake normal or reduced; occasional assist; LOC full/confusion  ___50%  Mainly sit/lie; Extensive disease; Can't do any work; Considerable assist; intake normal or reduced; LOC full/confusion  ___40%  Mainly in bed; Extensive disease; Mainly assist; intake normal or reduced; LOC full/confusion   ___30%  Bed Bound; Extensive disease; Total care; intake reduced; LOCfull/confusion  ___20%  Bed Bound; Extensive disease; Total care; intake minimal; Drowsy/coma  _x__10%  Bed Bound; Extensive disease; Total care; Mouth care only; Drowsy/coma  ___0       Death        Please call with any palliative questions or concerns. Palliative Care Team is available via perfect serve or via phone. Palliative Care will continue to follow Ms. Salguero's care as needed. The note has been dictated by dragon, typing errors may be a possibility     Thank you for allowing Palliative Care to participate in the care of Ms. Salguero .        Electronically signed by   Keara Mcgraw DO  Palliative Care Team  on 10/26/2023 at 3:03 PM    Palliative care office: [phone]

## 2023-10-26 NOTE — PLAN OF CARE
Problem: Respiratory - Adult  Goal: Achieves optimal ventilation and oxygenation  10/26/2023 0910 by Mckenzie Wang RN  Outcome: Progressing     Problem: Discharge Planning  Goal: Discharge to home or other facility with appropriate resources  10/26/2023 0910 by Mckenzie Wang RN  Outcome: Progressing     Problem: Pain  Goal: Verbalizes/displays adequate comfort level or baseline comfort level  10/26/2023 0910 by Mckenzie Wang RN  Outcome: Progressing     Problem: Skin/Tissue Integrity  Goal: Absence of new skin breakdown  Description: 1. Monitor for areas of redness and/or skin breakdown  2. Assess vascular access sites hourly  3. Every 4-6 hours minimum:  Change oxygen saturation probe site  4. Every 4-6 hours:  If on nasal continuous positive airway pressure, respiratory therapy assess nares and determine need for appliance change or resting period. 10/26/2023 0910 by Mckenzie Wang RN  Outcome: Progressing     Problem: Safety - Adult  Goal: Free from fall injury  10/26/2023 0910 by Mckenzie Wang RN  Outcome: Progressing     Problem: ABCDS Injury Assessment  Goal: Absence of physical injury  10/26/2023 0910 by Mckenzie Wang RN  Outcome: Progressing     Problem: Nutrition Deficit:  Goal: Optimize nutritional status  10/26/2023 0910 by Mckenzie Wang RN  Outcome: Progressing     Problem:  Thermoregulation - [place]/Pediatrics  Goal: Maintains normal body temperature  10/26/2023 0910 by Mckenzie Wang RN  Outcome: Progressing     Problem: Chronic Conditions and Co-morbidities  Goal: Patient's chronic conditions and co-morbidity symptoms are monitored and maintained or improved  10/26/2023 0910 by Mckenzie Wang RN  Outcome: Progressing

## 2023-10-26 NOTE — CARE COORDINATION
Transitional planning: Plan for terminal extubation tomorrow when more family is available to be present.

## 2023-10-26 NOTE — PROGRESS NOTES
[facility] requested primary RN to call  to determine if patient will be a  case. Primary RN called  and  determined that patient will not be a  case.

## 2023-10-27 NOTE — PLAN OF CARE
Problem: Respiratory - Adult  Goal: Achieves optimal ventilation and oxygenation  10/26/2023 2009 by Janel Mon RN  Outcome: Progressing  10/26/2023 2009 by Lizeth Lr, [facility]  Flowsheets (Taken 10/26/2023 2009)  Achieves optimal ventilation and oxygenation:   Assess for changes in respiratory status   Assess the need for suctioning and aspirate as needed   Respiratory therapy support as indicated   Assess for changes in mentation and behavior   Oxygen supplementation based on oxygen saturation or arterial blood gases  10/26/2023 0910 by General Karel RN  Outcome: Progressing     Problem: Discharge Planning  Goal: Discharge to home or other facility with appropriate resources  10/26/2023 2009 by Janel Mon RN  Outcome: Progressing  10/26/2023 0910 by General Karel RN  Outcome: Progressing     Problem: Pain  Goal: Verbalizes/displays adequate comfort level or baseline comfort level  10/26/2023 2009 by Janel Mon RN  Outcome: Progressing  10/26/2023 0910 by General Karel RN  Outcome: Progressing     Problem: Skin/Tissue Integrity  Goal: Absence of new skin breakdown  Description: 1. Monitor for areas of redness and/or skin breakdown  2. Assess vascular access sites hourly  3. Every 4-6 hours minimum:  Change oxygen saturation probe site  4. Every 4-6 hours:  If on nasal continuous positive airway pressure, respiratory therapy assess nares and determine need for appliance change or resting period.   10/26/2023 2009 by Janel Mon RN  Outcome: Progressing  10/26/2023 0910 by General Karel RN  Outcome: Progressing     Problem: Safety - Adult  Goal: Free from fall injury  10/26/2023 2009 by Janel Mon RN  Outcome: Progressing  10/26/2023 0910 by General Karel RN  Outcome: Progressing     Problem: ABCDS Injury Assessment  Goal: Absence of physical injury  10/26/2023 2009 by Janel Mon RN  Outcome: Progressing  10/26/2023 0910 by General Karel RN  Outcome: Progressing

## 2023-10-27 NOTE — PROGRESS NOTES
Patient extubated per physician order. Patient extubated in usual fashion. Patient placed on  room air.      Kirsten Whitman RCP   2:49 PM

## 2023-10-27 NOTE — PROGRESS NOTES
Comprehensive Metabolic Profile:   Recent Labs     10/25/23  0557 10/25/23  0840 10/26/23  0251 10/26/23  1106 10/27/23  0432      < > 137 137 142   K 4.1   < > 4.2 3.5* 3.8   CL 95*   < > 94* 92* 94*   CO2 26   < > 29 28 34*   BUN 76*  --  81* 80* 83*   CREATININE 4.4*  --  4.5* 4.5* 4.0*   GLUCOSE 166*  --  447* 233* 150*   CALCIUM 7.4*  --  7.2* 7.2* 7.3*   PROT 4.8*  --  5.0* 4.9*  --    LABALBU 2.4*  --  2.2* 1.7*  --    BILITOT 0.3  --  0.3 0.3  --    ALKPHOS 136*  --  230* 204*  --    AST 86*  --  66* 62*  --    ALT 80*  --  58* 54*  --     < > = values in this interval not displayed. Magnesium:   Lab Results   Component Value Date/Time    MG 2.1 10/26/2023 11:06 AM    MG 2.1 10/26/2023 02:51 AM    MG 2.3 10/24/2023 02:54 AM     Phosphorus:   Lab Results   Component Value Date/Time    PHOS 4.5 10/26/2023 11:06 AM    PHOS 4.5 10/26/2023 02:51 AM    PHOS 11.6 10/22/2023 10:46 AM     Ionized Calcium:   Lab Results   Component Value Date/Time    CAION 0.96 10/23/2023 10:00 PM    CAION 1.31 10/22/2023 10:46 AM        Urinalysis:   Lab Results   Component Value Date/Time    NITRU NEGATIVE 10/26/2023 05:37 AM    COLORU Yellow 10/26/2023 05:37 AM    PHUR 5.5 10/26/2023 05:37 AM    WBCUA 10 TO 20 10/26/2023 05:37 AM    RBCUA 0 TO 2 10/26/2023 05:37 AM    BACTERIA None 10/26/2023 05:37 AM    SPECGRAV 1.013 10/26/2023 05:37 AM    LEUKOCYTESUR TRACE 10/26/2023 05:37 AM    UROBILINOGEN Normal 10/26/2023 05:37 AM    BILIRUBINUR NEGATIVE 10/26/2023 05:37 AM    GLUCOSEU TRACE 10/26/2023 05:37 AM    KETUA NEGATIVE 10/26/2023 05:37 AM       HgBA1c:    Lab Results   Component Value Date/Time    LABA1C 8.0 10/22/2023 12:17 PM     TSH:  No results found for: \"TSH\"  Lactic Acid: No results found for: \"LACTA\"   Troponin: Invalid input(s):  \"TROPONIN\"    Microbiology:  Blood culture: Negativex2  Ucx: Negative  MRSA: Negative    Radiology/Imaging:  XR CHEST PORTABLE    Result Date: 10/23/2023  Scattered infiltrates gtt  Diet:Diet NPO  Defer tube feeds at this time      Renal/Fluid/Electrolyte  IV Fluids: bicarb at 50mL/Hr   I/O: In: 3572 [I.V.:3271.9]  Out: 5770 [Urine:5610]  Monitor electrolytes, replace PRN   Cr 4.0 (no change from yesterday) , BUN 83   K 3.8, Na 142    ID  Antimicrobials: Unasyn q12hr, concern for aspiration    Hematology:  Hb 8.9 (stable) , Plt 119  On heparin gtt for NSTEMI    Endocrine:   DKA resolved  glucose controlled - most recent BGL is 160  Will continue insulin gtt due to hyperglycemia when off    Prophylaxis  DVT - heparin gtt  GI - protonix BID    Discharge Needs:  withdrawal of care    CODE STATUS: DNR-CC      Roseline Mccall MD  Emergency Medicine Resident PGY-2  10/27/2023 5:38 PM   the accuracy of this automated transcription, some errors in transcription may have occurred.

## 2023-10-27 NOTE — DEATH NOTES
[address] [facility]      Critical Care Service    INPATIENT DEATH SUMMARY      PATIENT IDENTIFICATION:  NAME:  Ragini Mccracken   :   1960  MRN:    [id number]     Acct:    [de-identified]   Admit Date:  10/22/2023  Discharge date:  10/27/2023  4:44 PM   Attending Provider: No att. providers found                                     Principal Problem:    Cardiac arrest ([address])  Active Problems:    Type 2 diabetes mellitus with hyperglycemia ([address])    Hyperkalemia    Hyperphosphatemia    Shock ([address])    Encounter for palliative care    Goals of care, counseling/discussion    Cardiopulmonary arrest ([address])  Resolved Problems:    * No resolved hospital problems. *       REASON FOR HOSPITALIZATION:   Chief Complaint   Patient presents with    [address] [place] [address] is a 66 y.o. F with history of of hypertension, CHF, CVA, IDDM who presents to the emergency department in cardiac arrest.  As per EMS report, patient's last known well was last evening. She was found by family in the basement, unresponsive. Was unresponsive for EMS. On route to the emergency department, patient did experience V-fib arrest.  Compressions were started. Patient was shocked once. On arrival to the emergency room, patient did have CPR performed for 20 minutes. Epi was given, amiodarone was given, patient was defibrillated multiple times, bicarb was given, calcium chloride was given. Point-of-care showed elevated potassium and significantly elevated glucose. Initial post-arrest labs in the ED show:  WBC 32.6, Hgb 10.9,   Glucose 1429, K 6.5, Na 121 [142 corrected], Cr 2.5  Mg 2.8, Phos 11.6  ,   PT 1.7, PTT 46.6     Postarrest ECG shows ST depression in lateral leads, trop 383. Cardiology was consulted at the time, recommending to trend troponins and heparin gtt, not a Cath Lab candidate. ED team did discuss with cardiology, no recommendations regarding cooling.

## 2023-10-27 NOTE — DEATH NOTES
Death Pronouncement Note  Patient's Name: Naresh Jane   Patient's YOB: 1960  MRN Number: [id number]    Admitting Provider: Ashlyn Allen MD  Attending Provider: Ashlyn Allen MD    Patient was examined and the following were absent: Pulses, Blood Pressure, and Respiratory effort    I declared the patient dead on 10/27/2023 at 3:11 PM    Preliminary Cause of Death: Cardiopulmonary arrest [facility])     Electronically signed by Yanira Lea MD on 10/27/23 at 3:14 PM EDT

## 2023-10-27 NOTE — PROGRESS NOTES
Palliative Care Progress Note    NAME:  Ragini Mccracken  MEDICAL RECORD NUMBER:  [id number]  AGE: 66 y.o. GENDER: female  : 1960  TODAY'S DATE:  10/27/2023    Reason for Consult:  goals of care      Plan         Palliative Interaction: Called to bedside by RN. Family present and ready to proceed with comfort care. Patient mother and sister, as well as several other family members present. Explained to family the process of withdrawing care. Family verbalizing understanding and ready to proceed. DNR-CC documentation completed and comfort care orders placed at this time. Education/support to staff  Education/support to family  Education/support to patient  Code status clarified: [facility]      Principle Problem/Diagnosis:  Cardiac arrest [facility])    Additional Assessments:    1- Symptom management/ pain control     Pain Assessment:  The patient is not having any pain. Anxiety:  none                          Dyspnea:   Remains on vent                               We feel the patient symptoms are being controlled. her current regimen is reviewed by myself and discussed with the staff. 2- Goals of care evaluation   The patient goals of care are provide comfort care/support/palliation/relieve suffering   Goals of care discussed with:    [] Patient independently    [] Patient and Family    [x] Family or Healthcare DPOA independently    [] Unable to discuss with patient, family/DPOA not present    3- Code Status  DNR-CC    4- Other recommendations  - We will continue to provide comfort and support to the patient and the family      History of Present Illness     The patient is a 66 y.o. Non- / non  female who presents with Cardiac Arrest      Referred to Palliative Care by  [x] Physician   [] Nursing  [] Family Request   [] Other:       She was admitted to the Critical Care service for Cardiac arrest ([address]) [I46.9].  Her hospital course has been associated with Cardiac arrest, arrest  Decision Support Exception - unselect if not a suspected or confirmed  emergency medical condition->Emergency Medical Condition ([place])    FINDINGS:  BRAIN/VENTRICLES: There is no acute intracranial hemorrhage, mass effect or  midline shift. No abnormal extra-axial fluid collection. The gray-white  differentiation is maintained without evidence of an acute infarct. There is  no evidence of hydrocephalus. ORBITS: The visualized portion of the orbits demonstrate no acute abnormality. SINUSES: The visualized paranasal sinuses and mastoid air cells demonstrate  no acute abnormality. SOFT TISSUES/SKULL:  No acute abnormality of the visualized skull or soft  tissues. Impression  1. No acute intracranial abnormality. Xray Result (most recent):  XR CHEST PORTABLE 10/26/2023    Narrative  EXAMINATION:  ONE XRAY VIEW OF THE CHEST    10/26/2023 12:38 am    COMPARISON:  Chest x-ray 10/23/2023. CT PE protocol 10/22/2023    HISTORY:  ORDERING SYSTEM PROVIDED HISTORY: eval for pneumonia  TECHNOLOGIST PROVIDED HISTORY:  eval for pneumonia    FINDINGS:  Mildly increased right perihilar patchy opacities, concerning for worsening  aspiration/pneumonia. Stable left perihilar/basilar airspace disease. Cardiomediastinal silhouette is unchanged. No pleural effusion. No  pneumothorax. Bony structures are unremarkable. Stable endotracheal tube. Enteric tube courses below diaphragm. Impression  Mildly increased right perihilar patchy opacities, concerning for worsening  aspiration/pneumonia. Stable left perihilar/basilar airspace disease. MRI Result (most recent):  No results found for this or any previous visit from the past 3650 days. No results found for this or any previous visit.        Encounter Date: 10/22/23   EKG 12 Lead   Result Value    Ventricular Rate 122    Atrial Rate 122    P-R Interval 124    QRS Duration 76    Q-T Interval 326    QTc Calculation (Bazett) 464    P Axis 69    R

## 2023-10-27 NOTE — PLAN OF CARE
Problem: Respiratory - Adult  Goal: Achieves optimal ventilation and oxygenation  10/27/2023 0724 by Ninoska Friend RN  Outcome: Not Progressing     Problem: Discharge Planning  Goal: Discharge to home or other facility with appropriate resources  10/27/2023 0724 by Ninoska Friend RN  Outcome: Not Progressing     Problem: Pain  Goal: Verbalizes/displays adequate comfort level or baseline comfort level  10/27/2023 0724 by Ninoska Friend RN  Outcome: Not Progressing     Problem: Skin/Tissue Integrity  Goal: Absence of new skin breakdown  Description: 1. Monitor for areas of redness and/or skin breakdown  2. Assess vascular access sites hourly  3. Every 4-6 hours minimum:  Change oxygen saturation probe site  4. Every 4-6 hours:  If on nasal continuous positive airway pressure, respiratory therapy assess nares and determine need for appliance change or resting period. 10/27/2023 0724 by Ninoska Friend RN  Outcome: Not Progressing     Problem: Safety - Adult  Goal: Free from fall injury  10/27/2023 0724 by Ninoska Friend RN  Outcome: Not Progressing     Problem: ABCDS Injury Assessment  Goal: Absence of physical injury  10/27/2023 0724 by Ninoska Friend RN  Outcome: Not Progressing     Problem: Nutrition Deficit:  Goal: Optimize nutritional status  10/27/2023 0724 by Ninoska Friend RN  Outcome: Not Progressing     Problem:  Thermoregulation - [place]/Pediatrics  Goal: Maintains normal body temperature  10/27/2023 0724 by Ninoska Friend RN  Outcome: Not Progressing     Problem: Chronic Conditions and Co-morbidities  Goal: Patient's chronic conditions and co-morbidity symptoms are monitored and maintained or improved  10/27/2023 0724 by Ninoska Friend RN  Outcome: Not Progressing     Problem: Respiratory - Adult  Goal: Achieves optimal ventilation and oxygenation  10/27/2023 0724 by Ninoska Friend RN  Outcome: Not Progressing  10/26/2023 2009 by Wero Castellon RN  Outcome: Progressing  10/26/2023 2009 by Lizeth Lr, RCP  Flowsheets (Taken 10/26/2023 2009)  Achieves optimal ventilation and oxygenation:   Assess for changes in respiratory status   Assess the need for suctioning and aspirate as needed   Respiratory therapy support as indicated   Assess for changes in mentation and behavior   Oxygen supplementation based on oxygen saturation or arterial blood gases     Problem: Discharge Planning  Goal: Discharge to home or other facility with appropriate resources  10/27/2023 0724 by Willi Chavez RN  Outcome: Not Progressing  10/26/2023 2009 by Wale Alexandre RN  Outcome: Progressing     Problem: Pain  Goal: Verbalizes/displays adequate comfort level or baseline comfort level  10/27/2023 0724 by Willi Chavez RN  Outcome: Not Progressing  10/26/2023 2009 by Wale Alexandre RN  Outcome: Progressing     Problem: Skin/Tissue Integrity  Goal: Absence of new skin breakdown  Description: 1. Monitor for areas of redness and/or skin breakdown  2. Assess vascular access sites hourly  3. Every 4-6 hours minimum:  Change oxygen saturation probe site  4. Every 4-6 hours:  If on nasal continuous positive airway pressure, respiratory therapy assess nares and determine need for appliance change or resting period. 10/27/2023 0724 by Willi Chavez RN  Outcome: Not Progressing  10/26/2023 2009 by Wale Alexandre RN  Outcome: Progressing     Problem: Safety - Adult  Goal: Free from fall injury  10/27/2023 0724 by Willi Chavez RN  Outcome: Not Progressing  10/26/2023 2009 by Wale Alexandre RN  Outcome: Progressing     Problem: ABCDS Injury Assessment  Goal: Absence of physical injury  10/27/2023 0724 by Willi Chavez RN  Outcome: Not Progressing  10/26/2023 2009 by Wale Alexandre RN  Outcome: Progressing     Problem: Nutrition Deficit:  Goal: Optimize nutritional status  10/27/2023 0724 by Willi Chavez RN  Outcome: Not Progressing  10/26/2023 2009 by Wale Alexandre RN  Outcome: Progressing     Problem:  Thermoregulation -

## 2023-10-27 NOTE — PROGRESS NOTES
Nutrition Assessment     Type and Reason for Visit: Reassess    Nutrition Recommendations/Plan:   RD to sign off at this time. Please consult as needed. Nutrition Assessment:  Pt's code status changed to [facility] and pt extubated. RD will sign off at this time. Please consult as needed.     Current Nutrition Therapies:    Diet NPO    Anthropometric Measures:  Height: 162.6 cm (5' 4.02\")  Current Body Wt: 94.2 kg (207 lb 10.8 oz)   BMI: 35.6    Nutrition Diagnosis:   Inadequate oral intake related to impaired respiratory function, acute injury/trauma as evidenced by NPO or clear liquid status due to medical condition    Nutrition Interventions:   Food and/or Nutrient Delivery: Continue NPO  Nutrition Education/Counseling: No recommendation at this time  Coordination of Nutrition Care: Continue to monitor while inpatient  Plan of Care discussed with: RN    Goals:  Previous Goal Met: Progress towards Goal(s) Declining  Goals: Meet at least 75% of estimated needs, prior to discharge       Nutrition Monitoring and Evaluation:   Behavioral-Environmental Outcomes: None Identified  Food/Nutrient Intake Outcomes: None Identified  Physical Signs/Symptoms Outcomes: None Identified      Roberto Degroot RD, LD  Contact: [phone]

## 2023-10-27 NOTE — PROGRESS NOTES
[address] [address]   Patient Death Note  DEATH   Shift date: 10/27/2023     Shift day: Friday  Shift #: 1                 Room # 3002/3002-01   Name: Lindsay Ann            Age: 66 y.o. Gender: female          Synagogue: [address] of [employer]:   Admit Date & Time: 10/22/2023 10:18 AM     Referral: Nurse   Actual date of death: 10/27/2023   TOD: 0       SITUATION AT DEATH:  Cardiac arrest; terminal extubation. IS THIS A 'S CASE? No    SPIRITUAL/EMOTIONAL INTERVENTION:  (Please note all relevant care assessments and interventions and duties performed. Also include important family names, numbers, and dynamics)      Family Received Grief Packet? No, but will prepare one for mailing. NAME AND PHONE NUMBER OF DOCTOR SIGNING DEATH CERTIFICATE:  (full name) Dr. Pete Dear     (phone)  [phone] are now contacting the  home after a patient death.  spoke to representative of  [address] [place] [address] indicating family's choice for their services.  called  home on 10/27/2023 (date) at 67 (time). Copy of COMPLETED Release of Body Form Received? Yes    Patient's belongings: [place] was given to patient's mother.  HOME:  Name: [address] [place] [address]  Phone Number: [phone]    NEXT OF KIN:  Name: Chichi Villalobos  Relationship: [facility]  Street Address: [address]: [facility]  Zip code: [address]   Phone Number: [phone]    [address] [place] [address]? No    IF SO, WHAT?       Electronically signed by Bonita Araujo on 10/27/2023 at 4:00 PM.  [address] [place] [address]  [phone]

## 2023-10-28 NOTE — PROGRESS NOTES
Security called [facility] due to concerns regarding body pick-up by [employer]. Security states that they had no knowledge of patient being an organ donor.  was asked to confirm.  contacted [facility] who confirmed that patient was slated as a tissue and eye donor.  asked that [facility] call security to confirm assignment.  also contacted security to inform them of the information obtained.          10/27/23 1800   Encounter Summary   Encounter Overview/Reason  Follow-up   Service Provided For: Patient not available   Referral/Consult From: Other (comment)  (Security)   Support System Family members   Last Encounter  10/27/23   Complexity of Encounter Low   Begin Time 1800   End Time  1815   Total Time Calculated 15 min   Crisis   Type Follow up   Grief, Loss, and Adjustments   Type End of Life   Assessment/Intervention/Outcome   Assessment Unable to assess     Electronically signed by Marija Adair on 10/27/2023 at 9:20 PM

## 2023-10-29 LAB
MICROORGANISM SPEC CULT: NORMAL
MICROORGANISM SPEC CULT: NORMAL
SERVICE CMNT-IMP: NORMAL
SERVICE CMNT-IMP: NORMAL
SPECIMEN DESCRIPTION: NORMAL
SPECIMEN DESCRIPTION: NORMAL

## 2023-10-30 ENCOUNTER — TELEPHONE (OUTPATIENT)
Dept: PULMONOLOGY | Age: 63
End: 2023-10-30

## 2023-10-31 LAB
MICROORGANISM SPEC CULT: NORMAL
MICROORGANISM SPEC CULT: NORMAL
SERVICE CMNT-IMP: NORMAL
SERVICE CMNT-IMP: NORMAL
SPECIMEN DESCRIPTION: NORMAL
SPECIMEN DESCRIPTION: NORMAL

## (undated) DEVICE — GLOVE SURG SZ 75 L12IN FNGR THK87MIL WHT LTX FREE

## (undated) DEVICE — SOLUTION PREP POVIDONE IOD FOR SKIN MUCOUS MEM PRIOR TO

## (undated) DEVICE — 3M™ STERI-STRIP™ COMPOUND BENZOIN TINCTURE 40 BAGS/CARTON 4 CARTONS/CASE C1544: Brand: 3M™ STERI-STRIP™

## (undated) DEVICE — Device

## (undated) DEVICE — MEDICINE CUP, GRADUATED, STER: Brand: MEDLINE

## (undated) DEVICE — YANKAUER,FLEXIBLE HANDLE,REGLR CAPACITY: Brand: MEDLINE INDUSTRIES, INC.

## (undated) DEVICE — CLIP LIG M TI 6 SIL HNDL FOR OPN AND ENDOSCP SGL APPL

## (undated) DEVICE — MEDI-VAC SUCTION HANDLE REGULAR CAPACITY: Brand: CARDINAL HEALTH

## (undated) DEVICE — 3M™ WARMING BLANKET, UPPER BODY, 10 PER CASE, 42268: Brand: BAIR HUGGER™

## (undated) DEVICE — SYRINGE MED 50ML LUERLOCK TIP

## (undated) DEVICE — POOLE SUCTION HANDLE: Brand: CARDINAL HEALTH

## (undated) DEVICE — CO2 CANNULA,SUPERSOFT, ADLT,7'O2,7'CO2: Brand: MEDLINE

## (undated) DEVICE — BLOCK BITE 60FR RUBBER ADLT DENTAL

## (undated) DEVICE — FORCEPS BX L240CM WRK CHN 2.8MM STD CAP W/ NDL MIC MESH

## (undated) DEVICE — Device: Brand: DEFENDO VALVE AND CONNECTOR KIT

## (undated) DEVICE — GOWN,AURORA,NONREINFORCED,LARGE: Brand: MEDLINE

## (undated) DEVICE — GAUZE,SPONGE,FLUFF,6"X6.75",STRL,5/TRAY: Brand: MEDLINE

## (undated) DEVICE — ADAPTER TBNG LUER STUB 15 GA INTMED

## (undated) DEVICE — PAD,ABDOMINAL,5"X9",ST,LF,25/BX: Brand: MEDLINE INDUSTRIES, INC.

## (undated) DEVICE — GLOVE SURG SZ 75 CRM LTX FREE POLYISOPRENE POLYMER BEAD ANTI

## (undated) DEVICE — CUP MED 1OZ CLR POLYPR FEED GRAD W/O LID

## (undated) DEVICE — INTENDED FOR TISSUE SEPARATION, AND OTHER PROCEDURES THAT REQUIRE A SHARP SURGICAL BLADE TO PUNCTURE OR CUT.: Brand: BARD-PARKER ® CARBON RIB-BACK BLADES

## (undated) DEVICE — HERNIA PK

## (undated) DEVICE — ELECTRODE PT RET AD L9FT HI MOIST COND ADH HYDRGEL CORDED

## (undated) DEVICE — BASIN EMSIS 700ML GRAPHITE PLAS KID SHP GRAD

## (undated) DEVICE — GAUZE,SPONGE,4"X4",16PLY,STRL,LF,10/TRAY: Brand: MEDLINE

## (undated) DEVICE — TRAP SURG QUAD PARABOLA SLOT DSGN SFTY SCRN TRAPEASE

## (undated) DEVICE — CHLORAPREP 26ML ORANGE

## (undated) DEVICE — GAUZE,SPONGE,4"X4",16PLY,XRAY,STRL,LF: Brand: MEDLINE

## (undated) DEVICE — BINDER ABD UNIV H9IN WAIST 45-62IN E SFT COT PREM 3 PNL

## (undated) DEVICE — HYPODERMIC SAFETY NEEDLE: Brand: MAGELLAN

## (undated) DEVICE — TUBING, SUCTION, 1/4" X 12', STRAIGHT: Brand: MEDLINE

## (undated) DEVICE — JELLY,LUBE,STERILE,FLIP TOP,TUBE,2-OZ: Brand: MEDLINE

## (undated) DEVICE — GARMENT COMPR STD FOR 17IN CALF UNIF THER FLOTRN

## (undated) DEVICE — MEDI-VAC NON-CONDUCTIVE SUCTION TUBING: Brand: CARDINAL HEALTH

## (undated) DEVICE — 3M™ PRECISE™ VISTA DISPOSABLE SKIN STAPLER 3995: Brand: 3M™ PRECISE™

## (undated) DEVICE — ADHESIVE SKIN CLSR 0.7ML TOP DERMBND ADV

## (undated) DEVICE — PROTECTOR ULN NRV PUR FOAM HK LOOP STRP ANATOMICALLY

## (undated) DEVICE — NEEDLE HYPO 25GA L1.5IN BLU POLYPR HUB S STL REG BVL STR

## (undated) DEVICE — TOWEL,OR,DSP,ST,BLUE,DLX,XR,4/PK,20PK/CS: Brand: MEDLINE